# Patient Record
Sex: FEMALE | Race: OTHER | HISPANIC OR LATINO
[De-identification: names, ages, dates, MRNs, and addresses within clinical notes are randomized per-mention and may not be internally consistent; named-entity substitution may affect disease eponyms.]

---

## 2019-12-15 ENCOUNTER — TRANSCRIPTION ENCOUNTER (OUTPATIENT)
Age: 44
End: 2019-12-15

## 2020-05-26 ENCOUNTER — APPOINTMENT (OUTPATIENT)
Dept: UROLOGY | Facility: CLINIC | Age: 45
End: 2020-05-26
Payer: MEDICAID

## 2020-05-26 ENCOUNTER — OUTPATIENT (OUTPATIENT)
Dept: OUTPATIENT SERVICES | Facility: HOSPITAL | Age: 45
LOS: 1 days | End: 2020-05-26
Payer: MEDICAID

## 2020-05-26 VITALS
HEIGHT: 64 IN | TEMPERATURE: 98.3 F | WEIGHT: 238 LBS | HEART RATE: 79 BPM | DIASTOLIC BLOOD PRESSURE: 67 MMHG | BODY MASS INDEX: 40.63 KG/M2 | SYSTOLIC BLOOD PRESSURE: 100 MMHG

## 2020-05-26 DIAGNOSIS — E21.3 HYPERPARATHYROIDISM, UNSPECIFIED: ICD-10-CM

## 2020-05-26 PROCEDURE — 76775 US EXAM ABDO BACK WALL LIM: CPT | Mod: 26

## 2020-05-26 PROCEDURE — 99204 OFFICE O/P NEW MOD 45 MIN: CPT | Mod: 25

## 2020-05-26 PROCEDURE — 76775 US EXAM ABDO BACK WALL LIM: CPT

## 2020-05-26 NOTE — REVIEW OF SYSTEMS
[Feeling Tired] : feeling tired [Recent Weight Gain (___ Lbs)] : recent [unfilled] ~Ulb weight gain [Abdominal Pain] : abdominal pain [Constipation] : constipation [History of kidney stones] : history of kidney stones [Bladder pressure] : experiences bladder pressure [Strain or push to urinate] : strain or push to urinate [Itching] : itching [Negative] : Heme/Lymph

## 2020-05-26 NOTE — PHYSICAL EXAM
[General Appearance - Well Developed] : well developed [General Appearance - Well Nourished] : well nourished [Normal Appearance] : normal appearance [Well Groomed] : well groomed [General Appearance - In No Acute Distress] : no acute distress [Heart Rate And Rhythm] : Heart rate and rhythm were normal [Edema] : no peripheral edema [Exaggerated Use Of Accessory Muscles For Inspiration] : no accessory muscle use [Respiration, Rhythm And Depth] : normal respiratory rhythm and effort [Abdomen Soft] : soft [Abdomen Tenderness] : non-tender [Costovertebral Angle Tenderness] : no ~M costovertebral angle tenderness [Urinary Bladder Findings] : the bladder was normal on palpation [Normal Station and Gait] : the gait and station were normal for the patient's age [] : no rash [No Focal Deficits] : no focal deficits [Oriented To Time, Place, And Person] : oriented to person, place, and time [Affect] : the affect was normal [Mood] : the mood was normal [Not Anxious] : not anxious [No Palpable Adenopathy] : no palpable adenopathy [FreeTextEntry1] : soft and non tender.  Mild left CVA tenderness

## 2020-05-26 NOTE — ADDENDUM
[FreeTextEntry1] : This note was authored by Tracy Martinez working as scribe for Dr. González Cole. I, Dr. González Cole, have reviewed the content of this note and confirm it is true and accurate. I personally performed the history and physical examination and made all the decisions.\par 05/26/2020

## 2020-05-26 NOTE — ASSESSMENT
[FreeTextEntry1] : 05/26/2020: 45 year old female presents today for initial evaluation. Reports back pain on the left side in the lumbar area. Pt is experiencing suprapubic pain and pressure and increased levels of urine. This pressure began around 4 months ago. No vision problems. Bilateral upper chest discomfort. Reports itch skin in the face area. Denies depression and excess anxiety. Had an episode of stress incontinence with a large  amount of urine. Normally no urgency. Hx of kidney stones. Hx of parathyroid surgery in December 2000 and high calcium. \par \par US renal today showed there is a 1.2 cm x 1.4 cm x 0.9 cm hyperechoic nonvascular lesion in the upper pole of the left kidney. Both kidneys are normal in size and echogenicity without obvious stones, hydronephrosis visualized. Cyst is benign.\par \par Pt will provide a urine sample today for culture and analysis. \par \par Advised pt to discuss chest discomfort with PCP\par \par Bloodwork today will include Basic Metabolic Panel and Parathyroid Hormone Intact. \par \par Pt will have a pregnancy blood test and CT in two days if the results are negative.

## 2020-05-26 NOTE — REVIEW OF SYSTEMS
[Feeling Tired] : feeling tired [Recent Weight Gain (___ Lbs)] : recent [unfilled] ~Ulb weight gain [Abdominal Pain] : abdominal pain [Constipation] : constipation [History of kidney stones] : history of kidney stones [Bladder pressure] : experiences bladder pressure [Strain or push to urinate] : strain or push to urinate [Itching] : itching [Negative] : Endocrine

## 2020-05-26 NOTE — ADDENDUM
[FreeTextEntry1] : This note was authored by Tracy Martinez working as scribe for Dr. González Cole. I, Dr. Goznález Cole, have reviewed the content of this note and confirm it is true and accurate. I personally performed the history and physical examination and made all the decisions.\par 05/26/2020

## 2020-05-26 NOTE — LETTER BODY
[Dear  ___] : Dear  [unfilled], [Please see my note below.] : Please see my note below. [Consult Letter:] : I had the pleasure of evaluating your patient, [unfilled]. [Consult Closing:] : Thank you very much for allowing me to participate in the care of this patient.  If you have any questions, please do not hesitate to contact me. [Sincerely,] : Sincerely, [FreeTextEntry2] : Dr. Jose Grijalva\par [address] [facility] [address] \[place], [address] [FreeTextEntry1] : 05/26/2020: 45 year old female presents today for initial evaluation. Reports back pain on the left side in the lumbar area. Pt is experiencing suprapubic pain and pressure and increased levels of urine. This pressure began around 4 months ago. No vision problems. Bilateral upper chest discomfort. Reports itch skin in the face area. Denies depression and excess anxiety. Had an episode of stress incontinence with a large  amount of urine. Normally no urgency. Hx of kidney stones. Hx of parathyroid surgery in December 2000 and high calcium. \par \par US renal today showed there is a 1.2 cm x 1.4 cm x 0.9 cm hyperechoic nonvascular lesion in the upper pole of the left kidney. Both kidneys are normal in size and echogenicity without obvious stones, hydronephrosis visualized. Cyst is benign.\par \par Pt will provide a urine sample today for culture and analysis. \par \par Advised pt to discuss chest discomfort with PCP\par \par Bloodwork today will include Basic Metabolic Panel and Parathyroid Hormone Intact. \par \par Pt will have a pregnancy blood test and CT in two days if the results are negative.  [FreeTextEntry3] : Dr. González Cole

## 2020-05-26 NOTE — LETTER BODY
[Dear  ___] : Dear  [unfilled], [Please see my note below.] : Please see my note below. [Consult Letter:] : I had the pleasure of evaluating your patient, [unfilled]. [Consult Closing:] : Thank you very much for allowing me to participate in the care of this patient.  If you have any questions, please do not hesitate to contact me. [Sincerely,] : Sincerely, [FreeTextEntry2] : Dr. Jose Grijalva\par [address] [facility] [address] \[place], [address] [FreeTextEntry1] : 05/26/2020: 45 year old female presents today for initial evaluation. Reports back pain on the left side in the lumbar area. Pt is experiencing suprapubic pain and pressure and increased levels of urine. This pressure began around 4 months ago. No vision problems. Bilateral upper chest discomfort. Reports itch skin in the face area. Denies depression and excess anxiety. Had an episode of stress incontinence with a large  amount of urine. Normally no urgency. Hx of kidney stones. Hx of parathyroid surgery in December 2000 and high calcium. \par \par US renal today showed there is a 1.2 cm x 1.4 cm x 0.9 cm hyperechoic nonvascular lesion in the upper pole of the left kidney. Both kidneys are normal in size and echogenicity without obvious stones, hydronephrosis visualized. Cyst is benign.\par \par Pt will provide a urine sample today for culture and analysis. \par \par Advised pt to discuss chest discomfort with PCP\par \par Bloodwork today will include Basic Metabolic Panel and Parathyroid Hormone Intact. \par \par Pt will have a pregnancy blood test and CT in two days if the results are negative.  [FreeTextEntry3] : Dr. González Cole

## 2020-05-27 DIAGNOSIS — N20.0 CALCULUS OF KIDNEY: ICD-10-CM

## 2020-05-27 DIAGNOSIS — M54.5 LOW BACK PAIN: ICD-10-CM

## 2020-05-29 ENCOUNTER — APPOINTMENT (OUTPATIENT)
Dept: CT IMAGING | Facility: IMAGING CENTER | Age: 45
End: 2020-05-29
Payer: MEDICAID

## 2020-05-29 ENCOUNTER — TRANSCRIPTION ENCOUNTER (OUTPATIENT)
Age: 45
End: 2020-05-29

## 2020-05-29 ENCOUNTER — RESULT REVIEW (OUTPATIENT)
Age: 45
End: 2020-05-29

## 2020-05-29 ENCOUNTER — OUTPATIENT (OUTPATIENT)
Dept: OUTPATIENT SERVICES | Facility: HOSPITAL | Age: 45
LOS: 1 days | End: 2020-05-29
Payer: MEDICAID

## 2020-05-29 DIAGNOSIS — M54.5 LOW BACK PAIN: ICD-10-CM

## 2020-05-29 DIAGNOSIS — N20.0 CALCULUS OF KIDNEY: ICD-10-CM

## 2020-05-29 PROCEDURE — 74178 CT ABD&PLV WO CNTR FLWD CNTR: CPT | Mod: 26

## 2020-05-29 PROCEDURE — 74178 CT ABD&PLV WO CNTR FLWD CNTR: CPT

## 2020-06-01 LAB
APPEARANCE: CLEAR
BACTERIA: ABNORMAL
BILIRUBIN URINE: NEGATIVE
BLOOD URINE: NEGATIVE
COLOR: NORMAL
GLUCOSE QUALITATIVE U: NEGATIVE
HYALINE CASTS: 0 /LPF
KETONES URINE: NEGATIVE
LEUKOCYTE ESTERASE URINE: NEGATIVE
MICROSCOPIC-UA: NORMAL
NITRITE URINE: NEGATIVE
PH URINE: 6
PROTEIN URINE: NEGATIVE
RED BLOOD CELLS URINE: 2 /HPF
SPECIFIC GRAVITY URINE: 1.02
SQUAMOUS EPITHELIAL CELLS: 3 /HPF
UROBILINOGEN URINE: NORMAL
WHITE BLOOD CELLS URINE: 1 /HPF

## 2020-06-04 ENCOUNTER — APPOINTMENT (OUTPATIENT)
Dept: UROLOGY | Facility: CLINIC | Age: 45
End: 2020-06-04
Payer: MEDICAID

## 2020-06-04 VITALS
SYSTOLIC BLOOD PRESSURE: 120 MMHG | RESPIRATION RATE: 16 BRPM | DIASTOLIC BLOOD PRESSURE: 82 MMHG | TEMPERATURE: 98.1 F | HEART RATE: 83 BPM

## 2020-06-04 VITALS — TEMPERATURE: 98.1 F

## 2020-06-04 LAB
ANION GAP SERPL CALC-SCNC: 12 MMOL/L
BUN SERPL-MCNC: 15 MG/DL
CALCIUM SERPL-MCNC: 8.8 MG/DL
CALCIUM SERPL-MCNC: 8.8 MG/DL
CHLORIDE SERPL-SCNC: 104 MMOL/L
CO2 SERPL-SCNC: 24 MMOL/L
CREAT SERPL-MCNC: 0.85 MG/DL
GLUCOSE SERPL-MCNC: 102 MG/DL
HCG SERPL QL: NEGATIVE
PAPP-A SERPL-ACNC: <1 MIU/ML
PARATHYROID HORMONE INTACT: 48 PG/ML
POTASSIUM SERPL-SCNC: 4.4 MMOL/L
SODIUM SERPL-SCNC: 140 MMOL/L
URINE CYTOLOGY: NORMAL

## 2020-06-04 PROCEDURE — 99214 OFFICE O/P EST MOD 30 MIN: CPT

## 2020-06-04 NOTE — PHYSICAL EXAM
[General Appearance - Well Nourished] : well nourished [General Appearance - Well Developed] : well developed [Normal Appearance] : normal appearance [Well Groomed] : well groomed [General Appearance - In No Acute Distress] : no acute distress [Abdomen Soft] : soft [Abdomen Tenderness] : non-tender [Costovertebral Angle Tenderness] : no ~M costovertebral angle tenderness [Urinary Bladder Findings] : the bladder was normal on palpation [Heart Rate And Rhythm] : Heart rate and rhythm were normal [Edema] : no peripheral edema [] : no respiratory distress [Respiration, Rhythm And Depth] : normal respiratory rhythm and effort [Exaggerated Use Of Accessory Muscles For Inspiration] : no accessory muscle use [Oriented To Time, Place, And Person] : oriented to person, place, and time [Affect] : the affect was normal [Mood] : the mood was normal [Not Anxious] : not anxious [Normal Station and Gait] : the gait and station were normal for the patient's age [No Focal Deficits] : no focal deficits [No Palpable Adenopathy] : no palpable adenopathy [FreeTextEntry1] : 5/5 bilaterally

## 2020-06-04 NOTE — ADDENDUM
[FreeTextEntry1] : This note was authored by Tracy Martinez working as scribe for Dr. González Cole. I, Dr. González Cole, have reviewed the content of this note and confirm it is true and accurate. I personally performed the history and physical examination and made all the decisions.\par 06/04/2020

## 2020-06-04 NOTE — LETTER BODY
[Dear  ___] : Dear  [unfilled], [Consult Letter:] : I had the pleasure of evaluating your patient, [unfilled]. [Please see my note below.] : Please see my note below. [Consult Closing:] : Thank you very much for allowing me to participate in the care of this patient.  If you have any questions, please do not hesitate to contact me. [Sincerely,] : Sincerely, [FreeTextEntry2] : Dr. Jose Grijalva\par [address] [facility] [address] \[place], [address] [FreeTextEntry1] : 05/26/2020: 45 year old female presents today for initial evaluation. Reports back pain on the left side in the lumbar area. Pt is experiencing suprapubic pain and pressure and increased levels of urine. This pressure began around 4 months ago. No vision problems. Bilateral upper chest discomfort. Reports itch skin in the face area. Denies depression and excess anxiety. Had an episode of stress incontinence with a large  amount of urine. Normally no urgency. Hx of kidney stones. Hx of parathyroid surgery in December 2000 and high calcium. \par \par US renal today showed there is a 1.2 cm x 1.4 cm x 0.9 cm hyperechoic nonvascular lesion in the upper pole of the left kidney. Both kidneys are normal in size and echogenicity without obvious stones, hydronephrosis visualized. Cyst is benign.\par \par Pt will provide a urine sample today for culture and analysis. \par \par Advised pt to discuss chest discomfort with PCP\par \par Bloodwork today will include Basic Metabolic Panel and Parathyroid Hormone Intact. \par \par Pt will have a pregnancy blood test and CT in two days if the results are negative.  [FreeTextEntry3] : Dr. González Cole

## 2020-06-04 NOTE — ASSESSMENT
Called and LM to call back in the morning of 11/15/2017. (have tasked lawson at  as well). No orders and would benefit from seeing provider rather than LAB only visit.   ~ Ceasar CRUZ (Chrissi) CMA     [FreeTextEntry1] : 05/26/2020: 45 year old female presents today for initial evaluation. Reports back pain on the left side in the lumbar area. Pt is experiencing suprapubic pain and pressure and increased levels of urine. This pressure began around 4 months ago. No vision problems. Bilateral upper chest discomfort. Reports itch skin in the face area. Denies depression and excess anxiety. Had an episode of stress incontinence with a large  amount of urine. Normally no urgency. Hx of kidney stones. Hx of parathyroid surgery in December 2000 and high calcium. US renal today showed there is a 1.2 cm x 1.4 cm x 0.9 cm hyperechoic nonvascular lesion in the upper pole of the left kidney. Both kidneys are normal in size and echogenicity without obvious stones, hydronephrosis visualized. Cyst is benign. Pt will provide a urine sample today for culture and analysis. Advised pt to discuss chest discomfort with PCP. Bloodwork today will include Basic Metabolic Panel and Parathyroid Hormone Intact. Pt will have a pregnancy blood test and CT in two days if the results are negative. \par \par 06/04/2020: Patient presents today for a follow up. US renal from 05/26/2020 shows there is a 1.2 cm x 1.4 cm x 0.9 cm hyperechoic nonvascular lesion in the upper pole of the left kidney. Both kidneys are normal in size and echogenicity without obvious stones, hydronephrosis visualized. Pregnancy test was negative. CT from 05/29/2020 shows no blockage, tumors or stones in the kidney. Bladder is normal. Pt reports continuations of abdominal pain. Hx of stones. Urine showed occasional bacteria. Pt is still on phenazopyridine. There is suprapubic pressure which is relieved when she urinates. \par \par Prescribed Amoxicillin\par \par RTO in 2 weeks.

## 2020-06-04 NOTE — HISTORY OF PRESENT ILLNESS
[FreeTextEntry1] : 05/26/2020: 45 year old female presents today for initial evaluation. Reports back pain on the left side in the lumbar area. Pt is experiencing suprapubic pain and pressure and increased levels of urine. This pressure began around 4 months ago. No vision problems. Bilateral upper chest discomfort. Reports itch skin in the face area. Denies depression and excess anxiety. Had an episode of stress incontinence with a large  amount of urine. Normally no urgency. Hx of kidney stones. Hx of parathyroid surgery in December 2000 and high calcium. \par \par 06/04/2020: Patient presents today for a follow up. US renal from 05/26/2020 shows there is a 1.2 cm x 1.4 cm x 0.9 cm hyperechoic nonvascular lesion in the upper pole of the left kidney. Both kidneys are normal in size and echogenicity without obvious stones, hydronephrosis visualized. Pregnancy test was negative. CT from 05/29/2020 shows no blockage, tumors or stones in the kidney. Bladder is normal. Pt reports continuations of abdominal pain. Hx of stones. Urine showed occasional bacteria. Pt is still on phenazopyridine. There is suprapubic pressure which is relieved when she urinates.

## 2020-06-06 LAB — BACTERIA UR CULT: ABNORMAL

## 2020-06-18 ENCOUNTER — APPOINTMENT (OUTPATIENT)
Dept: UROLOGY | Facility: CLINIC | Age: 45
End: 2020-06-18
Payer: MEDICAID

## 2020-06-18 VITALS
HEART RATE: 83 BPM | BODY MASS INDEX: 40.63 KG/M2 | SYSTOLIC BLOOD PRESSURE: 107 MMHG | WEIGHT: 238 LBS | HEIGHT: 64 IN | RESPIRATION RATE: 16 BRPM | DIASTOLIC BLOOD PRESSURE: 72 MMHG

## 2020-06-18 VITALS — TEMPERATURE: 98.2 F

## 2020-06-18 PROCEDURE — 99214 OFFICE O/P EST MOD 30 MIN: CPT

## 2020-06-18 NOTE — LETTER BODY
[Consult Letter:] : I had the pleasure of evaluating your patient, [unfilled]. [Dear  ___] : Dear  [unfilled], [Please see my note below.] : Please see my note below. [Sincerely,] : Sincerely, [Consult Closing:] : Thank you very much for allowing me to participate in the care of this patient.  If you have any questions, please do not hesitate to contact me. [FreeTextEntry2] : Dr. Jose Grijalva\par [address] [facility] [address] \[place], [address] [FreeTextEntry1] : 05/26/2020: 45 year old female presents today for initial evaluation. Reports back pain on the left side in the lumbar area. Pt is experiencing suprapubic pain and pressure and increased levels of urine. This pressure began around 4 months ago. No vision problems. Bilateral upper chest discomfort. Reports itch skin in the face area. Denies depression and excess anxiety. Had an episode of stress incontinence with a large  amount of urine. Normally no urgency. Hx of kidney stones. Hx of parathyroid surgery in December 2000 and high calcium. \par \par US renal today showed there is a 1.2 cm x 1.4 cm x 0.9 cm hyperechoic nonvascular lesion in the upper pole of the left kidney. Both kidneys are normal in size and echogenicity without obvious stones, hydronephrosis visualized. Cyst is benign.\par \par Pt will provide a urine sample today for culture and analysis. \par \par Advised pt to discuss chest discomfort with PCP\par \par Bloodwork today will include Basic Metabolic Panel and Parathyroid Hormone Intact. \par \par Pt will have a pregnancy blood test and CT in two days if the results are negative.  [FreeTextEntry3] : Dr. González Cole

## 2020-06-18 NOTE — ADDENDUM
[FreeTextEntry1] : This note was authored by Tracy Martinez working as scribe for Dr. González Cole. I, Dr. González Cole, have reviewed the content of this note and confirm it is true and accurate. I personally performed the history and physical examination and made all the decisions.\par 06/18/2020

## 2020-06-18 NOTE — HISTORY OF PRESENT ILLNESS
[FreeTextEntry1] : 05/26/2020: 45 year old female presents today for initial evaluation. Reports back pain on the left side in the lumbar area. Pt is experiencing suprapubic pain and pressure and increased levels of urine. This pressure began around 4 months ago. No vision problems. Bilateral upper chest discomfort. Reports itch skin in the face area. Denies depression and excess anxiety. Had an episode of stress incontinence with a large  amount of urine. Normally no urgency. Hx of kidney stones. Hx of parathyroid surgery in December 2000 and high calcium. \par 06/04/2020: Patient presents today for a follow up. US renal from 05/26/2020 shows there is a 1.2 cm x 1.4 cm x 0.9 cm hyperechoic nonvascular lesion in the upper pole of the left kidney. Both kidneys are normal in size and echogenicity without obvious stones, hydronephrosis visualized. Pregnancy test was negative. CT from 05/29/2020 shows no blockage, tumors or stones in the kidney. Bladder is normal. Pt reports continuations of abdominal pain. Hx of stones. Urine showed occasional bacteria. Pt is still on phenazopyridine. There is suprapubic pressure which is relieved when she urinates. \par \par 06/18/2020: Patient presents today for a follow up. Pt works as a  for a baking delivery service. Pt finished Amoxicillin TID. She believes it has helped. She finished it yesterday. Last night, her suprapubic pressure returned. While she was on Amoxicillin, her pressure was milder but still present and stronger at night. She drank water and forced herself to void. She reports some difficulty in voiding. Once she voided some of the pressure was relieved, but came back. Never a smoker. Creatinine was 0.84 on 05/26/2020.

## 2020-06-18 NOTE — ASSESSMENT
[FreeTextEntry1] : 05/26/2020: 45 year old female presents today for initial evaluation. Reports back pain on the left side in the lumbar area. Pt is experiencing suprapubic pain and pressure and increased levels of urine. This pressure began around 4 months ago. No vision problems. Bilateral upper chest discomfort. Reports itch skin in the face area. Denies depression and excess anxiety. Had an episode of stress incontinence with a large  amount of urine. Normally no urgency. Hx of kidney stones. Hx of parathyroid surgery in December 2000 and high calcium. US renal today showed there is a 1.2 cm x 1.4 cm x 0.9 cm hyperechoic nonvascular lesion in the upper pole of the left kidney. Both kidneys are normal in size and echogenicity without obvious stones, hydronephrosis visualized. Cyst is benign. Pt will provide a urine sample today for culture and analysis. Advised pt to discuss chest discomfort with PCP. Bloodwork today will include Basic Metabolic Panel and Parathyroid Hormone Intact. Pt will have a pregnancy blood test and CT in two days if the results are negative. \par 06/04/2020: Patient presents today for a follow up. US renal from 05/26/2020 shows there is a 1.2 cm x 1.4 cm x 0.9 cm hyperechoic nonvascular lesion in the upper pole of the left kidney. Both kidneys are normal in size and echogenicity without obvious stones, hydronephrosis visualized. Pregnancy test was negative. CT from 05/29/2020 shows no blockage, tumors or stones in the kidney. Bladder is normal. Pt reports continuations of abdominal pain. Hx of stones. Urine showed occasional bacteria. Pt is still on phenazopyridine. There is suprapubic pressure which is relieved when she urinates. Prescribed Amoxicillin. RTO in 2 weeks. \par \par 06/18/2020: Patient presents today for a follow up. Pt works as a  for a baking delivery service. Pt finished Amoxicillin TID. She believes it has helped. She finished it yesterday. Last night, her suprapubic pressure returned. While she was on Amoxicillin, her pressure was milder but still present and stronger at night. She drank water and forced herself to void. She reports some difficulty in voiding. Once she voided some of the pressure was relieved, but came back. Never a smoker. Creatinine was 0.84 on 05/26/2020. \par \par Prescribed Nitrofurantoin qhs and Tamsulosin after dinner. \par \par Pt will provide a urine sample today for culture, cytology and analysis. \par \par Scheduled cystoscopy for next week.

## 2020-06-18 NOTE — PHYSICAL EXAM
[General Appearance - Well Developed] : well developed [General Appearance - Well Nourished] : well nourished [Well Groomed] : well groomed [Normal Appearance] : normal appearance [General Appearance - In No Acute Distress] : no acute distress [Abdomen Tenderness] : non-tender [Abdomen Soft] : soft [Costovertebral Angle Tenderness] : no ~M costovertebral angle tenderness [Urinary Bladder Findings] : the bladder was normal on palpation [Heart Rate And Rhythm] : Heart rate and rhythm were normal [Edema] : no peripheral edema [] : no respiratory distress [Respiration, Rhythm And Depth] : normal respiratory rhythm and effort [Exaggerated Use Of Accessory Muscles For Inspiration] : no accessory muscle use [Oriented To Time, Place, And Person] : oriented to person, place, and time [Affect] : the affect was normal [Mood] : the mood was normal [Not Anxious] : not anxious [Normal Station and Gait] : the gait and station were normal for the patient's age [No Focal Deficits] : no focal deficits [No Palpable Adenopathy] : no palpable adenopathy [FreeTextEntry1] : 5/5 bilaterally

## 2020-06-21 LAB
APPEARANCE: CLEAR
BACTERIA UR CULT: NORMAL
BACTERIA: NEGATIVE
BILIRUBIN URINE: NEGATIVE
BLOOD URINE: NEGATIVE
COLOR: YELLOW
GLUCOSE QUALITATIVE U: NEGATIVE
HYALINE CASTS: 1 /LPF
KETONES URINE: NEGATIVE
LEUKOCYTE ESTERASE URINE: NEGATIVE
MICROSCOPIC-UA: NORMAL
NITRITE URINE: NEGATIVE
PH URINE: 6.5
PROTEIN URINE: NEGATIVE
RED BLOOD CELLS URINE: 2 /HPF
SPECIFIC GRAVITY URINE: 1.02
SQUAMOUS EPITHELIAL CELLS: 1 /HPF
URINE CYTOLOGY: NORMAL
UROBILINOGEN URINE: NORMAL
WHITE BLOOD CELLS URINE: 0 /HPF

## 2020-06-29 ENCOUNTER — APPOINTMENT (OUTPATIENT)
Dept: UROLOGY | Facility: CLINIC | Age: 45
End: 2020-06-29
Payer: MEDICAID

## 2020-06-29 VITALS — RESPIRATION RATE: 16 BRPM | HEART RATE: 94 BPM | SYSTOLIC BLOOD PRESSURE: 127 MMHG | DIASTOLIC BLOOD PRESSURE: 73 MMHG

## 2020-06-29 VITALS — TEMPERATURE: 97.7 F

## 2020-06-29 DIAGNOSIS — N34.2 OTHER URETHRITIS: ICD-10-CM

## 2020-06-29 PROCEDURE — 99214 OFFICE O/P EST MOD 30 MIN: CPT

## 2020-06-29 NOTE — HISTORY OF PRESENT ILLNESS
[FreeTextEntry1] : 05/26/2020: 45 year old female presents today for initial evaluation. Reports back pain on the left side in the lumbar area. Pt is experiencing suprapubic pain and pressure and increased levels of urine. This pressure began around 4 months ago. No vision problems. Bilateral upper chest discomfort. Reports itch skin in the face area. Denies depression and excess anxiety. Had an episode of stress incontinence with a large  amount of urine. Normally no urgency. Hx of kidney stones. Hx of parathyroid surgery in December 2000 and high calcium. \par 06/04/2020: Patient presents today for a follow up. US renal from 05/26/2020 shows there is a 1.2 cm x 1.4 cm x 0.9 cm hyperechoic nonvascular lesion in the upper pole of the left kidney. Both kidneys are normal in size and echogenicity without obvious stones, hydronephrosis visualized. Pregnancy test was negative. CT from 05/29/2020 shows no blockage, tumors or stones in the kidney. Bladder is normal. Pt reports continuations of abdominal pain. Hx of stones. Urine showed occasional bacteria. Pt is still on phenazopyridine. There is suprapubic pressure which is relieved when she urinates. \par 06/18/2020: Patient presents today for a follow up. Pt works as a  for a baking delivery service. Pt finished Amoxicillin TID. She believes it has helped. She finished it yesterday. Last night, her suprapubic pressure returned. While she was on Amoxicillin, her pressure was milder but still present and stronger at night. She drank water and forced herself to void. She reports some difficulty in voiding. Once she voided some of the pressure was relieved, but came back. Never a smoker. Creatinine was 0.84 on 05/26/2020. \par \par 06/29/2020: Patient presents today for a follow up. Pt complains of pressure during urination. There is some improvement. Pt is taking Tamsulosin once daily after meal and Nitrofurantoin qhs. Urine from 06/18/2020 showed no unusual results. \par

## 2020-06-29 NOTE — LETTER BODY
[Dear  ___] : Dear  [unfilled], [Please see my note below.] : Please see my note below. [Consult Closing:] : Thank you very much for allowing me to participate in the care of this patient.  If you have any questions, please do not hesitate to contact me. [Consult Letter:] : I had the pleasure of evaluating your patient, [unfilled]. [Sincerely,] : Sincerely, [FreeTextEntry2] : Dr. Jose Grijalva\par [address] [facility] [address] \[place], [address] [FreeTextEntry1] : 05/26/2020: 45 year old female presents today for initial evaluation. Reports back pain on the left side in the lumbar area. Pt is experiencing suprapubic pain and pressure and increased levels of urine. This pressure began around 4 months ago. No vision problems. Bilateral upper chest discomfort. Reports itch skin in the face area. Denies depression and excess anxiety. Had an episode of stress incontinence with a large  amount of urine. Normally no urgency. Hx of kidney stones. Hx of parathyroid surgery in December 2000 and high calcium. \par \par US renal today showed there is a 1.2 cm x 1.4 cm x 0.9 cm hyperechoic nonvascular lesion in the upper pole of the left kidney. Both kidneys are normal in size and echogenicity without obvious stones, hydronephrosis visualized. Cyst is benign.\par \par Pt will provide a urine sample today for culture and analysis. \par \par Advised pt to discuss chest discomfort with PCP\par \par Bloodwork today will include Basic Metabolic Panel and Parathyroid Hormone Intact. \par \par Pt will have a pregnancy blood test and CT in two days if the results are negative.  [FreeTextEntry3] : Dr. González Cole

## 2020-06-29 NOTE — PHYSICAL EXAM
[General Appearance - Well Developed] : well developed [General Appearance - Well Nourished] : well nourished [Well Groomed] : well groomed [Normal Appearance] : normal appearance [General Appearance - In No Acute Distress] : no acute distress [Costovertebral Angle Tenderness] : no ~M costovertebral angle tenderness [Abdomen Tenderness] : non-tender [Abdomen Soft] : soft [Urinary Bladder Findings] : the bladder was normal on palpation [Heart Rate And Rhythm] : Heart rate and rhythm were normal [Edema] : no peripheral edema [] : no respiratory distress [Respiration, Rhythm And Depth] : normal respiratory rhythm and effort [Oriented To Time, Place, And Person] : oriented to person, place, and time [Exaggerated Use Of Accessory Muscles For Inspiration] : no accessory muscle use [Affect] : the affect was normal [Mood] : the mood was normal [Not Anxious] : not anxious [Normal Station and Gait] : the gait and station were normal for the patient's age [No Focal Deficits] : no focal deficits [No Palpable Adenopathy] : no palpable adenopathy [FreeTextEntry1] : right pulse 1+, left pulse is faint, RRR

## 2020-06-29 NOTE — ASSESSMENT
[FreeTextEntry1] : 05/26/2020: 45 year old female presents today for initial evaluation. Reports back pain on the left side in the lumbar area. Pt is experiencing suprapubic pain and pressure and increased levels of urine. This pressure began around 4 months ago. No vision problems. Bilateral upper chest discomfort. Reports itch skin in the face area. Denies depression and excess anxiety. Had an episode of stress incontinence with a large  amount of urine. Normally no urgency. Hx of kidney stones. Hx of parathyroid surgery in December 2000 and high calcium. US renal today showed there is a 1.2 cm x 1.4 cm x 0.9 cm hyperechoic nonvascular lesion in the upper pole of the left kidney. Both kidneys are normal in size and echogenicity without obvious stones, hydronephrosis visualized. Cyst is benign. Pt will provide a urine sample today for culture and analysis. Advised pt to discuss chest discomfort with PCP. Bloodwork today will include Basic Metabolic Panel and Parathyroid Hormone Intact. Pt will have a pregnancy blood test and CT in two days if the results are negative. \par 06/04/2020: Patient presents today for a follow up. US renal from 05/26/2020 shows there is a 1.2 cm x 1.4 cm x 0.9 cm hyperechoic nonvascular lesion in the upper pole of the left kidney. Both kidneys are normal in size and echogenicity without obvious stones, hydronephrosis visualized. Pregnancy test was negative. CT from 05/29/2020 shows no blockage, tumors or stones in the kidney. Bladder is normal. Pt reports continuations of abdominal pain. Hx of stones. Urine showed occasional bacteria. Pt is still on phenazopyridine. There is suprapubic pressure which is relieved when she urinates. Prescribed Amoxicillin. RTO in 2 weeks. \par 06/18/2020: Patient presents today for a follow up. Pt works as a  for a baking delivery service. Pt finished Amoxicillin TID. She believes it has helped. She finished it yesterday. Last night, her suprapubic pressure returned. While she was on Amoxicillin, her pressure was milder but still present and stronger at night. She drank water and forced herself to void. She reports some difficulty in voiding. Once she voided some of the pressure was relieved, but came back. Never a smoker. Creatinine was 0.84 on 05/26/2020. Prescribed Nitrofurantoin qhs and Tamsulosin after dinner. Pt will provide a urine sample today for culture, cytology and analysis. \par Scheduled cystoscopy for next week.\par \par 06/29/2020: Patient presents today for a follow up. Pt complains of pressure during urination. There is some improvement. Pt is taking Tamsulosin once daily after meal and Nitrofurantoin qhs. Urine from 06/18/2020 showed no unusual results. \par \par Increased Tamsulosin to BID \par \par Pt will provide a urine sample today for culture, cytology and analysis. Pt is on her period currently and is concerned about contaminating the sample. \par \par RTO in 2 weeks. Scheduled cystoscopy.

## 2020-06-30 LAB
APPEARANCE: CLEAR
BILIRUBIN URINE: NEGATIVE
BLOOD URINE: NEGATIVE
COLOR: YELLOW
GLUCOSE QUALITATIVE U: NEGATIVE
KETONES URINE: NEGATIVE
LEUKOCYTE ESTERASE URINE: NEGATIVE
NITRITE URINE: NEGATIVE
PH URINE: 6.5
PROTEIN URINE: NORMAL
SPECIFIC GRAVITY URINE: 1.02
URINE CYTOLOGY: NORMAL
UROBILINOGEN URINE: NORMAL

## 2020-07-08 ENCOUNTER — APPOINTMENT (OUTPATIENT)
Dept: UROLOGY | Facility: CLINIC | Age: 45
End: 2020-07-08
Payer: MEDICAID

## 2020-07-08 ENCOUNTER — OUTPATIENT (OUTPATIENT)
Dept: OUTPATIENT SERVICES | Facility: HOSPITAL | Age: 45
LOS: 1 days | End: 2020-07-08
Payer: MEDICAID

## 2020-07-08 VITALS
DIASTOLIC BLOOD PRESSURE: 80 MMHG | TEMPERATURE: 97.5 F | HEART RATE: 79 BPM | SYSTOLIC BLOOD PRESSURE: 117 MMHG | RESPIRATION RATE: 16 BRPM

## 2020-07-08 VITALS — TEMPERATURE: 97.5 F

## 2020-07-08 DIAGNOSIS — R39.89 OTHER SYMPTOMS AND SIGNS INVOLVING THE GENITOURINARY SYSTEM: ICD-10-CM

## 2020-07-08 DIAGNOSIS — R10.2 PELVIC AND PERINEAL PAIN: ICD-10-CM

## 2020-07-08 DIAGNOSIS — R35.0 FREQUENCY OF MICTURITION: ICD-10-CM

## 2020-07-08 DIAGNOSIS — R30.0 DYSURIA: ICD-10-CM

## 2020-07-08 DIAGNOSIS — M54.5 LOW BACK PAIN: ICD-10-CM

## 2020-07-08 PROCEDURE — 88112 CYTOPATH CELL ENHANCE TECH: CPT | Mod: 26

## 2020-07-08 PROCEDURE — 99214 OFFICE O/P EST MOD 30 MIN: CPT | Mod: 25

## 2020-07-08 PROCEDURE — 52000 CYSTOURETHROSCOPY: CPT

## 2020-07-08 NOTE — ADDENDUM
[FreeTextEntry1] : This note was authored by Tracy Martinez working as scribe for Dr. González Cole. I, Dr. González Cole, have reviewed the content of this note and confirm it is true and accurate. I personally performed the history and physical examination and made all the decisions.\par 07/08/2020

## 2020-07-08 NOTE — ASSESSMENT
[FreeTextEntry1] : 05/26/2020: 45 year old female presents today for initial evaluation. Reports back pain on the left side in the lumbar area. Pt is experiencing suprapubic pain and pressure and increased levels of urine. This pressure began around 4 months ago. No vision problems. Bilateral upper chest discomfort. Reports itch skin in the face area. Denies depression and excess anxiety. Had an episode of stress incontinence with a large  amount of urine. Normally no urgency. Hx of kidney stones. Hx of parathyroid surgery in December 2000 and high calcium. US renal today showed there is a 1.2 cm x 1.4 cm x 0.9 cm hyperechoic nonvascular lesion in the upper pole of the left kidney. Both kidneys are normal in size and echogenicity without obvious stones, hydronephrosis visualized. Cyst is benign. Pt will provide a urine sample today for culture and analysis. Advised pt to discuss chest discomfort with PCP. Bloodwork today will include Basic Metabolic Panel and Parathyroid Hormone Intact. Pt will have a pregnancy blood test and CT in two days if the results are negative. \par 06/04/2020: Patient presents today for a follow up. US renal from 05/26/2020 shows there is a 1.2 cm x 1.4 cm x 0.9 cm hyperechoic nonvascular lesion in the upper pole of the left kidney. Both kidneys are normal in size and echogenicity without obvious stones, hydronephrosis visualized. Pregnancy test was negative. CT from 05/29/2020 shows no blockage, tumors or stones in the kidney. Bladder is normal. Pt reports continuations of abdominal pain. Hx of stones. Urine showed occasional bacteria. Pt is still on phenazopyridine. There is suprapubic pressure which is relieved when she urinates. Prescribed Amoxicillin. RTO in 2 weeks. \par 06/18/2020: Patient presents today for a follow up. Pt works as a  for a baking delivery service. Pt finished Amoxicillin TID. She believes it has helped. She finished it yesterday. Last night, her suprapubic pressure returned. While she was on Amoxicillin, her pressure was milder but still present and stronger at night. She drank water and forced herself to void. She reports some difficulty in voiding. Once she voided some of the pressure was relieved, but came back. Never a smoker. Creatinine was 0.84 on 05/26/2020. Prescribed Nitrofurantoin qhs and Tamsulosin after dinner. Pt will provide a urine sample today for culture, cytology and analysis. \par Scheduled cystoscopy for next week.\par 06/29/2020: Patient presents today for a follow up. Pt complains of pressure during urination. There is some improvement. Pt is taking Tamsulosin once daily after meal and Nitrofurantoin qhs. Urine from 06/18/2020 showed no unusual results. Increased Tamsulosin to BID. Pt will provide a urine sample today for culture, cytology and analysis. Pt is on her period currently and is concerned about contaminating the sample. RTO in 2 weeks. Scheduled cystoscopy. \par \par 07/08/2020: Patient presents today for cystoscopy. Ureteral orifices are at normal positions in the Trigone. No bladder stones. Cystoscopy is retroflex. Trigone is normal. No areas of inflammation or urothelial neoplasia. No inflammatory polyps of the bladder neck. No urethral strictures, stones or tumors. Reports burning and pain in the pelvic area unrelated to the cystoscopy. She is still feeling pressure in her suprapubic area. The pain is induced when she has the urge to void. When she voids, she has the urge to void again immediately afterwards and must void 2-3 times in subsequently small amounts before she feels she's emptied. When she pressures on her lower abdomen, the pain becomes more severe. CT urogram on 05/29/2020 reveals no unusual results. No unusual growths or infections in urine. \par \par Prescribed Oxybutynin to take along with Tamsulosin. Discussed side effects. Advised to sugarless candy for dry mouth and stool softener for constipation. If it does not work, we will schedule the pt for UDS. \par \par Pt will provide a urine sample today for culture, cytology and analysis. \par \par RTO in 2 weeks.

## 2020-07-08 NOTE — LETTER BODY
[Dear  ___] : Dear  [unfilled], [Sincerely,] : Sincerely, [Consult Closing:] : Thank you very much for allowing me to participate in the care of this patient.  If you have any questions, please do not hesitate to contact me. [Consult Letter:] : I had the pleasure of evaluating your patient, [unfilled]. [Please see my note below.] : Please see my note below. [FreeTextEntry2] : Dr. Jose Grijalva\par [address] [facility] [address] \[place], [address] [FreeTextEntry1] : 05/26/2020: 45 year old female presents today for initial evaluation. Reports back pain on the left side in the lumbar area. Pt is experiencing suprapubic pain and pressure and increased levels of urine. This pressure began around 4 months ago. No vision problems. Bilateral upper chest discomfort. Reports itch skin in the face area. Denies depression and excess anxiety. Had an episode of stress incontinence with a large  amount of urine. Normally no urgency. Hx of kidney stones. Hx of parathyroid surgery in December 2000 and high calcium. \par \par US renal today showed there is a 1.2 cm x 1.4 cm x 0.9 cm hyperechoic nonvascular lesion in the upper pole of the left kidney. Both kidneys are normal in size and echogenicity without obvious stones, hydronephrosis visualized. Cyst is benign.\par \par Pt will provide a urine sample today for culture and analysis. \par \par Advised pt to discuss chest discomfort with PCP\par \par Bloodwork today will include Basic Metabolic Panel and Parathyroid Hormone Intact. \par \par Pt will have a pregnancy blood test and CT in two days if the results are negative.  [FreeTextEntry3] : Dr. González Cole

## 2020-07-08 NOTE — HISTORY OF PRESENT ILLNESS
[FreeTextEntry1] : 05/26/2020: 45 year old female presents today for initial evaluation. Reports back pain on the left side in the lumbar area. Pt is experiencing suprapubic pain and pressure and increased levels of urine. This pressure began around 4 months ago. No vision problems. Bilateral upper chest discomfort. Reports itch skin in the face area. Denies depression and excess anxiety. Had an episode of stress incontinence with a large  amount of urine. Normally no urgency. Hx of kidney stones. Hx of parathyroid surgery in December 2000 and high calcium. \par 06/04/2020: Patient presents today for a follow up. US renal from 05/26/2020 shows there is a 1.2 cm x 1.4 cm x 0.9 cm hyperechoic nonvascular lesion in the upper pole of the left kidney. Both kidneys are normal in size and echogenicity without obvious stones, hydronephrosis visualized. Pregnancy test was negative. CT from 05/29/2020 shows no blockage, tumors or stones in the kidney. Bladder is normal. Pt reports continuations of abdominal pain. Hx of stones. Urine showed occasional bacteria. Pt is still on phenazopyridine. There is suprapubic pressure which is relieved when she urinates. \par 06/18/2020: Patient presents today for a follow up. Pt works as a  for a baking delivery service. Pt finished Amoxicillin TID. She believes it has helped. She finished it yesterday. Last night, her suprapubic pressure returned. While she was on Amoxicillin, her pressure was milder but still present and stronger at night. She drank water and forced herself to void. She reports some difficulty in voiding. Once she voided some of the pressure was relieved, but came back. Never a smoker. Creatinine was 0.84 on 05/26/2020. \par 06/29/2020: Patient presents today for a follow up. Pt complains of pressure during urination. There is some improvement. Pt is taking Tamsulosin once daily after meal and Nitrofurantoin qhs. Urine from 06/18/2020 showed no unusual results. \par \par 07/08/2020: Patient presents today for cystoscopy. Ureteral orifices are at normal positions in the Trigone. No bladder stones. Cystoscopy is retroflex. Trigone is normal. No areas of inflammation or urothelial neoplasia. No inflammatory polyps of the bladder neck. No urethral strictures, stones or tumors. Reports burning and pain in the pelvic area unrelated to the cystoscopy. She is still feeling pressure in her suprapubic area. The pain is induced when she has the urge to void. When she voids, she has the urge to void again immediately afterwards and must void 2-3 times in subsequently small amounts before she feels she's emptied. When she pressures on her lower abdomen, the pain becomes more severe. CT urogram on 05/29/2020 reveals no unusual results. No unusual growths or infections in urine.

## 2020-07-08 NOTE — PHYSICAL EXAM
[General Appearance - Well Nourished] : well nourished [General Appearance - Well Developed] : well developed [Normal Appearance] : normal appearance [General Appearance - In No Acute Distress] : no acute distress [Abdomen Soft] : soft [Well Groomed] : well groomed [Abdomen Tenderness] : non-tender [Costovertebral Angle Tenderness] : no ~M costovertebral angle tenderness [Urinary Bladder Findings] : the bladder was normal on palpation [Edema] : no peripheral edema [Heart Rate And Rhythm] : Heart rate and rhythm were normal [Exaggerated Use Of Accessory Muscles For Inspiration] : no accessory muscle use [] : no respiratory distress [Respiration, Rhythm And Depth] : normal respiratory rhythm and effort [Oriented To Time, Place, And Person] : oriented to person, place, and time [Affect] : the affect was normal [Mood] : the mood was normal [Normal Station and Gait] : the gait and station were normal for the patient's age [Not Anxious] : not anxious [No Focal Deficits] : no focal deficits [No Palpable Adenopathy] : no palpable adenopathy [FreeTextEntry1] : right pulse 1+, left pulse is faint, RRR

## 2020-07-09 LAB
APPEARANCE: CLEAR
BACTERIA: ABNORMAL
BILIRUBIN URINE: NEGATIVE
BLOOD URINE: NEGATIVE
COLOR: NORMAL
GLUCOSE QUALITATIVE U: NEGATIVE
HYALINE CASTS: 3 /LPF
KETONES URINE: NEGATIVE
LEUKOCYTE ESTERASE URINE: ABNORMAL
MICROSCOPIC-UA: NORMAL
NITRITE URINE: NEGATIVE
PH URINE: 6
PROTEIN URINE: NEGATIVE
RED BLOOD CELLS URINE: 3 /HPF
SPECIFIC GRAVITY URINE: 1.02
SQUAMOUS EPITHELIAL CELLS: 4 /HPF
URINE CYTOLOGY: NORMAL
UROBILINOGEN URINE: NORMAL
WHITE BLOOD CELLS URINE: 5 /HPF

## 2020-07-10 LAB — BACTERIA UR CULT: NORMAL

## 2020-07-16 DIAGNOSIS — R39.89 OTHER SYMPTOMS AND SIGNS INVOLVING THE GENITOURINARY SYSTEM: ICD-10-CM

## 2020-07-16 DIAGNOSIS — R10.2 PELVIC AND PERINEAL PAIN: ICD-10-CM

## 2020-07-16 DIAGNOSIS — N20.0 CALCULUS OF KIDNEY: ICD-10-CM

## 2020-07-16 DIAGNOSIS — M54.5 LOW BACK PAIN: ICD-10-CM

## 2020-07-16 DIAGNOSIS — E66.9 OBESITY, UNSPECIFIED: ICD-10-CM

## 2020-07-16 DIAGNOSIS — R30.0 DYSURIA: ICD-10-CM

## 2020-07-28 ENCOUNTER — APPOINTMENT (OUTPATIENT)
Dept: OBGYN | Facility: HOSPITAL | Age: 45
End: 2020-07-28

## 2020-09-22 ENCOUNTER — RESULT REVIEW (OUTPATIENT)
Age: 45
End: 2020-09-22

## 2020-09-22 ENCOUNTER — OUTPATIENT (OUTPATIENT)
Dept: OUTPATIENT SERVICES | Facility: HOSPITAL | Age: 45
LOS: 1 days | End: 2020-09-22

## 2020-09-22 ENCOUNTER — APPOINTMENT (OUTPATIENT)
Dept: OBGYN | Facility: HOSPITAL | Age: 45
End: 2020-09-22
Payer: MEDICAID

## 2020-09-22 VITALS
SYSTOLIC BLOOD PRESSURE: 133 MMHG | TEMPERATURE: 98 F | BODY MASS INDEX: 41.48 KG/M2 | DIASTOLIC BLOOD PRESSURE: 83 MMHG | WEIGHT: 243 LBS | HEART RATE: 72 BPM | HEIGHT: 64 IN

## 2020-09-22 PROCEDURE — 99213 OFFICE O/P EST LOW 20 MIN: CPT | Mod: 25,GC

## 2020-09-22 RX ORDER — NORGESTIMATE AND ETHINYL ESTRADIOL 0.25-0.035
0.25-35 KIT ORAL DAILY
Qty: 1 | Refills: 3 | Status: COMPLETED | OUTPATIENT
Start: 2020-09-22

## 2020-09-23 LAB
C TRACH RRNA SPEC QL NAA+PROBE: SIGNIFICANT CHANGE UP
C TRACH+GC RRNA SPEC QL PROBE: SIGNIFICANT CHANGE UP
HPV HIGH+LOW RISK DNA PNL CVX: SIGNIFICANT CHANGE UP
N GONORRHOEA RRNA SPEC QL NAA+PROBE: SIGNIFICANT CHANGE UP

## 2020-09-26 LAB — CYTOLOGY SPEC DOC CYTO: SIGNIFICANT CHANGE UP

## 2020-09-27 NOTE — PLAN
[FreeTextEntry1] : #Health Maintenance \par -Pap performed today with HPV and G/C \par -Referral for mammogram given \par -Rx for Sprintec , advised to use condoms as well \par -To be seen annually \par \par #Suprapubic Pressure\par -Continue to follow with Dr. Cole( Urology) \par \par \par d/w Dr. Guillen \par Rakel Brunner, PGY-1

## 2020-09-27 NOTE — PHYSICAL EXAM
[Appropriately responsive] : appropriately responsive [Alert] : alert [No Acute Distress] : no acute distress [No Lymphadenopathy] : no lymphadenopathy [Regular Rate Rhythm] : regular rate rhythm [No Murmurs] : no murmurs [Clear to Auscultation B/L] : clear to auscultation bilaterally [Soft] : soft [Non-tender] : non-tender [Non-distended] : non-distended [No HSM] : No HSM [No Lesions] : no lesions [No Mass] : no mass [Oriented x3] : oriented x3 [Examination Of The Breasts] : a normal appearance [No Masses] : no breast masses were palpable [Labia Majora] : normal [Labia Minora] : normal [Normal] : normal [Uterine Adnexae] : normal [FreeTextEntry4] : no cystocele noted on examination [FreeTextEntry8] : no uterine or adnexal masses appreciated

## 2020-09-27 NOTE — REVIEW OF SYSTEMS
[Negative] : Heme/Lymph [Abdominal Pain] : no abdominal pain [Constipation] : no constipation [Diarrhea] : diarrhea [Vomiting] : no vomiting [Melena] : no melena [Nausea] : no nausea [Urgency] : no urgency [Frequency] : no frequency [Pelvic pain] : no pelvic pain [CVA Pain] : no CVA pain

## 2020-10-02 DIAGNOSIS — Z01.419 ENCOUNTER FOR GYNECOLOGICAL EXAMINATION (GENERAL) (ROUTINE) WITHOUT ABNORMAL FINDINGS: ICD-10-CM

## 2021-01-03 ENCOUNTER — INPATIENT (INPATIENT)
Facility: HOSPITAL | Age: 46
LOS: 58 days | Discharge: HOME HEALTH | End: 2021-03-03
Attending: INTERNAL MEDICINE | Admitting: INTERNAL MEDICINE
Payer: MEDICAID

## 2021-01-03 VITALS
OXYGEN SATURATION: 80 % | RESPIRATION RATE: 28 BRPM | SYSTOLIC BLOOD PRESSURE: 137 MMHG | DIASTOLIC BLOOD PRESSURE: 90 MMHG | TEMPERATURE: 99 F | HEART RATE: 96 BPM

## 2021-01-03 DIAGNOSIS — Z02.9 ENCOUNTER FOR ADMINISTRATIVE EXAMINATIONS, UNSPECIFIED: ICD-10-CM

## 2021-01-03 DIAGNOSIS — J96.01 ACUTE RESPIRATORY FAILURE WITH HYPOXIA: ICD-10-CM

## 2021-01-03 DIAGNOSIS — U07.1 COVID-19: ICD-10-CM

## 2021-01-03 DIAGNOSIS — Z29.9 ENCOUNTER FOR PROPHYLACTIC MEASURES, UNSPECIFIED: ICD-10-CM

## 2021-01-03 LAB
ALBUMIN SERPL ELPH-MCNC: 4 G/DL — SIGNIFICANT CHANGE UP (ref 3.3–5)
ALP SERPL-CCNC: 90 U/L — SIGNIFICANT CHANGE UP (ref 40–120)
ALT FLD-CCNC: 20 U/L — SIGNIFICANT CHANGE UP (ref 4–33)
ANION GAP SERPL CALC-SCNC: 13 MMOL/L — SIGNIFICANT CHANGE UP (ref 7–14)
APTT BLD: 27 SEC — SIGNIFICANT CHANGE UP (ref 27–36.3)
AST SERPL-CCNC: <5 U/L — LOW (ref 4–32)
BASOPHILS # BLD AUTO: 0.01 K/UL — SIGNIFICANT CHANGE UP (ref 0–0.2)
BASOPHILS NFR BLD AUTO: 0.1 % — SIGNIFICANT CHANGE UP (ref 0–2)
BILIRUB SERPL-MCNC: 0.3 MG/DL — SIGNIFICANT CHANGE UP (ref 0.2–1.2)
BLD GP AB SCN SERPL QL: NEGATIVE — SIGNIFICANT CHANGE UP
BUN SERPL-MCNC: 13 MG/DL — SIGNIFICANT CHANGE UP (ref 7–23)
CALCIUM SERPL-MCNC: 8.8 MG/DL — SIGNIFICANT CHANGE UP (ref 8.4–10.5)
CHLORIDE SERPL-SCNC: 100 MMOL/L — SIGNIFICANT CHANGE UP (ref 98–107)
CO2 SERPL-SCNC: 22 MMOL/L — SIGNIFICANT CHANGE UP (ref 22–31)
CREAT SERPL-MCNC: 0.74 MG/DL — SIGNIFICANT CHANGE UP (ref 0.5–1.3)
CRP SERPL-MCNC: 81.3 MG/L — HIGH
D DIMER BLD IA.RAPID-MCNC: 243 NG/ML DDU — HIGH
EOSINOPHIL # BLD AUTO: 0 K/UL — SIGNIFICANT CHANGE UP (ref 0–0.5)
EOSINOPHIL NFR BLD AUTO: 0 % — SIGNIFICANT CHANGE UP (ref 0–6)
FERRITIN SERPL-MCNC: 106 NG/ML — SIGNIFICANT CHANGE UP (ref 15–150)
GLUCOSE SERPL-MCNC: 118 MG/DL — HIGH (ref 70–99)
HCT VFR BLD CALC: 33.7 % — LOW (ref 34.5–45)
HGB BLD-MCNC: 11 G/DL — LOW (ref 11.5–15.5)
IANC: 8.47 K/UL — SIGNIFICANT CHANGE UP (ref 1.5–8.5)
IMM GRANULOCYTES NFR BLD AUTO: 1 % — SIGNIFICANT CHANGE UP (ref 0–1.5)
INR BLD: 1.05 RATIO — SIGNIFICANT CHANGE UP (ref 0.88–1.16)
LDH SERPL L TO P-CCNC: 355 U/L — HIGH (ref 135–225)
LYMPHOCYTES # BLD AUTO: 1.32 K/UL — SIGNIFICANT CHANGE UP (ref 1–3.3)
LYMPHOCYTES # BLD AUTO: 12.6 % — LOW (ref 13–44)
MCHC RBC-ENTMCNC: 27.4 PG — SIGNIFICANT CHANGE UP (ref 27–34)
MCHC RBC-ENTMCNC: 32.6 GM/DL — SIGNIFICANT CHANGE UP (ref 32–36)
MCV RBC AUTO: 84 FL — SIGNIFICANT CHANGE UP (ref 80–100)
MONOCYTES # BLD AUTO: 0.6 K/UL — SIGNIFICANT CHANGE UP (ref 0–0.9)
MONOCYTES NFR BLD AUTO: 5.7 % — SIGNIFICANT CHANGE UP (ref 2–14)
NEUTROPHILS # BLD AUTO: 8.47 K/UL — HIGH (ref 1.8–7.4)
NEUTROPHILS NFR BLD AUTO: 80.6 % — HIGH (ref 43–77)
NRBC # BLD: 0 /100 WBCS — SIGNIFICANT CHANGE UP
NRBC # FLD: 0 K/UL — SIGNIFICANT CHANGE UP
PLATELET # BLD AUTO: 236 K/UL — SIGNIFICANT CHANGE UP (ref 150–400)
POTASSIUM SERPL-MCNC: 3.9 MMOL/L — SIGNIFICANT CHANGE UP (ref 3.5–5.3)
POTASSIUM SERPL-SCNC: 3.9 MMOL/L — SIGNIFICANT CHANGE UP (ref 3.5–5.3)
PROT SERPL-MCNC: 7.8 G/DL — SIGNIFICANT CHANGE UP (ref 6–8.3)
PROTHROM AB SERPL-ACNC: 12.1 SEC — SIGNIFICANT CHANGE UP (ref 10.6–13.6)
RBC # BLD: 4.01 M/UL — SIGNIFICANT CHANGE UP (ref 3.8–5.2)
RBC # FLD: 14.7 % — HIGH (ref 10.3–14.5)
RH IG SCN BLD-IMP: POSITIVE — SIGNIFICANT CHANGE UP
SARS-COV-2 RNA SPEC QL NAA+PROBE: DETECTED
SODIUM SERPL-SCNC: 135 MMOL/L — SIGNIFICANT CHANGE UP (ref 135–145)
WBC # BLD: 10.51 K/UL — HIGH (ref 3.8–10.5)
WBC # FLD AUTO: 10.51 K/UL — HIGH (ref 3.8–10.5)

## 2021-01-03 PROCEDURE — 99223 1ST HOSP IP/OBS HIGH 75: CPT

## 2021-01-03 PROCEDURE — 71045 X-RAY EXAM CHEST 1 VIEW: CPT | Mod: 26

## 2021-01-03 PROCEDURE — 99285 EMERGENCY DEPT VISIT HI MDM: CPT

## 2021-01-03 RX ORDER — DEXAMETHASONE 0.5 MG/5ML
6 ELIXIR ORAL EVERY 24 HOURS
Refills: 0 | Status: DISCONTINUED | OUTPATIENT
Start: 2021-01-04 | End: 2021-01-05

## 2021-01-03 RX ORDER — DEXAMETHASONE 0.5 MG/5ML
6 ELIXIR ORAL ONCE
Refills: 0 | Status: DISCONTINUED | OUTPATIENT
Start: 2021-01-03 | End: 2021-01-03

## 2021-01-03 RX ORDER — ALBUTEROL 90 UG/1
2 AEROSOL, METERED ORAL EVERY 6 HOURS
Refills: 0 | Status: DISCONTINUED | OUTPATIENT
Start: 2021-01-03 | End: 2021-01-06

## 2021-01-03 RX ORDER — DEXAMETHASONE 0.5 MG/5ML
6 ELIXIR ORAL ONCE
Refills: 0 | Status: COMPLETED | OUTPATIENT
Start: 2021-01-03 | End: 2021-01-03

## 2021-01-03 RX ADMIN — Medication 6 MILLIGRAM(S): at 16:18

## 2021-01-03 RX ADMIN — ALBUTEROL 2 PUFF(S): 90 AEROSOL, METERED ORAL at 16:19

## 2021-01-03 NOTE — H&P ADULT - PROBLEM SELECTOR PLAN 1
SUBJECTIVE:    Patient is a 82y old Female who presents with a chief complaint of Urinary Retention/Hperkalemia with EKG changes (08 Mar 2019 21:09)    Currently admitted to medicine with the primary diagnosis of Bladder cancer     Today is hospital day 32d. This morning she is resting comfortably in bed and reports no new issues or overnight events.     PAST MEDICAL & SURGICAL HISTORY  Anemia: 4 PRBC 11/2018  Oxygen dependent: O2 2L Via NC  Bladder cancer: 2018  CHF (congestive heart failure)  Breast tumor: removal benign bl breasts  Anal cancer: Chemo and radiation 1992  COPD (chronic obstructive pulmonary disease)  Lung disease: COPD  Hypertension  History of cystoscopy: 10/2018  H/O breast surgery: 39 yrs. ago, bilateral  History of back surgery: 10 yrs. ago stimulator implant 2001, 2011( cervical and Lumbar)    SOCIAL HISTORY:  Negative for smoking/alcohol/drug use.     ALLERGIES:  hair dyes (Hives)  sulfa drugs (Headache)  Zoloft (Headache)    MEDICATIONS:  STANDING MEDICATIONS  ALBUTerol/ipratropium for Nebulization 3 milliLiter(s) Nebulizer every 6 hours  amLODIPine   Tablet 10 milliGRAM(s) Oral daily  aspirin  chewable 81 milliGRAM(s) Oral daily  atorvastatin 20 milliGRAM(s) Oral at bedtime  buDESOnide 160 MICROgram(s)/formoterol 4.5 MICROgram(s) Inhaler 2 Puff(s) Inhalation two times a day  busPIRone 5 milliGRAM(s) Oral three times a day  chlorhexidine 4% Liquid 1 Application(s) Topical <User Schedule>  docusate sodium 200 milliGRAM(s) Oral two times a day  DULoxetine 30 milliGRAM(s) Oral at bedtime  epoetin spencer Injectable 15963 Unit(s) SubCutaneous every 7 days  famotidine    Tablet 20 milliGRAM(s) Oral daily  hydrocortisone 2.5% Rectal Cream 1 Application(s) Rectal daily  iohexol 300 mG (iodine)/mL Oral Solution 30 milliLiter(s) Oral once  metoprolol succinate ER 50 milliGRAM(s) Oral daily  senna 2 Tablet(s) Oral daily  vancomycin    Solution 125 milliGRAM(s) Oral every 6 hours    PRN MEDICATIONS  acetylcysteine 10%  Inhalation 3 milliLiter(s) Inhalation two times a day PRN  bisacodyl Suppository 10 milliGRAM(s) Rectal every 24 hours PRN  cloNIDine 0.1 milliGRAM(s) Oral every 8 hours PRN  HYDROcodone 10 mG/acetaminophen 325 mG 1 Tablet(s) Oral every 4 hours PRN  ondansetron Injectable 4 milliGRAM(s) IV Push every 8 hours PRN  simethicone 80 milliGRAM(s) Chew every 8 hours PRN  sodium chloride 3%  Inhalation 3 milliLiter(s) Inhalation every 4 hours PRN    VITALS:   T(F): 96.7  HR: 94  BP: 140/63  RR: 16  SpO2: 97%    LABS:                        6.8    8.47  )-----------( 204      ( 08 Mar 2019 15:26 )             23.6     03-08    149<H>  |  113<H>  |  49<H>  ----------------------------<  138<H>  5.0   |  32  |  0.7    Ca    8.0<L>      08 Mar 2019 15:26    TPro  4.8<L>  /  Alb  2.3<L>  /  TBili  0.3  /  DBili  x   /  AST  104<H>  /  ALT  135<H>  /  AlkPhos  1123<H>  03-08    PT/INR - ( 08 Mar 2019 15:26 )   PT: 11.90 sec;   INR: 1.04 ratio         PTT - ( 08 Mar 2019 15:26 )  PTT:29.2 sec              RADIOLOGY:    PHYSICAL EXAM:  GEN: No acute distress  LUNGS: Clear to auscultation bilaterally   HEART: Regular  ABD: Soft, non-tender, non-distended.  EXT: NC/NC/NE/2+PP/SARAVIA/Skin Intact.   NEURO: AAOX3    Intravenous access:   NG tube:   Rowe Catheter:   Indwelling Urethral Catheter:     Connect To:  Straight Drainage/Maybeury    Indication:  Urinary Retention / Obstruction    Additional Instructions:  if more then 250cc, keep in place (03-08-19 @ 18:13) (not performed) [active] Patient w/ acute hypoxic respiratory failure likely 2/2 to COVID given exposure  - f/u COVID test   - continue with dexamethasone for 10 days   - will start remdesivir once COVID test comes back and weight entered  - COVID labs ordered   - will order Lovenox once weight entered

## 2021-01-03 NOTE — H&P ADULT - NSHPPHYSICALEXAM_GEN_ALL_CORE
VITAL SIGNS:  T(F): 98.6 (01-03-21 @ 15:18), Max: 98.6 (01-03-21 @ 15:18)  HR: 93 (01-03-21 @ 16:24) (93 - 96)  BP: 137/79 (01-03-21 @ 16:24) (137/79 - 137/90)  BP(mean): --  RR: 25 (01-03-21 @ 16:24) (25 - 28)  SpO2: 94% (01-03-21 @ 16:24) (80% - 94%)    PHYSICAL EXAM:  Constitutional: Middle aged woman, dyspneic with speaking, comfortable at rest   HEENT: anicteric sclera, no nasal discharge; uvula midline, no oropharyngeal erythema or exudates; slightly dry mucous membranes   Neck: supple   Respiratory: Exam limited patient splinting due to pleuritic rib pain. However otherwise clear w/o wheezes.   Cardiac: +S1/S2; RRR; no M/R/G  Gastrointestinal: soft, NT/ND; no rebound or guarding; +BSx4  Genitourinary: not examined  Back: spine midline  Extremities: WWP, no clubbing or cyanosis; no peripheral edema  Musculoskeletal: NROM x4; no joint swelling, tenderness or erythema  Vascular: 2+ radial, DP/PT pulses B/L  Dermatologic: skin warm, dry and intact; no rashes, wounds, or scars  Lymphatic: no submandibular or cervical LAD  Neurologic: AAOx3; CNII-XII grossly intact; no focal deficits  Psychiatric: affect and characteristics of appearance, verbalizations, behaviors are appropriate

## 2021-01-03 NOTE — ED PROVIDER NOTE - CLINICAL SUMMARY MEDICAL DECISION MAKING FREE TEXT BOX
45 y/o F denies pmh c/o 1 week of generalized headache, fever, chills, cough, diarrhea and 2 days of worsening SOB. Pt reports her  tested positive on 12/29 for covid. Pt reports pleuritic chest pain. Denies abd pain, v/d, dysuria.    covid infection  -labs, cxr, albuterol, dexamethasone Sammy att: 47 y/o F denies pmh c/o 1 week of generalized headache, fever, chills, cough, diarrhea and 2 days of worsening SOB. Pt reports her  tested positive on 12/29 for covid. Pt reports pleuritic chest pain. Denies abd pain, v/d, dysuria.    covid infection  -labs, cxr, albuterol, dexamethasone

## 2021-01-03 NOTE — H&P ADULT - HISTORY OF PRESENT ILLNESS
Patient is a 46 year old woman with no past medical history coming in with shortness of breath, cough,  Patient is a 46 year old woman with no past medical history coming in with shortness of breath, cough productive of white sputum, pleuritic rib pain (moderate in severity) for two days and diarrhea for one day. Patient  tested positive for COVID 12/29. Patient denies loss of taste or smell. Patient denies sore throat however states mouth is dry. Patient denies any history of lung disease or smoking. Patient states she was having subjective fevers at home however never took her temperature. Patient was taking an unknown antibiotic prescribed by a doctor. Unable to give name of doctor or name of abx. Patient dyspneic during with talking during interview.   ED Course: T 98.6, HR 96, /90, S 80% on RA. White count 10k. Hemoglobin 11.0. Plt 236. D-dimer 243. CRP 81.3. . CXR peripheral opacities consistent w/ COVID. Patient given 6 of dexamethasone. Patient admitted for acute hypoxic respiratory failure in the setting of likely COVID infection.

## 2021-01-03 NOTE — ED PROVIDER NOTE - SECONDARY DIAGNOSIS.
----- Message from Luisa Peters sent at 2/6/2017 12:31 PM CST -----  Contact: chris Brooke  Ms Mendy is giving patients BP readings- 142/120, Ms Mendy will be faxing over a trend in patients BP readings, please call Ms Mendy back if needed at [phone]. Thank you   Shortness of breath

## 2021-01-03 NOTE — ED ADULT NURSE REASSESSMENT NOTE - NS ED NURSE REASSESS COMMENT FT1
pt received alert and oriented x4. respirations appeared labored. pt positioned to her side. pt appears less sob after reposition . nsr on cm. iv intact. Call bell in reach, warm blanket provided, bed in lowest position, side rails up x2,safety maintained. will continue to monitor.. pt waiting for transport to (T

## 2021-01-03 NOTE — ED ADULT NURSE NOTE - OBJECTIVE STATEMENT
pt a&o x3 reports fevers, cough and sob. states her  is covid +. pt on NRB and 10l N/C with o2 sat 92-96. md assessed. right ac 20gplaced.

## 2021-01-03 NOTE — H&P ADULT - NSHPSOCIALHISTORY_GEN_ALL_CORE
patient non-smoker, non-drinker, no drug use. Lives with her  who is COVID +. Daughter also with COVID.

## 2021-01-03 NOTE — H&P ADULT - ASSESSMENT
Patient is a 46 year old woman with no past medical history coming in with shortness of breath, cough productive of white sputum, pleuritic rib pain (moderate in severity) for two days and diarrhea for one day.

## 2021-01-03 NOTE — ED PROVIDER NOTE - OBJECTIVE STATEMENT
45 y/o F denies pmh c/o 1 week of generalized headache, fever, chills, cough, diarrhea and 2 days of worsening SOB. Pt reports her  tested positive on 12/29 for covid. Pt reports pleuritic chest pain. Denies abd pain, v/d, dysuria.

## 2021-01-03 NOTE — H&P ADULT - NSHPREVIEWOFSYSTEMS_GEN_ALL_CORE
REVIEW OF SYSTEMS:  CONSTITUTIONAL: Patient denies weakness, fevers or chills  EYES/ENT: Patient denies visual changes;  denies vertigo or throat pain   NECK: Patient denies pain or stiffness  RESPIRATORY: Patient denies cough, wheezing, hemoptysis; denies shortness of breath  CARDIOVASCULAR: Patient denies chest pain or palpitations  GASTROINTESTINAL: Patient denies abdominal or epigastric pain, nausea, vomiting, or hematemesis, diarrhea or constipation, melena or hematochezia.  GENITOURINARY: Patient denies dysuria, frequency or hematuria  NEUROLOGICAL: Patient denies numbness or weakness  SKIN: Patient denies itching, burning, rashes, or lesions   All other review of systems is negative unless indicated above. REVIEW OF SYSTEMS:  CONSTITUTIONAL: + generalized weakness, + subjective fever   EYES/ENT: Patient denies visual changes;  denies vertigo or throat pain, + dry mouth    NECK: Patient denies pain or stiffness  RESPIRATORY: + productive cough, wheezing, hemoptysis; + shortness off breath, + pleuritic rib pain  CARDIOVASCULAR: Patient denies chest pain or palpitations  GASTROINTESTINAL: Patient denies abdominal or epigastric pain, nausea, vomiting, or hematemesis, constipation, melena or hematochezia. + diarrhea of one day duration   GENITOURINARY: Patient denies dysuria, frequency or hematuria  NEUROLOGICAL: Patient denies numbness or weakness  SKIN: Patient denies itching, burning, rashes, or lesions   All other review of systems is negative unless indicated above.

## 2021-01-03 NOTE — H&P ADULT - NSHPLABSRESULTS_GEN_ALL_CORE
LABS:                         11.0   10.51 )-----------( 236      ( 03 Jan 2021 16:31 )             33.7     01-03    135  |  100  |  13  ----------------------------<  118<H>  3.9   |  22  |  0.74    Ca    8.8      03 Jan 2021 16:32    TPro  7.8  /  Alb  4.0  /  TBili  0.3  /  DBili  x   /  AST  <5<L>  /  ALT  20  /  AlkPhos  90  01-03                  RADIOLOGY, EKG & ADDITIONAL TESTS: Reviewed.

## 2021-01-04 LAB
ALBUMIN SERPL ELPH-MCNC: 3.8 G/DL — SIGNIFICANT CHANGE UP (ref 3.3–5)
ALBUMIN SERPL ELPH-MCNC: 3.8 G/DL — SIGNIFICANT CHANGE UP (ref 3.3–5)
ALP SERPL-CCNC: 88 U/L — SIGNIFICANT CHANGE UP (ref 40–120)
ALP SERPL-CCNC: 89 U/L — SIGNIFICANT CHANGE UP (ref 40–120)
ALT FLD-CCNC: 17 U/L — SIGNIFICANT CHANGE UP (ref 4–33)
ALT FLD-CCNC: 17 U/L — SIGNIFICANT CHANGE UP (ref 4–33)
ANION GAP SERPL CALC-SCNC: 10 MMOL/L — SIGNIFICANT CHANGE UP (ref 7–14)
AST SERPL-CCNC: 23 U/L — SIGNIFICANT CHANGE UP (ref 4–32)
AST SERPL-CCNC: 35 U/L — HIGH (ref 4–32)
BASOPHILS # BLD AUTO: 0.02 K/UL — SIGNIFICANT CHANGE UP (ref 0–0.2)
BASOPHILS NFR BLD AUTO: 0.2 % — SIGNIFICANT CHANGE UP (ref 0–2)
BILIRUB DIRECT SERPL-MCNC: <0.2 MG/DL — SIGNIFICANT CHANGE UP (ref 0–0.2)
BILIRUB INDIRECT FLD-MCNC: >0.1 MG/DL — SIGNIFICANT CHANGE UP (ref 0–1)
BILIRUB SERPL-MCNC: 0.3 MG/DL — SIGNIFICANT CHANGE UP (ref 0.2–1.2)
BILIRUB SERPL-MCNC: 0.3 MG/DL — SIGNIFICANT CHANGE UP (ref 0.2–1.2)
BUN SERPL-MCNC: 14 MG/DL — SIGNIFICANT CHANGE UP (ref 7–23)
CALCIUM SERPL-MCNC: 8.9 MG/DL — SIGNIFICANT CHANGE UP (ref 8.4–10.5)
CHLORIDE SERPL-SCNC: 101 MMOL/L — SIGNIFICANT CHANGE UP (ref 98–107)
CK SERPL-CCNC: 93 U/L — SIGNIFICANT CHANGE UP (ref 25–170)
CO2 SERPL-SCNC: 27 MMOL/L — SIGNIFICANT CHANGE UP (ref 22–31)
CREAT SERPL-MCNC: 0.77 MG/DL — SIGNIFICANT CHANGE UP (ref 0.5–1.3)
CREAT SERPL-MCNC: 0.78 MG/DL — SIGNIFICANT CHANGE UP (ref 0.5–1.3)
EOSINOPHIL # BLD AUTO: 0 K/UL — SIGNIFICANT CHANGE UP (ref 0–0.5)
EOSINOPHIL NFR BLD AUTO: 0 % — SIGNIFICANT CHANGE UP (ref 0–6)
GLUCOSE SERPL-MCNC: 155 MG/DL — HIGH (ref 70–99)
HCT VFR BLD CALC: 36.5 % — SIGNIFICANT CHANGE UP (ref 34.5–45)
HGB BLD-MCNC: 11.4 G/DL — LOW (ref 11.5–15.5)
IANC: 9.56 K/UL — HIGH (ref 1.5–8.5)
IMM GRANULOCYTES NFR BLD AUTO: 1.2 % — SIGNIFICANT CHANGE UP (ref 0–1.5)
LYMPHOCYTES # BLD AUTO: 0.82 K/UL — LOW (ref 1–3.3)
LYMPHOCYTES # BLD AUTO: 7.5 % — LOW (ref 13–44)
MAGNESIUM SERPL-MCNC: 2.3 MG/DL — SIGNIFICANT CHANGE UP (ref 1.6–2.6)
MCHC RBC-ENTMCNC: 27 PG — SIGNIFICANT CHANGE UP (ref 27–34)
MCHC RBC-ENTMCNC: 31.2 GM/DL — LOW (ref 32–36)
MCV RBC AUTO: 86.3 FL — SIGNIFICANT CHANGE UP (ref 80–100)
MONOCYTES # BLD AUTO: 0.45 K/UL — SIGNIFICANT CHANGE UP (ref 0–0.9)
MONOCYTES NFR BLD AUTO: 4.1 % — SIGNIFICANT CHANGE UP (ref 2–14)
NEUTROPHILS # BLD AUTO: 9.56 K/UL — HIGH (ref 1.8–7.4)
NEUTROPHILS NFR BLD AUTO: 87 % — HIGH (ref 43–77)
NRBC # BLD: 0 /100 WBCS — SIGNIFICANT CHANGE UP
NRBC # FLD: 0 K/UL — SIGNIFICANT CHANGE UP
PLATELET # BLD AUTO: 286 K/UL — SIGNIFICANT CHANGE UP (ref 150–400)
POTASSIUM SERPL-MCNC: 4.6 MMOL/L — SIGNIFICANT CHANGE UP (ref 3.5–5.3)
POTASSIUM SERPL-SCNC: 4.6 MMOL/L — SIGNIFICANT CHANGE UP (ref 3.5–5.3)
PROCALCITONIN SERPL-MCNC: 0.11 NG/ML — HIGH (ref 0.02–0.1)
PROT SERPL-MCNC: 7.9 G/DL — SIGNIFICANT CHANGE UP (ref 6–8.3)
PROT SERPL-MCNC: 8 G/DL — SIGNIFICANT CHANGE UP (ref 6–8.3)
RBC # BLD: 4.23 M/UL — SIGNIFICANT CHANGE UP (ref 3.8–5.2)
RBC # FLD: 14.9 % — HIGH (ref 10.3–14.5)
SODIUM SERPL-SCNC: 138 MMOL/L — SIGNIFICANT CHANGE UP (ref 135–145)
TROPONIN T, HIGH SENSITIVITY RESULT: <6 NG/L — SIGNIFICANT CHANGE UP
WBC # BLD: 10.98 K/UL — HIGH (ref 3.8–10.5)
WBC # FLD AUTO: 10.98 K/UL — HIGH (ref 3.8–10.5)

## 2021-01-04 RX ORDER — ACETAMINOPHEN 500 MG
650 TABLET ORAL EVERY 6 HOURS
Refills: 0 | Status: DISCONTINUED | OUTPATIENT
Start: 2021-01-04 | End: 2021-01-16

## 2021-01-04 RX ORDER — REMDESIVIR 5 MG/ML
INJECTION INTRAVENOUS
Refills: 0 | Status: DISCONTINUED | OUTPATIENT
Start: 2021-01-04 | End: 2021-01-05

## 2021-01-04 RX ORDER — REMDESIVIR 5 MG/ML
100 INJECTION INTRAVENOUS EVERY 24 HOURS
Refills: 0 | Status: DISCONTINUED | OUTPATIENT
Start: 2021-01-05 | End: 2021-01-05

## 2021-01-04 RX ORDER — INFLUENZA VIRUS VACCINE 15; 15; 15; 15 UG/.5ML; UG/.5ML; UG/.5ML; UG/.5ML
0.5 SUSPENSION INTRAMUSCULAR ONCE
Refills: 0 | Status: DISCONTINUED | OUTPATIENT
Start: 2021-01-04 | End: 2021-01-12

## 2021-01-04 RX ORDER — ENOXAPARIN SODIUM 100 MG/ML
40 INJECTION SUBCUTANEOUS EVERY 12 HOURS
Refills: 0 | Status: DISCONTINUED | OUTPATIENT
Start: 2021-01-04 | End: 2021-01-12

## 2021-01-04 RX ORDER — REMDESIVIR 5 MG/ML
200 INJECTION INTRAVENOUS EVERY 24 HOURS
Refills: 0 | Status: COMPLETED | OUTPATIENT
Start: 2021-01-04 | End: 2021-01-04

## 2021-01-04 RX ADMIN — ENOXAPARIN SODIUM 40 MILLIGRAM(S): 100 INJECTION SUBCUTANEOUS at 05:04

## 2021-01-04 RX ADMIN — Medication 650 MILLIGRAM(S): at 14:17

## 2021-01-04 RX ADMIN — REMDESIVIR 500 MILLIGRAM(S): 5 INJECTION INTRAVENOUS at 11:19

## 2021-01-04 RX ADMIN — Medication 6 MILLIGRAM(S): at 18:38

## 2021-01-04 RX ADMIN — ENOXAPARIN SODIUM 40 MILLIGRAM(S): 100 INJECTION SUBCUTANEOUS at 18:38

## 2021-01-04 RX ADMIN — Medication 650 MILLIGRAM(S): at 13:17

## 2021-01-04 NOTE — PATIENT PROFILE ADULT - NSPROIMPLANTSMEDDEV_GEN_A_NUR
The patient's sleep hygiene is unchanged.  He does nap on the couch without CPAP (see above).  I recommended that he wear his CPAP whenever he sleeps.  He is drinking 5-6 cups of coffee every day.  Moderation discussed.  He is rather vague about how much alcohol he was drinking.  Once again the effects of alcohol on sleep were discussed and moderation was recommended.  Good sleep hygiene was discussed and encouraged.   None

## 2021-01-04 NOTE — PROGRESS NOTE ADULT - SUBJECTIVE AND OBJECTIVE BOX
Subjective: Patient seen and examined. No new events except as noted.     REVIEW OF SYSTEMS:    CONSTITUTIONAL: No weakness, fevers or chills  EYES/ENT: No visual changes;  No vertigo or throat pain   NECK: No pain or stiffness  RESPIRATORY: No cough, wheezing, hemoptysis; No shortness of breath  CARDIOVASCULAR: No chest pain or palpitations  GASTROINTESTINAL: No abdominal or epigastric pain. No nausea, vomiting, or hematemesis; No diarrhea or constipation. No melena or hematochezia.  GENITOURINARY: No dysuria, frequency or hematuria  NEUROLOGICAL: No numbness or weakness  SKIN: No itching, burning, rashes, or lesions   All other review of systems is negative unless indicated above.    MEDICATIONS:  MEDICATIONS  (STANDING):  dexAMETHasone     Tablet 6 milliGRAM(s) Oral every 24 hours  enoxaparin Injectable 40 milliGRAM(s) SubCutaneous every 12 hours  influenza   Vaccine 0.5 milliLiter(s) IntraMuscular once  remdesivir  IVPB   IV Intermittent       PHYSICAL EXAM:  T(C): 37 (01-04-21 @ 15:36), Max: 37.2 (01-03-21 @ 22:30)  HR: 90 (01-04-21 @ 15:55) (90 - 100)  BP: 119/74 (01-04-21 @ 15:36) (104/56 - 133/76)  RR: 20 (01-04-21 @ 15:55) (20 - 22)  SpO2: 94% (01-04-21 @ 15:55) (90% - 99%)  Wt(kg): --  I&O's Summary    Height (cm): 165.1 (01-03 @ 22:30)  Weight (kg): 109.8 (01-03 @ 22:30)  BMI (kg/m2): 40.3 (01-03 @ 22:30)  BSA (m2): 2.15 (01-03 @ 22:30)    Appearance: Normal	  HEENT:  PERRLA   Lymphatic: No lymphadenopathy   Cardiovascular: Normal S1 S2, no JVD  Respiratory: normal effort , clear  Gastrointestinal:  Soft, Non-tender  Skin: No rashes,  warm to touch  Psychiatry:  Mood & affect appropriate  Musculuskeletal: No edema      All labs, Imaging and EKGs personally reviewed                           11.4   10.98 )-----------( 286      ( 04 Jan 2021 07:22 )             36.5               01-04    138  |  101  |  14  ----------------------------<  155<H>  4.6   |  27  |  0.78    Ca    8.9      04 Jan 2021 07:22  Mg     2.3     01-04    TPro  7.9  /  Alb  3.8  /  TBili  0.3  /  DBili  <0.2  /  AST  35<H>  /  ALT  17  /  AlkPhos  89  01-04    PT/INR - ( 03 Jan 2021 16:31 )   PT: 12.1 sec;   INR: 1.05 ratio         PTT - ( 03 Jan 2021 16:31 )  PTT:27.0 sec       CARDIAC MARKERS ( 04 Jan 2021 07:22 )  x     / x     / 93 U/L / x     / x

## 2021-01-05 LAB
ALBUMIN SERPL ELPH-MCNC: 3.3 G/DL — SIGNIFICANT CHANGE UP (ref 3.3–5)
ALBUMIN SERPL ELPH-MCNC: 3.6 G/DL — SIGNIFICANT CHANGE UP (ref 3.3–5)
ALP SERPL-CCNC: 106 U/L — SIGNIFICANT CHANGE UP (ref 40–120)
ALP SERPL-CCNC: 113 U/L — SIGNIFICANT CHANGE UP (ref 40–120)
ALT FLD-CCNC: 26 U/L — SIGNIFICANT CHANGE UP (ref 4–33)
ALT FLD-CCNC: 28 U/L — SIGNIFICANT CHANGE UP (ref 4–33)
ANION GAP SERPL CALC-SCNC: 16 MMOL/L — HIGH (ref 7–14)
ANION GAP SERPL CALC-SCNC: 17 MMOL/L — HIGH (ref 7–14)
AST SERPL-CCNC: 40 U/L — HIGH (ref 4–32)
AST SERPL-CCNC: 50 U/L — HIGH (ref 4–32)
BASOPHILS # BLD AUTO: 0.03 K/UL — SIGNIFICANT CHANGE UP (ref 0–0.2)
BASOPHILS NFR BLD AUTO: 0.3 % — SIGNIFICANT CHANGE UP (ref 0–2)
BILIRUB SERPL-MCNC: 0.5 MG/DL — SIGNIFICANT CHANGE UP (ref 0.2–1.2)
BILIRUB SERPL-MCNC: 0.6 MG/DL — SIGNIFICANT CHANGE UP (ref 0.2–1.2)
BLOOD GAS ARTERIAL - LYTES,HGB,ICA,LACT RESULT: SIGNIFICANT CHANGE UP
BLOOD GAS ARTERIAL COMPREHENSIVE RESULT: SIGNIFICANT CHANGE UP
BLOOD GAS ARTERIAL COMPREHENSIVE WITH CREATININE RESULT: SIGNIFICANT CHANGE UP
BUN SERPL-MCNC: 21 MG/DL — SIGNIFICANT CHANGE UP (ref 7–23)
BUN SERPL-MCNC: 24 MG/DL — HIGH (ref 7–23)
CALCIUM SERPL-MCNC: 8.2 MG/DL — LOW (ref 8.4–10.5)
CALCIUM SERPL-MCNC: 8.5 MG/DL — SIGNIFICANT CHANGE UP (ref 8.4–10.5)
CHLORIDE SERPL-SCNC: 99 MMOL/L — SIGNIFICANT CHANGE UP (ref 98–107)
CHLORIDE SERPL-SCNC: 99 MMOL/L — SIGNIFICANT CHANGE UP (ref 98–107)
CO2 SERPL-SCNC: 18 MMOL/L — LOW (ref 22–31)
CO2 SERPL-SCNC: 23 MMOL/L — SIGNIFICANT CHANGE UP (ref 22–31)
CREAT SERPL-MCNC: 0.63 MG/DL — SIGNIFICANT CHANGE UP (ref 0.5–1.3)
CREAT SERPL-MCNC: 0.74 MG/DL — SIGNIFICANT CHANGE UP (ref 0.5–1.3)
CRP SERPL-MCNC: 270.2 MG/L — HIGH
D DIMER BLD IA.RAPID-MCNC: 707 NG/ML DDU — HIGH
EOSINOPHIL # BLD AUTO: 0 K/UL — SIGNIFICANT CHANGE UP (ref 0–0.5)
EOSINOPHIL NFR BLD AUTO: 0 % — SIGNIFICANT CHANGE UP (ref 0–6)
FERRITIN SERPL-MCNC: 236 NG/ML — HIGH (ref 15–150)
GLUCOSE SERPL-MCNC: 104 MG/DL — HIGH (ref 70–99)
GLUCOSE SERPL-MCNC: 157 MG/DL — HIGH (ref 70–99)
HCT VFR BLD CALC: 37 % — SIGNIFICANT CHANGE UP (ref 34.5–45)
HGB BLD-MCNC: 11.7 G/DL — SIGNIFICANT CHANGE UP (ref 11.5–15.5)
IANC: 9.29 K/UL — HIGH (ref 1.5–8.5)
IMM GRANULOCYTES NFR BLD AUTO: 1.9 % — HIGH (ref 0–1.5)
LYMPHOCYTES # BLD AUTO: 1.45 K/UL — SIGNIFICANT CHANGE UP (ref 1–3.3)
LYMPHOCYTES # BLD AUTO: 12.4 % — LOW (ref 13–44)
MAGNESIUM SERPL-MCNC: 2.3 MG/DL — SIGNIFICANT CHANGE UP (ref 1.6–2.6)
MCHC RBC-ENTMCNC: 27.1 PG — SIGNIFICANT CHANGE UP (ref 27–34)
MCHC RBC-ENTMCNC: 31.6 GM/DL — LOW (ref 32–36)
MCV RBC AUTO: 85.8 FL — SIGNIFICANT CHANGE UP (ref 80–100)
MONOCYTES # BLD AUTO: 0.68 K/UL — SIGNIFICANT CHANGE UP (ref 0–0.9)
MONOCYTES NFR BLD AUTO: 5.8 % — SIGNIFICANT CHANGE UP (ref 2–14)
NEUTROPHILS # BLD AUTO: 9.29 K/UL — HIGH (ref 1.8–7.4)
NEUTROPHILS NFR BLD AUTO: 79.6 % — HIGH (ref 43–77)
NRBC # BLD: 0 /100 WBCS — SIGNIFICANT CHANGE UP
NRBC # FLD: 0 K/UL — SIGNIFICANT CHANGE UP
PHOSPHATE SERPL-MCNC: 4.8 MG/DL — HIGH (ref 2.5–4.5)
PLATELET # BLD AUTO: 318 K/UL — SIGNIFICANT CHANGE UP (ref 150–400)
POTASSIUM SERPL-MCNC: 4.2 MMOL/L — SIGNIFICANT CHANGE UP (ref 3.5–5.3)
POTASSIUM SERPL-MCNC: 4.9 MMOL/L — SIGNIFICANT CHANGE UP (ref 3.5–5.3)
POTASSIUM SERPL-SCNC: 4.2 MMOL/L — SIGNIFICANT CHANGE UP (ref 3.5–5.3)
POTASSIUM SERPL-SCNC: 4.9 MMOL/L — SIGNIFICANT CHANGE UP (ref 3.5–5.3)
PROT SERPL-MCNC: 7.7 G/DL — SIGNIFICANT CHANGE UP (ref 6–8.3)
PROT SERPL-MCNC: 7.8 G/DL — SIGNIFICANT CHANGE UP (ref 6–8.3)
RBC # BLD: 4.31 M/UL — SIGNIFICANT CHANGE UP (ref 3.8–5.2)
RBC # FLD: 14.8 % — HIGH (ref 10.3–14.5)
SODIUM SERPL-SCNC: 134 MMOL/L — LOW (ref 135–145)
SODIUM SERPL-SCNC: 138 MMOL/L — SIGNIFICANT CHANGE UP (ref 135–145)
WBC # BLD: 11.67 K/UL — HIGH (ref 3.8–10.5)
WBC # FLD AUTO: 11.67 K/UL — HIGH (ref 3.8–10.5)

## 2021-01-05 PROCEDURE — 71045 X-RAY EXAM CHEST 1 VIEW: CPT | Mod: 26

## 2021-01-05 PROCEDURE — 99291 CRITICAL CARE FIRST HOUR: CPT | Mod: 25

## 2021-01-05 PROCEDURE — 43753 TX GASTRO INTUB W/ASP: CPT | Mod: 59

## 2021-01-05 PROCEDURE — 36556 INSERT NON-TUNNEL CV CATH: CPT

## 2021-01-05 PROCEDURE — 31500 INSERT EMERGENCY AIRWAY: CPT

## 2021-01-05 PROCEDURE — 99232 SBSQ HOSP IP/OBS MODERATE 35: CPT

## 2021-01-05 PROCEDURE — 36620 INSERTION CATHETER ARTERY: CPT

## 2021-01-05 RX ORDER — CALCIUM GLUCONATE 100 MG/ML
2 VIAL (ML) INTRAVENOUS ONCE
Refills: 0 | Status: COMPLETED | OUTPATIENT
Start: 2021-01-05 | End: 2021-01-05

## 2021-01-05 RX ORDER — PROPOFOL 10 MG/ML
30 INJECTION, EMULSION INTRAVENOUS ONCE
Refills: 0 | Status: COMPLETED | OUTPATIENT
Start: 2021-01-05 | End: 2021-01-05

## 2021-01-05 RX ORDER — CHLORHEXIDINE GLUCONATE 213 G/1000ML
15 SOLUTION TOPICAL EVERY 12 HOURS
Refills: 0 | Status: DISCONTINUED | OUTPATIENT
Start: 2021-01-05 | End: 2021-01-08

## 2021-01-05 RX ORDER — DEXAMETHASONE 0.5 MG/5ML
6 ELIXIR ORAL DAILY
Refills: 0 | Status: COMPLETED | OUTPATIENT
Start: 2021-01-05 | End: 2021-01-12

## 2021-01-05 RX ORDER — CISATRACURIUM BESYLATE 2 MG/ML
3 INJECTION INTRAVENOUS
Qty: 200 | Refills: 0 | Status: DISCONTINUED | OUTPATIENT
Start: 2021-01-05 | End: 2021-01-09

## 2021-01-05 RX ORDER — PROPOFOL 10 MG/ML
50 INJECTION, EMULSION INTRAVENOUS
Qty: 1000 | Refills: 0 | Status: DISCONTINUED | OUTPATIENT
Start: 2021-01-05 | End: 2021-01-09

## 2021-01-05 RX ORDER — DEXMEDETOMIDINE HYDROCHLORIDE IN 0.9% SODIUM CHLORIDE 4 UG/ML
0.2 INJECTION INTRAVENOUS
Qty: 400 | Refills: 0 | Status: DISCONTINUED | OUTPATIENT
Start: 2021-01-05 | End: 2021-01-06

## 2021-01-05 RX ORDER — CHLORHEXIDINE GLUCONATE 213 G/1000ML
1 SOLUTION TOPICAL
Refills: 0 | Status: DISCONTINUED | OUTPATIENT
Start: 2021-01-05 | End: 2021-01-06

## 2021-01-05 RX ORDER — MIDAZOLAM HYDROCHLORIDE 1 MG/ML
4 INJECTION, SOLUTION INTRAMUSCULAR; INTRAVENOUS ONCE
Refills: 0 | Status: DISCONTINUED | OUTPATIENT
Start: 2021-01-05 | End: 2021-01-05

## 2021-01-05 RX ORDER — POTASSIUM CHLORIDE 20 MEQ
40 PACKET (EA) ORAL ONCE
Refills: 0 | Status: COMPLETED | OUTPATIENT
Start: 2021-01-05 | End: 2021-01-05

## 2021-01-05 RX ORDER — CHLORHEXIDINE GLUCONATE 213 G/1000ML
1 SOLUTION TOPICAL
Refills: 0 | Status: DISCONTINUED | OUTPATIENT
Start: 2021-01-05 | End: 2021-01-16

## 2021-01-05 RX ORDER — FENTANYL CITRATE 50 UG/ML
100 INJECTION INTRAVENOUS ONCE
Refills: 0 | Status: DISCONTINUED | OUTPATIENT
Start: 2021-01-05 | End: 2021-01-05

## 2021-01-05 RX ORDER — NOREPINEPHRINE BITARTRATE/D5W 8 MG/250ML
0.05 PLASTIC BAG, INJECTION (ML) INTRAVENOUS
Qty: 8 | Refills: 0 | Status: DISCONTINUED | OUTPATIENT
Start: 2021-01-05 | End: 2021-01-05

## 2021-01-05 RX ORDER — PROPOFOL 10 MG/ML
20 INJECTION, EMULSION INTRAVENOUS ONCE
Refills: 0 | Status: COMPLETED | OUTPATIENT
Start: 2021-01-05 | End: 2021-01-05

## 2021-01-05 RX ORDER — PROPOFOL 10 MG/ML
40 INJECTION, EMULSION INTRAVENOUS ONCE
Refills: 0 | Status: COMPLETED | OUTPATIENT
Start: 2021-01-05 | End: 2021-01-05

## 2021-01-05 RX ORDER — REMDESIVIR 5 MG/ML
100 INJECTION INTRAVENOUS EVERY 24 HOURS
Refills: 0 | Status: COMPLETED | OUTPATIENT
Start: 2021-01-05 | End: 2021-01-08

## 2021-01-05 RX ORDER — ACETAMINOPHEN 500 MG
1000 TABLET ORAL ONCE
Refills: 0 | Status: COMPLETED | OUTPATIENT
Start: 2021-01-05 | End: 2021-01-05

## 2021-01-05 RX ORDER — MIDAZOLAM HYDROCHLORIDE 1 MG/ML
6 INJECTION, SOLUTION INTRAMUSCULAR; INTRAVENOUS ONCE
Refills: 0 | Status: DISCONTINUED | OUTPATIENT
Start: 2021-01-05 | End: 2021-01-05

## 2021-01-05 RX ORDER — SODIUM CHLORIDE 9 MG/ML
10 INJECTION INTRAMUSCULAR; INTRAVENOUS; SUBCUTANEOUS
Refills: 0 | Status: DISCONTINUED | OUTPATIENT
Start: 2021-01-05 | End: 2021-01-16

## 2021-01-05 RX ORDER — CISATRACURIUM BESYLATE 2 MG/ML
20 INJECTION INTRAVENOUS ONCE
Refills: 0 | Status: COMPLETED | OUTPATIENT
Start: 2021-01-05 | End: 2021-01-05

## 2021-01-05 RX ORDER — FENTANYL CITRATE 50 UG/ML
0.5 INJECTION INTRAVENOUS
Qty: 2500 | Refills: 0 | Status: DISCONTINUED | OUTPATIENT
Start: 2021-01-05 | End: 2021-01-09

## 2021-01-05 RX ADMIN — Medication 1 APPLICATION(S): at 15:03

## 2021-01-05 RX ADMIN — PROPOFOL 20 MILLIGRAM(S): 10 INJECTION, EMULSION INTRAVENOUS at 17:38

## 2021-01-05 RX ADMIN — MIDAZOLAM HYDROCHLORIDE 4 MILLIGRAM(S): 1 INJECTION, SOLUTION INTRAMUSCULAR; INTRAVENOUS at 17:38

## 2021-01-05 RX ADMIN — MIDAZOLAM HYDROCHLORIDE 6 MILLIGRAM(S): 1 INJECTION, SOLUTION INTRAMUSCULAR; INTRAVENOUS at 17:38

## 2021-01-05 RX ADMIN — CHLORHEXIDINE GLUCONATE 15 MILLILITER(S): 213 SOLUTION TOPICAL at 17:40

## 2021-01-05 RX ADMIN — CISATRACURIUM BESYLATE 19.8 MICROGRAM(S)/KG/MIN: 2 INJECTION INTRAVENOUS at 14:58

## 2021-01-05 RX ADMIN — CHLORHEXIDINE GLUCONATE 1 APPLICATION(S): 213 SOLUTION TOPICAL at 15:02

## 2021-01-05 RX ADMIN — REMDESIVIR 500 MILLIGRAM(S): 5 INJECTION INTRAVENOUS at 21:00

## 2021-01-05 RX ADMIN — PROPOFOL 40 MILLIGRAM(S): 10 INJECTION, EMULSION INTRAVENOUS at 17:38

## 2021-01-05 RX ADMIN — Medication 400 MILLIGRAM(S): at 01:10

## 2021-01-05 RX ADMIN — FENTANYL CITRATE 100 MICROGRAM(S): 50 INJECTION INTRAVENOUS at 18:22

## 2021-01-05 RX ADMIN — ENOXAPARIN SODIUM 40 MILLIGRAM(S): 100 INJECTION SUBCUTANEOUS at 17:37

## 2021-01-05 RX ADMIN — MIDAZOLAM HYDROCHLORIDE 4 MILLIGRAM(S): 1 INJECTION, SOLUTION INTRAMUSCULAR; INTRAVENOUS at 12:58

## 2021-01-05 RX ADMIN — PROPOFOL 30 MILLIGRAM(S): 10 INJECTION, EMULSION INTRAVENOUS at 17:38

## 2021-01-05 RX ADMIN — DEXMEDETOMIDINE HYDROCHLORIDE IN 0.9% SODIUM CHLORIDE 5.49 MICROGRAM(S)/KG/HR: 4 INJECTION INTRAVENOUS at 20:19

## 2021-01-05 RX ADMIN — Medication 6 MILLIGRAM(S): at 17:36

## 2021-01-05 RX ADMIN — CISATRACURIUM BESYLATE 20 MILLIGRAM(S): 2 INJECTION INTRAVENOUS at 14:57

## 2021-01-05 RX ADMIN — PROPOFOL 32.9 MICROGRAM(S)/KG/MIN: 10 INJECTION, EMULSION INTRAVENOUS at 20:19

## 2021-01-05 RX ADMIN — Medication 200 GRAM(S): at 23:24

## 2021-01-05 RX ADMIN — CHLORHEXIDINE GLUCONATE 1 APPLICATION(S): 213 SOLUTION TOPICAL at 17:40

## 2021-01-05 RX ADMIN — Medication 40 MILLIEQUIVALENT(S): at 17:36

## 2021-01-05 RX ADMIN — FENTANYL CITRATE 5.49 MICROGRAM(S)/KG/HR: 50 INJECTION INTRAVENOUS at 20:19

## 2021-01-05 RX ADMIN — PROPOFOL 30 MILLIGRAM(S): 10 INJECTION, EMULSION INTRAVENOUS at 17:39

## 2021-01-05 RX ADMIN — CISATRACURIUM BESYLATE 19.8 MICROGRAM(S)/KG/MIN: 2 INJECTION INTRAVENOUS at 20:19

## 2021-01-05 RX ADMIN — Medication 1000 MILLIGRAM(S): at 02:00

## 2021-01-05 RX ADMIN — FENTANYL CITRATE 100 MICROGRAM(S): 50 INJECTION INTRAVENOUS at 17:40

## 2021-01-05 RX ADMIN — ENOXAPARIN SODIUM 40 MILLIGRAM(S): 100 INJECTION SUBCUTANEOUS at 05:17

## 2021-01-05 NOTE — CHART NOTE - NSCHARTNOTEFT_GEN_A_CORE
CHIEF COMPLAINT:    HPI:    PAST MEDICAL & SURGICAL HISTORY:  No pertinent past medical history    No significant past surgical history        FAMILY HISTORY:  FH: type 2 diabetes        SOCIAL HISTORY:  Smoking: [ ] Never Smoked [ ] Former Smoker (__ packs x ___ years) [ ] Current Smoker  (__ packs x ___ years)  Substance Use: [ ] Never Used [ ] Used ____  EtOH Use:  Marital Status: [ ] Single [ ]  [ ]  [ ]   Sexual History:   Occupation:  Recent Travel:  Country of Birth:  Advance Directives:    Allergies    No Known Allergies    Intolerances        HOME MEDICATIONS:    REVIEW OF SYSTEMS:  Constitutional: [ ] fevers [ ] chills [ ] weight loss [ ] weight gain  HEENT: [ ] dry eyes [ ] eye irritation [ ] postnasal drip [ ] nasal congestion  CV: [ ] chest pain [ ] orthopnea [ ] palpitations [ ] murmur  Resp: [ ] cough [ ] shortness of breath [ ] dyspnea [ ] wheezing [ ] sputum [ ] hemoptysis  GI: [ ] nausea [ ] vomiting [ ] diarrhea [ ] constipation [ ] abd pain [ ] dysphagia   : [ ] dysuria [ ] nocturia [ ] hematuria [ ] increased urinary frequency  Musculoskeletal: [ ] back pain [ ] myalgias [ ] arthralgias [ ] fracture  Skin: [ ] rash [ ] itch  Neurological: [ ] headache [ ] dizziness [ ] syncope [ ] weakness [ ] numbness  Psychiatric: [ ] anxiety [ ] depression  Endocrine: [ ] diabetes [ ] thyroid problem  Hematologic/Lymphatic: [ ] anemia [ ] bleeding problem  Allergic/Immunologic: [ ] itchy eyes [ ] nasal discharge [ ] hives [ ] angioedema  [ ] All other systems negative  [ ] Unable to assess ROS because ________    OBJECTIVE:  ICU Vital Signs Last 24 Hrs  T(C): 37.1 (05 Jan 2021 12:03), Max: 37.9 (05 Jan 2021 01:00)  T(F): 98.7 (05 Jan 2021 12:03), Max: 100.3 (05 Jan 2021 01:00)  HR: 93 (05 Jan 2021 12:52) (76 - 96)  BP: 108/82 (05 Jan 2021 12:52) (92/64 - 153/106)  BP(mean): 91 (05 Jan 2021 12:52) (69 - 120)  ABP: --  ABP(mean): --  RR: 27 (05 Jan 2021 12:52) (16 - 50)  SpO2: 85% (05 Jan 2021 12:52) (72% - 99%)        CAPILLARY BLOOD GLUCOSE      POCT Blood Glucose.: 111 mg/dL (05 Jan 2021 01:01)      PHYSICAL EXAM:  General:   HEENT:   Lymph Nodes:  Neck:   Respiratory:   Cardiovascular:   Abdomen:   Extremities:   Skin:   Neurological:  Psychiatry:    LINES:     HOSPITAL MEDICATIONS:  MEDICATIONS  (STANDING):  chlorhexidine 0.12% Liquid 15 milliLiter(s) Oral Mucosa every 12 hours  chlorhexidine 4% Liquid 1 Application(s) Topical <User Schedule>  dexAMETHasone  Injectable 6 milliGRAM(s) IV Push daily  dexMEDEtomidine Infusion 0.2 MICROgram(s)/kG/Hr (5.49 mL/Hr) IV Continuous <Continuous>  enoxaparin Injectable 40 milliGRAM(s) SubCutaneous every 12 hours  influenza   Vaccine 0.5 milliLiter(s) IntraMuscular once  norepinephrine Infusion 0.05 MICROgram(s)/kG/Min (10.3 mL/Hr) IV Continuous <Continuous>  petrolatum Ophthalmic Ointment 1 Application(s) Both EYES daily  propofol Infusion 50 MICROgram(s)/kG/Min (32.9 mL/Hr) IV Continuous <Continuous>    MEDICATIONS  (PRN):  acetaminophen   Tablet .. 650 milliGRAM(s) Oral every 6 hours PRN Temp greater or equal to 38C (100.4F), Moderate Pain (4 - 6)  ALBUTerol    90 MICROgram(s) HFA Inhaler 2 Puff(s) Inhalation every 6 hours PRN Shortness of Breath      LABS:                        11.7   11.67 )-----------( 318      ( 05 Jan 2021 07:46 )             37.0     Hgb Trend: 11.7<--, 11.4<--, 11.0<--  01-05    138  |  99  |  21  ----------------------------<  104<H>  4.2   |  23  |  0.74    Ca    8.5      05 Jan 2021 07:46  Mg     2.3     01-04    TPro  7.8  /  Alb  3.6  /  TBili  0.5  /  DBili  x   /  AST  40<H>  /  ALT  28  /  AlkPhos  113  01-05    Creatinine Trend: 0.74<--, 0.78<--, 0.74<--  PT/INR - ( 03 Jan 2021 16:31 )   PT: 12.1 sec;   INR: 1.05 ratio         PTT - ( 03 Jan 2021 16:31 )  PTT:27.0 sec    Arterial Blood Gas:  01-05 @ 01:17  7.42/39/58/25/88.5/0.5  ABG lactate: --        MICROBIOLOGY:     RADIOLOGY:  [ ] Reviewed and interpreted by me    EKG:    Patient is 46yFemale with PMHx of *** who presents with ***.     NEURO:  #Mental status: baseline/altered/non altered. Likely 2/2 ____ (structural (bleed or stroke), encephalitis, meningitis, non convulsive status epilepticus, metabolic cause (endogenous vs exogenous)).  -Currently A&O x 3  -Eligible for early mobilization and immediate physical therapy?    CV:  #STEMI (+trops, +EKG changes) | NSTEMI (+trops) | unstable angina (-trops): Typical/atypical symptoms, loaded with 325 mg ASA, 180mg Ticagrelor/ 600mg Clopidogrel, sublingual nitroglycerin 0.4mg, heparin bolus (check monogram)  - Revascularization (within 48hrs of symptoms) with PCI  - JENI score: (if >140, revascularize early)  - Nitroglycerin gtt 5mcg/min UNLESS anterior MI for pain relief  - DAPT: ASA 81, Clopidogrel 75mg qD /Ticagrelor 90mg BID for 1 year  - Statin: atorvastatin 80mg qD  - Heparin gtt (for 48 hrs or until revascularization with PCI, check monogram)    s/p stent: stop heparin, nitro gtt  -c/w DAPT  - start B-blocker: metoprolol tartrate  - start ACE-I (wait until AM labs return)   - statin: atorvastatin 80mg qD  - check ECHO in 48hr if anterior wall MI    #CHF: EF __%, on JVD ***  -Bblocker: metoprolol succinate 25mg qD / carvedilol 3.125mg qD  -Diuretic: furosemide (lasix) 20-40-60-80 mg qD | torsemide | bumetanide (bumex) 1mg qD  -Trend I/Os and daily weights, and Cr    #Arrythmia:   - Monitor on tele    #AFib:   - CHADSVASC score:  - HAS-BLED score:  - a/c: DOAC (apixiban 5mg BID (Eliquis)| dabigatran 150mg BID (Pradexa)| Riveroxaban 20mg qD (Xeralto) > Coumadin  - If new: get echo (check for tachycardia, valvular AF, L atrial size which is large if chronic)  - Rate control: BB/Digoxin/Amio if low EF  - Monitor telemetry    #s/p Atrial fibrillation Ablation:  - CHADSVASC/HAS-BLED score:  - a/c: Coumadin, DOAC (eliquis/pradexa/xeralto) resume after 6hrs post procedure  - Bedrest x6hrs, resume head of bed @30 degrees afterwards  - 12-lead EKG   - Continuous telemetry monitoring for arrythmias  - TTE echo (to r/o tamponade and heart fx)  - Protonix 40mg qD (prophylactic prevent acidity b/c heat from ablation since L atrium is close to esophagusx 1mo)   - DASH diet: Soft  - Pro-BNP labs in AM (to determine underlying dilation of heart)    #s/p Atrial flutter Ablation:  - CHADSVASC/HAS-BLED score:  - a/c: Coumadin, DOAC (eliquis/pradexa/xeralto)  - Rate control: CCB or BB  - Continuous telemetry monitoring for arrythmias  - Bedrest x6hrs, resume head of bed @30 degrees afterwards  - 12-lead EKG now    #Hemodynamically stable/unstable:  - On pressors due to ____ shock (cardiogenic due to muscle or valve failure, obstructive due to peff, PE, PTX, hypovolemic, vasopegic)     PULM:  #COPD: *Home meds* on __L O2    RENAL:  #ROMAN:   -UO:  -Haque:    GI:  #Transaminitis: Elevated LFTs  #Diet:  #Bowel regiment:    ENDO:  #DM2: HbA1c ***   - Insulin Sliding Scale    METABOLIC:  #Electrolyte abnormalities    HEMATOLOGIC:  #CBC results show  #Coag panel shows  #DVT prophylaxis with     ID:  # COVID-19 infection: on 5d course of remdesivir     SKIN:  #Lines: CHIEF COMPLAINT: AHRF    HPI:  46F w/ no PMH coming in with shortness of breath, cough productive of white sputum, pleuritic rib pain (moderate in severity) for two days and diarrhea for one day. Patient  tested positive for COVID . Patient denies loss of taste or smell. Patient denies sore throat however states mouth is dry. Patient denies any history of lung disease or smoking. Patient states she was having subjective fevers at home however never took her temperature. Patient was taking an unknown antibiotic prescribed by a doctor. Unable to give name of doctor or name of abx. Patient dyspneic during with talking during interview.   ED Course: T 98.6, HR 96, /90, S 80% on RA. White count 10k. Hemoglobin 11.0. Plt 236. D-dimer 243. CRP 81.3. . CXR peripheral opacities consistent w/ COVID. Patient given 6 of dexamethasone. Patient admitted for acute hypoxic respiratory failure in the setting of likely COVID infection.     RRT called for hypoxia top the low 80s. Patient did not tolerate proning earlier.  Pt was also febrile  BP wnl  ABG, Other (specify)...  Chest PT  IV Tylenol 1g  Patient placed on her L side and received chest PT with improvement in her SPO2 to 89-91%. ABG obtained. BIPAP placed at bedside and primary team advised to switch from HFNC to BIPAP if patient persistently desats. MICU team was present at bedside at time of RRT, in agreement with plan  Remained on unit      PAST MEDICAL & SURGICAL HISTORY:  No pertinent past medical history    No significant past surgical history        FAMILY HISTORY:  FH: type 2 diabetes        SOCIAL HISTORY:  Smoking: [ ] Never Smoked [ ] Former Smoker (__ packs x ___ years) [ ] Current Smoker  (__ packs x ___ years)  Substance Use: [ ] Never Used [ ] Used ____  EtOH Use:  Marital Status: [ ] Single [ ]  [ ]  [ ]   Sexual History:   Occupation:  Recent Travel:  Country of Birth:  Advance Directives:    Allergies    No Known Allergies    Intolerances        HOME MEDICATIONS:    REVIEW OF SYSTEMS:  Constitutional: [ ] fevers [ ] chills [ ] weight loss [ ] weight gain  HEENT: [ ] dry eyes [ ] eye irritation [ ] postnasal drip [ ] nasal congestion  CV: [ ] chest pain [ ] orthopnea [ ] palpitations [ ] murmur  Resp: [ ] cough [ ] shortness of breath [ ] dyspnea [ ] wheezing [ ] sputum [ ] hemoptysis  GI: [ ] nausea [ ] vomiting [ ] diarrhea [ ] constipation [ ] abd pain [ ] dysphagia   : [ ] dysuria [ ] nocturia [ ] hematuria [ ] increased urinary frequency  Musculoskeletal: [ ] back pain [ ] myalgias [ ] arthralgias [ ] fracture  Skin: [ ] rash [ ] itch  Neurological: [ ] headache [ ] dizziness [ ] syncope [ ] weakness [ ] numbness  Psychiatric: [ ] anxiety [ ] depression  Endocrine: [ ] diabetes [ ] thyroid problem  Hematologic/Lymphatic: [ ] anemia [ ] bleeding problem  Allergic/Immunologic: [ ] itchy eyes [ ] nasal discharge [ ] hives [ ] angioedema  [ ] All other systems negative  [ ] Unable to assess ROS because ________    OBJECTIVE:  ICU Vital Signs Last 24 Hrs  T(C): 37.1 (2021 12:03), Max: 37.9 (2021 01:00)  T(F): 98.7 (2021 12:03), Max: 100.3 (2021 01:00)  HR: 93 (2021 12:52) (76 - 96)  BP: 108/82 (2021 12:52) (92/64 - 153/106)  BP(mean): 91 (2021 12:52) (69 - 120)  ABP: --  ABP(mean): --  RR: 27 (2021 12:52) (16 - 50)  SpO2: 85% (2021 12:52) (72% - 99%)        CAPILLARY BLOOD GLUCOSE      POCT Blood Glucose.: 111 mg/dL (2021 01:01)      PHYSICAL EXAM:  General:   HEENT:   Lymph Nodes:  Neck:   Respiratory:   Cardiovascular:   Abdomen:   Extremities:   Skin:   Neurological:  Psychiatry:    LINES:     HOSPITAL MEDICATIONS:  MEDICATIONS  (STANDING):  chlorhexidine 0.12% Liquid 15 milliLiter(s) Oral Mucosa every 12 hours  chlorhexidine 4% Liquid 1 Application(s) Topical <User Schedule>  dexAMETHasone  Injectable 6 milliGRAM(s) IV Push daily  dexMEDEtomidine Infusion 0.2 MICROgram(s)/kG/Hr (5.49 mL/Hr) IV Continuous <Continuous>  enoxaparin Injectable 40 milliGRAM(s) SubCutaneous every 12 hours  influenza   Vaccine 0.5 milliLiter(s) IntraMuscular once  norepinephrine Infusion 0.05 MICROgram(s)/kG/Min (10.3 mL/Hr) IV Continuous <Continuous>  petrolatum Ophthalmic Ointment 1 Application(s) Both EYES daily  propofol Infusion 50 MICROgram(s)/kG/Min (32.9 mL/Hr) IV Continuous <Continuous>    MEDICATIONS  (PRN):  acetaminophen   Tablet .. 650 milliGRAM(s) Oral every 6 hours PRN Temp greater or equal to 38C (100.4F), Moderate Pain (4 - 6)  ALBUTerol    90 MICROgram(s) HFA Inhaler 2 Puff(s) Inhalation every 6 hours PRN Shortness of Breath      LABS:                        11.7   11.67 )-----------( 318      ( 2021 07:46 )             37.0     Hgb Trend: 11.7<--, 11.4<--, 11.0<--      138  |  99  |  21  ----------------------------<  104<H>  4.2   |  23  |  0.74    Ca    8.5      2021 07:46  Mg     2.3         TPro  7.8  /  Alb  3.6  /  TBili  0.5  /  DBili  x   /  AST  40<H>  /  ALT  28  /  AlkPhos  113      Creatinine Trend: 0.74<--, 0.78<--, 0.74<--  PT/INR - ( 2021 16:31 )   PT: 12.1 sec;   INR: 1.05 ratio         PTT - ( 2021 16:31 )  PTT:27.0 sec    Arterial Blood Gas:   @ 01:17  7.42/39/58/25/88.5/0.5  ABG lactate: --        MICROBIOLOGY:     RADIOLOGY:  [ ] Reviewed and interpreted by me    EKF w/ obesity who initially p/w SOB, productive cough, pleuritic rib pain and diarrhea and was admitted on 20 w/ AHRF 2/2 COVID-19, now transferred to MICU on 20 s/p RRT for desaturation on BIPAP and currently intubated on MV.       NEURO:  #Mental status:   baseline AOx4  now sedated on propofol and precedex  c/w current sedation    CV:  no known cardiac hx; admission EKG ___  # on levophed likely 2/2 vasoplegic shock  - wean pressors as tolerated  - will eval heart function on POCUS     PULM:  #COPD: *Home meds* on __L O2    RENAL:  #ROMAN:   -UO:  -Haque:    GI:  #Transaminitis: Elevated LFTs  #Diet:  #Bowel regiment:    ENDO:  #DM2: HbA1c ***   - Insulin Sliding Scale    METABOLIC:  #Electrolyte abnormalities    HEMATOLOGIC:  #CBC results show  #Coag panel shows  #DVT prophylaxis with     ID:  # COVID-19 infection: on 5d course of remdesivir     SKIN:  #Lines: CHIEF COMPLAINT: AHRF    HPI:  46F w/ no PMH coming in with shortness of breath, cough productive of white sputum, pleuritic rib pain (moderate in severity) for two days and diarrhea for one day. Patient  tested positive for COVID 12/29. Patient denies loss of taste or smell. Patient denies sore throat however states mouth is dry. Patient denies any history of lung disease or smoking. Patient states she was having subjective fevers at home however never took her temperature. Patient was taking an unknown antibiotic prescribed by a doctor. Unable to give name of doctor or name of abx. Patient dyspneic during with talking during interview.   ED Course: T 98.6, HR 96, /90, S 80% on RA. White count 10k. Hemoglobin 11.0. Plt 236. D-dimer 243. CRP 81.3. . CXR peripheral opacities consistent w/ COVID. Patient given 6 of dexamethasone. Patient admitted for acute hypoxic respiratory failure in the setting of likely COVID infection.     RRT called for hypoxia top the low 80s. Patient did not tolerate proning earlier.  Pt was also febrile  BP wnl  ABG, Other (specify)...  Chest PT  IV Tylenol 1g  Patient placed on her L side and received chest PT with improvement in her SPO2 to 89-91%. ABG obtained. BIPAP placed at bedside and primary team advised to switch from HFNC to BIPAP if patient persistently desats. MICU team was present at bedside at time of RRT, in agreement with plan  Remained on unit      PAST MEDICAL & SURGICAL HISTORY:  No pertinent past medical history    No significant past surgical history        FAMILY HISTORY:  FH: type 2 diabetes        SOCIAL HISTORY:  Smoking: [ ] Never Smoked [ ] Former Smoker (__ packs x ___ years) [ ] Current Smoker  (__ packs x ___ years)  Substance Use: [ ] Never Used [ ] Used ____  EtOH Use:  Marital Status: [ ] Single [ ]  [ ]  [ ]   Sexual History:   Occupation:  Recent Travel:  Country of Birth:  Advance Directives:    Allergies    No Known Allergies    Intolerances        HOME MEDICATIONS:    REVIEW OF SYSTEMS:  Constitutional: [ ] fevers [ ] chills [ ] weight loss [ ] weight gain  HEENT: [ ] dry eyes [ ] eye irritation [ ] postnasal drip [ ] nasal congestion  CV: [ ] chest pain [ ] orthopnea [ ] palpitations [ ] murmur  Resp: [ ] cough [ ] shortness of breath [ ] dyspnea [ ] wheezing [ ] sputum [ ] hemoptysis  GI: [ ] nausea [ ] vomiting [ ] diarrhea [ ] constipation [ ] abd pain [ ] dysphagia   : [ ] dysuria [ ] nocturia [ ] hematuria [ ] increased urinary frequency  Musculoskeletal: [ ] back pain [ ] myalgias [ ] arthralgias [ ] fracture  Skin: [ ] rash [ ] itch  Neurological: [ ] headache [ ] dizziness [ ] syncope [ ] weakness [ ] numbness  Psychiatric: [ ] anxiety [ ] depression  Endocrine: [ ] diabetes [ ] thyroid problem  Hematologic/Lymphatic: [ ] anemia [ ] bleeding problem  Allergic/Immunologic: [ ] itchy eyes [ ] nasal discharge [ ] hives [ ] angioedema  [ ] All other systems negative  [ ] Unable to assess ROS because ________    OBJECTIVE:  ICU Vital Signs Last 24 Hrs  T(C): 37.1 (05 Jan 2021 12:03), Max: 37.9 (05 Jan 2021 01:00)  T(F): 98.7 (05 Jan 2021 12:03), Max: 100.3 (05 Jan 2021 01:00)  HR: 93 (05 Jan 2021 12:52) (76 - 96)  BP: 108/82 (05 Jan 2021 12:52) (92/64 - 153/106)  BP(mean): 91 (05 Jan 2021 12:52) (69 - 120)  ABP: --  ABP(mean): --  RR: 27 (05 Jan 2021 12:52) (16 - 50)  SpO2: 85% (05 Jan 2021 12:52) (72% - 99%)        CAPILLARY BLOOD GLUCOSE      POCT Blood Glucose.: 111 mg/dL (05 Jan 2021 01:01)      PHYSICAL EXAM:  General: obese female laying in bed  HEENT:   Lymph Nodes:  Neck:   Respiratory:   Cardiovascular:   Abdomen:   Extremities:   Skin:   Neurological:  Psychiatry:    LINES:     HOSPITAL MEDICATIONS:  MEDICATIONS  (STANDING):  chlorhexidine 0.12% Liquid 15 milliLiter(s) Oral Mucosa every 12 hours  chlorhexidine 4% Liquid 1 Application(s) Topical <User Schedule>  dexAMETHasone  Injectable 6 milliGRAM(s) IV Push daily  dexMEDEtomidine Infusion 0.2 MICROgram(s)/kG/Hr (5.49 mL/Hr) IV Continuous <Continuous>  enoxaparin Injectable 40 milliGRAM(s) SubCutaneous every 12 hours  influenza   Vaccine 0.5 milliLiter(s) IntraMuscular once  norepinephrine Infusion 0.05 MICROgram(s)/kG/Min (10.3 mL/Hr) IV Continuous <Continuous>  petrolatum Ophthalmic Ointment 1 Application(s) Both EYES daily  propofol Infusion 50 MICROgram(s)/kG/Min (32.9 mL/Hr) IV Continuous <Continuous>    MEDICATIONS  (PRN):  acetaminophen   Tablet .. 650 milliGRAM(s) Oral every 6 hours PRN Temp greater or equal to 38C (100.4F), Moderate Pain (4 - 6)  ALBUTerol    90 MICROgram(s) HFA Inhaler 2 Puff(s) Inhalation every 6 hours PRN Shortness of Breath      LABS:                        11.7   11.67 )-----------( 318      ( 05 Jan 2021 07:46 )             37.0     Hgb Trend: 11.7<--, 11.4<--, 11.0<--  01-05    138  |  99  |  21  ----------------------------<  104<H>  4.2   |  23  |  0.74    Ca    8.5      05 Jan 2021 07:46  Mg     2.3     01-04    TPro  7.8  /  Alb  3.6  /  TBili  0.5  /  DBili  x   /  AST  40<H>  /  ALT  28  /  AlkPhos  113  01-05    Creatinine Trend: 0.74<--, 0.78<--, 0.74<--  PT/INR - ( 03 Jan 2021 16:31 )   PT: 12.1 sec;   INR: 1.05 ratio         PTT - ( 03 Jan 2021 16:31 )  PTT:27.0 sec    Arterial Blood Gas:  01-05 @ 01:17  7.42/39/58/25/88.5/0.5  ABG lactate: --      MICROBIOLOGY: no cultures    RADIOLOGY:  [ x] Reviewed and interpreted by me    EKG: pending    46F w/ obesity who initially p/w SOB, productive cough, pleuritic rib pain and diarrhea and was admitted on 1/4/20 w/ AHRF 2/2 COVID-19, now transferred to MICU on 1/5/20 s/p RRT for desaturation on BIPAP and currently intubated on MV.       NEURO:  #Mental status:   baseline AOx4  now sedated on propofol and precedex  c/w current sedation    CV:  no known cardiac hx; admission EKG ___  # on levophed likely 2/2 vasoplegic shock  - wean pressors as tolerated  - will eval heart function on POCUS     PULM:  #COPD: *Home meds* on __L O2    RENAL:  #ROMAN:   -UO:  -Haque:    GI:  #Transaminitis: Elevated LFTs  #Diet:  #Bowel regiment:    ENDO:  #DM2: HbA1c ***   - Insulin Sliding Scale    METABOLIC:  #Electrolyte abnormalities    HEMATOLOGIC:  #CBC results show  #Coag panel shows  #DVT prophylaxis with     ID:  # COVID-19 infection: on 5d course of remdesivir     SKIN:  #Lines: CHIEF COMPLAINT: AHRF    HPI:  46F w/ no PMH coming in with shortness of breath, cough productive of white sputum, pleuritic rib pain (moderate in severity) for two days and diarrhea for one day. Patient  tested positive for COVID 12/29. Patient denies loss of taste or smell. Patient denies sore throat however states mouth is dry. Patient denies any history of lung disease or smoking. Patient states she was having subjective fevers at home however never took her temperature. Patient was taking an unknown antibiotic prescribed by a doctor. Unable to give name of doctor or name of abx. Patient dyspneic during with talking during interview.   ED Course: T 98.6, HR 96, /90, S 80% on RA. White count 10k. Hemoglobin 11.0. Plt 236. D-dimer 243. CRP 81.3. . CXR peripheral opacities consistent w/ COVID. Patient given 6 of dexamethasone. Patient admitted for acute hypoxic respiratory failure in the setting of likely COVID infection.     RRT called for hypoxia top the low 80s. Patient did not tolerate proning earlier.  Pt was also febrile  BP wnl  ABG, Other (specify)...  Chest PT  IV Tylenol 1g  Patient placed on her L side and received chest PT with improvement in her SPO2 to 89-91%. ABG obtained. BIPAP placed at bedside and primary team advised to switch from HFNC to BIPAP if patient persistently desats. MICU team was present at bedside at time of RRT, in agreement with plan      PAST MEDICAL & SURGICAL HISTORY:  No pertinent past medical history    No significant past surgical history        FAMILY HISTORY:  FH: type 2 diabetes        SOCIAL HISTORY:  Smoking: [ x] Never Smoked [ ] Former Smoker (__ packs x ___ years) [ ] Current Smoker  (__ packs x ___ years)  Substance Use: [ x] Never Used [ ] Used ____  EtOH Use: none  Marital Status: [ ] Single [x ]  [ ]  [ ]   Full code.  is covid positive    Allergies    No Known Allergies    Intolerances      HOME MEDICATIONS: none    REVIEW OF SYSTEMS:  Constitutional: [ ] fevers [ ] chills [ ] weight loss [ ] weight gain  HEENT: [ ] dry eyes [ ] eye irritation [ ] postnasal drip [ ] nasal congestion  CV: [ ] chest pain [ ] orthopnea [ ] palpitations [ ] murmur  Resp: [ ] cough [ ] shortness of breath [ ] dyspnea [ ] wheezing [ ] sputum [ ] hemoptysis  GI: [ ] nausea [ ] vomiting [ ] diarrhea [ ] constipation [ ] abd pain [ ] dysphagia   : [ ] dysuria [ ] nocturia [ ] hematuria [ ] increased urinary frequency  Musculoskeletal: [ ] back pain [ ] myalgias [ ] arthralgias [ ] fracture  Skin: [ ] rash [ ] itch  Neurological: [ ] headache [ ] dizziness [ ] syncope [ ] weakness [ ] numbness  Psychiatric: [ ] anxiety [ ] depression  Endocrine: [ ] diabetes [ ] thyroid problem  Hematologic/Lymphatic: [ ] anemia [ ] bleeding problem  Allergic/Immunologic: [ ] itchy eyes [ ] nasal discharge [ ] hives [ ] angioedema  [ ] All other systems negative  [x ] Unable to assess ROS because intubated, sedated    OBJECTIVE:  ICU Vital Signs Last 24 Hrs  T(C): 37.1 (05 Jan 2021 12:03), Max: 37.9 (05 Jan 2021 01:00)  T(F): 98.7 (05 Jan 2021 12:03), Max: 100.3 (05 Jan 2021 01:00)  HR: 93 (05 Jan 2021 12:52) (76 - 96)  BP: 108/82 (05 Jan 2021 12:52) (92/64 - 153/106)  BP(mean): 91 (05 Jan 2021 12:52) (69 - 120)  ABP: --  ABP(mean): --  RR: 27 (05 Jan 2021 12:52) (16 - 50)  SpO2: 85% (05 Jan 2021 12:52) (72% - 99%)      CAPILLARY BLOOD GLUCOSE      POCT Blood Glucose.: 111 mg/dL (05 Jan 2021 01:01)      PHYSICAL EXAM:  General: obese female laying in bed, now intubated, sedated   Respiratory: intubated on MV  Cardiovascular: n1 s1s2  Abdomen: NTND  Extremities: no RENEE  Neurological:  PERRLA    LINES: +RIJ, +L A-line    HOSPITAL MEDICATIONS:  MEDICATIONS  (STANDING):  chlorhexidine 0.12% Liquid 15 milliLiter(s) Oral Mucosa every 12 hours  chlorhexidine 4% Liquid 1 Application(s) Topical <User Schedule>  dexAMETHasone  Injectable 6 milliGRAM(s) IV Push daily  dexMEDEtomidine Infusion 0.2 MICROgram(s)/kG/Hr (5.49 mL/Hr) IV Continuous <Continuous>  enoxaparin Injectable 40 milliGRAM(s) SubCutaneous every 12 hours  influenza   Vaccine 0.5 milliLiter(s) IntraMuscular once  norepinephrine Infusion 0.05 MICROgram(s)/kG/Min (10.3 mL/Hr) IV Continuous <Continuous>  petrolatum Ophthalmic Ointment 1 Application(s) Both EYES daily  propofol Infusion 50 MICROgram(s)/kG/Min (32.9 mL/Hr) IV Continuous <Continuous>    MEDICATIONS  (PRN):  acetaminophen   Tablet .. 650 milliGRAM(s) Oral every 6 hours PRN Temp greater or equal to 38C (100.4F), Moderate Pain (4 - 6)  ALBUTerol    90 MICROgram(s) HFA Inhaler 2 Puff(s) Inhalation every 6 hours PRN Shortness of Breath      LABS:                        11.7   11.67 )-----------( 318      ( 05 Jan 2021 07:46 )             37.0     Hgb Trend: 11.7<--, 11.4<--, 11.0<--  01-05    138  |  99  |  21  ----------------------------<  104<H>  4.2   |  22  |  0.74    Ca    8.5      05 Jan 2021 07:46  Mg     2.3     01-04    TPro  7.8  /  Alb  3.6  /  TBili  0.5  /  DBili  x   /  AST  40<H>  /  ALT  28  /  AlkPhos  113  01-05    Creatinine Trend: 0.74<--, 0.78<--, 0.74<--  PT/INR - ( 03 Jan 2021 16:31 )   PT: 12.1 sec;   INR: 1.05 ratio         PTT - ( 03 Jan 2021 16:31 )  PTT:27.0 sec    Arterial Blood Gas:  01-05 @ 01:17  7.42/39/58/25/88.5/0.5  ABG lactate: --      MICROBIOLOGY: no cultures    RADIOLOGY:  [ x] Reviewed and interpreted by me    EKG: pending    46F w/ obesity who initially p/w SOB, productive cough, pleuritic rib pain and diarrhea and was admitted on 1/4/20 w/ AHRF 2/2 COVID-19, now transferred to MICU on 1/5/20 s/p RRT for desaturation on BIPAP and currently intubated on MV.       NEURO:  #Mental status:   baseline AOx4  now sedated on fent, propofol and precedex  c/w current sedation    CV:  no known cardiac hx; admission EKG pending  # on levophed likely 2/2 vasoplegic shock  - wean pressors as tolerated  - will eval heart function on POCUS     PULM:  ARDS  #currently intubated on AC: 28/380/12/100  f/u post-intubation ABG  airway clearance  albuterol MDI    RENAL:  #baseline CR ~0.7, currently at baseline  - monitor UOP carefully  - burdick placed for UOP    GI:  #Diet: will start TFs  #Bowel regimen: senna/miralax    ENDO:  #BG wnl on BMPs  no other issues    HEMATOLOGIC:  #Hgb, Plts, coags unremarkable - monitor qd  #DVT prophylaxis with lovenox 40BID    ID:  # COVID-19 infection: started on remdesivir - will dc now that pt is on MV (completed 2d) and started on 10d course of dexamethasone on 1/4 - will complete  - monitor inflammatory markers  - will send admission blood, sputum cx's     SKIN:  #Lines: RIJ central line, L A line CHIEF COMPLAINT: AHRF    HPI:  46F w/ no PMH who initially presented on 1/4/20 with SOB, cough productive of subjective fevers, white sputum, pleuritic rib pain (moderate in severity) for two days and diarrhea for one day. Patient's  tested positive for COVID 12/29. Never had loss of taste/smell/sore throat but did have dry mouth. She denied any history of lung disease or smoking. Patient was taking an unknown antibiotic prescribed by a doctor but did not know the name of the abx or doctor. In the ED, she was notably tachypneic and hypoxic: T98.6, HR 96, /90, S 80% on RA. White count 10k. Hemoglobin 11.0. Plt 236. D-dimer 243. CRP 81.3. . CXR peripheral opacities consistent w/ COVID. Patient given 6 of dexamethasone. Patient admitted for acute hypoxic respiratory failure in the setting of likely COVID infection.     RRT called for hypoxia top the low 80s. Patient did not tolerate proning earlier.  Pt was also febrile  BP wnl  ABG, Other (specify)...  Chest PT  IV Tylenol 1g  Patient placed on her L side and received chest PT with improvement in her SPO2 to 89-91%. ABG obtained. BIPAP placed at bedside and primary team advised to switch from HFNC to BIPAP if patient persistently desats. MICU team was present at bedside at time of RRT, in agreement with plan      PAST MEDICAL & SURGICAL HISTORY:  No pertinent past medical history    No significant past surgical history        FAMILY HISTORY:  FH: type 2 diabetes        SOCIAL HISTORY:  Smoking: [ x] Never Smoked [ ] Former Smoker (__ packs x ___ years) [ ] Current Smoker  (__ packs x ___ years)  Substance Use: [ x] Never Used [ ] Used ____  EtOH Use: none  Marital Status: [ ] Single [x ]  [ ]  [ ]   Full code.  is covid positive    Allergies    No Known Allergies    Intolerances      HOME MEDICATIONS: none    REVIEW OF SYSTEMS:  Constitutional: [ ] fevers [ ] chills [ ] weight loss [ ] weight gain  HEENT: [ ] dry eyes [ ] eye irritation [ ] postnasal drip [ ] nasal congestion  CV: [ ] chest pain [ ] orthopnea [ ] palpitations [ ] murmur  Resp: [ ] cough [ ] shortness of breath [ ] dyspnea [ ] wheezing [ ] sputum [ ] hemoptysis  GI: [ ] nausea [ ] vomiting [ ] diarrhea [ ] constipation [ ] abd pain [ ] dysphagia   : [ ] dysuria [ ] nocturia [ ] hematuria [ ] increased urinary frequency  Musculoskeletal: [ ] back pain [ ] myalgias [ ] arthralgias [ ] fracture  Skin: [ ] rash [ ] itch  Neurological: [ ] headache [ ] dizziness [ ] syncope [ ] weakness [ ] numbness  Psychiatric: [ ] anxiety [ ] depression  Endocrine: [ ] diabetes [ ] thyroid problem  Hematologic/Lymphatic: [ ] anemia [ ] bleeding problem  Allergic/Immunologic: [ ] itchy eyes [ ] nasal discharge [ ] hives [ ] angioedema  [ ] All other systems negative  [x ] Unable to assess ROS because intubated, sedated    OBJECTIVE:  ICU Vital Signs Last 24 Hrs  T(C): 37.1 (05 Jan 2021 12:03), Max: 37.9 (05 Jan 2021 01:00)  T(F): 98.7 (05 Jan 2021 12:03), Max: 100.3 (05 Jan 2021 01:00)  HR: 93 (05 Jan 2021 12:52) (76 - 96)  BP: 108/82 (05 Jan 2021 12:52) (92/64 - 153/106)  BP(mean): 91 (05 Jan 2021 12:52) (69 - 120)  ABP: --  ABP(mean): --  RR: 27 (05 Jan 2021 12:52) (16 - 50)  SpO2: 85% (05 Jan 2021 12:52) (72% - 99%)      CAPILLARY BLOOD GLUCOSE      POCT Blood Glucose.: 111 mg/dL (05 Jan 2021 01:01)      PHYSICAL EXAM:  General: obese female laying in bed, now intubated, sedated   Respiratory: intubated on MV  Cardiovascular: n1 s1s2  Abdomen: NTND  Extremities: no RENEE  Neurological:  PERRLA    LINES: +RIJ, +L A-line    HOSPITAL MEDICATIONS:  MEDICATIONS  (STANDING):  chlorhexidine 0.12% Liquid 15 milliLiter(s) Oral Mucosa every 12 hours  chlorhexidine 4% Liquid 1 Application(s) Topical <User Schedule>  dexAMETHasone  Injectable 6 milliGRAM(s) IV Push daily  dexMEDEtomidine Infusion 0.2 MICROgram(s)/kG/Hr (5.49 mL/Hr) IV Continuous <Continuous>  enoxaparin Injectable 40 milliGRAM(s) SubCutaneous every 12 hours  influenza   Vaccine 0.5 milliLiter(s) IntraMuscular once  norepinephrine Infusion 0.05 MICROgram(s)/kG/Min (10.3 mL/Hr) IV Continuous <Continuous>  petrolatum Ophthalmic Ointment 1 Application(s) Both EYES daily  propofol Infusion 50 MICROgram(s)/kG/Min (32.9 mL/Hr) IV Continuous <Continuous>    MEDICATIONS  (PRN):  acetaminophen   Tablet .. 650 milliGRAM(s) Oral every 6 hours PRN Temp greater or equal to 38C (100.4F), Moderate Pain (4 - 6)  ALBUTerol    90 MICROgram(s) HFA Inhaler 2 Puff(s) Inhalation every 6 hours PRN Shortness of Breath      LABS:                        11.7   11.67 )-----------( 318      ( 05 Jan 2021 07:46 )             37.0     Hgb Trend: 11.7<--, 11.4<--, 11.0<--  01-05    138  |  99  |  21  ----------------------------<  104<H>  4.2   |  23  |  0.74    Ca    8.5      05 Jan 2021 07:46  Mg     2.3     01-04    TPro  7.8  /  Alb  3.6  /  TBili  0.5  /  DBili  x   /  AST  40<H>  /  ALT  28  /  AlkPhos  113  01-05    Creatinine Trend: 0.74<--, 0.78<--, 0.74<--  PT/INR - ( 03 Jan 2021 16:31 )   PT: 12.1 sec;   INR: 1.05 ratio         PTT - ( 03 Jan 2021 16:31 )  PTT:27.0 sec    Arterial Blood Gas:  01-05 @ 01:17  7.42/39/58/25/88.5/0.5  ABG lactate: --      MICROBIOLOGY: no cultures    RADIOLOGY:  [ x] Reviewed and interpreted by me    EKG: pending    46F w/ obesity who initially p/w SOB, productive cough, pleuritic rib pain and diarrhea and was admitted on 1/4/20 w/ AHRF 2/2 COVID-19, [facility]/p [facility]RT for desaturation on BIPAP and currently intubated on MV.       NEURO:  #Mental status:   baseline AOx4  now sedated on fent, propofol and precedex  c/w current sedation    CV:  no known cardiac hx; admission EKG pending  # on levophed likely 2/2 vasoplegic shock  - wean pressors as tolerated  - will eval heart function on POCUS     PULM:  ARDS  #currently intubated on AC: 28/380/12/100  f/u post-intubation ABG  airway clearance  albuterol MDI    RENAL:  #baseline CR ~0.7, currently at baseline  - monitor UOP carefully  - burdick placed for UOP    GI:  #Diet: will start TFs  #Bowel regimen: senna/miralax    ENDO:  #BG wnl on BMPs  no other issues    HEMATOLOGIC:  #Hgb, Plts, coags unremarkable - monitor qd  #DVT prophylaxis with lovenox 40BID    ID:  # COVID-19 infection: started on remdesivir - will dc now that pt is on MV (completed 2d) and started on 10d course of dexamethasone on 1/4 - will complete  - monitor inflammatory markers  - will send admission blood, sputum cx's     SKIN:  #Lines: RIJ central line, L A line CHIEF COMPLAINT: AHRF    HPI:  46F w/ no PMH who initially presented on 1/4/20 with SOB, cough productive of subjective fevers, white sputum, pleuritic rib pain (moderate in severity) for two days and diarrhea for one day. Patient's  tested positive for COVID 12/29. Never had loss of taste/smell/sore throat but did have dry mouth. She denied any history of lung disease or smoking. Patient was taking an unknown antibiotic prescribed by a doctor but did not know the name of the abx or doctor. In the ED, she was notably tachypneic and hypoxic: T98.6, HR 96, /90, S 80% on RA. White count 10k. Hemoglobin 11.0. Plt 236. D-dimer 243. CRP 81.3. . CXR peripheral opacities consistent w/ COVID. Patient given 6 of dexamethasone. Patient admitted for acute hypoxic respiratory failure in the setting of likely COVID infection.     RRT called for hypoxia top the low 80s. Patient did not tolerate proning earlier.  Pt was also febrile  BP wnl  ABG, Other (specify)...  Chest PT  IV Tylenol 1g  Patient placed on her L side and received chest PT with improvement in her SPO2 to 89-91%. ABG obtained. BIPAP placed at bedside and primary team advised to switch from HFNC to BIPAP if patient persistently desats. MICU team was present at bedside at time of RRT, in agreement with plan      PAST MEDICAL & SURGICAL HISTORY:  No pertinent past medical history    No significant past surgical history        FAMILY HISTORY:  FH: type 2 diabetes        SOCIAL HISTORY:  Smoking: [ x] Never Smoked [ ] Former Smoker (__ packs x ___ years) [ ] Current Smoker  (__ packs x ___ years)  Substance Use: [ x] Never Used [ ] Used ____  EtOH Use: none  Marital Status: [ ] Single [x ]  [ ]  [ ]   Full code.  is covid positive    Allergies    No Known Allergies    Intolerances      HOME MEDICATIONS: none    REVIEW OF SYSTEMS:  Constitutional: [ ] fevers [ ] chills [ ] weight loss [ ] weight gain  HEENT: [ ] dry eyes [ ] eye irritation [ ] postnasal drip [ ] nasal congestion  CV: [ ] chest pain [ ] orthopnea [ ] palpitations [ ] murmur  Resp: [ ] cough [ ] shortness of breath [ ] dyspnea [ ] wheezing [ ] sputum [ ] hemoptysis  GI: [ ] nausea [ ] vomiting [ ] diarrhea [ ] constipation [ ] abd pain [ ] dysphagia   : [ ] dysuria [ ] nocturia [ ] hematuria [ ] increased urinary frequency  Musculoskeletal: [ ] back pain [ ] myalgias [ ] arthralgias [ ] fracture  Skin: [ ] rash [ ] itch  Neurological: [ ] headache [ ] dizziness [ ] syncope [ ] weakness [ ] numbness  Psychiatric: [ ] anxiety [ ] depression  Endocrine: [ ] diabetes [ ] thyroid problem  Hematologic/Lymphatic: [ ] anemia [ ] bleeding problem  Allergic/Immunologic: [ ] itchy eyes [ ] nasal discharge [ ] hives [ ] angioedema  [ ] All other systems negative  [x ] Unable to assess ROS because intubated, sedated    OBJECTIVE:  ICU Vital Signs Last 24 Hrs  T(C): 37.1 (05 Jan 2021 12:03), Max: 37.9 (05 Jan 2021 01:00)  T(F): 98.7 (05 Jan 2021 12:03), Max: 100.3 (05 Jan 2021 01:00)  HR: 93 (05 Jan 2021 12:52) (76 - 96)  BP: 108/82 (05 Jan 2021 12:52) (92/64 - 153/106)  BP(mean): 91 (05 Jan 2021 12:52) (69 - 120)  ABP: --  ABP(mean): --  RR: 27 (05 Jan 2021 12:52) (16 - 50)  SpO2: 85% (05 Jan 2021 12:52) (72% - 99%)      CAPILLARY BLOOD GLUCOSE      POCT Blood Glucose.: 111 mg/dL (05 Jan 2021 01:01)      PHYSICAL EXAM:  General: obese female laying in bed, now intubated, sedated   Respiratory: intubated on MV  Cardiovascular: n1 s1s2  Abdomen: NTND  Extremities: no RENEE  Neurological:  PERRLA    LINES: +RIJ, +L A-line    HOSPITAL MEDICATIONS:  MEDICATIONS  (STANDING):  chlorhexidine 0.12% Liquid 15 milliLiter(s) Oral Mucosa every 12 hours  chlorhexidine 4% Liquid 1 Application(s) Topical <User Schedule>  dexAMETHasone  Injectable 6 milliGRAM(s) IV Push daily  dexMEDEtomidine Infusion 0.2 MICROgram(s)/kG/Hr (5.49 mL/Hr) IV Continuous <Continuous>  enoxaparin Injectable 40 milliGRAM(s) SubCutaneous every 12 hours  influenza   Vaccine 0.5 milliLiter(s) IntraMuscular once  norepinephrine Infusion 0.05 MICROgram(s)/kG/Min (10.3 mL/Hr) IV Continuous <Continuous>  petrolatum Ophthalmic Ointment 1 Application(s) Both EYES daily  propofol Infusion 50 MICROgram(s)/kG/Min (32.9 mL/Hr) IV Continuous <Continuous>    MEDICATIONS  (PRN):  acetaminophen   Tablet .. 650 milliGRAM(s) Oral every 6 hours PRN Temp greater or equal to 38C (100.4F), Moderate Pain (4 - 6)  ALBUTerol    90 MICROgram(s) HFA Inhaler 2 Puff(s) Inhalation every 6 hours PRN Shortness of Breath      LABS:                        11.7   11.67 )-----------( 318      ( 05 Jan 2021 07:46 )             37.0     Hgb Trend: 11.7<--, 11.4<--, 11.0<--  01-05    138  |  99  |  21  ----------------------------<  104<H>  4.2   |  23  |  0.74    Ca    8.5      05 Jan 2021 07:46  Mg     2.3     01-04    TPro  7.8  /  Alb  3.6  /  TBili  0.5  /  DBili  x   /  AST  40<H>  /  ALT  28  /  AlkPhos  113  01-05    Creatinine Trend: 0.74<--, 0.78<--, 0.74<--  PT/INR - ( 03 Jan 2021 16:31 )   PT: 12.1 sec;   INR: 1.05 ratio         PTT - ( 03 Jan 2021 16:31 )  PTT:27.0 sec    Arterial Blood Gas:  01-05 @ 01:17  7.42/39/58/25/88.5/0.5  ABG lactate: --      MICROBIOLOGY: no cultures    RADIOLOGY:  [ x] Reviewed and interpreted by me    EKG: pending    46F w/ obesity who initially p/w SOB, productive cough, pleuritic rib pain and diarrhea and was admitted on 1/4/20 w/ AHRF 2/2 COVID-19, [facility]/p [facility]RT for desaturation on BIPAP and currently intubated on MV.       NEURO:  #Mental status:   baseline AOx4  now sedated on fent, propofol and precedex  c/w current sedation    CV:  no known cardiac hx; admission EKG pending  # on levophed likely 2/2 vasoplegic shock  - wean pressors as tolerated  - will eval heart function on POCUS     PULM:  ARDS  #currently intubated on AC: 28/380/12/100  f/u post-intubation ABG  airway clearance  albuterol MDI    RENAL:  #baseline CR ~0.7, currently at baseline  - monitor UOP carefully  - burdick placed for UOP    GI:  #Diet: will start TFs  #Bowel regimen: senna/miralax    ENDO:  #BG wnl on BMPs  no other issues    HEMATOLOGIC:  #Hgb, Plts, coags unremarkable - monitor qd  #DVT prophylaxis with lovenox 40BID    ID:  # COVID-19 infection: started on remdesivir - will dc now that pt is on MV (completed 2d) and started on 10d course of dexamethasone on 1/4 - will complete  - monitor inflammatory markers  - will send admission blood, sputum cx's     SKIN:  #Lines: RIJ central line, L A line    Attending Attestation:  Patient seen and examined with resident/fellow.  Agree with above except as noted.    45 y/o F with PMH as above presented to the hospital with shortness of breath on 01/03, patient was noted to be in acute hypoxic respiratory failure and was admitted to the medicine floors.  This morning, the patient had an RRT called for progressive respiratory failure with increased work of breathing and she was placed on BiPAP.  She was transferred to the MICU for closer observation to the MICU.  Upon evaluation, she was noted to have increase work of breathing and an oxygen saturation of 91% on BiPAP FiO2 of 100%, so the decision was made to intubate the patient.  Patient was set on a ventilator setting of 28/380/100/12 with plateau pressures of 26 which calculates to a driving pressure of 14.  Will check ABG and CXR.  Cont sedation and paralysis.  Depending on ABG, the patient may need proning.  Will plan for TLC and ABG.  Serial ABGs.  Cont steroids and remdesivir.  Overall prognosis guarded.    This patient is critically ill and required frequent bedside visits with therapy change.  Total critical care time spent was 60 minutes. CHIEF COMPLAINT: AHRF    HPI:  46F w/ no PMH who initially presented on 1/4/20 with SOB, cough productive of subjective fevers, white sputum, pleuritic rib pain (moderate in severity) for two days and diarrhea for one day. Patient's  tested positive for COVID 12/29. Never had loss of taste/smell/sore throat but did have dry mouth. She denied any history of lung disease or smoking. Patient was taking an unknown antibiotic prescribed by a doctor but did not know the name of the abx or doctor. In the ED, she was notably tachypneic and hypoxic: T98.6, HR 96, /90, satting 80%RA. White count 10k. Hemoglobin 11.0. Plt 236. D-dimer 243. CRP 81.3. . CXR peripheral opacities c/f COVID. Given 6mg dexamethasone, then admitted to medicine for AHRF in the s/o COVID-19 infection. Pt was on NRB, then HFNC. On 1/5, RRT was called for hypoxia to the low 80s while on HFNC. She did not tolerate proning earlier. Given IV tylenol for fever. VS otherwise were stable. Patient placed on her L side and received chest PT with improvement in her SPO2 to 89-91%. BIPAP placed at bedside and primary team was advised to switch from HFNC to BIPAP if patient persistently desats. She was trialed on BIPAP, then admitted to the MICU for further monitoring given increased WOB. In the MICU, she was notably tachypneic on BIPAP. Given increased WOB and 02Sat 91% on FI02 100%, she was urgently intubated.      PAST MEDICAL & SURGICAL HISTORY:  No pertinent past medical history    No significant past surgical history        FAMILY HISTORY:  FH: type 2 diabetes        SOCIAL HISTORY:  Smoking: [ x] Never Smoked [ ] Former Smoker (__ packs x ___ years) [ ] Current Smoker  (__ packs x ___ years)  Substance Use: [ x] Never Used [ ] Used ____  EtOH Use: none  Marital Status: [ ] Single [x ]  [ ]  [ ]   Full code.  is covid positive    Allergies    No Known Allergies    Intolerances      HOME MEDICATIONS: none    REVIEW OF SYSTEMS:  Constitutional: [ ] fevers [ ] chills [ ] weight loss [ ] weight gain  HEENT: [ ] dry eyes [ ] eye irritation [ ] postnasal drip [ ] nasal congestion  CV: [ ] chest pain [ ] orthopnea [ ] palpitations [ ] murmur  Resp: [ ] cough [ ] shortness of breath [ ] dyspnea [ ] wheezing [ ] sputum [ ] hemoptysis  GI: [ ] nausea [ ] vomiting [ ] diarrhea [ ] constipation [ ] abd pain [ ] dysphagia   : [ ] dysuria [ ] nocturia [ ] hematuria [ ] increased urinary frequency  Musculoskeletal: [ ] back pain [ ] myalgias [ ] arthralgias [ ] fracture  Skin: [ ] rash [ ] itch  Neurological: [ ] headache [ ] dizziness [ ] syncope [ ] weakness [ ] numbness  Psychiatric: [ ] anxiety [ ] depression  Endocrine: [ ] diabetes [ ] thyroid problem  Hematologic/Lymphatic: [ ] anemia [ ] bleeding problem  Allergic/Immunologic: [ ] itchy eyes [ ] nasal discharge [ ] hives [ ] angioedema  [ ] All other systems negative  [x ] Unable to assess ROS because intubated, sedated    OBJECTIVE:  ICU Vital Signs Last 24 Hrs  T(C): 37.1 (05 Jan 2021 12:03), Max: 37.9 (05 Jan 2021 01:00)  T(F): 98.7 (05 Jan 2021 12:03), Max: 100.3 (05 Jan 2021 01:00)  HR: 93 (05 Jan 2021 12:52) (76 - 96)  BP: 108/82 (05 Jan 2021 12:52) (92/64 - 153/106)  BP(mean): 91 (05 Jan 2021 12:52) (69 - 120)  ABP: --  ABP(mean): --  RR: 27 (05 Jan 2021 12:52) (16 - 50)  SpO2: 85% (05 Jan 2021 12:52) (72% - 99%)      CAPILLARY BLOOD GLUCOSE      POCT Blood Glucose.: 111 mg/dL (05 Jan 2021 01:01)      PHYSICAL EXAM:  General: obese female laying in bed, now intubated, sedated   Respiratory: intubated on MV  Cardiovascular: n1 s1s2  Abdomen: NTND  Extremities: no RENEE  Neurological:  PERRLA    LINES: +RIJ, +L A-line    HOSPITAL MEDICATIONS:  MEDICATIONS  (STANDING):  chlorhexidine 0.12% Liquid 15 milliLiter(s) Oral Mucosa every 12 hours  chlorhexidine 4% Liquid 1 Application(s) Topical <User Schedule>  dexAMETHasone  Injectable 6 milliGRAM(s) IV Push daily  dexMEDEtomidine Infusion 0.2 MICROgram(s)/kG/Hr (5.49 mL/Hr) IV Continuous <Continuous>  enoxaparin Injectable 40 milliGRAM(s) SubCutaneous every 12 hours  influenza   Vaccine 0.5 milliLiter(s) IntraMuscular once  norepinephrine Infusion 0.05 MICROgram(s)/kG/Min (10.3 mL/Hr) IV Continuous <Continuous>  petrolatum Ophthalmic Ointment 1 Application(s) Both EYES daily  propofol Infusion 50 MICROgram(s)/kG/Min (32.9 mL/Hr) IV Continuous <Continuous>    MEDICATIONS  (PRN):  acetaminophen   Tablet .. 650 milliGRAM(s) Oral every 6 hours PRN Temp greater or equal to 38C (100.4F), Moderate Pain (4 - 6)  ALBUTerol    90 MICROgram(s) HFA Inhaler 2 Puff(s) Inhalation every 6 hours PRN Shortness of Breath      LABS:                        11.7   11.67 )-----------( 318      ( 05 Jan 2021 07:46 )             37.0     Hgb Trend: 11.7<--, 11.4<--, 11.0<--  01-05    138  |  99  |  21  ----------------------------<  104<H>  4.2   |  23  |  0.74    Ca    8.5      05 Jan 2021 07:46  Mg     2.3     01-04    TPro  7.8  /  Alb  3.6  /  TBili  0.5  /  DBili  x   /  AST  40<H>  /  ALT  28  /  AlkPhos  113  01-05    Creatinine Trend: 0.74<--, 0.78<--, 0.74<--  PT/INR - ( 03 Jan 2021 16:31 )   PT: 12.1 sec;   INR: 1.05 ratio         PTT - ( 03 Jan 2021 16:31 )  PTT:27.0 sec    Arterial Blood Gas:  01-05 @ 01:17  7.42/39/58/25/88.5/0.5  ABG lactate: --      MICROBIOLOGY: no cultures    RADIOLOGY:  [ x] Reviewed and interpreted by me    EKG: pending    46F w/ obesity who initially p/w SOB, productive cough, pleuritic rib pain and diarrhea and was admitted on 1/4/20 w/ AHRF 2/2 COVID-19, now transferred to MICU on 1/5/20 s/p RRT for desaturation on BIPAP and currently intubated on MV.       NEURO:  #Mental status:   baseline AOx4  now sedated on fent, propofol and precedex and paralyzed with nimbex  c/w current sedation/paralysis    CV:  no known cardiac hx; admission EKG pending  # on levophed likely 2/2 vasoplegic shock  - wean pressors as tolerated  - will eval heart function on POCUS     PULM:  ARDS  #currently intubated on AC: 28/380/12/100  f/u post-intubation ABG to determine need for proning  airway clearance  albuterol MDI    RENAL:  #baseline CR ~0.7, currently at baseline  - monitor UOP carefully  - burdick placed for UOP    GI:  #Diet: will start TFs depending on whether pt will need proning  #Bowel regimen: senna/miralax    ENDO:  #BG wnl on BMPs  no other issues    HEMATOLOGIC:  #Hgb, Plts, coags unremarkable - monitor qd  #DVT prophylaxis with lovenox 40BID    ID:  # COVID-19 infection: started on remdesivir for 5d course and started on 10d course of dexamethasone on 1/4 - will complete  - monitor inflammatory markers  - will send admission blood, sputum cx's     SKIN:  #Lines: RIJ central line, L A line    Attending Attestation:  Patient seen and examined with resident/fellow.  Agree with above except as noted.    47 y/o F with PMH as above presented to the hospital with shortness of breath on 01/03, patient was noted to be in acute hypoxic respiratory failure and was admitted to the medicine floors.  This morning, the patient had an RRT called for progressive respiratory failure with increased work of breathing and she was placed on BiPAP.  She was transferred to the MICU for closer observation to the MICU.  Upon evaluation, she was noted to have increase work of breathing and an oxygen saturation of 91% on BiPAP FiO2 of 100%, so the decision was made to intubate the patient.  Patient was set on a ventilator setting of 28/380/100/12 with plateau pressures of 26 which calculates to a driving pressure of 14.  Will check ABG and CXR.  Cont sedation and paralysis.  Depending on ABG, the patient may need proning.  Will plan for TLC and ABG.  Serial ABGs.  Cont steroids and remdesivir.  Overall prognosis guarded.    This patient is critically ill and required frequent bedside visits with therapy change.  Total critical care time spent was 60 minutes.

## 2021-01-05 NOTE — PROGRESS NOTE ADULT - SUBJECTIVE AND OBJECTIVE BOX
Subjective: Patient seen and examined. No new events except as noted.   transferred to MICU for tachypnea         MEDICATIONS:  MEDICATIONS  (STANDING):  chlorhexidine 0.12% Liquid 15 milliLiter(s) Oral Mucosa every 12 hours  chlorhexidine 4% Liquid 1 Application(s) Topical <User Schedule>  chlorhexidine 4% Liquid 1 Application(s) Topical <User Schedule>  cisatracurium Infusion 3 MICROgram(s)/kG/Min (19.8 mL/Hr) IV Continuous <Continuous>  dexAMETHasone  Injectable 6 milliGRAM(s) IV Push daily  dexMEDEtomidine Infusion 0.2 MICROgram(s)/kG/Hr (5.49 mL/Hr) IV Continuous <Continuous>  enoxaparin Injectable 40 milliGRAM(s) SubCutaneous every 12 hours  fentaNYL   Infusion. 0.5 MICROgram(s)/kG/Hr (5.49 mL/Hr) IV Continuous <Continuous>  influenza   Vaccine 0.5 milliLiter(s) IntraMuscular once  petrolatum Ophthalmic Ointment 1 Application(s) Both EYES daily  propofol Infusion 50 MICROgram(s)/kG/Min (32.9 mL/Hr) IV Continuous <Continuous>  remdesivir  IVPB 100 milliGRAM(s) IV Intermittent every 24 hours      PHYSICAL EXAM:  T(C): 37.4 (01-06-21 @ 00:00), Max: 37.9 (01-05-21 @ 01:00)  HR: 59 (01-06-21 @ 00:03) (59 - 95)  BP: 142/89 (01-06-21 @ 00:00) (90/62 - 153/106)  RR: 25 (01-06-21 @ 00:00) (0 - 50)  SpO2: 100% (01-06-21 @ 00:03) (72% - 100%)  Wt(kg): --  I&O's Summary    05 Jan 2021 07:01  -  06 Jan 2021 00:18  --------------------------------------------------------  IN: 1630.5 mL / OUT: 2265 mL / NET: -634.5 mL          Appearance: Normal	  HEENT:  PERRLA   Lymphatic: No lymphadenopathy   Cardiovascular: Normal S1 S2  Respiratory: + SOB, tachypnea   Gastrointestinal:  Soft, Non-tender  Skin: No rashes,  warm to touch  Psychiatry:  Mood & affect appropriate  Musculuskeletal: No edema      All labs, Imaging and EKGs personally reviewed                             11.7 11.67 )-----------( 318      ( 05 Jan 2021 07:46 )             37.0               01-05    134<L>  |  99  |  24<H>  ----------------------------<  157<H>  4.9   |  18<L>  |  0.63    Ca    8.2<L>      05 Jan 2021 14:24  Phos  4.8     01-05  Mg     2.3     01-05    TPro  7.7  /  Alb  3.3  /  TBili  0.6  /  DBili  x   /  AST  50<H>  /  ALT  26  /  AlkPhos  106  01-05           CARDIAC MARKERS ( 04 Jan 2021 07:22 )  x     / x     / 93 U/L / x     / x                ABG - ( 05 Jan 2021 18:25 )  pH, Arterial: 7.40  pH, Blood: x     /  pCO2: 34    /  pO2: 238   / HCO3: 22    / Base Excess: -3.4  /  SaO2: 99.0

## 2021-01-05 NOTE — CHART NOTE - NSCHARTNOTEFT_GEN_A_CORE
Medicine Subsequent Hospital Care Note- ACP  CC: Hypoxia and Tachypnea    HPI/Subjective: 47 yo Covid Pos, hypoxic to 80's on HFNC and SOB    ROS: febrile, SOB    Constitutional: fever, generalized weakness. .  Respiratory: SOB, cough  Cardiovascular: dizziness, Denies CP,    Gastrointestinal: Denies nausea, vomiting, diarrhea, constipation, abdominal pain, melena, hematochezia, dysphagia.  Genitourinary: Denies dysuria, frequency, urgency, hematuria, nocturia.  Skin/Breast: Denies rash, itching, ulcers, erythema, peripheral edema.  Neurologic: Denies headache, weakness, dizziness paresthesias, numbness/tingling, paralysis, visual changes, ataxia.  Psychiatric: anxious      -------------------------------------------------------------------------------------------------------------------------  Vital: /80  Os sat soen to 78 on HFNC 60/100 and NRB 15l  HR SR 90's  RR 40's  Temp 100.6 rectal        Telemetry/Alarms:  General: anxious, tachypnea, inc WOB  Neurology: Awake, SU x 4  Eyes: Scleras clear, PERRLA/ EOMI, Gross vision intact  ENT:Gross hearing intact, no stridor  Neck: Neck supple, trachea midline,   Respiratory: diminissed, difficult to assess  CV: RRR  Abdominal: Soft, NT, ND   Extremities: No edema, + peripheral pulses  Skin: No Rashes, Hematoma, Ecchymosis  Psych:  normal affect    Relevant labs, radiology and Medications reviewed                        11.4   10.98 )-----------( 286      ( 04 Jan 2021 07:22 )             36.5     01-04    138  |  101  |  14  ----------------------------<  155<H>  4.6   |  27  |  0.78    Ca    8.9      04 Jan 2021 07:22  Mg     2.3     01-04    TPro  7.9  /  Alb  3.8  /  TBili  0.3  /  DBili  <0.2  /  AST  35<H>  /  ALT  17  /  AlkPhos  89  01-04    PT/INR - ( 03 Jan 2021 16:31 )   PT: 12.1 sec;   INR: 1.05 ratio         PTT - ( 03 Jan 2021 16:31 )  PTT:27.0 sec  MEDICATIONS  (STANDING):  dexAMETHasone     Tablet 6 milliGRAM(s) Oral every 24 hours  enoxaparin Injectable 40 milliGRAM(s) SubCutaneous every 12 hours  influenza   Vaccine 0.5 milliLiter(s) IntraMuscular once  remdesivir  IVPB 100 milliGRAM(s) IV Intermittent every 24 hours  remdesivir  IVPB   IV Intermittent     MEDICATIONS  (PRN):  acetaminophen   Tablet .. 650 milliGRAM(s) Oral every 6 hours PRN Temp greater or equal to 38C (100.4F), Moderate Pain (4 - 6)  ALBUTerol    90 MICROgram(s) HFA Inhaler 2 Puff(s) Inhalation every 6 hours PRN Shortness of Breath          Substance Use (street drugs): ( x ) never used  (  ) other:  Tobacco Usage:  ( x  ) never smoked   (   ) former smoker   (   ) current smoker  (     ) pack year  Alcohol Usage: None     I reviewed the above lab results, tests, telemetry, and  EKG interpretation.   Assessment  46y Female  w/ PAST MEDICAL & SURGICAL HISTORY:  No pertinent past medical history    No significant past surgical history     Patient is a 46y old  Female who presents with a chief complaint of COVID 19 with SOB   now with incresing O2 requirements    PLAN  1)inc HFNC to 60/100 and NRB at 15L and pt still desat to 78%, RRT was called  2)CPT, and 2 puffs albuterol given prior to RRT, repeated CPT at RRT with rise in O2sat to 90-91%, RR decreased soemwhat and pt more comfortable but still struggling, cannot to Nebulizer as per respiratory therapist  3)Last dose of Deacdron at 6:30 PM 1/4, will not redose now  4) MICU at bedside qiht RRT team, rec BIPAP if pt desat again  5) ABG sent, D-dimer added  6) Pt on full dose lovenox 40mg BID, will inc if d-dimer higher with suspicion of DVT/PE  7? Continue Remdesevir and Decadron, fever management      Neuro: Pain management  Pulm: Encourage coughing, deep breathing and use of incentive spirometry. Wean off supplemental oxygen as able.   Cardio: Monitor telemetry/alarms  GI: Tolerating diet. Continue stool softeners.  Renal: monitor urine output, supplement electrolytes as needed  Vasc: Lovenoa SC for DVT prophylaxis  Heme: Stable H/H. .   ID: Off antibiotics. Stable.      Disposition: Full Code. Pt requested intubation at RRT if necessary    Discussed with MICU, Will monitor patient closely.      [x ] Moderate complexity/risk, (Time > 30 min)

## 2021-01-05 NOTE — RAPID RESPONSE TEAM SUMMARY - NSOTHERINTERVENTIONSRRT_GEN_ALL_CORE
Patient placed on her L side and received chest PT with improvement in her SPO2 to 89-91%. ABG obtained. BIPAP placed at bedside and primary team advised to switch from HFNC to BIPAP if patient persistently desats. MICU team was present at bedside at time of RRT, in agreement with plan

## 2021-01-06 LAB
A1C WITH ESTIMATED AVERAGE GLUCOSE RESULT: 6.6 % — HIGH (ref 4–5.6)
APPEARANCE UR: CLEAR — SIGNIFICANT CHANGE UP
APTT BLD: 22.1 SEC — LOW (ref 27–36.3)
BASOPHILS # BLD AUTO: 0.03 K/UL — SIGNIFICANT CHANGE UP (ref 0–0.2)
BASOPHILS NFR BLD AUTO: 0.3 % — SIGNIFICANT CHANGE UP (ref 0–2)
BILIRUB UR-MCNC: NEGATIVE — SIGNIFICANT CHANGE UP
BLOOD GAS ARTERIAL - LYTES,HGB,ICA,LACT RESULT: SIGNIFICANT CHANGE UP
COLOR SPEC: SIGNIFICANT CHANGE UP
D DIMER BLD IA.RAPID-MCNC: 817 NG/ML DDU — HIGH
DIFF PNL FLD: NEGATIVE — SIGNIFICANT CHANGE UP
EOSINOPHIL # BLD AUTO: 0 K/UL — SIGNIFICANT CHANGE UP (ref 0–0.5)
EOSINOPHIL NFR BLD AUTO: 0 % — SIGNIFICANT CHANGE UP (ref 0–6)
ESTIMATED AVERAGE GLUCOSE: 143 MG/DL — HIGH (ref 68–114)
GLUCOSE BLDC GLUCOMTR-MCNC: 167 MG/DL — HIGH (ref 70–99)
GLUCOSE BLDC GLUCOMTR-MCNC: 170 MG/DL — HIGH (ref 70–99)
GLUCOSE BLDC GLUCOMTR-MCNC: 203 MG/DL — HIGH (ref 70–99)
GLUCOSE BLDC GLUCOMTR-MCNC: 302 MG/DL — HIGH (ref 70–99)
GLUCOSE UR QL: NEGATIVE — SIGNIFICANT CHANGE UP
HCT VFR BLD CALC: 34.6 % — SIGNIFICANT CHANGE UP (ref 34.5–45)
HGB BLD-MCNC: 11.4 G/DL — LOW (ref 11.5–15.5)
IANC: 7.24 K/UL — SIGNIFICANT CHANGE UP (ref 1.5–8.5)
IMM GRANULOCYTES NFR BLD AUTO: 1.9 % — HIGH (ref 0–1.5)
INR BLD: 1.24 RATIO — HIGH (ref 0.88–1.16)
KETONES UR-MCNC: NEGATIVE — SIGNIFICANT CHANGE UP
LEUKOCYTE ESTERASE UR-ACNC: NEGATIVE — SIGNIFICANT CHANGE UP
LYMPHOCYTES # BLD AUTO: 0.85 K/UL — LOW (ref 1–3.3)
LYMPHOCYTES # BLD AUTO: 9.6 % — LOW (ref 13–44)
MAGNESIUM SERPL-MCNC: 2.2 MG/DL — SIGNIFICANT CHANGE UP (ref 1.6–2.6)
MCHC RBC-ENTMCNC: 27.7 PG — SIGNIFICANT CHANGE UP (ref 27–34)
MCHC RBC-ENTMCNC: 32.9 GM/DL — SIGNIFICANT CHANGE UP (ref 32–36)
MCV RBC AUTO: 84 FL — SIGNIFICANT CHANGE UP (ref 80–100)
MONOCYTES # BLD AUTO: 0.58 K/UL — SIGNIFICANT CHANGE UP (ref 0–0.9)
MONOCYTES NFR BLD AUTO: 6.5 % — SIGNIFICANT CHANGE UP (ref 2–14)
NEUTROPHILS # BLD AUTO: 7.24 K/UL — SIGNIFICANT CHANGE UP (ref 1.8–7.4)
NEUTROPHILS NFR BLD AUTO: 81.7 % — HIGH (ref 43–77)
NITRITE UR-MCNC: NEGATIVE — SIGNIFICANT CHANGE UP
NRBC # BLD: 0 /100 WBCS — SIGNIFICANT CHANGE UP
NRBC # FLD: 0 K/UL — SIGNIFICANT CHANGE UP
PH UR: 6.5 — SIGNIFICANT CHANGE UP (ref 5–8)
PHOSPHATE SERPL-MCNC: 4.1 MG/DL — SIGNIFICANT CHANGE UP (ref 2.5–4.5)
PLATELET # BLD AUTO: 334 K/UL — SIGNIFICANT CHANGE UP (ref 150–400)
PROT UR-MCNC: ABNORMAL
PROTHROM AB SERPL-ACNC: 14.1 SEC — HIGH (ref 10.6–13.6)
RBC # BLD: 4.12 M/UL — SIGNIFICANT CHANGE UP (ref 3.8–5.2)
RBC # FLD: 14.4 % — SIGNIFICANT CHANGE UP (ref 10.3–14.5)
SP GR SPEC: 1.02 — SIGNIFICANT CHANGE UP (ref 1.01–1.02)
UROBILINOGEN FLD QL: SIGNIFICANT CHANGE UP
WBC # BLD: 8.87 K/UL — SIGNIFICANT CHANGE UP (ref 3.8–10.5)
WBC # FLD AUTO: 8.87 K/UL — SIGNIFICANT CHANGE UP (ref 3.8–10.5)

## 2021-01-06 PROCEDURE — 99291 CRITICAL CARE FIRST HOUR: CPT | Mod: 25

## 2021-01-06 PROCEDURE — 93308 TTE F-UP OR LMTD: CPT | Mod: 26

## 2021-01-06 PROCEDURE — 76604 US EXAM CHEST: CPT | Mod: 26

## 2021-01-06 RX ORDER — POLYETHYLENE GLYCOL 3350 17 G/17G
17 POWDER, FOR SOLUTION ORAL DAILY
Refills: 0 | Status: DISCONTINUED | OUTPATIENT
Start: 2021-01-06 | End: 2021-01-25

## 2021-01-06 RX ORDER — SODIUM CHLORIDE 9 MG/ML
1000 INJECTION, SOLUTION INTRAVENOUS
Refills: 0 | Status: DISCONTINUED | OUTPATIENT
Start: 2021-01-06 | End: 2021-01-10

## 2021-01-06 RX ORDER — ALBUTEROL 90 UG/1
2 AEROSOL, METERED ORAL EVERY 6 HOURS
Refills: 0 | Status: DISCONTINUED | OUTPATIENT
Start: 2021-01-06 | End: 2021-02-02

## 2021-01-06 RX ORDER — SENNA PLUS 8.6 MG/1
10 TABLET ORAL DAILY
Refills: 0 | Status: DISCONTINUED | OUTPATIENT
Start: 2021-01-06 | End: 2021-02-17

## 2021-01-06 RX ORDER — PANTOPRAZOLE SODIUM 20 MG/1
40 TABLET, DELAYED RELEASE ORAL DAILY
Refills: 0 | Status: DISCONTINUED | OUTPATIENT
Start: 2021-01-06 | End: 2021-01-07

## 2021-01-06 RX ORDER — DEXTROSE 50 % IN WATER 50 %
15 SYRINGE (ML) INTRAVENOUS ONCE
Refills: 0 | Status: DISCONTINUED | OUTPATIENT
Start: 2021-01-06 | End: 2021-01-10

## 2021-01-06 RX ORDER — ACETAMINOPHEN 500 MG
650 TABLET ORAL EVERY 6 HOURS
Refills: 0 | Status: DISCONTINUED | OUTPATIENT
Start: 2021-01-06 | End: 2021-01-16

## 2021-01-06 RX ORDER — INSULIN LISPRO 100/ML
VIAL (ML) SUBCUTANEOUS EVERY 6 HOURS
Refills: 0 | Status: DISCONTINUED | OUTPATIENT
Start: 2021-01-06 | End: 2021-01-10

## 2021-01-06 RX ORDER — NOREPINEPHRINE BITARTRATE/D5W 8 MG/250ML
0.03 PLASTIC BAG, INJECTION (ML) INTRAVENOUS
Qty: 8 | Refills: 0 | Status: DISCONTINUED | OUTPATIENT
Start: 2021-01-06 | End: 2021-01-09

## 2021-01-06 RX ORDER — DEXTROSE 50 % IN WATER 50 %
25 SYRINGE (ML) INTRAVENOUS ONCE
Refills: 0 | Status: DISCONTINUED | OUTPATIENT
Start: 2021-01-06 | End: 2021-01-10

## 2021-01-06 RX ORDER — DEXTROSE 50 % IN WATER 50 %
12.5 SYRINGE (ML) INTRAVENOUS ONCE
Refills: 0 | Status: DISCONTINUED | OUTPATIENT
Start: 2021-01-06 | End: 2021-01-10

## 2021-01-06 RX ORDER — GLUCAGON INJECTION, SOLUTION 0.5 MG/.1ML
1 INJECTION, SOLUTION SUBCUTANEOUS ONCE
Refills: 0 | Status: DISCONTINUED | OUTPATIENT
Start: 2021-01-06 | End: 2021-01-10

## 2021-01-06 RX ADMIN — REMDESIVIR 500 MILLIGRAM(S): 5 INJECTION INTRAVENOUS at 22:42

## 2021-01-06 RX ADMIN — POLYETHYLENE GLYCOL 3350 17 GRAM(S): 17 POWDER, FOR SOLUTION ORAL at 11:43

## 2021-01-06 RX ADMIN — CISATRACURIUM BESYLATE 19.8 MICROGRAM(S)/KG/MIN: 2 INJECTION INTRAVENOUS at 06:09

## 2021-01-06 RX ADMIN — ALBUTEROL 2 PUFF(S): 90 AEROSOL, METERED ORAL at 22:49

## 2021-01-06 RX ADMIN — DEXMEDETOMIDINE HYDROCHLORIDE IN 0.9% SODIUM CHLORIDE 5.49 MICROGRAM(S)/KG/HR: 4 INJECTION INTRAVENOUS at 06:09

## 2021-01-06 RX ADMIN — SENNA PLUS 10 MILLILITER(S): 8.6 TABLET ORAL at 11:43

## 2021-01-06 RX ADMIN — Medication 6 MILLIGRAM(S): at 06:06

## 2021-01-06 RX ADMIN — CISATRACURIUM BESYLATE 19.8 MICROGRAM(S)/KG/MIN: 2 INJECTION INTRAVENOUS at 08:35

## 2021-01-06 RX ADMIN — PROPOFOL 32.9 MICROGRAM(S)/KG/MIN: 10 INJECTION, EMULSION INTRAVENOUS at 08:35

## 2021-01-06 RX ADMIN — CHLORHEXIDINE GLUCONATE 15 MILLILITER(S): 213 SOLUTION TOPICAL at 06:06

## 2021-01-06 RX ADMIN — Medication 650 MILLIGRAM(S): at 06:07

## 2021-01-06 RX ADMIN — CHLORHEXIDINE GLUCONATE 15 MILLILITER(S): 213 SOLUTION TOPICAL at 17:13

## 2021-01-06 RX ADMIN — ENOXAPARIN SODIUM 40 MILLIGRAM(S): 100 INJECTION SUBCUTANEOUS at 06:06

## 2021-01-06 RX ADMIN — Medication 1 APPLICATION(S): at 11:20

## 2021-01-06 RX ADMIN — PANTOPRAZOLE SODIUM 40 MILLIGRAM(S): 20 TABLET, DELAYED RELEASE ORAL at 11:56

## 2021-01-06 RX ADMIN — Medication 650 MILLIGRAM(S): at 12:23

## 2021-01-06 RX ADMIN — FENTANYL CITRATE 5.49 MICROGRAM(S)/KG/HR: 50 INJECTION INTRAVENOUS at 06:09

## 2021-01-06 RX ADMIN — DEXMEDETOMIDINE HYDROCHLORIDE IN 0.9% SODIUM CHLORIDE 5.49 MICROGRAM(S)/KG/HR: 4 INJECTION INTRAVENOUS at 08:35

## 2021-01-06 RX ADMIN — PROPOFOL 32.9 MICROGRAM(S)/KG/MIN: 10 INJECTION, EMULSION INTRAVENOUS at 06:08

## 2021-01-06 RX ADMIN — ENOXAPARIN SODIUM 40 MILLIGRAM(S): 100 INJECTION SUBCUTANEOUS at 17:14

## 2021-01-06 RX ADMIN — Medication 1: at 17:13

## 2021-01-06 RX ADMIN — CHLORHEXIDINE GLUCONATE 1 APPLICATION(S): 213 SOLUTION TOPICAL at 06:07

## 2021-01-06 NOTE — DIETITIAN INITIAL EVALUATION ADULT. - ENTERAL
Jevity 1.2 @ 10 ml/hr 7 increase to the goal of 35 ml/hr x24hrs with No Carb Pro source x 3 packs daily

## 2021-01-06 NOTE — PROGRESS NOTE ADULT - ATTENDING COMMENTS
47 y/o F with PMH as above presented to the hospital with shortness of breath on 01/03, patient was noted to be in acute hypoxic respiratory failure and was admitted to the medicine floors.  Yesterday morning, the patient had an RRT called for progressive respiratory failure with increased work of breathing and she was placed on BiPAP.  She was transferred to the MICU for closer observation to the MICU.  Upon evaluation, she was noted to have increase work of breathing and an oxygen saturation of 91% on BiPAP FiO2 of 100%, so the decision was made to intubate the patient.  Patient was set on a ventilator setting of 28/380/100/12 with plateau pressures of 26 which calculates to a driving pressure of 14.  ABG prior to proning showed a P:F 72.  The decision was made to prone.  Her proned ABG showed a P:F of 238.  Supined at 10 am and ABG on 28/380/60/12 showed a P:F of 156.  Will trend serial ABGs and decide on proning.  Cont sedation and paralysis. Febrile, will check cultures.  Cont steroids and remdesivir.  Overall prognosis guarded.

## 2021-01-06 NOTE — PROGRESS NOTE ADULT - SUBJECTIVE AND OBJECTIVE BOX
Subjective: Patient seen and examined. No new events except as noted.   intubated        MEDICATIONS:  MEDICATIONS  (STANDING):  ALBUTerol    90 MICROgram(s) HFA Inhaler 2 Puff(s) Inhalation every 6 hours  chlorhexidine 0.12% Liquid 15 milliLiter(s) Oral Mucosa every 12 hours  chlorhexidine 4% Liquid 1 Application(s) Topical <User Schedule>  cisatracurium Infusion 3 MICROgram(s)/kG/Min (19.8 mL/Hr) IV Continuous <Continuous>  dexAMETHasone  Injectable 6 milliGRAM(s) IV Push daily  dextrose 40% Gel 15 Gram(s) Oral once  dextrose 5%. 1000 milliLiter(s) (50 mL/Hr) IV Continuous <Continuous>  dextrose 5%. 1000 milliLiter(s) (100 mL/Hr) IV Continuous <Continuous>  dextrose 50% Injectable 25 Gram(s) IV Push once  dextrose 50% Injectable 12.5 Gram(s) IV Push once  dextrose 50% Injectable 25 Gram(s) IV Push once  enoxaparin Injectable 40 milliGRAM(s) SubCutaneous every 12 hours  fentaNYL   Infusion. 0.5 MICROgram(s)/kG/Hr (5.49 mL/Hr) IV Continuous <Continuous>  glucagon  Injectable 1 milliGRAM(s) IntraMuscular once  influenza   Vaccine 0.5 milliLiter(s) IntraMuscular once  insulin lispro (ADMELOG) corrective regimen sliding scale   SubCutaneous every 6 hours  norepinephrine Infusion 0.03 MICROgram(s)/kG/Min (6.18 mL/Hr) IV Continuous <Continuous>  pantoprazole  Injectable 40 milliGRAM(s) IV Push daily  petrolatum Ophthalmic Ointment 1 Application(s) Both EYES daily  polyethylene glycol 3350 17 Gram(s) Oral daily  propofol Infusion 50 MICROgram(s)/kG/Min (32.9 mL/Hr) IV Continuous <Continuous>  remdesivir  IVPB 100 milliGRAM(s) IV Intermittent every 24 hours  senna Syrup 10 milliLiter(s) Oral daily      PHYSICAL EXAM:  T(C): 37.6 (21 @ 14:00), Max: 38.8 (21 @ 10:21)  HR: 62 (21 @ 16:46) (59 - 65)  BP: 109/67 (21 @ 15:00) (89/64 - 145/93)  RR: 28 (21 @ 15:00) (0 - 28)  SpO2: 94% (21 @ 16:46) (92% - 100%)  Wt(kg): --  I&O's Summary    2021 07:01  -  2021 07:00  --------------------------------------------------------  IN: 2310.9 mL / OUT: 3230 mL / NET: -919.1 mL    2021 07:01  -  2021 17:45  --------------------------------------------------------  IN: 730.2 mL / OUT: 760 mL / NET: -29.8 mL    All labs, Imaging and EKGs personally reviewed                             11.4   8.87  )-----------( 334      ( 2021 03:55 )             34.6               01-05    134<L>  |  99  |  24<H>  ----------------------------<  157<H>  4.9   |  18<L>  |  0.63    Ca    8.2<L>      2021 14:24  Phos  4.1     -  Mg     2.2     -    TPro  7.7  /  Alb  3.3  /  TBili  0.6  /  DBili  x   /  AST  50<H>  /  ALT  26  /  AlkPhos  106  01-05    PT/INR - ( 2021 03:55 )   PT: 14.1 sec;   INR: 1.24 ratio         PTT - ( 2021 03:55 )  PTT:22.1 sec                 ABG - ( 2021 14:57 )  pH, Arterial: 7.38  pH, Blood: x     /  pCO2: 38    /  pO2: 72    / HCO3: 23    / Base Excess: -2.1  /  SaO2: 92.3              Urinalysis Basic - ( 2021 11:42 )    Color: Light Yellow / Appearance: Clear / S.018 / pH: x  Gluc: x / Ketone: Negative  / Bili: Negative / Urobili: <2 mg/dL   Blood: x / Protein: Trace / Nitrite: Negative   Leuk Esterase: Negative / RBC: 1 /HPF / WBC 4 /HPF   Sq Epi: x / Non Sq Epi: 6 /HPF / Bacteria: Negative

## 2021-01-06 NOTE — PROGRESS NOTE ADULT - SUBJECTIVE AND OBJECTIVE BOX
Melinda Attila, PGY-1    Patient is a 46y old  Female who presents with a chief complaint of COVID (05 Jan 2021 12:17)      INTERVAL HPI/OVERNIGHT EVENTS: KAVYA    SUBJECTIVE: Patient seen and examined at bedside.       VITAL SIGNS:  ICU Vital Signs Last 24 Hrs  T(C): 38.2 (06 Jan 2021 04:00), Max: 38.2 (06 Jan 2021 04:00)  T(F): 100.8 (06 Jan 2021 04:00), Max: 100.8 (06 Jan 2021 04:00)  HR: 65 (06 Jan 2021 07:07) (59 - 95)  BP: 113/78 (06 Jan 2021 06:00) (90/62 - 153/106)  BP(mean): 90 (06 Jan 2021 06:00) (69 - 120)  ABP: 115/64 (06 Jan 2021 06:00) (115/64 - 157/81)  ABP(mean): 84 (06 Jan 2021 06:00) (84 - 111)  RR: 25 (06 Jan 2021 06:00) (0 - 50)  SpO2: 100% (06 Jan 2021 07:07) (72% - 100%)    Mode: AC/ CMV (Assist Control/ Continuous Mandatory Ventilation), RR (machine): 28, TV (machine): 380, FiO2: 50, PEEP: 12, ITime: 0.66, MAP: 17, PIP: 27  Plateau pressure:   P/F ratio:     01-05 @ 07:01  -  01-06 @ 07:00  --------------------------------------------------------  IN: 2310.9 mL / OUT: 3230 mL / NET: -919.1 mL      CAPILLARY BLOOD GLUCOSE      POCT Blood Glucose.: 111 mg/dL (05 Jan 2021 01:01)    ECG:    PHYSICAL EXAM:        MEDICATIONS:  MEDICATIONS  (STANDING):  chlorhexidine 0.12% Liquid 15 milliLiter(s) Oral Mucosa every 12 hours  chlorhexidine 4% Liquid 1 Application(s) Topical <User Schedule>  chlorhexidine 4% Liquid 1 Application(s) Topical <User Schedule>  cisatracurium Infusion 3 MICROgram(s)/kG/Min (19.8 mL/Hr) IV Continuous <Continuous>  dexAMETHasone  Injectable 6 milliGRAM(s) IV Push daily  dexMEDEtomidine Infusion 0.2 MICROgram(s)/kG/Hr (5.49 mL/Hr) IV Continuous <Continuous>  enoxaparin Injectable 40 milliGRAM(s) SubCutaneous every 12 hours  fentaNYL   Infusion. 0.5 MICROgram(s)/kG/Hr (5.49 mL/Hr) IV Continuous <Continuous>  influenza   Vaccine 0.5 milliLiter(s) IntraMuscular once  petrolatum Ophthalmic Ointment 1 Application(s) Both EYES daily  propofol Infusion 50 MICROgram(s)/kG/Min (32.9 mL/Hr) IV Continuous <Continuous>  remdesivir  IVPB 100 milliGRAM(s) IV Intermittent every 24 hours    MEDICATIONS  (PRN):  acetaminophen   Tablet .. 650 milliGRAM(s) Oral every 6 hours PRN Temp greater or equal to 38C (100.4F), Moderate Pain (4 - 6)  acetaminophen  Suppository .. 650 milliGRAM(s) Rectal every 6 hours PRN Temp greater or equal to 38C (100.4F)  ALBUTerol    90 MICROgram(s) HFA Inhaler 2 Puff(s) Inhalation every 6 hours PRN Shortness of Breath  sodium chloride 0.9% lock flush 10 milliLiter(s) IV Push every 1 hour PRN Pre/post blood products, medications, blood draw, and to maintain line patency      ALLERGIES:  Allergies    No Known Allergies    Intolerances        LABS:                        11.4   8.87  )-----------( 334      ( 06 Jan 2021 03:55 )             34.6     01-05    134<L>  |  99  |  24<H>  ----------------------------<  157<H>  4.9   |  18<L>  |  0.63    Ca    8.2<L>      05 Jan 2021 14:24  Phos  4.1     01-06  Mg     2.2     01-06    TPro  7.7  /  Alb  3.3  /  TBili  0.6  /  DBili  x   /  AST  50<H>  /  ALT  26  /  AlkPhos  106  01-05    PT/INR - ( 06 Jan 2021 03:55 )   PT: 14.1 sec;   INR: 1.24 ratio         PTT - ( 06 Jan 2021 03:55 )  PTT:22.1 sec      RADIOLOGY & ADDITIONAL TESTS: Reviewed. Melinda Attila, PGY-1    Patient is a 46y old  Female who presents with a chief complaint of COVID (05 Jan 2021 12:17)      INTERVAL HPI/OVERNIGHT EVENTS: KAVYA    SUBJECTIVE: Patient seen and examined at bedside.       VITAL SIGNS:  ICU Vital Signs Last 24 Hrs  T(C): 38.2 (06 Jan 2021 04:00), Max: 38.2 (06 Jan 2021 04:00)  T(F): 100.8 (06 Jan 2021 04:00), Max: 100.8 (06 Jan 2021 04:00)  HR: 65 (06 Jan 2021 07:07) (59 - 95)  BP: 113/78 (06 Jan 2021 06:00) (90/62 - 153/106)  BP(mean): 90 (06 Jan 2021 06:00) (69 - 120)  ABP: 115/64 (06 Jan 2021 06:00) (115/64 - 157/81)  ABP(mean): 84 (06 Jan 2021 06:00) (84 - 111)  RR: 25 (06 Jan 2021 06:00) (0 - 50)  SpO2: 100% (06 Jan 2021 07:07) (72% - 100%)    Mode: AC/ CMV (Assist Control/ Continuous Mandatory Ventilation), RR (machine): 28, TV (machine): 380, FiO2: 50, PEEP: 12, ITime: 0.66, MAP: 17, PIP: 27  Plateau pressure:   P/F ratio:     01-05 @ 07:01  -  01-06 @ 07:00  --------------------------------------------------------  IN: 2310.9 mL / OUT: 3230 mL / NET: -919.1 mL      CAPILLARY BLOOD GLUCOSE      POCT Blood Glucose.: 111 mg/dL (05 Jan 2021 01:01)    ECG:    PHYSICAL EXAM:  General: obese female laying in bed, now intubated, sedated   Respiratory: intubated on MV  Cardiovascular: n1 s1s2  Abdomen: NTND  Extremities: no RENEE  Neurological:  PERRLA      MEDICATIONS:  MEDICATIONS  (STANDING):  chlorhexidine 0.12% Liquid 15 milliLiter(s) Oral Mucosa every 12 hours  chlorhexidine 4% Liquid 1 Application(s) Topical <User Schedule>  chlorhexidine 4% Liquid 1 Application(s) Topical <User Schedule>  cisatracurium Infusion 3 MICROgram(s)/kG/Min (19.8 mL/Hr) IV Continuous <Continuous>  dexAMETHasone  Injectable 6 milliGRAM(s) IV Push daily  dexMEDEtomidine Infusion 0.2 MICROgram(s)/kG/Hr (5.49 mL/Hr) IV Continuous <Continuous>  enoxaparin Injectable 40 milliGRAM(s) SubCutaneous every 12 hours  fentaNYL   Infusion. 0.5 MICROgram(s)/kG/Hr (5.49 mL/Hr) IV Continuous <Continuous>  influenza   Vaccine 0.5 milliLiter(s) IntraMuscular once  petrolatum Ophthalmic Ointment 1 Application(s) Both EYES daily  propofol Infusion 50 MICROgram(s)/kG/Min (32.9 mL/Hr) IV Continuous <Continuous>  remdesivir  IVPB 100 milliGRAM(s) IV Intermittent every 24 hours    MEDICATIONS  (PRN):  acetaminophen   Tablet .. 650 milliGRAM(s) Oral every 6 hours PRN Temp greater or equal to 38C (100.4F), Moderate Pain (4 - 6)  acetaminophen  Suppository .. 650 milliGRAM(s) Rectal every 6 hours PRN Temp greater or equal to 38C (100.4F)  ALBUTerol    90 MICROgram(s) HFA Inhaler 2 Puff(s) Inhalation every 6 hours PRN Shortness of Breath  sodium chloride 0.9% lock flush 10 milliLiter(s) IV Push every 1 hour PRN Pre/post blood products, medications, blood draw, and to maintain line patency      ALLERGIES:  Allergies    No Known Allergies    Intolerances        LABS:                        11.4   8.87  )-----------( 334      ( 06 Jan 2021 03:55 )             34.6     01-05    134<L>  |  99  |  24<H>  ----------------------------<  157<H>  4.9   |  18<L>  |  0.63    Ca    8.2<L>      05 Jan 2021 14:24  Phos  4.1     01-06  Mg     2.2     01-06    TPro  7.7  /  Alb  3.3  /  TBili  0.6  /  DBili  x   /  AST  50<H>  /  ALT  26  /  AlkPhos  106  01-05    PT/INR - ( 06 Jan 2021 03:55 )   PT: 14.1 sec;   INR: 1.24 ratio         PTT - ( 06 Jan 2021 03:55 )  PTT:22.1 sec      RADIOLOGY & ADDITIONAL TESTS: Reviewed.

## 2021-01-06 NOTE — PROGRESS NOTE ADULT - ASSESSMENT
46F w/ obesity who initially p/w SOB, productive cough, pleuritic rib pain and diarrhea and was admitted on 1/4/20 w/ AHRF 2/2 COVID-19, now transferred to MICU on 1/5/20 s/p RRT for desaturation on BIPAP and currently intubated on MV.       NEURO:  #Mental status:   baseline AOx4  now sedated on fent, propofol and precedex and paralyzed with nimbex  c/w current sedation/paralysis    CV:  no known cardiac hx; admission EKG pending  # on levophed likely 2/2 vasoplegic shock  - wean pressors as tolerated  - will eval heart function on POCUS     PULM:  ARDS  #currently intubated on AC: 28/380/12/100  f/u post-intubation ABG to determine need for proning  airway clearance  albuterol MDI    RENAL:  #baseline CR ~0.7, currently at baseline  - monitor UOP carefully  - burdick placed for UOP    GI:  #Diet: will start TFs depending on whether pt will need proning  #Bowel regimen: senna/miralax    ENDO:  #BG wnl on BMPs  no other issues    HEMATOLOGIC:  #Hgb, Plts, coags unremarkable - monitor qd  #DVT prophylaxis with lovenox 40BID    ID:  # COVID-19 infection: started on remdesivir for 5d course and started on 10d course of dexamethasone on 1/4 - will complete  - monitor inflammatory markers  - will send admission blood, sputum cx's     SKIN:  #Lines: RIJ central line, L A line     46F w/ obesity who initially p/w SOB, productive cough, pleuritic rib pain and diarrhea and was admitted on 1/4/20 w/ AHRF 2/2 COVID-19, now transferred to MICU on 1/5/20 s/p RRT for desaturation on BIPAP and currently intubated on MV.       NEURO:  #Mental status:   baseline AOx4  now sedated on fent, propofol and paralyzed with nimbex  c/w current sedation/paralysis    CV:  no known cardiac hx; admission EKG pending  # on levophed likely 2/2 vasoplegic shock  - wean pressors as tolerated, currently d/c   - POCUS reveals normal LV and RV function    PULM:  ARDS  #currently intubated on AC: 28/380/12/100  -ABGs improved with proning, supine this AM, ABG while supine shows worsening PF ratio, POCUS reveals lung disease is in posterior bases, will benefit from reproning 1/6/2021  airway clearance  albuterol MDI    RENAL:  #baseline CR ~0.7, currently at baseline  - monitor UOP carefully, currently net negative   - burdick placed for UOP    GI:  #Diet: NPO while proned, likely will reprone today, continue to hold tube feeds  -nutrition recs appreciated, will adjust when patient resumes feeds  #Bowel regimen: senna/miralax    ENDO:  #BG wnl on BMPs  -f/u A1c  no other issues    HEMATOLOGIC:  #Hgb, Plts, coags unremarkable - monitor qd  #DVT prophylaxis with lovenox 40BID    ID:  # COVID-19 infection: started on remdesivir for 5d course and started on 10d course of dexamethasone on 1/4 - will complete  - monitor inflammatory markers  - will send admission blood, sputum cx's     SKIN:  #Lines: RIJ central line, L A line

## 2021-01-06 NOTE — CHART NOTE - NSCHARTNOTEFT_GEN_A_CORE
: Pat Stuart    INDICATION: Respiratory failure    PROCEDURE:  [x] LIMITED ECHO  [x] LIMITED CHEST  [ ] LIMITED RETROPERITONEAL  [ ] LIMITED ABDOMINAL  [ ] LIMITED DVT  [ ] NEEDLE GUIDANCE VASCULAR  [ ] NEEDLE GUIDANCE THORACENTESIS  [ ] NEEDLE GUIDANCE PARACENTESIS  [ ] NEEDLE GUIDANCE PERICARDIOCENTESIS  [ ] OTHER    FINDINGS:  ECHO: (limited subcostal view) grossly normal LV function, no pericardial effusion, no RV enlargement,   Lung: A lines anteriorly, b-lines posteriorly with irregular pleura    INTERPRETATION:  normal LV function  Posterior consolidations    Images uploaded on [employer] : Pat Stuart    INDICATION: Respiratory failure    PROCEDURE:  [x] LIMITED ECHO  [x] LIMITED CHEST  [ ] LIMITED RETROPERITONEAL  [ ] LIMITED ABDOMINAL  [ ] LIMITED DVT  [ ] NEEDLE GUIDANCE VASCULAR  [ ] NEEDLE GUIDANCE THORACENTESIS  [ ] NEEDLE GUIDANCE PARACENTESIS  [ ] NEEDLE GUIDANCE PERICARDIOCENTESIS  [ ] OTHER    FINDINGS:  ECHO: (limited subcostal view) grossly normal LV function, no pericardial effusion, no RV enlargement,   Lung: A lines anteriorly, b-lines posteriorly with irregular pleura    INTERPRETATION:  normal LV function  Posterior consolidations    Images uploaded on [employer]    Attending Attestation:  I was present during the key portions of the procedure and immediately available during the entire procedure.  Claudio Ghosh DO  Attending  Pulmonary & Critical Care Medicine

## 2021-01-06 NOTE — DIETITIAN INITIAL EVALUATION ADULT. - OTHER INFO
Pt. was on PO diet until 1/5/2021 , currently NPO , intubated, sedated , noted the plan for EN support if continues the need for sedation , per flow sheet pt. with 2+ generalized edema . Nutrition focused physical exam deferred at this time in setting of limited contact precautions in setting of Covid pandemic. Per [facility] HIE  pt. was 109.8 kg in Dec. 2019 ; 110.2 kg in Sept. 2020. Wt. stable and BMI > 40 . Pt. received 559 ml propofol in 24 hrs providing 615 kcal

## 2021-01-07 LAB
ALBUMIN SERPL ELPH-MCNC: 2.8 G/DL — LOW (ref 3.3–5)
ALBUMIN SERPL ELPH-MCNC: 2.9 G/DL — LOW (ref 3.3–5)
ALP SERPL-CCNC: 89 U/L — SIGNIFICANT CHANGE UP (ref 40–120)
ALP SERPL-CCNC: 90 U/L — SIGNIFICANT CHANGE UP (ref 40–120)
ALT FLD-CCNC: 15 U/L — SIGNIFICANT CHANGE UP (ref 4–33)
ALT FLD-CCNC: 15 U/L — SIGNIFICANT CHANGE UP (ref 4–33)
ANION GAP SERPL CALC-SCNC: 12 MMOL/L — SIGNIFICANT CHANGE UP (ref 7–14)
ANION GAP SERPL CALC-SCNC: 12 MMOL/L — SIGNIFICANT CHANGE UP (ref 7–14)
AST SERPL-CCNC: 17 U/L — SIGNIFICANT CHANGE UP (ref 4–32)
AST SERPL-CCNC: 17 U/L — SIGNIFICANT CHANGE UP (ref 4–32)
BASOPHILS # BLD AUTO: 0.04 K/UL — SIGNIFICANT CHANGE UP (ref 0–0.2)
BASOPHILS NFR BLD AUTO: 0.3 % — SIGNIFICANT CHANGE UP (ref 0–2)
BILIRUB SERPL-MCNC: 0.3 MG/DL — SIGNIFICANT CHANGE UP (ref 0.2–1.2)
BILIRUB SERPL-MCNC: 0.3 MG/DL — SIGNIFICANT CHANGE UP (ref 0.2–1.2)
BLOOD GAS ARTERIAL - LYTES,HGB,ICA,LACT RESULT: SIGNIFICANT CHANGE UP
BUN SERPL-MCNC: 16 MG/DL — SIGNIFICANT CHANGE UP (ref 7–23)
BUN SERPL-MCNC: 16 MG/DL — SIGNIFICANT CHANGE UP (ref 7–23)
CALCIUM SERPL-MCNC: 7.9 MG/DL — LOW (ref 8.4–10.5)
CALCIUM SERPL-MCNC: 8.1 MG/DL — LOW (ref 8.4–10.5)
CHLORIDE SERPL-SCNC: 104 MMOL/L — SIGNIFICANT CHANGE UP (ref 98–107)
CHLORIDE SERPL-SCNC: 104 MMOL/L — SIGNIFICANT CHANGE UP (ref 98–107)
CO2 SERPL-SCNC: 21 MMOL/L — LOW (ref 22–31)
CO2 SERPL-SCNC: 22 MMOL/L — SIGNIFICANT CHANGE UP (ref 22–31)
CREAT SERPL-MCNC: 0.59 MG/DL — SIGNIFICANT CHANGE UP (ref 0.5–1.3)
CREAT SERPL-MCNC: 0.6 MG/DL — SIGNIFICANT CHANGE UP (ref 0.5–1.3)
D DIMER BLD IA.RAPID-MCNC: 507 NG/ML DDU — HIGH
EOSINOPHIL # BLD AUTO: 0 K/UL — SIGNIFICANT CHANGE UP (ref 0–0.5)
EOSINOPHIL NFR BLD AUTO: 0 % — SIGNIFICANT CHANGE UP (ref 0–6)
GAS PNL BLDA: SIGNIFICANT CHANGE UP
GLUCOSE BLDC GLUCOMTR-MCNC: 121 MG/DL — HIGH (ref 70–99)
GLUCOSE BLDC GLUCOMTR-MCNC: 126 MG/DL — HIGH (ref 70–99)
GLUCOSE BLDC GLUCOMTR-MCNC: 137 MG/DL — HIGH (ref 70–99)
GLUCOSE SERPL-MCNC: 150 MG/DL — HIGH (ref 70–99)
GLUCOSE SERPL-MCNC: 152 MG/DL — HIGH (ref 70–99)
HCT VFR BLD CALC: 34.7 % — SIGNIFICANT CHANGE UP (ref 34.5–45)
HGB BLD-MCNC: 10.8 G/DL — LOW (ref 11.5–15.5)
IANC: 9.93 K/UL — HIGH (ref 1.5–8.5)
IMM GRANULOCYTES NFR BLD AUTO: 2.3 % — HIGH (ref 0–1.5)
LYMPHOCYTES # BLD AUTO: 1.63 K/UL — SIGNIFICANT CHANGE UP (ref 1–3.3)
LYMPHOCYTES # BLD AUTO: 12.7 % — LOW (ref 13–44)
MAGNESIUM SERPL-MCNC: 2.2 MG/DL — SIGNIFICANT CHANGE UP (ref 1.6–2.6)
MCHC RBC-ENTMCNC: 26.7 PG — LOW (ref 27–34)
MCHC RBC-ENTMCNC: 31.1 GM/DL — LOW (ref 32–36)
MCV RBC AUTO: 85.9 FL — SIGNIFICANT CHANGE UP (ref 80–100)
MONOCYTES # BLD AUTO: 0.91 K/UL — HIGH (ref 0–0.9)
MONOCYTES NFR BLD AUTO: 7.1 % — SIGNIFICANT CHANGE UP (ref 2–14)
NEUTROPHILS # BLD AUTO: 9.93 K/UL — HIGH (ref 1.8–7.4)
NEUTROPHILS NFR BLD AUTO: 77.6 % — HIGH (ref 43–77)
NRBC # BLD: 0 /100 WBCS — SIGNIFICANT CHANGE UP
NRBC # FLD: 0 K/UL — SIGNIFICANT CHANGE UP
PHOSPHATE SERPL-MCNC: 3 MG/DL — SIGNIFICANT CHANGE UP (ref 2.5–4.5)
PLATELET # BLD AUTO: 411 K/UL — HIGH (ref 150–400)
POTASSIUM SERPL-MCNC: 3.6 MMOL/L — SIGNIFICANT CHANGE UP (ref 3.5–5.3)
POTASSIUM SERPL-MCNC: 3.8 MMOL/L — SIGNIFICANT CHANGE UP (ref 3.5–5.3)
POTASSIUM SERPL-SCNC: 3.6 MMOL/L — SIGNIFICANT CHANGE UP (ref 3.5–5.3)
POTASSIUM SERPL-SCNC: 3.8 MMOL/L — SIGNIFICANT CHANGE UP (ref 3.5–5.3)
PROT SERPL-MCNC: 6.6 G/DL — SIGNIFICANT CHANGE UP (ref 6–8.3)
PROT SERPL-MCNC: 6.8 G/DL — SIGNIFICANT CHANGE UP (ref 6–8.3)
RBC # BLD: 4.04 M/UL — SIGNIFICANT CHANGE UP (ref 3.8–5.2)
RBC # FLD: 14.5 % — SIGNIFICANT CHANGE UP (ref 10.3–14.5)
SODIUM SERPL-SCNC: 137 MMOL/L — SIGNIFICANT CHANGE UP (ref 135–145)
SODIUM SERPL-SCNC: 138 MMOL/L — SIGNIFICANT CHANGE UP (ref 135–145)
WBC # BLD: 12.81 K/UL — HIGH (ref 3.8–10.5)
WBC # FLD AUTO: 12.81 K/UL — HIGH (ref 3.8–10.5)

## 2021-01-07 PROCEDURE — 99291 CRITICAL CARE FIRST HOUR: CPT | Mod: 25

## 2021-01-07 PROCEDURE — 76604 US EXAM CHEST: CPT | Mod: 26

## 2021-01-07 PROCEDURE — 93308 TTE F-UP OR LMTD: CPT | Mod: 26

## 2021-01-07 RX ADMIN — ALBUTEROL 2 PUFF(S): 90 AEROSOL, METERED ORAL at 03:31

## 2021-01-07 RX ADMIN — Medication 1: at 00:03

## 2021-01-07 RX ADMIN — PANTOPRAZOLE SODIUM 40 MILLIGRAM(S): 20 TABLET, DELAYED RELEASE ORAL at 11:41

## 2021-01-07 RX ADMIN — FENTANYL CITRATE 5.49 MICROGRAM(S)/KG/HR: 50 INJECTION INTRAVENOUS at 13:26

## 2021-01-07 RX ADMIN — ENOXAPARIN SODIUM 40 MILLIGRAM(S): 100 INJECTION SUBCUTANEOUS at 06:45

## 2021-01-07 RX ADMIN — CHLORHEXIDINE GLUCONATE 1 APPLICATION(S): 213 SOLUTION TOPICAL at 06:57

## 2021-01-07 RX ADMIN — ALBUTEROL 2 PUFF(S): 90 AEROSOL, METERED ORAL at 23:38

## 2021-01-07 RX ADMIN — Medication 1 APPLICATION(S): at 11:55

## 2021-01-07 RX ADMIN — CHLORHEXIDINE GLUCONATE 15 MILLILITER(S): 213 SOLUTION TOPICAL at 18:13

## 2021-01-07 RX ADMIN — CHLORHEXIDINE GLUCONATE 15 MILLILITER(S): 213 SOLUTION TOPICAL at 06:56

## 2021-01-07 RX ADMIN — Medication 6 MILLIGRAM(S): at 06:55

## 2021-01-07 RX ADMIN — ALBUTEROL 2 PUFF(S): 90 AEROSOL, METERED ORAL at 16:23

## 2021-01-07 RX ADMIN — ALBUTEROL 2 PUFF(S): 90 AEROSOL, METERED ORAL at 11:36

## 2021-01-07 RX ADMIN — ENOXAPARIN SODIUM 40 MILLIGRAM(S): 100 INJECTION SUBCUTANEOUS at 18:13

## 2021-01-07 RX ADMIN — POLYETHYLENE GLYCOL 3350 17 GRAM(S): 17 POWDER, FOR SOLUTION ORAL at 11:41

## 2021-01-07 RX ADMIN — SENNA PLUS 10 MILLILITER(S): 8.6 TABLET ORAL at 11:42

## 2021-01-07 RX ADMIN — REMDESIVIR 500 MILLIGRAM(S): 5 INJECTION INTRAVENOUS at 21:46

## 2021-01-07 NOTE — PROGRESS NOTE ADULT - ATTENDING COMMENTS
Patient examined and care reviewed in detail on bedside rounds  Critically ill on vent/pressors with severe COVID-19 ARDS/encephalopathy   Frequent bedside visits with therapy change today.   I have personally provided 35+ minutes of critical care time concurrently with the resident/fellow; this excludes time spent on separate procedures.

## 2021-01-07 NOTE — ADVANCED PRACTICE NURSE CONSULT - ASSESSMENT
General: Patient intubated on AC FiO2 30%, sedated and paralyzed, in prone position. Bedbound, indwelling burdick catheter in place, no recent BMs. Skin warm, dry with increased moisture in intertriginous folds, adequate skin turgor. Visible skin intact, mild facial edema. Patient in reverse Trendelenburg to minimize facial edema, in swimmers position for full lung expansion. Patient with prevention silicone foams to all bony prominences. Z-jayne positioning device is under patient's shoulder and head to minimize pressure.

## 2021-01-07 NOTE — PROGRESS NOTE ADULT - SUBJECTIVE AND OBJECTIVE BOX
CHIEF COMPLAINT:    Interval Events:    REVIEW OF SYSTEMS:  Constitutional:   Eyes:  ENT:  CV:  Resp:  GI:  :  MSK:  Integumentary:  Neurological:  Psychiatric:  Endocrine:  Hematologic/Lymphatic:  Allergic/Immunologic:  [ ] All other systems negative  [x ] Unable to assess ROS because mental status    OBJECTIVE:  ICU Vital Signs Last 24 Hrs  T(C): 36.5 (2021 12:00), Max: 37.6 (2021 14:00)  T(F): 97.7 (2021 12:00), Max: 99.6 (2021 14:00)  HR: 80 (2021 11:34) (57 - 114)  BP: 103/84 (2021 18:37) (103/84 - 111/78)  BP(mean): 92 (2021 18:37) (81 - 92)  ABP: 109/58 (2021 11:00) (98/56 - 118/92)  ABP(mean): 78 (2021 11:00) (72 - 92)  RR: 35 (2021 11:00) (20 - 35)  SpO2: 100% (2021 11:34) (93% - 100%)    Mode: AC/ CMV (Assist Control/ Continuous Mandatory Ventilation), RR (machine): 28, TV (machine): 380, FiO2: 30, PEEP: 12, ITime: 0.65, MAP: 17, PIP: 26     @ : @ 07:00  --------------------------------------------------------  IN: 1891.1 mL / OUT: 1450 mL / NET: 441.1 mL     @ : @ 12:42  --------------------------------------------------------  IN: 307.4 mL / OUT: 140 mL / NET: 167.4 mL      CAPILLARY BLOOD GLUCOSE      POCT Blood Glucose.: 126 mg/dL (2021 11:37)      PHYSICAL EXAM:  General: obese female laying in bed, proned, now intubated, sedated   Respiratory: intubated on MV  Cardiovascular: n1 s1s2  Abdomen: NTND  Extremities: no RENEE  Neurological:  PERRLA, sedated    HOSPITAL MEDICATIONS:  MEDICATIONS  (STANDING):  ALBUTerol    90 MICROgram(s) HFA Inhaler 2 Puff(s) Inhalation every 6 hours  chlorhexidine 0.12% Liquid 15 milliLiter(s) Oral Mucosa every 12 hours  chlorhexidine 4% Liquid 1 Application(s) Topical <User Schedule>  cisatracurium Infusion 3 MICROgram(s)/kG/Min (19.8 mL/Hr) IV Continuous <Continuous>  dexAMETHasone  Injectable 6 milliGRAM(s) IV Push daily  dextrose 40% Gel 15 Gram(s) Oral once  dextrose 5%. 1000 milliLiter(s) (50 mL/Hr) IV Continuous <Continuous>  dextrose 5%. 1000 milliLiter(s) (100 mL/Hr) IV Continuous <Continuous>  dextrose 50% Injectable 25 Gram(s) IV Push once  dextrose 50% Injectable 12.5 Gram(s) IV Push once  dextrose 50% Injectable 25 Gram(s) IV Push once  enoxaparin Injectable 40 milliGRAM(s) SubCutaneous every 12 hours  fentaNYL   Infusion. 0.5 MICROgram(s)/kG/Hr (5.49 mL/Hr) IV Continuous <Continuous>  glucagon  Injectable 1 milliGRAM(s) IntraMuscular once  influenza   Vaccine 0.5 milliLiter(s) IntraMuscular once  insulin lispro (ADMELOG) corrective regimen sliding scale   SubCutaneous every 6 hours  norepinephrine Infusion 0.03 MICROgram(s)/kG/Min (6.18 mL/Hr) IV Continuous <Continuous>  pantoprazole  Injectable 40 milliGRAM(s) IV Push daily  petrolatum Ophthalmic Ointment 1 Application(s) Both EYES daily  polyethylene glycol 3350 17 Gram(s) Oral daily  propofol Infusion 50 MICROgram(s)/kG/Min (32.9 mL/Hr) IV Continuous <Continuous>  remdesivir  IVPB 100 milliGRAM(s) IV Intermittent every 24 hours  senna Syrup 10 milliLiter(s) Oral daily    MEDICATIONS  (PRN):  acetaminophen   Tablet .. 650 milliGRAM(s) Oral every 6 hours PRN Temp greater or equal to 38C (100.4F), Moderate Pain (4 - 6)  acetaminophen  Suppository .. 650 milliGRAM(s) Rectal every 6 hours PRN Temp greater or equal to 38C (100.4F)  sodium chloride 0.9% lock flush 10 milliLiter(s) IV Push every 1 hour PRN Pre/post blood products, medications, blood draw, and to maintain line patency      LABS:                        10.8   12.81 )-----------( 411      ( 2021 05:27 )             34.7         137  |  104  |  16  ----------------------------<  150<H>  3.6   |  21<L>  |  0.60    Ca    7.9<L>      2021 05:27  Phos  3.0       Mg     2.2         TPro  6.6  /  Alb  2.8<L>  /  TBili  0.3  /  DBili  x   /  AST  17  /  ALT  15  /  AlkPhos  89      PT/INR - ( 2021 03:55 )   PT: 14.1 sec;   INR: 1.24 ratio         PTT - ( 2021 03:55 )  PTT:22.1 sec  Urinalysis Basic - ( 2021 11:42 )    Color: Light Yellow / Appearance: Clear / S.018 / pH: x  Gluc: x / Ketone: Negative  / Bili: Negative / Urobili: <2 mg/dL   Blood: x / Protein: Trace / Nitrite: Negative   Leuk Esterase: Negative / RBC: 1 /HPF / WBC 4 /HPF   Sq Epi: x / Non Sq Epi: 6 /HPF / Bacteria: Negative      Arterial Blood Gas:   @ 09:31  7.39/39/88/23/96.2/-1.4  ABG lactate: --  Arterial Blood Gas:   @ 05:27  7.39/38/156/23/98.5/-1.4  ABG lactate: --  Arterial Blood Gas:   @ 14:57  7.38/38/72/23/92.3/-2.1  ABG lactate: --  Arterial Blood Gas:   @ 11:42  7.42/37/94/24/96.6/-0.6  ABG lactate: --  Arterial Blood Gas:   @ 03:55  7.42/37/115/24/97.5/-0.5  ABG lactate: --  Arterial Blood Gas:   @ 18:25  7.40/34/238/22/99.0/-3.4  ABG lactate: --  Arterial Blood Gas:   @ 14:24  7.36/40/72/22/91.3/-2.6  ABG lactate: --        MICROBIOLOGY:     RADIOLOGY:  [ ] Reviewed and interpreted by me    EKF w/ obesity who initially p/w SOB, productive cough, pleuritic rib pain and diarrhea and was admitted on 20 w/ AHRF 2/2 COVID-19, now transferred to MICU on 20 s/p RRT for desaturation on BIPAP and currently intubated on MV.       NEURO:  #Mental status:   baseline AOx4  now sedated on fent, propofol and paralyzed with nimbex  c/w current sedation/paralysis    CV:  no known cardiac hx; admission EKG pending  # on levophed likely 2/2 vasoplegic shock  - wean pressors as tolerated, currently d/c   - POCUS reveals normal LV and RV function    PULM:  ARDS  #currently intubated on AC: 28/380/12/100  -ABGs improved with proning, supine this AM, ABG while supine shows worsening PF ratio, POCUS reveals lung disease is in posterior bases, will benefit from reproning 2021  airway clearance  albuterol MDI    RENAL:  #baseline CR ~0.7, currently at baseline  - monitor UOP carefully, currently net negative   - burdick placed for UOP    GI:  #Diet: NPO while proned, likely will reprone today, continue to hold tube feeds  -nutrition recs appreciated, will adjust when patient resumes feeds  #Bowel regimen: senna/miralax    ENDO:  #BG wnl on BMPs  -f/u A1c  no other issues    HEMATOLOGIC:  #Hgb, Plts, coags unremarkable - monitor qd  #DVT prophylaxis with lovenox 40BID    ID:  # COVID-19 infection: started on remdesivir for 5d course and started on 10d course of dexamethasone on  - will complete  - monitor inflammatory markers  - will send admission blood, sputum cx's     SKIN:  #Lines: RIJ central line, L A line   CHIEF COMPLAINT:    Interval Events:    REVIEW OF SYSTEMS:  Constitutional:   Eyes:  ENT:  CV:  Resp:  GI:  :  MSK:  Integumentary:  Neurological:  Psychiatric:  Endocrine:  Hematologic/Lymphatic:  Allergic/Immunologic:  [ ] All other systems negative  [x ] Unable to assess ROS because mental status    OBJECTIVE:  ICU Vital Signs Last 24 Hrs  T(C): 36.5 (2021 12:00), Max: 37.6 (2021 14:00)  T(F): 97.7 (2021 12:00), Max: 99.6 (2021 14:00)  HR: 80 (2021 11:34) (57 - 114)  BP: 103/84 (2021 18:37) (103/84 - 111/78)  BP(mean): 92 (2021 18:37) (81 - 92)  ABP: 109/58 (2021 11:00) (98/56 - 118/92)  ABP(mean): 78 (2021 11:00) (72 - 92)  RR: 35 (2021 11:00) (20 - 35)  SpO2: 100% (2021 11:34) (93% - 100%)    Mode: AC/ CMV (Assist Control/ Continuous Mandatory Ventilation), RR (machine): 28, TV (machine): 380, FiO2: 30, PEEP: 12, ITime: 0.65, MAP: 17, PIP: 26     @ : @ 07:00  --------------------------------------------------------  IN: 1891.1 mL / OUT: 1450 mL / NET: 441.1 mL     @ : @ 12:42  --------------------------------------------------------  IN: 307.4 mL / OUT: 140 mL / NET: 167.4 mL      CAPILLARY BLOOD GLUCOSE      POCT Blood Glucose.: 126 mg/dL (2021 11:37)      PHYSICAL EXAM:  General: obese female laying in bed, proned, now intubated, sedated   Respiratory: intubated on MV  Cardiovascular: n1 s1s2  Abdomen: NTND  Extremities: no RENEE  Neurological:  PERRLA, sedated    HOSPITAL MEDICATIONS:  MEDICATIONS  (STANDING):  ALBUTerol    90 MICROgram(s) HFA Inhaler 2 Puff(s) Inhalation every 6 hours  chlorhexidine 0.12% Liquid 15 milliLiter(s) Oral Mucosa every 12 hours  chlorhexidine 4% Liquid 1 Application(s) Topical <User Schedule>  cisatracurium Infusion 3 MICROgram(s)/kG/Min (19.8 mL/Hr) IV Continuous <Continuous>  dexAMETHasone  Injectable 6 milliGRAM(s) IV Push daily  dextrose 40% Gel 15 Gram(s) Oral once  dextrose 5%. 1000 milliLiter(s) (50 mL/Hr) IV Continuous <Continuous>  dextrose 5%. 1000 milliLiter(s) (100 mL/Hr) IV Continuous <Continuous>  dextrose 50% Injectable 25 Gram(s) IV Push once  dextrose 50% Injectable 12.5 Gram(s) IV Push once  dextrose 50% Injectable 25 Gram(s) IV Push once  enoxaparin Injectable 40 milliGRAM(s) SubCutaneous every 12 hours  fentaNYL   Infusion. 0.5 MICROgram(s)/kG/Hr (5.49 mL/Hr) IV Continuous <Continuous>  glucagon  Injectable 1 milliGRAM(s) IntraMuscular once  influenza   Vaccine 0.5 milliLiter(s) IntraMuscular once  insulin lispro (ADMELOG) corrective regimen sliding scale   SubCutaneous every 6 hours  norepinephrine Infusion 0.03 MICROgram(s)/kG/Min (6.18 mL/Hr) IV Continuous <Continuous>  pantoprazole  Injectable 40 milliGRAM(s) IV Push daily  petrolatum Ophthalmic Ointment 1 Application(s) Both EYES daily  polyethylene glycol 3350 17 Gram(s) Oral daily  propofol Infusion 50 MICROgram(s)/kG/Min (32.9 mL/Hr) IV Continuous <Continuous>  remdesivir  IVPB 100 milliGRAM(s) IV Intermittent every 24 hours  senna Syrup 10 milliLiter(s) Oral daily    MEDICATIONS  (PRN):  acetaminophen   Tablet .. 650 milliGRAM(s) Oral every 6 hours PRN Temp greater or equal to 38C (100.4F), Moderate Pain (4 - 6)  acetaminophen  Suppository .. 650 milliGRAM(s) Rectal every 6 hours PRN Temp greater or equal to 38C (100.4F)  sodium chloride 0.9% lock flush 10 milliLiter(s) IV Push every 1 hour PRN Pre/post blood products, medications, blood draw, and to maintain line patency      LABS:                        10.8   12.81 )-----------( 411      ( 2021 05:27 )             34.7         137  |  104  |  16  ----------------------------<  150<H>  3.6   |  21<L>  |  0.60    Ca    7.9<L>      2021 05:27  Phos  3.0       Mg     2.2         TPro  6.6  /  Alb  2.8<L>  /  TBili  0.3  /  DBili  x   /  AST  17  /  ALT  15  /  AlkPhos  89      PT/INR - ( 2021 03:55 )   PT: 14.1 sec;   INR: 1.24 ratio         PTT - ( 2021 03:55 )  PTT:22.1 sec  Urinalysis Basic - ( 2021 11:42 )    Color: Light Yellow / Appearance: Clear / S.018 / pH: x  Gluc: x / Ketone: Negative  / Bili: Negative / Urobili: <2 mg/dL   Blood: x / Protein: Trace / Nitrite: Negative   Leuk Esterase: Negative / RBC: 1 /HPF / WBC 4 /HPF   Sq Epi: x / Non Sq Epi: 6 /HPF / Bacteria: Negative      Arterial Blood Gas:   @ 09:31  7.39/39/88/23/96.2/-1.4  ABG lactate: --  Arterial Blood Gas:   @ 05:27  7.39/38/156/23/98.5/-1.4  ABG lactate: --  Arterial Blood Gas:   @ 14:57  7.38/38/72/23/92.3/-2.1  ABG lactate: --  Arterial Blood Gas:   @ 11:42  7.42/37/94/24/96.6/-0.6  ABG lactate: --  Arterial Blood Gas:   @ 03:55  7.42/37/115/24/97.5/-0.5  ABG lactate: --  Arterial Blood Gas:   @ 18:25  7.40/34/238/22/99.0/-3.4  ABG lactate: --  Arterial Blood Gas:   @ 14:24  7.36/40/72/22/91.3/-2.6  ABG lactate: --        MICROBIOLOGY:     RADIOLOGY:  [ ] Reviewed and interpreted by me    EKF w/ obesity who initially p/w SOB, productive cough, pleuritic rib pain and diarrhea and was admitted on 20 w/ AHRF 2/2 COVID-19, now transferred to MICU on 20 s/p RRT for desaturation on BIPAP and currently intubated on MV.       NEURO:  #Mental status:   baseline AOx4  now sedated on fent, propofol and paralyzed with nimbex  c/w current sedation/paralysis    CV:  no known cardiac hx; admission EKG pending  # on levophed likely 2/2 vasoplegic shock  - wean pressors as tolerated, currently d/c   - POCUS reveals normal LV and RV function    PULM:  ARDS  #currently intubated on AC: 28/380/12/100  -ABGs improved with proning, supine this AM, ABG while supine shows worsening PF ratio, POCUS reveals lung disease is in posterior bases, will benefit from reproning 2021  airway clearance  albuterol MDI    RENAL:  #baseline CR ~0.7, currently at baseline  - monitor UOP carefully, currently net negative   - burdick placed for UOP    GI:  #Diet: NPO while proned, likely will reprone today, continue to hold tube feeds  -nutrition recs appreciated, will adjust when patient resumes feeds  #Bowel regimen: senna/miralax    ENDO:  #BG wnl on BMPs  -f/u A1c  no other issues    HEMATOLOGIC:  #Hgb, Plts, coags unremarkable - monitor qd  #DVT prophylaxis with lovenox 40BID    ID:  # COVID-19 infection: started on remdesivir for 5d course and started on 10d course of dexamethasone on  - will complete  - monitor inflammatory markers  - will send admission blood, sputum cx's     SKIN:  #Lines: RIJ central line

## 2021-01-07 NOTE — CHART NOTE - NSCHARTNOTEFT_GEN_A_CORE
: MD Gary and MD Dane    INDICATION: Hypoxic Respiratory Failure    PROCEDURE:  [x] LIMITED ECHO  [x] LIMITED CHEST  [ ] LIMITED RETROPERITONEAL  [ ] LIMITED ABDOMINAL  [x] LIMITED DVT  [ ] NEEDLE GUIDANCE VASCULAR  [ ] NEEDLE GUIDANCE THORACENTESIS  [ ] NEEDLE GUIDANCE PARACENTESIS  [ ] NEEDLE GUIDANCE PERICARDIOCENTESIS  [ ] OTHER    FINDINGS:  Thoracic: In prone position, posteriorly A-line predominance, B-lines at the right lung base.  Cardiac: Difficult to obtain views given prone position. Grossly normal LV systolic function.  DVT: Only able to visualize popliteal veins, fully compressible.    INTERPRETATION:  R basilar B-lines with irregular pleura, otherwise A-line predominant pattern. Grossly normal LV systolic function. No evidence of popliteal DVT.    Images stored in [employer]

## 2021-01-08 LAB
ALBUMIN SERPL ELPH-MCNC: 2.8 G/DL — LOW (ref 3.3–5)
ALP SERPL-CCNC: 91 U/L — SIGNIFICANT CHANGE UP (ref 40–120)
ALT FLD-CCNC: 16 U/L — SIGNIFICANT CHANGE UP (ref 4–33)
ANION GAP SERPL CALC-SCNC: 11 MMOL/L — SIGNIFICANT CHANGE UP (ref 7–14)
APTT BLD: 26.8 SEC — LOW (ref 27–36.3)
AST SERPL-CCNC: 25 U/L — SIGNIFICANT CHANGE UP (ref 4–32)
BASOPHILS # BLD AUTO: 0.05 K/UL — SIGNIFICANT CHANGE UP (ref 0–0.2)
BASOPHILS NFR BLD AUTO: 0.4 % — SIGNIFICANT CHANGE UP (ref 0–2)
BILIRUB SERPL-MCNC: 0.6 MG/DL — SIGNIFICANT CHANGE UP (ref 0.2–1.2)
BLD GP AB SCN SERPL QL: NEGATIVE — SIGNIFICANT CHANGE UP
BLOOD GAS ARTERIAL - LYTES,HGB,ICA,LACT RESULT: SIGNIFICANT CHANGE UP
BLOOD GAS ARTERIAL - LYTES,HGB,ICA,LACT RESULT: SIGNIFICANT CHANGE UP
BLOOD GAS ARTERIAL COMPREHENSIVE RESULT: SIGNIFICANT CHANGE UP
BUN SERPL-MCNC: 18 MG/DL — SIGNIFICANT CHANGE UP (ref 7–23)
CALCIUM SERPL-MCNC: 7.9 MG/DL — LOW (ref 8.4–10.5)
CHLORIDE SERPL-SCNC: 107 MMOL/L — SIGNIFICANT CHANGE UP (ref 98–107)
CO2 SERPL-SCNC: 21 MMOL/L — LOW (ref 22–31)
CREAT SERPL-MCNC: 0.76 MG/DL — SIGNIFICANT CHANGE UP (ref 0.5–1.3)
CRP SERPL-MCNC: 120.1 MG/L — HIGH
D DIMER BLD IA.RAPID-MCNC: 492 NG/ML DDU — HIGH
EOSINOPHIL # BLD AUTO: 0.06 K/UL — SIGNIFICANT CHANGE UP (ref 0–0.5)
EOSINOPHIL NFR BLD AUTO: 0.5 % — SIGNIFICANT CHANGE UP (ref 0–6)
FERRITIN SERPL-MCNC: 208 NG/ML — HIGH (ref 15–150)
GLUCOSE BLDC GLUCOMTR-MCNC: 114 MG/DL — HIGH (ref 70–99)
GLUCOSE BLDC GLUCOMTR-MCNC: 125 MG/DL — HIGH (ref 70–99)
GLUCOSE BLDC GLUCOMTR-MCNC: 126 MG/DL — HIGH (ref 70–99)
GLUCOSE BLDC GLUCOMTR-MCNC: 142 MG/DL — HIGH (ref 70–99)
GLUCOSE BLDC GLUCOMTR-MCNC: 192 MG/DL — HIGH (ref 70–99)
GLUCOSE SERPL-MCNC: 141 MG/DL — HIGH (ref 70–99)
HCT VFR BLD CALC: 33.8 % — LOW (ref 34.5–45)
HGB BLD-MCNC: 10.9 G/DL — LOW (ref 11.5–15.5)
IANC: 8.93 K/UL — HIGH (ref 1.5–8.5)
IMM GRANULOCYTES NFR BLD AUTO: 4 % — HIGH (ref 0–1.5)
INR BLD: 1.28 RATIO — HIGH (ref 0.88–1.16)
LDH SERPL L TO P-CCNC: 305 U/L — HIGH (ref 135–225)
LYMPHOCYTES # BLD AUTO: 17 % — SIGNIFICANT CHANGE UP (ref 13–44)
LYMPHOCYTES # BLD AUTO: 2.18 K/UL — SIGNIFICANT CHANGE UP (ref 1–3.3)
MAGNESIUM SERPL-MCNC: 2 MG/DL — SIGNIFICANT CHANGE UP (ref 1.6–2.6)
MCHC RBC-ENTMCNC: 27.3 PG — SIGNIFICANT CHANGE UP (ref 27–34)
MCHC RBC-ENTMCNC: 32.2 GM/DL — SIGNIFICANT CHANGE UP (ref 32–36)
MCV RBC AUTO: 84.7 FL — SIGNIFICANT CHANGE UP (ref 80–100)
MONOCYTES # BLD AUTO: 1.06 K/UL — HIGH (ref 0–0.9)
MONOCYTES NFR BLD AUTO: 8.3 % — SIGNIFICANT CHANGE UP (ref 2–14)
NEUTROPHILS # BLD AUTO: 8.93 K/UL — HIGH (ref 1.8–7.4)
NEUTROPHILS NFR BLD AUTO: 69.8 % — SIGNIFICANT CHANGE UP (ref 43–77)
NRBC # BLD: 0 /100 WBCS — SIGNIFICANT CHANGE UP
NRBC # FLD: 0 K/UL — SIGNIFICANT CHANGE UP
PHOSPHATE SERPL-MCNC: 3.4 MG/DL — SIGNIFICANT CHANGE UP (ref 2.5–4.5)
PLATELET # BLD AUTO: 392 K/UL — SIGNIFICANT CHANGE UP (ref 150–400)
POTASSIUM SERPL-MCNC: 3.8 MMOL/L — SIGNIFICANT CHANGE UP (ref 3.5–5.3)
POTASSIUM SERPL-SCNC: 3.8 MMOL/L — SIGNIFICANT CHANGE UP (ref 3.5–5.3)
PROCALCITONIN SERPL-MCNC: 0.14 NG/ML — HIGH (ref 0.02–0.1)
PROT SERPL-MCNC: 6.5 G/DL — SIGNIFICANT CHANGE UP (ref 6–8.3)
PROTHROM AB SERPL-ACNC: 14.4 SEC — HIGH (ref 10.6–13.6)
RBC # BLD: 3.99 M/UL — SIGNIFICANT CHANGE UP (ref 3.8–5.2)
RBC # FLD: 14.8 % — HIGH (ref 10.3–14.5)
RH IG SCN BLD-IMP: POSITIVE — SIGNIFICANT CHANGE UP
SODIUM SERPL-SCNC: 139 MMOL/L — SIGNIFICANT CHANGE UP (ref 135–145)
WBC # BLD: 12.79 K/UL — HIGH (ref 3.8–10.5)
WBC # FLD AUTO: 12.79 K/UL — HIGH (ref 3.8–10.5)

## 2021-01-08 PROCEDURE — 99291 CRITICAL CARE FIRST HOUR: CPT | Mod: 25

## 2021-01-08 PROCEDURE — 99292 CRITICAL CARE ADDL 30 MIN: CPT | Mod: 25

## 2021-01-08 PROCEDURE — 93308 TTE F-UP OR LMTD: CPT | Mod: 26

## 2021-01-08 PROCEDURE — 76604 US EXAM CHEST: CPT | Mod: 26

## 2021-01-08 RX ORDER — CHLORHEXIDINE GLUCONATE 213 G/1000ML
15 SOLUTION TOPICAL EVERY 12 HOURS
Refills: 0 | Status: DISCONTINUED | OUTPATIENT
Start: 2021-01-08 | End: 2021-01-09

## 2021-01-08 RX ADMIN — Medication 1: at 12:15

## 2021-01-08 RX ADMIN — CHLORHEXIDINE GLUCONATE 15 MILLILITER(S): 213 SOLUTION TOPICAL at 05:23

## 2021-01-08 RX ADMIN — ENOXAPARIN SODIUM 40 MILLIGRAM(S): 100 INJECTION SUBCUTANEOUS at 05:30

## 2021-01-08 RX ADMIN — CHLORHEXIDINE GLUCONATE 1 APPLICATION(S): 213 SOLUTION TOPICAL at 05:23

## 2021-01-08 RX ADMIN — ALBUTEROL 2 PUFF(S): 90 AEROSOL, METERED ORAL at 04:59

## 2021-01-08 RX ADMIN — ENOXAPARIN SODIUM 40 MILLIGRAM(S): 100 INJECTION SUBCUTANEOUS at 18:30

## 2021-01-08 RX ADMIN — Medication 6 MILLIGRAM(S): at 05:30

## 2021-01-08 RX ADMIN — Medication 650 MILLIGRAM(S): at 00:04

## 2021-01-08 RX ADMIN — Medication 1 APPLICATION(S): at 12:20

## 2021-01-08 RX ADMIN — ALBUTEROL 2 PUFF(S): 90 AEROSOL, METERED ORAL at 23:12

## 2021-01-08 RX ADMIN — Medication 650 MILLIGRAM(S): at 06:30

## 2021-01-08 RX ADMIN — REMDESIVIR 500 MILLIGRAM(S): 5 INJECTION INTRAVENOUS at 21:00

## 2021-01-08 NOTE — PROGRESS NOTE ADULT - ATTENDING COMMENTS
Patient examined and care reviewed in detail on bedside rounds  Critically ill on vent with severe COVID-19 ARDS/encephalopathy   Frequent bedside visits with therapy change today.   I have personally provided 80+ minutes of critical care time concurrently with the resident/fellow; this excludes time spent on separate procedures.  Additional 35 min+ of critical care time provided today

## 2021-01-08 NOTE — PROGRESS NOTE ADULT - SUBJECTIVE AND OBJECTIVE BOX
CHIEF COMPLAINT:    Interval Events:    REVIEW OF SYSTEMS:  Constitutional:   Eyes:  ENT:  CV:  Resp:  GI:  :  MSK:  Integumentary:  Neurological:  Psychiatric:  Endocrine:  Hematologic/Lymphatic:  Allergic/Immunologic:  [ ] All other systems negative  [x ] Unable to assess ROS because intubated    OBJECTIVE:  ICU Vital Signs Last 24 Hrs  T(C): 38.4 (2021 06:00), Max: 38.7 (2021 00:00)  T(F): 101.2 (2021 06:00), Max: 101.6 (2021 00:00)  HR: 93 (2021 10:00) (80 - 110)  BP: 132/80 (2021 10:00) (95/59 - 146/93)  BP(mean): 97 (2021 10:00) (71 - 115)  ABP: 109/58 (2021 11:00) (109/58 - 109/58)  ABP(mean): 78 (2021 11:00) (78 - 78)  RR: 12 (2021 10:00) (12 - 35)  SpO2: 95% (2021 10:00) (92% - 100%)    Mode: standby     @ : @ 07:00  --------------------------------------------------------  IN: 1091.7 mL / OUT: 1170 mL / NET: -78.3 mL     @ : @ 10:39  --------------------------------------------------------  IN: 0 mL / OUT: 200 mL / NET: -200 mL      CAPILLARY BLOOD GLUCOSE      POCT Blood Glucose.: 114 mg/dL (2021 05:18)      PHYSICAL EXAM:  General: obese female laying in bed, now intubated, off sedation  Respiratory: intubated on MV  Cardiovascular: n1 s1s2  Abdomen: NTND  Extremities: no RENEE  Neurological:  PERRLA, now off sedation, responding to questions      HOSPITAL MEDICATIONS:  MEDICATIONS  (STANDING):  ALBUTerol    90 MICROgram(s) HFA Inhaler 2 Puff(s) Inhalation every 6 hours  chlorhexidine 0.12% Liquid 15 milliLiter(s) Oral Mucosa every 12 hours  chlorhexidine 4% Liquid 1 Application(s) Topical <User Schedule>  cisatracurium Infusion 3 MICROgram(s)/kG/Min (19.8 mL/Hr) IV Continuous <Continuous>  dexAMETHasone  Injectable 6 milliGRAM(s) IV Push daily  dextrose 40% Gel 15 Gram(s) Oral once  dextrose 5%. 1000 milliLiter(s) (50 mL/Hr) IV Continuous <Continuous>  dextrose 5%. 1000 milliLiter(s) (100 mL/Hr) IV Continuous <Continuous>  dextrose 50% Injectable 25 Gram(s) IV Push once  dextrose 50% Injectable 12.5 Gram(s) IV Push once  dextrose 50% Injectable 25 Gram(s) IV Push once  enoxaparin Injectable 40 milliGRAM(s) SubCutaneous every 12 hours  fentaNYL   Infusion. 0.5 MICROgram(s)/kG/Hr (5.49 mL/Hr) IV Continuous <Continuous>  glucagon  Injectable 1 milliGRAM(s) IntraMuscular once  influenza   Vaccine 0.5 milliLiter(s) IntraMuscular once  insulin lispro (ADMELOG) corrective regimen sliding scale   SubCutaneous every 6 hours  norepinephrine Infusion 0.03 MICROgram(s)/kG/Min (6.18 mL/Hr) IV Continuous <Continuous>  petrolatum Ophthalmic Ointment 1 Application(s) Both EYES daily  polyethylene glycol 3350 17 Gram(s) Oral daily  propofol Infusion 50 MICROgram(s)/kG/Min (32.9 mL/Hr) IV Continuous <Continuous>  remdesivir  IVPB 100 milliGRAM(s) IV Intermittent every 24 hours  senna Syrup 10 milliLiter(s) Oral daily    MEDICATIONS  (PRN):  acetaminophen   Tablet .. 650 milliGRAM(s) Oral every 6 hours PRN Temp greater or equal to 38C (100.4F), Moderate Pain (4 - 6)  acetaminophen  Suppository .. 650 milliGRAM(s) Rectal every 6 hours PRN Temp greater or equal to 38C (100.4F)  sodium chloride 0.9% lock flush 10 milliLiter(s) IV Push every 1 hour PRN Pre/post blood products, medications, blood draw, and to maintain line patency      LABS:                        10.9   12.79 )-----------( 392      ( 2021 00:47 )             33.8         139  |  107  |  18  ----------------------------<  141<H>  3.8   |  21<L>  |  0.76    Ca    7.9<L>      2021 00:47  Phos  3.4       Mg     2.0         TPro  6.5  /  Alb  2.8<L>  /  TBili  0.6  /  DBili  x   /  AST  25  /  ALT  16  /  AlkPhos  91      PT/INR - ( 2021 00:47 )   PT: 14.4 sec;   INR: 1.28 ratio         PTT - ( 2021 00:47 )  PTT:26.8 sec  Urinalysis Basic - ( 2021 11:42 )    Color: Light Yellow / Appearance: Clear / S.018 / pH: x  Gluc: x / Ketone: Negative  / Bili: Negative / Urobili: <2 mg/dL   Blood: x / Protein: Trace / Nitrite: Negative   Leuk Esterase: Negative / RBC: 1 /HPF / WBC 4 /HPF   Sq Epi: x / Non Sq Epi: 6 /HPF / Bacteria: Negative      Arterial Blood Gas:   @ 07:37  7.39/40/66/24/91.7/-0.1  ABG lactate: --  Arterial Blood Gas:   @ 01:14  7.39/40/59/24/88.0/-0.2  ABG lactate: --  Arterial Blood Gas:   @ 09:31  7.39/39/88/23/96.2/-1.4  ABG lactate: --  Arterial Blood Gas:   @ 05:27  7.39/38/156/23/98.5/-1.4  ABG lactate: --  Arterial Blood Gas:   @ 14:57  7.38/38/72/23/92.3/-2.1  ABG lactate: --  Arterial Blood Gas:  01-06 @ 11:42  7.42/37/94/24/96.6/-0.6  ABG lactate: --        MICROBIOLOGY:     RADIOLOGY:  [ ] Reviewed and interpreted by me    EKF w/ obesity who initially p/w SOB, productive cough, pleuritic rib pain and diarrhea and was admitted on 20 w/ AHRF 2/2 COVID-19, now transferred to MICU on 20 s/p RRT for desaturation on BIPAP and currently intubated on MV.       NEURO:  #Mental status:   baseline AOx4  off sedation, remains intubated, following commands appropriately    CV:  no known cardiac hx; admission EKG pending  # on levophed likely 2/2 vasoplegic shock  - wean pressors as tolerated, currently d/c   - POCUS reveals normal LV and RV function    PULM:  ARDS  #currently intubated on AC: /  - ABGs stable  - will trial extubation today to cpap  - albuterol MDI    RENAL:  #baseline CR ~0.7, currently at baseline  - monitor UOP carefully, currently net negative   - burdick placed for UOP    GI:  #Diet: NPO while proned, likely will reprone today, continue to hold tube feeds  -nutrition recs appreciated, will adjust when patient resumes feeds  #Bowel regimen: senna/miralax    ENDO:  #BG wnl on BMPs  -f/u A1c  no other issues    HEMATOLOGIC:  #Hgb, Plts, coags unremarkable - monitor qd  #DVT prophylaxis with lovenox 40BID    ID:  # COVID-19 infection: started on remdesivir for 5d course and started on 10d course of dexamethasone on  - will complete  - monitor inflammatory markers  - will send admission blood, sputum cx's     SKIN:  #Lines: RIJ central line, L A line

## 2021-01-08 NOTE — CHART NOTE - NSCHARTNOTEFT_GEN_A_CORE
: Alejandro Vela    INDICATION: respiratory failure    PROCEDURE:  [x] LIMITED ECHO  [x] LIMITED CHEST      FINDINGS:  Anterior A-lines with few lateral B-lines  LV/RV function normal. IVC indeterminate    INTERPRETATION:  c/w COVID-19 PNA  No cardiac limitations

## 2021-01-09 LAB
ALBUMIN SERPL ELPH-MCNC: 2.8 G/DL — LOW (ref 3.3–5)
ALP SERPL-CCNC: 91 U/L — SIGNIFICANT CHANGE UP (ref 40–120)
ALT FLD-CCNC: 18 U/L — SIGNIFICANT CHANGE UP (ref 4–33)
ANION GAP SERPL CALC-SCNC: 10 MMOL/L — SIGNIFICANT CHANGE UP (ref 7–14)
APTT BLD: 30 SEC — SIGNIFICANT CHANGE UP (ref 27–36.3)
AST SERPL-CCNC: 29 U/L — SIGNIFICANT CHANGE UP (ref 4–32)
BASOPHILS # BLD AUTO: 0.03 K/UL — SIGNIFICANT CHANGE UP (ref 0–0.2)
BASOPHILS NFR BLD AUTO: 0.2 % — SIGNIFICANT CHANGE UP (ref 0–2)
BILIRUB SERPL-MCNC: 0.5 MG/DL — SIGNIFICANT CHANGE UP (ref 0.2–1.2)
BLOOD GAS ARTERIAL - LYTES,HGB,ICA,LACT RESULT: SIGNIFICANT CHANGE UP
BLOOD GAS ARTERIAL - LYTES,HGB,ICA,LACT RESULT: SIGNIFICANT CHANGE UP
BLOOD GAS ARTERIAL COMPREHENSIVE RESULT: SIGNIFICANT CHANGE UP
BLOOD GAS ARTERIAL COMPREHENSIVE RESULT: SIGNIFICANT CHANGE UP
BUN SERPL-MCNC: 17 MG/DL — SIGNIFICANT CHANGE UP (ref 7–23)
CALCIUM SERPL-MCNC: 8.6 MG/DL — SIGNIFICANT CHANGE UP (ref 8.4–10.5)
CHLORIDE SERPL-SCNC: 106 MMOL/L — SIGNIFICANT CHANGE UP (ref 98–107)
CO2 SERPL-SCNC: 26 MMOL/L — SIGNIFICANT CHANGE UP (ref 22–31)
CREAT SERPL-MCNC: 0.57 MG/DL — SIGNIFICANT CHANGE UP (ref 0.5–1.3)
EOSINOPHIL # BLD AUTO: 0.04 K/UL — SIGNIFICANT CHANGE UP (ref 0–0.5)
EOSINOPHIL NFR BLD AUTO: 0.3 % — SIGNIFICANT CHANGE UP (ref 0–6)
GLUCOSE BLDC GLUCOMTR-MCNC: 129 MG/DL — HIGH (ref 70–99)
GLUCOSE BLDC GLUCOMTR-MCNC: 131 MG/DL — HIGH (ref 70–99)
GLUCOSE BLDC GLUCOMTR-MCNC: 132 MG/DL — HIGH (ref 70–99)
GLUCOSE BLDC GLUCOMTR-MCNC: 191 MG/DL — HIGH (ref 70–99)
GLUCOSE SERPL-MCNC: 133 MG/DL — HIGH (ref 70–99)
HCT VFR BLD CALC: 30.7 % — LOW (ref 34.5–45)
HGB BLD-MCNC: 9.7 G/DL — LOW (ref 11.5–15.5)
IANC: 10.01 K/UL — HIGH (ref 1.5–8.5)
IMM GRANULOCYTES NFR BLD AUTO: 4.2 % — HIGH (ref 0–1.5)
INR BLD: 1.32 RATIO — HIGH (ref 0.88–1.16)
LYMPHOCYTES # BLD AUTO: 1.09 K/UL — SIGNIFICANT CHANGE UP (ref 1–3.3)
LYMPHOCYTES # BLD AUTO: 8.6 % — LOW (ref 13–44)
MAGNESIUM SERPL-MCNC: 2.5 MG/DL — SIGNIFICANT CHANGE UP (ref 1.6–2.6)
MCHC RBC-ENTMCNC: 27.1 PG — SIGNIFICANT CHANGE UP (ref 27–34)
MCHC RBC-ENTMCNC: 31.6 GM/DL — LOW (ref 32–36)
MCV RBC AUTO: 85.8 FL — SIGNIFICANT CHANGE UP (ref 80–100)
MONOCYTES # BLD AUTO: 1.04 K/UL — HIGH (ref 0–0.9)
MONOCYTES NFR BLD AUTO: 8.2 % — SIGNIFICANT CHANGE UP (ref 2–14)
NEUTROPHILS # BLD AUTO: 10.01 K/UL — HIGH (ref 1.8–7.4)
NEUTROPHILS NFR BLD AUTO: 78.5 % — HIGH (ref 43–77)
NRBC # BLD: 0 /100 WBCS — SIGNIFICANT CHANGE UP
NRBC # FLD: 0 K/UL — SIGNIFICANT CHANGE UP
PHOSPHATE SERPL-MCNC: 3 MG/DL — SIGNIFICANT CHANGE UP (ref 2.5–4.5)
PLATELET # BLD AUTO: 373 K/UL — SIGNIFICANT CHANGE UP (ref 150–400)
POTASSIUM SERPL-MCNC: 4.1 MMOL/L — SIGNIFICANT CHANGE UP (ref 3.5–5.3)
POTASSIUM SERPL-SCNC: 4.1 MMOL/L — SIGNIFICANT CHANGE UP (ref 3.5–5.3)
PROT SERPL-MCNC: 6.8 G/DL — SIGNIFICANT CHANGE UP (ref 6–8.3)
PROTHROM AB SERPL-ACNC: 14.9 SEC — HIGH (ref 10.6–13.6)
RBC # BLD: 3.58 M/UL — LOW (ref 3.8–5.2)
RBC # FLD: 15 % — HIGH (ref 10.3–14.5)
SODIUM SERPL-SCNC: 142 MMOL/L — SIGNIFICANT CHANGE UP (ref 135–145)
WBC # BLD: 12.74 K/UL — HIGH (ref 3.8–10.5)
WBC # FLD AUTO: 12.74 K/UL — HIGH (ref 3.8–10.5)

## 2021-01-09 PROCEDURE — 99291 CRITICAL CARE FIRST HOUR: CPT

## 2021-01-09 RX ORDER — DEXMEDETOMIDINE HYDROCHLORIDE IN 0.9% SODIUM CHLORIDE 4 UG/ML
0.5 INJECTION INTRAVENOUS
Qty: 400 | Refills: 0 | Status: DISCONTINUED | OUTPATIENT
Start: 2021-01-09 | End: 2021-01-10

## 2021-01-09 RX ADMIN — Medication 1: at 13:02

## 2021-01-09 RX ADMIN — ALBUTEROL 2 PUFF(S): 90 AEROSOL, METERED ORAL at 22:58

## 2021-01-09 RX ADMIN — ALBUTEROL 2 PUFF(S): 90 AEROSOL, METERED ORAL at 03:42

## 2021-01-09 RX ADMIN — Medication 1 APPLICATION(S): at 13:17

## 2021-01-09 RX ADMIN — ENOXAPARIN SODIUM 40 MILLIGRAM(S): 100 INJECTION SUBCUTANEOUS at 05:09

## 2021-01-09 RX ADMIN — ALBUTEROL 2 PUFF(S): 90 AEROSOL, METERED ORAL at 15:25

## 2021-01-09 RX ADMIN — ENOXAPARIN SODIUM 40 MILLIGRAM(S): 100 INJECTION SUBCUTANEOUS at 17:58

## 2021-01-09 RX ADMIN — ALBUTEROL 2 PUFF(S): 90 AEROSOL, METERED ORAL at 08:36

## 2021-01-09 RX ADMIN — Medication 6 MILLIGRAM(S): at 05:09

## 2021-01-09 RX ADMIN — DEXMEDETOMIDINE HYDROCHLORIDE IN 0.9% SODIUM CHLORIDE 13.7 MICROGRAM(S)/KG/HR: 4 INJECTION INTRAVENOUS at 20:10

## 2021-01-09 RX ADMIN — DEXMEDETOMIDINE HYDROCHLORIDE IN 0.9% SODIUM CHLORIDE 13.7 MICROGRAM(S)/KG/HR: 4 INJECTION INTRAVENOUS at 18:20

## 2021-01-09 RX ADMIN — CHLORHEXIDINE GLUCONATE 1 APPLICATION(S): 213 SOLUTION TOPICAL at 05:09

## 2021-01-09 NOTE — PROGRESS NOTE ADULT - ATTENDING COMMENTS
Covid 19 PNA hypoxic resp failure extubated to CPAP yesterday now continues to be on CPAP   Will encourage incentive spirometry/ out fo bed to chair for goal sSPO2>88%   Pt is diuresing well   Dexamethasone and Remdesavir   Will transfer to the floor.

## 2021-01-09 NOTE — PROGRESS NOTE ADULT - ASSESSMENT
46F w/ obesity who initially p/w SOB, productive cough, pleuritic rib pain and diarrhea and was admitted on 1/4/20 w/ AHRF 2/2 COVID-19, now transferred to MICU on 1/5/20 s/p RRT for desaturation on BIPAP and currently intubated on MV.       NEURO:  #Mental status:   off sedation, A&Ox4, following commands appropriately, no acute issues    CV:  no known cardiac hx; admission EKG pending  #Hemodynamically stable no acute issues   - POCUS reveals normal LV and RV function    PULM:  ARDS  #Extubated 1/8 to CPAP currently on CPAP 10 FiO2 50%  - Wean CPAP/FiO2 as tolerated  - albuterol MDI    RENAL:  #baseline CR ~0.7, currently at baseline  - monitor UOP carefully, currently net negative   - burdick placed for UOP    GI:  #Diet: NPO while requiring CPAP  -nutrition recs appreciated, will adjust when patient resumes feeds  #Bowel regimen: senna/miralax    ENDO:  #BG wnl on BMPs  -f/u A1c  no other issues    HEMATOLOGIC:  #Hgb, Plts, coags unremarkable - monitor qd  #DVT prophylaxis with lovenox 40BID    ID:  # COVID-19 infection: started on remdesivir for 5d course and started on 10d course of dexamethasone on 1/4 - will complete  - monitor inflammatory markers  - will send admission blood, sputum cx's     SKIN:  #Lines: RIJ central line    DISPO: Transfer to floor today

## 2021-01-09 NOTE — PROGRESS NOTE ADULT - SUBJECTIVE AND OBJECTIVE BOX
INTERVAL HPI/OVERNIGHT EVENTS:  - Yesterday extubated to CPAP 10  - Attempted to wean CPAP however patient continues to require due to hypoxemia on lower settings    SUBJECTIVE: Patient seen and examined at bedside. Endorses dyspnea on CPAP, denies any pain. No acute complaints.    REVIEW OF SYSTEMS:  CONSTITUTIONAL: No weakness, fevers or chills  EYES/ENT: No visual changes;  No vertigo or throat pain   NECK: No pain or stiffness  RESPIRATORY: See above  CARDIOVASCULAR: No chest pain or palpitations  GASTROINTESTINAL: No abdominal or epigastric pain. No nausea, vomiting, or hematemesis; No diarrhea or constipation. No melena or hematochezia.  GENITOURINARY: No dysuria, frequency or hematuria  NEUROLOGICAL: No numbness or weakness  SKIN: No itching, rashes    OBJECTIVE:    VITAL SIGNS:  ICU Vital Signs Last 24 Hrs  T(C): 36.8 (09 Jan 2021 04:00), Max: 37.4 (08 Jan 2021 20:00)  T(F): 98.3 (09 Jan 2021 04:00), Max: 99.4 (08 Jan 2021 20:00)  HR: 89 (09 Jan 2021 11:27) (74 - 104)  BP: 117/75 (09 Jan 2021 08:00) (116/70 - 142/83)  BP(mean): 90 (09 Jan 2021 08:00) (86 - 104)  ABP: --  ABP(mean): --  RR: 18 (09 Jan 2021 08:00) (14 - 18)  SpO2: 92% (09 Jan 2021 11:27) (92% - 95%)        01-08 @ 07:01 - 01-09 @ 07:00  --------------------------------------------------------  IN: 250 mL / OUT: 1415 mL / NET: -1165 mL    01-09 @ 07:01  -  01-09 @ 12:39  --------------------------------------------------------  IN: 0 mL / OUT: 150 mL / NET: -150 mL      CAPILLARY BLOOD GLUCOSE      POCT Blood Glucose.: 129 mg/dL (09 Jan 2021 05:04)      PHYSICAL EXAM:    General: obese female resting comfortably in bed, NAD  HEENT: NC/AT; PERRL, clear conjunctiva  Neck: supple  Respiratory: no increased WOB on CPAP  Cardiovascular: +S1/S2; RRR  Abdomen: soft, NT/ND; +BS x4  Extremities: WWP, 2+ peripheral pulses b/l; no LE edema  Skin: normal color and turgor; no rash  Neurological: A&Ox4, CN II-XII grossly intact, nonfocal    MEDICATIONS:  MEDICATIONS  (STANDING):  ALBUTerol    90 MICROgram(s) HFA Inhaler 2 Puff(s) Inhalation every 6 hours  chlorhexidine 4% Liquid 1 Application(s) Topical <User Schedule>  dexAMETHasone  Injectable 6 milliGRAM(s) IV Push daily  dextrose 40% Gel 15 Gram(s) Oral once  dextrose 5%. 1000 milliLiter(s) (50 mL/Hr) IV Continuous <Continuous>  dextrose 5%. 1000 milliLiter(s) (100 mL/Hr) IV Continuous <Continuous>  dextrose 50% Injectable 25 Gram(s) IV Push once  dextrose 50% Injectable 12.5 Gram(s) IV Push once  dextrose 50% Injectable 25 Gram(s) IV Push once  enoxaparin Injectable 40 milliGRAM(s) SubCutaneous every 12 hours  glucagon  Injectable 1 milliGRAM(s) IntraMuscular once  influenza   Vaccine 0.5 milliLiter(s) IntraMuscular once  insulin lispro (ADMELOG) corrective regimen sliding scale   SubCutaneous every 6 hours  petrolatum Ophthalmic Ointment 1 Application(s) Both EYES daily  polyethylene glycol 3350 17 Gram(s) Oral daily  senna Syrup 10 milliLiter(s) Oral daily    MEDICATIONS  (PRN):  acetaminophen   Tablet .. 650 milliGRAM(s) Oral every 6 hours PRN Temp greater or equal to 38C (100.4F), Moderate Pain (4 - 6)  acetaminophen  Suppository .. 650 milliGRAM(s) Rectal every 6 hours PRN Temp greater or equal to 38C (100.4F)  sodium chloride 0.9% lock flush 10 milliLiter(s) IV Push every 1 hour PRN Pre/post blood products, medications, blood draw, and to maintain line patency      ALLERGIES:  Allergies    No Known Allergies    Intolerances        LABS:                        9.7    12.74 )-----------( 373      ( 09 Jan 2021 03:48 )             30.7     01-09    142  |  106  |  17  ----------------------------<  133<H>  4.1   |  26  |  0.57    Ca    8.6      09 Jan 2021 03:48  Phos  3.0     01-09  Mg     2.5     01-09    TPro  6.8  /  Alb  2.8<L>  /  TBili  0.5  /  DBili  x   /  AST  29  /  ALT  18  /  AlkPhos  91  01-09    PT/INR - ( 09 Jan 2021 03:48 )   PT: 14.9 sec;   INR: 1.32 ratio         PTT - ( 09 Jan 2021 03:48 )  PTT:30.0 sec      RADIOLOGY & ADDITIONAL TESTS: Reviewed.

## 2021-01-10 LAB
ALBUMIN SERPL ELPH-MCNC: 3 G/DL — LOW (ref 3.3–5)
ALP SERPL-CCNC: 99 U/L — SIGNIFICANT CHANGE UP (ref 40–120)
ALT FLD-CCNC: 32 U/L — SIGNIFICANT CHANGE UP (ref 4–33)
ANION GAP SERPL CALC-SCNC: 11 MMOL/L — SIGNIFICANT CHANGE UP (ref 7–14)
APTT BLD: 28.2 SEC — SIGNIFICANT CHANGE UP (ref 27–36.3)
AST SERPL-CCNC: 40 U/L — HIGH (ref 4–32)
BASOPHILS # BLD AUTO: 0.03 K/UL — SIGNIFICANT CHANGE UP (ref 0–0.2)
BASOPHILS NFR BLD AUTO: 0.2 % — SIGNIFICANT CHANGE UP (ref 0–2)
BILIRUB SERPL-MCNC: 0.6 MG/DL — SIGNIFICANT CHANGE UP (ref 0.2–1.2)
BLOOD GAS ARTERIAL COMPREHENSIVE RESULT: SIGNIFICANT CHANGE UP
BUN SERPL-MCNC: 26 MG/DL — HIGH (ref 7–23)
CALCIUM SERPL-MCNC: 9.1 MG/DL — SIGNIFICANT CHANGE UP (ref 8.4–10.5)
CHLORIDE SERPL-SCNC: 104 MMOL/L — SIGNIFICANT CHANGE UP (ref 98–107)
CO2 SERPL-SCNC: 27 MMOL/L — SIGNIFICANT CHANGE UP (ref 22–31)
CREAT SERPL-MCNC: 0.62 MG/DL — SIGNIFICANT CHANGE UP (ref 0.5–1.3)
D DIMER BLD IA.RAPID-MCNC: 493 NG/ML DDU — HIGH
EOSINOPHIL # BLD AUTO: 0.07 K/UL — SIGNIFICANT CHANGE UP (ref 0–0.5)
EOSINOPHIL NFR BLD AUTO: 0.5 % — SIGNIFICANT CHANGE UP (ref 0–6)
GLUCOSE BLDC GLUCOMTR-MCNC: 112 MG/DL — HIGH (ref 70–99)
GLUCOSE BLDC GLUCOMTR-MCNC: 127 MG/DL — HIGH (ref 70–99)
GLUCOSE BLDC GLUCOMTR-MCNC: 142 MG/DL — HIGH (ref 70–99)
GLUCOSE BLDC GLUCOMTR-MCNC: 179 MG/DL — HIGH (ref 70–99)
GLUCOSE SERPL-MCNC: 155 MG/DL — HIGH (ref 70–99)
HCT VFR BLD CALC: 31.2 % — LOW (ref 34.5–45)
HGB BLD-MCNC: 10.1 G/DL — LOW (ref 11.5–15.5)
IANC: 10.33 K/UL — HIGH (ref 1.5–8.5)
IMM GRANULOCYTES NFR BLD AUTO: 3.2 % — HIGH (ref 0–1.5)
INR BLD: 1.3 RATIO — HIGH (ref 0.88–1.16)
LYMPHOCYTES # BLD AUTO: 1.26 K/UL — SIGNIFICANT CHANGE UP (ref 1–3.3)
LYMPHOCYTES # BLD AUTO: 9.5 % — LOW (ref 13–44)
MAGNESIUM SERPL-MCNC: 2.4 MG/DL — SIGNIFICANT CHANGE UP (ref 1.6–2.6)
MCHC RBC-ENTMCNC: 27.3 PG — SIGNIFICANT CHANGE UP (ref 27–34)
MCHC RBC-ENTMCNC: 32.4 GM/DL — SIGNIFICANT CHANGE UP (ref 32–36)
MCV RBC AUTO: 84.3 FL — SIGNIFICANT CHANGE UP (ref 80–100)
MONOCYTES # BLD AUTO: 1.18 K/UL — HIGH (ref 0–0.9)
MONOCYTES NFR BLD AUTO: 8.9 % — SIGNIFICANT CHANGE UP (ref 2–14)
NEUTROPHILS # BLD AUTO: 10.33 K/UL — HIGH (ref 1.8–7.4)
NEUTROPHILS NFR BLD AUTO: 77.7 % — HIGH (ref 43–77)
NRBC # BLD: 0 /100 WBCS — SIGNIFICANT CHANGE UP
NRBC # FLD: 0 K/UL — SIGNIFICANT CHANGE UP
PHOSPHATE SERPL-MCNC: 3.3 MG/DL — SIGNIFICANT CHANGE UP (ref 2.5–4.5)
PLATELET # BLD AUTO: 413 K/UL — HIGH (ref 150–400)
POTASSIUM SERPL-MCNC: 4.4 MMOL/L — SIGNIFICANT CHANGE UP (ref 3.5–5.3)
POTASSIUM SERPL-SCNC: 4.4 MMOL/L — SIGNIFICANT CHANGE UP (ref 3.5–5.3)
PROT SERPL-MCNC: 7.2 G/DL — SIGNIFICANT CHANGE UP (ref 6–8.3)
PROTHROM AB SERPL-ACNC: 14.8 SEC — HIGH (ref 10.6–13.6)
RBC # BLD: 3.7 M/UL — LOW (ref 3.8–5.2)
RBC # FLD: 14.5 % — SIGNIFICANT CHANGE UP (ref 10.3–14.5)
SODIUM SERPL-SCNC: 142 MMOL/L — SIGNIFICANT CHANGE UP (ref 135–145)
WBC # BLD: 13.3 K/UL — HIGH (ref 3.8–10.5)
WBC # FLD AUTO: 13.3 K/UL — HIGH (ref 3.8–10.5)

## 2021-01-10 PROCEDURE — 99291 CRITICAL CARE FIRST HOUR: CPT

## 2021-01-10 RX ORDER — SODIUM CHLORIDE 9 MG/ML
1000 INJECTION, SOLUTION INTRAVENOUS
Refills: 0 | Status: DISCONTINUED | OUTPATIENT
Start: 2021-01-10 | End: 2021-01-12

## 2021-01-10 RX ORDER — SODIUM CHLORIDE 9 MG/ML
1000 INJECTION, SOLUTION INTRAVENOUS
Refills: 0 | Status: DISCONTINUED | OUTPATIENT
Start: 2021-01-10 | End: 2021-01-11

## 2021-01-10 RX ORDER — INSULIN LISPRO 100/ML
VIAL (ML) SUBCUTANEOUS EVERY 6 HOURS
Refills: 0 | Status: DISCONTINUED | OUTPATIENT
Start: 2021-01-10 | End: 2021-01-13

## 2021-01-10 RX ORDER — GLUCAGON INJECTION, SOLUTION 0.5 MG/.1ML
1 INJECTION, SOLUTION SUBCUTANEOUS ONCE
Refills: 0 | Status: DISCONTINUED | OUTPATIENT
Start: 2021-01-10 | End: 2021-01-12

## 2021-01-10 RX ADMIN — Medication 1 MILLIGRAM(S): at 18:56

## 2021-01-10 RX ADMIN — ENOXAPARIN SODIUM 40 MILLIGRAM(S): 100 INJECTION SUBCUTANEOUS at 17:49

## 2021-01-10 RX ADMIN — ALBUTEROL 2 PUFF(S): 90 AEROSOL, METERED ORAL at 06:10

## 2021-01-10 RX ADMIN — ALBUTEROL 2 PUFF(S): 90 AEROSOL, METERED ORAL at 07:23

## 2021-01-10 RX ADMIN — ALBUTEROL 2 PUFF(S): 90 AEROSOL, METERED ORAL at 14:39

## 2021-01-10 RX ADMIN — ENOXAPARIN SODIUM 40 MILLIGRAM(S): 100 INJECTION SUBCUTANEOUS at 05:18

## 2021-01-10 RX ADMIN — SODIUM CHLORIDE 100 MILLILITER(S): 9 INJECTION, SOLUTION INTRAVENOUS at 22:36

## 2021-01-10 RX ADMIN — Medication 6 MILLIGRAM(S): at 05:17

## 2021-01-10 RX ADMIN — Medication 1 MILLIGRAM(S): at 12:25

## 2021-01-10 RX ADMIN — Medication 1 APPLICATION(S): at 11:25

## 2021-01-10 RX ADMIN — ALBUTEROL 2 PUFF(S): 90 AEROSOL, METERED ORAL at 03:14

## 2021-01-10 RX ADMIN — CHLORHEXIDINE GLUCONATE 1 APPLICATION(S): 213 SOLUTION TOPICAL at 11:25

## 2021-01-10 RX ADMIN — Medication 1: at 11:53

## 2021-01-10 NOTE — PROGRESS NOTE ADULT - SUBJECTIVE AND OBJECTIVE BOX
INTERVAL HPI/OVERNIGHT EVENTS:  - precedex weaned down    SUBJECTIVE: Patient seen and examined at bedside. Endorses dyspnea on CPAP, denies any pain. No acute complaints.    REVIEW OF SYSTEMS:  CONSTITUTIONAL: No weakness, fevers or chills  EYES/ENT: No visual changes;  No vertigo or throat pain   NECK: No pain or stiffness  RESPIRATORY: See above  CARDIOVASCULAR: No chest pain or palpitations  GASTROINTESTINAL: No abdominal or epigastric pain. No nausea, vomiting, or hematemesis; No diarrhea or constipation. No melena or hematochezia.  GENITOURINARY: No dysuria, frequency or hematuria  NEUROLOGICAL: No numbness or weakness  SKIN: No itching, rashes    OBJECTIVE:    VITAL SIGNS:  T(C): 35.4 (01-10-21 @ 12:00), Max: 37.5 (01-09-21 @ 16:00)  HR: 78 (01-10-21 @ 14:00) (52 - 102)  BP: 122/75 (01-10-21 @ 14:00) (110/61 - 144/85)  BP(mean): 89 (01-10-21 @ 14:00) (72 - 111)  ABP: --  ABP(mean): --  RR: 32 (01-10-21 @ 14:00) (25 - 45)  SpO2: 93% (01-10-21 @ 14:00) (90% - 98%)  Wt(kg): --  CVP(mm Hg): --  CI: --  CAPILLARY BLOOD GLUCOSE      POCT Blood Glucose.: 179 mg/dL (10 José Manuel 2021 11:47)  POCT Blood Glucose.: 142 mg/dL (10 José Manuel 2021 05:15)  POCT Blood Glucose.: 132 mg/dL (09 Jan 2021 23:11)  POCT Blood Glucose.: 131 mg/dL (09 Jan 2021 17:55)   N/A      01-09 @ 07:01  -  01-10 @ 07:00  --------------------------------------------------------  IN:    Dexmedetomidine: 129.2 mL  Total IN: 129.2 mL    OUT:    Indwelling Catheter - Urethral (mL): 1260 mL  Total OUT: 1260 mL    Total NET: -1130.8 mL      01-10 @ 07:01  -  01-10 @ 14:34  --------------------------------------------------------  IN:    Dexmedetomidine: 46.6 mL    dextrose 5% + lactated ringers: 200 mL  Total IN: 246.6 mL    OUT:    Indwelling Catheter - Urethral (mL): 710 mL  Total OUT: 710 mL    Total NET: -463.4 mL      PHYSICAL EXAM:    General: obese female resting comfortably in bed, NAD  HEENT: NC/AT; PERRL, clear conjunctiva  Neck: supple  Respiratory: no increased WOB on CPAP  Cardiovascular: +S1/S2; RRR  Abdomen: soft, NT/ND; +BS x4  Extremities: WWP, 2+ peripheral pulses b/l; no LE edema  Skin: normal color and turgor; no rash  Neurological: A&Ox4, CN II-XII grossly intact, nonfocal    MEDICATIONS:  MEDICATIONS  (STANDING):  ALBUTerol    90 MICROgram(s) HFA Inhaler 2 Puff(s) Inhalation every 6 hours  chlorhexidine 4% Liquid 1 Application(s) Topical <User Schedule>  dexAMETHasone  Injectable 6 milliGRAM(s) IV Push daily  dextrose 5% + lactated ringers. 1000 milliLiter(s) (100 mL/Hr) IV Continuous <Continuous>  dextrose 5%. 1000 milliLiter(s) (100 mL/Hr) IV Continuous <Continuous>  enoxaparin Injectable 40 milliGRAM(s) SubCutaneous every 12 hours  glucagon  Injectable 1 milliGRAM(s) IntraMuscular once  influenza   Vaccine 0.5 milliLiter(s) IntraMuscular once  insulin lispro (ADMELOG) corrective regimen sliding scale   SubCutaneous every 6 hours  petrolatum Ophthalmic Ointment 1 Application(s) Both EYES daily  polyethylene glycol 3350 17 Gram(s) Oral daily  senna Syrup 10 milliLiter(s) Oral daily    MEDICATIONS  (PRN):  acetaminophen   Tablet .. 650 milliGRAM(s) Oral every 6 hours PRN Temp greater or equal to 38C (100.4F), Moderate Pain (4 - 6)  acetaminophen  Suppository .. 650 milliGRAM(s) Rectal every 6 hours PRN Temp greater or equal to 38C (100.4F)  LORazepam   Injectable 1 milliGRAM(s) IV Push every 6 hours PRN Anxiety    labs    CBC (01-10 @ 04:46)                              10.1<L>                         13.30<H>  )----------------(  413<H>     77.7<H>% Neutrophils, 9.5<L>% Lymphocytes, ANC: 10.33<H>                              31.2<L>  CBC (01-09 @ 03:48)                              9.7<L>                         12.74<H>  )----------------(  373        78.5<H>% Neutrophils, 8.6<L>% Lymphocytes, ANC: 10.01<H>                              30.7<L>    BMP (01-10 @ 04:46)             142     |  104     |  26<H> 		Ca++ --      Ca 9.1                ---------------------------------( 155<H>		Mg 2.4                4.4     |  27      |  0.62  			Ph 3.3     BMP (01-09 @ 03:48)             142     |  106     |  17    		Ca++ --      Ca 8.6                ---------------------------------( 133<H>		Mg 2.5                4.1     |  26      |  0.57  			Ph 3.0       LFTs (01-10 @ 04:46)      TPro 7.2 / Alb 3.0<L> / TBili 0.6 / DBili -- / AST 40<H> / ALT 32 / AlkPhos 99  LFTs (01-09 @ 03:48)      TPro 6.8 / Alb 2.8<L> / TBili 0.5 / DBili -- / AST 29 / ALT 18 / AlkPhos 91    Coags (01-10 @ 04:46)  aPTT 28.2 / INR 1.30<H> / PT 14.8<H>  Coags (01-09 @ 03:48)  aPTT 30.0 / INR 1.32<H> / PT 14.9<H>      ABG (01-10 @ 04:46)     7.46<H> / 41 / 136<H> / 28<H> / 4.4<H> / 98.8%     Lactate:     ABG (01-09 @ 23:37)     7.44 / 43 / 82<L> / 28<H> / 4.6<H> / 95.5%     Lactate:           -> .Blood Blood-Peripheral Culture (01-08 @ 02:22)     NG    NG    No growth to date.    -> .Blood Blood-Peripheral Culture (01-06 @ 14:44)     NG    NG    No growth to date.    sodium chloride 0.9% lock flush 10 milliLiter(s) IV Push every 1 hour PRN Pre/post blood products, medications, blood draw, and to maintain line patency        ALLERGIES:  Allergies    No Known Allergies    Intolerances            Assessment and Plan:   · Assessment	  46F w/ obesity who initially p/w SOB, productive cough, pleuritic rib pain and diarrhea and was admitted on 1/4/20 w/ AHRF 2/2 COVID-19, now transferred to MICU on 1/5/20 s/p RRT for desaturation on BIPAP and currently intubated on MV.       NEURO:  #Mental status:   off sedation, A&Ox4, following commands appropriately, no acute issues  -ativan 1mg Q6hrs prn anxiety    CV:  no known cardiac hx; admission EKG pending  #Hemodynamically stable no acute issues   - POCUS reveals normal LV and RV function    PULM:  ARDS  #Extubated 1/8 to CPAP currently on CPAP 10 FiO2 50%  - Wean CPAP/FiO2 as tolerated  - albuterol MDI    RENAL:  #baseline CR ~0.7, currently at baseline  - monitor UOP carefully, currently net negative   - burdick placed for UOP    GI:  #Diet: NPO while requiring CPAP, adding LR with dextrose for nutrition  -nutrition recs appreciated, will adjust when patient resumes feeds  #Bowel regimen: senna/miralax    ENDO:  #BG wnl on BMPs  -f/u A1c  no other issues    HEMATOLOGIC:  #Hgb, Plts, coags unremarkable - monitor qd  #DVT prophylaxis with lovenox 40BID    ID:  # COVID-19 infection: started on remdesivir for 5d course and started on 10d course of dexamethasone on 1/4 - will complete  - monitor inflammatory markers  - will send admission blood, sputum cx's     SKIN:  #Lines: RIJ central line can come out after getting PIV    DISPO: Transfer to floor today

## 2021-01-10 NOTE — PROGRESS NOTE ADULT - ATTENDING COMMENTS
Covid 19 PNA hypoxic resp failure extubated to CPAP continues to be on CPAP since last 4 8 hours   Episodes of increased work of breathign likey in the setting of anxiety , ABG with imrpvoemetn in oxygenation  Overnight pt was on precedex , now with improvement in distress, doing well   WIll switch to HFNC,  encourage incentive spirometry/ out fo bed to chair for goal sSPO2>88%   Dexamethasone and Remdesavir   Will transfer to the floor.

## 2021-01-10 NOTE — PROGRESS NOTE ADULT - SUBJECTIVE AND OBJECTIVE BOX
Subjective: Patient seen and examined. No new events except as noted.   doing okay   o2 upplement     REVIEW OF SYSTEMS:    CONSTITUTIONAL: No weakness, fevers or chills  EYES/ENT: No visual changes;  No vertigo or throat pain   NECK: No pain or stiffness  RESPIRATORY: No cough, wheezing, hemoptysis; No shortness of breath  CARDIOVASCULAR: No chest pain or palpitations  GASTROINTESTINAL: No abdominal or epigastric pain. No nausea, vomiting, or hematemesis; No diarrhea or constipation. No melena or hematochezia.  GENITOURINARY: No dysuria, frequency or hematuria  NEUROLOGICAL: No numbness or weakness  SKIN: No itching, burning, rashes, or lesions   All other review of systems is negative unless indicated above.    MEDICATIONS:  MEDICATIONS  (STANDING):  ALBUTerol    90 MICROgram(s) HFA Inhaler 2 Puff(s) Inhalation every 6 hours  chlorhexidine 4% Liquid 1 Application(s) Topical <User Schedule>  dexAMETHasone  Injectable 6 milliGRAM(s) IV Push daily  dextrose 5% + lactated ringers. 1000 milliLiter(s) (100 mL/Hr) IV Continuous <Continuous>  dextrose 5%. 1000 milliLiter(s) (100 mL/Hr) IV Continuous <Continuous>  enoxaparin Injectable 40 milliGRAM(s) SubCutaneous every 12 hours  glucagon  Injectable 1 milliGRAM(s) IntraMuscular once  influenza   Vaccine 0.5 milliLiter(s) IntraMuscular once  insulin lispro (ADMELOG) corrective regimen sliding scale   SubCutaneous every 6 hours  petrolatum Ophthalmic Ointment 1 Application(s) Both EYES daily  polyethylene glycol 3350 17 Gram(s) Oral daily  senna Syrup 10 milliLiter(s) Oral daily      PHYSICAL EXAM:  T(C): 37.4 (01-10-21 @ 18:00), Max: 37.4 (01-10-21 @ 18:00)  HR: 72 (01-10-21 @ 20:09) (52 - 91)  BP: 133/75 (01-10-21 @ 17:15) (113/75 - 144/85)  RR: 27 (01-10-21 @ 17:15) (27 - 40)  SpO2: 95% (01-10-21 @ 20:09) (90% - 98%)  Wt(kg): --  I&O's Summary    09 Jan 2021 07:01  -  10 José Manuel 2021 07:00  --------------------------------------------------------  IN: 129.2 mL / OUT: 1260 mL / NET: -1130.8 mL    10 José Manuel 2021 07:01  -  10 José Manuel 2021 21:53  --------------------------------------------------------  IN: 446.6 mL / OUT: 860 mL / NET: -413.4 mL          Appearance: Normal	  HEENT:  PERRLA   Lymphatic: No lymphadenopathy   Cardiovascular: Normal S1 S2, no JVD  Respiratory: normal effort , clear  Gastrointestinal:  Soft, Non-tender  Skin: No rashes,  warm to touch  Psychiatry:  Mood & affect appropriate  Musculuskeletal: No edema      All labs, Imaging and EKGs personally reviewed                             10.1   13.30 )-----------( 413      ( 10 José Manuel 2021 04:46 )             31.2               01-10    142  |  104  |  26<H>  ----------------------------<  155<H>  4.4   |  27  |  0.62    Ca    9.1      10 José Manuel 2021 04:46  Phos  3.3     01-10  Mg     2.4     01-10    TPro  7.2  /  Alb  3.0<L>  /  TBili  0.6  /  DBili  x   /  AST  40<H>  /  ALT  32  /  AlkPhos  99  01-10    PT/INR - ( 10 José Manuel 2021 04:46 )   PT: 14.8 sec;   INR: 1.30 ratio         PTT - ( 10 José Manuel 2021 04:46 )  PTT:28.2 sec                 ABG - ( 10 José Manuel 2021 04:46 )  pH, Arterial: 7.46  pH, Blood: x     /  pCO2: 41    /  pO2: 136   / HCO3: 28    / Base Excess: 4.4   /  SaO2: 98.8

## 2021-01-11 LAB
ALBUMIN SERPL ELPH-MCNC: 3.2 G/DL — LOW (ref 3.3–5)
ALP SERPL-CCNC: 119 U/L — SIGNIFICANT CHANGE UP (ref 40–120)
ALT FLD-CCNC: 66 U/L — HIGH (ref 4–33)
ANION GAP SERPL CALC-SCNC: 8 MMOL/L — SIGNIFICANT CHANGE UP (ref 7–14)
AST SERPL-CCNC: 65 U/L — HIGH (ref 4–32)
BILIRUB SERPL-MCNC: 1.1 MG/DL — SIGNIFICANT CHANGE UP (ref 0.2–1.2)
BLOOD GAS ARTERIAL COMPREHENSIVE RESULT: SIGNIFICANT CHANGE UP
BUN SERPL-MCNC: 22 MG/DL — SIGNIFICANT CHANGE UP (ref 7–23)
CALCIUM SERPL-MCNC: 8.9 MG/DL — SIGNIFICANT CHANGE UP (ref 8.4–10.5)
CHLORIDE SERPL-SCNC: 103 MMOL/L — SIGNIFICANT CHANGE UP (ref 98–107)
CO2 SERPL-SCNC: 29 MMOL/L — SIGNIFICANT CHANGE UP (ref 22–31)
CREAT SERPL-MCNC: 0.54 MG/DL — SIGNIFICANT CHANGE UP (ref 0.5–1.3)
CRP SERPL-MCNC: 86.3 MG/L — HIGH
CULTURE RESULTS: SIGNIFICANT CHANGE UP
FERRITIN SERPL-MCNC: 252 NG/ML — HIGH (ref 15–150)
GLUCOSE BLDC GLUCOMTR-MCNC: 115 MG/DL — HIGH (ref 70–99)
GLUCOSE BLDC GLUCOMTR-MCNC: 159 MG/DL — HIGH (ref 70–99)
GLUCOSE BLDC GLUCOMTR-MCNC: 160 MG/DL — HIGH (ref 70–99)
GLUCOSE BLDC GLUCOMTR-MCNC: 76 MG/DL — SIGNIFICANT CHANGE UP (ref 70–99)
GLUCOSE SERPL-MCNC: 198 MG/DL — HIGH (ref 70–99)
HCT VFR BLD CALC: 36.1 % — SIGNIFICANT CHANGE UP (ref 34.5–45)
HGB BLD-MCNC: 11.2 G/DL — LOW (ref 11.5–15.5)
LDH SERPL L TO P-CCNC: 491 U/L — HIGH (ref 135–225)
MAGNESIUM SERPL-MCNC: 2.1 MG/DL — SIGNIFICANT CHANGE UP (ref 1.6–2.6)
MCHC RBC-ENTMCNC: 26.9 PG — LOW (ref 27–34)
MCHC RBC-ENTMCNC: 31 GM/DL — LOW (ref 32–36)
MCV RBC AUTO: 86.8 FL — SIGNIFICANT CHANGE UP (ref 80–100)
NRBC # BLD: 0 /100 WBCS — SIGNIFICANT CHANGE UP
NRBC # FLD: 0 K/UL — SIGNIFICANT CHANGE UP
PHOSPHATE SERPL-MCNC: 3.9 MG/DL — SIGNIFICANT CHANGE UP (ref 2.5–4.5)
PLATELET # BLD AUTO: 459 K/UL — HIGH (ref 150–400)
POTASSIUM SERPL-MCNC: 4.3 MMOL/L — SIGNIFICANT CHANGE UP (ref 3.5–5.3)
POTASSIUM SERPL-SCNC: 4.3 MMOL/L — SIGNIFICANT CHANGE UP (ref 3.5–5.3)
PROCALCITONIN SERPL-MCNC: 0.05 NG/ML — SIGNIFICANT CHANGE UP (ref 0.02–0.1)
PROT SERPL-MCNC: 7.6 G/DL — SIGNIFICANT CHANGE UP (ref 6–8.3)
RBC # BLD: 4.16 M/UL — SIGNIFICANT CHANGE UP (ref 3.8–5.2)
RBC # FLD: 14.3 % — SIGNIFICANT CHANGE UP (ref 10.3–14.5)
SODIUM SERPL-SCNC: 140 MMOL/L — SIGNIFICANT CHANGE UP (ref 135–145)
SPECIMEN SOURCE: SIGNIFICANT CHANGE UP
WBC # BLD: 11.79 K/UL — HIGH (ref 3.8–10.5)
WBC # FLD AUTO: 11.79 K/UL — HIGH (ref 3.8–10.5)

## 2021-01-11 RX ORDER — ACETAMINOPHEN 500 MG
1000 TABLET ORAL ONCE
Refills: 0 | Status: COMPLETED | OUTPATIENT
Start: 2021-01-11 | End: 2021-01-11

## 2021-01-11 RX ADMIN — POLYETHYLENE GLYCOL 3350 17 GRAM(S): 17 POWDER, FOR SOLUTION ORAL at 12:32

## 2021-01-11 RX ADMIN — Medication 1 MILLIGRAM(S): at 02:43

## 2021-01-11 RX ADMIN — Medication 1 MILLIGRAM(S): at 23:49

## 2021-01-11 RX ADMIN — Medication 2: at 07:09

## 2021-01-11 RX ADMIN — ENOXAPARIN SODIUM 40 MILLIGRAM(S): 100 INJECTION SUBCUTANEOUS at 04:32

## 2021-01-11 RX ADMIN — Medication 400 MILLIGRAM(S): at 20:30

## 2021-01-11 RX ADMIN — CHLORHEXIDINE GLUCONATE 1 APPLICATION(S): 213 SOLUTION TOPICAL at 04:32

## 2021-01-11 RX ADMIN — ENOXAPARIN SODIUM 40 MILLIGRAM(S): 100 INJECTION SUBCUTANEOUS at 18:44

## 2021-01-11 RX ADMIN — Medication 1 APPLICATION(S): at 14:05

## 2021-01-11 RX ADMIN — Medication 2: at 12:32

## 2021-01-11 RX ADMIN — ALBUTEROL 2 PUFF(S): 90 AEROSOL, METERED ORAL at 23:00

## 2021-01-11 RX ADMIN — ALBUTEROL 2 PUFF(S): 90 AEROSOL, METERED ORAL at 16:43

## 2021-01-11 RX ADMIN — ALBUTEROL 2 PUFF(S): 90 AEROSOL, METERED ORAL at 05:17

## 2021-01-11 RX ADMIN — ALBUTEROL 2 PUFF(S): 90 AEROSOL, METERED ORAL at 10:13

## 2021-01-11 RX ADMIN — Medication 6 MILLIGRAM(S): at 04:32

## 2021-01-11 NOTE — PROGRESS NOTE ADULT - SUBJECTIVE AND OBJECTIVE BOX
DATE OF SERVICE: 01-11-21 @ 14:50    Subjective: Patient seen and examined. No new events except as noted.   on BIPAP  doing okay   saturating in low 90s     REVIEW OF SYSTEMS:    CONSTITUTIONAL: No weakness, fevers or chills  EYES/ENT: No visual changes;  No vertigo or throat pain   NECK: No pain or stiffness  RESPIRATORY: No cough, wheezing, hemoptysis; No shortness of breath  CARDIOVASCULAR: No chest pain or palpitations  GASTROINTESTINAL: No abdominal or epigastric pain. No nausea, vomiting, or hematemesis; No diarrhea or constipation. No melena or hematochezia.  GENITOURINARY: No dysuria, frequency or hematuria  NEUROLOGICAL: No numbness or weakness  SKIN: No itching, burning, rashes, or lesions   All other review of systems is negative unless indicated above.    MEDICATIONS:  MEDICATIONS  (STANDING):  ALBUTerol    90 MICROgram(s) HFA Inhaler 2 Puff(s) Inhalation every 6 hours  chlorhexidine 4% Liquid 1 Application(s) Topical <User Schedule>  dexAMETHasone  Injectable 6 milliGRAM(s) IV Push daily  dextrose 5% + lactated ringers. 1000 milliLiter(s) (100 mL/Hr) IV Continuous <Continuous>  dextrose 5%. 1000 milliLiter(s) (100 mL/Hr) IV Continuous <Continuous>  enoxaparin Injectable 40 milliGRAM(s) SubCutaneous every 12 hours  glucagon  Injectable 1 milliGRAM(s) IntraMuscular once  influenza   Vaccine 0.5 milliLiter(s) IntraMuscular once  insulin lispro (ADMELOG) corrective regimen sliding scale   SubCutaneous every 6 hours  petrolatum Ophthalmic Ointment 1 Application(s) Both EYES daily  polyethylene glycol 3350 17 Gram(s) Oral daily  senna Syrup 10 milliLiter(s) Oral daily      PHYSICAL EXAM:  T(C): 37.3 (01-11-21 @ 09:55), Max: 37.9 (01-11-21 @ 02:31)  HR: 86 (01-11-21 @ 09:55) (72 - 92)  BP: 127/73 (01-11-21 @ 09:55) (127/73 - 144/77)  RR: 25 (01-11-21 @ 09:55) (25 - 37)  SpO2: 94% (01-11-21 @ 09:55) (91% - 98%)  Wt(kg): --  I&O's Summary    10 José Manuel 2021 07:01  -  11 Jan 2021 07:00  --------------------------------------------------------  IN: 1646.6 mL / OUT: 1535 mL / NET: 111.6 mL          Appearance: Normal, obese	  HEENT:  PERRLA , on bipap   Lymphatic: No lymphadenopathy   Cardiovascular: Normal S1 S2, no JVD  Respiratory: normal effort , clear  Gastrointestinal:  Soft, Non-tender  Skin: No rashes,  warm to touch  Psychiatry:  Mood & affect appropriate  Musculuskeletal: No edema      All labs, Imaging and EKGs personally reviewed                           11.2 11.79 )-----------( 459      ( 11 Jan 2021 11:26 )             36.1               01-11    140  |  103  |  22  ----------------------------<  198<H>  4.3   |  29  |  0.54    Ca    8.9      11 Jan 2021 11:26  Phos  3.9     01-11  Mg     2.1     01-11    TPro  7.6  /  Alb  3.2<L>  /  TBili  1.1  /  DBili  x   /  AST  65<H>  /  ALT  66<H>  /  AlkPhos  119  01-11    PT/INR - ( 10 José Manuel 2021 04:46 )   PT: 14.8 sec;   INR: 1.30 ratio         PTT - ( 10 José Manuel 2021 04:46 )  PTT:28.2 sec                 ABG - ( 10 José Manuel 2021 04:46 )  pH, Arterial: 7.46  pH, Blood: x     /  pCO2: 41    /  pO2: 136   / HCO3: 28    / Base Excess: 4.4   /  SaO2: 98.8

## 2021-01-12 LAB
ALBUMIN SERPL ELPH-MCNC: 3.1 G/DL — LOW (ref 3.3–5)
ALP SERPL-CCNC: 110 U/L — SIGNIFICANT CHANGE UP (ref 40–120)
ALT FLD-CCNC: 71 U/L — HIGH (ref 4–33)
ANION GAP SERPL CALC-SCNC: 11 MMOL/L — SIGNIFICANT CHANGE UP (ref 7–14)
AST SERPL-CCNC: 46 U/L — HIGH (ref 4–32)
BILIRUB SERPL-MCNC: 1 MG/DL — SIGNIFICANT CHANGE UP (ref 0.2–1.2)
BLOOD GAS ARTERIAL COMPREHENSIVE RESULT: SIGNIFICANT CHANGE UP
BLOOD GAS ARTERIAL COMPREHENSIVE RESULT: SIGNIFICANT CHANGE UP
BUN SERPL-MCNC: 20 MG/DL — SIGNIFICANT CHANGE UP (ref 7–23)
CALCIUM SERPL-MCNC: 8.6 MG/DL — SIGNIFICANT CHANGE UP (ref 8.4–10.5)
CHLORIDE SERPL-SCNC: 100 MMOL/L — SIGNIFICANT CHANGE UP (ref 98–107)
CO2 SERPL-SCNC: 26 MMOL/L — SIGNIFICANT CHANGE UP (ref 22–31)
CREAT SERPL-MCNC: 0.59 MG/DL — SIGNIFICANT CHANGE UP (ref 0.5–1.3)
D DIMER BLD IA.RAPID-MCNC: 1118 NG/ML DDU — HIGH
D DIMER BLD IA.RAPID-MCNC: SIGNIFICANT CHANGE UP NG/ML DDU
FERRITIN SERPL-MCNC: 242 NG/ML — HIGH (ref 15–150)
GLUCOSE BLDC GLUCOMTR-MCNC: 180 MG/DL — HIGH (ref 70–99)
GLUCOSE BLDC GLUCOMTR-MCNC: 96 MG/DL — SIGNIFICANT CHANGE UP (ref 70–99)
GLUCOSE SERPL-MCNC: 167 MG/DL — HIGH (ref 70–99)
HCT VFR BLD CALC: 32.6 % — LOW (ref 34.5–45)
HGB BLD-MCNC: 10.6 G/DL — LOW (ref 11.5–15.5)
MAGNESIUM SERPL-MCNC: 2 MG/DL — SIGNIFICANT CHANGE UP (ref 1.6–2.6)
MCHC RBC-ENTMCNC: 27.2 PG — SIGNIFICANT CHANGE UP (ref 27–34)
MCHC RBC-ENTMCNC: 32.5 GM/DL — SIGNIFICANT CHANGE UP (ref 32–36)
MCV RBC AUTO: 83.8 FL — SIGNIFICANT CHANGE UP (ref 80–100)
NRBC # BLD: 0 /100 WBCS — SIGNIFICANT CHANGE UP
NRBC # FLD: 0 K/UL — SIGNIFICANT CHANGE UP
PHOSPHATE SERPL-MCNC: 4 MG/DL — SIGNIFICANT CHANGE UP (ref 2.5–4.5)
PLATELET # BLD AUTO: 409 K/UL — HIGH (ref 150–400)
POTASSIUM SERPL-MCNC: 3.9 MMOL/L — SIGNIFICANT CHANGE UP (ref 3.5–5.3)
POTASSIUM SERPL-SCNC: 3.9 MMOL/L — SIGNIFICANT CHANGE UP (ref 3.5–5.3)
PROT SERPL-MCNC: 7 G/DL — SIGNIFICANT CHANGE UP (ref 6–8.3)
RBC # BLD: 3.89 M/UL — SIGNIFICANT CHANGE UP (ref 3.8–5.2)
RBC # FLD: 14.4 % — SIGNIFICANT CHANGE UP (ref 10.3–14.5)
SODIUM SERPL-SCNC: 137 MMOL/L — SIGNIFICANT CHANGE UP (ref 135–145)
WBC # BLD: 14.33 K/UL — HIGH (ref 3.8–10.5)
WBC # FLD AUTO: 14.33 K/UL — HIGH (ref 3.8–10.5)

## 2021-01-12 PROCEDURE — 71045 X-RAY EXAM CHEST 1 VIEW: CPT | Mod: 26

## 2021-01-12 RX ORDER — GLUCAGON INJECTION, SOLUTION 0.5 MG/.1ML
1 INJECTION, SOLUTION SUBCUTANEOUS ONCE
Refills: 0 | Status: DISCONTINUED | OUTPATIENT
Start: 2021-01-12 | End: 2021-01-16

## 2021-01-12 RX ORDER — ENOXAPARIN SODIUM 100 MG/ML
110 INJECTION SUBCUTANEOUS
Refills: 0 | Status: DISCONTINUED | OUTPATIENT
Start: 2021-01-12 | End: 2021-01-16

## 2021-01-12 RX ORDER — DEXTROSE 50 % IN WATER 50 %
25 SYRINGE (ML) INTRAVENOUS ONCE
Refills: 0 | Status: COMPLETED | OUTPATIENT
Start: 2021-01-12 | End: 2021-01-12

## 2021-01-12 RX ORDER — DEXTROSE 50 % IN WATER 50 %
12.5 SYRINGE (ML) INTRAVENOUS ONCE
Refills: 0 | Status: DISCONTINUED | OUTPATIENT
Start: 2021-01-12 | End: 2021-01-16

## 2021-01-12 RX ORDER — SODIUM CHLORIDE 9 MG/ML
1000 INJECTION, SOLUTION INTRAVENOUS
Refills: 0 | Status: DISCONTINUED | OUTPATIENT
Start: 2021-01-12 | End: 2021-01-16

## 2021-01-12 RX ORDER — DEXTROSE 50 % IN WATER 50 %
25 SYRINGE (ML) INTRAVENOUS ONCE
Refills: 0 | Status: DISCONTINUED | OUTPATIENT
Start: 2021-01-12 | End: 2021-01-16

## 2021-01-12 RX ORDER — DEXTROSE 50 % IN WATER 50 %
15 SYRINGE (ML) INTRAVENOUS ONCE
Refills: 0 | Status: DISCONTINUED | OUTPATIENT
Start: 2021-01-12 | End: 2021-01-16

## 2021-01-12 RX ORDER — SODIUM CHLORIDE 9 MG/ML
1000 INJECTION, SOLUTION INTRAVENOUS
Refills: 0 | Status: DISCONTINUED | OUTPATIENT
Start: 2021-01-12 | End: 2021-01-12

## 2021-01-12 RX ADMIN — Medication 1 MILLIGRAM(S): at 05:58

## 2021-01-12 RX ADMIN — Medication 1 MILLIGRAM(S): at 18:51

## 2021-01-12 RX ADMIN — Medication 6 MILLIGRAM(S): at 05:11

## 2021-01-12 RX ADMIN — Medication 2: at 11:56

## 2021-01-12 RX ADMIN — SODIUM CHLORIDE 50 MILLILITER(S): 9 INJECTION, SOLUTION INTRAVENOUS at 00:44

## 2021-01-12 RX ADMIN — ALBUTEROL 2 PUFF(S): 90 AEROSOL, METERED ORAL at 16:22

## 2021-01-12 RX ADMIN — Medication 0.5 MILLIGRAM(S): at 01:25

## 2021-01-12 RX ADMIN — Medication 1 APPLICATION(S): at 11:59

## 2021-01-12 RX ADMIN — ENOXAPARIN SODIUM 40 MILLIGRAM(S): 100 INJECTION SUBCUTANEOUS at 05:58

## 2021-01-12 RX ADMIN — Medication 25 MILLILITER(S): at 00:44

## 2021-01-12 RX ADMIN — ENOXAPARIN SODIUM 110 MILLIGRAM(S): 100 INJECTION SUBCUTANEOUS at 17:56

## 2021-01-12 RX ADMIN — Medication 0.5 MILLIGRAM(S): at 02:15

## 2021-01-12 RX ADMIN — ALBUTEROL 2 PUFF(S): 90 AEROSOL, METERED ORAL at 10:32

## 2021-01-12 RX ADMIN — Medication 1 MILLIGRAM(S): at 06:47

## 2021-01-12 RX ADMIN — ALBUTEROL 2 PUFF(S): 90 AEROSOL, METERED ORAL at 02:20

## 2021-01-12 RX ADMIN — ALBUTEROL 2 PUFF(S): 90 AEROSOL, METERED ORAL at 21:23

## 2021-01-12 RX ADMIN — CHLORHEXIDINE GLUCONATE 1 APPLICATION(S): 213 SOLUTION TOPICAL at 05:11

## 2021-01-12 NOTE — CHART NOTE - NSCHARTNOTEFT_GEN_A_CORE
ID recommending trial to remove [facility] for [facility] Barron Castanon PA-C

## 2021-01-12 NOTE — PROVIDER CONTACT NOTE (OTHER) - ACTION/TREATMENT ORDERED:
Provider notified and at bedside. Provider is okay with RR when patient is sating in 90s. This is an ongoing issue for patient. Monitoring of o2 saturation frequently done throughout shift.

## 2021-01-12 NOTE — CHART NOTE - NSCHARTNOTEFT_GEN_A_CORE
Notified by RN that patient is tachypneic to 40s on BIPAP. Patient seen and examined bedside, is in mild respiratory distress with RR of 41, O2 sat 92%. Patient s/p RRT earlier in night, extubated 1/10. Lungs CTA.   Plan: repeat CXR ordered, repeat ABG sent, MICU consulted, will continue with ativan prn

## 2021-01-12 NOTE — PROGRESS NOTE ADULT - SUBJECTIVE AND OBJECTIVE BOX
DATE OF SERVICE: 01-12-21 @ 16:07    Subjective: Patient seen and examined. No new events except as noted.   hypoxic and tachypneic   Bipap as tolerated  D-dimer elevated  AC started      REVIEW OF SYSTEMS:    CONSTITUTIONAL: + weakness  EYES/ENT: No visual changes;  No vertigo or throat pain   NECK: No pain or stiffness  RESPIRATORY: No cough, wheezing, hemoptysis  CARDIOVASCULAR: No chest pain or palpitations  GASTROINTESTINAL: No abdominal or epigastric pain.   GENITOURINARY: No dysuria, frequency or hematuria  NEUROLOGICAL: No numbness or weakness  SKIN: No itching, burning, rashes, or lesions   All other review of systems is negative unless indicated above.    MEDICATIONS:  MEDICATIONS  (STANDING):  ALBUTerol    90 MICROgram(s) HFA Inhaler 2 Puff(s) Inhalation every 6 hours  chlorhexidine 4% Liquid 1 Application(s) Topical <User Schedule>  dextrose 40% Gel 15 Gram(s) Oral once  dextrose 5%. 1000 milliLiter(s) (50 mL/Hr) IV Continuous <Continuous>  dextrose 5%. 1000 milliLiter(s) (100 mL/Hr) IV Continuous <Continuous>  dextrose 50% Injectable 25 Gram(s) IV Push once  dextrose 50% Injectable 12.5 Gram(s) IV Push once  dextrose 50% Injectable 25 Gram(s) IV Push once  enoxaparin Injectable 110 milliGRAM(s) SubCutaneous two times a day  glucagon  Injectable 1 milliGRAM(s) IntraMuscular once  insulin lispro (ADMELOG) corrective regimen sliding scale   SubCutaneous every 6 hours  petrolatum Ophthalmic Ointment 1 Application(s) Both EYES daily  polyethylene glycol 3350 17 Gram(s) Oral daily  senna Syrup 10 milliLiter(s) Oral daily      PHYSICAL EXAM:  T(C): 37 (01-12-21 @ 08:15), Max: 37 (01-12-21 @ 08:15)  HR: 111 (01-12-21 @ 08:15) (87 - 111)  BP: 115/75 (01-12-21 @ 08:15) (115/75 - 129/73)  RR: 49 (01-12-21 @ 08:15) (27 - 49)  SpO2: 95% (01-12-21 @ 08:15) (90% - 113%)  Wt(kg): --  I&O's Summary    11 Jan 2021 07:01  -  12 Jan 2021 07:00  --------------------------------------------------------  IN: 0 mL / OUT: 650 mL / NET: -650 mL    12 Jan 2021 07:01  -  12 Jan 2021 16:07  --------------------------------------------------------  IN: 0 mL / OUT: 375 mL / NET: -375 mL          Appearance: awake , on bipap   HEENT:  PERRLA   Lymphatic: No lymphadenopathy   Cardiovascular: Normal S1 S2  Respiratory: normal effort , clear  Gastrointestinal:  Soft, Non-tender  Skin: No rashes,  warm to touch  Psychiatry:  Mood & affect appropriate  Musculuskeletal: No edema      All labs, Imaging and EKGs personally reviewed                           10.6   14.33 )-----------( 409      ( 12 Jan 2021 07:55 )             32.6               01-12    137  |  100  |  20  ----------------------------<  167<H>  3.9   |  26  |  0.59    Ca    8.6      12 Jan 2021 07:55  Phos  4.0     01-12  Mg     2.0     01-12    TPro  7.0  /  Alb  3.1<L>  /  TBili  1.0  /  DBili  x   /  AST  46<H>  /  ALT  71<H>  /  AlkPhos  110  01-12                     ABG - ( 12 Jan 2021 10:49 )  pH, Arterial: 7.43  pH, Blood: x     /  pCO2: 40    /  pO2: 73    / HCO3: 26    / Base Excess: 2.1   /  SaO2: 94.2

## 2021-01-13 LAB
ALBUMIN SERPL ELPH-MCNC: 3.1 G/DL — LOW (ref 3.3–5)
ALP SERPL-CCNC: 107 U/L — SIGNIFICANT CHANGE UP (ref 40–120)
ALT FLD-CCNC: 55 U/L — HIGH (ref 4–33)
ANION GAP SERPL CALC-SCNC: 12 MMOL/L — SIGNIFICANT CHANGE UP (ref 7–14)
AST SERPL-CCNC: 30 U/L — SIGNIFICANT CHANGE UP (ref 4–32)
BILIRUB SERPL-MCNC: 0.8 MG/DL — SIGNIFICANT CHANGE UP (ref 0.2–1.2)
BUN SERPL-MCNC: 22 MG/DL — SIGNIFICANT CHANGE UP (ref 7–23)
CALCIUM SERPL-MCNC: 8.8 MG/DL — SIGNIFICANT CHANGE UP (ref 8.4–10.5)
CHLORIDE SERPL-SCNC: 98 MMOL/L — SIGNIFICANT CHANGE UP (ref 98–107)
CO2 SERPL-SCNC: 24 MMOL/L — SIGNIFICANT CHANGE UP (ref 22–31)
CREAT SERPL-MCNC: 0.63 MG/DL — SIGNIFICANT CHANGE UP (ref 0.5–1.3)
CULTURE RESULTS: SIGNIFICANT CHANGE UP
D DIMER BLD IA.RAPID-MCNC: 2189 NG/ML DDU — HIGH
FERRITIN SERPL-MCNC: 218 NG/ML — HIGH (ref 15–150)
GLUCOSE SERPL-MCNC: 105 MG/DL — HIGH (ref 70–99)
HCT VFR BLD CALC: 32.3 % — LOW (ref 34.5–45)
HGB BLD-MCNC: 10.4 G/DL — LOW (ref 11.5–15.5)
MAGNESIUM SERPL-MCNC: 2.1 MG/DL — SIGNIFICANT CHANGE UP (ref 1.6–2.6)
MCHC RBC-ENTMCNC: 27.4 PG — SIGNIFICANT CHANGE UP (ref 27–34)
MCHC RBC-ENTMCNC: 32.2 GM/DL — SIGNIFICANT CHANGE UP (ref 32–36)
MCV RBC AUTO: 85 FL — SIGNIFICANT CHANGE UP (ref 80–100)
NRBC # BLD: 0 /100 WBCS — SIGNIFICANT CHANGE UP
NRBC # FLD: 0 K/UL — SIGNIFICANT CHANGE UP
PHOSPHATE SERPL-MCNC: 3.4 MG/DL — SIGNIFICANT CHANGE UP (ref 2.5–4.5)
PLATELET # BLD AUTO: 383 K/UL — SIGNIFICANT CHANGE UP (ref 150–400)
POTASSIUM SERPL-MCNC: 3.4 MMOL/L — LOW (ref 3.5–5.3)
POTASSIUM SERPL-SCNC: 3.4 MMOL/L — LOW (ref 3.5–5.3)
PROT SERPL-MCNC: 7.2 G/DL — SIGNIFICANT CHANGE UP (ref 6–8.3)
RBC # BLD: 3.8 M/UL — SIGNIFICANT CHANGE UP (ref 3.8–5.2)
RBC # FLD: 14.3 % — SIGNIFICANT CHANGE UP (ref 10.3–14.5)
SODIUM SERPL-SCNC: 134 MMOL/L — LOW (ref 135–145)
SPECIMEN SOURCE: SIGNIFICANT CHANGE UP
WBC # BLD: 14.38 K/UL — HIGH (ref 3.8–10.5)
WBC # FLD AUTO: 14.38 K/UL — HIGH (ref 3.8–10.5)

## 2021-01-13 PROCEDURE — 99233 SBSQ HOSP IP/OBS HIGH 50: CPT | Mod: GC

## 2021-01-13 RX ORDER — INSULIN LISPRO 100/ML
VIAL (ML) SUBCUTANEOUS
Refills: 0 | Status: DISCONTINUED | OUTPATIENT
Start: 2021-01-13 | End: 2021-01-16

## 2021-01-13 RX ORDER — INSULIN LISPRO 100/ML
VIAL (ML) SUBCUTANEOUS AT BEDTIME
Refills: 0 | Status: DISCONTINUED | OUTPATIENT
Start: 2021-01-13 | End: 2021-01-16

## 2021-01-13 RX ORDER — IBUPROFEN 200 MG
400 TABLET ORAL ONCE
Refills: 0 | Status: COMPLETED | OUTPATIENT
Start: 2021-01-13 | End: 2021-01-13

## 2021-01-13 RX ORDER — DEXTROSE 50 % IN WATER 50 %
25 SYRINGE (ML) INTRAVENOUS ONCE
Refills: 0 | Status: COMPLETED | OUTPATIENT
Start: 2021-01-13 | End: 2021-01-13

## 2021-01-13 RX ORDER — ZOLPIDEM TARTRATE 10 MG/1
5 TABLET ORAL ONCE
Refills: 0 | Status: DISCONTINUED | OUTPATIENT
Start: 2021-01-13 | End: 2021-01-13

## 2021-01-13 RX ADMIN — ALBUTEROL 2 PUFF(S): 90 AEROSOL, METERED ORAL at 21:09

## 2021-01-13 RX ADMIN — Medication 1 MILLIGRAM(S): at 12:42

## 2021-01-13 RX ADMIN — ALBUTEROL 2 PUFF(S): 90 AEROSOL, METERED ORAL at 12:40

## 2021-01-13 RX ADMIN — ENOXAPARIN SODIUM 110 MILLIGRAM(S): 100 INJECTION SUBCUTANEOUS at 17:39

## 2021-01-13 RX ADMIN — ENOXAPARIN SODIUM 110 MILLIGRAM(S): 100 INJECTION SUBCUTANEOUS at 05:55

## 2021-01-13 RX ADMIN — Medication 1 MILLIGRAM(S): at 19:40

## 2021-01-13 RX ADMIN — Medication 0.5 MILLIGRAM(S): at 02:44

## 2021-01-13 RX ADMIN — Medication 650 MILLIGRAM(S): at 19:09

## 2021-01-13 RX ADMIN — CHLORHEXIDINE GLUCONATE 1 APPLICATION(S): 213 SOLUTION TOPICAL at 05:25

## 2021-01-13 RX ADMIN — SENNA PLUS 10 MILLILITER(S): 8.6 TABLET ORAL at 12:42

## 2021-01-13 RX ADMIN — ZOLPIDEM TARTRATE 5 MILLIGRAM(S): 10 TABLET ORAL at 22:54

## 2021-01-13 RX ADMIN — ALBUTEROL 2 PUFF(S): 90 AEROSOL, METERED ORAL at 02:45

## 2021-01-13 RX ADMIN — Medication 400 MILLIGRAM(S): at 22:32

## 2021-01-13 RX ADMIN — Medication 1 APPLICATION(S): at 12:42

## 2021-01-13 RX ADMIN — Medication 25 MILLILITER(S): at 00:10

## 2021-01-13 RX ADMIN — Medication 1 MILLIGRAM(S): at 00:07

## 2021-01-13 NOTE — CHART NOTE - NSCHARTNOTEFT_GEN_A_CORE
Called by RN at ~ 10:45AM after patient was found to be hypoxic to < 80% on BiPAP. At the time of my arrival, patient's O2 sat improved to 90%. It remains around 90% at rest, though it can decrease with little exertion. Patient reports that her breathing feels the same as yesterday. On exam, she is tachypneic and her lungs sound slightly coarse. Discussed plan with Attending and Pulmonary. Will request ICU evaluation. Spoke with MICU Resident and discussed the case. Will await recommendations.    Arun Stapleton MD  Acting ACP for COVID Deployment   Pager: [phone]

## 2021-01-13 NOTE — CONSULT NOTE ADULT - ASSESSMENT
Patient is a 46 year old woman with no past medical history coming in with shortness of breath, cough productive of white sputum, pleuritic rib pain (moderate in severity) for two days and diarrhea for one day.    COVID 19 PNEUMONIA: WITH HYPOXIC RESP FAILURE:     1/13:  COVID 19 PNEUMONIA: WITH HYPOXIC RESP FAILURE:     She was intubated in MICU nad then extubated successfully and transferred to floor: pulm called today to evaluate Pt is on bipap 100% sao2 in high 80's and tachypneic   She looks in resp distress:   Her d Dimer bumped up yesterday and was switched to full AC:  She isnot doing well and may need MICU again:   ALT is mildly elevated and renal profile is normal   repeat crp and ferritin:  ??firgtehr course of steroids    STEPHANIE LAYTON and rn at [facility]:   stephanie craig

## 2021-01-13 NOTE — CONSULT NOTE ADULT - SUBJECTIVE AND OBJECTIVE BOX
CHIEF COMPLAINT:    HPI:  47 yo woman with BMI 40, admitted 1/3/2020 for acute hypoxic respiratory failure 2/2 COVID-19, transferred to MICU on 1/5/20 (intubated 1/5 - 1/10), transferred to medicine floor on BiPAP, trialed on HF however switched back to BiPAP at RRT 1/11 for desaturation. MICU reconsulted for dyspnea, tachypnea (30s-40s), increased WOB. Labs notable for uptrending D-Dimer (400 - 2000s) since she has been on the medical floor, however is currently on full dose AC with lovenox 110 BID.  Pt endorses mild chest pain, SOB.  She  denies fever, chills, nausea, vomiting, diarrhea, dysuria.     PAST MEDICAL & SURGICAL HISTORY:  No pertinent past medical history    No significant past surgical history    FAMILY HISTORY:  FH: type 2 diabetes    SOCIAL HISTORY:  Smoking: [ x] Never Smoked [ ] Former Smoker (__ packs x ___ years) [ ] Current Smoker  (__ packs x ___ years)  Substance Use: [ x] Never Used [ ] Used ____  EtOH Use: none  Marital Status: [ ] Single [x ]  [ ]  [ ]   Full code.  is covid positive    Allergies    No Known Allergies    Intolerances    REVIEW OF SYSTEMS: Per HPI    OBJECTIVE:  ICU Vital Signs Last 24 Hrs  T(C): 36.9 (13 Jan 2021 11:54), Max: 37.3 (12 Jan 2021 21:04)  T(F): 98.4 (13 Jan 2021 11:54), Max: 99.2 (12 Jan 2021 21:04)  HR: 86 (13 Jan 2021 12:24) (75 - 114)  BP: 105/63 (13 Jan 2021 11:54) (104/35 - 126/70)  RR: 27 (13 Jan 2021 11:54) (27 - 41)  SpO2: 93% (13 Jan 2021 12:24) (90% - 99%)        01-12 @ 07:01  -  01-13 @ 07:00  --------------------------------------------------------  IN: 0 mL / OUT: 675 mL / NET: -675 mL      CAPILLARY BLOOD GLUCOSE      POCT Blood Glucose.: 121 mg/dL (13 Jan 2021 11:54)      PHYSICAL EXAM:  Constitutional: NAD  Eyes: PERRL, EOMI  ENT: Neck supple  CV: tachycardic to 100, regular rhythm, +s1/s2, no m/r/g  Resp: CTAB, wheezing heard from air-leak on BiLevel  GI: +BS, soft, obsese, non-tender,non-distended   Integumentary: Warm, well perfused  Neurological: AOx3  Psychiatric: [facility] MEDICATIONS:  MEDICATIONS  (STANDING):  ALBUTerol    90 MICROgram(s) HFA Inhaler 2 Puff(s) Inhalation every 6 hours  chlorhexidine 4% Liquid 1 Application(s) Topical <User Schedule>  dextrose 40% Gel 15 Gram(s) Oral once  dextrose 5%. 1000 milliLiter(s) (50 mL/Hr) IV Continuous <Continuous>  dextrose 5%. 1000 milliLiter(s) (100 mL/Hr) IV Continuous <Continuous>  dextrose 50% Injectable 25 Gram(s) IV Push once  dextrose 50% Injectable 12.5 Gram(s) IV Push once  dextrose 50% Injectable 25 Gram(s) IV Push once  enoxaparin Injectable 110 milliGRAM(s) SubCutaneous two times a day  glucagon  Injectable 1 milliGRAM(s) IntraMuscular once  insulin lispro (ADMELOG) corrective regimen sliding scale   SubCutaneous every 6 hours  petrolatum Ophthalmic Ointment 1 Application(s) Both EYES daily  polyethylene glycol 3350 17 Gram(s) Oral daily  senna Syrup 10 milliLiter(s) Oral daily    MEDICATIONS  (PRN):  acetaminophen   Tablet .. 650 milliGRAM(s) Oral every 6 hours PRN Temp greater or equal to 38C (100.4F), Moderate Pain (4 - 6)  acetaminophen  Suppository .. 650 milliGRAM(s) Rectal every 6 hours PRN Temp greater or equal to 38C (100.4F)  LORazepam   Injectable 1 milliGRAM(s) IV Push every 6 hours PRN Anxiety  sodium chloride 0.9% lock flush 10 milliLiter(s) IV Push every 1 hour PRN Pre/post blood products, medications, blood draw, and to maintain line patency      LABS:                        10.4   14.38 )-----------( 383      ( 13 Jan 2021 07:30 )             32.3     01-13    134<L>  |  98  |  22  ----------------------------<  105<H>  3.4<L>   |  24  |  0.63    Ca    8.8      13 Jan 2021 07:30  Phos  3.4     01-13  Mg     2.1     01-13    TPro  7.2  /  Alb  3.1<L>  /  TBili  0.8  /  DBili  x   /  AST  30  /  ALT  55<H>  /  AlkPhos  107  01-13        Arterial Blood Gas:  01-12 @ 10:49  7.43/40/73/26/94.2/2.1  ABG lactate: --  Arterial Blood Gas:  01-12 @ 06:46  TNP/TNP/TNP/--/TNP/--  ABG lactate: --  Arterial Blood Gas:  01-11 @ 21:48  7.46/37/81/27/95.3/2.8  ABG lactate: --        MICROBIOLOGY:     RADIOLOGY:  < from: Xray Chest 1 View- PORTABLE-Urgent (Xray Chest 1 View- PORTABLE-Urgent .) (01.12.21 @ 08:08) >  IMPRESSION:  Bilateral patchy lung opacities which can be consistent with COVID-19 infection show slight improvement from theprior image.    < end of copied text >

## 2021-01-13 NOTE — ADVANCED PRACTICE NURSE CONSULT - ASSESSMENT
General: A&Ox4, obese, on BIPAP, increased work of breathing RR in the high 40s- primary RN made aware, O2 sat maintaining to 92%, moving extremities independently, continent of urine and stool. Skin warm, dry, adequate skin turgor. Unable to turn patient at this time due to tachypnea and increased work of breathing.      Risk for MAD secondary to increased moisture under bilateral breasts and abdominal pannus.    Bridge of nose- mixed etiology COVID skin manifestation, acute skin failure, frictional injury and pressure related injury measuring [id number]. Tissue type presenting with 100% pink moist dermis. No drainage. Gecko pad noted  under BIPAP for protection. Periwound with blanching erythema at wound edge. (As per primary RN, patient had BIPAP mask that was too large which kept moving around which could have contributed to wound). Goals of care: Maintain moist wound bed, continue to offload, protect from frictional forces.

## 2021-01-13 NOTE — CONSULT NOTE ADULT - SUBJECTIVE AND OBJECTIVE BOX
55 y/o  female with ESRD on HD M/W/F, HFrEF LVEF 20-25% from echo in 10/2018, NICM (non-obstructive CAD on cath 3/2018), insulin dependent diabetes mellitus, hypertension, hyperlipidemia, paroxysmal atrial fibrillation (not on AC due to hx of GI bleed, has not followed up in the office regarding watchman referral), uremic pericarditis s/p pericardiocentesis, right sided pleural effusion s/p thoracentesis 9/2018, hx of dietary indiscretions, presents with worsening shortness of breath at home.  Found with large right sided pleural effusion by PCP. Compliant with HD sessions and medications.  No chest pain, dizziness.  Reports orthopnea and cough in supine position. Imaging showed pulmonary edema, small left pleural effusion, moderate, increasing right sided pleural effusion.      # dyspnea - resolved  - largely due to pleural effusion/volume overload  # recurrent right sided pleural effusion  - CTS on board, s/p thoracentesis (1350 mL) by interventional radiology  # NICM, decompensated, LVEF 20-25% - now compensated.  Continue carvedilol and entresto at current doses.  LVEF has been severely depressed since August, has been on OMT since then, however missed follow up appointments in the office with me.  TTE showed mildly reduced systolic function on my review (estimated LVEF 45-50%, read as 50-55%), nonetheless, recovered LVEF.  ICD not warranted.  Oliguric, diuretic likely not worthwhile. Rediscussed low sodium diet.   # hypertension - now well controlled on current regimen - carvedilol, entresto.   # paroxysmal afib - hx of recurrent GI bleed in the past, deemed high bleeding risk.  Has been previously referred for watchman, however has missed appointments in the office - patient willing to follow up as an outpatient regarding this.   # ESRD - HD per renal.   # worsening pulmonary edema on CXR - may be getting additional HD session, asymptomatic.     Thank you for allowing me to participate in care of your patient.   Stable from cardiac standpoint  Please call with questions.   D/W Dr. Barnes  Outpatient follow up in 2 weeks.   01-13-21 @ 12:26    Patient is a 46y old  Female who presents with a chief complaint of COVID (12 Jan 2021 16:06)      HPI:  Patient is a 46 year old woman with no past medical history coming in with shortness of breath, cough productive of white sputum, pleuritic rib pain (moderate in severity) for two days and diarrhea for one day. Patient  tested positive for COVID 12/29. Patient denies loss of taste or smell. Patient denies sore throat however states mouth is dry. Patient denies any history of lung disease or smoking. Patient states she was having subjective fevers at home however never took her temperature. Patient was taking an unknown antibiotic prescribed by a doctor. Unable to give name of doctor or name of abx. Patient dyspneic during with talking during interview.   ED Course: T 98.6, HR 96, /90, S 80% on RA. White count 10k. Hemoglobin 11.0. Plt 236. D-dimer 243. CRP 81.3. . CXR peripheral opacities consistent w/ COVID. Patient given 6 of dexamethasone. Patient admitted for acute hypoxic respiratory failure in the setting of likely COVID infection.  (03 Jan 2021 18:35)    she was intubated in MICU and was successfully extubated on 10th: today pulm called to evaluate:  she is in  resp distress hypoxic on bipap at 100%: not doing so well:       ?FOLLOWING PRESENT  [ x] Hx of PE/DVT, x[ ] Hx COPD, [ x] Hx of Asthma, x ] Hx of Hospitalization, [x ]  Hx of BiPAP/CPAP use, [x ] Hx of OSMAR    Allergies    No Known Allergies    Intolerances        PAST MEDICAL & SURGICAL HISTORY:  No pertinent past medical history    No significant past surgical history        FAMILY HISTORY:  FH: type 2 diabetes        Social History: [  x] TOBACCO                  [  x] ETOH                                 [x  ] IVDA/DRUGS    REVIEW OF SYSTEMS      General:	x    Skin/Breast:x  	  Ophthalmologic:x  	  ENMT:	x    Respiratory and Thorax: sob  	  Cardiovascular:	x    Gastrointestinal:	x    Genitourinary:	x    Musculoskeletal:	x    Neurological:	  x  Psychiatric:	x    Hematology/Lymphatics:	x    Endocrine:	xx    Allergic/Immunologic:x    MEDICATIONS  (STANDING):  ALBUTerol    90 MICROgram(s) HFA Inhaler 2 Puff(s) Inhalation every 6 hours  chlorhexidine 4% Liquid 1 Application(s) Topical <User Schedule>  dextrose 40% Gel 15 Gram(s) Oral once  dextrose 5%. 1000 milliLiter(s) (100 mL/Hr) IV Continuous <Continuous>  dextrose 5%. 1000 milliLiter(s) (50 mL/Hr) IV Continuous <Continuous>  dextrose 50% Injectable 25 Gram(s) IV Push once  dextrose 50% Injectable 25 Gram(s) IV Push once  dextrose 50% Injectable 12.5 Gram(s) IV Push once  enoxaparin Injectable 110 milliGRAM(s) SubCutaneous two times a day  glucagon  Injectable 1 milliGRAM(s) IntraMuscular once  insulin lispro (ADMELOG) corrective regimen sliding scale   SubCutaneous every 6 hours  petrolatum Ophthalmic Ointment 1 Application(s) Both EYES daily  polyethylene glycol 3350 17 Gram(s) Oral daily  senna Syrup 10 milliLiter(s) Oral daily    MEDICATIONS  (PRN):  acetaminophen   Tablet .. 650 milliGRAM(s) Oral every 6 hours PRN Temp greater or equal to 38C (100.4F), Moderate Pain (4 - 6)  acetaminophen  Suppository .. 650 milliGRAM(s) Rectal every 6 hours PRN Temp greater or equal to 38C (100.4F)  LORazepam   Injectable 1 milliGRAM(s) IV Push every 6 hours PRN Anxiety  sodium chloride 0.9% lock flush 10 milliLiter(s) IV Push every 1 hour PRN Pre/post blood products, medications, blood draw, and to maintain line patency       Vital Signs Last 24 Hrs  T(C): 36.9 (13 Jan 2021 11:54), Max: 37.3 (12 Jan 2021 21:04)  T(F): 98.4 (13 Jan 2021 11:54), Max: 99.2 (12 Jan 2021 21:04)  HR: 98 (13 Jan 2021 11:54) (75 - 114)  BP: 105/63 (13 Jan 2021 11:54) (104/35 - 126/70)  BP(mean): --  RR: 27 (13 Jan 2021 11:54) (27 - 41)  SpO2: 92% (13 Jan 2021 11:54) (90% - 99%)Orthostatic VS          I&O's Summary    12 Jan 2021 07:01  -  13 Jan 2021 07:00  --------------------------------------------------------  IN: 0 mL / OUT: 675 mL / NET: -675 mL        Physical Exam:   GENERAL: Obese++  HEENT: LALIT/   Atraumatic, Normocephalic  ENMT: No tonsillar erythema, exudates, or enlargement; Moist mucous membranes, Good dentition, No lesions  NECK: Supple, No JVD, Normal thyroid  CHEST/LUNG: Clear to auscultation bilaterally  CVS: Regular rate and rhythm; No murmurs, rubs, or gallops  GI: : Soft, Nontender, Nondistended; Bowel sounds present  NERVOUS SYSTEM:  Alert & Oriented X3  EXTREMITIES: - edema  LYMPH: No lymphadenopathy noted  SKIN: No rashes or lesions  ENDOCRINOLOGY: No Thyromegaly  PSYCH: Appropriate    Labs:  ABG - ( 12 Jan 2021 10:49 )  pH, Arterial: 7.43  pH, Blood: x     /  pCO2: 40    /  pO2: 73    / HCO3: 26    / Base Excess: 2.1   /  SaO2: 94.2            COVID-19 PCR: Detected (03 Jan 2021 16:34)                              10.4   14.38 )-----------( 383      ( 13 Jan 2021 07:30 )             32.3                         10.6   14.33 )-----------( 409      ( 12 Jan 2021 07:55 )             32.6                         11.2   11.79 )-----------( 459      ( 11 Jan 2021 11:26 )             36.1                         10.1   13.30 )-----------( 413      ( 10 José Manuel 2021 04:46 )             31.2     01-13    134<L>  |  98  |  22  ----------------------------<  105<H>  3.4<L>   |  24  |  0.63  01-12    137  |  100  |  20  ----------------------------<  167<H>  3.9   |  26  |  0.59  01-11    140  |  103  |  22  ----------------------------<  198<H>  4.3   |  29  |  0.54  01-10    142  |  104  |  26<H>  ----------------------------<  155<H>  4.4   |  27  |  0.62    Ca    8.8      13 Jan 2021 07:30  Ca    8.6      12 Jan 2021 07:55  Phos  3.4     01-13  Phos  4.0     01-12  Mg     2.1     01-13  Mg     2.0     01-12    TPro  7.2  /  Alb  3.1<L>  /  TBili  0.8  /  DBili  x   /  AST  30  /  ALT  55<H>  /  AlkPhos  107  01-13  TPro  7.0  /  Alb  3.1<L>  /  TBili  1.0  /  DBili  x   /  AST  46<H>  /  ALT  71<H>  /  AlkPhos  110  01-12  TPro  7.6  /  Alb  3.2<L>  /  TBili  1.1  /  DBili  x   /  AST  65<H>  /  ALT  66<H>  /  AlkPhos  119  01-11  TPro  7.2  /  Alb  3.0<L>  /  TBili  0.6  /  DBili  x   /  AST  40<H>  /  ALT  32  /  AlkPhos  99  01-10    CAPILLARY BLOOD GLUCOSE      POCT Blood Glucose.: 121 mg/dL (13 Jan 2021 11:54)  POCT Blood Glucose.: 114 mg/dL (13 Jan 2021 07:24)  POCT Blood Glucose.: 106 mg/dL (13 Jan 2021 05:45)  POCT Blood Glucose.: 70 mg/dL (12 Jan 2021 23:47)  POCT Blood Glucose.: 82 mg/dL (12 Jan 2021 20:57)  POCT Blood Glucose.: 114 mg/dL (12 Jan 2021 16:43)    LIVER FUNCTIONS - ( 13 Jan 2021 07:30 )  Alb: 3.1 g/dL / Pro: 7.2 g/dL / ALK PHOS: 107 U/L / ALT: 55 U/L / AST: 30 U/L / GGT: x               D DImer  Procalcitonin, Serum: 0.05 ng/mL (01-11 @ 11:26)  D-Dimer Assay, Quantitative: 2189 ng/mL DDU (01-13 @ 07:30)  D-Dimer Assay, Quantitative: 1118 ng/mL DDU (01-12 @ 13:28)  D-Dimer Assay, Quantitative: FAILED ng/mL DDU (01-12 @ 10:49)  D-Dimer Assay, Quantitative: 493 ng/mL DDU (01-10 @ 04:46)  D-Dimer Assay, Quantitative: 492 ng/mL DDU (01-08 @ 00:47)  D-Dimer Assay, Quantitative: 507 ng/mL DDU (01-07 @ 09:31)    r< from: Xray Chest 1 View- PORTABLE-Urgent (Xray Chest 1 View- PORTABLE-Urgent .) (01.12.21 @ 08:08) >  EXAM:  XR CHEST PORTABLE URGENT 1V        PROCEDURE DATE:  Jan 12 2021         INTERPRETATION:  TIME OF EXAM: January 12, 2021 at 7:32 AM.    CLINICAL INFORMATION: Shortness of breath.    COMPARISON:  January 5, 2021.    TECHNIQUE:   AP Portable chest x-ray. The chin obscures the superior mediastinum and portions of the apices.    INTERPRETATION:    Heart size and the mediastinum cannot be accurately evaluated on this projection. Previous ET tube, left IJ line, and enteric tube are not seen.  Bilateral patchy lung opacities are again noted showing slight improvement.  No pleural effusion or pneumothorax is seen.      IMPRESSION:  Bilateral patchy lung opacities which can be consistent with COVID-19 infection show slight improvement from theprior image.              LAMBERT GODFREY MD; Attending Radiologist  This document has been electronically signed. Jan 12 2021  1:20PM    < end of copied text >  < from: CT Abdomen and Pelvis w/wo IV Cont (05.29.20 @ 18:14) >  Sagittal and coronal reformats were performed.    FINDINGS:  LOWER CHEST: Within normal limits.    LIVER: Within normal limits.  BILE DUCTS: Normal caliber.  GALLBLADDER: Within normal limits.  SPLEEN: Within normal limits.  PANCREAS: Within normal limits.  ADRENALS: Within normal limits.  KIDNEYS/URETERS: No renal stones or hydronephrosis. Symmetricrenal enhancement. No renal mass or evidence of a urothelial lesion.    BLADDER: Within normal limits.  REPRODUCTIVE ORGANS: Uterus and adnexa within normal limits.    BOWEL: No bowel obstruction. Appendix is normal.  PERITONEUM: No ascites.  VESSELS: Within normal limits.  RETROPERITONEUM/LYMPH NODES: No lymphadenopathy.    ABDOMINAL WALL: Within normal limits.  BONES: Degenerative changes.    IMPRESSION:     Normal CT urogram.          ***Please see the addendum at the top of this report. It may contain additional important information or changes.****    < end of copied text >    Studies  Chest X-RAY  CT SCAN Chest   CT Abdomen  Venous Dopplers: LE:   Others

## 2021-01-13 NOTE — PROGRESS NOTE ADULT - SUBJECTIVE AND OBJECTIVE BOX
DATE OF SERVICE: 01-13-21 @ 12:07    Subjective: Patient seen and examined earlier today. No new events except as noted.   cont to be hypoxic and very tachypneic   MICu eval called     REVIEW OF SYSTEMS:    CONSTITUTIONAL: + wekaness   EYES/ENT: No visual changes;  No vertigo or throat pain   NECK: No pain or stiffness  RESPIRATORY: + SOB   CARDIOVASCULAR: No chest pain or palpitations  GASTROINTESTINAL: No abdominal or epigastric pain. No nausea, vomiting, or hematemesis; No diarrhea or constipation. No melena or hematochezia.  GENITOURINARY: No dysuria, frequency or hematuria  NEUROLOGICAL: No numbness or weakness  SKIN: No itching, burning, rashes, or lesions   All other review of systems is negative unless indicated above.    MEDICATIONS:  MEDICATIONS  (STANDING):  ALBUTerol    90 MICROgram(s) HFA Inhaler 2 Puff(s) Inhalation every 6 hours  chlorhexidine 4% Liquid 1 Application(s) Topical <User Schedule>  dextrose 40% Gel 15 Gram(s) Oral once  dextrose 5%. 1000 milliLiter(s) (50 mL/Hr) IV Continuous <Continuous>  dextrose 5%. 1000 milliLiter(s) (100 mL/Hr) IV Continuous <Continuous>  dextrose 50% Injectable 25 Gram(s) IV Push once  dextrose 50% Injectable 12.5 Gram(s) IV Push once  dextrose 50% Injectable 25 Gram(s) IV Push once  enoxaparin Injectable 110 milliGRAM(s) SubCutaneous two times a day  glucagon  Injectable 1 milliGRAM(s) IntraMuscular once  insulin lispro (ADMELOG) corrective regimen sliding scale   SubCutaneous every 6 hours  petrolatum Ophthalmic Ointment 1 Application(s) Both EYES daily  polyethylene glycol 3350 17 Gram(s) Oral daily  senna Syrup 10 milliLiter(s) Oral daily      PHYSICAL EXAM:  T(C): 36.9 (01-13-21 @ 11:54), Max: 37.3 (01-12-21 @ 21:04)  HR: 92 (01-13-21 @ 16:23) (84 - 98)  BP: 105/63 (01-13-21 @ 11:54) (105/63 - 126/70)  RR: 26 (01-13-21 @ 16:23) (26 - 41)  SpO2: 94% (01-13-21 @ 16:23) (92% - 99%)  Wt(kg): --  I&O's Summary    12 Jan 2021 07:01  -  13 Jan 2021 07:00  --------------------------------------------------------  IN: 0 mL / OUT: 675 mL / NET: -675 mL          Appearance: awake, obese, in distress   HEENT:  PERRLA   Lymphatic: No lymphadenopathy   Cardiovascular: Normal S1 S2, no JVD  Respiratory: on bipap   Gastrointestinal:  Soft, Non-tender  Skin: No rashes,  warm to touch  Psychiatry:  Mood & affect appropriate  Musculuskeletal: No edema      All labs, Imaging and EKGs personally reviewed                           10.4 14.38 )-----------( 383      ( 13 Jan 2021 07:30 )             32.3               01-13    134<L>  |  98  |  22  ----------------------------<  105<H>  3.4<L>   |  24  |  0.63    Ca    8.8      13 Jan 2021 07:30  Phos  3.4     01-13  Mg     2.1     01-13    TPro  7.2  /  Alb  3.1<L>  /  TBili  0.8  /  DBili  x   /  AST  30  /  ALT  55<H>  /  AlkPhos  107  01-13                     ABG - ( 12 Jan 2021 10:49 )  pH, Arterial: 7.43  pH, Blood: x     /  pCO2: 40    /  pO2: 73    / HCO3: 26    / Base Excess: 2.1   /  SaO2: 94.2

## 2021-01-13 NOTE — ADVANCED PRACTICE NURSE CONSULT - REASON FOR CONSULT
Patient seen on skin care rounds after wound care referral received for assessment of skin impairment and recommendations of topical management. Known to WOCN team last seen on Jan 7 2021. Chart reviewed. Patient H/O of obesity who initially p/w SOB, productive cough, pleuritic rib pain and diarrhea and was admitted on 1/4/20 w/ AHRF 2/2 COVID-19, transferred to MICU on 1/5/20 s/p RRT for desaturation on BIPAP, patient intubated and extubated on 1/10, on 1/11 RRT called for desaturation, BIPAP continued, patient remained on current floor.      Nutrition consult assessed patient with concern for malnutrition secondary to NPO status due to  hypoxia.    
Patient seen on skin care rounds today. Chart reviewed: Pt h/o obesity who initially p/w SOB, productive cough, pleuritic rib pain and diarrhea and was admitted on 1/4/20 w/ AHRF 2/2 COVID-19, transferred to MICU on 1/5/20 s/p RRT for desaturation on BIPAP. Patient subsequently intubated in MICU. Was proned on 1/5, supine 1/6 in the AM, Reproned 1/6 PM. Patient due to be supine today. Patient assessed with prone. BMI 40.3 kg/m2, Jose 10.

## 2021-01-13 NOTE — ADVANCED PRACTICE NURSE CONSULT - RECOMMEDATIONS
Continue low air loss bed therapy, continue skin prevention for proning as per prone protocol. When supine, continue heel elevation with Z-flex fluidized positioning boots, continue to turn & reposition q2h with Z-jayne fluidized positioning device, soft pillow between bony prominences, continue moisture management with interdry beneath to intertriginous folds leaving 2" out at end to wick, single breathable pad, continue measures to decrease friction/shear/pressure.     When appropriate continue with nutritional support.    Findings and plan discussed with primary RN and Medical ICU team.    Please contact Wound Care Service Line if we can be of further assistance (ext [phone]). 
Topical recommendations:     Areas at risk for MAD- Cleanse with soap and water, pat dry, Apply Interdry textile sheeting, under intertriginous folds leaving 2 inches exposed at ends to wick, remove to wash & dry affected area, then replace. Individual sheeting may be used for up to 5 days unless soiled.     Bridge of nose- Cleanse with NS, pat dry. Apply Liquid barrier film to periwound skin (allow to dry). Apply small piece of hydrocolloid to open area. Change every three days. (If applying BIPAP, apply Gecko pad over hydrocolloid and apply BIPAP)    Continue low air loss bed therapy, heel elevation while in bed, turn & reposition q2h with Z-flow fluidized pillow- even small shifts in weight if patient unable to handle complete turn, continue moisture management with barrier creams & single breathable pad, continue measures to decrease friction/shear. Of note: patient at high risk for skin breakdown due to prolonged NPO status, respiratory failure, s/p mechanical ventilation, hx of obesity.     Recommend Nutrition consult when deemed appropriate.     Please contact Wound Care Service Line if we can be of further assistance (ext [phone]).

## 2021-01-14 LAB
ALBUMIN SERPL ELPH-MCNC: 3.1 G/DL — LOW (ref 3.3–5)
ALP SERPL-CCNC: 124 U/L — HIGH (ref 40–120)
ALT FLD-CCNC: 48 U/L — HIGH (ref 4–33)
ANION GAP SERPL CALC-SCNC: 15 MMOL/L — HIGH (ref 7–14)
AST SERPL-CCNC: 36 U/L — HIGH (ref 4–32)
BILIRUB SERPL-MCNC: 1.2 MG/DL — SIGNIFICANT CHANGE UP (ref 0.2–1.2)
BUN SERPL-MCNC: 17 MG/DL — SIGNIFICANT CHANGE UP (ref 7–23)
CALCIUM SERPL-MCNC: 8.7 MG/DL — SIGNIFICANT CHANGE UP (ref 8.4–10.5)
CHLORIDE SERPL-SCNC: 95 MMOL/L — LOW (ref 98–107)
CO2 SERPL-SCNC: 22 MMOL/L — SIGNIFICANT CHANGE UP (ref 22–31)
CREAT SERPL-MCNC: 0.55 MG/DL — SIGNIFICANT CHANGE UP (ref 0.5–1.3)
CRP SERPL-MCNC: 127 MG/L — HIGH
D DIMER BLD IA.RAPID-MCNC: 1255 NG/ML DDU — HIGH
FERRITIN SERPL-MCNC: 258 NG/ML — HIGH (ref 15–150)
GLUCOSE SERPL-MCNC: 112 MG/DL — HIGH (ref 70–99)
HCT VFR BLD CALC: 33.9 % — LOW (ref 34.5–45)
HGB BLD-MCNC: 11.1 G/DL — LOW (ref 11.5–15.5)
LDH SERPL L TO P-CCNC: 516 U/L — HIGH (ref 135–225)
MAGNESIUM SERPL-MCNC: 2 MG/DL — SIGNIFICANT CHANGE UP (ref 1.6–2.6)
MCHC RBC-ENTMCNC: 27.2 PG — SIGNIFICANT CHANGE UP (ref 27–34)
MCHC RBC-ENTMCNC: 32.7 GM/DL — SIGNIFICANT CHANGE UP (ref 32–36)
MCV RBC AUTO: 83.1 FL — SIGNIFICANT CHANGE UP (ref 80–100)
NRBC # BLD: 0 /100 WBCS — SIGNIFICANT CHANGE UP
NRBC # FLD: 0 K/UL — SIGNIFICANT CHANGE UP
PHOSPHATE SERPL-MCNC: 3.2 MG/DL — SIGNIFICANT CHANGE UP (ref 2.5–4.5)
PLATELET # BLD AUTO: 356 K/UL — SIGNIFICANT CHANGE UP (ref 150–400)
POTASSIUM SERPL-MCNC: 3.9 MMOL/L — SIGNIFICANT CHANGE UP (ref 3.5–5.3)
POTASSIUM SERPL-SCNC: 3.9 MMOL/L — SIGNIFICANT CHANGE UP (ref 3.5–5.3)
PROCALCITONIN SERPL-MCNC: 0.07 NG/ML — SIGNIFICANT CHANGE UP (ref 0.02–0.1)
PROT SERPL-MCNC: 7.3 G/DL — SIGNIFICANT CHANGE UP (ref 6–8.3)
RBC # BLD: 4.08 M/UL — SIGNIFICANT CHANGE UP (ref 3.8–5.2)
RBC # FLD: 14.3 % — SIGNIFICANT CHANGE UP (ref 10.3–14.5)
SODIUM SERPL-SCNC: 132 MMOL/L — LOW (ref 135–145)
WBC # BLD: 19.43 K/UL — HIGH (ref 3.8–10.5)
WBC # FLD AUTO: 19.43 K/UL — HIGH (ref 3.8–10.5)

## 2021-01-14 RX ORDER — SODIUM CHLORIDE 0.65 %
1 AEROSOL, SPRAY (ML) NASAL
Refills: 0 | Status: DISCONTINUED | OUTPATIENT
Start: 2021-01-14 | End: 2021-01-16

## 2021-01-14 RX ORDER — DEXAMETHASONE 0.5 MG/5ML
6 ELIXIR ORAL DAILY
Refills: 0 | Status: COMPLETED | OUTPATIENT
Start: 2021-01-14 | End: 2021-01-18

## 2021-01-14 RX ADMIN — ENOXAPARIN SODIUM 110 MILLIGRAM(S): 100 INJECTION SUBCUTANEOUS at 17:19

## 2021-01-14 RX ADMIN — ALBUTEROL 2 PUFF(S): 90 AEROSOL, METERED ORAL at 11:10

## 2021-01-14 RX ADMIN — Medication 650 MILLIGRAM(S): at 13:30

## 2021-01-14 RX ADMIN — CHLORHEXIDINE GLUCONATE 1 APPLICATION(S): 213 SOLUTION TOPICAL at 04:17

## 2021-01-14 RX ADMIN — Medication 650 MILLIGRAM(S): at 20:55

## 2021-01-14 RX ADMIN — Medication 6 MILLIGRAM(S): at 13:28

## 2021-01-14 RX ADMIN — Medication 1: at 17:18

## 2021-01-14 RX ADMIN — ENOXAPARIN SODIUM 110 MILLIGRAM(S): 100 INJECTION SUBCUTANEOUS at 04:17

## 2021-01-14 RX ADMIN — ALBUTEROL 2 PUFF(S): 90 AEROSOL, METERED ORAL at 19:44

## 2021-01-14 RX ADMIN — ALBUTEROL 2 PUFF(S): 90 AEROSOL, METERED ORAL at 04:17

## 2021-01-14 RX ADMIN — Medication 1 SPRAY(S): at 06:53

## 2021-01-14 RX ADMIN — Medication 1 SPRAY(S): at 19:43

## 2021-01-14 RX ADMIN — Medication 1 MILLIGRAM(S): at 06:53

## 2021-01-14 RX ADMIN — Medication 1 MILLIGRAM(S): at 20:56

## 2021-01-14 RX ADMIN — POLYETHYLENE GLYCOL 3350 17 GRAM(S): 17 POWDER, FOR SOLUTION ORAL at 13:30

## 2021-01-14 RX ADMIN — Medication 1 MILLIGRAM(S): at 11:09

## 2021-01-14 NOTE — PROVIDER CONTACT NOTE (OTHER) - ACTION/TREATMENT ORDERED:
Provider notified and at bedside. Provider is aware of ongoing issue for patient. Provider notified and at bedside. Provider is aware of ongoing issue for patient. Patient was placed on settings of 60/100 on high flow NC and is sating between 88-93%. Provider is okay with Sat.

## 2021-01-14 NOTE — PROGRESS NOTE ADULT - ASSESSMENT
Patient is a 46 year old woman with no past medical history coming in with shortness of breath, cough productive of white sputum, pleuritic rib pain (moderate in severity) for two days and diarrhea for one day.    COVID 19 PNEUMONIA: WITH HYPOXIC RESP FAILURE:     1/13:  COVID 19 PNEUMONIA: WITH HYPOXIC RESP FAILURE:     She was intubated in MICU nad then extubated successfully and transferred to floor: pulm called today to evaluate Pt is on bipap 100% sao2 in high 80's and tachypneic   She looks in resp distress:   Her d Dimer bumped up yesterday and was switched to full AC:  She isnot doing well and may need MICU again:   ALT is mildly elevated and renal profile is normal   repeat crp and ferritin:  ??firgtehr course of steroids    STEPHANIE PMMOI and rn at [facility]:   stephanie craig    1/14:  COVID 19 PNEUMONIA: WITH HYPOXIC RESP FAILURE:   She was intubated in MICU and then extubated successfully and transferred to floor: pulm called today to evaluate Pt is on bipap 100% sao2 in high 80's and tachypneic : MICU evaluation done again yesterday and she refused for intubation  She does not looks in resp distress today   Her d Dimer bumped up yesterday and was switched to full AC: d dimer is decreased today   She is not doing well today too and may need MICU again:   LFT's are mildly elevated and renal profile is normal   repeat crp and ferritin: high still   stephanie craig

## 2021-01-14 NOTE — PROGRESS NOTE ADULT - SUBJECTIVE AND OBJECTIVE BOX
Date of Service: 01-14-21 @ 11:04    Patient is a 46y old  Female who presents with a chief complaint of COVID (13 Jan 2021 14:17)      Any change in ROS: Doing ok : taoday much more calm on high flow: at 1005"     MEDICATIONS  (STANDING):  ALBUTerol    90 MICROgram(s) HFA Inhaler 2 Puff(s) Inhalation every 6 hours  chlorhexidine 4% Liquid 1 Application(s) Topical <User Schedule>  dextrose 40% Gel 15 Gram(s) Oral once  dextrose 5%. 1000 milliLiter(s) (50 mL/Hr) IV Continuous <Continuous>  dextrose 5%. 1000 milliLiter(s) (100 mL/Hr) IV Continuous <Continuous>  dextrose 50% Injectable 25 Gram(s) IV Push once  dextrose 50% Injectable 12.5 Gram(s) IV Push once  dextrose 50% Injectable 25 Gram(s) IV Push once  enoxaparin Injectable 110 milliGRAM(s) SubCutaneous two times a day  glucagon  Injectable 1 milliGRAM(s) IntraMuscular once  insulin lispro (ADMELOG) corrective regimen sliding scale   SubCutaneous three times a day before meals  insulin lispro (ADMELOG) corrective regimen sliding scale   SubCutaneous at bedtime  LORazepam   Injectable 1 milliGRAM(s) IV Push once  petrolatum Ophthalmic Ointment 1 Application(s) Both EYES daily  polyethylene glycol 3350 17 Gram(s) Oral daily  senna Syrup 10 milliLiter(s) Oral daily    MEDICATIONS  (PRN):  acetaminophen   Tablet .. 650 milliGRAM(s) Oral every 6 hours PRN Temp greater or equal to 38C (100.4F), Moderate Pain (4 - 6)  acetaminophen  Suppository .. 650 milliGRAM(s) Rectal every 6 hours PRN Temp greater or equal to 38C (100.4F)  LORazepam   Injectable 1 milliGRAM(s) IV Push every 6 hours PRN Anxiety  sodium chloride 0.65% Nasal 1 Spray(s) Both Nostrils four times a day PRN Nasal Congestion  sodium chloride 0.9% lock flush 10 milliLiter(s) IV Push every 1 hour PRN Pre/post blood products, medications, blood draw, and to maintain line patency    Vital Signs Last 24 Hrs  T(C): 37.2 (14 Jan 2021 04:14), Max: 37.2 (14 Jan 2021 04:14)  T(F): 98.9 (14 Jan 2021 04:14), Max: 98.9 (14 Jan 2021 04:14)  HR: 94 (14 Jan 2021 07:44) (86 - 99)  BP: 104/61 (14 Jan 2021 04:14) (102/61 - 125/68)  BP(mean): --  RR: 24 (14 Jan 2021 07:44) (23 - 38)  SpO2: 94% (14 Jan 2021 07:44) (85% - 94%)    I&O's Summary    13 Jan 2021 07:01  -  14 Jan 2021 07:00  --------------------------------------------------------  IN: 0 mL / OUT: 500 mL / NET: -500 mL          Physical Exam:   GENERAL: Obese+  HEENT: LALIT/   Atraumatic, Normocephalic  ENMT: No tonsillar erythema, exudates, or enlargement; Moist mucous membranes, Good dentition, No lesions  NECK: Supple, No JVD, Normal thyroid  CHEST/LUNG: Clear to auscultaion, ; No rales, rhonchi, wheezing, or rubs  CVS: Regular rate and rhythm; No murmurs, rubs, or gallops  GI: : Soft, Nontender, Nondistended; Bowel sounds present  NERVOUS SYSTEM:  Alert & Oriented X3  EXTREMITIES:  -edema  LYMPH: No lymphadenopathy noted  SKIN: No rashes or lesions  ENDOCRINOLOGY: No Thyromegaly  PSYCH: Appropriate    Labs:                              11.1   19.43 )-----------( 356      ( 14 Jan 2021 08:15 )             33.9                         10.4   14.38 )-----------( 383      ( 13 Jan 2021 07:30 )             32.3                         10.6   14.33 )-----------( 409      ( 12 Jan 2021 07:55 )             32.6                         11.2   11.79 )-----------( 459      ( 11 Jan 2021 11:26 )             36.1     01-14    132<L>  |  95<L>  |  17  ----------------------------<  112<H>  3.9   |  22  |  0.55  01-13    134<L>  |  98  |  22  ----------------------------<  105<H>  3.4<L>   |  24  |  0.63  01-12    137  |  100  |  20  ----------------------------<  167<H>  3.9   |  26  |  0.59  01-11    140  |  103  |  22  ----------------------------<  198<H>  4.3   |  29  |  0.54    Ca    8.7      14 Jan 2021 08:15  Ca    8.8      13 Jan 2021 07:30  Phos  3.2     01-14  Phos  3.4     01-13  Mg     2.0     01-14  Mg     2.1     01-13    TPro  7.3  /  Alb  3.1<L>  /  TBili  1.2  /  DBili  x   /  AST  36<H>  /  ALT  48<H>  /  AlkPhos  124<H>  01-14  TPro  7.2  /  Alb  3.1<L>  /  TBili  0.8  /  DBili  x   /  AST  30  /  ALT  55<H>  /  AlkPhos  107  01-13  TPro  7.0  /  Alb  3.1<L>  /  TBili  1.0  /  DBili  x   /  AST  46<H>  /  ALT  71<H>  /  AlkPhos  110  01-12  TPro  7.6  /  Alb  3.2<L>  /  TBili  1.1  /  DBili  x   /  AST  65<H>  /  ALT  66<H>  /  AlkPhos  119  01-11    CAPILLARY BLOOD GLUCOSE      POCT Blood Glucose.: 109 mg/dL (14 Jan 2021 08:26)  POCT Blood Glucose.: 106 mg/dL (14 Jan 2021 07:22)  POCT Blood Glucose.: 93 mg/dL (13 Jan 2021 21:12)  POCT Blood Glucose.: 104 mg/dL (13 Jan 2021 16:57)  POCT Blood Glucose.: 121 mg/dL (13 Jan 2021 11:54)      LIVER FUNCTIONS - ( 14 Jan 2021 08:15 )  Alb: 3.1 g/dL / Pro: 7.3 g/dL / ALK PHOS: 124 U/L / ALT: 48 U/L / AST: 36 U/L / GGT: x               D-Dimer Assay, Quantitative: 1255 ng/mL DDU (01-14 @ 08:15)  D-Dimer Assay, Quantitative: 2189 ng/mL DDU (01-13 @ 07:30)  D-Dimer Assay, Quantitative: 1118 ng/mL DDU (01-12 @ 13:28)  D-Dimer Assay, Quantitative: FAILED ng/mL DDU (01-12 @ 10:49)  D-Dimer Assay, Quantitative: 493 ng/mL DDU (01-10 @ 04:46)  D-Dimer Assay, Quantitative: 492 ng/mL DDU (01-08 @ 00:47)  Procalcitonin, Serum: 0.07 ng/mL (01-14 @ 08:15)  Procalcitonin, Serum: 0.05 ng/mL (01-11 @ 11:26)        RECENT CULTURES:  01-08 @ 00:30 .Blood Blood-Peripheral       < from: Xray Chest 1 View- PORTABLE-Urgent (Xray Chest 1 View- PORTABLE-Urgent .) (01.12.21 @ 08:08) >  EXAM:  XR CHEST PORTABLE URGENT 1V        PROCEDURE DATE:  Jan 12 2021         INTERPRETATION:  TIME OF EXAM: January 12, 2021 at 7:32 AM.    CLINICAL INFORMATION: Shortness of breath.    COMPARISON:  January 5, 2021.    TECHNIQUE:   AP Portable chest x-ray. The chin obscures the superior mediastinum and portions of the apices.    INTERPRETATION:    Heart size and the mediastinum cannot be accurately evaluated on this projection. Previous ET tube, left IJ line, and enteric tube are not seen.  Bilateral patchy lung opacities are again noted showing slight improvement.  No pleural effusion or pneumothorax is seen.      IMPRESSION:  Bilateral patchy lung opacities which can be consistent with COVID-19 infection show slight improvement from theprior image.              LAMBERT GODFREY MD; Attending Radiologist  This document has been electronically signed. Jan 12 2021  1:20PM    < end of copied text >           No Growth Final          RESPIRATORY CULTURES:          Studies  Chest X-RAY  CT SCAN Chest   Venous Dopplers: LE:   CT Abdomen  Others

## 2021-01-14 NOTE — PROGRESS NOTE ADULT - SUBJECTIVE AND OBJECTIVE BOX
DATE OF SERVICE: 01-14-21 @ 22:17    Subjective: Patient seen and examined. No new events except as noted.   placed on high flow   cont to be tachypneic refusing intubation   saturating low 90s     REVIEW OF SYSTEMS:    CONSTITUTIONAL: No weakness, fevers or chills  EYES/ENT: No visual changes;  No vertigo or throat pain   NECK: No pain or stiffness  RESPIRATORY: + SOB   CARDIOVASCULAR: No chest pain or palpitations  GASTROINTESTINAL: No abdominal or epigastric pain. No nausea, vomiting, or hematemesis; No diarrhea or constipation. No melena or hematochezia.  GENITOURINARY: No dysuria, frequency or hematuria  NEUROLOGICAL: No numbness or weakness  SKIN: No itching, burning, rashes, or lesions   All other review of systems is negative unless indicated above.    MEDICATIONS:  MEDICATIONS  (STANDING):  ALBUTerol    90 MICROgram(s) HFA Inhaler 2 Puff(s) Inhalation every 6 hours  chlorhexidine 4% Liquid 1 Application(s) Topical <User Schedule>  dexAMETHasone  Injectable 6 milliGRAM(s) IV Push daily  dextrose 40% Gel 15 Gram(s) Oral once  dextrose 5%. 1000 milliLiter(s) (50 mL/Hr) IV Continuous <Continuous>  dextrose 5%. 1000 milliLiter(s) (100 mL/Hr) IV Continuous <Continuous>  dextrose 50% Injectable 25 Gram(s) IV Push once  dextrose 50% Injectable 12.5 Gram(s) IV Push once  dextrose 50% Injectable 25 Gram(s) IV Push once  enoxaparin Injectable 110 milliGRAM(s) SubCutaneous two times a day  glucagon  Injectable 1 milliGRAM(s) IntraMuscular once  insulin lispro (ADMELOG) corrective regimen sliding scale   SubCutaneous three times a day before meals  insulin lispro (ADMELOG) corrective regimen sliding scale   SubCutaneous at bedtime  petrolatum Ophthalmic Ointment 1 Application(s) Both EYES daily                        11.1   19.43 )-----------( 356      ( 14 Jan 2021 08:15 )             33.9               01-14    132<L>  |  95<L>  |  17  ----------------------------<  112<H>  3.9   |  22  |  0.55    Ca    8.7      14 Jan 2021 08:15  Phos  3.2     01-14  Mg     2.0     01-14    TPro  7.3  /  Alb  3.1<L>  /  TBili  1.2  /  DBili  x   /  AST  36<H>  /  ALT  48<H>  /  AlkPhos  124<H>  01-14                         polyethylene glycol 3350 17 Gram(s) Oral daily  senna Syrup 10 milliLiter(s) Oral daily  sodium chloride 0.65% Nasal 1 Spray(s) Both Nostrils two times a day      PHYSICAL EXAM:  T(C): 36.7 (01-14-21 @ 20:53), Max: 37.2 (01-14-21 @ 04:14)  HR: 102 (01-14-21 @ 20:53) (92 - 111)  BP: 129/76 (01-14-21 @ 20:53) (102/61 - 129/76)  RR: 26 (01-14-21 @ 20:53) (20 - 38)  SpO2: 93% (01-14-21 @ 20:53) (85% - 95%)  Wt(kg): --  I&O's Summary    13 Jan 2021 07:01  -  14 Jan 2021 07:00  --------------------------------------------------------  IN: 0 mL / OUT: 500 mL / NET: -500 mL          Appearance: awake, in moderate distress 	  HEENT:  PERRLA   Lymphatic: No lymphadenopathy   Cardiovascular: Normal S1 S2  Respiratory: hihg flow,   Gastrointestinal:  Soft, Non-tender  Skin: No rashes,  warm to touch  Psychiatry:  Mood & affect appropriate  Musculuskeletal: No edema      All labs, Imaging and EKGs personally reviewed

## 2021-01-15 LAB
ALBUMIN SERPL ELPH-MCNC: 3.2 G/DL — LOW (ref 3.3–5)
ALP SERPL-CCNC: 126 U/L — HIGH (ref 40–120)
ALT FLD-CCNC: 47 U/L — HIGH (ref 4–33)
ANION GAP SERPL CALC-SCNC: 13 MMOL/L — SIGNIFICANT CHANGE UP (ref 7–14)
AST SERPL-CCNC: 31 U/L — SIGNIFICANT CHANGE UP (ref 4–32)
BILIRUB SERPL-MCNC: 0.8 MG/DL — SIGNIFICANT CHANGE UP (ref 0.2–1.2)
BUN SERPL-MCNC: 16 MG/DL — SIGNIFICANT CHANGE UP (ref 7–23)
CALCIUM SERPL-MCNC: 9 MG/DL — SIGNIFICANT CHANGE UP (ref 8.4–10.5)
CHLORIDE SERPL-SCNC: 95 MMOL/L — LOW (ref 98–107)
CO2 SERPL-SCNC: 21 MMOL/L — LOW (ref 22–31)
CREAT SERPL-MCNC: 0.58 MG/DL — SIGNIFICANT CHANGE UP (ref 0.5–1.3)
GLUCOSE SERPL-MCNC: 110 MG/DL — HIGH (ref 70–99)
HCT VFR BLD CALC: 32.1 % — LOW (ref 34.5–45)
HGB BLD-MCNC: 10.7 G/DL — LOW (ref 11.5–15.5)
MAGNESIUM SERPL-MCNC: 2.2 MG/DL — SIGNIFICANT CHANGE UP (ref 1.6–2.6)
MCHC RBC-ENTMCNC: 27.4 PG — SIGNIFICANT CHANGE UP (ref 27–34)
MCHC RBC-ENTMCNC: 33.3 GM/DL — SIGNIFICANT CHANGE UP (ref 32–36)
MCV RBC AUTO: 82.3 FL — SIGNIFICANT CHANGE UP (ref 80–100)
NRBC # BLD: 0 /100 WBCS — SIGNIFICANT CHANGE UP
NRBC # FLD: 0 K/UL — SIGNIFICANT CHANGE UP
PHOSPHATE SERPL-MCNC: 3.7 MG/DL — SIGNIFICANT CHANGE UP (ref 2.5–4.5)
PLATELET # BLD AUTO: 383 K/UL — SIGNIFICANT CHANGE UP (ref 150–400)
POTASSIUM SERPL-MCNC: 4.7 MMOL/L — SIGNIFICANT CHANGE UP (ref 3.5–5.3)
POTASSIUM SERPL-SCNC: 4.7 MMOL/L — SIGNIFICANT CHANGE UP (ref 3.5–5.3)
PROT SERPL-MCNC: 7.5 G/DL — SIGNIFICANT CHANGE UP (ref 6–8.3)
RBC # BLD: 3.9 M/UL — SIGNIFICANT CHANGE UP (ref 3.8–5.2)
RBC # FLD: 14.4 % — SIGNIFICANT CHANGE UP (ref 10.3–14.5)
SODIUM SERPL-SCNC: 129 MMOL/L — LOW (ref 135–145)
WBC # BLD: 22.05 K/UL — HIGH (ref 3.8–10.5)
WBC # FLD AUTO: 22.05 K/UL — HIGH (ref 3.8–10.5)

## 2021-01-15 RX ORDER — LANOLIN ALCOHOL/MO/W.PET/CERES
6 CREAM (GRAM) TOPICAL ONCE
Refills: 0 | Status: COMPLETED | OUTPATIENT
Start: 2021-01-15 | End: 2021-01-15

## 2021-01-15 RX ADMIN — Medication 1 MILLIGRAM(S): at 05:20

## 2021-01-15 RX ADMIN — ALBUTEROL 2 PUFF(S): 90 AEROSOL, METERED ORAL at 20:58

## 2021-01-15 RX ADMIN — Medication 1 MILLIGRAM(S): at 20:22

## 2021-01-15 RX ADMIN — ALBUTEROL 2 PUFF(S): 90 AEROSOL, METERED ORAL at 03:23

## 2021-01-15 RX ADMIN — Medication 1 SPRAY(S): at 05:06

## 2021-01-15 RX ADMIN — Medication 6 MILLIGRAM(S): at 05:08

## 2021-01-15 RX ADMIN — CHLORHEXIDINE GLUCONATE 1 APPLICATION(S): 213 SOLUTION TOPICAL at 05:08

## 2021-01-15 RX ADMIN — Medication 1 MILLIGRAM(S): at 12:13

## 2021-01-15 RX ADMIN — Medication 1 APPLICATION(S): at 12:49

## 2021-01-15 RX ADMIN — ENOXAPARIN SODIUM 110 MILLIGRAM(S): 100 INJECTION SUBCUTANEOUS at 05:06

## 2021-01-15 RX ADMIN — Medication 650 MILLIGRAM(S): at 20:58

## 2021-01-15 RX ADMIN — Medication 1: at 10:34

## 2021-01-15 RX ADMIN — Medication 6 MILLIGRAM(S): at 00:16

## 2021-01-15 RX ADMIN — SODIUM CHLORIDE 10 MILLILITER(S): 9 INJECTION INTRAMUSCULAR; INTRAVENOUS; SUBCUTANEOUS at 12:50

## 2021-01-15 RX ADMIN — Medication 1 MILLIGRAM(S): at 07:22

## 2021-01-15 NOTE — PROGRESS NOTE ADULT - SUBJECTIVE AND OBJECTIVE BOX
DATE OF SERVICE: 01-15-21 @ 12:31    Subjective: Patient seen and examined. No new events except as noted.   cont to be hypoxic and tachypneic  RRT this morning, refusing intubation     REVIEW OF SYSTEMS:    CONSTITUTIONAL: No weakness, fevers or chills  EYES/ENT: No visual changes;  No vertigo or throat pain   NECK: No pain or stiffness  RESPIRATORY: No cough, wheezing, hemoptysis; No shortness of breath  CARDIOVASCULAR: No chest pain or palpitations  GASTROINTESTINAL: No abdominal or epigastric pain. No nausea, vomiting, or hematemesis; No diarrhea or constipation. No melena or hematochezia.  GENITOURINARY: No dysuria, frequency or hematuria  NEUROLOGICAL: No numbness or weakness  SKIN: No itching, burning, rashes, or lesions   All other review of systems is negative unless indicated above.    MEDICATIONS:  MEDICATIONS  (STANDING):  ALBUTerol    90 MICROgram(s) HFA Inhaler 2 Puff(s) Inhalation every 6 hours  chlorhexidine 4% Liquid 1 Application(s) Topical <User Schedule>  dexAMETHasone  Injectable 6 milliGRAM(s) IV Push daily  dextrose 40% Gel 15 Gram(s) Oral once  dextrose 5%. 1000 milliLiter(s) (50 mL/Hr) IV Continuous <Continuous>  dextrose 5%. 1000 milliLiter(s) (100 mL/Hr) IV Continuous <Continuous>  dextrose 50% Injectable 25 Gram(s) IV Push once  dextrose 50% Injectable 12.5 Gram(s) IV Push once  dextrose 50% Injectable 25 Gram(s) IV Push once  enoxaparin Injectable 110 milliGRAM(s) SubCutaneous two times a day  glucagon  Injectable 1 milliGRAM(s) IntraMuscular once  insulin lispro (ADMELOG) corrective regimen sliding scale   SubCutaneous three times a day before meals  insulin lispro (ADMELOG) corrective regimen sliding scale   SubCutaneous at bedtime  petrolatum Ophthalmic Ointment 1 Application(s) Both EYES daily  polyethylene glycol 3350 17 Gram(s) Oral daily  senna Syrup 10 milliLiter(s) Oral daily  sodium chloride 0.65% Nasal 1 Spray(s) Both Nostrils two times a day      PHYSICAL EXAM:  T(C): 36.7 (01-14-21 @ 20:53), Max: 36.7 (01-14-21 @ 20:53)  HR: 73 (01-15-21 @ 07:20) (73 - 111)  BP: 129/76 (01-14-21 @ 20:53) (129/76 - 129/76)  RR: 24 (01-15-21 @ 05:21) (20 - 26)  SpO2: 94% (01-15-21 @ 07:20) (92% - 95%)  Wt(kg): --  I&O's Summary        Appearance: awake, bipap, tachypneic   HEENT:  PERRLA   Lymphatic: No lymphadenopathy   Cardiovascular: Normal S1 S2, no JVD  Respiratory: normal effort , clear  Gastrointestinal:  Soft, Non-tender  Skin: No rashes,  warm to touch  Psychiatry:  Mood & affect appropriate  Musculuskeletal: No edema      All labs, Imaging and EKGs personally reviewed                           10.7   22.05 )-----------( 383      ( 15 José Manuel 2021 05:01 )             32.1               01-15    129<L>  |  95<L>  |  16  ----------------------------<  110<H>  4.7   |  21<L>  |  0.58    Ca    9.0      15 José Manuel 2021 05:01  Phos  3.7     01-15  Mg     2.2     01-15    TPro  7.5  /  Alb  3.2<L>  /  TBili  0.8  /  DBili  x   /  AST  31  /  ALT  47<H>  /  AlkPhos  126<H>  01-15

## 2021-01-15 NOTE — PROGRESS NOTE ADULT - SUBJECTIVE AND OBJECTIVE BOX
Date of Service: 01-15-21 @ 12:59    Patient is a 46y old  Female who presents with a chief complaint of COVID (15 José Manuel 2021 12:31)      Any change in ROS: she is on bipap seems calm+     MEDICATIONS  (STANDING):  ALBUTerol    90 MICROgram(s) HFA Inhaler 2 Puff(s) Inhalation every 6 hours  chlorhexidine 4% Liquid 1 Application(s) Topical <User Schedule>  dexAMETHasone  Injectable 6 milliGRAM(s) IV Push daily  dextrose 40% Gel 15 Gram(s) Oral once  dextrose 5%. 1000 milliLiter(s) (50 mL/Hr) IV Continuous <Continuous>  dextrose 5%. 1000 milliLiter(s) (100 mL/Hr) IV Continuous <Continuous>  dextrose 50% Injectable 25 Gram(s) IV Push once  dextrose 50% Injectable 12.5 Gram(s) IV Push once  dextrose 50% Injectable 25 Gram(s) IV Push once  enoxaparin Injectable 110 milliGRAM(s) SubCutaneous two times a day  glucagon  Injectable 1 milliGRAM(s) IntraMuscular once  insulin lispro (ADMELOG) corrective regimen sliding scale   SubCutaneous three times a day before meals  insulin lispro (ADMELOG) corrective regimen sliding scale   SubCutaneous at bedtime  petrolatum Ophthalmic Ointment 1 Application(s) Both EYES daily  polyethylene glycol 3350 17 Gram(s) Oral daily  senna Syrup 10 milliLiter(s) Oral daily  sodium chloride 0.65% Nasal 1 Spray(s) Both Nostrils two times a day    MEDICATIONS  (PRN):  acetaminophen   Tablet .. 650 milliGRAM(s) Oral every 6 hours PRN Temp greater or equal to 38C (100.4F), Moderate Pain (4 - 6)  acetaminophen  Suppository .. 650 milliGRAM(s) Rectal every 6 hours PRN Temp greater or equal to 38C (100.4F)  LORazepam   Injectable 1 milliGRAM(s) IV Push every 6 hours PRN Anxiety  sodium chloride 0.65% Nasal 1 Spray(s) Both Nostrils four times a day PRN Nasal Congestion  sodium chloride 0.9% lock flush 10 milliLiter(s) IV Push every 1 hour PRN Pre/post blood products, medications, blood draw, and to maintain line patency    Vital Signs Last 24 Hrs  T(C): 36.7 (14 Jan 2021 20:53), Max: 36.7 (14 Jan 2021 20:53)  T(F): 98.1 (14 Jan 2021 20:53), Max: 98.1 (14 Jan 2021 20:53)  HR: 73 (15 José Manuel 2021 07:20) (73 - 111)  BP: 129/76 (14 Jan 2021 20:53) (129/76 - 129/76)  BP(mean): --  RR: 24 (15 José Manuel 2021 05:21) (20 - 26)  SpO2: 94% (15 José Manuel 2021 07:20) (92% - 95%)    I&O's Summary        Physical Exam:   GENERAL: Morbidly obese+  HEENT: LALIT/   Atraumatic, Normocephalic  ENMT: No tonsillar erythema, exudates, or enlargement; Moist mucous membranes, Good dentition, No lesions  NECK: Supple, No JVD, Normal thyroid  CHEST/LUNG: Clear to auscultaion  CVS: Regular rate and rhythm; No murmurs, rubs, or gallops  GI: : Soft, Nontender, Nondistended; Bowel sounds present  NERVOUS SYSTEM:  calm on bipap  EXTREMITIES: - edema  LYMPH: No lymphadenopathy noted  SKIN: No rashes or lesions  ENDOCRINOLOGY: No Thyromegaly  PSYCH: on bipap     Labs:                              10.7   22.05 )-----------( 383      ( 15 José Manuel 2021 05:01 )             32.1                         11.1   19.43 )-----------( 356      ( 14 Jan 2021 08:15 )             33.9                         10.4   14.38 )-----------( 383      ( 13 Jan 2021 07:30 )             32.3                         10.6   14.33 )-----------( 409      ( 12 Jan 2021 07:55 )             32.6     01-15    129<L>  |  95<L>  |  16  ----------------------------<  110<H>  4.7   |  21<L>  |  0.58  01-14    132<L>  |  95<L>  |  17  ----------------------------<  112<H>  3.9   |  22  |  0.55  01-13    134<L>  |  98  |  22  ----------------------------<  105<H>  3.4<L>   |  24  |  0.63  01-12    137  |  100  |  20  ----------------------------<  167<H>  3.9   |  26  |  0.59    Ca    9.0      15 José Manuel 2021 05:01  Ca    8.7      14 Jan 2021 08:15  Phos  3.7     01-15  Phos  3.2     01-14  Mg     2.2     01-15  Mg     2.0     01-14    TPro  7.5  /  Alb  3.2<L>  /  TBili  0.8  /  DBili  x   /  AST  31  /  ALT  47<H>  /  AlkPhos  126<H>  01-15  TPro  7.3  /  Alb  3.1<L>  /  TBili  1.2  /  DBili  x   /  AST  36<H>  /  ALT  48<H>  /  AlkPhos  124<H>  01-14  TPro  7.2  /  Alb  3.1<L>  /  TBili  0.8  /  DBili  x   /  AST  30  /  ALT  55<H>  /  AlkPhos  107  01-13  TPro  7.0  /  Alb  3.1<L>  /  TBili  1.0  /  DBili  x   /  AST  46<H>  /  ALT  71<H>  /  AlkPhos  110  01-12    CAPILLARY BLOOD GLUCOSE      POCT Blood Glucose.: 144 mg/dL (15 José Manuel 2021 11:52)  POCT Blood Glucose.: 154 mg/dL (15 José Manuel 2021 10:31)  POCT Blood Glucose.: 142 mg/dL (15 José Manuel 2021 07:51)  POCT Blood Glucose.: 141 mg/dL (14 Jan 2021 21:23)  POCT Blood Glucose.: 156 mg/dL (14 Jan 2021 17:08)      LIVER FUNCTIONS - ( 15 José Manuel 2021 05:01 )  Alb: 3.2 g/dL / Pro: 7.5 g/dL / ALK PHOS: 126 U/L / ALT: 47 U/L / AST: 31 U/L / GGT: x               D-Dimer Assay, Quantitative: 1255 ng/mL DDU (01-14 @ 08:15)  D-Dimer Assay, Quantitative: 2189 ng/mL DDU (01-13 @ 07:30)  D-Dimer Assay, Quantitative: 1118 ng/mL DDU (01-12 @ 13:28)  D-Dimer Assay, Quantitative: FAILED ng/mL DDU (01-12 @ 10:49)  D-Dimer Assay, Quantitative: 493 ng/mL DDU (01-10 @ 04:46)  Procalcitonin, Serum: 0.07 ng/mL (01-14 @ 08:15)        RECENT CULTURES:      < from: Xray Chest 1 View- PORTABLE-Urgent (Xray Chest 1 View- PORTABLE-Urgent .) (01.12.21 @ 08:08) >    CLINICAL INFORMATION: Shortness of breath.    COMPARISON:  January 5, 2021.    TECHNIQUE:   AP Portable chest x-ray. The chin obscures the superior mediastinum and portions of the apices.    INTERPRETATION:    Heart size and the mediastinum cannot be accurately evaluated on this projection. Previous ET tube, left IJ line, and enteric tube are not seen.  Bilateral patchy lung opacities are again noted showing slight improvement.  No pleural effusion or pneumothorax is seen.      IMPRESSION:  Bilateral patchy lung opacities which can be consistent with COVID-19 infection show slight improvement from theprior image.              LAMBERT GODFREY MD; Attending Radiologist  This document has been electronically signed. Jan 12 2021  1:20P    < end of copied text >    RESPIRATORY CULTURES:          Studies  Chest X-RAY  CT SCAN Chest   Venous Dopplers: LE:   CT Abdomen  Others

## 2021-01-15 NOTE — PROGRESS NOTE ADULT - ASSESSMENT
Patient is a 46 year old woman with no past medical history coming in with shortness of breath, cough productive of white sputum, pleuritic rib pain (moderate in severity) for two days and diarrhea for one day.    COVID 19 PNEUMONIA: WITH HYPOXIC RESP FAILURE:     1/13:  COVID 19 PNEUMONIA: WITH HYPOXIC RESP FAILURE:     She was intubated in MICU nad then extubated successfully and transferred to floor: pulm called today to evaluate Pt is on bipap 100% sao2 in high 80's and tachypneic   She looks in resp distress:   Her d Dimer bumped up yesterday and was switched to full AC:  She isnot doing well and may need MICU again:   ALT is mildly elevated and renal profile is normal   repeat crp and ferritin:  ??firgtehr course of steroids    STEPHANIE PMD and rn at [facility]:   stephanie acp    1/14:  COVID 19 PNEUMONIA: WITH HYPOXIC RESP FAILURE:   She was intubated in MICU and then extubated successfully and transferred to floor: pulm called today to evaluate Pt is on bipap 100% sao2 in high 80's and tachypneic : MICU evaluation done again yesterday and she refused for intubation  She does not looks in resp distress today   Her d Dimer bumped up yesterday and was switched to full AC: d dimer is decreased today   She is not doing well today too and may need MICU again:   LFT's are mildly elevated and renal profile is normal   repeat crp and ferritin: high still   dw acp    1/15:    COVID 19 PNEUMONIA: WITH HYPOXIC RESP FAILURE:   She is on bipap; tachypneic on 100% bipap: she had earlier refused for intubation she was intubated once: before :  She does not looks in resp distress today   Her d Dimer bumped up yesterday and was switched to full AC:   She is not doing well today again and s eis very hypoxic: she may need intubation any time if she agrees:     LFT's are mildly elevated and renal profile is normal   crp and ferritin: high still   dw PMD   pt is not doing well and she has SOB and is tachypneic and on bipap at 100% fio2: She is already on full time AC:

## 2021-01-16 LAB
ALBUMIN SERPL ELPH-MCNC: 3.1 G/DL — LOW (ref 3.3–5)
ALP SERPL-CCNC: 113 U/L — SIGNIFICANT CHANGE UP (ref 40–120)
ALT FLD-CCNC: 38 U/L — HIGH (ref 4–33)
ANION GAP SERPL CALC-SCNC: 11 MMOL/L — SIGNIFICANT CHANGE UP (ref 7–14)
APTT BLD: 26.8 SEC — LOW (ref 27–36.3)
AST SERPL-CCNC: 26 U/L — SIGNIFICANT CHANGE UP (ref 4–32)
BASOPHILS # BLD AUTO: 0.04 K/UL — SIGNIFICANT CHANGE UP (ref 0–0.2)
BASOPHILS NFR BLD AUTO: 0.2 % — SIGNIFICANT CHANGE UP (ref 0–2)
BILIRUB SERPL-MCNC: 0.8 MG/DL — SIGNIFICANT CHANGE UP (ref 0.2–1.2)
BLOOD GAS ARTERIAL - LYTES,HGB,ICA,LACT RESULT: SIGNIFICANT CHANGE UP
BLOOD GAS ARTERIAL COMPREHENSIVE RESULT: SIGNIFICANT CHANGE UP
BUN SERPL-MCNC: 20 MG/DL — SIGNIFICANT CHANGE UP (ref 7–23)
CALCIUM SERPL-MCNC: 8.7 MG/DL — SIGNIFICANT CHANGE UP (ref 8.4–10.5)
CHLORIDE SERPL-SCNC: 96 MMOL/L — LOW (ref 98–107)
CO2 SERPL-SCNC: 22 MMOL/L — SIGNIFICANT CHANGE UP (ref 22–31)
CREAT SERPL-MCNC: 0.67 MG/DL — SIGNIFICANT CHANGE UP (ref 0.5–1.3)
D DIMER BLD IA.RAPID-MCNC: 886 NG/ML DDU — HIGH
EOSINOPHIL # BLD AUTO: 0.09 K/UL — SIGNIFICANT CHANGE UP (ref 0–0.5)
EOSINOPHIL NFR BLD AUTO: 0.4 % — SIGNIFICANT CHANGE UP (ref 0–6)
GLUCOSE SERPL-MCNC: 128 MG/DL — HIGH (ref 70–99)
HCT VFR BLD CALC: 31 % — LOW (ref 34.5–45)
HGB BLD-MCNC: 10.3 G/DL — LOW (ref 11.5–15.5)
IANC: 17.69 K/UL — HIGH (ref 1.5–8.5)
IMM GRANULOCYTES NFR BLD AUTO: 1.5 % — SIGNIFICANT CHANGE UP (ref 0–1.5)
INR BLD: 1.49 RATIO — HIGH (ref 0.88–1.16)
LYMPHOCYTES # BLD AUTO: 1.87 K/UL — SIGNIFICANT CHANGE UP (ref 1–3.3)
LYMPHOCYTES # BLD AUTO: 8.6 % — LOW (ref 13–44)
MAGNESIUM SERPL-MCNC: 2 MG/DL — SIGNIFICANT CHANGE UP (ref 1.6–2.6)
MCHC RBC-ENTMCNC: 28.1 PG — SIGNIFICANT CHANGE UP (ref 27–34)
MCHC RBC-ENTMCNC: 33.2 GM/DL — SIGNIFICANT CHANGE UP (ref 32–36)
MCV RBC AUTO: 84.5 FL — SIGNIFICANT CHANGE UP (ref 80–100)
MONOCYTES # BLD AUTO: 1.82 K/UL — HIGH (ref 0–0.9)
MONOCYTES NFR BLD AUTO: 8.3 % — SIGNIFICANT CHANGE UP (ref 2–14)
NEUTROPHILS # BLD AUTO: 17.69 K/UL — HIGH (ref 1.8–7.4)
NEUTROPHILS NFR BLD AUTO: 81 % — HIGH (ref 43–77)
NRBC # BLD: 0 /100 WBCS — SIGNIFICANT CHANGE UP
NRBC # FLD: 0 K/UL — SIGNIFICANT CHANGE UP
PHOSPHATE SERPL-MCNC: 3.7 MG/DL — SIGNIFICANT CHANGE UP (ref 2.5–4.5)
PLATELET # BLD AUTO: 433 K/UL — HIGH (ref 150–400)
POTASSIUM SERPL-MCNC: 3.9 MMOL/L — SIGNIFICANT CHANGE UP (ref 3.5–5.3)
POTASSIUM SERPL-SCNC: 3.9 MMOL/L — SIGNIFICANT CHANGE UP (ref 3.5–5.3)
PROCALCITONIN SERPL-MCNC: 0.1 NG/ML — SIGNIFICANT CHANGE UP (ref 0.02–0.1)
PROT SERPL-MCNC: 7.1 G/DL — SIGNIFICANT CHANGE UP (ref 6–8.3)
PROTHROM AB SERPL-ACNC: 16.8 SEC — HIGH (ref 10.6–13.6)
RBC # BLD: 3.67 M/UL — LOW (ref 3.8–5.2)
RBC # FLD: 14.6 % — HIGH (ref 10.3–14.5)
SODIUM SERPL-SCNC: 129 MMOL/L — LOW (ref 135–145)
WBC # BLD: 21.84 K/UL — HIGH (ref 3.8–10.5)
WBC # FLD AUTO: 21.84 K/UL — HIGH (ref 3.8–10.5)

## 2021-01-16 PROCEDURE — 93971 EXTREMITY STUDY: CPT | Mod: 26,GC

## 2021-01-16 PROCEDURE — 93308 TTE F-UP OR LMTD: CPT | Mod: 26,GC

## 2021-01-16 PROCEDURE — 99291 CRITICAL CARE FIRST HOUR: CPT

## 2021-01-16 PROCEDURE — 71045 X-RAY EXAM CHEST 1 VIEW: CPT | Mod: 26,76

## 2021-01-16 PROCEDURE — 76604 US EXAM CHEST: CPT | Mod: 26,GC

## 2021-01-16 RX ORDER — GLUCAGON INJECTION, SOLUTION 0.5 MG/.1ML
1 INJECTION, SOLUTION SUBCUTANEOUS ONCE
Refills: 0 | Status: DISCONTINUED | OUTPATIENT
Start: 2021-01-16 | End: 2021-03-03

## 2021-01-16 RX ORDER — CISATRACURIUM BESYLATE 2 MG/ML
20 INJECTION INTRAVENOUS ONCE
Refills: 0 | Status: COMPLETED | OUTPATIENT
Start: 2021-01-16 | End: 2021-01-16

## 2021-01-16 RX ORDER — MIDAZOLAM HYDROCHLORIDE 1 MG/ML
0.02 INJECTION, SOLUTION INTRAMUSCULAR; INTRAVENOUS
Qty: 100 | Refills: 0 | Status: DISCONTINUED | OUTPATIENT
Start: 2021-01-16 | End: 2021-01-22

## 2021-01-16 RX ORDER — SODIUM CHLORIDE 9 MG/ML
1000 INJECTION, SOLUTION INTRAVENOUS
Refills: 0 | Status: DISCONTINUED | OUTPATIENT
Start: 2021-01-16 | End: 2021-03-03

## 2021-01-16 RX ORDER — DEXTROSE 50 % IN WATER 50 %
25 SYRINGE (ML) INTRAVENOUS ONCE
Refills: 0 | Status: DISCONTINUED | OUTPATIENT
Start: 2021-01-16 | End: 2021-01-23

## 2021-01-16 RX ORDER — FENTANYL CITRATE 50 UG/ML
100 INJECTION INTRAVENOUS ONCE
Refills: 0 | Status: DISCONTINUED | OUTPATIENT
Start: 2021-01-16 | End: 2021-01-16

## 2021-01-16 RX ORDER — NOREPINEPHRINE BITARTRATE/D5W 8 MG/250ML
0.05 PLASTIC BAG, INJECTION (ML) INTRAVENOUS
Qty: 16 | Refills: 0 | Status: DISCONTINUED | OUTPATIENT
Start: 2021-01-16 | End: 2021-01-30

## 2021-01-16 RX ORDER — CHLORHEXIDINE GLUCONATE 213 G/1000ML
15 SOLUTION TOPICAL EVERY 12 HOURS
Refills: 0 | Status: DISCONTINUED | OUTPATIENT
Start: 2021-01-16 | End: 2021-01-27

## 2021-01-16 RX ORDER — PIPERACILLIN AND TAZOBACTAM 4; .5 G/20ML; G/20ML
3.38 INJECTION, POWDER, LYOPHILIZED, FOR SOLUTION INTRAVENOUS ONCE
Refills: 0 | Status: COMPLETED | OUTPATIENT
Start: 2021-01-16 | End: 2021-01-16

## 2021-01-16 RX ORDER — PIPERACILLIN AND TAZOBACTAM 4; .5 G/20ML; G/20ML
3.38 INJECTION, POWDER, LYOPHILIZED, FOR SOLUTION INTRAVENOUS EVERY 8 HOURS
Refills: 0 | Status: DISCONTINUED | OUTPATIENT
Start: 2021-01-16 | End: 2021-01-20

## 2021-01-16 RX ORDER — DEXTROSE 50 % IN WATER 50 %
15 SYRINGE (ML) INTRAVENOUS ONCE
Refills: 0 | Status: DISCONTINUED | OUTPATIENT
Start: 2021-01-16 | End: 2021-01-23

## 2021-01-16 RX ORDER — PROPOFOL 10 MG/ML
50 INJECTION, EMULSION INTRAVENOUS
Qty: 1000 | Refills: 0 | Status: DISCONTINUED | OUTPATIENT
Start: 2021-01-16 | End: 2021-01-19

## 2021-01-16 RX ORDER — CHLORHEXIDINE GLUCONATE 213 G/1000ML
1 SOLUTION TOPICAL
Refills: 0 | Status: DISCONTINUED | OUTPATIENT
Start: 2021-01-16 | End: 2021-02-08

## 2021-01-16 RX ORDER — DEXTROSE 50 % IN WATER 50 %
12.5 SYRINGE (ML) INTRAVENOUS ONCE
Refills: 0 | Status: DISCONTINUED | OUTPATIENT
Start: 2021-01-16 | End: 2021-01-23

## 2021-01-16 RX ORDER — NOREPINEPHRINE BITARTRATE/D5W 8 MG/250ML
0.05 PLASTIC BAG, INJECTION (ML) INTRAVENOUS
Qty: 8 | Refills: 0 | Status: DISCONTINUED | OUTPATIENT
Start: 2021-01-16 | End: 2021-01-16

## 2021-01-16 RX ORDER — INSULIN LISPRO 100/ML
VIAL (ML) SUBCUTANEOUS EVERY 6 HOURS
Refills: 0 | Status: DISCONTINUED | OUTPATIENT
Start: 2021-01-16 | End: 2021-02-18

## 2021-01-16 RX ORDER — HYDROMORPHONE HYDROCHLORIDE 2 MG/ML
1 INJECTION INTRAMUSCULAR; INTRAVENOUS; SUBCUTANEOUS
Qty: 100 | Refills: 0 | Status: DISCONTINUED | OUTPATIENT
Start: 2021-01-16 | End: 2021-01-19

## 2021-01-16 RX ORDER — CISATRACURIUM BESYLATE 2 MG/ML
3 INJECTION INTRAVENOUS
Qty: 200 | Refills: 0 | Status: DISCONTINUED | OUTPATIENT
Start: 2021-01-16 | End: 2021-01-19

## 2021-01-16 RX ORDER — ENOXAPARIN SODIUM 100 MG/ML
40 INJECTION SUBCUTANEOUS EVERY 12 HOURS
Refills: 0 | Status: COMPLETED | OUTPATIENT
Start: 2021-01-16 | End: 2021-01-27

## 2021-01-16 RX ADMIN — CISATRACURIUM BESYLATE 20 MILLIGRAM(S): 2 INJECTION INTRAVENOUS at 06:40

## 2021-01-16 RX ADMIN — Medication 1: at 12:58

## 2021-01-16 RX ADMIN — Medication 1 APPLICATION(S): at 17:22

## 2021-01-16 RX ADMIN — CHLORHEXIDINE GLUCONATE 1 APPLICATION(S): 213 SOLUTION TOPICAL at 07:32

## 2021-01-16 RX ADMIN — Medication 6 MILLIGRAM(S): at 07:32

## 2021-01-16 RX ADMIN — ENOXAPARIN SODIUM 40 MILLIGRAM(S): 100 INJECTION SUBCUTANEOUS at 17:22

## 2021-01-16 RX ADMIN — PROPOFOL 32.9 MICROGRAM(S)/KG/MIN: 10 INJECTION, EMULSION INTRAVENOUS at 19:32

## 2021-01-16 RX ADMIN — PROPOFOL 32.9 MICROGRAM(S)/KG/MIN: 10 INJECTION, EMULSION INTRAVENOUS at 09:37

## 2021-01-16 RX ADMIN — ALBUTEROL 2 PUFF(S): 90 AEROSOL, METERED ORAL at 20:18

## 2021-01-16 RX ADMIN — CHLORHEXIDINE GLUCONATE 15 MILLILITER(S): 213 SOLUTION TOPICAL at 07:32

## 2021-01-16 RX ADMIN — CISATRACURIUM BESYLATE 20 MILLIGRAM(S): 2 INJECTION INTRAVENOUS at 09:52

## 2021-01-16 RX ADMIN — HYDROMORPHONE HYDROCHLORIDE 1 MG/HR: 2 INJECTION INTRAMUSCULAR; INTRAVENOUS; SUBCUTANEOUS at 19:32

## 2021-01-16 RX ADMIN — PIPERACILLIN AND TAZOBACTAM 200 GRAM(S): 4; .5 INJECTION, POWDER, LYOPHILIZED, FOR SOLUTION INTRAVENOUS at 09:37

## 2021-01-16 RX ADMIN — FENTANYL CITRATE 100 MICROGRAM(S): 50 INJECTION INTRAVENOUS at 05:18

## 2021-01-16 RX ADMIN — MIDAZOLAM HYDROCHLORIDE 2.2 MG/KG/HR: 1 INJECTION, SOLUTION INTRAMUSCULAR; INTRAVENOUS at 10:14

## 2021-01-16 RX ADMIN — Medication 1 MILLIGRAM(S): at 02:16

## 2021-01-16 RX ADMIN — Medication 1 APPLICATION(S): at 07:33

## 2021-01-16 RX ADMIN — CHLORHEXIDINE GLUCONATE 15 MILLILITER(S): 213 SOLUTION TOPICAL at 17:22

## 2021-01-16 RX ADMIN — ENOXAPARIN SODIUM 40 MILLIGRAM(S): 100 INJECTION SUBCUTANEOUS at 07:32

## 2021-01-16 RX ADMIN — ALBUTEROL 2 PUFF(S): 90 AEROSOL, METERED ORAL at 09:13

## 2021-01-16 RX ADMIN — Medication 5.15 MICROGRAM(S)/KG/MIN: at 09:37

## 2021-01-16 RX ADMIN — CISATRACURIUM BESYLATE 19.8 MICROGRAM(S)/KG/MIN: 2 INJECTION INTRAVENOUS at 19:33

## 2021-01-16 RX ADMIN — MIDAZOLAM HYDROCHLORIDE 2.2 MG/KG/HR: 1 INJECTION, SOLUTION INTRAMUSCULAR; INTRAVENOUS at 19:32

## 2021-01-16 RX ADMIN — HYDROMORPHONE HYDROCHLORIDE 1 MG/HR: 2 INJECTION INTRAMUSCULAR; INTRAVENOUS; SUBCUTANEOUS at 09:38

## 2021-01-16 RX ADMIN — Medication 5.15 MICROGRAM(S)/KG/MIN: at 19:32

## 2021-01-16 RX ADMIN — CISATRACURIUM BESYLATE 19.8 MICROGRAM(S)/KG/MIN: 2 INJECTION INTRAVENOUS at 09:52

## 2021-01-16 RX ADMIN — PIPERACILLIN AND TAZOBACTAM 25 GRAM(S): 4; .5 INJECTION, POWDER, LYOPHILIZED, FOR SOLUTION INTRAVENOUS at 17:21

## 2021-01-16 NOTE — CHART NOTE - NSCHARTNOTEFT_GEN_A_CORE
: MD Gary and MD Abel    INDICATION: Hypoxic Respiratory Failure    PROCEDURE:  [x] LIMITED ECHO  [x] LIMITED CHEST  [x] LIMITED DVT    FINDINGS:  Thoracic: Bilateral diffuse B-lines with irregular pleura. No pleural effusions.  Cardiac: Grossly normal LV systolic function. No pericardial effusion. LV appears larger than RV. IVC 1.1cm-1.7cm, indeterminate.  DVT: Fully compressible femoral and popliteal veins    INTERPRETATION:  Bilateral diffuse B-lines with irregular pleura, consistent with COVID-19 ARDS. No pleural effusions, no evidence of pneumothorax.  Grossly normal LV systolic function, RV appears smaller than LV. Indeterminate IVC.  No evidence of lower extremity DVT    Images stored in [employer] : MD Gary and MD Abel    INDICATION: Hypoxic Respiratory Failure    PROCEDURE:  [x] LIMITED ECHO  [x] LIMITED CHEST  [x] LIMITED DVT    FINDINGS:  Thoracic: Bilateral diffuse B-lines with irregular pleura. No pleural effusions.  Cardiac: Grossly normal LV systolic function. No pericardial effusion. LV appears larger than RV. IVC 1.1cm-1.7cm, indeterminate.  DVT: Fully compressible femoral and popliteal veins    INTERPRETATION:  Bilateral diffuse B-lines with irregular pleura, consistent with COVID-19 ARDS. No pleural effusions, no evidence of pneumothorax.  Grossly normal LV systolic function, RV appears smaller than LV. Indeterminate IVC.  No evidence of lower extremity DVT    Images stored in [employer]    Attending Attestation:  I was present during the key portions of the procedure and immediately available during the entire procedure.  Betsey Roman MD  Attending  Pulmonary & Critical Care Medicine

## 2021-01-16 NOTE — PROGRESS NOTE ADULT - SUBJECTIVE AND OBJECTIVE BOX
Abdi Palencia [id number]  [id number]     INTERVAL HPI/OVERNIGHT EVENTS: The patient is a 46-year-old woman with no significant past medical history who was admitted to the hospital on 1/3 for acute respiratory failure with hypoxia secondary to covid-19 pneumonia. From 1/5 to 1/8 for management of acute respiratory distress syndrome. Over the course of her hospital stay, the patient received complete courses of remdesivir and of dexamethasone. Following extubation, the patient's management was transitioned to non-invasive positive pressure ventilation, to high-flow nasal cannula, and then back to non-invasive positive pressure ventilation. Overnight, the patient was noted to have an increased work of breathing and was intubated on the floor and transferred to the medical intensive care unit.     SUBJECTIVE: Patient seen and examined at bedside.     Could not obtain review of systems as patient is sedated and intubated.     OBJECTIVE:    VITAL SIGNS:  ICU Vital Signs Last 24 Hrs  T(C): 36.4 (16 Jan 2021 12:00), Max: 38 (15 José Manuel 2021 20:32)  T(F): 97.6 (16 Jan 2021 12:00), Max: 100.4 (15 José Manuel 2021 20:32)  HR: 78 (16 Jan 2021 15:02) (78 - 120)  BP: 104/56 (16 Jan 2021 05:00) (104/56 - 180/98)  BP(mean): 67 (16 Jan 2021 05:00) (67 - 118)  ABP: 108/61 (16 Jan 2021 14:00) (89/54 - 127/77)  ABP(mean): 82 (16 Jan 2021 14:00) (71 - 96)  RR: 28 (16 Jan 2021 14:00) (20 - 63)  SpO2: 97% (16 Jan 2021 15:02) (90% - 98%)    Mode: AC/ CMV (Assist Control/ Continuous Mandatory Ventilation), RR (machine): 28, TV (machine): 350, FiO2: 90, PEEP: 8, ITime: 0.75, MAP: 17, PIP: 37    01-16 @ 07:01  -  01-16 @ 15:47  --------------------------------------------------------  IN: 508.6 mL / OUT: 1750 mL / NET: -1241.4 mL      CAPILLARY BLOOD GLUCOSE      POCT Blood Glucose.: 176 mg/dL (16 Jan 2021 12:48)      PHYSICAL EXAM:    General: Sedated and intubated on mechanical ventilator   HEENT: Pupils round and reactive,   Neck: No jvd, no lymphadenopathy   Respiratory: Equal chest rise bilaterally; tympanic to percussion bilaterally   Cardiovascular: +S1/S2; RRR  Abdomen: soft, NT/ND; +BS x4  Extremities: WWP, 2+ peripheral pulses b/l; no LE edema  Skin: normal color and turgor; no rash  Neurological: RASS -5; pupils equal, round, and reactive     MEDICATIONS:  MEDICATIONS  (STANDING):  ALBUTerol    90 MICROgram(s) HFA Inhaler 2 Puff(s) Inhalation every 6 hours  chlorhexidine 0.12% Liquid 15 milliLiter(s) Oral Mucosa every 12 hours  chlorhexidine 2% Cloths 1 Application(s) Topical <User Schedule>  cisatracurium Infusion 3 MICROgram(s)/kG/Min (19.8 mL/Hr) IV Continuous <Continuous>  dexAMETHasone  Injectable 6 milliGRAM(s) IV Push daily  dextrose 40% Gel 15 Gram(s) Oral once  dextrose 5%. 1000 milliLiter(s) (50 mL/Hr) IV Continuous <Continuous>  dextrose 5%. 1000 milliLiter(s) (100 mL/Hr) IV Continuous <Continuous>  dextrose 50% Injectable 25 Gram(s) IV Push once  dextrose 50% Injectable 12.5 Gram(s) IV Push once  dextrose 50% Injectable 25 Gram(s) IV Push once  enoxaparin Injectable 40 milliGRAM(s) SubCutaneous every 12 hours  glucagon  Injectable 1 milliGRAM(s) IntraMuscular once  HYDROmorphone Infusion. 1 mG/Hr (1 mL/Hr) IV Continuous <Continuous>  insulin lispro (ADMELOG) corrective regimen sliding scale   SubCutaneous every 6 hours  midazolam Infusion 0.02 mG/kG/Hr (2.2 mL/Hr) IV Continuous <Continuous>  norepinephrine Infusion 0.05 MICROgram(s)/kG/Min (5.15 mL/Hr) IV Continuous <Continuous>  petrolatum Ophthalmic Ointment 1 Application(s) Both EYES two times a day  piperacillin/tazobactam IVPB.. 3.375 Gram(s) IV Intermittent every 8 hours  polyethylene glycol 3350 17 Gram(s) Oral daily  propofol Infusion 50 MICROgram(s)/kG/Min (32.9 mL/Hr) IV Continuous <Continuous>  senna Syrup 10 milliLiter(s) Oral daily    MEDICATIONS  (PRN):      ALLERGIES:  Allergies    No Known Allergies    Intolerances        LABS:                        10.3   21.84 )-----------( 433      ( 16 Jan 2021 06:27 )             31.0     01-16    129<L>  |  96<L>  |  20  ----------------------------<  128<H>  3.9   |  22  |  0.67    Ca    8.7      16 Jan 2021 06:27  Phos  3.7     01-16  Mg     2.0     01-16    TPro  7.1  /  Alb  3.1<L>  /  TBili  0.8  /  DBili  x   /  AST  26  /  ALT  38<H>  /  AlkPhos  113  01-16    PT/INR - ( 16 Jan 2021 06:27 )   PT: 16.8 sec;   INR: 1.49 ratio         PTT - ( 16 Jan 2021 06:27 )  PTT:26.8 sec      RADIOLOGY & ADDITIONAL TESTS: Reviewed.

## 2021-01-16 NOTE — CHART NOTE - NSCHARTNOTEFT_GEN_A_CORE
Notified by RN of patient with RR 50's while on BIPAP.   Vitals: Temp 100.4F HR: 104 Sp02: 95% on BIPAP Fi02 100%.     Patient is a 46 year old Obese F no sig PMHx (A1c 6.6% on presentation) admitted 1/3/2020 for acute hypoxic respiratory failure 2/2 COVID-19 s/p extubation on 01/08 s/p Remdesivir currently on full dose Lovenox for elevated DDimer.  Patient seen at bedside anxious, tachypneic with increased WOB. A+Ox4. IV Ativan 1mg x 1 given. CXR and ABG ordered. MICU called. Patient spoke with  and agreed on intubation. RRT called additional PIV placed and patient was started on Levophed. Intubated for airway protection.    Vital Signs Last 24 Hrs  T(C): 37.7 (16 Jan 2021 04:20), Max: 38 (15 José Manuel 2021 20:32)  T(F): 99.8 (16 Jan 2021 04:20), Max: 100.4 (15 José Manuel 2021 20:32)  HR: 119 (16 Jan 2021 04:20) (73 - 120)  BP: 180/98 (16 Jan 2021 04:20) (124/74 - 180/98)  BP(mean): 118 (16 Jan 2021 04:20) (118 - 118)  RR: 24 (16 Jan 2021 04:20) (22 - 63)  SpO2: 98% (16 Jan 2021 04:20) (90% - 98%) Notified by RN of patient with RR 50's while on BIPAP.   Vitals: Temp 100.4F HR: 104 Sp02: 95% on BIPAP Fi02 100%.     Patient is a 46 year old Obese F no sig PMHx (A1c 6.6% on presentation) admitted 1/3/2020 for acute hypoxic respiratory failure 2/2 COVID-19 s/p extubation on 01/08 s/p Remdesivir currently on full dose Lovenox for elevated DDimer.  Patient seen at bedside anxious, tachypneic with increased WOB. A+Ox4. IV Ativan 1mg x 1 given. CXR and ABG ordered. MICU called. Patient spoke with  and agreed on intubation. RRT called additional PIV placed and patient was started on Levophed. Intubated for airway protection. Discussed with Dr. Zapata     Vital Signs Last 24 Hrs  T(C): 37.7 (16 Jan 2021 04:20), Max: 38 (15 José Manuel 2021 20:32)  T(F): 99.8 (16 Jan 2021 04:20), Max: 100.4 (15 José Manuel 2021 20:32)  HR: 119 (16 Jan 2021 04:20) (73 - 120)  BP: 180/98 (16 Jan 2021 04:20) (124/74 - 180/98)  BP(mean): 118 (16 Jan 2021 04:20) (118 - 118)  RR: 24 (16 Jan 2021 04:20) (22 - 63)  SpO2: 98% (16 Jan 2021 04:20) (90% - 98%) I personally evaluated the patient. I reviewed the Physician Assistant’s note (as assigned above), and agree with the findings and plan except as documented in my note.  ~ 20 y/o here for eval of flank pain ,incr ua, afebrile , nkda never admitted + iup + fhr pe + l flank tenderness will send ua /sono ; tx

## 2021-01-16 NOTE — PROGRESS NOTE ADULT - SUBJECTIVE AND OBJECTIVE BOX
CHERELLE MEJIA    46y Female    No Known Allergies      HPI:  Patient is a 46 year old woman with no past medical history coming in with shortness of breath, cough productive of white sputum, pleuritic rib pain (moderate in severity) for two days and diarrhea for one day. Patient  tested positive for COVID 12/29. Patient denies loss of taste or smell. Patient denies sore throat however states mouth is dry. Patient denies any history of lung disease or smoking. Patient states she was having subjective fevers at home however never took her temperature. Patient was taking an unknown antibiotic prescribed by a doctor. Unable to give name of doctor or name of abx. Patient dyspneic during with talking during interview.   ED Course: T 98.6, HR 96, /90, S 80% on RA. White count 10k. Hemoglobin 11.0. Plt 236. D-dimer 243. CRP 81.3. . CXR peripheral opacities consistent w/ COVID. Patient given 6 of dexamethasone. Patient admitted for acute hypoxic respiratory failure in the setting of likely COVID infection.  (03 Jan 2021 18:35)      PAST MEDICAL & SURGICAL HISTORY:  No pertinent past medical history    No significant past surgical history          MEDICATIONS  (STANDING):  ALBUTerol    90 MICROgram(s) HFA Inhaler 2 Puff(s) Inhalation every 6 hours  chlorhexidine 4% Liquid 1 Application(s) Topical <User Schedule>  dexAMETHasone  Injectable 6 milliGRAM(s) IV Push daily  dextrose 40% Gel 15 Gram(s) Oral once  dextrose 5%. 1000 milliLiter(s) (50 mL/Hr) IV Continuous <Continuous>  dextrose 5%. 1000 milliLiter(s) (100 mL/Hr) IV Continuous <Continuous>  dextrose 50% Injectable 25 Gram(s) IV Push once  dextrose 50% Injectable 12.5 Gram(s) IV Push once  dextrose 50% Injectable 25 Gram(s) IV Push once  enoxaparin Injectable 110 milliGRAM(s) SubCutaneous two times a day  glucagon  Injectable 1 milliGRAM(s) IntraMuscular once  insulin lispro (ADMELOG) corrective regimen sliding scale   SubCutaneous three times a day before meals  insulin lispro (ADMELOG) corrective regimen sliding scale   SubCutaneous at bedtime  petrolatum Ophthalmic Ointment 1 Application(s) Both EYES daily  polyethylene glycol 3350 17 Gram(s) Oral daily  senna Syrup 10 milliLiter(s) Oral daily  sodium chloride 0.65% Nasal 1 Spray(s) Both Nostrils two times a day    MEDICATIONS  (PRN):  acetaminophen   Tablet .. 650 milliGRAM(s) Oral every 6 hours PRN Temp greater or equal to 38C (100.4F), Moderate Pain (4 - 6)  acetaminophen  Suppository .. 650 milliGRAM(s) Rectal every 6 hours PRN Temp greater or equal to 38C (100.4F)  LORazepam   Injectable 1 milliGRAM(s) IV Push every 6 hours PRN Anxiety  sodium chloride 0.65% Nasal 1 Spray(s) Both Nostrils four times a day PRN Nasal Congestion  sodium chloride 0.9% lock flush 10 milliLiter(s) IV Push every 1 hour PRN Pre/post blood products, medications, blood draw, and to maintain line patency      Allergies    No Known Allergies    Intolerances        Daily     Daily     SOCIAL HISTORY:    FAMILY HISTORY:  FH: type 2 diabetes        Vital Signs Last 24 Hrs  T(C): 38 (16 Jan 2021 02:28), Max: 38 (15 José Manuel 2021 20:32)  T(F): 100.4 (16 Jan 2021 02:28), Max: 100.4 (15 José Manuel 2021 20:32)  HR: 104 (16 Jan 2021 02:28) (73 - 105)  BP: 124/74 (15 José Manuel 2021 20:23) (124/74 - 134/72)  BP(mean): --  RR: 51 (16 Jan 2021 02:28) (22 - 63)  SpO2: 96% (16 Jan 2021 02:28) (90% - 97%)    Last Menstrual Period      Exam:  MP:  Teeth:  Mouth opening:  Neck:    LABS:                        10.7   22.05 )-----------( 383      ( 15 José Manuel 2021 05:01 )             32.1     01-15    129<L>  |  95<L>  |  16  ----------------------------<  110<H>  4.7   |  21<L>  |  0.58    Ca    9.0      15 José Manuel 2021 05:01  Phos  3.7     01-15  Mg     2.2     01-15    TPro  7.5  /  Alb  3.2<L>  /  TBili  0.8  /  DBili  x   /  AST  31  /  ALT  47<H>  /  AlkPhos  126<H>  01-15          RADIOLOGY & ADDITIONAL STUDIES:    Assessment and Plan: Called to secure airway. Pt. COVID positive on BIPAP vs: SaO2 100%. BP and HR wnl on levophed. Pt. induced with etomidate 10 mg and Succinylcholine 120mg. Pt. intubated with glide scope w/ size 3 blade. 7.5 ETT placed under direct visualization; positive BS; positive coloremetric change; rapid desaturation during intubation but pt. SaO2 improved after Bag ventilation

## 2021-01-16 NOTE — PROCEDURE NOTE - NSPROCDETAILS_GEN_ALL_CORE
Admission medication history interview status for the 1/28/2018 admission is complete. See Epic admission navigator for allergy information, pharmacy, prior to admission medications and immunization status.     Medication history interview sources:  Patient, care everywhere    Changes made to PTA medication list (reason)  Added: Lorazepam, aspirin, albuterol, duonebs, flonase, advair  Deleted: None  Changed: None    Additional medication history information (including reliability of information, actions taken by pharmacist):  - Patient was fair historian, confirmed meds/doses with PCP note from January 2018 in Care Everywhere  - He has not had a flu shot this season (was ill at his last visit therefore waited)  - He has not started taking the lorazepam yet (prescribed in January 2018) and did not know what it was for (I explained it to him)  - He recently completed a prednisone burst and levofloxacin 10d course starting 1/12/18      Prior to Admission medications    Medication Sig Last Dose Taking? Auth Provider   albuterol (PROAIR HFA/PROVENTIL HFA/VENTOLIN HFA) 108 (90 BASE) MCG/ACT Inhaler Inhale 2 puffs into the lungs every 4 hours as needed for shortness of breath / dyspnea or wheezing  Yes Unknown, Entered By History   ipratropium - albuterol 0.5 mg/2.5 mg/3 mL (DUONEB) 0.5-2.5 (3) MG/3ML neb solution Take 1 vial by nebulization 4 times daily  Yes Unknown, Entered By History   ASPIRIN PO Take 81 mg by mouth daily  Yes Unknown, Entered By History   fluticasone (FLONASE) 50 MCG/ACT spray Spray 2 sprays into both nostrils daily  Yes Unknown, Entered By History   fluticasone-salmeterol (ADVAIR) 100-50 MCG/DOSE diskus inhaler Inhale 1 puff into the lungs 2 times daily  Yes Unknown, Entered By History   LORAZEPAM PO Take 0.5 mg by mouth every 8 hours as needed for anxiety Prescribed in January 2018- patient has not started taking it yet.   Unknown, Entered By History         Medication history completed by: Jazmine 
Vera, PharmD  
location identified, draped/prepped, sterile technique used, needle inserted/introduced/positive blood return obtained via catheter/connected to a pressurized flush line/sutured in place/Seldinger technique/all materials/supplies accounted for at end of procedure
guidewire recovered/lumen(s) aspirated and flushed/sterile dressing applied/ultrasound guidance with use of sterile gel and probe cove
placement confirmed by auscultation/orogastric
guidewire recovered/lumen(s) aspirated and flushed/sterile dressing applied/ultrasound guidance with use of sterile gel and probe cove
nasogastric/placement confirmed by auscultation
location identified, draped/prepped, sterile technique used, needle inserted/introduced/positive blood return obtained via catheter/connected to a pressurized flush line/sutured in place/Seldinger technique/all materials/supplies accounted for at end of procedure
patient pre-oxygenated, tube inserted, placement confirmed

## 2021-01-16 NOTE — PROGRESS NOTE ADULT - ASSESSMENT
The patient is a 46-year-old woman who was admitted to the hospital for management acute respiratory failure with hypoxia secondary to covid-19 pneumonia, is status post intubation from 1/5 to 1/9 for management of acute respiratory distress syndrome, and who is now re-intubated and in the MICU for management of severe ARDS secondary to covid-19 pneumonia.     Neuro: -The patient is sedated and intubated in the MICU with intravenous hydromorphone, propofol, and midazolam; will administer cisatracurium for neuromuscular paralysis today  The patient is a 46-year-old woman who was admitted to the hospital for management acute respiratory failure with hypoxia secondary to covid-19 pneumonia, is status post intubation from 1/5 to 1/9 for management of acute respiratory distress syndrome, and who is now re-intubated and in the MICU for management of severe ARDS secondary to covid-19 pneumonia.     Neuro: -The patient is sedated and intubated in the MICU with intravenous hydromorphone, propofol, and midazolam; will administer cisatracurium for neuromuscular paralysis today in preparation for prone positioning     CV: -Vasoplegic shock, most likely secondary to administration of sedative medications; currently receiving norepinephrine for intravenous blood pressure support   -No known history of coronary artery disease or of arrhythmia; sinus rhythm on telemetry     Respiratory: -Severe acute respiratory distress syndrome diagnosed; p:f ratio less than 100; will plan for prone positioning today   -Current ventilator settings: AC/VC, RR 28, , PEEP 8, FiO2 90; pulmonary compliance about 30-35  -No pleural effusions noted on imaging; no new fevers noted; most consistent with clinical worsening of underlying covid pneumonia; superimposed bacterial infection unlikely  -Administering 6 mg of dexamethasone daily per decreased mortality and icu length of stay noted in dexa-ards trial   -Respiratory acidosis noted (permissive hypercapnia) with appropriate metabolic compensation     Renal: -Serum creatinine at baseline; will continue to monitor urine output; no acute kidney injury noted at this time   -Mild hyponatremia noted; possibly secondary to siadh in setting of pulmonary pathology   -Will continue to monitor daily metabolic panel     ID: -Status post administration of ten-day course of dexamethasone and five-day course of remdesivir for treatment of severe covid-19   -In the setting of need for re-intubation, administering zosyn for empiric coverage of superimposed bacterial pneumonia; will follow up blood and sputum cultures     Endo: -No active concerns at this time   -A1c 6.6, meeting criteria for diagnosis of diabetes mellitus; sliding scale insulin ordered  The patient is a 46-year-old woman who was admitted to the hospital for management acute respiratory failure with hypoxia secondary to covid-19 pneumonia, is status post intubation from 1/5 to 1/9 for management of acute respiratory distress syndrome, and who is now re-intubated and in the MICU for management of severe ARDS secondary to covid-19 pneumonia.     Neuro: -The patient is sedated and intubated in the MICU with intravenous hydromorphone, propofol, and midazolam; will administer cisatracurium for neuromuscular paralysis today in preparation for prone positioning     CV: -Vasoplegic shock, most likely secondary to administration of sedative medications; currently receiving norepinephrine for intravenous blood pressure support   -No known history of coronary artery disease or of arrhythmia; sinus rhythm on telemetry     Respiratory: -Severe acute respiratory distress syndrome diagnosed; p:f ratio less than 100; will plan for prone positioning today   -Current ventilator settings: AC/VC, RR 28, , PEEP 8, FiO2 90; pulmonary compliance about 30-35  -No pleural effusions noted on imaging; no new fevers noted; most consistent with clinical worsening of underlying covid pneumonia; superimposed bacterial infection unlikely  -Administering 6 mg of dexamethasone daily per decreased mortality and icu length of stay noted in dexa-ards trial   -Respiratory acidosis noted (permissive hypercapnia) with appropriate metabolic compensation     Renal: -Serum creatinine at baseline; will continue to monitor urine output; no acute kidney injury noted at this time   -Mild hyponatremia noted; possibly secondary to siadh in setting of pulmonary pathology   -Will continue to monitor daily metabolic panel     ID: -Status post administration of ten-day course of dexamethasone and five-day course of remdesivir for treatment of severe covid-19   -In the setting of need for re-intubation, administering zosyn for empiric coverage of superimposed bacterial pneumonia; will follow up blood and sputum cultures     Endo: -No active concerns at this time   -A1c 6.6, meeting criteria for diagnosis of diabetes mellitus; sliding scale insulin ordered     GI: -NPO at this time in the setting of prone positioning; will monitor bowel movements

## 2021-01-16 NOTE — CHART NOTE - NSCHARTNOTEFT_GEN_A_CORE
NUTRITION FOLLOW-UP:    Extensive chart review conducted to obtain nutrition related information.  Unable to conduct a face to face interview or nutrition-focused physical exam due to limited contact restrictions related to Pt's medical condition and isolation precautions.  Pt intubated this AM 2/2 desaturation.  Pt transferred to MICU.  Pt sedated on propofol, which provided 218kcal/24h.  Pt previously on Dysphagia 3 diet w/thin liquids prior to intubation.  NGT placed for drainage, feedings  Pt NPO at this time.  Noted pt placed in prone position at 12:20 PM today.  Noted wt status - pt down 4.3kg since admission.  Pt w/1+ generalized edema.      Weight:  1/14 - 105.5kg     1/11 - 108.5kg     Adm - 109.8kg     IBW - 56.6kg  Labs: H/H 10.3/31.0  Na 129  Glu 128  FS   C-reactive protein 127  477  Current Diet:  NPO  Enteral Recommendations:  Estimated kcal needs = 11-14kcal/kg WNQ=[id number]/[id number].  Estimated  needs = 1.5-2.0gm/kg IBW = 85-113gms/d.  Suggest initiate enteral nutrition support when medically feasible w/Pivot 1.5 @10mL/h and advance as tolerated to goal of 35mL/h to provide 1260 kcal w/78gm protein and add 1 pack/d of no carb prosource to increase protein intake to 93gms/d to meet kcal and protein needs.  .    RD to Remain Available:  yes    Additional Recommendations:   1) Monitor weights, labs, BM's, skin integrity, FS  2) Initiate enteral nutrition support as medically feasible.    3) Monitor Propofol needs and triglyceride levels.

## 2021-01-16 NOTE — RAPID RESPONSE TEAM SUMMARY - NSSITUATIONBACKGROUNDRRT_GEN_ALL_CORE
46F admitted 1/3/2020 for acute hypoxic respiratory failure 2/2 COVID-19, transferred to MICU on 1/5/20 (intubated 1/5 - 1/10), transferred to medicine floor on BiPAP, seen by MICU on 1/13, recommended for re-intubation at that time but patient declined.    RRT called for desaturation. On arrival, patient on HFNC and spO2 70s. Patient initially refusing BiPAP, after prolonged discussion patient agreed to BiPAP. Recommended intubation to patient, however patient adamantly refusing intubation. Currently AAOx4, understands the risks of foregoing intubation. MICU team called and updated, will come speak to patient. SpO2 improved to low 90s on BiPAP 18/12 100% FiO2. Given 1mg Ativan IVP for anxiety/restlessness. RRT ended, primary team at bedside and aware of plan.     
RRT called by MICU team for intubation. Patient with increased work of breathing and hypoxemia. Intubated by anesthesia without complication. Started on Levophed and propofol gtt post-intubation, given rocuronium IVP x1 post intubation as spO2 was mid 80s, subsequent improvement in spO2 to 90%. Transferred to MICU with spO2 and BP maintained appropriately. 
RRT called for desaturation. On arrival, patient had been just switched from HFNC to BiPAP, with improvement in spO2 to low 90s. Remainder of vitals stable. Given 1g IV Tylenol for fever. RRT ended with patient on BiPAP, primary team at bedside. Plan to continue on BiPAP overnight and recheck ABG in 30 mins.     
antonio is a 46 year old woman with no past medical history coming in with shortness of breath, cough productive of white sputum, pleuritic rib pain (moderate in severity) for two days and diarrhea for one day. RRT called for hypoxia top the low 80s. Patient did not tolerate proning earlier.

## 2021-01-16 NOTE — PROCEDURE NOTE - NSINDICATIONS_GEN_A_CORE
critical illness/emergency venous access
critical illness/venous access
critical patient/monitoring purposes
feeds
drainage/feeds
respiratory failure
arterial puncture to obtain ABG's

## 2021-01-16 NOTE — CHART NOTE - NSCHARTNOTEFT_GEN_A_CORE
MICU Accept Note    CHIEF COMPLAINT: Dyspnea    HPI / INTERVAL HISTORY:  46 year old woman with BMI 40, newly dx DM2 (A1c 6.6% on presentation), no other significant history, admitted 1/3/2020 for acute hypoxic respiratory failure 2/2 COVID-19, transferred to MICU on 1/5/20 (intubated 1/5 - 1/10), transferred to medicine floor on BiPAP, trialed on HF, eventually switched back to BiPAP. Patient with increasing dyspnea, tachypnea to the 50s, hypoxia, eventually requiring re-intubation and transfer to MICU. Pt endorses mild chest pain, worsening SOB.  She  denies fever, chills, nausea, vomiting, diarrhea, dysuria.         PAST MEDICAL & SURGICAL HISTORY:  No pertinent past medical history    No significant past surgical history        FAMILY HISTORY:  FH: type 2 diabetes        SOCIAL HISTORY:  Never smoker, no alcohol, illicit substance use.  ,  +COVID.     HOME MEDICATIONS:  None    Allergies    No Known Allergies    Intolerances          REVIEW OF SYSTEMS:  Per HPI      OBJECTIVE:  ICU Vital Signs Last 24 Hrs  T(C): 38 (16 Jan 2021 02:28), Max: 38 (15 José Manuel 2021 20:32)  T(F): 100.4 (16 Jan 2021 02:28), Max: 100.4 (15 José Manuel 2021 20:32)  HR: 104 (16 Jan 2021 02:28) (73 - 105)  BP: 124/74 (15 José Manuel 2021 20:23) (124/74 - 134/72)  RR: 51 (16 Jan 2021 02:28) (22 - 63)  SpO2: 96% (16 Jan 2021 02:28) (90% - 97%)        CAPILLARY BLOOD GLUCOSE      POCT Blood Glucose.: 93 mg/dL (15 José Manuel 2021 21:19)      PHYSICAL EXAM:  Constitutional: Anxious, respiratory distress  Eyes: PERRL, EOMI  ENT: Neck supple  CV: tachycardic to 100, regular rhythm, +s1/s2, no m/r/g  Resp: CTAB, wheezing heard from air-leak on BiLevel  GI: +BS, soft, obsese, non-tender,non-distended   Integumentary: Warm, well perfused  Neurological: AOx3  Psychiatric: anxious    LINES:     HOSPITAL MEDICATIONS:  MEDICATIONS  (STANDING):  ALBUTerol    90 MICROgram(s) HFA Inhaler 2 Puff(s) Inhalation every 6 hours  chlorhexidine 4% Liquid 1 Application(s) Topical <User Schedule>  dexAMETHasone  Injectable 6 milliGRAM(s) IV Push daily  dextrose 40% Gel 15 Gram(s) Oral once  dextrose 5%. 1000 milliLiter(s) (50 mL/Hr) IV Continuous <Continuous>  dextrose 5%. 1000 milliLiter(s) (100 mL/Hr) IV Continuous <Continuous>  dextrose 50% Injectable 25 Gram(s) IV Push once  dextrose 50% Injectable 12.5 Gram(s) IV Push once  dextrose 50% Injectable 25 Gram(s) IV Push once  enoxaparin Injectable 110 milliGRAM(s) SubCutaneous two times a day  glucagon  Injectable 1 milliGRAM(s) IntraMuscular once  insulin lispro (ADMELOG) corrective regimen sliding scale   SubCutaneous three times a day before meals  insulin lispro (ADMELOG) corrective regimen sliding scale   SubCutaneous at bedtime  petrolatum Ophthalmic Ointment 1 Application(s) Both EYES daily  polyethylene glycol 3350 17 Gram(s) Oral daily  senna Syrup 10 milliLiter(s) Oral daily  sodium chloride 0.65% Nasal 1 Spray(s) Both Nostrils two times a day    MEDICATIONS  (PRN):  acetaminophen   Tablet .. 650 milliGRAM(s) Oral every 6 hours PRN Temp greater or equal to 38C (100.4F), Moderate Pain (4 - 6)  acetaminophen  Suppository .. 650 milliGRAM(s) Rectal every 6 hours PRN Temp greater or equal to 38C (100.4F)  LORazepam   Injectable 1 milliGRAM(s) IV Push every 6 hours PRN Anxiety  sodium chloride 0.65% Nasal 1 Spray(s) Both Nostrils four times a day PRN Nasal Congestion  sodium chloride 0.9% lock flush 10 milliLiter(s) IV Push every 1 hour PRN Pre/post blood products, medications, blood draw, and to maintain line patency      LABS:                        10.7   22.05 )-----------( 383      ( 15 José Manuel 2021 05:01 )             32.1     Hgb Trend: 10.7<--, 11.1<--, 10.4<--, 10.6<--, 11.2<--  01-15    129<L>  |  95<L>  |  16  ----------------------------<  110<H>  4.7   |  21<L>  |  0.58    Ca    9.0      15 José Manuel 2021 05:01  Phos  3.7     01-15  Mg     2.2     01-15    TPro  7.5  /  Alb  3.2<L>  /  TBili  0.8  /  DBili  x   /  AST  31  /  ALT  47<H>  /  AlkPhos  126<H>  01-15    Creatinine Trend: 0.58<--, 0.55<--, 0.63<--, 0.59<--, 0.54<--, 0.62<--      D-Dimer Assay, Quantitative: 1255:(01.14.21 @ 08:15)  D-Dimer Assay, Quantitative: 2189(01.13.21 @ 07:30)  D-Dimer Assay, Quantitative: 1118:(01.12.21 @ 13:28)     C-Reactive Protein, Serum: 127.0 mg/L (01.14.21 @ 08:15)     MICROBIOLOGY:     RADIOLOGY & ADDITIONAL TESTS:      ASSESSMENT AND PLAN:  46 year old woman w/ obesity, DMII A1c 6.6% admitted on 1/4/20 w/ AHRF 2/2 COVID-19, intubated from 1/5-1/10, transferred to medicine floors, failed High Flow and BiPAP, transferred to MICU 1/16 s/p re-intubation.     NEURO:  #Mental status:   baseline AOx4  now sedated on fent, propofol and paralyzed with nimbex  c/w current sedation/paralysis    CV:  no known cardiac hx  # on levophed likely 2/2 vasoplegic shock  - wean pressors as tolerated, goal MAP >65  - eval heart function on POCUS     PULM:  ARDS  #currently intubated on AC: 28/380/12/100  f/u post-intubation ABG  airway clearance  albuterol MDI    RENAL:  #baseline CR ~0.7, currently at baseline  - monitor UOP carefully  - burdick placed for UOP    GI:  #Diet: will start TFs depending on whether pt will need proning  #Bowel regimen: senna/miralax    ENDO:  #BG wnl on BMPs  no other issues    HEMATOLOGIC:  #Hgb, Plts, coags unremarkable - monitor qd  #DVT prophylaxis with lovenox 110 BID  Trend D-dimer, on therapeutic lovenox     ID:  # COVID-19 infection: completed 5 day course of remdesivir, 10 day course of dexamethasone 1/4-1/12, restarted on 1/14-  - uptrending leukocytosis, febrile to 100.4, f/u BCx, UA/UCx, MRSA, Staph PCR, start broad spectrum antibiotics   - monitor inflammatory markers      SKIN:  #Lines: RIJ central line, L A line    ETHICS:  Full code MICU Accept Note    CHIEF COMPLAINT: Dyspnea    HPI / INTERVAL HISTORY:  46 year old woman with BMI 40, newly dx DM2 (A1c 6.6% on presentation), no other significant history, admitted 1/3/2020 for acute hypoxic respiratory failure 2/2 COVID-19, transferred to MICU on 1/5/20 (intubated 1/5 - 1/10), transferred to medicine floor on BiPAP, trialed on HF, eventually switched back to BiPAP. Patient with increasing dyspnea, tachypnea to the 50s, hypoxia, eventually requiring re-intubation and transfer to MICU. Pt endorses mild chest pain, worsening SOB.  She  denies fever, chills, nausea, vomiting, diarrhea, dysuria.         PAST MEDICAL & SURGICAL HISTORY:  No pertinent past medical history    No significant past surgical history        FAMILY HISTORY:  FH: type 2 diabetes        SOCIAL HISTORY:  Never smoker, no alcohol, illicit substance use.  ,  +COVID.     HOME MEDICATIONS:  None    Allergies    No Known Allergies    Intolerances          REVIEW OF SYSTEMS:  Per HPI      OBJECTIVE:  ICU Vital Signs Last 24 Hrs  T(C): 38 (16 Jan 2021 02:28), Max: 38 (15 José Manuel 2021 20:32)  T(F): 100.4 (16 Jan 2021 02:28), Max: 100.4 (15 José Manuel 2021 20:32)  HR: 104 (16 Jan 2021 02:28) (73 - 105)  BP: 124/74 (15 José Manuel 2021 20:23) (124/74 - 134/72)  RR: 51 (16 Jan 2021 02:28) (22 - 63)  SpO2: 96% (16 Jan 2021 02:28) (90% - 97%)        CAPILLARY BLOOD GLUCOSE      POCT Blood Glucose.: 93 mg/dL (15 José Manuel 2021 21:19)      PHYSICAL EXAM:  Constitutional: Anxious, respiratory distress  Eyes: PERRL, EOMI  ENT: Neck supple  CV: tachycardic to 100, regular rhythm, +s1/s2, no m/r/g  Resp: CTAB, wheezing heard from air-leak on BiLevel  GI: +BS, soft, obsese, non-tender,non-distended   Integumentary: Warm, well perfused  Neurological: AOx3  Psychiatric: anxious    LINES:     HOSPITAL MEDICATIONS:  MEDICATIONS  (STANDING):  ALBUTerol    90 MICROgram(s) HFA Inhaler 2 Puff(s) Inhalation every 6 hours  chlorhexidine 4% Liquid 1 Application(s) Topical <User Schedule>  dexAMETHasone  Injectable 6 milliGRAM(s) IV Push daily  dextrose 40% Gel 15 Gram(s) Oral once  dextrose 5%. 1000 milliLiter(s) (50 mL/Hr) IV Continuous <Continuous>  dextrose 5%. 1000 milliLiter(s) (100 mL/Hr) IV Continuous <Continuous>  dextrose 50% Injectable 25 Gram(s) IV Push once  dextrose 50% Injectable 12.5 Gram(s) IV Push once  dextrose 50% Injectable 25 Gram(s) IV Push once  enoxaparin Injectable 110 milliGRAM(s) SubCutaneous two times a day  glucagon  Injectable 1 milliGRAM(s) IntraMuscular once  insulin lispro (ADMELOG) corrective regimen sliding scale   SubCutaneous three times a day before meals  insulin lispro (ADMELOG) corrective regimen sliding scale   SubCutaneous at bedtime  petrolatum Ophthalmic Ointment 1 Application(s) Both EYES daily  polyethylene glycol 3350 17 Gram(s) Oral daily  senna Syrup 10 milliLiter(s) Oral daily  sodium chloride 0.65% Nasal 1 Spray(s) Both Nostrils two times a day    MEDICATIONS  (PRN):  acetaminophen   Tablet .. 650 milliGRAM(s) Oral every 6 hours PRN Temp greater or equal to 38C (100.4F), Moderate Pain (4 - 6)  acetaminophen  Suppository .. 650 milliGRAM(s) Rectal every 6 hours PRN Temp greater or equal to 38C (100.4F)  LORazepam   Injectable 1 milliGRAM(s) IV Push every 6 hours PRN Anxiety  sodium chloride 0.65% Nasal 1 Spray(s) Both Nostrils four times a day PRN Nasal Congestion  sodium chloride 0.9% lock flush 10 milliLiter(s) IV Push every 1 hour PRN Pre/post blood products, medications, blood draw, and to maintain line patency      LABS:                        10.7   22.05 )-----------( 383      ( 15 José Manuel 2021 05:01 )             32.1     Hgb Trend: 10.7<--, 11.1<--, 10.4<--, 10.6<--, 11.2<--  01-15    129<L>  |  95<L>  |  16  ----------------------------<  110<H>  4.7   |  21<L>  |  0.58    Ca    9.0      15 José Manuel 2021 05:01  Phos  3.7     01-15  Mg     2.2     01-15    TPro  7.5  /  Alb  3.2<L>  /  TBili  0.8  /  DBili  x   /  AST  31  /  ALT  47<H>  /  AlkPhos  126<H>  01-15    Creatinine Trend: 0.58<--, 0.55<--, 0.63<--, 0.59<--, 0.54<--, 0.62<--      D-Dimer Assay, Quantitative: 1255:(01.14.21 @ 08:15)  D-Dimer Assay, Quantitative: 2189(01.13.21 @ 07:30)  D-Dimer Assay, Quantitative: 1118:(01.12.21 @ 13:28)     C-Reactive Protein, Serum: 127.0 mg/L (01.14.21 @ 08:15)     MICROBIOLOGY:     RADIOLOGY & ADDITIONAL TESTS:      ASSESSMENT AND PLAN:  46 year old woman w/ obesity, DMII A1c 6.6% admitted on 1/4/20 w/ AHRF 2/2 COVID-19, intubated from 1/5-1/10, transferred to medicine floors, failed High Flow and BiPAP, transferred to MICU 1/16 s/p re-intubation.     NEURO:  #Mental status:   baseline AOx4  now sedated on fent, propofol and paralyzed with nimbex  c/w current sedation/paralysis    CV:  no known cardiac hx  # on levophed likely 2/2 vasoplegic shock  - wean pressors as tolerated, goal MAP >65  - eval heart function on POCUS     PULM:  ARDS  #reintubated 1/16:   f/u post-intubation ABG  airway clearance  albuterol MDI    RENAL:  #baseline CR ~0.7, currently at baseline  - monitor UOP carefully  - burdick placed for UOP    GI:  #Diet: will start TFs depending on whether pt will need proning  #Bowel regimen: senna/miralax    ENDO:  #DMII: A1c 6.6%, c/w FS q6 hours while on tube feeds, ISS  no other issues    HEMATOLOGIC:  #Hgb, Plts, coags unremarkable - monitor qd  #DVT prophylaxis with lovenox 110 BID  Trend D-dimer, on therapeutic lovenox     ID:  # COVID-19 infection: completed 5 day course of remdesivir, 10 day course of dexamethasone 1/4-1/12, restarted on 1/14-  - uptrending leukocytosis, febrile to 100.4, f/u BCx, UA/UCx, MRSA, Staph PCR, start broad spectrum antibiotics   - monitor inflammatory markers      SKIN:  #Lines:     ETHICS:  Full code MICU Accept Note    CHIEF COMPLAINT: Dyspnea    HPI / INTERVAL HISTORY:  46 year old woman with BMI 40, newly dx DM2 (A1c 6.6% on presentation), no other significant history, admitted 1/3/2020 for acute hypoxic respiratory failure 2/2 COVID-19, transferred to MICU on 1/5/20 (intubated 1/5 - 1/10), transferred to medicine floor on BiPAP, trialed on HF, eventually switched back to BiPAP. Patient with increasing dyspnea, tachypnea to the 50s, hypoxia, eventually requiring re-intubation and transfer to MICU. Pt endorses mild chest pain, worsening SOB.  She  denies fever, chills, nausea, vomiting, diarrhea, dysuria.         PAST MEDICAL & SURGICAL HISTORY:  No pertinent past medical history    No significant past surgical history        FAMILY HISTORY:  FH: type 2 diabetes        SOCIAL HISTORY:  Never smoker, no alcohol, illicit substance use.  ,  +COVID.     HOME MEDICATIONS:  None    Allergies    No Known Allergies    Intolerances          REVIEW OF SYSTEMS:  Per HPI      OBJECTIVE:  ICU Vital Signs Last 24 Hrs  T(C): 38 (16 Jan 2021 02:28), Max: 38 (15 José Manuel 2021 20:32)  T(F): 100.4 (16 Jan 2021 02:28), Max: 100.4 (15 José Manuel 2021 20:32)  HR: 104 (16 Jan 2021 02:28) (73 - 105)  BP: 124/74 (15 José Manuel 2021 20:23) (124/74 - 134/72)  RR: 51 (16 Jan 2021 02:28) (22 - 63)  SpO2: 96% (16 Jan 2021 02:28) (90% - 97%)        CAPILLARY BLOOD GLUCOSE      POCT Blood Glucose.: 93 mg/dL (15 José Manuel 2021 21:19)      PHYSICAL EXAM:  Constitutional: Anxious, respiratory distress  Eyes: PERRL, EOMI  ENT: Neck supple  CV: tachycardic to 100, regular rhythm, +s1/s2, no m/r/g  Resp: CTAB, wheezing heard from air-leak on BiLevel  GI: +BS, soft, obsese, non-tender,non-distended   Integumentary: Warm, well perfused  Neurological: AOx3  Psychiatric: anxious    LINES:     HOSPITAL MEDICATIONS:  MEDICATIONS  (STANDING):  ALBUTerol    90 MICROgram(s) HFA Inhaler 2 Puff(s) Inhalation every 6 hours  chlorhexidine 4% Liquid 1 Application(s) Topical <User Schedule>  dexAMETHasone  Injectable 6 milliGRAM(s) IV Push daily  dextrose 40% Gel 15 Gram(s) Oral once  dextrose 5%. 1000 milliLiter(s) (50 mL/Hr) IV Continuous <Continuous>  dextrose 5%. 1000 milliLiter(s) (100 mL/Hr) IV Continuous <Continuous>  dextrose 50% Injectable 25 Gram(s) IV Push once  dextrose 50% Injectable 12.5 Gram(s) IV Push once  dextrose 50% Injectable 25 Gram(s) IV Push once  enoxaparin Injectable 110 milliGRAM(s) SubCutaneous two times a day  glucagon  Injectable 1 milliGRAM(s) IntraMuscular once  insulin lispro (ADMELOG) corrective regimen sliding scale   SubCutaneous three times a day before meals  insulin lispro (ADMELOG) corrective regimen sliding scale   SubCutaneous at bedtime  petrolatum Ophthalmic Ointment 1 Application(s) Both EYES daily  polyethylene glycol 3350 17 Gram(s) Oral daily  senna Syrup 10 milliLiter(s) Oral daily  sodium chloride 0.65% Nasal 1 Spray(s) Both Nostrils two times a day    MEDICATIONS  (PRN):  acetaminophen   Tablet .. 650 milliGRAM(s) Oral every 6 hours PRN Temp greater or equal to 38C (100.4F), Moderate Pain (4 - 6)  acetaminophen  Suppository .. 650 milliGRAM(s) Rectal every 6 hours PRN Temp greater or equal to 38C (100.4F)  LORazepam   Injectable 1 milliGRAM(s) IV Push every 6 hours PRN Anxiety  sodium chloride 0.65% Nasal 1 Spray(s) Both Nostrils four times a day PRN Nasal Congestion  sodium chloride 0.9% lock flush 10 milliLiter(s) IV Push every 1 hour PRN Pre/post blood products, medications, blood draw, and to maintain line patency      LABS:                        10.7   22.05 )-----------( 383      ( 15 José Manuel 2021 05:01 )             32.1     Hgb Trend: 10.7<--, 11.1<--, 10.4<--, 10.6<--, 11.2<--  01-15    129<L>  |  95<L>  |  16  ----------------------------<  110<H>  4.7   |  21<L>  |  0.58    Ca    9.0      15 José Manuel 2021 05:01  Phos  3.7     01-15  Mg     2.2     01-15    TPro  7.5  /  Alb  3.2<L>  /  TBili  0.8  /  DBili  x   /  AST  31  /  ALT  47<H>  /  AlkPhos  126<H>  01-15    Creatinine Trend: 0.58<--, 0.55<--, 0.63<--, 0.59<--, 0.54<--, 0.62<--      D-Dimer Assay, Quantitative: 1255:(01.14.21 @ 08:15)  D-Dimer Assay, Quantitative: 2189(01.13.21 @ 07:30)  D-Dimer Assay, Quantitative: 1118:(01.12.21 @ 13:28)     C-Reactive Protein, Serum: 127.0 mg/L (01.14.21 @ 08:15)     MICROBIOLOGY:     RADIOLOGY & ADDITIONAL TESTS:      ASSESSMENT AND PLAN:  46 year old woman w/ obesity, DMII A1c 6.6% admitted on 1/4/20 w/ AHRF 2/2 COVID-19, intubated from 1/5-1/10, transferred to medicine floors, failed High Flow and BiPAP, transferred to MICU 1/16 s/p re-intubation.     NEURO:  #Mental status:   baseline AOx4  now sedated on fent, propofol and paralyzed with nimbex  c/w current sedation/paralysis    CV:  no known cardiac hx  # on levophed likely 2/2 vasoplegic shock  - wean pressors as tolerated, goal MAP >65  - eval heart function on POCUS     PULM:  ARDS  #reintubated 1/16:   f/u post-intubation ABG  airway clearance  albuterol MDI    RENAL:  #baseline CR ~0.7, currently at baseline  - monitor UOP carefully  - burdick placed for UOP    GI:  #Diet: will start TFs depending on whether pt will need proning  #Bowel regimen: senna/miralax    ENDO:  #DMII: A1c 6.6%, c/w FS q6 hours while on tube feeds, ISS  no other issues    HEMATOLOGIC:  #Hgb, Plts, coags unremarkable - monitor qd  #DVT prophylaxis with lovenox 110 BID  Trend D-dimer, c/w lovenox for DVT PPX    ID:  # COVID-19 infection: completed 5 day course of remdesivir, 10 day course of dexamethasone 1/4-1/12, restarted on 1/14-  - uptrending leukocytosis, febrile to 100.4, f/u BCx, UA/UCx, MRSA, Staph PCR, start zosyn  - monitor inflammatory markers      SKIN:  #Lines:     ETHICS:  Full code MICU Accept Note    CHIEF COMPLAINT: Dyspnea    HPI / INTERVAL HISTORY:  46 year old woman with BMI 40, newly dx DM2 (A1c 6.6% on presentation), no other significant history, admitted 1/3/2020 for acute hypoxic respiratory failure 2/2 COVID-19, transferred to MICU on 1/5/20 (intubated 1/5 - 1/10), transferred to medicine floor on BiPAP, trialed on HF, eventually switched back to BiPAP. Patient with increasing dyspnea, tachypnea to the 50s, hypoxia, eventually requiring re-intubation and transfer to MICU. Pt endorses mild chest pain, worsening SOB.  She  denies fever, chills, nausea, vomiting, diarrhea, dysuria.         PAST MEDICAL & SURGICAL HISTORY:  No pertinent past medical history    No significant past surgical history        FAMILY HISTORY:  FH: type 2 diabetes        SOCIAL HISTORY:  Never smoker, no alcohol, illicit substance use.  ,  +COVID.     HOME MEDICATIONS:  None    Allergies    No Known Allergies    Intolerances          REVIEW OF SYSTEMS:  Per HPI      OBJECTIVE:  ICU Vital Signs Last 24 Hrs  T(C): 38 (16 Jan 2021 02:28), Max: 38 (15 José Manuel 2021 20:32)  T(F): 100.4 (16 Jan 2021 02:28), Max: 100.4 (15 José Manuel 2021 20:32)  HR: 104 (16 Jan 2021 02:28) (73 - 105)  BP: 124/74 (15 José Manuel 2021 20:23) (124/74 - 134/72)  RR: 51 (16 Jan 2021 02:28) (22 - 63)  SpO2: 96% (16 Jan 2021 02:28) (90% - 97%)        CAPILLARY BLOOD GLUCOSE      POCT Blood Glucose.: 93 mg/dL (15 José Manuel 2021 21:19)      PHYSICAL EXAM:  Constitutional: Anxious, respiratory distress  Eyes: PERRL, EOMI  ENT: Neck supple  CV: tachycardic to 100, regular rhythm, +s1/s2, no m/r/g  Resp: CTAB, wheezing heard from air-leak on BiLevel  GI: +BS, soft, obsese, non-tender,non-distended   Integumentary: Warm, well perfused  Neurological: AOx3  Psychiatric: anxious    LINES:     HOSPITAL MEDICATIONS:  MEDICATIONS  (STANDING):  ALBUTerol    90 MICROgram(s) HFA Inhaler 2 Puff(s) Inhalation every 6 hours  chlorhexidine 4% Liquid 1 Application(s) Topical <User Schedule>  dexAMETHasone  Injectable 6 milliGRAM(s) IV Push daily  dextrose 40% Gel 15 Gram(s) Oral once  dextrose 5%. 1000 milliLiter(s) (50 mL/Hr) IV Continuous <Continuous>  dextrose 5%. 1000 milliLiter(s) (100 mL/Hr) IV Continuous <Continuous>  dextrose 50% Injectable 25 Gram(s) IV Push once  dextrose 50% Injectable 12.5 Gram(s) IV Push once  dextrose 50% Injectable 25 Gram(s) IV Push once  enoxaparin Injectable 110 milliGRAM(s) SubCutaneous two times a day  glucagon  Injectable 1 milliGRAM(s) IntraMuscular once  insulin lispro (ADMELOG) corrective regimen sliding scale   SubCutaneous three times a day before meals  insulin lispro (ADMELOG) corrective regimen sliding scale   SubCutaneous at bedtime  petrolatum Ophthalmic Ointment 1 Application(s) Both EYES daily  polyethylene glycol 3350 17 Gram(s) Oral daily  senna Syrup 10 milliLiter(s) Oral daily  sodium chloride 0.65% Nasal 1 Spray(s) Both Nostrils two times a day    MEDICATIONS  (PRN):  acetaminophen   Tablet .. 650 milliGRAM(s) Oral every 6 hours PRN Temp greater or equal to 38C (100.4F), Moderate Pain (4 - 6)  acetaminophen  Suppository .. 650 milliGRAM(s) Rectal every 6 hours PRN Temp greater or equal to 38C (100.4F)  LORazepam   Injectable 1 milliGRAM(s) IV Push every 6 hours PRN Anxiety  sodium chloride 0.65% Nasal 1 Spray(s) Both Nostrils four times a day PRN Nasal Congestion  sodium chloride 0.9% lock flush 10 milliLiter(s) IV Push every 1 hour PRN Pre/post blood products, medications, blood draw, and to maintain line patency      LABS:                        10.7   22.05 )-----------( 383      ( 15 José Manuel 2021 05:01 )             32.1     Hgb Trend: 10.7<--, 11.1<--, 10.4<--, 10.6<--, 11.2<--  01-15    129<L>  |  95<L>  |  16  ----------------------------<  110<H>  4.7   |  21<L>  |  0.58    Ca    9.0      15 José Manuel 2021 05:01  Phos  3.7     01-15  Mg     2.2     01-15    TPro  7.5  /  Alb  3.2<L>  /  TBili  0.8  /  DBili  x   /  AST  31  /  ALT  47<H>  /  AlkPhos  126<H>  01-15    Creatinine Trend: 0.58<--, 0.55<--, 0.63<--, 0.59<--, 0.54<--, 0.62<--      D-Dimer Assay, Quantitative: 1255:(01.14.21 @ 08:15)  D-Dimer Assay, Quantitative: 2189(01.13.21 @ 07:30)  D-Dimer Assay, Quantitative: 1118:(01.12.21 @ 13:28)     C-Reactive Protein, Serum: 127.0 mg/L (01.14.21 @ 08:15)     MICROBIOLOGY:     RADIOLOGY & ADDITIONAL TESTS:      ASSESSMENT AND PLAN:  46 year old woman w/ obesity, DMII A1c 6.6% admitted on 1/4/20 w/ AHRF 2/2 COVID-19, intubated from 1/5-1/10, transferred to medicine floors, failed High Flow and BiPAP, transferred to MICU 1/16 s/p re-intubation.     NEURO:  #Mental status:   baseline AOx4  now sedated on fent, propofol and paralyzed with nimbex  c/w current sedation/paralysis    CV:  no known cardiac hx  # on levophed likely 2/2 vasoplegic shock  - wean pressors as tolerated, goal MAP >65  - eval heart function on POCUS     PULM:  ARDS likely 2/2 COVID   #reintubated 1/16:   f/u post-intubation ABG   airway clearance  albuterol MDI    RENAL:  #baseline CR ~0.7, currently at baseline  - monitor UOP carefully  - burdick placed for UOP    GI:  #Diet: will start TFs depending on whether pt will need proning  #Bowel regimen: senna/miralax    ENDO:  #DMII: A1c 6.6%, c/w FS q6 hours while on tube feeds, ISS  no other issues    HEMATOLOGIC:  #Hgb, Plts, coags unremarkable - monitor qd  #DVT prophylaxis with lovenox 40 BID     ID:  # COVID-19 infection: completed 5 day course of remdesivir, 10 day course of dexamethasone 1/4-1/12, restarted on 1/14-  - uptrending leukocytosis, febrile to 100.4, f/u BCx, UA/UCx, MRSA, Staph PCR, start zosyn (1/16 - )  - monitor inflammatory markers    ETHICS:  Full code,  point of contact MICU Accept Note    CHIEF COMPLAINT: Dyspnea    HPI / INTERVAL HISTORY:  46 year old woman with BMI 40, newly dx DM2 (A1c 6.6% on presentation), no other significant history, admitted 1/3/2020 for acute hypoxic respiratory failure 2/2 COVID-19, transferred to MICU on 1/5/20 (intubated 1/5 - 1/10), transferred to medicine floor on BiPAP, trialed on HF, eventually switched back to BiPAP. Patient with increasing dyspnea, tachypnea to the 50s, hypoxia, eventually requiring re-intubation and transfer to MICU.         PAST MEDICAL & SURGICAL HISTORY:  No pertinent past medical history    No significant past surgical history        FAMILY HISTORY:  FH: type 2 diabetes        SOCIAL HISTORY:  Never smoker, no alcohol, illicit substance use.  ,  +COVID.     HOME MEDICATIONS:  None    Allergies    No Known Allergies    Intolerances          REVIEW OF SYSTEMS:  Per HPI      OBJECTIVE:  ICU Vital Signs Last 24 Hrs  T(C): 38 (16 Jan 2021 02:28), Max: 38 (15 José Manuel 2021 20:32)  T(F): 100.4 (16 Jan 2021 02:28), Max: 100.4 (15 José Manuel 2021 20:32)  HR: 104 (16 Jan 2021 02:28) (73 - 105)  BP: 124/74 (15 José Manuel 2021 20:23) (124/74 - 134/72)  RR: 51 (16 Jan 2021 02:28) (22 - 63)  SpO2: 96% (16 Jan 2021 02:28) (90% - 97%)        CAPILLARY BLOOD GLUCOSE      POCT Blood Glucose.: 93 mg/dL (15 José Manuel 2021 21:19)      PHYSICAL EXAM:  Constitutional: Anxious, respiratory distress  Eyes: PERRL, EOMI  ENT: Neck supple  CV: tachycardic to 100, regular rhythm, +s1/s2, no m/r/g  Resp: CTAB, wheezing heard from air-leak on BiLevel  GI: +BS, soft, obsese, non-tender,non-distended   Integumentary: Warm, well perfused  Neurological: AOx3  Psychiatric: anxious    HOSPITAL MEDICATIONS:  MEDICATIONS  (STANDING):  ALBUTerol    90 MICROgram(s) HFA Inhaler 2 Puff(s) Inhalation every 6 hours  chlorhexidine 4% Liquid 1 Application(s) Topical <User Schedule>  dexAMETHasone  Injectable 6 milliGRAM(s) IV Push daily  dextrose 40% Gel 15 Gram(s) Oral once  dextrose 5%. 1000 milliLiter(s) (50 mL/Hr) IV Continuous <Continuous>  dextrose 5%. 1000 milliLiter(s) (100 mL/Hr) IV Continuous <Continuous>  dextrose 50% Injectable 25 Gram(s) IV Push once  dextrose 50% Injectable 12.5 Gram(s) IV Push once  dextrose 50% Injectable 25 Gram(s) IV Push once  enoxaparin Injectable 110 milliGRAM(s) SubCutaneous two times a day  glucagon  Injectable 1 milliGRAM(s) IntraMuscular once  insulin lispro (ADMELOG) corrective regimen sliding scale   SubCutaneous three times a day before meals  insulin lispro (ADMELOG) corrective regimen sliding scale   SubCutaneous at bedtime  petrolatum Ophthalmic Ointment 1 Application(s) Both EYES daily  polyethylene glycol 3350 17 Gram(s) Oral daily  senna Syrup 10 milliLiter(s) Oral daily  sodium chloride 0.65% Nasal 1 Spray(s) Both Nostrils two times a day    MEDICATIONS  (PRN):  acetaminophen   Tablet .. 650 milliGRAM(s) Oral every 6 hours PRN Temp greater or equal to 38C (100.4F), Moderate Pain (4 - 6)  acetaminophen  Suppository .. 650 milliGRAM(s) Rectal every 6 hours PRN Temp greater or equal to 38C (100.4F)  LORazepam   Injectable 1 milliGRAM(s) IV Push every 6 hours PRN Anxiety  sodium chloride 0.65% Nasal 1 Spray(s) Both Nostrils four times a day PRN Nasal Congestion  sodium chloride 0.9% lock flush 10 milliLiter(s) IV Push every 1 hour PRN Pre/post blood products, medications, blood draw, and to maintain line patency      LABS:                        10.7   22.05 )-----------( 383      ( 15 José Manuel 2021 05:01 )             32.1     Hgb Trend: 10.7<--, 11.1<--, 10.4<--, 10.6<--, 11.2<--  01-15    129<L>  |  95<L>  |  16  ----------------------------<  110<H>  4.7   |  21<L>  |  0.58    Ca    9.0      15 José Manuel 2021 05:01  Phos  3.7     01-15  Mg     2.2     01-15    TPro  7.5  /  Alb  3.2<L>  /  TBili  0.8  /  DBili  x   /  AST  31  /  ALT  47<H>  /  AlkPhos  126<H>  01-15    Creatinine Trend: 0.58<--, 0.55<--, 0.63<--, 0.59<--, 0.54<--, 0.62<--      D-Dimer Assay, Quantitative: 1255:(01.14.21 @ 08:15)  D-Dimer Assay, Quantitative: 2189(01.13.21 @ 07:30)  D-Dimer Assay, Quantitative: 1118:(01.12.21 @ 13:28)     C-Reactive Protein, Serum: 127.0 mg/L (01.14.21 @ 08:15)     MICROBIOLOGY:     RADIOLOGY & ADDITIONAL TESTS:      ASSESSMENT AND PLAN:  46 year old woman w/ obesity, DMII A1c 6.6% admitted on 1/4/20 w/ AHRF 2/2 COVID-19, intubated from 1/5-1/10, transferred to medicine floors, failed High Flow and BiPAP, transferred to MICU 1/16 s/p re-intubation.     NEURO:  #Mental status:   baseline AOx4  now sedated on fent, propofol and paralyzed with nimbex  c/w current sedation/paralysis    CV:  no known cardiac hx  # on levophed likely 2/2 vasoplegic shock  - wean pressors as tolerated, goal MAP >65  - eval heart function on POCUS     PULM:  ARDS likely 2/2 COVID   #reintubated 1/16:   f/u post-intubation ABG   airway clearance  albuterol MDI    RENAL:  #baseline CR ~0.7, currently at baseline  - monitor UOP carefully  - burdick placed for UOP    GI:  #Diet: will start TFs depending on whether pt will need proning  #Bowel regimen: senna/miralax    ENDO:  #DMII: A1c 6.6%, c/w FS q6 hours while on tube feeds, ISS  no other issues    HEMATOLOGIC:  #Hgb, Plts, coags unremarkable - monitor qd  #DVT prophylaxis with lovenox 40 BID     ID:  # COVID-19 infection: completed 5 day course of remdesivir, 10 day course of dexamethasone 1/4-1/12, restarted on 1/14-  - uptrending leukocytosis, febrile to 100.4, f/u BCx, UA/UCx, MRSA, Staph PCR, start zosyn (1/16 - )  - monitor inflammatory markers    ETHICS:  Full code,  point of contact MICU Accept Note    CHIEF COMPLAINT: Dyspnea    HPI / INTERVAL HISTORY:  46 year old woman with BMI 40, newly dx DM2 (A1c 6.6% on presentation), no other significant history, admitted 1/3/2020 for acute hypoxic respiratory failure 2/2 COVID-19, transferred to MICU on 1/5/20 (intubated 1/5 - 1/10), transferred to medicine floor on BiPAP, trialed on HF, eventually switched back to BiPAP. Patient with increasing dyspnea, tachypnea to the 50s, hypoxia, eventually requiring re-intubation and transfer to MICU.         PAST MEDICAL & SURGICAL HISTORY:  No pertinent past medical history    No significant past surgical history        FAMILY HISTORY:  FH: type 2 diabetes        SOCIAL HISTORY:  Never smoker, no alcohol, illicit substance use.  ,  +COVID.     HOME MEDICATIONS:  None    Allergies    No Known Allergies    Intolerances          REVIEW OF SYSTEMS:  Per HPI      OBJECTIVE:  ICU Vital Signs Last 24 Hrs  T(C): 38 (16 Jan 2021 02:28), Max: 38 (15 José Manuel 2021 20:32)  T(F): 100.4 (16 Jan 2021 02:28), Max: 100.4 (15 José Manuel 2021 20:32)  HR: 104 (16 Jan 2021 02:28) (73 - 105)  BP: 124/74 (15 José Manuel 2021 20:23) (124/74 - 134/72)  RR: 51 (16 Jan 2021 02:28) (22 - 63)  SpO2: 96% (16 Jan 2021 02:28) (90% - 97%)        CAPILLARY BLOOD GLUCOSE      POCT Blood Glucose.: 93 mg/dL (15 José Manuel 2021 21:19)      PHYSICAL EXAM:  Constitutional: Anxious, respiratory distress  Eyes: PERRL, EOMI  ENT: Neck supple  CV: tachycardic to 100, regular rhythm, +s1/s2, no m/r/g  Resp: CTAB, wheezing heard from air-leak on BiLevel  GI: +BS, soft, obsese, non-tender,non-distended   Integumentary: Warm, well perfused  Neurological: AOx3  Psychiatric: anxious    HOSPITAL MEDICATIONS:  MEDICATIONS  (STANDING):  ALBUTerol    90 MICROgram(s) HFA Inhaler 2 Puff(s) Inhalation every 6 hours  chlorhexidine 4% Liquid 1 Application(s) Topical <User Schedule>  dexAMETHasone  Injectable 6 milliGRAM(s) IV Push daily  dextrose 40% Gel 15 Gram(s) Oral once  dextrose 5%. 1000 milliLiter(s) (50 mL/Hr) IV Continuous <Continuous>  dextrose 5%. 1000 milliLiter(s) (100 mL/Hr) IV Continuous <Continuous>  dextrose 50% Injectable 25 Gram(s) IV Push once  dextrose 50% Injectable 12.5 Gram(s) IV Push once  dextrose 50% Injectable 25 Gram(s) IV Push once  enoxaparin Injectable 110 milliGRAM(s) SubCutaneous two times a day  glucagon  Injectable 1 milliGRAM(s) IntraMuscular once  insulin lispro (ADMELOG) corrective regimen sliding scale   SubCutaneous three times a day before meals  insulin lispro (ADMELOG) corrective regimen sliding scale   SubCutaneous at bedtime  petrolatum Ophthalmic Ointment 1 Application(s) Both EYES daily  polyethylene glycol 3350 17 Gram(s) Oral daily  senna Syrup 10 milliLiter(s) Oral daily  sodium chloride 0.65% Nasal 1 Spray(s) Both Nostrils two times a day    MEDICATIONS  (PRN):  acetaminophen   Tablet .. 650 milliGRAM(s) Oral every 6 hours PRN Temp greater or equal to 38C (100.4F), Moderate Pain (4 - 6)  acetaminophen  Suppository .. 650 milliGRAM(s) Rectal every 6 hours PRN Temp greater or equal to 38C (100.4F)  LORazepam   Injectable 1 milliGRAM(s) IV Push every 6 hours PRN Anxiety  sodium chloride 0.65% Nasal 1 Spray(s) Both Nostrils four times a day PRN Nasal Congestion  sodium chloride 0.9% lock flush 10 milliLiter(s) IV Push every 1 hour PRN Pre/post blood products, medications, blood draw, and to maintain line patency      LABS:                        10.7   22.05 )-----------( 383      ( 15 José Manuel 2021 05:01 )             32.1     Hgb Trend: 10.7<--, 11.1<--, 10.4<--, 10.6<--, 11.2<--  01-15    129<L>  |  95<L>  |  16  ----------------------------<  110<H>  4.7   |  21<L>  |  0.58    Ca    9.0      15 José Manuel 2021 05:01  Phos  3.7     01-15  Mg     2.2     01-15    TPro  7.5  /  Alb  3.2<L>  /  TBili  0.8  /  DBili  x   /  AST  31  /  ALT  47<H>  /  AlkPhos  126<H>  01-15    Creatinine Trend: 0.58<--, 0.55<--, 0.63<--, 0.59<--, 0.54<--, 0.62<--      D-Dimer Assay, Quantitative: 1255:(01.14.21 @ 08:15)  D-Dimer Assay, Quantitative: 2189(01.13.21 @ 07:30)  D-Dimer Assay, Quantitative: 1118:(01.12.21 @ 13:28)     C-Reactive Protein, Serum: 127.0 mg/L (01.14.21 @ 08:15)     MICROBIOLOGY:     RADIOLOGY & ADDITIONAL TESTS:      ASSESSMENT AND PLAN:  46 year old woman w/ obesity, DMII A1c 6.6% admitted on 1/4/20 w/ AHRF 2/2 COVID-19, intubated from 1/5-1/10, transferred to medicine floors, failed High Flow and BiPAP, transferred to MICU 1/16 s/p re-intubation.     NEURO:  #Mental status:   baseline AOx4  now sedated on fent, propofol and paralyzed with nimbex  c/w current sedation/paralysis    CV:  no known cardiac hx  # on levophed likely 2/2 vasoplegic shock  - wean pressors as tolerated, goal MAP >65  - eval heart function on POCUS     PULM:  ARDS likely 2/2 COVID   #reintubated 1/16:   f/u post-intubation ABG   airway clearance  albuterol MDI    RENAL:  #baseline CR ~0.7, currently at baseline  - monitor UOP carefully  - burdick placed for UOP    GI:  #Diet: will start TFs depending on whether pt will need proning  #Bowel regimen: senna/miralax    ENDO:  #DMII: A1c 6.6%, c/w FS q6 hours while on tube feeds, ISS  no other issues    HEMATOLOGIC:  #Hgb, Plts, coags unremarkable - monitor qd  #DVT prophylaxis with lovenox 40 BID     ID:  # COVID-19 infection: completed 5 day course of remdesivir, 10 day course of dexamethasone 1/4-1/12, restarted on 1/14-  - uptrending leukocytosis, febrile to 100.4, f/u BCx, UA/UCx, MRSA, Staph PCR, start zosyn (1/16 - )  - monitor inflammatory markers    ETHICS:  Full code,  point of contact    Attending Attestation:  Patient seen and examined with resident/fellow.  Agree with above except as noted.  46 F with DM2 here with acute hypoxemic respiratory failure 2/2 COVID requiring intubation, extubated to NIPPV, switched to HFNC and then back on NIPPV with progressively worsening acute hypoxemic respiratory failure requiring intubation last night due to respiratory distress/failure, likely progression of disease.  Intubated, sedated, paralyzed, still with severe ARDS.  Will prone.  c/w broad abx in case this is superimposed pna, will check cultures.  Plateau pressure acceptable, as is driving pressure.      This patient is critically ill and required frequent bedside visits with therapy change.  Total critical care time spent was 35 minutes. This time excludes time spent on separate procedures and time spent teaching.    Betsey Roman MD  Attending  Pulmonary/Critical Care

## 2021-01-17 LAB
ALBUMIN SERPL ELPH-MCNC: 3.1 G/DL — LOW (ref 3.3–5)
ALP SERPL-CCNC: 111 U/L — SIGNIFICANT CHANGE UP (ref 40–120)
ALT FLD-CCNC: 29 U/L — SIGNIFICANT CHANGE UP (ref 4–33)
ANION GAP SERPL CALC-SCNC: 14 MMOL/L — SIGNIFICANT CHANGE UP (ref 7–14)
APTT BLD: 34 SEC — SIGNIFICANT CHANGE UP (ref 27–36.3)
AST SERPL-CCNC: 12 U/L — SIGNIFICANT CHANGE UP (ref 4–32)
BASOPHILS # BLD AUTO: 0.08 K/UL — SIGNIFICANT CHANGE UP (ref 0–0.2)
BASOPHILS NFR BLD AUTO: 0.4 % — SIGNIFICANT CHANGE UP (ref 0–2)
BILIRUB SERPL-MCNC: 0.8 MG/DL — SIGNIFICANT CHANGE UP (ref 0.2–1.2)
BLD GP AB SCN SERPL QL: NEGATIVE — SIGNIFICANT CHANGE UP
BLOOD GAS ARTERIAL - LYTES,HGB,ICA,LACT RESULT: SIGNIFICANT CHANGE UP
BLOOD GAS ARTERIAL COMPREHENSIVE RESULT: SIGNIFICANT CHANGE UP
BLOOD GAS ARTERIAL COMPREHENSIVE RESULT: SIGNIFICANT CHANGE UP
BUN SERPL-MCNC: 10 MG/DL — SIGNIFICANT CHANGE UP (ref 7–23)
CALCIUM SERPL-MCNC: 9.1 MG/DL — SIGNIFICANT CHANGE UP (ref 8.4–10.5)
CHLORIDE SERPL-SCNC: 103 MMOL/L — SIGNIFICANT CHANGE UP (ref 98–107)
CO2 SERPL-SCNC: 22 MMOL/L — SIGNIFICANT CHANGE UP (ref 22–31)
CREAT SERPL-MCNC: 0.54 MG/DL — SIGNIFICANT CHANGE UP (ref 0.5–1.3)
CRP SERPL-MCNC: 258.7 MG/L — HIGH
D DIMER BLD IA.RAPID-MCNC: 727 NG/ML DDU — HIGH
EOSINOPHIL # BLD AUTO: 0.46 K/UL — SIGNIFICANT CHANGE UP (ref 0–0.5)
EOSINOPHIL NFR BLD AUTO: 2.4 % — SIGNIFICANT CHANGE UP (ref 0–6)
FERRITIN SERPL-MCNC: 289 NG/ML — HIGH (ref 15–150)
GLUCOSE SERPL-MCNC: 140 MG/DL — HIGH (ref 70–99)
HCT VFR BLD CALC: 32.2 % — LOW (ref 34.5–45)
HGB BLD-MCNC: 10.4 G/DL — LOW (ref 11.5–15.5)
IANC: 15.1 K/UL — HIGH (ref 1.5–8.5)
IMM GRANULOCYTES NFR BLD AUTO: 1.5 % — SIGNIFICANT CHANGE UP (ref 0–1.5)
INR BLD: 1.31 RATIO — HIGH (ref 0.88–1.16)
LYMPHOCYTES # BLD AUTO: 1.64 K/UL — SIGNIFICANT CHANGE UP (ref 1–3.3)
LYMPHOCYTES # BLD AUTO: 8.7 % — LOW (ref 13–44)
MAGNESIUM SERPL-MCNC: 2.3 MG/DL — SIGNIFICANT CHANGE UP (ref 1.6–2.6)
MCHC RBC-ENTMCNC: 27.2 PG — SIGNIFICANT CHANGE UP (ref 27–34)
MCHC RBC-ENTMCNC: 32.3 GM/DL — SIGNIFICANT CHANGE UP (ref 32–36)
MCV RBC AUTO: 84.3 FL — SIGNIFICANT CHANGE UP (ref 80–100)
MONOCYTES # BLD AUTO: 1.35 K/UL — HIGH (ref 0–0.9)
MONOCYTES NFR BLD AUTO: 7.1 % — SIGNIFICANT CHANGE UP (ref 2–14)
NEUTROPHILS # BLD AUTO: 15.1 K/UL — HIGH (ref 1.8–7.4)
NEUTROPHILS NFR BLD AUTO: 79.9 % — HIGH (ref 43–77)
NRBC # BLD: 0 /100 WBCS — SIGNIFICANT CHANGE UP
NRBC # FLD: 0 K/UL — SIGNIFICANT CHANGE UP
PHOSPHATE SERPL-MCNC: 5.2 MG/DL — HIGH (ref 2.5–4.5)
PLATELET # BLD AUTO: 360 K/UL — SIGNIFICANT CHANGE UP (ref 150–400)
POTASSIUM SERPL-MCNC: 3.9 MMOL/L — SIGNIFICANT CHANGE UP (ref 3.5–5.3)
POTASSIUM SERPL-SCNC: 3.9 MMOL/L — SIGNIFICANT CHANGE UP (ref 3.5–5.3)
PROCALCITONIN SERPL-MCNC: 0.18 NG/ML — HIGH (ref 0.02–0.1)
PROT SERPL-MCNC: 7.1 G/DL — SIGNIFICANT CHANGE UP (ref 6–8.3)
PROTHROM AB SERPL-ACNC: 14.9 SEC — HIGH (ref 10.6–13.6)
RBC # BLD: 3.82 M/UL — SIGNIFICANT CHANGE UP (ref 3.8–5.2)
RBC # FLD: 14.8 % — HIGH (ref 10.3–14.5)
RH IG SCN BLD-IMP: POSITIVE — SIGNIFICANT CHANGE UP
SODIUM SERPL-SCNC: 139 MMOL/L — SIGNIFICANT CHANGE UP (ref 135–145)
WBC # BLD: 18.91 K/UL — HIGH (ref 3.8–10.5)
WBC # FLD AUTO: 18.91 K/UL — HIGH (ref 3.8–10.5)

## 2021-01-17 PROCEDURE — 99291 CRITICAL CARE FIRST HOUR: CPT

## 2021-01-17 RX ADMIN — HYDROMORPHONE HYDROCHLORIDE 1 MG/HR: 2 INJECTION INTRAMUSCULAR; INTRAVENOUS; SUBCUTANEOUS at 07:47

## 2021-01-17 RX ADMIN — Medication 1: at 16:43

## 2021-01-17 RX ADMIN — ENOXAPARIN SODIUM 40 MILLIGRAM(S): 100 INJECTION SUBCUTANEOUS at 17:39

## 2021-01-17 RX ADMIN — HYDROMORPHONE HYDROCHLORIDE 1 MG/HR: 2 INJECTION INTRAMUSCULAR; INTRAVENOUS; SUBCUTANEOUS at 19:39

## 2021-01-17 RX ADMIN — Medication 5.15 MICROGRAM(S)/KG/MIN: at 07:44

## 2021-01-17 RX ADMIN — MIDAZOLAM HYDROCHLORIDE 2.2 MG/KG/HR: 1 INJECTION, SOLUTION INTRAMUSCULAR; INTRAVENOUS at 07:46

## 2021-01-17 RX ADMIN — PIPERACILLIN AND TAZOBACTAM 25 GRAM(S): 4; .5 INJECTION, POWDER, LYOPHILIZED, FOR SOLUTION INTRAVENOUS at 07:47

## 2021-01-17 RX ADMIN — Medication 2: at 11:50

## 2021-01-17 RX ADMIN — ALBUTEROL 2 PUFF(S): 90 AEROSOL, METERED ORAL at 09:59

## 2021-01-17 RX ADMIN — PIPERACILLIN AND TAZOBACTAM 25 GRAM(S): 4; .5 INJECTION, POWDER, LYOPHILIZED, FOR SOLUTION INTRAVENOUS at 15:17

## 2021-01-17 RX ADMIN — ALBUTEROL 2 PUFF(S): 90 AEROSOL, METERED ORAL at 21:57

## 2021-01-17 RX ADMIN — CHLORHEXIDINE GLUCONATE 1 APPLICATION(S): 213 SOLUTION TOPICAL at 05:16

## 2021-01-17 RX ADMIN — CISATRACURIUM BESYLATE 19.8 MICROGRAM(S)/KG/MIN: 2 INJECTION INTRAVENOUS at 07:44

## 2021-01-17 RX ADMIN — ALBUTEROL 2 PUFF(S): 90 AEROSOL, METERED ORAL at 03:54

## 2021-01-17 RX ADMIN — Medication 5.15 MICROGRAM(S)/KG/MIN: at 19:39

## 2021-01-17 RX ADMIN — CISATRACURIUM BESYLATE 19.8 MICROGRAM(S)/KG/MIN: 2 INJECTION INTRAVENOUS at 19:39

## 2021-01-17 RX ADMIN — PROPOFOL 32.9 MICROGRAM(S)/KG/MIN: 10 INJECTION, EMULSION INTRAVENOUS at 19:39

## 2021-01-17 RX ADMIN — ALBUTEROL 2 PUFF(S): 90 AEROSOL, METERED ORAL at 15:14

## 2021-01-17 RX ADMIN — Medication 6 MILLIGRAM(S): at 05:29

## 2021-01-17 RX ADMIN — SENNA PLUS 10 MILLILITER(S): 8.6 TABLET ORAL at 11:44

## 2021-01-17 RX ADMIN — Medication 1 APPLICATION(S): at 05:29

## 2021-01-17 RX ADMIN — CHLORHEXIDINE GLUCONATE 15 MILLILITER(S): 213 SOLUTION TOPICAL at 05:30

## 2021-01-17 RX ADMIN — ENOXAPARIN SODIUM 40 MILLIGRAM(S): 100 INJECTION SUBCUTANEOUS at 05:29

## 2021-01-17 RX ADMIN — Medication 1 APPLICATION(S): at 17:40

## 2021-01-17 RX ADMIN — PIPERACILLIN AND TAZOBACTAM 25 GRAM(S): 4; .5 INJECTION, POWDER, LYOPHILIZED, FOR SOLUTION INTRAVENOUS at 00:18

## 2021-01-17 RX ADMIN — CHLORHEXIDINE GLUCONATE 15 MILLILITER(S): 213 SOLUTION TOPICAL at 17:40

## 2021-01-17 RX ADMIN — PROPOFOL 32.9 MICROGRAM(S)/KG/MIN: 10 INJECTION, EMULSION INTRAVENOUS at 07:45

## 2021-01-17 RX ADMIN — POLYETHYLENE GLYCOL 3350 17 GRAM(S): 17 POWDER, FOR SOLUTION ORAL at 11:44

## 2021-01-17 RX ADMIN — MIDAZOLAM HYDROCHLORIDE 2.2 MG/KG/HR: 1 INJECTION, SOLUTION INTRAMUSCULAR; INTRAVENOUS at 19:39

## 2021-01-17 NOTE — PROGRESS NOTE ADULT - ASSESSMENT
The patient is a 46-year-old woman who was admitted to the hospital for management acute respiratory failure with hypoxia secondary to covid-19 pneumonia, is status post intubation from 1/5 to 1/9 for management of acute respiratory distress syndrome, and who is now re-intubated and in the MICU for management of severe ARDS secondary to covid-19 pneumonia.     Neuro:  -The patient is sedated and intubated in the MICU with intravenous hydromorphone, propofol, and midazolam    CV:  -Vasoplegic shock, most likely secondary to administration of sedative medications; currently receiving norepinephrine for intravenous blood pressure support   -No known history of coronary artery disease or of arrhythmia; sinus rhythm on telemetry     Respiratory:  -Severe acute respiratory distress syndrome diagnosed; p:f ratio less than 100; will plan for prone positioning today   -Current ventilator settings: AC/VC, RR 28, , PEEP 8, FiO2 90; pulmonary compliance about 30-35  -No pleural effusions noted on imaging; no new fevers noted; most consistent with clinical worsening of underlying covid pneumonia; superimposed bacterial infection unlikely  -Administering 6 mg of dexamethasone daily per decreased mortality and icu length of stay noted in dexa-ards trial   -Respiratory acidosis noted (permissive hypercapnia) with appropriate metabolic compensation     Renal:  -Serum creatinine at baseline; will continue to monitor urine output; no acute kidney injury noted at this time   -Mild hyponatremia noted; possibly secondary to siadh in setting of pulmonary pathology   -Will continue to monitor daily metabolic panel     ID:  -Status post administration of ten-day course of dexamethasone and five-day course of remdesivir for treatment of severe covid-19   -In the setting of need for re-intubation, administering zosyn for empiric coverage of superimposed bacterial pneumonia; will follow up blood and sputum cultures     Endo:  -No active concerns at this time   -A1c 6.6, meeting criteria for diagnosis of diabetes mellitus; sliding scale insulin ordered     GI:  -NPO at this time in the setting of prone positioning; will monitor bowel movements  The patient is a 46-year-old woman who was admitted to the hospital for management acute respiratory failure with hypoxia secondary to covid-19 pneumonia, is status post intubation from 1/5 to 1/9 for management of acute respiratory distress syndrome, and who is now re-intubated and in the MICU for management of severe ARDS secondary to covid-19 pneumonia.     Neuro:  -The patient is sedated and intubated in the MICU with intravenous hydromorphone, propofol, and midazolam. Paralysis with nimbex.     CV:  -Vasoplegic shock, most likely secondary to administration of sedative medications; currently receiving norepinephrine for intravenous blood pressure support   -No known history of coronary artery disease or of arrhythmia; sinus rhythm on telemetry     Respiratory:  -Severe acute respiratory distress syndrome diagnosed; s/p proning yesterday with improvement in p/f ratio >100  -Current ventilator settings: AC/VC, RR 28, , PEEP 8, FiO2 90; pulmonary compliance about 30-35  -No pleural effusions noted on imaging; no new fevers noted; most consistent with clinical worsening of underlying covid pneumonia; superimposed bacterial infection unlikely  -Administering 6 mg of dexamethasone daily per decreased mortality and icu length of stay noted in dexa-ards trial   -Respiratory acidosis noted (permissive hypercapnia) with appropriate metabolic compensation     Renal:  -Serum creatinine at baseline; will continue to monitor urine output; no acute kidney injury noted at this time   -Mild hyponatremia noted; possibly secondary to siadh in setting of pulmonary pathology   -Will continue to monitor daily metabolic panel     ID:  -Status post administration of ten-day course of dexamethasone and five-day course of remdesivir for treatment of severe covid-19   -In the setting of need for re-intubation, administering zosyn (1/16-) for empiric coverage of superimposed bacterial pneumonia;   -follow up blood and sputum cultures     Endo:  -No active concerns at this time   -A1c 6.6, meeting criteria for diagnosis of diabetes mellitus; sliding scale insulin ordered     GI:  -NPO at this time in the setting of prone positioning; will monitor bowel movements     Heme:  -DVT ppx: lovenox 40 q12h  -pending LE duplex r/o DVT   The patient is a 46-year-old woman who was admitted to the hospital for management acute respiratory failure with hypoxia secondary to covid-19 pneumonia, is status post intubation from 1/5 to 1/9 for management of acute respiratory distress syndrome, and who is now re-intubated and in the MICU for management of severe ARDS secondary to covid-19 pneumonia.     Neuro:  -The patient is sedated and intubated in the MICU with intravenous hydromorphone, propofol, and midazolam. Paralysis with nimbex.     CV:  -Vasoplegic shock, most likely secondary to administration of sedative medications; currently receiving norepinephrine for intravenous blood pressure support   -No known history of coronary artery disease or of arrhythmia; sinus rhythm on telemetry     Respiratory:  -Severe acute respiratory distress syndrome diagnosed; s/p proning yesterday with improvement in p/f ratio to 134. Prone again today until FiO2<60.  -Current ventilator settings: AC/VC, RR 28, , PEEP 8, FiO2 90; pulmonary compliance about 30-35  -No pleural effusions noted on imaging; no new fevers noted; most consistent with clinical worsening of underlying covid pneumonia; superimposed bacterial infection unlikely  -Administering 6 mg of dexamethasone daily per decreased mortality and icu length of stay noted in dexa-ards trial   -Respiratory acidosis noted (permissive hypercapnia) with appropriate metabolic compensation     Renal:  -Serum creatinine at baseline; will continue to monitor urine output; no acute kidney injury noted at this time   -Mild hyponatremia noted; possibly secondary to siadh in setting of pulmonary pathology   -Will continue to monitor daily metabolic panel     ID:  -Status post administration of ten-day course of dexamethasone and five-day course of remdesivir for treatment of severe covid-19   -In the setting of need for re-intubation, administering zosyn (1/16-) for empiric coverage of superimposed bacterial pneumonia;   -follow up blood and sputum cultures     Endo:  -No active concerns at this time   -A1c 6.6, meeting criteria for diagnosis of diabetes mellitus; sliding scale insulin ordered     GI:  - start trickle feeds  - monitor bowel movements     Heme:  -DVT ppx: lovenox 40 q12h  -pending LE duplex r/o DVT

## 2021-01-17 NOTE — PROGRESS NOTE ADULT - SUBJECTIVE AND OBJECTIVE BOX
Date of Service: 01-17-21 @ 10:36    Patient is a 46y old  Female who presents with a chief complaint of COVID (17 Jan 2021 07:10)      Any change in ROS: Pt was on bipap on floor and then subsequently intuabted: now in MICU : sedated and intuabted now:     MEDICATIONS  (STANDING):  ALBUTerol    90 MICROgram(s) HFA Inhaler 2 Puff(s) Inhalation every 6 hours  chlorhexidine 0.12% Liquid 15 milliLiter(s) Oral Mucosa every 12 hours  chlorhexidine 2% Cloths 1 Application(s) Topical <User Schedule>  cisatracurium Infusion 3 MICROgram(s)/kG/Min (19.8 mL/Hr) IV Continuous <Continuous>  dexAMETHasone  Injectable 6 milliGRAM(s) IV Push daily  dextrose 40% Gel 15 Gram(s) Oral once  dextrose 5%. 1000 milliLiter(s) (50 mL/Hr) IV Continuous <Continuous>  dextrose 5%. 1000 milliLiter(s) (100 mL/Hr) IV Continuous <Continuous>  dextrose 50% Injectable 25 Gram(s) IV Push once  dextrose 50% Injectable 12.5 Gram(s) IV Push once  dextrose 50% Injectable 25 Gram(s) IV Push once  enoxaparin Injectable 40 milliGRAM(s) SubCutaneous every 12 hours  glucagon  Injectable 1 milliGRAM(s) IntraMuscular once  HYDROmorphone Infusion. 1 mG/Hr (1 mL/Hr) IV Continuous <Continuous>  insulin lispro (ADMELOG) corrective regimen sliding scale   SubCutaneous every 6 hours  midazolam Infusion 0.02 mG/kG/Hr (2.2 mL/Hr) IV Continuous <Continuous>  norepinephrine Infusion 0.05 MICROgram(s)/kG/Min (5.15 mL/Hr) IV Continuous <Continuous>  petrolatum Ophthalmic Ointment 1 Application(s) Both EYES two times a day  piperacillin/tazobactam IVPB.. 3.375 Gram(s) IV Intermittent every 8 hours  polyethylene glycol 3350 17 Gram(s) Oral daily  propofol Infusion 50 MICROgram(s)/kG/Min (32.9 mL/Hr) IV Continuous <Continuous>  senna Syrup 10 milliLiter(s) Oral daily    MEDICATIONS  (PRN):    Vital Signs Last 24 Hrs  T(C): 36.3 (17 Jan 2021 08:00), Max: 36.4 (16 Jan 2021 12:00)  T(F): 97.4 (17 Jan 2021 08:00), Max: 97.6 (16 Jan 2021 12:00)  HR: 61 (17 Jan 2021 10:01) (61 - 82)  BP: --  BP(mean): --  RR: 28 (17 Jan 2021 09:00) (20 - 28)  SpO2: 100% (17 Jan 2021 10:01) (95% - 100%)  Mode: AC/ CMV (Assist Control/ Continuous Mandatory Ventilation)  RR (machine): 28  TV (machine): 350  FiO2: 90  PEEP: 8  ITime: 0.7  MAP: 14  PIP: 29    I&O's Summary    16 Jan 2021 07:01  -  17 Jan 2021 07:00  --------------------------------------------------------  IN: 1986.2 mL / OUT: 3700 mL / NET: -1713.8 mL    17 Jan 2021 07:01  -  17 Jan 2021 10:36  --------------------------------------------------------  IN: 309 mL / OUT: 700 mL / NET: -391 mL          Physical Exam:   GENERAL:Obese+  HEENT: LALIT/   Atraumatic, Normocephalic  ENMT: No tonsillar erythema, exudates, or enlargement; Moist mucous membranes, Good dentition, No lesions  NECK: Supple, No JVD, Normal thyroid  CHEST/LUNG: Clear to auscultaion, ; No rales, rhonchi, wheezing, or rubs  CVS: Regular rate and rhythm; No murmurs, rubs, or gallops  GI: : Soft, Nontender, Nondistended; Bowel sounds present  NERVOUS SYSTEM:  Sedatred and intuabtes  EXTREMITIES: - edema  LYMPH: No lymphadenopathy noted  SKIN: No rashes or lesions  ENDOCRINOLOGY: No Thyromegaly  PSYCH: sedated    Labs:  ABG - ( 17 Jan 2021 06:43 )  pH, Arterial: 7.35  pH, Blood: x     /  pCO2: 44    /  pO2: 121   / HCO3: 23    / Base Excess: -1.2  /  SaO2: 98.2                                        10.4   18.91 )-----------( 360      ( 17 Jan 2021 00:37 )             32.2                         10.3   21.84 )-----------( 433      ( 16 Jan 2021 06:27 )             31.0                         10.7   22.05 )-----------( 383      ( 15 José Manuel 2021 05:01 )             32.1                         11.1   19.43 )-----------( 356      ( 14 Jan 2021 08:15 )             33.9     01-17    139  |  103  |  10  ----------------------------<  140<H>  3.9   |  22  |  0.54  01-16    129<L>  |  96<L>  |  20  ----------------------------<  128<H>  3.9   |  22  |  0.67  01-15    129<L>  |  95<L>  |  16  ----------------------------<  110<H>  4.7   |  21<L>  |  0.58  01-14    132<L>  |  95<L>  |  17  ----------------------------<  112<H>  3.9   |  22  |  0.55    Ca    9.1      17 Jan 2021 00:37  Ca    8.7      16 Jan 2021 06:27  Phos  5.2     01-17  Phos  3.7     01-16  Mg     2.3     01-17  Mg     2.0     01-16    TPro  7.1  /  Alb  3.1<L>  /  TBili  0.8  /  DBili  x   /  AST  12  /  ALT  29  /  AlkPhos  111  01-17  TPro  7.1  /  Alb  3.1<L>  /  TBili  0.8  /  DBili  x   /  AST  26  /  ALT  38<H>  /  AlkPhos  113  01-16  TPro  7.5  /  Alb  3.2<L>  /  TBili  0.8  /  DBili  x   /  AST  31  /  ALT  47<H>  /  AlkPhos  126<H>  01-15  TPro  7.3  /  Alb  3.1<L>  /  TBili  1.2  /  DBili  x   /  AST  36<H>  /  ALT  48<H>  /  AlkPhos  124<H>  01-14    CAPILLARY BLOOD GLUCOSE      POCT Blood Glucose.: 123 mg/dL (17 Jan 2021 05:21)  POCT Blood Glucose.: 127 mg/dL (17 Jan 2021 00:12)  POCT Blood Glucose.: 140 mg/dL (16 Jan 2021 17:36)  POCT Blood Glucose.: 176 mg/dL (16 Jan 2021 12:48)      LIVER FUNCTIONS - ( 17 Jan 2021 00:37 )  Alb: 3.1 g/dL / Pro: 7.1 g/dL / ALK PHOS: 111 U/L / ALT: 29 U/L / AST: 12 U/L / GGT: x           PT/INR - ( 17 Jan 2021 00:37 )   PT: 14.9 sec;   INR: 1.31 ratio         PTT - ( 17 Jan 2021 00:37 )  PTT:34.0 sec    D-Dimer Assay, Quantitative: 727 ng/mL DDU (01-17 @ 00:37)  D-Dimer Assay, Quantitative: 886 ng/mL DDU (01-16 @ 06:27)  D-Dimer Assay, Quantitative: 1255 ng/mL DDU (01-14 @ 08:15)  D-Dimer Assay, Quantitative: 2189 ng/mL DDU (01-13 @ 07:30)  D-Dimer Assay, Quantitative: 1118 ng/mL DDU (01-12 @ 13:28)  D-Dimer Assay, Quantitative: FAILED ng/mL DDU (01-12 @ 10:49)  Procalcitonin, Serum: 0.18 ng/mL (01-17 @ 00:37)  Procalcitonin, Serum: 0.10 ng/mL (01-16 @ 06:27)  Procalcitonin, Serum: 0.07 ng/mL (01-14 @ 08:15)        RECENT CULTURES:        RESPIRATORY CULTURES:      < from: Xray Chest 1 View- PORTABLE-Urgent (Xray Chest 1 View- PORTABLE-Urgent .) (01.16.21 @ 07:52) >      PROCEDURE DATE:  Jan 16 2021         INTERPRETATION:  HISTORY: Shortness of breath    TECHNIQUE: Single frontal views of the chest obtained on 1/16/2021 at 2:48 AM and 7:44 AM with comparison to 1/12/2021    FINDINGS:    1/16/2021 at 2:48 AM: Heart is mildly prominent. Diffuse bilateral increased interstitial changes and airspace disease worsened as compared to prior exam. Apices are obscured by patient's head. Visualized osseous structures are within normal limits.    7:44 AM: Interval placement of an endotracheal tube and NG tube. Left internal jugular line with tip in the SVC. Heart is normal in size. Increased interstitial markings bilaterally unchanged. Apices are unremarkable. Visualized osseous structures within normal limits.        IMPRESSION:    Interval placement of endotracheal tube and NG tube and left internal jugular line.    Increasing interstitial markings and airspace disease bilaterally.              LACHELLE ASENCIO MD; Attending Radiologist  This document has been electronically signed. Jan 16 2021 10:44AM    < end of copied text >      Studies  Chest X-RAY  CT SCAN Chest   Venous Dopplers: LE:   CT Abdomen  Others

## 2021-01-17 NOTE — PROGRESS NOTE ADULT - ASSESSMENT
Patient is a 46 year old woman with no past medical history coming in with shortness of breath, cough productive of white sputum, pleuritic rib pain (moderate in severity) for two days and diarrhea for one day.    COVID 19 PNEUMONIA: WITH HYPOXIC RESP FAILURE:     1/13:  COVID 19 PNEUMONIA: WITH HYPOXIC RESP FAILURE:     She was intubated in MICU nad then extubated successfully and transferred to floor: pulm called today to evaluate Pt is on bipap 100% sao2 in high 80's and tachypneic   She looks in resp distress:   Her d Dimer bumped up yesterday and was switched to full AC:  She isnot doing well and may need MICU again:   ALT is mildly elevated and renal profile is normal   repeat crp and ferritin:  ??firgtehr course of steroids    STEPHANIE PMD and rn at [facility]:   stephanie acp    1/14:  COVID 19 PNEUMONIA: WITH HYPOXIC RESP FAILURE:   She was intubated in MICU and then extubated successfully and transferred to floor: pulm called today to evaluate Pt is on bipap 100% sao2 in high 80's and tachypneic : MICU evaluation done again yesterday and she refused for intubation  She does not looks in resp distress today   Her d Dimer bumped up yesterday and was switched to full AC: d dimer is decreased today   She is not doing well today too and may need MICU again:   LFT's are mildly elevated and renal profile is normal   repeat crp and ferritin: high still   dw acp    1/15:    COVID 19 PNEUMONIA: WITH HYPOXIC RESP FAILURE:   She is on bipap; tachypneic on 100% bipap: she had earlier refused for intubation she was intubated once: before :  She does not looks in resp distress today   Her d Dimer bumped up yesterday and was switched to full AC:   She is not doing well today again and s eis very hypoxic: she may need intubation any time if she agrees:     LFT's are mildly elevated and renal profile is normal   crp and ferritin: high still   dw PMD   pt is not doing well and she has SOB and is tachypneic and on bipap at 100% fio2: She is already on full time AC:       1/17:    COVID 19 PNEUMONIA: WITH HYPOXIC RESP FAILURE:   intuabted and sedated and paralysed in MICU : requiring high o2 and PEEP"   Shock: on pressors: She was started on empiric antibiotics after the intubation   Her d Dimer is decreasing     LFT's are mildly elevated and renal profile is normal   crp and ferritin: today are still high    on dvt emmy [facility] team

## 2021-01-17 NOTE — PROGRESS NOTE ADULT - ATTENDING COMMENTS
46 F with DM2 here with acute hypoxemic respiratory failure 2/2 COVID requiring intubation, extubated to NIPPV, switched to HFNC and then back on NIPPV with progressively worsening acute hypoxemic respiratory failure requiring intubation due to respiratory distress/failure, likely progression of disease.  Intubated, sedated, paralyzed, still with severe ARDS.      Supine today, but p/f ratio 134, will prone again.  check cultures.  Plateau pressure acceptable.

## 2021-01-17 NOTE — PROGRESS NOTE ADULT - SUBJECTIVE AND OBJECTIVE BOX
CHIEF COMPLAINT: Patient is a 46y old  Female who presents with a chief complaint of COVID (16 Jan 2021 15:45)    Interval Events:    REVIEW OF SYSTEMS:  Constitutional:     [ ] negative [ ] fevers [ ] chills [ ] weight loss [ ] weight gain  HEENT:                  [ ] negative [ ] dry eyes [ ] eye irritation [ ] postnasal drip [ ] nasal congestion  CV:                         [ ] negative  [ ] chest pain [ ] orthopnea [ ] palpitations [ ] murmur  Resp:                     [ ] negative [ ] cough [ ] shortness of breath [ ] dyspnea [ ] wheezing [ ] sputum [ ] hemoptysis  GI:                          [ ] negative [ ] nausea [ ] vomiting [ ] diarrhea [ ] constipation [ ] abd pain [ ] dysphagia   :                        [ ] negative [ ] dysuria [ ] nocturia [ ] hematuria [ ] increased urinary frequency  Musculoskeletal: [ ] negative [ ] back pain [ ] myalgias [ ] arthralgias [ ] fracture  Skin:                       [ ] negative [ ] rash [ ] itch  Neurological:        [ ] negative [ ] headache [ ] dizziness [ ] syncope [ ] weakness [ ] numbness  Psychiatric:           [ ] negative [ ] anxiety [ ] depression  Endocrine:            [ ] negative [ ] diabetes [ ] thyroid problem  Heme/Lymph:      [ ] negative [ ] anemia [ ] bleeding problem  Allergic/Immune: [ ] negative [ ] itchy eyes [ ] nasal discharge [ ] hives [ ] angioedema    [ ] All other systems negative  [x ] Unable to assess ROS because pt intubated and sedated.    OBJECTIVE:  ICU Vital Signs Last 24 Hrs  T(C): 36.2 (17 Jan 2021 04:00), Max: 36.5 (16 Jan 2021 08:00)  T(F): 97.2 (17 Jan 2021 04:00), Max: 97.7 (16 Jan 2021 08:00)  HR: 75 (17 Jan 2021 06:00) (75 - 84)  BP: --  BP(mean): --  ABP: 97/56 (17 Jan 2021 06:00) (89/54 - 116/61)  ABP(mean): 74 (17 Jan 2021 06:00) (71 - 86)  RR: 28 (17 Jan 2021 06:00) (20 - 28)  SpO2: 97% (17 Jan 2021 06:00) (93% - 100%)    Mode: AC/ CMV (Assist Control/ Continuous Mandatory Ventilation), RR (machine): 28, TV (machine): 350, FiO2: 90, PEEP: 8, ITime: 0.81, MAP: 17, PIP: 36    01-16 @ 07:01  -  01-17 @ 07:00  --------------------------------------------------------  IN: 1986.2 mL / OUT: 3700 mL / NET: -1713.8 mL      CAPILLARY BLOOD GLUCOSE      POCT Blood Glucose.: 123 mg/dL (17 Jan 2021 05:21)      PHYSICAL EXAM:  General: WN/WD NAD  HEENT: PERRLA, EOMI, moist mucous membranes  Neurology: A&Ox3, nonfocal, SU x 4  Respiratory: CTA B/L, normal respiratory effort, no wheezes, crackles, rales  CV: RRR, S1S2, no murmurs, rubs or gallops  Abdominal: Soft, NT, ND +BS, Last BM  Extremities: No edema, + peripheral pulses  Incisions:   Tubes:    HOSPITAL MEDICATIONS:  MEDICATIONS  (STANDING):  ALBUTerol    90 MICROgram(s) HFA Inhaler 2 Puff(s) Inhalation every 6 hours  chlorhexidine 0.12% Liquid 15 milliLiter(s) Oral Mucosa every 12 hours  chlorhexidine 2% Cloths 1 Application(s) Topical <User Schedule>  cisatracurium Infusion 3 MICROgram(s)/kG/Min (19.8 mL/Hr) IV Continuous <Continuous>  dexAMETHasone  Injectable 6 milliGRAM(s) IV Push daily  dextrose 40% Gel 15 Gram(s) Oral once  dextrose 5%. 1000 milliLiter(s) (50 mL/Hr) IV Continuous <Continuous>  dextrose 5%. 1000 milliLiter(s) (100 mL/Hr) IV Continuous <Continuous>  dextrose 50% Injectable 25 Gram(s) IV Push once  dextrose 50% Injectable 12.5 Gram(s) IV Push once  dextrose 50% Injectable 25 Gram(s) IV Push once  enoxaparin Injectable 40 milliGRAM(s) SubCutaneous every 12 hours  glucagon  Injectable 1 milliGRAM(s) IntraMuscular once  HYDROmorphone Infusion. 1 mG/Hr (1 mL/Hr) IV Continuous <Continuous>  insulin lispro (ADMELOG) corrective regimen sliding scale   SubCutaneous every 6 hours  midazolam Infusion 0.02 mG/kG/Hr (2.2 mL/Hr) IV Continuous <Continuous>  norepinephrine Infusion 0.05 MICROgram(s)/kG/Min (5.15 mL/Hr) IV Continuous <Continuous>  petrolatum Ophthalmic Ointment 1 Application(s) Both EYES two times a day  piperacillin/tazobactam IVPB.. 3.375 Gram(s) IV Intermittent every 8 hours  polyethylene glycol 3350 17 Gram(s) Oral daily  propofol Infusion 50 MICROgram(s)/kG/Min (32.9 mL/Hr) IV Continuous <Continuous>  senna Syrup 10 milliLiter(s) Oral daily    MEDICATIONS  (PRN):      LABS:  (01-17 @ 00:37)                        10.4  18.91 )-----------( 360                 32.2    Neutrophils = 15.10 (79.9%)  Lymphocytes = 1.64 (8.7%)  Eosinophils = 0.46 (2.4%)  Basophils = 0.08 (0.4%)  Monocytes = 1.35 (7.1%)  Bands = --%    WBC Trend: 18.91<--, 21.84<--, 22.05<--  Hb Trend: 10.4<--, 10.3<--, 10.7<--, 11.1<--, 10.4<--  Plt Trend: 360<--, 433<--, 383<--, 356<--, 383<--  01-17    139  |  103  |  10  ----------------------------<  140<H>  3.9   |  22  |  0.54    Ca    9.1      17 Jan 2021 00:37  Phos  5.2     01-17  Mg     2.3     01-17    TPro  7.1  /  Alb  3.1<L>  /  TBili  0.8  /  DBili  x   /  AST  12  /  ALT  29  /  AlkPhos  111  01-17    Creatinine Trend: 0.54<--, 0.67<--, 0.58<--, 0.55<--, 0.63<--, 0.59<--  PT/INR - ( 17 Jan 2021 00:37 )   PT: 14.9 sec;   INR: 1.31 ratio         PTT - ( 17 Jan 2021 00:37 )  PTT:34.0 sec    Arterial Blood Gas:  01-17 @ 06:43  7.35/44/121/23/98.2/-1.2  ABG lactate: --  Arterial Blood Gas:  01-17 @ 00:37  7.33/48/143/23/98.4/-0.9  ABG lactate: --  Arterial Blood Gas:  01-16 @ 15:36  7.30/49/113/22/97.6/-1.9  ABG lactate: --  Arterial Blood Gas:  01-16 @ 10:50  7.29/49/79/21/93.4/-3.0  ABG lactate: --  Arterial Blood Gas:  01-16 @ 06:27  7.32/44/76/22/92.7/-2.8  ABG lactate: --  Arterial Blood Gas:  01-16 @ 03:35  7.45/34/64/24/91.3/-0.7  ABG lactate: --            MICROBIOLOGY:   Blood Cx:  Urine Cx:  Sputum Cx:  Legionella:  RVP:    RADIOLOGY:  X Ray:  CT:  MRI:  Ultrasound:  [ ] Reviewed and interpreted by me    EKG:   CHIEF COMPLAINT: Patient is a 46y old  Female who presents with a chief complaint of COVID (16 Jan 2021 15:45)    Interval Events: Placed supine at 5am. AM abg 7.35/44/121.    REVIEW OF SYSTEMS:  Constitutional:     [ ] negative [ ] fevers [ ] chills [ ] weight loss [ ] weight gain  HEENT:                  [ ] negative [ ] dry eyes [ ] eye irritation [ ] postnasal drip [ ] nasal congestion  CV:                         [ ] negative  [ ] chest pain [ ] orthopnea [ ] palpitations [ ] murmur  Resp:                     [ ] negative [ ] cough [ ] shortness of breath [ ] dyspnea [ ] wheezing [ ] sputum [ ] hemoptysis  GI:                          [ ] negative [ ] nausea [ ] vomiting [ ] diarrhea [ ] constipation [ ] abd pain [ ] dysphagia   :                        [ ] negative [ ] dysuria [ ] nocturia [ ] hematuria [ ] increased urinary frequency  Musculoskeletal: [ ] negative [ ] back pain [ ] myalgias [ ] arthralgias [ ] fracture  Skin:                       [ ] negative [ ] rash [ ] itch  Neurological:        [ ] negative [ ] headache [ ] dizziness [ ] syncope [ ] weakness [ ] numbness  Psychiatric:           [ ] negative [ ] anxiety [ ] depression  Endocrine:            [ ] negative [ ] diabetes [ ] thyroid problem  Heme/Lymph:      [ ] negative [ ] anemia [ ] bleeding problem  Allergic/Immune: [ ] negative [ ] itchy eyes [ ] nasal discharge [ ] hives [ ] angioedema    [ ] All other systems negative  [x ] Unable to assess ROS because pt intubated and sedated.    OBJECTIVE:  ICU Vital Signs Last 24 Hrs  T(C): 36.2 (17 Jan 2021 04:00), Max: 36.5 (16 Jan 2021 08:00)  T(F): 97.2 (17 Jan 2021 04:00), Max: 97.7 (16 Jan 2021 08:00)  HR: 75 (17 Jan 2021 06:00) (75 - 84)  BP: --  BP(mean): --  ABP: 97/56 (17 Jan 2021 06:00) (89/54 - 116/61)  ABP(mean): 74 (17 Jan 2021 06:00) (71 - 86)  RR: 28 (17 Jan 2021 06:00) (20 - 28)  SpO2: 97% (17 Jan 2021 06:00) (93% - 100%)    Mode: AC/ CMV (Assist Control/ Continuous Mandatory Ventilation), RR (machine): 28, TV (machine): 350, FiO2: 90, PEEP: 8, ITime: 0.81, MAP: 17, PIP: 36    01-16 @ 07:01  -  01-17 @ 07:00  --------------------------------------------------------  IN: 1986.2 mL / OUT: 3700 mL / NET: -1713.8 mL      CAPILLARY BLOOD GLUCOSE      POCT Blood Glucose.: 123 mg/dL (17 Jan 2021 05:21)      PHYSICAL EXAM:  General: WN/WD NAD  HEENT: PERRLA, EOMI, moist mucous membranes  Neurology: Sedated and intubated on mechanical ventilator   Respiratory: CTA B/L, normal respiratory effort, no wheezes, crackles, rales  CV: RRR, S1S2, no murmurs, rubs or gallops  Abdominal: Soft, NT, ND +BS, Last BM  Extremities: No edema, + peripheral pulses      HOSPITAL MEDICATIONS:  MEDICATIONS  (STANDING):  ALBUTerol    90 MICROgram(s) HFA Inhaler 2 Puff(s) Inhalation every 6 hours  chlorhexidine 0.12% Liquid 15 milliLiter(s) Oral Mucosa every 12 hours  chlorhexidine 2% Cloths 1 Application(s) Topical <User Schedule>  cisatracurium Infusion 3 MICROgram(s)/kG/Min (19.8 mL/Hr) IV Continuous <Continuous>  dexAMETHasone  Injectable 6 milliGRAM(s) IV Push daily  dextrose 40% Gel 15 Gram(s) Oral once  dextrose 5%. 1000 milliLiter(s) (50 mL/Hr) IV Continuous <Continuous>  dextrose 5%. 1000 milliLiter(s) (100 mL/Hr) IV Continuous <Continuous>  dextrose 50% Injectable 25 Gram(s) IV Push once  dextrose 50% Injectable 12.5 Gram(s) IV Push once  dextrose 50% Injectable 25 Gram(s) IV Push once  enoxaparin Injectable 40 milliGRAM(s) SubCutaneous every 12 hours  glucagon  Injectable 1 milliGRAM(s) IntraMuscular once  HYDROmorphone Infusion. 1 mG/Hr (1 mL/Hr) IV Continuous <Continuous>  insulin lispro (ADMELOG) corrective regimen sliding scale   SubCutaneous every 6 hours  midazolam Infusion 0.02 mG/kG/Hr (2.2 mL/Hr) IV Continuous <Continuous>  norepinephrine Infusion 0.05 MICROgram(s)/kG/Min (5.15 mL/Hr) IV Continuous <Continuous>  petrolatum Ophthalmic Ointment 1 Application(s) Both EYES two times a day  piperacillin/tazobactam IVPB.. 3.375 Gram(s) IV Intermittent every 8 hours  polyethylene glycol 3350 17 Gram(s) Oral daily  propofol Infusion 50 MICROgram(s)/kG/Min (32.9 mL/Hr) IV Continuous <Continuous>  senna Syrup 10 milliLiter(s) Oral daily    MEDICATIONS  (PRN):      LABS:  (01-17 @ 00:37)                        10.4  18.91 )-----------( 360                 32.2    Neutrophils = 15.10 (79.9%)  Lymphocytes = 1.64 (8.7%)  Eosinophils = 0.46 (2.4%)  Basophils = 0.08 (0.4%)  Monocytes = 1.35 (7.1%)  Bands = --%    WBC Trend: 18.91<--, 21.84<--, 22.05<--  Hb Trend: 10.4<--, 10.3<--, 10.7<--, 11.1<--, 10.4<--  Plt Trend: 360<--, 433<--, 383<--, 356<--, 383<--  01-17    139  |  103  |  10  ----------------------------<  140<H>  3.9   |  22  |  0.54    Ca    9.1      17 Jan 2021 00:37  Phos  5.2     01-17  Mg     2.3     01-17    TPro  7.1  /  Alb  3.1<L>  /  TBili  0.8  /  DBili  x   /  AST  12  /  ALT  29  /  AlkPhos  111  01-17    Creatinine Trend: 0.54<--, 0.67<--, 0.58<--, 0.55<--, 0.63<--, 0.59<--  PT/INR - ( 17 Jan 2021 00:37 )   PT: 14.9 sec;   INR: 1.31 ratio         PTT - ( 17 Jan 2021 00:37 )  PTT:34.0 sec    Arterial Blood Gas:  01-17 @ 06:43  7.35/44/121/23/98.2/-1.2  ABG lactate: --  Arterial Blood Gas:  01-17 @ 00:37  7.33/48/143/23/98.4/-0.9  ABG lactate: --  Arterial Blood Gas:  01-16 @ 15:36  7.30/49/113/22/97.6/-1.9  ABG lactate: --  Arterial Blood Gas:  01-16 @ 10:50  7.29/49/79/21/93.4/-3.0  ABG lactate: --  Arterial Blood Gas:  01-16 @ 06:27  7.32/44/76/22/92.7/-2.8  ABG lactate: --  Arterial Blood Gas:  01-16 @ 03:35  7.45/34/64/24/91.3/-0.7  ABG lactate: --            MICROBIOLOGY:   Blood Cx:  Urine Cx:  Sputum Cx:  Legionella:  RVP:    RADIOLOGY:  X Ray:  CT:  MRI:  Ultrasound:  [ ] Reviewed and interpreted by me    EKG:

## 2021-01-18 LAB
ALBUMIN SERPL ELPH-MCNC: 2.9 G/DL — LOW (ref 3.3–5)
ALBUMIN SERPL ELPH-MCNC: 3.1 G/DL — LOW (ref 3.3–5)
ALP SERPL-CCNC: 103 U/L — SIGNIFICANT CHANGE UP (ref 40–120)
ALP SERPL-CCNC: 108 U/L — SIGNIFICANT CHANGE UP (ref 40–120)
ALT FLD-CCNC: 19 U/L — SIGNIFICANT CHANGE UP (ref 4–33)
ALT FLD-CCNC: 21 U/L — SIGNIFICANT CHANGE UP (ref 4–33)
ANION GAP SERPL CALC-SCNC: 11 MMOL/L — SIGNIFICANT CHANGE UP (ref 7–14)
ANION GAP SERPL CALC-SCNC: 8 MMOL/L — SIGNIFICANT CHANGE UP (ref 7–14)
APTT BLD: 28.2 SEC — SIGNIFICANT CHANGE UP (ref 27–36.3)
AST SERPL-CCNC: 10 U/L — SIGNIFICANT CHANGE UP (ref 4–32)
AST SERPL-CCNC: 14 U/L — SIGNIFICANT CHANGE UP (ref 4–32)
BASOPHILS # BLD AUTO: 0.03 K/UL — SIGNIFICANT CHANGE UP (ref 0–0.2)
BASOPHILS NFR BLD AUTO: 0.2 % — SIGNIFICANT CHANGE UP (ref 0–2)
BILIRUB SERPL-MCNC: 0.4 MG/DL — SIGNIFICANT CHANGE UP (ref 0.2–1.2)
BILIRUB SERPL-MCNC: 0.4 MG/DL — SIGNIFICANT CHANGE UP (ref 0.2–1.2)
BLOOD GAS ARTERIAL COMPREHENSIVE RESULT: SIGNIFICANT CHANGE UP
BUN SERPL-MCNC: 10 MG/DL — SIGNIFICANT CHANGE UP (ref 7–23)
BUN SERPL-MCNC: 8 MG/DL — SIGNIFICANT CHANGE UP (ref 7–23)
CALCIUM SERPL-MCNC: 8.9 MG/DL — SIGNIFICANT CHANGE UP (ref 8.4–10.5)
CALCIUM SERPL-MCNC: 9.2 MG/DL — SIGNIFICANT CHANGE UP (ref 8.4–10.5)
CHLORIDE SERPL-SCNC: 105 MMOL/L — SIGNIFICANT CHANGE UP (ref 98–107)
CHLORIDE SERPL-SCNC: 107 MMOL/L — SIGNIFICANT CHANGE UP (ref 98–107)
CO2 SERPL-SCNC: 26 MMOL/L — SIGNIFICANT CHANGE UP (ref 22–31)
CO2 SERPL-SCNC: 29 MMOL/L — SIGNIFICANT CHANGE UP (ref 22–31)
CREAT SERPL-MCNC: 0.52 MG/DL — SIGNIFICANT CHANGE UP (ref 0.5–1.3)
CREAT SERPL-MCNC: 0.59 MG/DL — SIGNIFICANT CHANGE UP (ref 0.5–1.3)
EOSINOPHIL # BLD AUTO: 0 K/UL — SIGNIFICANT CHANGE UP (ref 0–0.5)
EOSINOPHIL NFR BLD AUTO: 0 % — SIGNIFICANT CHANGE UP (ref 0–6)
GLUCOSE SERPL-MCNC: 178 MG/DL — HIGH (ref 70–99)
GLUCOSE SERPL-MCNC: 204 MG/DL — HIGH (ref 70–99)
HCT VFR BLD CALC: 31 % — LOW (ref 34.5–45)
HGB BLD-MCNC: 9.8 G/DL — LOW (ref 11.5–15.5)
IANC: 17.32 K/UL — HIGH (ref 1.5–8.5)
IMM GRANULOCYTES NFR BLD AUTO: 1.6 % — HIGH (ref 0–1.5)
INR BLD: 1.28 RATIO — HIGH (ref 0.88–1.16)
LYMPHOCYTES # BLD AUTO: 1.04 K/UL — SIGNIFICANT CHANGE UP (ref 1–3.3)
LYMPHOCYTES # BLD AUTO: 5.2 % — LOW (ref 13–44)
MAGNESIUM SERPL-MCNC: 2.3 MG/DL — SIGNIFICANT CHANGE UP (ref 1.6–2.6)
MCHC RBC-ENTMCNC: 27.2 PG — SIGNIFICANT CHANGE UP (ref 27–34)
MCHC RBC-ENTMCNC: 31.6 GM/DL — LOW (ref 32–36)
MCV RBC AUTO: 86.1 FL — SIGNIFICANT CHANGE UP (ref 80–100)
MONOCYTES # BLD AUTO: 1.12 K/UL — HIGH (ref 0–0.9)
MONOCYTES NFR BLD AUTO: 5.7 % — SIGNIFICANT CHANGE UP (ref 2–14)
NEUTROPHILS # BLD AUTO: 17.32 K/UL — HIGH (ref 1.8–7.4)
NEUTROPHILS NFR BLD AUTO: 87.3 % — HIGH (ref 43–77)
NRBC # BLD: 0 /100 WBCS — SIGNIFICANT CHANGE UP
NRBC # FLD: 0 K/UL — SIGNIFICANT CHANGE UP
PHOSPHATE SERPL-MCNC: 4.1 MG/DL — SIGNIFICANT CHANGE UP (ref 2.5–4.5)
PLATELET # BLD AUTO: 366 K/UL — SIGNIFICANT CHANGE UP (ref 150–400)
POTASSIUM SERPL-MCNC: 4.5 MMOL/L — SIGNIFICANT CHANGE UP (ref 3.5–5.3)
POTASSIUM SERPL-MCNC: 4.6 MMOL/L — SIGNIFICANT CHANGE UP (ref 3.5–5.3)
POTASSIUM SERPL-SCNC: 4.5 MMOL/L — SIGNIFICANT CHANGE UP (ref 3.5–5.3)
POTASSIUM SERPL-SCNC: 4.6 MMOL/L — SIGNIFICANT CHANGE UP (ref 3.5–5.3)
PROT SERPL-MCNC: 7 G/DL — SIGNIFICANT CHANGE UP (ref 6–8.3)
PROT SERPL-MCNC: 7 G/DL — SIGNIFICANT CHANGE UP (ref 6–8.3)
PROTHROM AB SERPL-ACNC: 14.6 SEC — HIGH (ref 10.6–13.6)
RBC # BLD: 3.6 M/UL — LOW (ref 3.8–5.2)
RBC # FLD: 15.1 % — HIGH (ref 10.3–14.5)
SODIUM SERPL-SCNC: 142 MMOL/L — SIGNIFICANT CHANGE UP (ref 135–145)
SODIUM SERPL-SCNC: 144 MMOL/L — SIGNIFICANT CHANGE UP (ref 135–145)
WBC # BLD: 19.82 K/UL — HIGH (ref 3.8–10.5)
WBC # FLD AUTO: 19.82 K/UL — HIGH (ref 3.8–10.5)

## 2021-01-18 PROCEDURE — 99291 CRITICAL CARE FIRST HOUR: CPT | Mod: 25

## 2021-01-18 RX ADMIN — CHLORHEXIDINE GLUCONATE 15 MILLILITER(S): 213 SOLUTION TOPICAL at 17:25

## 2021-01-18 RX ADMIN — Medication 1: at 05:50

## 2021-01-18 RX ADMIN — PIPERACILLIN AND TAZOBACTAM 25 GRAM(S): 4; .5 INJECTION, POWDER, LYOPHILIZED, FOR SOLUTION INTRAVENOUS at 23:14

## 2021-01-18 RX ADMIN — PIPERACILLIN AND TAZOBACTAM 25 GRAM(S): 4; .5 INJECTION, POWDER, LYOPHILIZED, FOR SOLUTION INTRAVENOUS at 00:06

## 2021-01-18 RX ADMIN — Medication 1 APPLICATION(S): at 05:45

## 2021-01-18 RX ADMIN — PIPERACILLIN AND TAZOBACTAM 25 GRAM(S): 4; .5 INJECTION, POWDER, LYOPHILIZED, FOR SOLUTION INTRAVENOUS at 07:58

## 2021-01-18 RX ADMIN — ENOXAPARIN SODIUM 40 MILLIGRAM(S): 100 INJECTION SUBCUTANEOUS at 17:25

## 2021-01-18 RX ADMIN — Medication 2: at 11:22

## 2021-01-18 RX ADMIN — PIPERACILLIN AND TAZOBACTAM 25 GRAM(S): 4; .5 INJECTION, POWDER, LYOPHILIZED, FOR SOLUTION INTRAVENOUS at 16:50

## 2021-01-18 RX ADMIN — CISATRACURIUM BESYLATE 19.8 MICROGRAM(S)/KG/MIN: 2 INJECTION INTRAVENOUS at 07:57

## 2021-01-18 RX ADMIN — ALBUTEROL 2 PUFF(S): 90 AEROSOL, METERED ORAL at 10:19

## 2021-01-18 RX ADMIN — HYDROMORPHONE HYDROCHLORIDE 1 MG/HR: 2 INJECTION INTRAMUSCULAR; INTRAVENOUS; SUBCUTANEOUS at 07:56

## 2021-01-18 RX ADMIN — Medication 1: at 17:23

## 2021-01-18 RX ADMIN — Medication 1 APPLICATION(S): at 17:25

## 2021-01-18 RX ADMIN — POLYETHYLENE GLYCOL 3350 17 GRAM(S): 17 POWDER, FOR SOLUTION ORAL at 11:23

## 2021-01-18 RX ADMIN — Medication 5.15 MICROGRAM(S)/KG/MIN: at 07:56

## 2021-01-18 RX ADMIN — ALBUTEROL 2 PUFF(S): 90 AEROSOL, METERED ORAL at 03:53

## 2021-01-18 RX ADMIN — PROPOFOL 32.9 MICROGRAM(S)/KG/MIN: 10 INJECTION, EMULSION INTRAVENOUS at 19:39

## 2021-01-18 RX ADMIN — PROPOFOL 32.9 MICROGRAM(S)/KG/MIN: 10 INJECTION, EMULSION INTRAVENOUS at 07:56

## 2021-01-18 RX ADMIN — MIDAZOLAM HYDROCHLORIDE 2.2 MG/KG/HR: 1 INJECTION, SOLUTION INTRAMUSCULAR; INTRAVENOUS at 19:39

## 2021-01-18 RX ADMIN — Medication 1: at 23:13

## 2021-01-18 RX ADMIN — ALBUTEROL 2 PUFF(S): 90 AEROSOL, METERED ORAL at 22:48

## 2021-01-18 RX ADMIN — CHLORHEXIDINE GLUCONATE 15 MILLILITER(S): 213 SOLUTION TOPICAL at 05:41

## 2021-01-18 RX ADMIN — ENOXAPARIN SODIUM 40 MILLIGRAM(S): 100 INJECTION SUBCUTANEOUS at 05:42

## 2021-01-18 RX ADMIN — ALBUTEROL 2 PUFF(S): 90 AEROSOL, METERED ORAL at 15:58

## 2021-01-18 RX ADMIN — Medication 1: at 00:08

## 2021-01-18 RX ADMIN — CHLORHEXIDINE GLUCONATE 1 APPLICATION(S): 213 SOLUTION TOPICAL at 05:44

## 2021-01-18 RX ADMIN — CISATRACURIUM BESYLATE 19.8 MICROGRAM(S)/KG/MIN: 2 INJECTION INTRAVENOUS at 19:40

## 2021-01-18 RX ADMIN — HYDROMORPHONE HYDROCHLORIDE 1 MG/HR: 2 INJECTION INTRAMUSCULAR; INTRAVENOUS; SUBCUTANEOUS at 19:40

## 2021-01-18 RX ADMIN — MIDAZOLAM HYDROCHLORIDE 2.2 MG/KG/HR: 1 INJECTION, SOLUTION INTRAMUSCULAR; INTRAVENOUS at 07:57

## 2021-01-18 RX ADMIN — SENNA PLUS 10 MILLILITER(S): 8.6 TABLET ORAL at 11:23

## 2021-01-18 RX ADMIN — Medication 5.15 MICROGRAM(S)/KG/MIN: at 19:40

## 2021-01-18 RX ADMIN — Medication 6 MILLIGRAM(S): at 05:42

## 2021-01-18 NOTE — PROGRESS NOTE ADULT - ATTENDING COMMENTS
46 F with DM2 here with acute hypoxemic respiratory failure 2/2 COVID requiring intubation, extubated to NIPPV, switched to HFNC and then back on NIPPV with progressively worsening acute hypoxemic respiratory failure requiring intubation due to respiratory distress/failure, likely progression of disease.  Intubated, sedated, paralyzed, still with severe ARDS.  Improved with proning, fio2 decreased.  Plateau 26, DP 18.  Wean fio2 to 60%, check abg. c/w zosyn for now.

## 2021-01-18 NOTE — PROGRESS NOTE ADULT - ASSESSMENT
The patient is a 46-year-old woman who was admitted to the hospital for management acute respiratory failure with hypoxia secondary to covid-19 pneumonia, is status post intubation from 1/5 to 1/9 for management of acute respiratory distress syndrome, and who is now re-intubated and in the MICU for management of severe ARDS secondary to covid-19 pneumonia.     Neuro:  -The patient is sedated and intubated in the MICU with intravenous hydromorphone, propofol, and midazolam. Paralysis with nimbex.     CV:  -Vasoplegic shock, most likely secondary to administration of sedative medications; currently receiving norepinephrine for intravenous blood pressure support   -No known history of coronary artery disease or of arrhythmia; sinus rhythm on telemetry     Respiratory:  -Severe acute respiratory distress syndrome diagnosed; s/p proning yesterday with improvement in p/f ratio to 134. Prone again today until FiO2<60.  -Current ventilator settings: AC/VC, RR 28, , PEEP 8, FiO2 90; pulmonary compliance about 30-35  -No pleural effusions noted on imaging; no new fevers noted; most consistent with clinical worsening of underlying covid pneumonia; superimposed bacterial infection unlikely  -Administering 6 mg of dexamethasone daily per decreased mortality and icu length of stay noted in dexa-ards trial   -Respiratory acidosis noted (permissive hypercapnia) with appropriate metabolic compensation     Renal:  -Serum creatinine at baseline; will continue to monitor urine output; no acute kidney injury noted at this time   -Mild hyponatremia noted; possibly secondary to siadh in setting of pulmonary pathology   -Will continue to monitor daily metabolic panel     ID:  -Status post administration of ten-day course of dexamethasone and five-day course of remdesivir for treatment of severe covid-19   -In the setting of need for re-intubation, administering zosyn (1/16-) for empiric coverage of superimposed bacterial pneumonia;   -follow up blood and sputum cultures     Endo:  -No active concerns at this time   -A1c 6.6, meeting criteria for diagnosis of diabetes mellitus; sliding scale insulin ordered     GI:  - start trickle feeds  - monitor bowel movements     Heme:  -DVT ppx: lovenox 40 q12h  -pending LE duplex r/o DVT   The patient is a 46-year-old woman who was admitted to the hospital for management acute respiratory failure with hypoxia secondary to covid-19 pneumonia, is status post intubation from 1/5 to 1/9 for management of acute respiratory distress syndrome, and who is now re-intubated and in the MICU for management of severe ARDS secondary to covid-19 pneumonia.     Neuro:  -The patient is sedated and intubated in the MICU with intravenous hydromorphone, propofol, and midazolam. Paralysis with cisatracurium. Will maintain current level of sedation in anticipation of possible need to re-prone.      CV:  -Vasoplegic shock, most likely secondary to administration of sedative medications; currently receiving norepinephrine for intravenous blood pressure support   -No known history of coronary artery disease or of arrhythmia; sinus rhythm on telemetry     Respiratory:  -Severe acute respiratory distress syndrome diagnosed; status post prone positioning with improvement in p:f ratio; will decrease fiO2 today to 0.6 and then will re-prone if p:f ratio less than 150   -Current ventilator settings: AC/VC, RR 28, , PEEP 8, FiO2 60; pulmonary compliance about 13  -No pleural effusions noted on imaging; no new fevers noted; most consistent with clinical worsening of underlying covid pneumonia; superimposed bacterial infection unlikely  -Respiratory acidosis noted (permissive hypercapnia) with appropriate metabolic compensation     Renal:  -Serum creatinine at baseline; will continue to monitor urine output; urine output decreasing slightly today; will administer intravenous crystalloid if urine output declines   -Mild hyponatremia noted; possibly secondary to siadh in setting of pulmonary pathology   -Will continue to monitor daily metabolic panel     ID:  -Status post administration of ten-day course of dexamethasone and five-day course of remdesivir for treatment of severe covid-19   -In the setting of need for re-intubation, administering zosyn (day 3/5) for empiric coverage of superimposed bacterial pneumonia;   -follow up blood and sputum cultures     Endo:  -No active concerns at this time   -A1c 6.6, meeting criteria for diagnosis of diabetes mellitus; sliding scale insulin ordered     GI:  -Will hold feeds in the setting of need to re-prone   -Will monitor bowel movements     Heme:  -DVT ppx: lovenox 40 q12h  -Ordered bilateral lower extremity dopplers in setting of rising d-dimer

## 2021-01-18 NOTE — PROGRESS NOTE ADULT - SUBJECTIVE AND OBJECTIVE BOX
Abdi Palencia [id number]  [id number]    INTERVAL HPI/OVERNIGHT EVENTS:    SUBJECTIVE: Patient seen and examined at bedside.     CONSTITUTIONAL: No weakness, fevers or chills  EYES/ENT: No visual changes;  No vertigo or throat pain   NECK: No pain or stiffness  RESPIRATORY: No cough, wheezing, hemoptysis; No shortness of breath  CARDIOVASCULAR: No chest pain or palpitations  GASTROINTESTINAL: No abdominal or epigastric pain. No nausea, vomiting, or hematemesis; No diarrhea or constipation. No melena or hematochezia.  GENITOURINARY: No dysuria, frequency or hematuria  NEUROLOGICAL: No numbness or weakness  SKIN: No itching, rashes    OBJECTIVE:    VITAL SIGNS:  ICU Vital Signs Last 24 Hrs  T(C): 36.5 (18 Jan 2021 04:00), Max: 36.9 (17 Jan 2021 12:00)  T(F): 97.7 (18 Jan 2021 04:00), Max: 98.5 (17 Jan 2021 12:00)  HR: 66 (18 Jan 2021 06:00) (61 - 69)  BP: --  BP(mean): --  ABP: 108/62 (18 Jan 2021 06:00) (94/54 - 142/78)  ABP(mean): 82 (18 Jan 2021 06:00) (71 - 105)  RR: 28 (18 Jan 2021 06:00) (28 - 29)  SpO2: 95% (18 Jan 2021 06:00) (95% - 100%)    Mode: AC/ CMV (Assist Control/ Continuous Mandatory Ventilation), RR (machine): 28, TV (machine): 350, FiO2: 70, PEEP: 8, ITime: 0.72, MAP: 15, PIP: 31    01-16 @ 07:01 - 01-17 @ 07:00  --------------------------------------------------------  IN: 1986.2 mL / OUT: 3700 mL / NET: -1713.8 mL    01-17 @ 07:01  - 01-18 @ 06:35  --------------------------------------------------------  IN: 1774.2 mL / OUT: 2310 mL / NET: -535.8 mL      CAPILLARY BLOOD GLUCOSE      POCT Blood Glucose.: 173 mg/dL (18 Jan 2021 05:48)      PHYSICAL EXAM:    General: NAD  HEENT: NC/AT; PERRL, clear conjunctiva  Neck: supple  Respiratory: CTA b/l  Cardiovascular: +S1/S2; RRR  Abdomen: soft, NT/ND; +BS x4  Extremities: WWP, 2+ peripheral pulses b/l; no LE edema  Skin: normal color and turgor; no rash  Neurological:    MEDICATIONS:  MEDICATIONS  (STANDING):  ALBUTerol    90 MICROgram(s) HFA Inhaler 2 Puff(s) Inhalation every 6 hours  chlorhexidine 0.12% Liquid 15 milliLiter(s) Oral Mucosa every 12 hours  chlorhexidine 2% Cloths 1 Application(s) Topical <User Schedule>  cisatracurium Infusion 3 MICROgram(s)/kG/Min (19.8 mL/Hr) IV Continuous <Continuous>  dextrose 40% Gel 15 Gram(s) Oral once  dextrose 5%. 1000 milliLiter(s) (50 mL/Hr) IV Continuous <Continuous>  dextrose 5%. 1000 milliLiter(s) (100 mL/Hr) IV Continuous <Continuous>  dextrose 50% Injectable 25 Gram(s) IV Push once  dextrose 50% Injectable 12.5 Gram(s) IV Push once  dextrose 50% Injectable 25 Gram(s) IV Push once  enoxaparin Injectable 40 milliGRAM(s) SubCutaneous every 12 hours  glucagon  Injectable 1 milliGRAM(s) IntraMuscular once  HYDROmorphone Infusion. 1 mG/Hr (1 mL/Hr) IV Continuous <Continuous>  insulin lispro (ADMELOG) corrective regimen sliding scale   SubCutaneous every 6 hours  midazolam Infusion 0.02 mG/kG/Hr (2.2 mL/Hr) IV Continuous <Continuous>  norepinephrine Infusion 0.05 MICROgram(s)/kG/Min (5.15 mL/Hr) IV Continuous <Continuous>  petrolatum Ophthalmic Ointment 1 Application(s) Both EYES two times a day  piperacillin/tazobactam IVPB.. 3.375 Gram(s) IV Intermittent every 8 hours  polyethylene glycol 3350 17 Gram(s) Oral daily  propofol Infusion 50 MICROgram(s)/kG/Min (32.9 mL/Hr) IV Continuous <Continuous>  senna Syrup 10 milliLiter(s) Oral daily    MEDICATIONS  (PRN):      ALLERGIES:  Allergies    No Known Allergies    Intolerances        LABS:                        9.8    19.82 )-----------( 366      ( 18 Jan 2021 02:16 )             31.0     01-18    144  |  107  |  8   ----------------------------<  178<H>  4.6   |  26  |  0.52    Ca    9.2      18 Jan 2021 02:16  Phos  4.1     01-18  Mg     2.3     01-18    TPro  7.0  /  Alb  2.9<L>  /  TBili  0.4  /  DBili  x   /  AST  10  /  ALT  21  /  AlkPhos  108  01-18    PT/INR - ( 18 Jan 2021 02:16 )   PT: 14.6 sec;   INR: 1.28 ratio         PTT - ( 18 Jan 2021 02:16 )  PTT:28.2 sec      RADIOLOGY & ADDITIONAL TESTS: Reviewed. Abdi [facility]encia [id number]  [id number]    INTERVAL HPI/OVERNIGHT EVENTS: The patient remained in the supine position for the past twenty-four hours. Overnight, in the setting of a rising pao2/fio2 ratio, the patient's fio2 as set on the ventilator was decreased to 70%.     SUBJECTIVE: Patient seen and examined at bedside.     Could not obtain an accurate review of systems as the patient was sedated and intubated.     OBJECTIVE:    VITAL SIGNS:  ICU Vital Signs Last 24 Hrs  T(C): 36.5 (18 Jan 2021 04:00), Max: 36.9 (17 Jan 2021 12:00)  T(F): 97.7 (18 Jan 2021 04:00), Max: 98.5 (17 Jan 2021 12:00)  HR: 66 (18 Jan 2021 06:00) (61 - 69)  BP: --  BP(mean): --  ABP: 108/62 (18 Jan 2021 06:00) (94/54 - 142/78)  ABP(mean): 82 (18 Jan 2021 06:00) (71 - 105)  RR: 28 (18 Jan 2021 06:00) (28 - 29)  SpO2: 95% (18 Jan 2021 06:00) (95% - 100%)    Mode: AC/ CMV (Assist Control/ Continuous Mandatory Ventilation), RR (machine): 28, TV (machine): 350, FiO2: 70, PEEP: 8, ITime: 0.72, MAP: 15, PIP: 31    01-16 @ 07:01 - 01-17 @ 07:00  --------------------------------------------------------  IN: 1986.2 mL / OUT: 3700 mL / NET: -1713.8 mL    01-17 @ 07:01  -  01-18 @ 06:35  --------------------------------------------------------  IN: 1774.2 mL / OUT: 2310 mL / NET: -535.8 mL      CAPILLARY BLOOD GLUCOSE      POCT Blood Glucose.: 173 mg/dL (18 Jan 2021 05:48)      PHYSICAL EXAM:    General: Sedated and intubated; not initiating respirations on the ventilator   HEENT: PERRL grossly (about 3 cm), clear conjunctiva  Neck: supple, no jvd; left internal jugular central venous catheter in place   Respiratory: Equal chest rise bilaterally; tympanitic to percussion   Cardiovascular: +S1/S2; RRR  Abdomen: soft, NT/ND; +BS x4  Extremities: WWP, 2+ peripheral pulses b/l; 1+ edema; right radial arterial line in place   Skin: normal color and turgor; no rash  Neurological: RASS -5; pupils 2-3 cm bilaterally and reactive     MEDICATIONS:  MEDICATIONS  (STANDING):  ALBUTerol    90 MICROgram(s) HFA Inhaler 2 Puff(s) Inhalation every 6 hours  chlorhexidine 0.12% Liquid 15 milliLiter(s) Oral Mucosa every 12 hours  chlorhexidine 2% Cloths 1 Application(s) Topical <User Schedule>  cisatracurium Infusion 3 MICROgram(s)/kG/Min (19.8 mL/Hr) IV Continuous <Continuous>  dextrose 40% Gel 15 Gram(s) Oral once  dextrose 5%. 1000 milliLiter(s) (50 mL/Hr) IV Continuous <Continuous>  dextrose 5%. 1000 milliLiter(s) (100 mL/Hr) IV Continuous <Continuous>  dextrose 50% Injectable 25 Gram(s) IV Push once  dextrose 50% Injectable 12.5 Gram(s) IV Push once  dextrose 50% Injectable 25 Gram(s) IV Push once  enoxaparin Injectable 40 milliGRAM(s) SubCutaneous every 12 hours  glucagon  Injectable 1 milliGRAM(s) IntraMuscular once  HYDROmorphone Infusion. 1 mG/Hr (1 mL/Hr) IV Continuous <Continuous>  insulin lispro (ADMELOG) corrective regimen sliding scale   SubCutaneous every 6 hours  midazolam Infusion 0.02 mG/kG/Hr (2.2 mL/Hr) IV Continuous <Continuous>  norepinephrine Infusion 0.05 MICROgram(s)/kG/Min (5.15 mL/Hr) IV Continuous <Continuous>  petrolatum Ophthalmic Ointment 1 Application(s) Both EYES two times a day  piperacillin/tazobactam IVPB.. 3.375 Gram(s) IV Intermittent every 8 hours  polyethylene glycol 3350 17 Gram(s) Oral daily  propofol Infusion 50 MICROgram(s)/kG/Min (32.9 mL/Hr) IV Continuous <Continuous>  senna Syrup 10 milliLiter(s) Oral daily    MEDICATIONS  (PRN):      ALLERGIES:  Allergies    No Known Allergies    Intolerances        LABS:                        9.8    19.82 )-----------( 366      ( 18 Jan 2021 02:16 )             31.0     01-18    144  |  107  |  8   ----------------------------<  178<H>  4.6   |  26  |  0.52    Ca    9.2      18 Jan 2021 02:16  Phos  4.1     01-18  Mg     2.3     01-18    TPro  7.0  /  Alb  2.9<L>  /  TBili  0.4  /  DBili  x   /  AST  10  /  ALT  21  /  AlkPhos  108  01-18    PT/INR - ( 18 Jan 2021 02:16 )   PT: 14.6 sec;   INR: 1.28 ratio         PTT - ( 18 Jan 2021 02:16 )  PTT:28.2 sec      RADIOLOGY & ADDITIONAL TESTS: Reviewed.

## 2021-01-19 LAB
ALBUMIN SERPL ELPH-MCNC: 2.9 G/DL — LOW (ref 3.3–5)
ALP SERPL-CCNC: 103 U/L — SIGNIFICANT CHANGE UP (ref 40–120)
ALT FLD-CCNC: 19 U/L — SIGNIFICANT CHANGE UP (ref 4–33)
ANION GAP SERPL CALC-SCNC: 9 MMOL/L — SIGNIFICANT CHANGE UP (ref 7–14)
APTT BLD: 26.1 SEC — LOW (ref 27–36.3)
AST SERPL-CCNC: 14 U/L — SIGNIFICANT CHANGE UP (ref 4–32)
BASOPHILS # BLD AUTO: 0.03 K/UL — SIGNIFICANT CHANGE UP (ref 0–0.2)
BASOPHILS NFR BLD AUTO: 0.2 % — SIGNIFICANT CHANGE UP (ref 0–2)
BILIRUB SERPL-MCNC: 0.4 MG/DL — SIGNIFICANT CHANGE UP (ref 0.2–1.2)
BLOOD GAS ARTERIAL - LYTES,HGB,ICA,LACT RESULT: SIGNIFICANT CHANGE UP
BLOOD GAS ARTERIAL COMPREHENSIVE RESULT: SIGNIFICANT CHANGE UP
BLOOD GAS ARTERIAL COMPREHENSIVE WITH CREATININE RESULT: SIGNIFICANT CHANGE UP
BUN SERPL-MCNC: 13 MG/DL — SIGNIFICANT CHANGE UP (ref 7–23)
CALCIUM SERPL-MCNC: 9.1 MG/DL — SIGNIFICANT CHANGE UP (ref 8.4–10.5)
CHLORIDE SERPL-SCNC: 105 MMOL/L — SIGNIFICANT CHANGE UP (ref 98–107)
CO2 SERPL-SCNC: 28 MMOL/L — SIGNIFICANT CHANGE UP (ref 22–31)
CREAT SERPL-MCNC: 0.54 MG/DL — SIGNIFICANT CHANGE UP (ref 0.5–1.3)
CRP SERPL-MCNC: 135 MG/L — HIGH
D DIMER BLD IA.RAPID-MCNC: 303 NG/ML DDU — HIGH
EOSINOPHIL # BLD AUTO: 0.01 K/UL — SIGNIFICANT CHANGE UP (ref 0–0.5)
EOSINOPHIL NFR BLD AUTO: 0.1 % — SIGNIFICANT CHANGE UP (ref 0–6)
FERRITIN SERPL-MCNC: 201 NG/ML — HIGH (ref 15–150)
GLUCOSE SERPL-MCNC: 155 MG/DL — HIGH (ref 70–99)
HCT VFR BLD CALC: 29.6 % — LOW (ref 34.5–45)
HGB BLD-MCNC: 9.3 G/DL — LOW (ref 11.5–15.5)
IANC: 12.41 K/UL — HIGH (ref 1.5–8.5)
IMM GRANULOCYTES NFR BLD AUTO: 1.5 % — SIGNIFICANT CHANGE UP (ref 0–1.5)
INR BLD: 1.18 RATIO — HIGH (ref 0.88–1.16)
LYMPHOCYTES # BLD AUTO: 1.6 K/UL — SIGNIFICANT CHANGE UP (ref 1–3.3)
LYMPHOCYTES # BLD AUTO: 10.4 % — LOW (ref 13–44)
MAGNESIUM SERPL-MCNC: 2.4 MG/DL — SIGNIFICANT CHANGE UP (ref 1.6–2.6)
MCHC RBC-ENTMCNC: 27.6 PG — SIGNIFICANT CHANGE UP (ref 27–34)
MCHC RBC-ENTMCNC: 31.4 GM/DL — LOW (ref 32–36)
MCV RBC AUTO: 87.8 FL — SIGNIFICANT CHANGE UP (ref 80–100)
MONOCYTES # BLD AUTO: 1.07 K/UL — HIGH (ref 0–0.9)
MONOCYTES NFR BLD AUTO: 7 % — SIGNIFICANT CHANGE UP (ref 2–14)
NEUTROPHILS # BLD AUTO: 12.41 K/UL — HIGH (ref 1.8–7.4)
NEUTROPHILS NFR BLD AUTO: 80.8 % — HIGH (ref 43–77)
NRBC # BLD: 0 /100 WBCS — SIGNIFICANT CHANGE UP
NRBC # FLD: 0 K/UL — SIGNIFICANT CHANGE UP
PHOSPHATE SERPL-MCNC: 3.6 MG/DL — SIGNIFICANT CHANGE UP (ref 2.5–4.5)
PLATELET # BLD AUTO: 346 K/UL — SIGNIFICANT CHANGE UP (ref 150–400)
POTASSIUM SERPL-MCNC: 4.4 MMOL/L — SIGNIFICANT CHANGE UP (ref 3.5–5.3)
POTASSIUM SERPL-SCNC: 4.4 MMOL/L — SIGNIFICANT CHANGE UP (ref 3.5–5.3)
PROCALCITONIN SERPL-MCNC: 0.06 NG/ML — SIGNIFICANT CHANGE UP (ref 0.02–0.1)
PROT SERPL-MCNC: 6.7 G/DL — SIGNIFICANT CHANGE UP (ref 6–8.3)
PROTHROM AB SERPL-ACNC: 13.4 SEC — SIGNIFICANT CHANGE UP (ref 10.6–13.6)
RBC # BLD: 3.37 M/UL — LOW (ref 3.8–5.2)
RBC # FLD: 15 % — HIGH (ref 10.3–14.5)
SODIUM SERPL-SCNC: 142 MMOL/L — SIGNIFICANT CHANGE UP (ref 135–145)
WBC # BLD: 15.35 K/UL — HIGH (ref 3.8–10.5)
WBC # FLD AUTO: 15.35 K/UL — HIGH (ref 3.8–10.5)

## 2021-01-19 PROCEDURE — 99291 CRITICAL CARE FIRST HOUR: CPT

## 2021-01-19 RX ORDER — HYDROMORPHONE HYDROCHLORIDE 2 MG/ML
0.5 INJECTION INTRAMUSCULAR; INTRAVENOUS; SUBCUTANEOUS
Qty: 100 | Refills: 0 | Status: DISCONTINUED | OUTPATIENT
Start: 2021-01-19 | End: 2021-01-22

## 2021-01-19 RX ORDER — PROPOFOL 10 MG/ML
50 INJECTION, EMULSION INTRAVENOUS
Qty: 1000 | Refills: 0 | Status: DISCONTINUED | OUTPATIENT
Start: 2021-01-19 | End: 2021-01-26

## 2021-01-19 RX ORDER — SODIUM CHLORIDE 9 MG/ML
500 INJECTION, SOLUTION INTRAVENOUS ONCE
Refills: 0 | Status: COMPLETED | OUTPATIENT
Start: 2021-01-19 | End: 2021-01-19

## 2021-01-19 RX ADMIN — POLYETHYLENE GLYCOL 3350 17 GRAM(S): 17 POWDER, FOR SOLUTION ORAL at 11:40

## 2021-01-19 RX ADMIN — HYDROMORPHONE HYDROCHLORIDE 1 MG/HR: 2 INJECTION INTRAMUSCULAR; INTRAVENOUS; SUBCUTANEOUS at 09:23

## 2021-01-19 RX ADMIN — PIPERACILLIN AND TAZOBACTAM 25 GRAM(S): 4; .5 INJECTION, POWDER, LYOPHILIZED, FOR SOLUTION INTRAVENOUS at 16:51

## 2021-01-19 RX ADMIN — CHLORHEXIDINE GLUCONATE 1 APPLICATION(S): 213 SOLUTION TOPICAL at 05:57

## 2021-01-19 RX ADMIN — Medication 1: at 11:40

## 2021-01-19 RX ADMIN — PIPERACILLIN AND TAZOBACTAM 25 GRAM(S): 4; .5 INJECTION, POWDER, LYOPHILIZED, FOR SOLUTION INTRAVENOUS at 09:02

## 2021-01-19 RX ADMIN — Medication 1 APPLICATION(S): at 17:01

## 2021-01-19 RX ADMIN — MIDAZOLAM HYDROCHLORIDE 2.2 MG/KG/HR: 1 INJECTION, SOLUTION INTRAMUSCULAR; INTRAVENOUS at 09:23

## 2021-01-19 RX ADMIN — CHLORHEXIDINE GLUCONATE 15 MILLILITER(S): 213 SOLUTION TOPICAL at 05:56

## 2021-01-19 RX ADMIN — PROPOFOL 32.9 MICROGRAM(S)/KG/MIN: 10 INJECTION, EMULSION INTRAVENOUS at 23:59

## 2021-01-19 RX ADMIN — PROPOFOL 32.9 MICROGRAM(S)/KG/MIN: 10 INJECTION, EMULSION INTRAVENOUS at 09:22

## 2021-01-19 RX ADMIN — Medication 5.15 MICROGRAM(S)/KG/MIN: at 09:23

## 2021-01-19 RX ADMIN — ALBUTEROL 2 PUFF(S): 90 AEROSOL, METERED ORAL at 03:48

## 2021-01-19 RX ADMIN — ALBUTEROL 2 PUFF(S): 90 AEROSOL, METERED ORAL at 20:04

## 2021-01-19 RX ADMIN — ALBUTEROL 2 PUFF(S): 90 AEROSOL, METERED ORAL at 15:09

## 2021-01-19 RX ADMIN — PIPERACILLIN AND TAZOBACTAM 25 GRAM(S): 4; .5 INJECTION, POWDER, LYOPHILIZED, FOR SOLUTION INTRAVENOUS at 23:59

## 2021-01-19 RX ADMIN — ENOXAPARIN SODIUM 40 MILLIGRAM(S): 100 INJECTION SUBCUTANEOUS at 17:01

## 2021-01-19 RX ADMIN — Medication 1 APPLICATION(S): at 05:56

## 2021-01-19 RX ADMIN — ENOXAPARIN SODIUM 40 MILLIGRAM(S): 100 INJECTION SUBCUTANEOUS at 05:56

## 2021-01-19 RX ADMIN — SENNA PLUS 10 MILLILITER(S): 8.6 TABLET ORAL at 11:40

## 2021-01-19 RX ADMIN — SODIUM CHLORIDE 500 MILLILITER(S): 9 INJECTION, SOLUTION INTRAVENOUS at 17:05

## 2021-01-19 RX ADMIN — CHLORHEXIDINE GLUCONATE 15 MILLILITER(S): 213 SOLUTION TOPICAL at 17:02

## 2021-01-19 RX ADMIN — CISATRACURIUM BESYLATE 19.8 MICROGRAM(S)/KG/MIN: 2 INJECTION INTRAVENOUS at 09:23

## 2021-01-19 RX ADMIN — ALBUTEROL 2 PUFF(S): 90 AEROSOL, METERED ORAL at 10:06

## 2021-01-19 NOTE — PROGRESS NOTE ADULT - PROBLEM SELECTOR PLAN 3
DVT and gI PPX
Lovenox once weight entered
DVT and gI PPX
Lovenox once weight entered

## 2021-01-19 NOTE — PROGRESS NOTE ADULT - ASSESSMENT
The patient is a 46-year-old woman who was admitted to the hospital for management acute respiratory failure with hypoxia secondary to covid-19 pneumonia, is status post intubation from 1/5 to 1/9 for management of acute respiratory distress syndrome, and who is now re-intubated and in the MICU for management of severe ARDS secondary to covid-19 pneumonia.     Neuro:  -The patient is sedated and intubated in the MICU with intravenous hydromorphone, propofol, and midazolam. Paralysis with cisatracurium. Will maintain current level of sedation in anticipation of possible need to re-prone.      CV:  -Vasoplegic shock, most likely secondary to administration of sedative medications; currently receiving norepinephrine for intravenous blood pressure support   -No known history of coronary artery disease or of arrhythmia; sinus rhythm on telemetry     Respiratory:  -Severe acute respiratory distress syndrome diagnosed; status post prone positioning with improvement in p:f ratio; will decrease fiO2 today to 0.6 and then will re-prone if p:f ratio less than 150   -Current ventilator settings: AC/VC, RR 28, , PEEP 8, FiO2 60; pulmonary compliance about 13  -No pleural effusions noted on imaging; no new fevers noted; most consistent with clinical worsening of underlying covid pneumonia; superimposed bacterial infection unlikely  -Respiratory acidosis noted (permissive hypercapnia) with appropriate metabolic compensation     Renal:  -Serum creatinine at baseline; will continue to monitor urine output; urine output decreasing slightly today; will administer intravenous crystalloid if urine output declines   -Mild hyponatremia noted; possibly secondary to siadh in setting of pulmonary pathology   -Will continue to monitor daily metabolic panel     ID:  -Status post administration of ten-day course of dexamethasone and five-day course of remdesivir for treatment of severe covid-19   -In the setting of need for re-intubation, administering zosyn (day 3/5) for empiric coverage of superimposed bacterial pneumonia;   -follow up blood and sputum cultures     Endo:  -No active concerns at this time   -A1c 6.6, meeting criteria for diagnosis of diabetes mellitus; sliding scale insulin ordered     GI:  -Will hold feeds in the setting of need to re-prone   -Will monitor bowel movements     Heme:  -DVT ppx: lovenox 40 q12h  -Ordered bilateral lower extremity dopplers in setting of rising d-dimer    The patient is a 46-year-old woman who was admitted to the hospital for management acute respiratory failure with hypoxia secondary to covid-19 pneumonia, is status post intubation from 1/5 to 1/9 for management of acute respiratory distress syndrome, and who is now re-intubated and in the MICU for management of severe ARDS secondary to covid-19 pneumonia.     Neuro:  -The patient is sedated and intubated in the MICU with intravenous hydromorphone, propofol, and midazolam. Paralysis with cisatracurium. If p:f ratio remains greater than 150, will maintain patient in supine position and consider weaning cisatracurium. Will maintain current level of sedation, amnesia, and analgesia.      CV:  -Vasoplegic shock, most likely secondary to administration of sedative medications; currently receiving norepinephrine for intravenous blood pressure support; decreasing pressor requirements indicate resolving shock state   -No known history of coronary artery disease or of arrhythmia; sinus rhythm on telemetry     Respiratory:  -Severe acute respiratory distress syndrome diagnosed; placed in prone position yesterday; improved p:f ratio represents effective recruitment of lung bases   -Current ventilator settings: AC/VC, RR 28, , PEEP 8, FiO2 60; pulmonary compliance about 19  -No pleural effusions noted on imaging; no new fevers noted; need for re-intubation most consistent with clinical worsening of underlying covid pneumonia; superimposed bacterial infection unlikely  -Respiratory acidosis noted (permissive hypercapnia) with appropriate metabolic compensation     Renal:  -Serum creatinine at baseline; will continue to monitor urine output; urine output about 30 ccs/hr over previous 24 hours; suspect represents mild pre-renal azotemia; will administer one liter of intravenous crystalloid today  -Mild hyponatremia noted; possibly secondary to siadh in setting of pulmonary pathology   -Will continue to monitor daily metabolic panel     ID:  -Status post administration of ten-day course of dexamethasone and five-day course of remdesivir for treatment of severe covid-19   -In the setting of need for re-intubation, administering zosyn (day 4/5) for empiric coverage of superimposed bacterial pneumonia;   -No growth to date from cultures     Endo:  -No active concerns at this time   -A1c 6.6, meeting criteria for diagnosis of diabetes mellitus; sliding scale insulin ordered; about 5 units administered over previous 24 hours     GI:  -Jevity tube feeds   -Will monitor bowel movements     Heme:  -DVT ppx: lovenox 40 q12h  -Ordered bilateral lower extremity dopplers in setting of rising d-dimer

## 2021-01-19 NOTE — PROGRESS NOTE ADULT - PROBLEM SELECTOR PLAN 2
S/P Dexa daily albuterol  Monitro O2 sat
S/P Dexa daily albuterol  Monitro O2 sat
plan as above
cont dexa daily albuterol  monitro O2 sat
S/P Dexa daily albuterol  Monitro O2 sat
plan as above
S/P Dexa daily albuterol  Monitro O2 sat
cont dexa daily albuterol  monitro O2 sat
plan as above
cont Dexa daily albuterol  Monitro O2 sat

## 2021-01-19 NOTE — PROGRESS NOTE ADULT - ASSESSMENT
Patient is a 46 year old woman with no past medical history coming in with shortness of breath, cough productive of white sputum, pleuritic rib pain (moderate in severity) for two days and diarrhea for one day.    COVID 19 PNEUMONIA: WITH HYPOXIC RESP FAILURE:     1/13:  COVID 19 PNEUMONIA: WITH HYPOXIC RESP FAILURE:     She was intubated in MICU nad then extubated successfully and transferred to floor: pulm called today to evaluate Pt is on bipap 100% sao2 in high 80's and tachypneic   She looks in resp distress:   Her d Dimer bumped up yesterday and was switched to full AC:  She isnot doing well and may need MICU again:   ALT is mildly elevated and renal profile is normal   repeat crp and ferritin:  ??firgtehr course of steroids    STEPHANIE PMD and rn at [facility]:   stephanie acp    1/14:  COVID 19 PNEUMONIA: WITH HYPOXIC RESP FAILURE:   She was intubated in MICU and then extubated successfully and transferred to floor: pulm called today to evaluate Pt is on bipap 100% sao2 in high 80's and tachypneic : MICU evaluation done again yesterday and she refused for intubation  She does not looks in resp distress today   Her d Dimer bumped up yesterday and was switched to full AC: d dimer is decreased today   She is not doing well today too and may need MICU again:   LFT's are mildly elevated and renal profile is normal   repeat crp and ferritin: high still   dw acp    1/15:    COVID 19 PNEUMONIA: WITH HYPOXIC RESP FAILURE:   She is on bipap; tachypneic on 100% bipap: she had earlier refused for intubation she was intubated once: before :  She does not looks in resp distress today   Her d Dimer bumped up yesterday and was switched to full AC:   She is not doing well today again and s eis very hypoxic: she may need intubation any time if she agrees:     LFT's are mildly elevated and renal profile is normal   crp and ferritin: high still   dw PMD   pt is not doing well and she has SOB and is tachypneic and on bipap at 100% fio2: She is already on full time AC:       1/17:    COVID 19 PNEUMONIA: WITH HYPOXIC RESP FAILURE:   intuabted and sedated and paralysed in MICU : requiring high o2 and PEEP"   Shock: on pressors: She was started on empiric antibiotics after the intubation   Her d Dimer is decreasing     LFT's are mildly elevated and renal profile is normal   crp and ferritin: today are still high    on dvt propahyxdamiáns  dw team    1/19:    COVID 19 PNEUMONIA: WITH HYPOXIC RESP FAILURE:   intubated and sedated and paralysed in MICU : requiring high o2 and PEEP" : 50% fio2: peep 8   Shock: requiring less  pressors: She was started on empiric antibiotics after the intubation : on d4 of zosyn  Her d Dimer is decreasing     LFT's are mildly elevated and renal profile is normal   on dvt propahyxlis  dw team

## 2021-01-19 NOTE — PROGRESS NOTE ADULT - PROBLEM SELECTOR PROBLEM 4
Discharge planning issues

## 2021-01-19 NOTE — PROGRESS NOTE ADULT - PROBLEM SELECTOR PLAN 1
S/P hypoxic respuratory failure  intubated and now extubated, saturating well   O2 supplement  on bipap, switch to hihg flow if tolerating   taper slowly   D/C burdick catheter  elevated d-dimer  start full dose AV with Lovenox BID   CTA ordered
S/P hypoxic respiratory failure  intubated and now extubated  O2 supplement  on bipap, switch to high flow if tolerating , patient is very uncomfortable, tachypnea and hypoxia, MICU called for intubation, patient refusing, has full capacity to make decisions   D/C burdick catheter  elevated d-dimer  Cont full dose AV with Lovenox BID   CTA ordered  pulm roxana appreciated  patient cont to refuse intubation despite multiple RRT and recommendations
S/P hypoxic respuratory failure  intubated and now extubated, saturating well   O2 supplement
Patient w/ acute hypoxic respiratory failure likely 2/2 to COVID given exposure  - f/u COVID test   - continue with dexamethasone for 10 days   - start remdesivir once COVID test comes back and weight entered  - COVID labs ordered   - will order Lovenox once weight entered  - change O2 to high flow   PT when tolerated  - transferred to MICU for worsening tachypnea andclose monitoring
S/P hypoxic respiratory failure  intubated and now extubated  O2 supplement  on bipap, switch to high flow if tolerating , patient is very uncomfortable, tachypnea and hypoxia, MICU called for intubation, patient refusing, has full capacity to make decisions   D/C burdick catheter  elevated d-dimer  Cont full dose AV with Lovenox BID   CTA ordered  pulm eval appreciated  placed on high flow
S/P hypoxic respuratory failure  intubated and sedated  care as per MICU
S/P hypoxic respuratory failure  intubated and now extubated, saturating well   O2 supplement  on bipap, switch to hihg flow if tolerating   taper slowly   D/C burdick catheter
S/P hypoxic respiratory failure  intubated care as per ICU team
Patient w/ acute hypoxic respiratory failure likely 2/2 to COVID given exposure  - f/u COVID test   - continue with dexamethasone for 10 days   - start remdesivir once COVID test comes back and weight entered  - COVID labs ordered   - will order Lovenox once weight entered  - change O2 to high flow   PT when tolerated
S/P hypoxic respuratory failure  intubated and now extubated, saturating well   O2 supplement  on bipap, switch to high flow if tolerating , patient is very uncomfortable, tachypnea and hypoxia, MICU called for intubation, patient refusing, has full capacity to make decisions   D/C burdick catheter  elevated d-dimer  Cont full dose AV with Lovenox BID   CTA ordered  pulm evla called

## 2021-01-19 NOTE — PROGRESS NOTE ADULT - SUBJECTIVE AND OBJECTIVE BOX
Date of Service: 01-19-21 @ 13:04    Patient is a 46y old  Female who presents with a chief complaint of COVID (19 Jan 2021 06:42)      Any change in ROS: intuabted and sedated :paralysed    MEDICATIONS  (STANDING):  ALBUTerol    90 MICROgram(s) HFA Inhaler 2 Puff(s) Inhalation every 6 hours  chlorhexidine 0.12% Liquid 15 milliLiter(s) Oral Mucosa every 12 hours  chlorhexidine 2% Cloths 1 Application(s) Topical <User Schedule>  cisatracurium Infusion 3 MICROgram(s)/kG/Min (19.8 mL/Hr) IV Continuous <Continuous>  dextrose 40% Gel 15 Gram(s) Oral once  dextrose 5%. 1000 milliLiter(s) (50 mL/Hr) IV Continuous <Continuous>  dextrose 5%. 1000 milliLiter(s) (100 mL/Hr) IV Continuous <Continuous>  dextrose 50% Injectable 25 Gram(s) IV Push once  dextrose 50% Injectable 12.5 Gram(s) IV Push once  dextrose 50% Injectable 25 Gram(s) IV Push once  enoxaparin Injectable 40 milliGRAM(s) SubCutaneous every 12 hours  glucagon  Injectable 1 milliGRAM(s) IntraMuscular once  HYDROmorphone Infusion. 1 mG/Hr (1 mL/Hr) IV Continuous <Continuous>  insulin lispro (ADMELOG) corrective regimen sliding scale   SubCutaneous every 6 hours  midazolam Infusion 0.02 mG/kG/Hr (2.2 mL/Hr) IV Continuous <Continuous>  norepinephrine Infusion 0.05 MICROgram(s)/kG/Min (5.15 mL/Hr) IV Continuous <Continuous>  petrolatum Ophthalmic Ointment 1 Application(s) Both EYES two times a day  piperacillin/tazobactam IVPB.. 3.375 Gram(s) IV Intermittent every 8 hours  polyethylene glycol 3350 17 Gram(s) Oral daily  propofol Infusion 50 MICROgram(s)/kG/Min (32.9 mL/Hr) IV Continuous <Continuous>  senna Syrup 10 milliLiter(s) Oral daily    MEDICATIONS  (PRN):    Vital Signs Last 24 Hrs  T(C): 35.4 (19 Jan 2021 12:00), Max: 36.3 (18 Jan 2021 16:00)  T(F): 95.8 (19 Jan 2021 12:00), Max: 97.3 (18 Jan 2021 16:00)  HR: 52 (19 Jan 2021 12:00) (44 - 62)  BP: --  BP(mean): --  RR: 28 (19 Jan 2021 12:00) (28 - 28)  SpO2: 96% (19 Jan 2021 12:00) (92% - 98%)  Mode: AC/ CMV (Assist Control/ Continuous Mandatory Ventilation)  RR (machine): 28  TV (machine): 350  FiO2: 50  PEEP: 8  ITime: 0.72  MAP: 15  PIP: 29    I&O's Summary    18 Jan 2021 07:01  -  19 Jan 2021 07:00  --------------------------------------------------------  IN: 1925 mL / OUT: 670 mL / NET: 1255 mL    19 Jan 2021 07:01  -  19 Jan 2021 13:04  --------------------------------------------------------  IN: 294.5 mL / OUT: 90 mL / NET: 204.5 mL          Physical Exam:   GENERAL: Obese+  HEENT: LALIT/   Atraumatic, Normocephalic  ENMT: No tonsillar erythema, exudates, or enlargement; Moist mucous membranes, Good dentition, No lesions  NECK: Supple, No JVD, Normal thyroid  CHEST/LUNG: Clear to auscultaion  CVS: Regular rate and rhythm; No murmurs, rubs, or gallops  GI: : Soft, Nontender, Nondistended; Bowel sounds present  NERVOUS SYSTEM:  sedated and intuabted  EXTREMITIES:  2+ Peripheral Pulses, No clubbing, cyanosis, or edema  LYMPH: No lymphadenopathy noted  SKIN: No rashes or lesions  ENDOCRINOLOGY: No Thyromegaly  PSYCH: sedated    Labs:  ABG - ( 19 Jan 2021 09:09 )  pH, Arterial: 7.50  pH, Blood: x     /  pCO2: 37    /  pO2: 75    / HCO3: 29    / Base Excess: 5.3   /  SaO2: 96.1                                        9.3    15.35 )-----------( 346      ( 19 Jan 2021 03:03 )             29.6                         9.8    19.82 )-----------( 366      ( 18 Jan 2021 02:16 )             31.0                         10.4   18.91 )-----------( 360      ( 17 Jan 2021 00:37 )             32.2                         10.3   21.84 )-----------( 433      ( 16 Jan 2021 06:27 )             31.0     01-19    142  |  105  |  13  ----------------------------<  155<H>  4.4   |  28  |  0.54  01-18    142  |  105  |  10  ----------------------------<  204<H>  4.5   |  29  |  0.59  01-18    144  |  107  |  8   ----------------------------<  178<H>  4.6   |  26  |  0.52  01-17    139  |  103  |  10  ----------------------------<  140<H>  3.9   |  22  |  0.54  01-16    129<L>  |  96<L>  |  20  ----------------------------<  128<H>  3.9   |  22  |  0.67    Ca    9.1      19 Jan 2021 03:03  Ca    8.9      18 Jan 2021 15:31  Ca    9.2      18 Jan 2021 02:16  Phos  3.6     01-19  Phos  4.1     01-18  Mg     2.4     01-19  Mg     2.3     01-18    TPro  6.7  /  Alb  2.9<L>  /  TBili  0.4  /  DBili  x   /  AST  14  /  ALT  19  /  AlkPhos  103  01-19  TPro  7.0  /  Alb  3.1<L>  /  TBili  0.4  /  DBili  x   /  AST  14  /  ALT  19  /  AlkPhos  103  01-18  TPro  7.0  /  Alb  2.9<L>  /  TBili  0.4  /  DBili  x   /  AST  10  /  ALT  21  /  AlkPhos  108  01-18  TPro  7.1  /  Alb  3.1<L>  /  TBili  0.8  /  DBili  x   /  AST  12  /  ALT  29  /  AlkPhos  111  01-17  TPro  7.1  /  Alb  3.1<L>  /  TBili  0.8  /  DBili  x   /  AST  26  /  ALT  38<H>  /  AlkPhos  113  01-16    CAPILLARY BLOOD GLUCOSE      POCT Blood Glucose.: 151 mg/dL (19 Jan 2021 11:38)  POCT Blood Glucose.: 133 mg/dL (19 Jan 2021 05:46)  POCT Blood Glucose.: 151 mg/dL (18 Jan 2021 23:10)  POCT Blood Glucose.: 181 mg/dL (18 Jan 2021 17:21)      LIVER FUNCTIONS - ( 19 Jan 2021 03:03 )  Alb: 2.9 g/dL / Pro: 6.7 g/dL / ALK PHOS: 103 U/L / ALT: 19 U/L / AST: 14 U/L / GGT: x           PT/INR - ( 19 Jan 2021 03:03 )   PT: 13.4 sec;   INR: 1.18 ratio         PTT - ( 19 Jan 2021 03:03 )  PTT:26.1 sec    D-Dimer Assay, Quantitative: 303 ng/mL DDU (01-19 @ 03:03)  D-Dimer Assay, Quantitative: 727 ng/mL DDU (01-17 @ 00:37)  D-Dimer Assay, Quantitative: 886 ng/mL DDU (01-16 @ 06:27)  D-Dimer Assay, Quantitative: 1255 ng/mL DDU (01-14 @ 08:15)  D-Dimer Assay, Quantitative: 2189 ng/mL DDU (01-13 @ 07:30)  D-Dimer Assay, Quantitative: 1118 ng/mL DDU (01-12 @ 13:28)  Procalcitonin, Serum: 0.06 ng/mL (01-19 @ 03:03)  Procalcitonin, Serum: 0.18 ng/mL (01-17 @ 00:37)  Procalcitonin, Serum: 0.10 ng/mL (01-16 @ 06:27)        RECENT CULTURES:  01-16 @ 10:02 .Blood Blood-Peripheral   r< from: Xray Chest 1 View- PORTABLE-Urgent (Xray Chest 1 View- PORTABLE-Urgent .) (01.16.21 @ 07:52) >    EXAM:  XR CHEST PORTABLE URGENT 1V      EXAM:  XR CHEST PORTABLE URGENT 1V        PROCEDURE DATE:  Jan 16 2021         INTERPRETATION:  HISTORY: Shortness of breath    TECHNIQUE: Single frontal views of the chest obtained on 1/16/2021 at 2:48 AM and 7:44 AM with comparison to 1/12/2021    FINDINGS:    1/16/2021 at 2:48 AM: Heart is mildly prominent. Diffuse bilateral increased interstitial changes and airspace disease worsened as compared to prior exam. Apices are obscured by patient's head. Visualized osseous structures are within normal limits.    7:44 AM: Interval placement of an endotracheal tube and NG tube. Left internal jugular line with tip in the SVC. Heart is normal in size. Increased interstitial markings bilaterally unchanged. Apices are unremarkable. Visualized osseous structures within normal limits.        IMPRESSION:    Interval placement of endotracheal tube and NG tube and left internal jugular line.    Increasing interstitial markings and airspace disease bilaterally.              LACHELLE ASENCIO MD; Attending Radiologist  This document has been electronically signed. Jan 16 2021 10:44AM    < end of copied text >               No growth to date.          RESPIRATORY CULTURES:          Studies  Chest X-RAY  CT SCAN Chest   Venous Dopplers: LE:   CT Abdomen  Others

## 2021-01-19 NOTE — PROGRESS NOTE ADULT - SUBJECTIVE AND OBJECTIVE BOX
Abdi Palencia [id number]  [id number]    INTERVAL HPI/OVERNIGHT EVENTS:    SUBJECTIVE: Patient seen and examined at bedside.     CONSTITUTIONAL: No weakness, fevers or chills  EYES/ENT: No visual changes;  No vertigo or throat pain   NECK: No pain or stiffness  RESPIRATORY: No cough, wheezing, hemoptysis; No shortness of breath  CARDIOVASCULAR: No chest pain or palpitations  GASTROINTESTINAL: No abdominal or epigastric pain. No nausea, vomiting, or hematemesis; No diarrhea or constipation. No melena or hematochezia.  GENITOURINARY: No dysuria, frequency or hematuria  NEUROLOGICAL: No numbness or weakness  SKIN: No itching, rashes    OBJECTIVE:    VITAL SIGNS:  ICU Vital Signs Last 24 Hrs  T(C): 35.6 (19 Jan 2021 04:00), Max: 36.8 (18 Jan 2021 12:00)  T(F): 96 (19 Jan 2021 04:00), Max: 98.2 (18 Jan 2021 12:00)  HR: 48 (19 Jan 2021 06:36) (48 - 62)  BP: --  BP(mean): --  ABP: 89/50 (19 Jan 2021 06:26) (89/50 - 183/87)  ABP(mean): 67 (19 Jan 2021 06:26) (67 - 125)  RR: 28 (19 Jan 2021 06:26) (28 - 28)  SpO2: 94% (19 Jan 2021 06:36) (92% - 99%)    Mode: AC/ CMV (Assist Control/ Continuous Mandatory Ventilation), RR (machine): 28, TV (machine): 350, FiO2: 50, PEEP: 8, ITime: 0.72, MAP: 15, PIP: 29    01-17 @ 07:01 - 01-18 @ 07:00  --------------------------------------------------------  IN: 1946.2 mL / OUT: 2330 mL / NET: -383.8 mL    01-18 @ 07:01 - 01-19 @ 06:42  --------------------------------------------------------  IN: 1923 mL / OUT: 650 mL / NET: 1273 mL      CAPILLARY BLOOD GLUCOSE      POCT Blood Glucose.: 133 mg/dL (19 Jan 2021 05:46)      PHYSICAL EXAM:    General: NAD  HEENT: NC/AT; PERRL, clear conjunctiva  Neck: supple  Respiratory: CTA b/l  Cardiovascular: +S1/S2; RRR  Abdomen: soft, NT/ND; +BS x4  Extremities: WWP, 2+ peripheral pulses b/l; no LE edema  Skin: normal color and turgor; no rash  Neurological:    MEDICATIONS:  MEDICATIONS  (STANDING):  ALBUTerol    90 MICROgram(s) HFA Inhaler 2 Puff(s) Inhalation every 6 hours  chlorhexidine 0.12% Liquid 15 milliLiter(s) Oral Mucosa every 12 hours  chlorhexidine 2% Cloths 1 Application(s) Topical <User Schedule>  cisatracurium Infusion 3 MICROgram(s)/kG/Min (19.8 mL/Hr) IV Continuous <Continuous>  dextrose 40% Gel 15 Gram(s) Oral once  dextrose 5%. 1000 milliLiter(s) (50 mL/Hr) IV Continuous <Continuous>  dextrose 5%. 1000 milliLiter(s) (100 mL/Hr) IV Continuous <Continuous>  dextrose 50% Injectable 25 Gram(s) IV Push once  dextrose 50% Injectable 12.5 Gram(s) IV Push once  dextrose 50% Injectable 25 Gram(s) IV Push once  enoxaparin Injectable 40 milliGRAM(s) SubCutaneous every 12 hours  glucagon  Injectable 1 milliGRAM(s) IntraMuscular once  HYDROmorphone Infusion. 1 mG/Hr (1 mL/Hr) IV Continuous <Continuous>  insulin lispro (ADMELOG) corrective regimen sliding scale   SubCutaneous every 6 hours  midazolam Infusion 0.02 mG/kG/Hr (2.2 mL/Hr) IV Continuous <Continuous>  norepinephrine Infusion 0.05 MICROgram(s)/kG/Min (5.15 mL/Hr) IV Continuous <Continuous>  petrolatum Ophthalmic Ointment 1 Application(s) Both EYES two times a day  piperacillin/tazobactam IVPB.. 3.375 Gram(s) IV Intermittent every 8 hours  polyethylene glycol 3350 17 Gram(s) Oral daily  propofol Infusion 50 MICROgram(s)/kG/Min (32.9 mL/Hr) IV Continuous <Continuous>  senna Syrup 10 milliLiter(s) Oral daily    MEDICATIONS  (PRN):      ALLERGIES:  Allergies    No Known Allergies    Intolerances        LABS:                        9.3    15.35 )-----------( 346      ( 19 Jan 2021 03:03 )             29.6     01-19    142  |  105  |  13  ----------------------------<  155<H>  4.4   |  28  |  0.54    Ca    9.1      19 Jan 2021 03:03  Phos  3.6     01-19  Mg     2.4     01-19    TPro  6.7  /  Alb  2.9<L>  /  TBili  0.4  /  DBili  x   /  AST  14  /  ALT  19  /  AlkPhos  103  01-19    PT/INR - ( 19 Jan 2021 03:03 )   PT: 13.4 sec;   INR: 1.18 ratio         PTT - ( 19 Jan 2021 03:03 )  PTT:26.1 sec      RADIOLOGY & ADDITIONAL TESTS: Reviewed. Abdi Palencia [id number]  [id number]    INTERVAL HPI/OVERNIGHT EVENTS: The patient's P:F ratio was greater than 200 in prone position, so she placed back in the supine position. She continues to maintain oxygen saturations greater than 95% in the supine position.     SUBJECTIVE: Patient seen and examined at bedside.     Could not obtain an accurate review of systems as the patient is sedated and intubated.     OBJECTIVE:    VITAL SIGNS:  ICU Vital Signs Last 24 Hrs  T(C): 35.6 (19 Jan 2021 04:00), Max: 36.8 (18 Jan 2021 12:00)  T(F): 96 (19 Jan 2021 04:00), Max: 98.2 (18 Jan 2021 12:00)  HR: 48 (19 Jan 2021 06:36) (48 - 62)  BP: --  BP(mean): --  ABP: 89/50 (19 Jan 2021 06:26) (89/50 - 183/87)  ABP(mean): 67 (19 Jan 2021 06:26) (67 - 125)  RR: 28 (19 Jan 2021 06:26) (28 - 28)  SpO2: 94% (19 Jan 2021 06:36) (92% - 99%)    Mode: AC/ CMV (Assist Control/ Continuous Mandatory Ventilation), RR (machine): 28, TV (machine): 350, FiO2: 50, PEEP: 8, ITime: 0.72, MAP: 15, PIP: 29    01-17 @ 07:01 - 01-18 @ 07:00  --------------------------------------------------------  IN: 1946.2 mL / OUT: 2330 mL / NET: -383.8 mL    01-18 @ 07:01  -  01-19 @ 06:42  --------------------------------------------------------  IN: 1923 mL / OUT: 650 mL / NET: 1273 mL      CAPILLARY BLOOD GLUCOSE      POCT Blood Glucose.: 133 mg/dL (19 Jan 2021 05:46)      PHYSICAL EXAM:    General: Sedated and intubated; not initiating own respirations on ventilator   HEENT: PERRL, clear conjunctiva  Neck: left internal jugular vein triple lumen catheter in place; no lymphadenopathy   Respiratory: Equal chest rise bilaterally; tympanitic to percussion   Cardiovascular: +S1/S2; RRR  Abdomen: soft, NT/ND; +BS x4  Extremities: WWP, 2+ peripheral pulses b/l; 1+ pitting edema in all four extremities   Skin: normal color and turgor; no rash  Neurological: RASS -5; pupils equal and round about 3 mm     MEDICATIONS:  MEDICATIONS  (STANDING):  ALBUTerol    90 MICROgram(s) HFA Inhaler 2 Puff(s) Inhalation every 6 hours  chlorhexidine 0.12% Liquid 15 milliLiter(s) Oral Mucosa every 12 hours  chlorhexidine 2% Cloths 1 Application(s) Topical <User Schedule>  cisatracurium Infusion 3 MICROgram(s)/kG/Min (19.8 mL/Hr) IV Continuous <Continuous>  dextrose 40% Gel 15 Gram(s) Oral once  dextrose 5%. 1000 milliLiter(s) (50 mL/Hr) IV Continuous <Continuous>  dextrose 5%. 1000 milliLiter(s) (100 mL/Hr) IV Continuous <Continuous>  dextrose 50% Injectable 25 Gram(s) IV Push once  dextrose 50% Injectable 12.5 Gram(s) IV Push once  dextrose 50% Injectable 25 Gram(s) IV Push once  enoxaparin Injectable 40 milliGRAM(s) SubCutaneous every 12 hours  glucagon  Injectable 1 milliGRAM(s) IntraMuscular once  HYDROmorphone Infusion. 1 mG/Hr (1 mL/Hr) IV Continuous <Continuous>  insulin lispro (ADMELOG) corrective regimen sliding scale   SubCutaneous every 6 hours  midazolam Infusion 0.02 mG/kG/Hr (2.2 mL/Hr) IV Continuous <Continuous>  norepinephrine Infusion 0.05 MICROgram(s)/kG/Min (5.15 mL/Hr) IV Continuous <Continuous>  petrolatum Ophthalmic Ointment 1 Application(s) Both EYES two times a day  piperacillin/tazobactam IVPB.. 3.375 Gram(s) IV Intermittent every 8 hours  polyethylene glycol 3350 17 Gram(s) Oral daily  propofol Infusion 50 MICROgram(s)/kG/Min (32.9 mL/Hr) IV Continuous <Continuous>  senna Syrup 10 milliLiter(s) Oral daily    MEDICATIONS  (PRN):      ALLERGIES:  Allergies    No Known Allergies    Intolerances        LABS:                        9.3    15.35 )-----------( 346      ( 19 Jan 2021 03:03 )             29.6     01-19    142  |  105  |  13  ----------------------------<  155<H>  4.4   |  28  |  0.54    Ca    9.1      19 Jan 2021 03:03  Phos  3.6     01-19  Mg     2.4     01-19    TPro  6.7  /  Alb  2.9<L>  /  TBili  0.4  /  DBili  x   /  AST  14  /  ALT  19  /  AlkPhos  103  01-19    PT/INR - ( 19 Jan 2021 03:03 )   PT: 13.4 sec;   INR: 1.18 ratio         PTT - ( 19 Jan 2021 03:03 )  PTT:26.1 sec      RADIOLOGY & ADDITIONAL TESTS: Reviewed.

## 2021-01-19 NOTE — PROGRESS NOTE ADULT - ATTENDING COMMENTS
Patient seen and examined. Chart reviewed.    46 year old woman with COVID ARDS re-intubated oxygenation improved with proning now supine doing well. Will repeat ABG if P/F ratio stable will wean down paralytics. Urine output decreasing will give volume challenge.  On empiric ABx cultures negative to date last day of ABx will be 1/20. Finger sticks controlled.    Critically ill patient requiring frequent bedside visits with therapeutic changes.  Prognosis guarded.

## 2021-01-19 NOTE — PROGRESS NOTE ADULT - SUBJECTIVE AND OBJECTIVE BOX
DATE OF SERVICE: 01-19-21 @ 17:19    Subjective: Patient seen and examined. No new events except as noted.   cont to be intubated   ICU Care     REVIEW OF SYSTEMS:  intubated     MEDICATIONS:  MEDICATIONS  (STANDING):  ALBUTerol    90 MICROgram(s) HFA Inhaler 2 Puff(s) Inhalation every 6 hours  chlorhexidine 0.12% Liquid 15 milliLiter(s) Oral Mucosa every 12 hours  chlorhexidine 2% Cloths 1 Application(s) Topical <User Schedule>  cisatracurium Infusion 3 MICROgram(s)/kG/Min (19.8 mL/Hr) IV Continuous <Continuous>  dextrose 40% Gel 15 Gram(s) Oral once  dextrose 5%. 1000 milliLiter(s) (50 mL/Hr) IV Continuous <Continuous>  dextrose 5%. 1000 milliLiter(s) (100 mL/Hr) IV Continuous <Continuous>  dextrose 50% Injectable 25 Gram(s) IV Push once  dextrose 50% Injectable 12.5 Gram(s) IV Push once  dextrose 50% Injectable 25 Gram(s) IV Push once  enoxaparin Injectable 40 milliGRAM(s) SubCutaneous every 12 hours  glucagon  Injectable 1 milliGRAM(s) IntraMuscular once  HYDROmorphone Infusion. 1 mG/Hr (1 mL/Hr) IV Continuous <Continuous>  insulin lispro (ADMELOG) corrective regimen sliding scale   SubCutaneous every 6 hours  midazolam Infusion 0.02 mG/kG/Hr (2.2 mL/Hr) IV Continuous <Continuous>  norepinephrine Infusion 0.05 MICROgram(s)/kG/Min (5.15 mL/Hr) IV Continuous <Continuous>  petrolatum Ophthalmic Ointment 1 Application(s) Both EYES two times a day  piperacillin/tazobactam IVPB.. 3.375 Gram(s) IV Intermittent every 8 hours  polyethylene glycol 3350 17 Gram(s) Oral daily  propofol Infusion 50 MICROgram(s)/kG/Min (32.9 mL/Hr) IV Continuous <Continuous>  senna Syrup 10 milliLiter(s) Oral daily      PHYSICAL EXAM:  T(C): 35.8 (01-19-21 @ 17:00), Max: 36.2 (01-18-21 @ 20:00)  HR: 56 (01-19-21 @ 17:00) (44 - 60)  BP: --  RR: 28 (01-19-21 @ 17:00) (28 - 28)  SpO2: 96% (01-19-21 @ 17:00) (92% - 100%)  Wt(kg): --  I&O's Summary    18 Jan 2021 07:01  -  19 Jan 2021 07:00  --------------------------------------------------------  IN: 1925 mL / OUT: 670 mL / NET: 1255 mL    19 Jan 2021 07:01  -  19 Jan 2021 17:19  --------------------------------------------------------  IN: 1499.9 mL / OUT: 240 mL / NET: 1259.9 mL        All labs, Imaging and EKGs personally reviewed                             9.3    15.35 )-----------( 346      ( 19 Jan 2021 03:03 )             29.6               01-19    142  |  105  |  13  ----------------------------<  155<H>  4.4   |  28  |  0.54    Ca    9.1      19 Jan 2021 03:03  Phos  3.6     01-19  Mg     2.4     01-19    TPro  6.7  /  Alb  2.9<L>  /  TBili  0.4  /  DBili  x   /  AST  14  /  ALT  19  /  AlkPhos  103  01-19    PT/INR - ( 19 Jan 2021 03:03 )   PT: 13.4 sec;   INR: 1.18 ratio         PTT - ( 19 Jan 2021 03:03 )  PTT:26.1 sec                 ABG - ( 19 Jan 2021 17:05 )  pH, Arterial: 7.38  pH, Blood: x     /  pCO2: 52    /  pO2: 124   / HCO3: 29    / Base Excess: 5.5   /  SaO2: 98.4

## 2021-01-20 LAB
ALBUMIN SERPL ELPH-MCNC: 2.9 G/DL — LOW (ref 3.3–5)
ALP SERPL-CCNC: 105 U/L — SIGNIFICANT CHANGE UP (ref 40–120)
ALT FLD-CCNC: 21 U/L — SIGNIFICANT CHANGE UP (ref 4–33)
ANION GAP SERPL CALC-SCNC: 6 MMOL/L — LOW (ref 7–14)
APTT BLD: 27.8 SEC — SIGNIFICANT CHANGE UP (ref 27–36.3)
AST SERPL-CCNC: 17 U/L — SIGNIFICANT CHANGE UP (ref 4–32)
BASOPHILS # BLD AUTO: 0.07 K/UL — SIGNIFICANT CHANGE UP (ref 0–0.2)
BASOPHILS NFR BLD AUTO: 0.6 % — SIGNIFICANT CHANGE UP (ref 0–2)
BILIRUB SERPL-MCNC: 0.4 MG/DL — SIGNIFICANT CHANGE UP (ref 0.2–1.2)
BLOOD GAS ARTERIAL COMPREHENSIVE RESULT: SIGNIFICANT CHANGE UP
BLOOD GAS ARTERIAL COMPREHENSIVE RESULT: SIGNIFICANT CHANGE UP
BUN SERPL-MCNC: 13 MG/DL — SIGNIFICANT CHANGE UP (ref 7–23)
CALCIUM SERPL-MCNC: 8.7 MG/DL — SIGNIFICANT CHANGE UP (ref 8.4–10.5)
CHLORIDE SERPL-SCNC: 103 MMOL/L — SIGNIFICANT CHANGE UP (ref 98–107)
CO2 SERPL-SCNC: 31 MMOL/L — SIGNIFICANT CHANGE UP (ref 22–31)
CREAT SERPL-MCNC: 0.63 MG/DL — SIGNIFICANT CHANGE UP (ref 0.5–1.3)
CRP SERPL-MCNC: 72.9 MG/L — HIGH
D DIMER BLD IA.RAPID-MCNC: 497 NG/ML DDU — HIGH
EOSINOPHIL # BLD AUTO: 0.19 K/UL — SIGNIFICANT CHANGE UP (ref 0–0.5)
EOSINOPHIL NFR BLD AUTO: 1.7 % — SIGNIFICANT CHANGE UP (ref 0–6)
FERRITIN SERPL-MCNC: 173 NG/ML — HIGH (ref 15–150)
GLUCOSE SERPL-MCNC: 168 MG/DL — HIGH (ref 70–99)
HCT VFR BLD CALC: 31 % — LOW (ref 34.5–45)
HGB BLD-MCNC: 9.3 G/DL — LOW (ref 11.5–15.5)
IANC: 7.5 K/UL — SIGNIFICANT CHANGE UP (ref 1.5–8.5)
IMM GRANULOCYTES NFR BLD AUTO: 2.3 % — HIGH (ref 0–1.5)
INR BLD: 1.1 RATIO — SIGNIFICANT CHANGE UP (ref 0.88–1.16)
LYMPHOCYTES # BLD AUTO: 19.4 % — SIGNIFICANT CHANGE UP (ref 13–44)
LYMPHOCYTES # BLD AUTO: 2.12 K/UL — SIGNIFICANT CHANGE UP (ref 1–3.3)
MAGNESIUM SERPL-MCNC: 2.1 MG/DL — SIGNIFICANT CHANGE UP (ref 1.6–2.6)
MCHC RBC-ENTMCNC: 27 PG — SIGNIFICANT CHANGE UP (ref 27–34)
MCHC RBC-ENTMCNC: 30 GM/DL — LOW (ref 32–36)
MCV RBC AUTO: 89.9 FL — SIGNIFICANT CHANGE UP (ref 80–100)
MONOCYTES # BLD AUTO: 0.78 K/UL — SIGNIFICANT CHANGE UP (ref 0–0.9)
MONOCYTES NFR BLD AUTO: 7.1 % — SIGNIFICANT CHANGE UP (ref 2–14)
NEUTROPHILS # BLD AUTO: 7.5 K/UL — HIGH (ref 1.8–7.4)
NEUTROPHILS NFR BLD AUTO: 68.9 % — SIGNIFICANT CHANGE UP (ref 43–77)
NRBC # BLD: 0 /100 WBCS — SIGNIFICANT CHANGE UP
NRBC # FLD: 0.02 K/UL — HIGH
PHOSPHATE SERPL-MCNC: 5.2 MG/DL — HIGH (ref 2.5–4.5)
PLATELET # BLD AUTO: 329 K/UL — SIGNIFICANT CHANGE UP (ref 150–400)
POTASSIUM SERPL-MCNC: 3.8 MMOL/L — SIGNIFICANT CHANGE UP (ref 3.5–5.3)
POTASSIUM SERPL-SCNC: 3.8 MMOL/L — SIGNIFICANT CHANGE UP (ref 3.5–5.3)
PROT SERPL-MCNC: 6.3 G/DL — SIGNIFICANT CHANGE UP (ref 6–8.3)
PROTHROM AB SERPL-ACNC: 12.6 SEC — SIGNIFICANT CHANGE UP (ref 10.6–13.6)
RBC # BLD: 3.45 M/UL — LOW (ref 3.8–5.2)
RBC # FLD: 15.1 % — HIGH (ref 10.3–14.5)
SODIUM SERPL-SCNC: 140 MMOL/L — SIGNIFICANT CHANGE UP (ref 135–145)
WBC # BLD: 10.91 K/UL — HIGH (ref 3.8–10.5)
WBC # FLD AUTO: 10.91 K/UL — HIGH (ref 3.8–10.5)

## 2021-01-20 PROCEDURE — 99291 CRITICAL CARE FIRST HOUR: CPT

## 2021-01-20 PROCEDURE — 93970 EXTREMITY STUDY: CPT | Mod: 26

## 2021-01-20 RX ORDER — SODIUM CHLORIDE 9 MG/ML
1200 INJECTION, SOLUTION INTRAVENOUS
Refills: 0 | Status: DISCONTINUED | OUTPATIENT
Start: 2021-01-20 | End: 2021-01-21

## 2021-01-20 RX ORDER — PIPERACILLIN AND TAZOBACTAM 4; .5 G/20ML; G/20ML
3.38 INJECTION, POWDER, LYOPHILIZED, FOR SOLUTION INTRAVENOUS ONCE
Refills: 0 | Status: COMPLETED | OUTPATIENT
Start: 2021-01-20 | End: 2021-01-20

## 2021-01-20 RX ORDER — SODIUM CHLORIDE 9 MG/ML
1000 INJECTION, SOLUTION INTRAVENOUS
Refills: 0 | Status: DISCONTINUED | OUTPATIENT
Start: 2021-01-20 | End: 2021-01-20

## 2021-01-20 RX ADMIN — SODIUM CHLORIDE 100 MILLILITER(S): 9 INJECTION, SOLUTION INTRAVENOUS at 20:19

## 2021-01-20 RX ADMIN — SENNA PLUS 10 MILLILITER(S): 8.6 TABLET ORAL at 11:55

## 2021-01-20 RX ADMIN — MIDAZOLAM HYDROCHLORIDE 2.2 MG/KG/HR: 1 INJECTION, SOLUTION INTRAMUSCULAR; INTRAVENOUS at 07:32

## 2021-01-20 RX ADMIN — HYDROMORPHONE HYDROCHLORIDE 0.5 MG/HR: 2 INJECTION INTRAMUSCULAR; INTRAVENOUS; SUBCUTANEOUS at 00:00

## 2021-01-20 RX ADMIN — Medication 1 APPLICATION(S): at 16:41

## 2021-01-20 RX ADMIN — Medication 5.15 MICROGRAM(S)/KG/MIN: at 00:00

## 2021-01-20 RX ADMIN — HYDROMORPHONE HYDROCHLORIDE 0.5 MG/HR: 2 INJECTION INTRAMUSCULAR; INTRAVENOUS; SUBCUTANEOUS at 20:18

## 2021-01-20 RX ADMIN — ALBUTEROL 2 PUFF(S): 90 AEROSOL, METERED ORAL at 21:03

## 2021-01-20 RX ADMIN — Medication 5.15 MICROGRAM(S)/KG/MIN: at 07:32

## 2021-01-20 RX ADMIN — ALBUTEROL 2 PUFF(S): 90 AEROSOL, METERED ORAL at 02:43

## 2021-01-20 RX ADMIN — PIPERACILLIN AND TAZOBACTAM 25 GRAM(S): 4; .5 INJECTION, POWDER, LYOPHILIZED, FOR SOLUTION INTRAVENOUS at 07:32

## 2021-01-20 RX ADMIN — Medication 1 APPLICATION(S): at 05:30

## 2021-01-20 RX ADMIN — CHLORHEXIDINE GLUCONATE 1 APPLICATION(S): 213 SOLUTION TOPICAL at 05:31

## 2021-01-20 RX ADMIN — CHLORHEXIDINE GLUCONATE 15 MILLILITER(S): 213 SOLUTION TOPICAL at 05:30

## 2021-01-20 RX ADMIN — ENOXAPARIN SODIUM 40 MILLIGRAM(S): 100 INJECTION SUBCUTANEOUS at 05:30

## 2021-01-20 RX ADMIN — Medication 1: at 16:41

## 2021-01-20 RX ADMIN — HYDROMORPHONE HYDROCHLORIDE 0.5 MG/HR: 2 INJECTION INTRAMUSCULAR; INTRAVENOUS; SUBCUTANEOUS at 07:32

## 2021-01-20 RX ADMIN — MIDAZOLAM HYDROCHLORIDE 2.2 MG/KG/HR: 1 INJECTION, SOLUTION INTRAMUSCULAR; INTRAVENOUS at 20:18

## 2021-01-20 RX ADMIN — SODIUM CHLORIDE 100 MILLILITER(S): 9 INJECTION, SOLUTION INTRAVENOUS at 13:39

## 2021-01-20 RX ADMIN — PROPOFOL 32.9 MICROGRAM(S)/KG/MIN: 10 INJECTION, EMULSION INTRAVENOUS at 20:18

## 2021-01-20 RX ADMIN — ENOXAPARIN SODIUM 40 MILLIGRAM(S): 100 INJECTION SUBCUTANEOUS at 16:42

## 2021-01-20 RX ADMIN — MIDAZOLAM HYDROCHLORIDE 2.2 MG/KG/HR: 1 INJECTION, SOLUTION INTRAMUSCULAR; INTRAVENOUS at 00:00

## 2021-01-20 RX ADMIN — ALBUTEROL 2 PUFF(S): 90 AEROSOL, METERED ORAL at 10:59

## 2021-01-20 RX ADMIN — Medication 5.15 MICROGRAM(S)/KG/MIN: at 20:18

## 2021-01-20 RX ADMIN — SODIUM CHLORIDE 1000 MILLILITER(S): 9 INJECTION, SOLUTION INTRAVENOUS at 12:04

## 2021-01-20 RX ADMIN — Medication 1: at 12:03

## 2021-01-20 RX ADMIN — PROPOFOL 32.9 MICROGRAM(S)/KG/MIN: 10 INJECTION, EMULSION INTRAVENOUS at 07:32

## 2021-01-20 RX ADMIN — ALBUTEROL 2 PUFF(S): 90 AEROSOL, METERED ORAL at 15:05

## 2021-01-20 RX ADMIN — CHLORHEXIDINE GLUCONATE 15 MILLILITER(S): 213 SOLUTION TOPICAL at 16:42

## 2021-01-20 RX ADMIN — POLYETHYLENE GLYCOL 3350 17 GRAM(S): 17 POWDER, FOR SOLUTION ORAL at 11:55

## 2021-01-20 RX ADMIN — PIPERACILLIN AND TAZOBACTAM 25 GRAM(S): 4; .5 INJECTION, POWDER, LYOPHILIZED, FOR SOLUTION INTRAVENOUS at 16:43

## 2021-01-20 NOTE — PROGRESS NOTE ADULT - ASSESSMENT
The patient is a 46-year-old woman who was admitted to the hospital for management acute respiratory failure with hypoxia secondary to covid-19 pneumonia, is status post intubation from 1/5 to 1/9 for management of acute respiratory distress syndrome, and who is now re-intubated and in the MICU for management of severe ARDS secondary to covid-19 pneumonia.     Neuro:  -The patient is sedated and intubated in the MICU with intravenous hydromorphone, propofol, and midazolam. Paralysis with cisatracurium. If p:f ratio remains greater than 150, will maintain patient in supine position and consider weaning cisatracurium. Will maintain current level of sedation, amnesia, and analgesia.      CV:  -Vasoplegic shock, most likely secondary to administration of sedative medications; currently receiving norepinephrine for intravenous blood pressure support; decreasing pressor requirements indicate resolving shock state   -No known history of coronary artery disease or of arrhythmia; sinus rhythm on telemetry     Respiratory:  -Severe acute respiratory distress syndrome diagnosed; placed in prone position yesterday; improved p:f ratio represents effective recruitment of lung bases   -Current ventilator settings: AC/VC, RR 28, , PEEP 8, FiO2 60; pulmonary compliance about 19  -No pleural effusions noted on imaging; no new fevers noted; need for re-intubation most consistent with clinical worsening of underlying covid pneumonia; superimposed bacterial infection unlikely  -Respiratory acidosis noted (permissive hypercapnia) with appropriate metabolic compensation     Renal:  -Serum creatinine at baseline; will continue to monitor urine output; urine output about 30 ccs/hr over previous 24 hours; suspect represents mild pre-renal azotemia; will administer one liter of intravenous crystalloid today  -Mild hyponatremia noted; possibly secondary to siadh in setting of pulmonary pathology   -Will continue to monitor daily metabolic panel     ID:  -Status post administration of ten-day course of dexamethasone and five-day course of remdesivir for treatment of severe covid-19   -In the setting of need for re-intubation, administering zosyn (day 4/5) for empiric coverage of superimposed bacterial pneumonia;   -No growth to date from cultures     Endo:  -No active concerns at this time   -A1c 6.6, meeting criteria for diagnosis of diabetes mellitus; sliding scale insulin ordered; about 5 units administered over previous 24 hours     GI:  -Jevity tube feeds   -Will monitor bowel movements     Heme:  -DVT ppx: lovenox 40 q12h  -Ordered bilateral lower extremity dopplers in setting of rising d-dimer  The patient is a 46-year-old woman who was admitted to the hospital for management acute respiratory failure with hypoxia secondary to covid-19 pneumonia, is status post intubation from 1/5 to 1/9 for management of acute respiratory distress syndrome, and who is now re-intubated (since 1/16) and in the MICU for management of severe ARDS secondary to covid-19 pneumonia.     Neuro:  -The patient is sedated and intubated in the MICU with intravenous hydromorphone, propofol, and midazolam. Paralysis with cisatracurium weaned. Will maintain current level of sedation as patient's current pulmonary function does not indicate that she is ready for extubation.     CV:  -Vasoplegic shock, most likely secondary to administration of sedative medications; currently receiving norepinephrine for intravenous blood pressure support; mildly increasing vasopressor requirements overnight most likely secondary to hypovolemia (no fevers to represent vasoplegia, no leukocytosis, no emboli concerning for obstructive shock, no spinal cord injury)   -No known history of coronary artery disease or of arrhythmia; sinus rhythm on telemetry     Respiratory:  -Severe acute respiratory distress syndrome diagnosed; placed in prone position yesterday; improved p:f ratio represents effective recruitment of lung bases   -Current ventilator settings: AC/VC, RR 28, , PEEP 8, FiO2 60; pulmonary compliance about 17.5 mL/mm Hg; will decrease tidal volume to 300 ccs today in the setting of elevated driving pressures; hope to allow for lung-protective ventilation and subsequently to wean current ventilator settings   -No pleural effusions noted on imaging; no new fevers noted; need for re-intubation most consistent with clinical worsening of underlying covid pneumonia; superimposed bacterial infection unlikely  -Respiratory acidosis noted (permissive hypercapnia in setting of lung-protective ventilation) with appropriate metabolic compensation     Renal:  -Serum creatinine at baseline; will continue to monitor urine output; urine output about 50 ccs/hr over previous 24 hours; suspect represents mild pre-renal azotemia; will administer 100 cc/hr of lactated ringers for maintenance fluid over the next twelve hours   -Mild hyponatremia noted; possibly secondary to siadh in setting of pulmonary pathology   -Will continue to monitor daily metabolic panel     ID:  -Status post administration of ten-day course of dexamethasone and five-day course of remdesivir for treatment of severe covid-19   -In the setting of need for re-intubation, administering zosyn (day 5/5) for empiric coverage of superimposed bacterial pneumonia;   -No growth to date from cultures     Endo:  -No active concerns at this time   -A1c 6.6, meeting criteria for diagnosis of diabetes mellitus; sliding scale insulin ordered; about 2 units administered over previous 24 hours     GI:  -Jevity tube feeds   -Will monitor bowel movements     Heme:  -DVT ppx: lovenox 40 q12h  -Negative bilateral lower extremity dopplers despite rising d-dimer

## 2021-01-20 NOTE — PROGRESS NOTE ADULT - SUBJECTIVE AND OBJECTIVE BOX
Abdi Palencia [email or web address]  [id number]    INTERVAL HPI/OVERNIGHT EVENTS:    SUBJECTIVE: Patient seen and examined at bedside.     CONSTITUTIONAL: No weakness, fevers or chills  EYES/ENT: No visual changes;  No vertigo or throat pain   NECK: No pain or stiffness  RESPIRATORY: No cough, wheezing, hemoptysis; No shortness of breath  CARDIOVASCULAR: No chest pain or palpitations  GASTROINTESTINAL: No abdominal or epigastric pain. No nausea, vomiting, or hematemesis; No diarrhea or constipation. No melena or hematochezia.  GENITOURINARY: No dysuria, frequency or hematuria  NEUROLOGICAL: No numbness or weakness  SKIN: No itching, rashes    OBJECTIVE:    VITAL SIGNS:  ICU Vital Signs Last 24 Hrs  T(C): 37.2 (20 Jan 2021 05:00), Max: 37.2 (20 Jan 2021 05:00)  T(F): 99 (20 Jan 2021 05:00), Max: 99 (20 Jan 2021 05:00)  HR: 61 (20 Jan 2021 06:25) (44 - 67)  BP: 101/61 (20 Jan 2021 06:25) (101/61 - 108/64)  BP(mean): 70 (20 Jan 2021 06:25) (70 - 74)  ABP: 89/46 (20 Jan 2021 06:25) (80/43 - 193/90)  ABP(mean): 64 (20 Jan 2021 06:25) (58 - 127)  RR: 28 (20 Jan 2021 06:25) (28 - 28)  SpO2: 94% (20 Jan 2021 06:25) (94% - 100%)    Mode: AC/ CMV (Assist Control/ Continuous Mandatory Ventilation), RR (machine): 28, TV (machine): 350, FiO2: 50, PEEP: 8, ITime: 0.78, MAP: 15, PIP: 31    01-18 @ 07:01  -  01-19 @ 07:00  --------------------------------------------------------  IN: 1925 mL / OUT: 670 mL / NET: 1255 mL    01-19 @ 07:01 - 01-20 @ 06:35  --------------------------------------------------------  IN: 2849.6 mL / OUT: 910 mL / NET: 1939.6 mL      CAPILLARY BLOOD GLUCOSE      POCT Blood Glucose.: 143 mg/dL (20 Jan 2021 06:10)      PHYSICAL EXAM:    General: NAD  HEENT: NC/AT; PERRL, clear conjunctiva  Neck: supple  Respiratory: CTA b/l  Cardiovascular: +S1/S2; RRR  Abdomen: soft, NT/ND; +BS x4  Extremities: WWP, 2+ peripheral pulses b/l; no LE edema  Skin: normal color and turgor; no rash  Neurological:    MEDICATIONS:  MEDICATIONS  (STANDING):  ALBUTerol    90 MICROgram(s) HFA Inhaler 2 Puff(s) Inhalation every 6 hours  chlorhexidine 0.12% Liquid 15 milliLiter(s) Oral Mucosa every 12 hours  chlorhexidine 2% Cloths 1 Application(s) Topical <User Schedule>  dextrose 40% Gel 15 Gram(s) Oral once  dextrose 5%. 1000 milliLiter(s) (50 mL/Hr) IV Continuous <Continuous>  dextrose 5%. 1000 milliLiter(s) (100 mL/Hr) IV Continuous <Continuous>  dextrose 50% Injectable 25 Gram(s) IV Push once  dextrose 50% Injectable 12.5 Gram(s) IV Push once  dextrose 50% Injectable 25 Gram(s) IV Push once  enoxaparin Injectable 40 milliGRAM(s) SubCutaneous every 12 hours  glucagon  Injectable 1 milliGRAM(s) IntraMuscular once  HYDROmorphone Infusion. 0.5 mG/Hr (0.5 mL/Hr) IV Continuous <Continuous>  insulin lispro (ADMELOG) corrective regimen sliding scale   SubCutaneous every 6 hours  midazolam Infusion 0.02 mG/kG/Hr (2.2 mL/Hr) IV Continuous <Continuous>  norepinephrine Infusion 0.05 MICROgram(s)/kG/Min (5.15 mL/Hr) IV Continuous <Continuous>  petrolatum Ophthalmic Ointment 1 Application(s) Both EYES two times a day  piperacillin/tazobactam IVPB.. 3.375 Gram(s) IV Intermittent every 8 hours  polyethylene glycol 3350 17 Gram(s) Oral daily  propofol Infusion 50 MICROgram(s)/kG/Min (32.9 mL/Hr) IV Continuous <Continuous>  senna Syrup 10 milliLiter(s) Oral daily    MEDICATIONS  (PRN):      ALLERGIES:  Allergies    No Known Allergies    Intolerances        LABS:                        9.3    10.91 )-----------( 329      ( 20 Jan 2021 02:17 )             31.0     01-20    140  |  103  |  13  ----------------------------<  168<H>  3.8   |  31  |  0.63    Ca    8.7      20 Jan 2021 02:31  Phos  5.2     01-20  Mg     2.1     01-20    TPro  6.3  /  Alb  2.9<L>  /  TBili  0.4  /  DBili  x   /  AST  17  /  ALT  21  /  AlkPhos  105  01-20    PT/INR - ( 20 Jan 2021 02:31 )   PT: 12.6 sec;   INR: 1.10 ratio         PTT - ( 20 Jan 2021 02:31 )  PTT:27.8 sec      RADIOLOGY & ADDITIONAL TESTS: Reviewed. Abdi Palencia [id number]  [id number]    INTERVAL HPI/OVERNIGHT EVENTS: The patient was maintained in the supine position, and her p:f ratio remained greater than 200, so her neuromuscular blockade was weaned.     SUBJECTIVE: Patient seen and examined at bedside.     I could not obtain an accurate review of systems because the patient is sedated and intubated.     OBJECTIVE:    VITAL SIGNS:  ICU Vital Signs Last 24 Hrs  T(C): 37.2 (20 Jan 2021 05:00), Max: 37.2 (20 Jan 2021 05:00)  T(F): 99 (20 Jan 2021 05:00), Max: 99 (20 Jan 2021 05:00)  HR: 61 (20 Jan 2021 06:25) (44 - 67)  BP: 101/61 (20 Jan 2021 06:25) (101/61 - 108/64)  BP(mean): 70 (20 Jan 2021 06:25) (70 - 74)  ABP: 89/46 (20 Jan 2021 06:25) (80/43 - 193/90)  ABP(mean): 64 (20 Jan 2021 06:25) (58 - 127)  RR: 28 (20 Jan 2021 06:25) (28 - 28)  SpO2: 94% (20 Jan 2021 06:25) (94% - 100%)    Mode: AC/ CMV (Assist Control/ Continuous Mandatory Ventilation), RR (machine): 28, TV (machine): 350, FiO2: 50, PEEP: 8, ITime: 0.78, MAP: 15, PIP: 31    01-18 @ 07:01 - 01-19 @ 07:00  --------------------------------------------------------  IN: 1925 mL / OUT: 670 mL / NET: 1255 mL    01-19 @ 07:01 - 01-20 @ 06:35  --------------------------------------------------------  IN: 2849.6 mL / OUT: 910 mL / NET: 1939.6 mL      CAPILLARY BLOOD GLUCOSE      POCT Blood Glucose.: 143 mg/dL (20 Jan 2021 06:10)      PHYSICAL EXAM:    General: Sedated and intubated; not initiating respirations on mechanical ventilator   HEENT: Pupils equal and round; approximately 3 mm bilaterally, clear conjunctiva  Neck: Left internal jugular venous triple lumen catheter in place; no lymphadenopathy   Respiratory: Equal chest rise bilaterally; tympanic to percussion; no crepitus   Cardiovascular: Non-displaced pmi; peripheral pulses intact   Abdomen: soft, NT/ND; +BS x4  Extremities: WWP, 2+ peripheral pulses b/l; no LE edema  Skin: normal color and turgor; no rash  Neurological:    MEDICATIONS:  MEDICATIONS  (STANDING):  ALBUTerol    90 MICROgram(s) HFA Inhaler 2 Puff(s) Inhalation every 6 hours  chlorhexidine 0.12% Liquid 15 milliLiter(s) Oral Mucosa every 12 hours  chlorhexidine 2% Cloths 1 Application(s) Topical <User Schedule>  dextrose 40% Gel 15 Gram(s) Oral once  dextrose 5%. 1000 milliLiter(s) (50 mL/Hr) IV Continuous <Continuous>  dextrose 5%. 1000 milliLiter(s) (100 mL/Hr) IV Continuous <Continuous>  dextrose 50% Injectable 25 Gram(s) IV Push once  dextrose 50% Injectable 12.5 Gram(s) IV Push once  dextrose 50% Injectable 25 Gram(s) IV Push once  enoxaparin Injectable 40 milliGRAM(s) SubCutaneous every 12 hours  glucagon  Injectable 1 milliGRAM(s) IntraMuscular once  HYDROmorphone Infusion. 0.5 mG/Hr (0.5 mL/Hr) IV Continuous <Continuous>  insulin lispro (ADMELOG) corrective regimen sliding scale   SubCutaneous every 6 hours  midazolam Infusion 0.02 mG/kG/Hr (2.2 mL/Hr) IV Continuous <Continuous>  norepinephrine Infusion 0.05 MICROgram(s)/kG/Min (5.15 mL/Hr) IV Continuous <Continuous>  petrolatum Ophthalmic Ointment 1 Application(s) Both EYES two times a day  piperacillin/tazobactam IVPB.. 3.375 Gram(s) IV Intermittent every 8 hours  polyethylene glycol 3350 17 Gram(s) Oral daily  propofol Infusion 50 MICROgram(s)/kG/Min (32.9 mL/Hr) IV Continuous <Continuous>  senna Syrup 10 milliLiter(s) Oral daily    MEDICATIONS  (PRN):      ALLERGIES:  Allergies    No Known Allergies    Intolerances        LABS:                        9.3    10.91 )-----------( 329      ( 20 Jan 2021 02:17 )             31.0     01-20    140  |  103  |  13  ----------------------------<  168<H>  3.8   |  31  |  0.63    Ca    8.7      20 Jan 2021 02:31  Phos  5.2     01-20  Mg     2.1     01-20    TPro  6.3  /  Alb  2.9<L>  /  TBili  0.4  /  DBili  x   /  AST  17  /  ALT  21  /  AlkPhos  105  01-20    PT/INR - ( 20 Jan 2021 02:31 )   PT: 12.6 sec;   INR: 1.10 ratio         PTT - ( 20 Jan 2021 02:31 )  PTT:27.8 sec      RADIOLOGY & ADDITIONAL TESTS: Reviewed. Abdi Palencia [email or web address]  [id number]    INTERVAL HPI/OVERNIGHT EVENTS: The patient was maintained in the supine position, and her p:f ratio remained greater than 200, so her neuromuscular blockade was weaned.     SUBJECTIVE: Patient seen and examined at bedside.     I could not obtain an accurate review of systems because the patient is sedated and intubated.     OBJECTIVE:    VITAL SIGNS:  ICU Vital Signs Last 24 Hrs  T(C): 37.2 (20 Jan 2021 05:00), Max: 37.2 (20 Jan 2021 05:00)  T(F): 99 (20 Jan 2021 05:00), Max: 99 (20 Jan 2021 05:00)  HR: 61 (20 Jan 2021 06:25) (44 - 67)  BP: 101/61 (20 Jan 2021 06:25) (101/61 - 108/64)  BP(mean): 70 (20 Jan 2021 06:25) (70 - 74)  ABP: 89/46 (20 Jan 2021 06:25) (80/43 - 193/90)  ABP(mean): 64 (20 Jan 2021 06:25) (58 - 127)  RR: 28 (20 Jan 2021 06:25) (28 - 28)  SpO2: 94% (20 Jan 2021 06:25) (94% - 100%)    Mode: AC/ CMV (Assist Control/ Continuous Mandatory Ventilation), RR (machine): 28, TV (machine): 350, FiO2: 50, PEEP: 8, ITime: 0.78, MAP: 15, PIP: 31    01-18 @ 07:01 - 01-19 @ 07:00  --------------------------------------------------------  IN: 1925 mL / OUT: 670 mL / NET: 1255 mL    01-19 @ 07:01 - 01-20 @ 06:35  --------------------------------------------------------  IN: 2849.6 mL / OUT: 910 mL / NET: 1939.6 mL      CAPILLARY BLOOD GLUCOSE      POCT Blood Glucose.: 143 mg/dL (20 Jan 2021 06:10)      PHYSICAL EXAM:    General: Sedated and intubated; not initiating respirations on mechanical ventilator   HEENT: Pupils equal and round; approximately 3 mm bilaterally, clear conjunctiva  Neck: Left internal jugular venous triple lumen catheter in place; no lymphadenopathy   Respiratory: Equal chest rise bilaterally; tympanic to percussion; no crepitus   Cardiovascular: Non-displaced pmi; peripheral pulses intact   Abdomen: soft, NT/ND; +BS x4; rectal tube in place  Genitourinary: Haque catheter in place draining yellow urine   Extremities: WWP, 2+ peripheral pulses b/l; no LE edema  Skin: normal color and turgor; no rash  Neurological: No gross motor or sensory deficits; coordination intact     MEDICATIONS:  MEDICATIONS  (STANDING):  ALBUTerol    90 MICROgram(s) HFA Inhaler 2 Puff(s) Inhalation every 6 hours  chlorhexidine 0.12% Liquid 15 milliLiter(s) Oral Mucosa every 12 hours  chlorhexidine 2% Cloths 1 Application(s) Topical <User Schedule>  dextrose 40% Gel 15 Gram(s) Oral once  dextrose 5%. 1000 milliLiter(s) (50 mL/Hr) IV Continuous <Continuous>  dextrose 5%. 1000 milliLiter(s) (100 mL/Hr) IV Continuous <Continuous>  dextrose 50% Injectable 25 Gram(s) IV Push once  dextrose 50% Injectable 12.5 Gram(s) IV Push once  dextrose 50% Injectable 25 Gram(s) IV Push once  enoxaparin Injectable 40 milliGRAM(s) SubCutaneous every 12 hours  glucagon  Injectable 1 milliGRAM(s) IntraMuscular once  HYDROmorphone Infusion. 0.5 mG/Hr (0.5 mL/Hr) IV Continuous <Continuous>  insulin lispro (ADMELOG) corrective regimen sliding scale   SubCutaneous every 6 hours  midazolam Infusion 0.02 mG/kG/Hr (2.2 mL/Hr) IV Continuous <Continuous>  norepinephrine Infusion 0.05 MICROgram(s)/kG/Min (5.15 mL/Hr) IV Continuous <Continuous>  petrolatum Ophthalmic Ointment 1 Application(s) Both EYES two times a day  piperacillin/tazobactam IVPB.. 3.375 Gram(s) IV Intermittent every 8 hours  polyethylene glycol 3350 17 Gram(s) Oral daily  propofol Infusion 50 MICROgram(s)/kG/Min (32.9 mL/Hr) IV Continuous <Continuous>  senna Syrup 10 milliLiter(s) Oral daily    MEDICATIONS  (PRN):      ALLERGIES:  Allergies    No Known Allergies    Intolerances        LABS:                        9.3    10.91 )-----------( 329      ( 20 Jan 2021 02:17 )             31.0     01-20    140  |  103  |  13  ----------------------------<  168<H>  3.8   |  31  |  0.63    Ca    8.7      20 Jan 2021 02:31  Phos  5.2     01-20  Mg     2.1     01-20    TPro  6.3  /  Alb  2.9<L>  /  TBili  0.4  /  DBili  x   /  AST  17  /  ALT  21  /  AlkPhos  105  01-20    PT/INR - ( 20 Jan 2021 02:31 )   PT: 12.6 sec;   INR: 1.10 ratio         PTT - ( 20 Jan 2021 02:31 )  PTT:27.8 sec      RADIOLOGY & ADDITIONAL TESTS: Reviewed.

## 2021-01-20 NOTE — PROGRESS NOTE ADULT - ATTENDING COMMENTS
Patient seen and examined. Chart reviewed.    46 year old woman with COVID ARDS re-intubated oxygenation improved with proning now supine doing well. Off paralytics remains with stable oxygenation has elevated plateau pressures will adjust ventilator follow up ABGs. decreased urine output but stable creatinine will continue volume resuscitation.    Critically ill patient requiring frequent bedside visits with therapeutic changes.  Prognosis guarded.

## 2021-01-21 LAB
ALBUMIN SERPL ELPH-MCNC: 2.7 G/DL — LOW (ref 3.3–5)
ALP SERPL-CCNC: 159 U/L — HIGH (ref 40–120)
ALT FLD-CCNC: 117 U/L — HIGH (ref 4–33)
ANION GAP SERPL CALC-SCNC: 8 MMOL/L — SIGNIFICANT CHANGE UP (ref 7–14)
APTT BLD: 28.8 SEC — SIGNIFICANT CHANGE UP (ref 27–36.3)
AST SERPL-CCNC: 107 U/L — HIGH (ref 4–32)
BASOPHILS # BLD AUTO: 0.07 K/UL — SIGNIFICANT CHANGE UP (ref 0–0.2)
BASOPHILS NFR BLD AUTO: 0.8 % — SIGNIFICANT CHANGE UP (ref 0–2)
BILIRUB SERPL-MCNC: 0.3 MG/DL — SIGNIFICANT CHANGE UP (ref 0.2–1.2)
BLD GP AB SCN SERPL QL: NEGATIVE — SIGNIFICANT CHANGE UP
BLOOD GAS ARTERIAL - LYTES,HGB,ICA,LACT RESULT: SIGNIFICANT CHANGE UP
BLOOD GAS ARTERIAL COMPREHENSIVE RESULT: SIGNIFICANT CHANGE UP
BUN SERPL-MCNC: 9 MG/DL — SIGNIFICANT CHANGE UP (ref 7–23)
CALCIUM SERPL-MCNC: 8.3 MG/DL — LOW (ref 8.4–10.5)
CHLORIDE SERPL-SCNC: 104 MMOL/L — SIGNIFICANT CHANGE UP (ref 98–107)
CO2 SERPL-SCNC: 31 MMOL/L — SIGNIFICANT CHANGE UP (ref 22–31)
CREAT SERPL-MCNC: 0.49 MG/DL — LOW (ref 0.5–1.3)
CULTURE RESULTS: SIGNIFICANT CHANGE UP
CULTURE RESULTS: SIGNIFICANT CHANGE UP
EOSINOPHIL # BLD AUTO: 0.25 K/UL — SIGNIFICANT CHANGE UP (ref 0–0.5)
EOSINOPHIL NFR BLD AUTO: 2.9 % — SIGNIFICANT CHANGE UP (ref 0–6)
GAS PNL BLDA: SIGNIFICANT CHANGE UP
GLUCOSE SERPL-MCNC: 167 MG/DL — HIGH (ref 70–99)
HCT VFR BLD CALC: 30 % — LOW (ref 34.5–45)
HGB BLD-MCNC: 9.2 G/DL — LOW (ref 11.5–15.5)
IANC: 5.2 K/UL — SIGNIFICANT CHANGE UP (ref 1.5–8.5)
IMM GRANULOCYTES NFR BLD AUTO: 4.2 % — HIGH (ref 0–1.5)
INR BLD: 1.13 RATIO — SIGNIFICANT CHANGE UP (ref 0.88–1.16)
LYMPHOCYTES # BLD AUTO: 1.76 K/UL — SIGNIFICANT CHANGE UP (ref 1–3.3)
LYMPHOCYTES # BLD AUTO: 20.8 % — SIGNIFICANT CHANGE UP (ref 13–44)
MAGNESIUM SERPL-MCNC: 1.9 MG/DL — SIGNIFICANT CHANGE UP (ref 1.6–2.6)
MCHC RBC-ENTMCNC: 27.1 PG — SIGNIFICANT CHANGE UP (ref 27–34)
MCHC RBC-ENTMCNC: 30.7 GM/DL — LOW (ref 32–36)
MCV RBC AUTO: 88.5 FL — SIGNIFICANT CHANGE UP (ref 80–100)
MONOCYTES # BLD AUTO: 0.84 K/UL — SIGNIFICANT CHANGE UP (ref 0–0.9)
MONOCYTES NFR BLD AUTO: 9.9 % — SIGNIFICANT CHANGE UP (ref 2–14)
NEUTROPHILS # BLD AUTO: 5.2 K/UL — SIGNIFICANT CHANGE UP (ref 1.8–7.4)
NEUTROPHILS NFR BLD AUTO: 61.4 % — SIGNIFICANT CHANGE UP (ref 43–77)
NRBC # BLD: 0 /100 WBCS — SIGNIFICANT CHANGE UP
NRBC # FLD: 0.06 K/UL — HIGH
PHOSPHATE SERPL-MCNC: 3.4 MG/DL — SIGNIFICANT CHANGE UP (ref 2.5–4.5)
PLATELET # BLD AUTO: 278 K/UL — SIGNIFICANT CHANGE UP (ref 150–400)
POTASSIUM SERPL-MCNC: 3.9 MMOL/L — SIGNIFICANT CHANGE UP (ref 3.5–5.3)
POTASSIUM SERPL-SCNC: 3.9 MMOL/L — SIGNIFICANT CHANGE UP (ref 3.5–5.3)
PROCALCITONIN SERPL-MCNC: 0.1 NG/ML — SIGNIFICANT CHANGE UP (ref 0.02–0.1)
PROT SERPL-MCNC: 6 G/DL — SIGNIFICANT CHANGE UP (ref 6–8.3)
PROTHROM AB SERPL-ACNC: 12.8 SEC — SIGNIFICANT CHANGE UP (ref 10.6–13.6)
RBC # BLD: 3.39 M/UL — LOW (ref 3.8–5.2)
RBC # FLD: 15.1 % — HIGH (ref 10.3–14.5)
RH IG SCN BLD-IMP: POSITIVE — SIGNIFICANT CHANGE UP
SODIUM SERPL-SCNC: 143 MMOL/L — SIGNIFICANT CHANGE UP (ref 135–145)
SPECIMEN SOURCE: SIGNIFICANT CHANGE UP
SPECIMEN SOURCE: SIGNIFICANT CHANGE UP
WBC # BLD: 8.48 K/UL — SIGNIFICANT CHANGE UP (ref 3.8–10.5)
WBC # FLD AUTO: 8.48 K/UL — SIGNIFICANT CHANGE UP (ref 3.8–10.5)

## 2021-01-21 PROCEDURE — 99291 CRITICAL CARE FIRST HOUR: CPT

## 2021-01-21 PROCEDURE — 99233 SBSQ HOSP IP/OBS HIGH 50: CPT

## 2021-01-21 RX ORDER — CISATRACURIUM BESYLATE 2 MG/ML
50 INJECTION INTRAVENOUS ONCE
Refills: 0 | Status: COMPLETED | OUTPATIENT
Start: 2021-01-21 | End: 2021-01-21

## 2021-01-21 RX ORDER — CISATRACURIUM BESYLATE 2 MG/ML
3 INJECTION INTRAVENOUS
Qty: 200 | Refills: 0 | Status: DISCONTINUED | OUTPATIENT
Start: 2021-01-21 | End: 2021-01-25

## 2021-01-21 RX ADMIN — CHLORHEXIDINE GLUCONATE 15 MILLILITER(S): 213 SOLUTION TOPICAL at 05:20

## 2021-01-21 RX ADMIN — ALBUTEROL 2 PUFF(S): 90 AEROSOL, METERED ORAL at 14:26

## 2021-01-21 RX ADMIN — Medication 1: at 00:51

## 2021-01-21 RX ADMIN — Medication 1 APPLICATION(S): at 05:20

## 2021-01-21 RX ADMIN — MIDAZOLAM HYDROCHLORIDE 2.2 MG/KG/HR: 1 INJECTION, SOLUTION INTRAMUSCULAR; INTRAVENOUS at 10:11

## 2021-01-21 RX ADMIN — POLYETHYLENE GLYCOL 3350 17 GRAM(S): 17 POWDER, FOR SOLUTION ORAL at 10:10

## 2021-01-21 RX ADMIN — Medication 2: at 12:38

## 2021-01-21 RX ADMIN — Medication 1: at 05:20

## 2021-01-21 RX ADMIN — CISATRACURIUM BESYLATE 50 MILLIGRAM(S): 2 INJECTION INTRAVENOUS at 22:09

## 2021-01-21 RX ADMIN — ENOXAPARIN SODIUM 40 MILLIGRAM(S): 100 INJECTION SUBCUTANEOUS at 17:12

## 2021-01-21 RX ADMIN — HYDROMORPHONE HYDROCHLORIDE 0.5 MG/HR: 2 INJECTION INTRAMUSCULAR; INTRAVENOUS; SUBCUTANEOUS at 10:11

## 2021-01-21 RX ADMIN — Medication 5.15 MICROGRAM(S)/KG/MIN: at 10:12

## 2021-01-21 RX ADMIN — Medication 1: at 17:11

## 2021-01-21 RX ADMIN — ALBUTEROL 2 PUFF(S): 90 AEROSOL, METERED ORAL at 04:53

## 2021-01-21 RX ADMIN — CHLORHEXIDINE GLUCONATE 1 APPLICATION(S): 213 SOLUTION TOPICAL at 05:19

## 2021-01-21 RX ADMIN — ALBUTEROL 2 PUFF(S): 90 AEROSOL, METERED ORAL at 09:37

## 2021-01-21 RX ADMIN — CISATRACURIUM BESYLATE 19.8 MICROGRAM(S)/KG/MIN: 2 INJECTION INTRAVENOUS at 22:10

## 2021-01-21 RX ADMIN — PROPOFOL 32.9 MICROGRAM(S)/KG/MIN: 10 INJECTION, EMULSION INTRAVENOUS at 10:10

## 2021-01-21 RX ADMIN — CHLORHEXIDINE GLUCONATE 15 MILLILITER(S): 213 SOLUTION TOPICAL at 17:57

## 2021-01-21 RX ADMIN — SENNA PLUS 10 MILLILITER(S): 8.6 TABLET ORAL at 10:12

## 2021-01-21 RX ADMIN — ALBUTEROL 2 PUFF(S): 90 AEROSOL, METERED ORAL at 21:16

## 2021-01-21 RX ADMIN — ENOXAPARIN SODIUM 40 MILLIGRAM(S): 100 INJECTION SUBCUTANEOUS at 05:20

## 2021-01-21 NOTE — CHART NOTE - NSCHARTNOTEFT_GEN_A_CORE
Subjective: Intubated, and sedated w/ propofol, versed, and dilaudid drips.    Vital Signs:  Vital Signs Last 24 Hrs  T(C): 37 (01-21-21 @ 08:00), Max: 37.3 (01-20-21 @ 15:00)  T(F): 98.6 (01-21-21 @ 08:00), Max: 99.2 (01-20-21 @ 15:00)  HR: 76 (01-21-21 @ 11:02) (58 - 82)  BP: 102/61 (01-21-21 @ 08:00) (102/61 - 109/62)  RR: 28 (01-21-21 @ 08:00) (2 - 28)  SpO2: 96% (01-21-21 @ 11:02) (95% - 99%) on (O2)      General: Intubated, and sedated w/ propofol, versed, and dilaudid drips.  Eyes: Scleras clear, PERRLA  Neck: Neck supple, trachea midline, No JVD  Respiratory: coarse breath sounds B/L  CV: S1S2; no audible murmurs  Abdominal: +BS's x4, soft, ND/NT  Extremities: No edema, + peripheral pulses  Skin: No Rashes, Hematoma, Ecchymosis                          9.2    8.48  )-----------( 278      ( 21 Jan 2021 03:40 )             30.0       143  |  104  |  9   ----------------------------<  167<H>  3.9   |  31  |  0.49<L>    Ca    8.3<L>       Phos  3.4      Mg     1.9        TPro  6.0  /  Alb  2.7<L>  /  TBili  0.3  /  DBili  x   /  AST  107<H>  /  ALT  117<H>  /  AlkPhos  159<H>    PT: 12.8 sec;   INR: 1.13 ratio      PTT:28.8 sec      MEDICATIONS  (STANDING):  ALBUTerol    90 MICROgram(s) HFA Inhaler 2 Puff(s) Inhalation every 6 hours  chlorhexidine 0.12% Liquid 15 milliLiter(s) Oral Mucosa every 12 hours  chlorhexidine 2% Cloths 1 Application(s) Topical <User Schedule>  dextrose 40% Gel 15 Gram(s) Oral once  dextrose 5%. 1000 milliLiter(s) (50 mL/Hr) IV Continuous <Continuous>  dextrose 5%. 1000 milliLiter(s) (100 mL/Hr) IV Continuous <Continuous>  dextrose 50% Injectable 25 Gram(s) IV Push once  dextrose 50% Injectable 12.5 Gram(s) IV Push once  dextrose 50% Injectable 25 Gram(s) IV Push once  enoxaparin Injectable 40 milliGRAM(s) SubCutaneous every 12 hours  glucagon  Injectable 1 milliGRAM(s) IntraMuscular once  HYDROmorphone Infusion. 0.5 mG/Hr (0.5 mL/Hr) IV Continuous <Continuous>  insulin lispro (ADMELOG) corrective regimen sliding scale   SubCutaneous every 6 hours  midazolam Infusion 0.02 mG/kG/Hr (2.2 mL/Hr) IV Continuous <Continuous>  norepinephrine Infusion 0.05 MICROgram(s)/kG/Min (5.15 mL/Hr) IV Continuous <Continuous>  petrolatum Ophthalmic Ointment 1 Application(s) Both EYES two times a day  polyethylene glycol 3350 17 Gram(s) Oral daily  propofol Infusion 50 MICROgram(s)/kG/Min (32.9 mL/Hr) IV Continuous <Continuous>  senna Syrup 10 milliLiter(s) Oral daily      Assessment  47 y/o female w/ PAST MEDICAL & SURGICAL HISTORY:    Admitted 1/3/21, and originally intubated 1/5 w/ COVID PNA.  Extubated 1/10, however required reintubation on 1/16.  Transferred from MICU to [facility] today.    PLAN  Sedated w/ propofol, versed, and dilaudid drips.    Wean Levophed drip as tolerated.    Vent settings: FIO2 60%, PEEP 8, , RR 28  Last ABG at 4am: 7.36/60/78/30/95%    WBC 8.48; Afebrile  Blood cultures from 1/16/21 negative  No antibiotics at this time    LFT's elevated today    Cr 0.49  Urine Output last 24hrs: 1,255ml  Fluid balance last 24hrs: 2,371ml    Jevity NGT feeds  Blood sugar's controlled  Lispro sliding scale    Senna, Miralax    Lovenox 40 Q12hrs    Patient discussed w/ Dr. Mayo Subjective: Intubated, and sedated w/ propofol, versed, and dilaudid drips.    Vital Signs:  Vital Signs Last 24 Hrs  T(C): 37 (01-21-21 @ 08:00), Max: 37.3 (01-20-21 @ 15:00)  T(F): 98.6 (01-21-21 @ 08:00), Max: 99.2 (01-20-21 @ 15:00)  HR: 76 (01-21-21 @ 11:02) (58 - 82)  BP: 102/61 (01-21-21 @ 08:00) (102/61 - 109/62)  RR: 28 (01-21-21 @ 08:00) (2 - 28)  SpO2: 96% (01-21-21 @ 11:02) (95% - 99%) on (O2)      General: Intubated, and sedated w/ propofol, versed, and dilaudid drips.  Eyes: Scleras clear, PERRLA  Neck: Neck supple, trachea midline, No JVD  Respiratory: coarse breath sounds B/L  CV: S1S2; no audible murmurs  Abdominal: +BS's x4, soft, ND/NT  Extremities: No edema, + peripheral pulses  Skin: No Rashes, Hematoma, Ecchymosis                          9.2    8.48  )-----------( 278      ( 21 Jan 2021 03:40 )             30.0       143  |  104  |  9   ----------------------------<  167<H>  3.9   |  31  |  0.49<L>    Ca    8.3<L>       Phos  3.4      Mg     1.9        TPro  6.0  /  Alb  2.7<L>  /  TBili  0.3  /  DBili  x   /  AST  107<H>  /  ALT  117<H>  /  AlkPhos  159<H>    PT: 12.8 sec;   INR: 1.13 ratio      PTT:28.8 sec      MEDICATIONS  (STANDING):  ALBUTerol    90 MICROgram(s) HFA Inhaler 2 Puff(s) Inhalation every 6 hours  chlorhexidine 0.12% Liquid 15 milliLiter(s) Oral Mucosa every 12 hours  chlorhexidine 2% Cloths 1 Application(s) Topical <User Schedule>  dextrose 40% Gel 15 Gram(s) Oral once  dextrose 5%. 1000 milliLiter(s) (50 mL/Hr) IV Continuous <Continuous>  dextrose 5%. 1000 milliLiter(s) (100 mL/Hr) IV Continuous <Continuous>  dextrose 50% Injectable 25 Gram(s) IV Push once  dextrose 50% Injectable 12.5 Gram(s) IV Push once  dextrose 50% Injectable 25 Gram(s) IV Push once  enoxaparin Injectable 40 milliGRAM(s) SubCutaneous every 12 hours  glucagon  Injectable 1 milliGRAM(s) IntraMuscular once  HYDROmorphone Infusion. 0.5 mG/Hr (0.5 mL/Hr) IV Continuous <Continuous>  insulin lispro (ADMELOG) corrective regimen sliding scale   SubCutaneous every 6 hours  midazolam Infusion 0.02 mG/kG/Hr (2.2 mL/Hr) IV Continuous <Continuous>  norepinephrine Infusion 0.05 MICROgram(s)/kG/Min (5.15 mL/Hr) IV Continuous <Continuous>  petrolatum Ophthalmic Ointment 1 Application(s) Both EYES two times a day  polyethylene glycol 3350 17 Gram(s) Oral daily  propofol Infusion 50 MICROgram(s)/kG/Min (32.9 mL/Hr) IV Continuous <Continuous>  senna Syrup 10 milliLiter(s) Oral daily      Assessment  45 y/o female w/ PAST MEDICAL & SURGICAL HISTORY:    Admitted 1/3/21, and originally intubated 1/5 w/ COVID PNA.  Extubated 1/10, however required reintubation on 1/16.  Transferred from MICU to [facility] today.    PLAN  Sedated w/ propofol, versed, and dilaudid drips.    Wean Levophed drip as tolerated.    Vent settings: FIO2 60%, PEEP 8, , RR 28  Last ABG at 4am: 7.36/60/78/30/95%  Proned yesterday in MICU    WBC 8.48; Afebrile  Blood cultures from 1/16/21 negative  No antibiotics at this time    LFT's elevated today    Cr 0.49  Urine Output last 24hrs: 1,255ml  Fluid balance last 24hrs: 2,371ml    Jevity NGT feeds  Blood sugar's controlled  Lispro sliding scale    Senna, Miralax    Lovenox 40 Q12hrs    Patient discussed w/ Dr. Mayo Subjective: Intubated, and sedated w/ propofol, versed, and dilaudid drips.    Vital Signs:  Vital Signs Last 24 Hrs  T(C): 37 (01-21-21 @ 08:00), Max: 37.3 (01-20-21 @ 15:00)  T(F): 98.6 (01-21-21 @ 08:00), Max: 99.2 (01-20-21 @ 15:00)  HR: 76 (01-21-21 @ 11:02) (58 - 82)  BP: 102/61 (01-21-21 @ 08:00) (102/61 - 109/62)  RR: 28 (01-21-21 @ 08:00) (2 - 28)  SpO2: 96% (01-21-21 @ 11:02) (95% - 99%) on (O2)      General: Intubated, and sedated w/ propofol, versed, and dilaudid drips.  Eyes: Scleras clear, PERRLA  Neck: Neck supple, trachea midline, No JVD  Respiratory: coarse breath sounds B/L  CV: S1S2; no audible murmurs  Abdominal: +BS's x4, soft, ND/NT  Extremities: No edema, + peripheral pulses  Skin: No Rashes, Hematoma, Ecchymosis                          9.2    8.48  )-----------( 278      ( 21 Jan 2021 03:40 )             30.0       143  |  104  |  9   ----------------------------<  167<H>  3.9   |  31  |  0.49<L>    Ca    8.3<L>       Phos  3.4      Mg     1.9        TPro  6.0  /  Alb  2.7<L>  /  TBili  0.3  /  DBili  x   /  AST  107<H>  /  ALT  117<H>  /  AlkPhos  159<H>    PT: 12.8 sec;   INR: 1.13 ratio      PTT:28.8 sec      MEDICATIONS  (STANDING):  ALBUTerol    90 MICROgram(s) HFA Inhaler 2 Puff(s) Inhalation every 6 hours  chlorhexidine 0.12% Liquid 15 milliLiter(s) Oral Mucosa every 12 hours  chlorhexidine 2% Cloths 1 Application(s) Topical <User Schedule>  dextrose 40% Gel 15 Gram(s) Oral once  dextrose 5%. 1000 milliLiter(s) (50 mL/Hr) IV Continuous <Continuous>  dextrose 5%. 1000 milliLiter(s) (100 mL/Hr) IV Continuous <Continuous>  dextrose 50% Injectable 25 Gram(s) IV Push once  dextrose 50% Injectable 12.5 Gram(s) IV Push once  dextrose 50% Injectable 25 Gram(s) IV Push once  enoxaparin Injectable 40 milliGRAM(s) SubCutaneous every 12 hours  glucagon  Injectable 1 milliGRAM(s) IntraMuscular once  HYDROmorphone Infusion. 0.5 mG/Hr (0.5 mL/Hr) IV Continuous <Continuous>  insulin lispro (ADMELOG) corrective regimen sliding scale   SubCutaneous every 6 hours  midazolam Infusion 0.02 mG/kG/Hr (2.2 mL/Hr) IV Continuous <Continuous>  norepinephrine Infusion 0.05 MICROgram(s)/kG/Min (5.15 mL/Hr) IV Continuous <Continuous>  petrolatum Ophthalmic Ointment 1 Application(s) Both EYES two times a day  polyethylene glycol 3350 17 Gram(s) Oral daily  propofol Infusion 50 MICROgram(s)/kG/Min (32.9 mL/Hr) IV Continuous <Continuous>  senna Syrup 10 milliLiter(s) Oral daily      Assessment  47 y/o female w/ PAST MEDICAL & SURGICAL HISTORY:    Admitted 1/3/21, and originally intubated 1/5 w/ COVID PNA.  Extubated 1/10, however required reintubation on 1/16.  Transferred from MICU to [facility] today.    PLAN  Sedated w/ propofol, versed, and dilaudid drips.    Wean Levophed drip as tolerated.    Vent settings: FIO2 60%, PEEP 8, , RR 28  Last ABG at 4am: 7.36/60/78/30/95%  Proned yesterday in MICU    WBC 8.48; Afebrile  Blood cultures from 1/16/21 negative  No antibiotics at this time    LFT's elevated today    Cr 0.49  Urine Output last 24hrs: 1,255ml  Fluid balance last 24hrs: 2,371ml    Jevity NGT feeds  Blood sugar's controlled  Lispro sliding scale    Senna, Miralax    Lovenox 40 Q12hrs    Patient discussed w/ Dr. Maria Esther Mayo: I have seen and examined the patient face to face, have reviewed and addended the HPI, PE and a/p as necessary.     46 year old woman with COVID ARDS s/p intubation on 1/5 and extubation on 1/9; noted to have re-intubated on 1/16 requiring proning.  Weaned off paralytics on 1/21, though noted to require re-initiation of paralytics and proning overnight for refractory hypoxemia.  Will require multiple visits and active titration of FiO2 and PEEP.

## 2021-01-21 NOTE — PROGRESS NOTE ADULT - ASSESSMENT
The patient is a 46-year-old woman who was admitted to the hospital for management acute respiratory failure with hypoxia secondary to covid-19 pneumonia, is status post intubation from 1/5 to 1/9 for management of acute respiratory distress syndrome, and who is now re-intubated (since 1/16) and in the MICU for management of severe ARDS secondary to covid-19 pneumonia.     Neuro:  -The patient is sedated and intubated in the MICU with intravenous hydromorphone, propofol, and midazolam. Paralysis with cisatracurium weaned. Will maintain current level of sedation as patient's current pulmonary function does not indicate that she is ready for extubation.     CV:  -Vasoplegic shock, most likely secondary to administration of sedative medications; currently receiving norepinephrine for intravenous blood pressure support; mildly increasing vasopressor requirements overnight most likely secondary to hypovolemia (no fevers to represent vasoplegia, no leukocytosis, no emboli concerning for obstructive shock, no spinal cord injury)   -No known history of coronary artery disease or of arrhythmia; sinus rhythm on telemetry     Respiratory:  -Severe acute respiratory distress syndrome diagnosed; placed in prone position yesterday; improved p:f ratio represents effective recruitment of lung bases   -Current ventilator settings: AC/VC, RR 28, , PEEP 8, FiO2 60; pulmonary compliance about 17.5 mL/mm Hg; will decrease tidal volume to 300 ccs today in the setting of elevated driving pressures; hope to allow for lung-protective ventilation and subsequently to wean current ventilator settings   -No pleural effusions noted on imaging; no new fevers noted; need for re-intubation most consistent with clinical worsening of underlying covid pneumonia; superimposed bacterial infection unlikely  -Respiratory acidosis noted (permissive hypercapnia in setting of lung-protective ventilation) with appropriate metabolic compensation     Renal:  -Serum creatinine at baseline; will continue to monitor urine output; urine output about 50 ccs/hr over previous 24 hours; suspect represents mild pre-renal azotemia; will administer 100 cc/hr of lactated ringers for maintenance fluid over the next twelve hours   -Mild hyponatremia noted; possibly secondary to siadh in setting of pulmonary pathology   -Will continue to monitor daily metabolic panel     ID:  -Status post administration of ten-day course of dexamethasone and five-day course of remdesivir for treatment of severe covid-19   -In the setting of need for re-intubation, administering zosyn (day 5/5) for empiric coverage of superimposed bacterial pneumonia;   -No growth to date from cultures     Endo:  -No active concerns at this time   -A1c 6.6, meeting criteria for diagnosis of diabetes mellitus; sliding scale insulin ordered; about 2 units administered over previous 24 hours     GI:  -Jevity tube feeds   -Will monitor bowel movements     Heme:  -DVT ppx: lovenox 40 q12h  -Negative bilateral lower extremity dopplers despite rising d-dimer  The patient is a 46-year-old woman who was admitted to the hospital for management acute respiratory failure with hypoxia secondary to covid-19 pneumonia, is status post intubation from 1/5 to 1/9 for management of acute respiratory distress syndrome, and who is now re-intubated (since 1/16) and in the MICU for management of severe ARDS secondary to covid-19 pneumonia.     Neuro:  -The patient is sedated in the MICU with intravenous hydromorphone, propofol, and midazolam. Paralysis with cisatracurium weaned. Will maintain current level of sedation as patient's current pulmonary function does not indicate that she is ready for extubation.     CV:  -Vasoplegic shock, most likely secondary to administration of sedative medications; currently receiving norepinephrine for intravenous blood pressure support; mildly increasing vasopressor requirements overnight most likely secondary ongoing sedative medication administration (no fevers to represent vasoplegia, no leukocytosis, no emboli concerning for obstructive shock, no spinal cord injury)   -No known history of coronary artery disease or of arrhythmia; sinus rhythm on telemetry     Respiratory:  -Severe acute respiratory distress syndrome diagnosed; has remained in supine position for past two days; improved p:f ratio represents effective recruitment of lung bases   -Current ventilator settings: AC/VC, RR 28, , PEEP 8, FiO2 60; pulmonary compliance about 15 mL/mm Hg; in the setting of elevated driving pressures, will decrease peep as tolerated; hope to allow for lung-protective ventilation and subsequently to wean current ventilator settings   -No pleural effusions noted on imaging; no new fevers noted; need for re-intubation most consistent with clinical worsening of underlying covid pneumonia; superimposed bacterial infection unlikely  -Respiratory acidosis noted (permissive hypercapnia in setting of lung-protective ventilation) with appropriate metabolic compensation     Renal:  -Serum creatinine at baseline; will continue to monitor urine output; urine output about  ccs/hr over previous 24 hours; no need for additional fluid administration at this time   -Mild hyponatremia noted; possibly secondary to siadh in setting of pulmonary pathology   -Will continue to monitor daily metabolic panel     ID:  -Status post administration of ten-day course of dexamethasone and five-day course of remdesivir for treatment of severe covid-19   -In the setting of need for re-intubation, status post zosyn administration for five days for empiric coverage of superimposed bacterial pneumonia;   -No growth to date from cultures     Endo:  -No active concerns at this time   -A1c 6.6, meeting criteria for diagnosis of diabetes mellitus; sliding scale insulin ordered; about 2 units administered over previous 24 hours     GI:  -Jevity tube feeds   -Will monitor bowel movements     Heme:  -DVT ppx: lovenox 40 q12h  -Negative bilateral lower extremity dopplers despite rising d-dimer

## 2021-01-21 NOTE — PROGRESS NOTE ADULT - SUBJECTIVE AND OBJECTIVE BOX
Abdi Palencia [email or web address]  [id number]    INTERVAL HPI/OVERNIGHT EVENTS:    SUBJECTIVE: Patient seen and examined at bedside.     CONSTITUTIONAL: No weakness, fevers or chills  EYES/ENT: No visual changes;  No vertigo or throat pain   NECK: No pain or stiffness  RESPIRATORY: No cough, wheezing, hemoptysis; No shortness of breath  CARDIOVASCULAR: No chest pain or palpitations  GASTROINTESTINAL: No abdominal or epigastric pain. No nausea, vomiting, or hematemesis; No diarrhea or constipation. No melena or hematochezia.  GENITOURINARY: No dysuria, frequency or hematuria  NEUROLOGICAL: No numbness or weakness  SKIN: No itching, rashes    OBJECTIVE:    VITAL SIGNS:  ICU Vital Signs Last 24 Hrs  T(C): 36.6 (21 Jan 2021 04:00), Max: 37.3 (20 Jan 2021 15:00)  T(F): 97.9 (21 Jan 2021 04:00), Max: 99.2 (20 Jan 2021 15:00)  HR: 72 (21 Jan 2021 04:54) (58 - 82)  BP: 109/62 (20 Jan 2021 12:00) (101/58 - 109/62)  BP(mean): 73 (20 Jan 2021 12:00) (68 - 73)  ABP: 122/57 (21 Jan 2021 04:00) (90/46 - 140/55)  ABP(mean): 80 (21 Jan 2021 04:00) (63 - 95)  RR: 28 (21 Jan 2021 04:00) (2 - 28)  SpO2: 97% (21 Jan 2021 04:54) (95% - 99%)    Mode: AC/ CMV (Assist Control/ Continuous Mandatory Ventilation), RR (machine): 28, TV (machine): 300, FiO2: 60, PEEP: 8, ITime: 0.7, MAP: 14, PIP: 30    01-19 @ 07:01  -  01-20 @ 07:00  --------------------------------------------------------  IN: 2867 mL / OUT: 945 mL / NET: 1922 mL    01-20 @ 07:01  -  01-21 @ 06:34  --------------------------------------------------------  IN: 3343.2 mL / OUT: 1255 mL / NET: 2088.2 mL      CAPILLARY BLOOD GLUCOSE      POCT Blood Glucose.: 187 mg/dL (21 Jan 2021 05:17)      PHYSICAL EXAM:    General: NAD  HEENT: NC/AT; PERRL, clear conjunctiva  Neck: supple  Respiratory: CTA b/l  Cardiovascular: +S1/S2; RRR  Abdomen: soft, NT/ND; +BS x4  Extremities: WWP, 2+ peripheral pulses b/l; no LE edema  Skin: normal color and turgor; no rash  Neurological:    MEDICATIONS:  MEDICATIONS  (STANDING):  ALBUTerol    90 MICROgram(s) HFA Inhaler 2 Puff(s) Inhalation every 6 hours  chlorhexidine 0.12% Liquid 15 milliLiter(s) Oral Mucosa every 12 hours  chlorhexidine 2% Cloths 1 Application(s) Topical <User Schedule>  dextrose 40% Gel 15 Gram(s) Oral once  dextrose 5%. 1000 milliLiter(s) (50 mL/Hr) IV Continuous <Continuous>  dextrose 5%. 1000 milliLiter(s) (100 mL/Hr) IV Continuous <Continuous>  dextrose 50% Injectable 25 Gram(s) IV Push once  dextrose 50% Injectable 12.5 Gram(s) IV Push once  dextrose 50% Injectable 25 Gram(s) IV Push once  enoxaparin Injectable 40 milliGRAM(s) SubCutaneous every 12 hours  glucagon  Injectable 1 milliGRAM(s) IntraMuscular once  HYDROmorphone Infusion. 0.5 mG/Hr (0.5 mL/Hr) IV Continuous <Continuous>  insulin lispro (ADMELOG) corrective regimen sliding scale   SubCutaneous every 6 hours  midazolam Infusion 0.02 mG/kG/Hr (2.2 mL/Hr) IV Continuous <Continuous>  norepinephrine Infusion 0.05 MICROgram(s)/kG/Min (5.15 mL/Hr) IV Continuous <Continuous>  petrolatum Ophthalmic Ointment 1 Application(s) Both EYES two times a day  polyethylene glycol 3350 17 Gram(s) Oral daily  propofol Infusion 50 MICROgram(s)/kG/Min (32.9 mL/Hr) IV Continuous <Continuous>  senna Syrup 10 milliLiter(s) Oral daily    MEDICATIONS  (PRN):      ALLERGIES:  Allergies    No Known Allergies    Intolerances        LABS:                        9.2    8.48  )-----------( 278      ( 21 Jan 2021 03:40 )             30.0     01-21    143  |  104  |  9   ----------------------------<  167<H>  3.9   |  31  |  0.49<L>    Ca    8.3<L>      21 Jan 2021 03:40  Phos  3.4     01-21  Mg     1.9     01-21    TPro  6.0  /  Alb  2.7<L>  /  TBili  0.3  /  DBili  x   /  AST  107<H>  /  ALT  117<H>  /  AlkPhos  159<H>  01-21    PT/INR - ( 21 Jan 2021 03:40 )   PT: 12.8 sec;   INR: 1.13 ratio         PTT - ( 21 Jan 2021 03:40 )  PTT:28.8 sec      RADIOLOGY & ADDITIONAL TESTS: Reviewed. Abdi Palencia [id number]  [id number]    INTERVAL HPI/OVERNIGHT EVENTS: The patient had mildly increasing norepinephrine requirements. She has maintained a p:f ratio of approximately 130.     SUBJECTIVE: Patient seen and examined at bedside.     Could not obtain an accurate review of systems because the patient is sedated and intubated.     OBJECTIVE:    VITAL SIGNS:  ICU Vital Signs Last 24 Hrs  T(C): 36.6 (21 Jan 2021 04:00), Max: 37.3 (20 Jan 2021 15:00)  T(F): 97.9 (21 Jan 2021 04:00), Max: 99.2 (20 Jan 2021 15:00)  HR: 72 (21 Jan 2021 04:54) (58 - 82)  BP: 109/62 (20 Jan 2021 12:00) (101/58 - 109/62)  BP(mean): 73 (20 Jan 2021 12:00) (68 - 73)  ABP: 122/57 (21 Jan 2021 04:00) (90/46 - 140/55)  ABP(mean): 80 (21 Jan 2021 04:00) (63 - 95)  RR: 28 (21 Jan 2021 04:00) (2 - 28)  SpO2: 97% (21 Jan 2021 04:54) (95% - 99%)    Mode: AC/ CMV (Assist Control/ Continuous Mandatory Ventilation), RR (machine): 28, TV (machine): 300, FiO2: 60, PEEP: 8, ITime: 0.7, MAP: 14, PIP: 30    01-19 @ 07:01 - 01-20 @ 07:00  --------------------------------------------------------  IN: 2867 mL / OUT: 945 mL / NET: 1922 mL    01-20 @ 07:01 - 01-21 @ 06:34  --------------------------------------------------------  IN: 3343.2 mL / OUT: 1255 mL / NET: 2088.2 mL      CAPILLARY BLOOD GLUCOSE      POCT Blood Glucose.: 187 mg/dL (21 Jan 2021 05:17)      PHYSICAL EXAM:    General: Sedated and intubated; not initiating respirations on the ventilator   HEENT: Pupils 3 mm and round bilaterally, clear conjunctiva  Neck: Left internal jugular vein triple lumen catheter in place; no lymphadenopathy   Respiratory: Equal chest rise bilaterally; tympanitic to percussion; no crepitus   Cardiovascular: PMI not displaced; peripheral pulses 2+  Abdomen: soft, NT/ND; +BS x4  Extremities: WWP, 2+ peripheral pulses b/l; no LE edema  Skin: normal color and turgor; no rash  Neurological: RASS -5 with no response to pain; pupils equal and round    MEDICATIONS:  MEDICATIONS  (STANDING):  ALBUTerol    90 MICROgram(s) HFA Inhaler 2 Puff(s) Inhalation every 6 hours  chlorhexidine 0.12% Liquid 15 milliLiter(s) Oral Mucosa every 12 hours  chlorhexidine 2% Cloths 1 Application(s) Topical <User Schedule>  dextrose 40% Gel 15 Gram(s) Oral once  dextrose 5%. 1000 milliLiter(s) (50 mL/Hr) IV Continuous <Continuous>  dextrose 5%. 1000 milliLiter(s) (100 mL/Hr) IV Continuous <Continuous>  dextrose 50% Injectable 25 Gram(s) IV Push once  dextrose 50% Injectable 12.5 Gram(s) IV Push once  dextrose 50% Injectable 25 Gram(s) IV Push once  enoxaparin Injectable 40 milliGRAM(s) SubCutaneous every 12 hours  glucagon  Injectable 1 milliGRAM(s) IntraMuscular once  HYDROmorphone Infusion. 0.5 mG/Hr (0.5 mL/Hr) IV Continuous <Continuous>  insulin lispro (ADMELOG) corrective regimen sliding scale   SubCutaneous every 6 hours  midazolam Infusion 0.02 mG/kG/Hr (2.2 mL/Hr) IV Continuous <Continuous>  norepinephrine Infusion 0.05 MICROgram(s)/kG/Min (5.15 mL/Hr) IV Continuous <Continuous>  petrolatum Ophthalmic Ointment 1 Application(s) Both EYES two times a day  polyethylene glycol 3350 17 Gram(s) Oral daily  propofol Infusion 50 MICROgram(s)/kG/Min (32.9 mL/Hr) IV Continuous <Continuous>  senna Syrup 10 milliLiter(s) Oral daily    MEDICATIONS  (PRN):      ALLERGIES:  Allergies    No Known Allergies    Intolerances        LABS:                        9.2    8.48  )-----------( 278      ( 21 Jan 2021 03:40 )             30.0     01-21    143  |  104  |  9   ----------------------------<  167<H>  3.9   |  31  |  0.49<L>    Ca    8.3<L>      21 Jan 2021 03:40  Phos  3.4     01-21  Mg     1.9     01-21    TPro  6.0  /  Alb  2.7<L>  /  TBili  0.3  /  DBili  x   /  AST  107<H>  /  ALT  117<H>  /  AlkPhos  159<H>  01-21    PT/INR - ( 21 Jan 2021 03:40 )   PT: 12.8 sec;   INR: 1.13 ratio         PTT - ( 21 Jan 2021 03:40 )  PTT:28.8 sec      RADIOLOGY & ADDITIONAL TESTS: Reviewed.

## 2021-01-21 NOTE — PROGRESS NOTE ADULT - ATTENDING COMMENTS
Patient seen and examined. Chart reviewed.    46 year old woman with COVID ARDS re-intubated oxygenation improved with proning now supine doing well. Off paralytics remains with stable oxygenation continue to follow ABG and wean vent as tolerated. Urine output improved.     Critically ill patient requiring frequent bedside visits.   Prognosis guarded.

## 2021-01-21 NOTE — CHART NOTE - NSCHARTNOTEFT_GEN_A_CORE
Nutrition Follow-Up Note   Reason for follow-up: Critical care length of stay follow- up.     Information obtained from extensive chart review. Unable to conduct face-to-face interview due to current contact/isolation precautions in setting of COVID-19.    Clinical Course:  Patient with history of obesity Admitted 1/3/21, and originally intubated 1/5 w/ COVID PNA.  Extubated 1/10, however required reintubation on 1/16.  Transferred from MICU to [facility] B 1/21. Remains intubated. Sedated w/ propofol, versed, and dilaudid drips.    Propofol 19.8mL/hr  =  provides 522 additional Kcal if sedation continues at this rate x24 hours.    Diet Order: Diet, NPO with Tube Feed:   Tube Feeding Modality: Nasogastric  Jevity 1.2 Willian (JEVITY1.2RTH)  Until Goal Tube Feed Rate (mL per Hour): 50  Tube Feed Duration (in Hours): 24  Tube Feed Start Time: 19:00 (01-17-21 @ 11:18)    CURRENT EN ORDER PROVIDES:  Total Volume: 1200mL/day  Energy: 1440Kcal/day  Protein: 66.6g protein/day  Free Water: 968mL/day    Nutrition Related Information: -1/17 - Patient supine and then re-proned @ 11am  - 1/18 - Supine and then re-proned @12:00. Tube feeds started @ 20mL/hr  - 1/29 - Supine 1/19 at 05:30  - 1/19 - Feeds increased to 40mL/hr  - 1/21 - Feeds running at 50mL/hr per flow sheets  - No tube feeding intolerances reported    GI: - Last BM 1/10. No BM today  - Ordered for Senna and Miralax  - No vomiting reported    Anthropometric Measurements:   Height (cm): 165.1   Weight (kg): 97 (1/20), 92 (1/18), 108.5 (1/11), 103.4 (1/09), 109.8 (1/03)  *Noted weight shifts, ?possibly fluid related versus true weight loss.   BMI (kg/m2): 40.3 (1/03)  IBW: 56.8kg +/-10%  Appearance: Unable to conduct nutrition-focused physical exam at this time due to limited contact in setting of isolation precautions related to COVID-19.    Medications: MEDICATIONS  (STANDING):  enoxaparin Injectable 40 milliGRAM(s) SubCutaneous every 12 hours  HYDROmorphone Infusion. 0.5 mG/Hr (0.5 mL/Hr) IV Continuous <Continuous>  insulin lispro (ADMELOG) corrective regimen sliding scale   SubCutaneous every 6 hours  midazolam Infusion 0.02 mG/kG/Hr (2.2 mL/Hr) IV Continuous <Continuous>  norepinephrine Infusion 0.05 MICROgram(s)/kG/Min (5.15 mL/Hr) IV Continuous <Continuous>  petrolatum Ophthalmic Ointment 1 Application(s) Both EYES two times a day  polyethylene glycol 3350 17 Gram(s) Oral daily  propofol Infusion 50 MICROgram(s)/kG/Min (32.9 mL/Hr) IV Continuous <Continuous>  senna Syrup 10 milliLiter(s) Oral daily    Labs: 01-21 @ 03:40: Sodium 143, Potassium 3.9, Calcium 8.3<L>, Magnesium 1.9, Phosphorus 3.4, BUN 9, Creatinine 0.49<L>, Glucose 167<H>, Alk Phos 159<H>, ALT/SGPT 117<H>, AST/SGOT 107<H>, Albumin 2.7<L>, Total Bilirubin 0.3, Hemoglobin 9.2<L>, Hematocrit 30.0<L>, Creatine Kinase <<27>    POCT Blood Glucose.: 213 mg/dL (01-21-21 @ 11:45)  POCT Blood Glucose.: 187 mg/dL (01-21-21 @ 05:17)  POCT Blood Glucose.: 161 mg/dL (01-21-21 @ 00:48)  POCT Blood Glucose.: 194 mg/dL (01-20-21 @ 16:39)    Skin: bridge of nose wound  Edema: 1+ generalized      Estimated Nutrition Needs: calculated using IBW given weight discrepancies in-house   Estimated Energy Needs (25-30): 1590-1704Kcal/kg  Estimated Protein Needs (2g/kg): 113.6g protein/day    Previous Nutrition Diagnosis:  Increased nutrient needs  Diagnosis is ongoing    Nutrition Interventions/Recommendations:   1. Recommend Jevity 1.2 via NGT @ 35mL/hr x24 hours + No Carb Prosource + No Carb Prosource 5x daily [provdes 840mL total volume, 1008Kcal, 121g protein, 677mL free water].   *With propofol (522Kcal), patient with receive 1530Kcal, 121g protein (26.9Kcal/kg IBW, 2.1g/kg IBW).   2. Consider multivitamin daily for micronutrient coverage.   3. Bowel regimen prn.  4. Additional free water per MD discretion.   5. Trend triglycerides while on propofol.      Monitoring and Evaluation:   Monitor nutrition provision, tolerance to diet, weights, labs, skin integrity and GI function.   RD remains available, please consult as needed. Will follow up per protocol.  Delisa Rios, MS, RD, CDN, [facility] Pager #[phone] Nutrition Follow-Up Note   Reason for follow-up: Critical care length of stay follow- up.     Information obtained from extensive chart review. Unable to conduct face-to-face interview due to current contact/isolation precautions in setting of COVID-19.    Clinical Course:  Patient with history of obesity Admitted 1/3/21, and originally intubated 1/5 w/ COVID PNA.  Extubated 1/10, however required reintubation on 1/16. Transferred from MICU to [facility] 1/21. Remains intubated. Sedated w/ propofol, versed, and dilaudid drips.    Propofol 19.8mL/hr  =  provides 522 additional Kcal if sedation continues at this rate x24 hours.    Diet Order: Diet, NPO with Tube Feed:   Tube Feeding Modality: Nasogastric  Jevity 1.2 Willian (JEVITY1.2RTH)  Until Goal Tube Feed Rate (mL per Hour): 50  Tube Feed Duration (in Hours): 24  Tube Feed Start Time: 19:00 (01-17-21 @ 11:18)    CURRENT EN ORDER PROVIDES:  Total Volume: 1200mL/day  Energy: 1440Kcal/day  Protein: 66.6g protein/day  Free Water: 968mL/day    Nutrition Related Information: -1/17 - Patient supine and then re-proned @ 11am  - 1/18 - Supine and then re-proned @12:00. Tube feeds started @ 20mL/hr  - 1/29 - Supine 1/19 at 05:30  - 1/19 - Feeds increased to 40mL/hr  - 1/21 - Feeds running at 50mL/hr per flow sheets  - No tube feeding intolerances reported    GI: - Last BM 1/10. No BM today  - Ordered for Senna and Miralax  - No vomiting reported    Anthropometric Measurements:   Height (cm): 165.1   Weight (kg): 97 (1/20), 92 (1/18), 108.5 (1/11), 103.4 (1/09), 109.8 (1/03)  *Noted weight shifts, ?possibly fluid related versus true weight loss.   BMI (kg/m2): 40.3 (1/03)  IBW: 56.8kg +/-10%  Appearance: Unable to conduct nutrition-focused physical exam at this time due to limited contact in setting of isolation precautions related to COVID-19.    Medications: MEDICATIONS  (STANDING):  enoxaparin Injectable 40 milliGRAM(s) SubCutaneous every 12 hours  HYDROmorphone Infusion. 0.5 mG/Hr (0.5 mL/Hr) IV Continuous <Continuous>  insulin lispro (ADMELOG) corrective regimen sliding scale   SubCutaneous every 6 hours  midazolam Infusion 0.02 mG/kG/Hr (2.2 mL/Hr) IV Continuous <Continuous>  norepinephrine Infusion 0.05 MICROgram(s)/kG/Min (5.15 mL/Hr) IV Continuous <Continuous>  petrolatum Ophthalmic Ointment 1 Application(s) Both EYES two times a day  polyethylene glycol 3350 17 Gram(s) Oral daily  propofol Infusion 50 MICROgram(s)/kG/Min (32.9 mL/Hr) IV Continuous <Continuous>  senna Syrup 10 milliLiter(s) Oral daily    Labs: 01-21 @ 03:40: Sodium 143, Potassium 3.9, Calcium 8.3<L>, Magnesium 1.9, Phosphorus 3.4, BUN 9, Creatinine 0.49<L>, Glucose 167<H>, Alk Phos 159<H>, ALT/SGPT 117<H>, AST/SGOT 107<H>, Albumin 2.7<L>, Total Bilirubin 0.3, Hemoglobin 9.2<L>, Hematocrit 30.0<L>, Creatine Kinase <<27>    POCT Blood Glucose.: 213 mg/dL (01-21-21 @ 11:45)  POCT Blood Glucose.: 187 mg/dL (01-21-21 @ 05:17)  POCT Blood Glucose.: 161 mg/dL (01-21-21 @ 00:48)  POCT Blood Glucose.: 194 mg/dL (01-20-21 @ 16:39)    Skin: bridge of nose wound  Edema: 1+ generalized      Estimated Nutrition Needs: calculated using IBW given weight discrepancies in-house   Estimated Energy Needs (25-30): 1590-1704Kcal/kg  Estimated Protein Needs (2g/kg): 113.6g protein/day    Previous Nutrition Diagnosis:  Increased nutrient needs  Diagnosis is ongoing    Nutrition Interventions/Recommendations:   1. Recommend Jevity 1.2 via NGT @ 35mL/hr x24 hours + No Carb Prosource + No Carb Prosource 5x daily [provdes 840mL total volume, 1008Kcal, 121g protein, 677mL free water].   *With propofol (522Kcal), patient to receive 1530Kcal, 121g protein (26.9Kcal/kg IBW, 2.1g/kg IBW).   2. Consider multivitamin daily for micronutrient coverage.   3. Bowel regimen prn.  4. Additional free water per MD discretion.   5. Trend triglycerides while on propofol.      Monitoring and Evaluation:   Monitor nutrition provision, tolerance to diet, weights, labs, skin integrity and GI function.   RD remains available, please consult as needed. Will follow up per protocol.  Delisa Rios, MS, RD, CDN, [facility] Pager #[phone]

## 2021-01-22 LAB
ALBUMIN SERPL ELPH-MCNC: 2.6 G/DL — LOW (ref 3.3–5)
ALP SERPL-CCNC: 153 U/L — HIGH (ref 40–120)
ALT FLD-CCNC: 115 U/L — HIGH (ref 4–33)
ANION GAP SERPL CALC-SCNC: 4 MMOL/L — LOW (ref 7–14)
APTT BLD: 32 SEC — SIGNIFICANT CHANGE UP (ref 27–36.3)
AST SERPL-CCNC: 61 U/L — HIGH (ref 4–32)
BASE EXCESS BLDA CALC-SCNC: 9.5 MMOL/L — HIGH (ref -2–2)
BASOPHILS # BLD AUTO: 0.05 K/UL — SIGNIFICANT CHANGE UP (ref 0–0.2)
BASOPHILS NFR BLD AUTO: 0.5 % — SIGNIFICANT CHANGE UP (ref 0–2)
BILIRUB SERPL-MCNC: 0.4 MG/DL — SIGNIFICANT CHANGE UP (ref 0.2–1.2)
BLOOD GAS ARTERIAL COMPREHENSIVE RESULT: SIGNIFICANT CHANGE UP
BLOOD GAS ARTERIAL COMPREHENSIVE RESULT: SIGNIFICANT CHANGE UP
BUN SERPL-MCNC: 8 MG/DL — SIGNIFICANT CHANGE UP (ref 7–23)
CALCIUM SERPL-MCNC: 8.4 MG/DL — SIGNIFICANT CHANGE UP (ref 8.4–10.5)
CHLORIDE SERPL-SCNC: 103 MMOL/L — SIGNIFICANT CHANGE UP (ref 98–107)
CO2 SERPL-SCNC: 33 MMOL/L — HIGH (ref 22–31)
CREAT SERPL-MCNC: 0.5 MG/DL — SIGNIFICANT CHANGE UP (ref 0.5–1.3)
CRP SERPL-MCNC: 109.7 MG/L — HIGH
D DIMER BLD IA.RAPID-MCNC: 855 NG/ML DDU — HIGH
EOSINOPHIL # BLD AUTO: 0.37 K/UL — SIGNIFICANT CHANGE UP (ref 0–0.5)
EOSINOPHIL NFR BLD AUTO: 3.6 % — SIGNIFICANT CHANGE UP (ref 0–6)
FERRITIN SERPL-MCNC: 216 NG/ML — HIGH (ref 15–150)
GLUCOSE SERPL-MCNC: 143 MG/DL — HIGH (ref 70–99)
HCO3 BLDA-SCNC: 32 MMOL/L — HIGH (ref 22–26)
HCT VFR BLD CALC: 30.1 % — LOW (ref 34.5–45)
HGB BLD-MCNC: 9 G/DL — LOW (ref 11.5–15.5)
IANC: 6.1 K/UL — SIGNIFICANT CHANGE UP (ref 1.5–8.5)
IMM GRANULOCYTES NFR BLD AUTO: 4.4 % — HIGH (ref 0–1.5)
INR BLD: 1.24 RATIO — HIGH (ref 0.88–1.16)
LYMPHOCYTES # BLD AUTO: 2.2 K/UL — SIGNIFICANT CHANGE UP (ref 1–3.3)
LYMPHOCYTES # BLD AUTO: 21.5 % — SIGNIFICANT CHANGE UP (ref 13–44)
MAGNESIUM SERPL-MCNC: 2 MG/DL — SIGNIFICANT CHANGE UP (ref 1.6–2.6)
MCHC RBC-ENTMCNC: 27.4 PG — SIGNIFICANT CHANGE UP (ref 27–34)
MCHC RBC-ENTMCNC: 29.9 GM/DL — LOW (ref 32–36)
MCV RBC AUTO: 91.5 FL — SIGNIFICANT CHANGE UP (ref 80–100)
MONOCYTES # BLD AUTO: 1.05 K/UL — HIGH (ref 0–0.9)
MONOCYTES NFR BLD AUTO: 10.3 % — SIGNIFICANT CHANGE UP (ref 2–14)
NEUTROPHILS # BLD AUTO: 6.1 K/UL — SIGNIFICANT CHANGE UP (ref 1.8–7.4)
NEUTROPHILS NFR BLD AUTO: 59.7 % — SIGNIFICANT CHANGE UP (ref 43–77)
NRBC # BLD: 0 /100 WBCS — SIGNIFICANT CHANGE UP
NRBC # FLD: 0.05 K/UL — HIGH
PCO2 BLDA: 60 MMHG — HIGH (ref 32–48)
PH BLDA: 7.38 — SIGNIFICANT CHANGE UP (ref 7.35–7.45)
PHOSPHATE SERPL-MCNC: 3 MG/DL — SIGNIFICANT CHANGE UP (ref 2.5–4.5)
PLATELET # BLD AUTO: 233 K/UL — SIGNIFICANT CHANGE UP (ref 150–400)
PO2 BLDA: 118 MMHG — HIGH (ref 83–108)
POTASSIUM SERPL-MCNC: 4.1 MMOL/L — SIGNIFICANT CHANGE UP (ref 3.5–5.3)
POTASSIUM SERPL-SCNC: 4.1 MMOL/L — SIGNIFICANT CHANGE UP (ref 3.5–5.3)
PROCALCITONIN SERPL-MCNC: 0.12 NG/ML — HIGH (ref 0.02–0.1)
PROT SERPL-MCNC: 6 G/DL — SIGNIFICANT CHANGE UP (ref 6–8.3)
PROTHROM AB SERPL-ACNC: 14.1 SEC — HIGH (ref 10.6–13.6)
RBC # BLD: 3.29 M/UL — LOW (ref 3.8–5.2)
RBC # FLD: 15.5 % — HIGH (ref 10.3–14.5)
SAO2 % BLDA: 98.2 % — SIGNIFICANT CHANGE UP (ref 95–99)
SODIUM SERPL-SCNC: 140 MMOL/L — SIGNIFICANT CHANGE UP (ref 135–145)
WBC # BLD: 10.22 K/UL — SIGNIFICANT CHANGE UP (ref 3.8–10.5)
WBC # FLD AUTO: 10.22 K/UL — SIGNIFICANT CHANGE UP (ref 3.8–10.5)

## 2021-01-22 PROCEDURE — 71045 X-RAY EXAM CHEST 1 VIEW: CPT | Mod: 26

## 2021-01-22 RX ORDER — PANTOPRAZOLE SODIUM 20 MG/1
40 TABLET, DELAYED RELEASE ORAL DAILY
Refills: 0 | Status: DISCONTINUED | OUTPATIENT
Start: 2021-01-22 | End: 2021-02-01

## 2021-01-22 RX ORDER — FENTANYL CITRATE 50 UG/ML
0.5 INJECTION INTRAVENOUS
Qty: 2500 | Refills: 0 | Status: DISCONTINUED | OUTPATIENT
Start: 2021-01-22 | End: 2021-01-26

## 2021-01-22 RX ORDER — FUROSEMIDE 40 MG
20 TABLET ORAL ONCE
Refills: 0 | Status: COMPLETED | OUTPATIENT
Start: 2021-01-22 | End: 2021-01-22

## 2021-01-22 RX ORDER — FUROSEMIDE 40 MG
20 TABLET ORAL DAILY
Refills: 0 | Status: DISCONTINUED | OUTPATIENT
Start: 2021-01-22 | End: 2021-01-22

## 2021-01-22 RX ORDER — PANTOPRAZOLE SODIUM 20 MG/1
40 TABLET, DELAYED RELEASE ORAL DAILY
Refills: 0 | Status: DISCONTINUED | OUTPATIENT
Start: 2021-01-22 | End: 2021-01-22

## 2021-01-22 RX ORDER — FENTANYL CITRATE 50 UG/ML
0.5 INJECTION INTRAVENOUS
Qty: 2500 | Refills: 0 | Status: DISCONTINUED | OUTPATIENT
Start: 2021-01-22 | End: 2021-01-22

## 2021-01-22 RX ORDER — POLYETHYLENE GLYCOL 3350 17 G/17G
17 POWDER, FOR SOLUTION ORAL DAILY
Refills: 0 | Status: DISCONTINUED | OUTPATIENT
Start: 2021-01-22 | End: 2021-01-22

## 2021-01-22 RX ORDER — MIDAZOLAM HYDROCHLORIDE 1 MG/ML
0.02 INJECTION, SOLUTION INTRAMUSCULAR; INTRAVENOUS
Qty: 100 | Refills: 0 | Status: DISCONTINUED | OUTPATIENT
Start: 2021-01-22 | End: 2021-01-26

## 2021-01-22 RX ORDER — LACTULOSE 10 G/15ML
10 SOLUTION ORAL ONCE
Refills: 0 | Status: COMPLETED | OUTPATIENT
Start: 2021-01-22 | End: 2021-01-22

## 2021-01-22 RX ORDER — ACETAMINOPHEN 500 MG
1000 TABLET ORAL ONCE
Refills: 0 | Status: COMPLETED | OUTPATIENT
Start: 2021-01-22 | End: 2021-01-22

## 2021-01-22 RX ADMIN — HYDROMORPHONE HYDROCHLORIDE 0.5 MG/HR: 2 INJECTION INTRAMUSCULAR; INTRAVENOUS; SUBCUTANEOUS at 09:52

## 2021-01-22 RX ADMIN — MIDAZOLAM HYDROCHLORIDE 2.2 MG/KG/HR: 1 INJECTION, SOLUTION INTRAMUSCULAR; INTRAVENOUS at 18:21

## 2021-01-22 RX ADMIN — ENOXAPARIN SODIUM 40 MILLIGRAM(S): 100 INJECTION SUBCUTANEOUS at 05:58

## 2021-01-22 RX ADMIN — CHLORHEXIDINE GLUCONATE 15 MILLILITER(S): 213 SOLUTION TOPICAL at 17:50

## 2021-01-22 RX ADMIN — Medication 1: at 13:14

## 2021-01-22 RX ADMIN — ENOXAPARIN SODIUM 40 MILLIGRAM(S): 100 INJECTION SUBCUTANEOUS at 17:52

## 2021-01-22 RX ADMIN — FENTANYL CITRATE 1.1 MICROGRAM(S)/KG/HR: 50 INJECTION INTRAVENOUS at 11:06

## 2021-01-22 RX ADMIN — Medication 20 MILLIGRAM(S): at 11:16

## 2021-01-22 RX ADMIN — ALBUTEROL 2 PUFF(S): 90 AEROSOL, METERED ORAL at 22:15

## 2021-01-22 RX ADMIN — SENNA PLUS 10 MILLILITER(S): 8.6 TABLET ORAL at 13:12

## 2021-01-22 RX ADMIN — CHLORHEXIDINE GLUCONATE 15 MILLILITER(S): 213 SOLUTION TOPICAL at 05:58

## 2021-01-22 RX ADMIN — MIDAZOLAM HYDROCHLORIDE 2.2 MG/KG/HR: 1 INJECTION, SOLUTION INTRAMUSCULAR; INTRAVENOUS at 02:21

## 2021-01-22 RX ADMIN — CHLORHEXIDINE GLUCONATE 1 APPLICATION(S): 213 SOLUTION TOPICAL at 06:00

## 2021-01-22 RX ADMIN — Medication 1: at 05:58

## 2021-01-22 RX ADMIN — FENTANYL CITRATE 1.1 MICROGRAM(S)/KG/HR: 50 INJECTION INTRAVENOUS at 18:21

## 2021-01-22 RX ADMIN — ALBUTEROL 2 PUFF(S): 90 AEROSOL, METERED ORAL at 04:09

## 2021-01-22 RX ADMIN — MIDAZOLAM HYDROCHLORIDE 2.2 MG/KG/HR: 1 INJECTION, SOLUTION INTRAMUSCULAR; INTRAVENOUS at 10:56

## 2021-01-22 RX ADMIN — FENTANYL CITRATE 1.1 MICROGRAM(S)/KG/HR: 50 INJECTION INTRAVENOUS at 22:36

## 2021-01-22 RX ADMIN — ALBUTEROL 2 PUFF(S): 90 AEROSOL, METERED ORAL at 15:03

## 2021-01-22 RX ADMIN — PANTOPRAZOLE SODIUM 40 MILLIGRAM(S): 20 TABLET, DELAYED RELEASE ORAL at 13:11

## 2021-01-22 RX ADMIN — Medication 400 MILLIGRAM(S): at 01:50

## 2021-01-22 RX ADMIN — ALBUTEROL 2 PUFF(S): 90 AEROSOL, METERED ORAL at 10:20

## 2021-01-22 RX ADMIN — POLYETHYLENE GLYCOL 3350 17 GRAM(S): 17 POWDER, FOR SOLUTION ORAL at 13:13

## 2021-01-22 NOTE — PROGRESS NOTE ADULT - ATTENDING COMMENTS
Gong: I have seen and examined the patient face to face, have reviewed and addended the HPI, PE and a/p as necessary.    46 year old woman with COVID ARDS s/p intubation on 1/5 and extubation on 1/9; noted to have re-intubated on 1/16 requiring proning.  Weaned off paralytics on 1/21, though noted to require re-initiation of paralytics and proning overnight for refractory hypoxemia.  Follow ABG and wean vent as tolerated. Urine output improved.     - Continue with paralytics and sedation for vent synchrony and prone positioning for total of 16 hours  - Vasoplegic shock - continue with levophed to maintin map>65  - COVID ARDS - with multiple intubations; c/w 30/300/8/60 wean as tolerated; requiring prone positioining for P/F <150.  - No acute renal issues; trend bun and creatinine, strict I and Os.  Will monitor closely aim for net negative.     - s/p dex and remdesivir; and zosyn x 5 days. f/u blood cultures  Critically ill patient requiring frequent bedside visits.   Prognosis guarded.

## 2021-01-22 NOTE — PROGRESS NOTE ADULT - SUBJECTIVE AND OBJECTIVE BOX
Date of Service: 01-22-21 @ 11:55    Patient is a 46y old  Female who presents with a chief complaint of COVID (22 Jan 2021 07:30)      Any change in ROS: doing poorly    MEDICATIONS  (STANDING):  ALBUTerol    90 MICROgram(s) HFA Inhaler 2 Puff(s) Inhalation every 6 hours  chlorhexidine 0.12% Liquid 15 milliLiter(s) Oral Mucosa every 12 hours  chlorhexidine 2% Cloths 1 Application(s) Topical <User Schedule>  cisatracurium Infusion 3 MICROgram(s)/kG/Min (19.8 mL/Hr) IV Continuous <Continuous>  dextrose 40% Gel 15 Gram(s) Oral once  dextrose 5%. 1000 milliLiter(s) (50 mL/Hr) IV Continuous <Continuous>  dextrose 5%. 1000 milliLiter(s) (100 mL/Hr) IV Continuous <Continuous>  dextrose 50% Injectable 25 Gram(s) IV Push once  dextrose 50% Injectable 12.5 Gram(s) IV Push once  dextrose 50% Injectable 25 Gram(s) IV Push once  enoxaparin Injectable 40 milliGRAM(s) SubCutaneous every 12 hours  fentaNYL   Infusion..... 0.5 MICROgram(s)/kG/Hr (1.1 mL/Hr) IV Continuous <Continuous>  glucagon  Injectable 1 milliGRAM(s) IntraMuscular once  insulin lispro (ADMELOG) corrective regimen sliding scale   SubCutaneous every 6 hours  midazolam Infusion 0.02 mG/kG/Hr (2.2 mL/Hr) IV Continuous <Continuous>  norepinephrine Infusion 0.05 MICROgram(s)/kG/Min (5.15 mL/Hr) IV Continuous <Continuous>  pantoprazole  Injectable 40 milliGRAM(s) IV Push daily  petrolatum Ophthalmic Ointment 1 Application(s) Both EYES two times a day  polyethylene glycol 3350 17 Gram(s) Oral daily  propofol Infusion 50 MICROgram(s)/kG/Min (32.9 mL/Hr) IV Continuous <Continuous>  senna Syrup 10 milliLiter(s) Oral daily    MEDICATIONS  (PRN):    Vital Signs Last 24 Hrs  T(C): 36.2 (22 Jan 2021 08:00), Max: 38.8 (22 Jan 2021 00:00)  T(F): 97.1 (22 Jan 2021 08:00), Max: 101.9 (22 Jan 2021 00:00)  HR: 79 (22 Jan 2021 11:03) (77 - 97)  BP: --  BP(mean): --  RR: 28 (21 Jan 2021 18:00) (28 - 28)  SpO2: 98% (22 Jan 2021 11:03) (93% - 99%)  Mode: AC/ CMV (Assist Control/ Continuous Mandatory Ventilation)  RR (machine): 30  TV (machine): 300  FiO2: 60  PEEP: 8  ITime: 0.74  MAP: 18  PIP: 39    I&O's Summary    21 Jan 2021 07:01  -  22 Jan 2021 07:00  --------------------------------------------------------  IN: 1688.9 mL / OUT: 1185 mL / NET: 503.9 mL    22 Jan 2021 07:01  -  22 Jan 2021 11:55  --------------------------------------------------------  IN: 157.7 mL / OUT: 100 mL / NET: 57.7 mL          Physical Exam:   GENERAL: NAD, well-groomed, well-developed  HEENT: LALIT/   Atraumatic, Normocephalic  ENMT: No tonsillar erythema, exudates, or enlargement; Moist mucous membranes, Good dentition, No lesions  NECK: Supple, No JVD, Normal thyroid  CHEST/LUNG: intubated AND SEDATED: PRONED  CVS: Regular rate and rhythm; No murmurs, rubs, or gallops  GI: : Soft, Nontender, Nondistended; Bowel sounds present  NERVOUS SYSTEM:  iNTUABTED AND SEDATED  EXTREMITIES:  - edema  LYMPH: No lymphadenopathy noted  SKIN: No rashes or lesions  ENDOCRINOLOGY: No Thyromegaly  PSYCH: SEDATED    Labs:  ABG - ( 22 Jan 2021 05:58 )  pH, Arterial: 7.33  pH, Blood: x     /  pCO2: 65    /  pO2: 90    / HCO3: 30    / Base Excess: 7.5   /  SaO2: 97.1                                        9.0    10.22 )-----------( 233      ( 22 Jan 2021 02:48 )             30.1                         9.2    8.48  )-----------( 278      ( 21 Jan 2021 03:40 )             30.0                         9.3    10.91 )-----------( 329      ( 20 Jan 2021 02:17 )             31.0                         9.3    15.35 )-----------( 346      ( 19 Jan 2021 03:03 )             29.6     01-22    140  |  103  |  8   ----------------------------<  143<H>  4.1   |  33<H>  |  0.50  01-21    143  |  104  |  9   ----------------------------<  167<H>  3.9   |  31  |  0.49<L>  01-20    140  |  103  |  13  ----------------------------<  168<H>  3.8   |  31  |  0.63  01-19    142  |  105  |  13  ----------------------------<  155<H>  4.4   |  28  |  0.54  01-18    142  |  105  |  10  ----------------------------<  204<H>  4.5   |  29  |  0.59    Ca    8.4      22 Jan 2021 02:48  Ca    8.3<L>      21 Jan 2021 03:40  Phos  3.0     01-22  Phos  3.4     01-21  Mg     2.0     01-22  Mg     1.9     01-21    TPro  6.0  /  Alb  2.6<L>  /  TBili  0.4  /  DBili  x   /  AST  61<H>  /  ALT  115<H>  /  AlkPhos  153<H>  01-22  TPro  6.0  /  Alb  2.7<L>  /  TBili  0.3  /  DBili  x   /  AST  107<H>  /  ALT  117<H>  /  AlkPhos  159<H>  01-21  TPro  6.3  /  Alb  2.9<L>  /  TBili  0.4  /  DBili  x   /  AST  17  /  ALT  21  /  AlkPhos  105  01-20  TPro  6.7  /  Alb  2.9<L>  /  TBili  0.4  /  DBili  x   /  AST  14  /  ALT  19  /  AlkPhos  103  01-19  TPro  7.0  /  Alb  3.1<L>  /  TBili  0.4  /  DBili  x   /  AST  14  /  ALT  19  /  AlkPhos  103  01-18    CAPILLARY BLOOD GLUCOSE      POCT Blood Glucose.: 154 mg/dL (22 Jan 2021 04:44)  POCT Blood Glucose.: 142 mg/dL (21 Jan 2021 22:03)  POCT Blood Glucose.: 184 mg/dL (21 Jan 2021 17:09)      LIVER FUNCTIONS - ( 22 Jan 2021 02:48 )  Alb: 2.6 g/dL / Pro: 6.0 g/dL / ALK PHOS: 153 U/L / ALT: 115 U/L / AST: 61 U/L / GGT: x           PT/INR - ( 22 Jan 2021 02:48 )   PT: 14.1 sec;   INR: 1.24 ratio         PTT - ( 22 Jan 2021 02:48 )  PTT:32.0 sec    D-Dimer Assay, Quantitative: 855 ng/mL DDU (01-22 @ 02:48)  D-Dimer Assay, Quantitative: 497 ng/mL DDU (01-20 @ 02:31)  D-Dimer Assay, Quantitative: 303 ng/mL DDU (01-19 @ 03:03)  D-Dimer Assay, Quantitative: 727 ng/mL DDU (01-17 @ 00:37)  D-Dimer Assay, Quantitative: 886 ng/mL DDU (01-16 @ 06:27)  Procalcitonin, Serum: 0.12 ng/mL (01-22 @ 02:48)  Procalcitonin, Serum: 0.10 ng/mL (01-21 @ 03:40)  Procalcitonin, Serum: 0.06 ng/mL (01-19 @ 03:03)        RECENT CULTURES:  01-16 @ 06:00 .Blood Blood-Peripheral     R< from: US Duplex Venous Lower Ext Complete, Bilateral (01.20.21 @ 11:31) >        INTERPRETATION:  CLINICAL INFORMATION: Pneumonia due to Covid 19. Rising d-dimer. Severe acute respiratory distress syndrome.    COMPARISON: None available.    TECHNIQUE: Duplex sonography of the BILATERAL LOWER extremity veins with color and spectral Doppler, with and without compression.    FINDINGS:    There is normal compressibility of the bilateral common femoral, femoral and popliteal veins.  Doppler examination shows normal spontaneous and phasic flow.    No calf vein thrombosis is detected. The bilateral peroneal veins are not visualized.    IMPRESSION:  No evidence of deep venous thrombosis in either lower extremity.                  REBECCA DURAN MD; Attending Radiologist  This document has been electronically signed. Jan 20 2021 11:39AM    < end of copied text >  < from: Xray Chest 1 View- PORTABLE-Urgent (Xray Chest 1 View- PORTABLE-Urgent .) (01.16.21 @ 07:52) >  as compared to prior exam. Apices are obscured by patient's head. Visualized osseous structures are within normal limits.    7:44 AM: Interval placement of an endotracheal tube and NG tube. Left internal jugular line with tip in the SVC. Heart is normal in size. Increased interstitial markings bilaterally unchanged. Apices are unremarkable. Visualized osseous structures within normal limits.        IMPRESSION:    Interval placement of endotracheal tube and NG tube and left internal jugular line.    Increasing interstitial markings and airspace disease bilaterally.              LACHELLE ASENCIO MD; Attending Radiologist  This document has been electronically signed. Jan 16 2021 10:44AM    < end of copied text >             No Growth Final          RESPIRATORY CULTURES:          Studies  Chest X-RAY  CT SCAN Chest   Venous Dopplers: LE:   CT Abdomen  Others

## 2021-01-22 NOTE — PROGRESS NOTE ADULT - ASSESSMENT
The patient is a 46-year-old woman, no PMH who was admitted to the hospital for management ARF with hypoxia secondary to covid-19 pneumonia, is status post intubation from 1/5 to 1/9 for management of acute respiratory distress syndrome, and who was now re-intubated (since 1/16) and in the MICU for management of severe ARDS secondary to covid-19 pneumonia. Transfer to [facility] ICU on 1/21.     Neuro:  -sedated with propofol, midazolam, hydromorphone gtt  -paralysed with nimbex gtt  -wean slowly as tolerated once supine    CV:  -on levophed for pressure support, likely i/s/o heavy sedation required to maintain ventilatory synchrony   -No known history of coronary artery disease or of arrhythmia; sinus rhythm on telemetry     Respiratory: COVID ARDS  intubation 01/05, extubation 01/09, reintubation 01/16, prone 1/21-22  -Current ventilator settings: AC/VC, RR 30, , PEEP 8, FiO2 60;  in the setting of elevated driving pressures, will decrease peep as tolerated; hope to allow for lung-protective ventilation and subsequently to wean current ventilator settings   -Respiratory acidosis noted (permissive hypercapnia in setting of lung-protective ventilation) with appropriate metabolic compensation     Renal:  -Serum creatinine at baseline; will continue to monitor urine output; urine output about 50 ccs/hr over previous 24 hours  -no electrolyte abnormalities at present  -Will continue to monitor daily metabolic panel and correct as needed     ID:  -Status post administration of ten-day course of dexamethasone and five-day course of remdesivir for treatment of severe covid-19   -In the setting of need for re-intubation, status post zosyn administration for five days for empiric coverage of superimposed bacterial pneumonia;   -No growth to date from cultures- last- blood 1/16    Endo:  -No active concerns at this time   -A1c 6.6, meeting criteria for diagnosis of diabetes mellitus; sliding scale insulin ordered    GI:  -Jevity tube feeds at goal   -Will monitor bowel movements, on senna and miralax     Heme:  -DVT ppx: lovenox 40 q12h  -Negative bilateral lower extremity dopplers despite rising d-dimer

## 2021-01-22 NOTE — PROGRESS NOTE ADULT - SUBJECTIVE AND OBJECTIVE BOX
COVID 19 ICU Note    HISTORY  The patient is a 46-year-old woman who was admitted to the hospital for management acute respiratory failure with hypoxia secondary to covid-19 pneumonia, is status post intubation from 1/5 to 1/9 for management of acute respiratory distress syndrome, and who is now re-intubated (since 1/16) and in the MICU for management of severe ARDS secondary to covid-19 pneumonia, transferred to [facility] ICU on 1/21.     24 HOUR EVENTS: Proned at 11 pm 1/21, PF ratio shows some improvement. One episode of fever of 101 ON.    SUBJECTIVE/ROS: Due to intubation with sedation, subjective information was not able to be obtained from the patient. History was obtained, to the extent possible, from review of the chart and collateral sources of information.    NEURO  RASS:  Exam: sedated,  paralysed   Meds: cisatracurium Infusion 3 MICROgram(s)/kG/Min IV Continuous <Continuous>  HYDROmorphone Infusion. 0.5 mG/Hr IV Continuous <Continuous>  midazolam Infusion 0.02 mG/kG/Hr IV Continuous <Continuous>  propofol Infusion 50 MICROgram(s)/kG/Min IV Continuous <Continuous>    Adequacy of sedation and pain control has been assessed and adjusted.    RESPIRATORY  RR: 28 (28 - 28)  SpO2: 98% (93% - 98%)  Exam: coarse rhonchi in all lung fields  Mechanical Ventilation: Mode: AC/ CMV (Assist Control/ Continuous Mandatory Ventilation), RR (machine): 30, TV (machine): 300, FiO2: 60, PEEP: 8  ABG - ( 22 Jan 2021 05:58 )  pH: 7.33  /  pCO2: 65    /  pO2: 90    / HCO3: 30    / Base Excess: 7.5   /  SaO2: 97.1    Lactate: x          PaO2 to FiO2 ratio: 90/0.6= 150    Peak Pressure:  Plateau Pressure:  Patient not a candidate for a SBT at this time due to ventilator requirements    CARDIOVASCULAR  HR: 79 (71 - 97)  ABP: 109/57 (105/52 - 116/54)  ABP(mean): 73 (70 - 76)  Exam: regualr rate and rhythm, S1S2  Cardiac Rhythm: sinus  Most recent QTc:   Meds: norepinephrine Infusion (5.15 mL/Hr)      GI/NUTRITION  Diet: Diet, NPO with Tube Feed:   Tube Feeding Modality: Nasogastric  Jevity 1.2 Willian (JEVITY1.2RTH)  Total Volume for 24 Hours (mL): 840  Continuous  Until Goal Tube Feed Rate (mL per Hour): 35  Tube Feed Duration (in Hours): 24  Tube Feed Start Time: 12:00  No Carb Prosource (1pkg = 15gms Protein)     Qty per Day:  5 (01-21-21 @ 15:29)    Most recent bowel movement:  Meds: dextrose 5%. 1000 milliLiter(s) IV Continuous <Continuous>  dextrose 5%. 1000 milliLiter(s) IV Continuous <Continuous>  polyethylene glycol 3350 17 Gram(s) Oral daily  senna Syrup 10 milliLiter(s) Oral daily      GENITOURINARY  I&O's Detail    01-21 @ 07:01  -  01-22 @ 07:00  --------------------------------------------------------  IN:    Cisatracurium: 92.4 mL    HYRDOmorphone: 60 mL    IV PiggyBack: 100 mL    Jevity: 490 mL    Midazolam: 264 mL    Norepinephrine: 219.4 mL    Propofol: 396 mL  Total IN: 1621.8 mL    OUT:    Indwelling Catheter - Urethral (mL): 1085 mL  Total OUT: 1085 mL    Total NET: 536.8 mL      01-22    140  |  103  |  8   ----------------------------<  143<H>  4.1   |  33<H>  |  0.50    Ca    8.4      22 Jan 2021 02:48  Phos  3.0     01-22  Mg     2.0     01-22    TPro  6.0  /  Alb  2.6<L>  /  TBili  0.4  /  DBili  x   /  AST  61<H>  /  ALT  115<H>  /  AlkPhos  153<H>  01-22    [x] Haque catheter, indication: urine output monitoring in critically ill  Meds: dextrose 5%. 1000 milliLiter(s) IV Continuous <Continuous>  dextrose 5%. 1000 milliLiter(s) IV Continuous <Continuous>        HEMATOLOGIC  Meds: enoxaparin Injectable 40 milliGRAM(s) SubCutaneous every 12 hours                          9.0    10.22 )-----------( 233      ( 22 Jan 2021 02:48 )             30.1     PT/INR - ( 22 Jan 2021 02:48 )   PT: 14.1 sec;   INR: 1.24 ratio         PTT - ( 22 Jan 2021 02:48 )  PTT:32.0 sec  21601-22-21 @ 02:48      INFECTIOUS DISEASES  T(C): 37.9, Max: 38.8 (01-22-21 @ 00:00)  WBC Count: 10.22 (01-22-21 @ 02:48)  WBC Count: 8.48 (01-21-21 @ 03:40)    Recent Cultures:  Specimen Source: .Blood Blood-Peripheral, 01-16 @ 06:00; Results   No Growth Final; Gram Stain: --; Organism: --    Meds:   Procalcitonin, Serum: 0.12 ng/mL (01-22-21 @ 02:48)      ENDOCRINE  Fingersticks: 154, 142, 184, 213, 187  Meds: dextrose 40% Gel 15 Gram(s) Oral once  dextrose 50% Injectable 25 Gram(s) IV Push once  dextrose 50% Injectable 12.5 Gram(s) IV Push once  dextrose 50% Injectable 25 Gram(s) IV Push once  glucagon  Injectable 1 milliGRAM(s) IntraMuscular once  insulin lispro (ADMELOG) corrective regimen sliding scale   SubCutaneous every 6 hours      ACCESS DEVICES:  [x] Peripheral IV  [x] Central Venous Line	[ ] R	[ ] L	[ ] IJ	[ ] Fem [ ] SC		Placed:   [x] Arterial Line			[ ] R	[ ] L	[ ] Fem [ ] Rad [ ] Ax	Placed:   [ ] Maria Cley HD Access		[ ] R [ ] L  [ ] IJ  [ ] Fem			Placed:  [x] Urinary Catheter, Date Placed:   Necessity of urinary, arterial, and venous catheters discussed.    CODE STATUS: full    IMAGING: lower ex duplex scan: 1/20 negative

## 2021-01-22 NOTE — PROGRESS NOTE ADULT - ASSESSMENT
Patient is a 46 year old woman with no past medical history coming in with shortness of breath, cough productive of white sputum, pleuritic rib pain (moderate in severity) for two days and diarrhea for one day.    COVID 19 PNEUMONIA: WITH HYPOXIC RESP FAILURE:     1/13:  COVID 19 PNEUMONIA: WITH HYPOXIC RESP FAILURE:     She was intubated in MICU nad then extubated successfully and transferred to floor: pulm called today to evaluate Pt is on bipap 100% sao2 in high 80's and tachypneic   She looks in resp distress:   Her d Dimer bumped up yesterday and was switched to full AC:  She isnot doing well and may need MICU again:   ALT is mildly elevated and renal profile is normal   repeat crp and ferritin:  ??firgtehr course of steroids    STEPHANIE PMD and rn at [facility]:   stephanie acp    1/14:  COVID 19 PNEUMONIA: WITH HYPOXIC RESP FAILURE:   She was intubated in MICU and then extubated successfully and transferred to floor: pulm called today to evaluate Pt is on bipap 100% sao2 in high 80's and tachypneic : MICU evaluation done again yesterday and she refused for intubation  She does not looks in resp distress today   Her d Dimer bumped up yesterday and was switched to full AC: d dimer is decreased today   She is not doing well today too and may need MICU again:   LFT's are mildly elevated and renal profile is normal   repeat crp and ferritin: high still   dw acp    1/15:    COVID 19 PNEUMONIA: WITH HYPOXIC RESP FAILURE:   She is on bipap; tachypneic on 100% bipap: she had earlier refused for intubation she was intubated once: before :  She does not looks in resp distress today   Her d Dimer bumped up yesterday and was switched to full AC:   She is not doing well today again and s eis very hypoxic: she may need intubation any time if she agrees:     LFT's are mildly elevated and renal profile is normal   crp and ferritin: high still   dw PMD   pt is not doing well and she has SOB and is tachypneic and on bipap at 100% fio2: She is already on full time AC:       1/17:    COVID 19 PNEUMONIA: WITH HYPOXIC RESP FAILURE:   intuabted and sedated and paralysed in MICU : requiring high o2 and PEEP"   Shock: on pressors: She was started on empiric antibiotics after the intubation   Her d Dimer is decreasing     LFT's are mildly elevated and renal profile is normal   crp and ferritin: today are still high    on dvt propahyxlis  dw team    1/19:    COVID 19 PNEUMONIA: WITH HYPOXIC RESP FAILURE:   intubated and sedated and paralysed in MICU : requiring high o2 and PEEP" : 50% fio2: peep 8   Shock: requiring less  pressors: She was started on empiric antibiotics after the intubation : on d4 of zosyn  Her d Dimer is decreasing     LFT's are mildly elevated and renal profile is normal   on dvt propahyxlis  dw team    1/22:    COVID 19 PNEUMONIA: WITH HYPOXIC RESP FAILURE:   intubated and sedated and paralysed AND PRONED: NOT DOING WELL  : requiring high o2 and PEEP" : 60% fio2: peep 8 : STILL PRETTY HYPOXIC  Shock: requiring less  pressors: She was started on empiric antibiotics after the intubation : FINISHED ZOSYN DOSAGES  OVERALL SHE IS CRITICALLY ILL: CONT CURRENT SUPPORTIVE CARE:   on dvt propdariyxdamiáns  dw team

## 2021-01-23 LAB
ALBUMIN SERPL ELPH-MCNC: 2.7 G/DL — LOW (ref 3.3–5)
ALP SERPL-CCNC: 136 U/L — HIGH (ref 40–120)
ALT FLD-CCNC: 72 U/L — HIGH (ref 4–33)
ANION GAP SERPL CALC-SCNC: 8 MMOL/L — SIGNIFICANT CHANGE UP (ref 7–14)
APTT BLD: 33.5 SEC — SIGNIFICANT CHANGE UP (ref 27–36.3)
AST SERPL-CCNC: 26 U/L — SIGNIFICANT CHANGE UP (ref 4–32)
BASOPHILS # BLD AUTO: 0.04 K/UL — SIGNIFICANT CHANGE UP (ref 0–0.2)
BASOPHILS NFR BLD AUTO: 0.4 % — SIGNIFICANT CHANGE UP (ref 0–2)
BILIRUB SERPL-MCNC: 0.5 MG/DL — SIGNIFICANT CHANGE UP (ref 0.2–1.2)
BLOOD GAS ARTERIAL COMPREHENSIVE RESULT: SIGNIFICANT CHANGE UP
BUN SERPL-MCNC: 9 MG/DL — SIGNIFICANT CHANGE UP (ref 7–23)
CALCIUM SERPL-MCNC: 8.6 MG/DL — SIGNIFICANT CHANGE UP (ref 8.4–10.5)
CHLORIDE SERPL-SCNC: 99 MMOL/L — SIGNIFICANT CHANGE UP (ref 98–107)
CHOLEST SERPL-MCNC: 258 MG/DL — HIGH
CO2 SERPL-SCNC: 33 MMOL/L — HIGH (ref 22–31)
CREAT SERPL-MCNC: 0.46 MG/DL — LOW (ref 0.5–1.3)
D DIMER BLD IA.RAPID-MCNC: 659 NG/ML DDU — HIGH
EOSINOPHIL # BLD AUTO: 0.41 K/UL — SIGNIFICANT CHANGE UP (ref 0–0.5)
EOSINOPHIL NFR BLD AUTO: 4.1 % — SIGNIFICANT CHANGE UP (ref 0–6)
GLUCOSE SERPL-MCNC: 144 MG/DL — HIGH (ref 70–99)
HCT VFR BLD CALC: 29.7 % — LOW (ref 34.5–45)
HDLC SERPL-MCNC: 19 MG/DL — LOW
HGB BLD-MCNC: 9.1 G/DL — LOW (ref 11.5–15.5)
IANC: 7.1 K/UL — SIGNIFICANT CHANGE UP (ref 1.5–8.5)
IMM GRANULOCYTES NFR BLD AUTO: 1.8 % — HIGH (ref 0–1.5)
INR BLD: 1.23 RATIO — HIGH (ref 0.88–1.16)
LIPID PNL WITH DIRECT LDL SERPL: 172 MG/DL — HIGH
LYMPHOCYTES # BLD AUTO: 1.46 K/UL — SIGNIFICANT CHANGE UP (ref 1–3.3)
LYMPHOCYTES # BLD AUTO: 14.5 % — SIGNIFICANT CHANGE UP (ref 13–44)
MAGNESIUM SERPL-MCNC: 1.9 MG/DL — SIGNIFICANT CHANGE UP (ref 1.6–2.6)
MCHC RBC-ENTMCNC: 27.4 PG — SIGNIFICANT CHANGE UP (ref 27–34)
MCHC RBC-ENTMCNC: 30.6 GM/DL — LOW (ref 32–36)
MCV RBC AUTO: 89.5 FL — SIGNIFICANT CHANGE UP (ref 80–100)
MONOCYTES # BLD AUTO: 0.91 K/UL — HIGH (ref 0–0.9)
MONOCYTES NFR BLD AUTO: 9 % — SIGNIFICANT CHANGE UP (ref 2–14)
NEUTROPHILS # BLD AUTO: 7.1 K/UL — SIGNIFICANT CHANGE UP (ref 1.8–7.4)
NEUTROPHILS NFR BLD AUTO: 70.2 % — SIGNIFICANT CHANGE UP (ref 43–77)
NON HDL CHOLESTEROL: 239 MG/DL — HIGH
NRBC # BLD: 0 /100 WBCS — SIGNIFICANT CHANGE UP
NRBC # FLD: 0.02 K/UL — HIGH
PHOSPHATE SERPL-MCNC: 3.2 MG/DL — SIGNIFICANT CHANGE UP (ref 2.5–4.5)
PLATELET # BLD AUTO: 224 K/UL — SIGNIFICANT CHANGE UP (ref 150–400)
POTASSIUM SERPL-MCNC: 3.5 MMOL/L — SIGNIFICANT CHANGE UP (ref 3.5–5.3)
POTASSIUM SERPL-SCNC: 3.5 MMOL/L — SIGNIFICANT CHANGE UP (ref 3.5–5.3)
PROT SERPL-MCNC: 6.2 G/DL — SIGNIFICANT CHANGE UP (ref 6–8.3)
PROTHROM AB SERPL-ACNC: 14 SEC — HIGH (ref 10.6–13.6)
RBC # BLD: 3.32 M/UL — LOW (ref 3.8–5.2)
RBC # FLD: 15.6 % — HIGH (ref 10.3–14.5)
SODIUM SERPL-SCNC: 140 MMOL/L — SIGNIFICANT CHANGE UP (ref 135–145)
TRIGL SERPL-MCNC: 334 MG/DL — HIGH
WBC # BLD: 10.1 K/UL — SIGNIFICANT CHANGE UP (ref 3.8–10.5)
WBC # FLD AUTO: 10.1 K/UL — SIGNIFICANT CHANGE UP (ref 3.8–10.5)

## 2021-01-23 RX ORDER — FUROSEMIDE 40 MG
40 TABLET ORAL ONCE
Refills: 0 | Status: COMPLETED | OUTPATIENT
Start: 2021-01-23 | End: 2021-01-23

## 2021-01-23 RX ORDER — POTASSIUM CHLORIDE 20 MEQ
20 PACKET (EA) ORAL
Refills: 0 | Status: COMPLETED | OUTPATIENT
Start: 2021-01-23 | End: 2021-01-23

## 2021-01-23 RX ADMIN — MIDAZOLAM HYDROCHLORIDE 2.2 MG/KG/HR: 1 INJECTION, SOLUTION INTRAMUSCULAR; INTRAVENOUS at 06:16

## 2021-01-23 RX ADMIN — ENOXAPARIN SODIUM 40 MILLIGRAM(S): 100 INJECTION SUBCUTANEOUS at 06:18

## 2021-01-23 RX ADMIN — PANTOPRAZOLE SODIUM 40 MILLIGRAM(S): 20 TABLET, DELAYED RELEASE ORAL at 11:26

## 2021-01-23 RX ADMIN — CISATRACURIUM BESYLATE 19.8 MICROGRAM(S)/KG/MIN: 2 INJECTION INTRAVENOUS at 06:19

## 2021-01-23 RX ADMIN — POLYETHYLENE GLYCOL 3350 17 GRAM(S): 17 POWDER, FOR SOLUTION ORAL at 11:26

## 2021-01-23 RX ADMIN — Medication 50 MILLIEQUIVALENT(S): at 11:27

## 2021-01-23 RX ADMIN — Medication 1: at 06:17

## 2021-01-23 RX ADMIN — FENTANYL CITRATE 1.1 MICROGRAM(S)/KG/HR: 50 INJECTION INTRAVENOUS at 11:26

## 2021-01-23 RX ADMIN — ALBUTEROL 2 PUFF(S): 90 AEROSOL, METERED ORAL at 16:22

## 2021-01-23 RX ADMIN — ALBUTEROL 2 PUFF(S): 90 AEROSOL, METERED ORAL at 21:21

## 2021-01-23 RX ADMIN — Medication 1: at 11:22

## 2021-01-23 RX ADMIN — Medication 1: at 16:46

## 2021-01-23 RX ADMIN — CHLORHEXIDINE GLUCONATE 15 MILLILITER(S): 213 SOLUTION TOPICAL at 16:48

## 2021-01-23 RX ADMIN — LACTULOSE 10 GRAM(S): 10 SOLUTION ORAL at 06:18

## 2021-01-23 RX ADMIN — ALBUTEROL 2 PUFF(S): 90 AEROSOL, METERED ORAL at 11:10

## 2021-01-23 RX ADMIN — ALBUTEROL 2 PUFF(S): 90 AEROSOL, METERED ORAL at 03:59

## 2021-01-23 RX ADMIN — Medication 2: at 22:23

## 2021-01-23 RX ADMIN — Medication 50 MILLIEQUIVALENT(S): at 09:05

## 2021-01-23 RX ADMIN — ENOXAPARIN SODIUM 40 MILLIGRAM(S): 100 INJECTION SUBCUTANEOUS at 16:48

## 2021-01-23 RX ADMIN — Medication 40 MILLIGRAM(S): at 03:22

## 2021-01-23 RX ADMIN — Medication 1 APPLICATION(S): at 16:48

## 2021-01-23 RX ADMIN — Medication 1 APPLICATION(S): at 06:18

## 2021-01-23 RX ADMIN — Medication 5.15 MICROGRAM(S)/KG/MIN: at 00:45

## 2021-01-23 RX ADMIN — Medication 50 MILLIEQUIVALENT(S): at 10:12

## 2021-01-23 RX ADMIN — CHLORHEXIDINE GLUCONATE 15 MILLILITER(S): 213 SOLUTION TOPICAL at 06:18

## 2021-01-23 RX ADMIN — CHLORHEXIDINE GLUCONATE 1 APPLICATION(S): 213 SOLUTION TOPICAL at 06:16

## 2021-01-23 RX ADMIN — SENNA PLUS 10 MILLILITER(S): 8.6 TABLET ORAL at 11:26

## 2021-01-23 RX ADMIN — PROPOFOL 32.9 MICROGRAM(S)/KG/MIN: 10 INJECTION, EMULSION INTRAVENOUS at 00:45

## 2021-01-23 NOTE — PROGRESS NOTE ADULT - ATTENDING COMMENTS
Maria Esther: I have seen and examined the patient face to face on 1/23/2020, have reviewed and addended the HPI, PE and a/p as necessary.     46 year old woman with COVID ARDS s/p intubation on 1/5 and extubation on 1/9; noted to have re-intubated on 1/16 requiring proning.  Weaned off paralytics on 1/21, though noted to require re-initiation of paralytics and proning overnight for refractory hypoxemia.    - Continue with paralytics and sedation for vent synchrony and prone positioning for total of 16 hours then place supine.    - Vasoplegic shock - continue with levophed to maintin map>65  - COVID ARDS - with multiple intubations; c/w 30/300/8/60 wean as tolerated; doing well today supine, will consider re-proning for P/F <150.  - No acute renal issues; trend bun and creatinine, strict I and Os.  Will monitor closely aim for net negative.     - s/p dex and remdesivir; and zosyn x 5 days. f/u blood cultures  Critically ill patient requiring frequent bedside visits.   Prognosis guarded.

## 2021-01-23 NOTE — CHART NOTE - NSCHARTNOTEFT_GEN_A_CORE
Spoke with daughter Alyssia, updated her on mother's clinical status.  All questions answered.  MD Yareli

## 2021-01-23 NOTE — PROGRESS NOTE ADULT - SUBJECTIVE AND OBJECTIVE BOX
Critical Care Progress Note    Patient ID: 45yo with PMHx DMII, obesity, admitted with acute hypoxic respiratory failure 2/2 COVID19, intubated 1/5/21-1/9/21, and reintubated 1/16/21.     Overnight Events: Moved from prone back to supine at 20:00 1/22/21, with subsequent desaturations. CXR suggested R mainstem migration of ET tube, adjusted and repeat CXR showed proper placement.     Subjective: Intubated, sedated, not arousable to verbal stimuli.    Tele: No events.    Objective:  T(F): 98.1 (01-23-21 @ 08:00), Max: 98.2 (01-23-21 @ 04:00)  HR: 85 (01-23-21 @ 11:11) (78 - 86)  BP: --  RR: 30 (01-23-21 @ 08:00) (29 - 30)  SpO2: 95% (01-23-21 @ 11:11) (95% - 97%)  Wt(kg): --  I&O's Summary    22 Jan 2021 07:01  -  23 Jan 2021 07:00  --------------------------------------------------------  IN: 726.3 mL / OUT: 4175 mL / NET: -3448.7 mL    23 Jan 2021 07:01  -  23 Jan 2021 12:19  --------------------------------------------------------  IN: 135.8 mL / OUT: 45 mL / NET: 90.8 mL      CAPILLARY BLOOD GLUCOSE      POCT Blood Glucose.: 162 mg/dL (23 Jan 2021 11:05)  POCT Blood Glucose.: 173 mg/dL (23 Jan 2021 06:08)  POCT Blood Glucose.: 143 mg/dL (23 Jan 2021 00:07)  POCT Blood Glucose.: 142 mg/dL (22 Jan 2021 17:45)      MEDICATIONS  (STANDING):  ALBUTerol    90 MICROgram(s) HFA Inhaler 2 Puff(s) Inhalation every 6 hours  chlorhexidine 0.12% Liquid 15 milliLiter(s) Oral Mucosa every 12 hours  chlorhexidine 2% Cloths 1 Application(s) Topical <User Schedule>  cisatracurium Infusion 3 MICROgram(s)/kG/Min (19.8 mL/Hr) IV Continuous <Continuous>  dextrose 5%. 1000 milliLiter(s) (50 mL/Hr) IV Continuous <Continuous>  dextrose 5%. 1000 milliLiter(s) (100 mL/Hr) IV Continuous <Continuous>  enoxaparin Injectable 40 milliGRAM(s) SubCutaneous every 12 hours  fentaNYL   Infusion..... 0.5 MICROgram(s)/kG/Hr (1.1 mL/Hr) IV Continuous <Continuous>  glucagon  Injectable 1 milliGRAM(s) IntraMuscular once  insulin lispro (ADMELOG) corrective regimen sliding scale   SubCutaneous every 6 hours  midazolam Infusion 0.02 mG/kG/Hr (2.2 mL/Hr) IV Continuous <Continuous>  norepinephrine Infusion 0.05 MICROgram(s)/kG/Min (5.15 mL/Hr) IV Continuous <Continuous>  pantoprazole  Injectable 40 milliGRAM(s) IV Push daily  petrolatum Ophthalmic Ointment 1 Application(s) Both EYES two times a day  polyethylene glycol 3350 17 Gram(s) Oral daily  propofol Infusion 50 MICROgram(s)/kG/Min (32.9 mL/Hr) IV Continuous <Continuous>  senna Syrup 10 milliLiter(s) Oral daily    MEDICATIONS  (PRN):      Physical Exam:  Constitutional: NAD, intubated and sedated   CV: RR S1S2 no m/r/g  Lungs: +vent breath sounds  Abdomen: soft, nondistended, NTTP  Extremities: no lower extremity edema or calf tenderness bilaterally    LABS:    01-23    140    |  99     |  9      ----------------------------<  144<H>  3.5     |  33<H>  |  0.46<L>  01-22    140    |  103    |  8      ----------------------------<  143<H>  4.1     |  33<H>  |  0.50     Ca    8.6        23 Jan 2021 04:56  Ca    8.4        22 Jan 2021 02:48  Phos  3.2       01-23  Phos  3.0       01-22  Mg     1.9       01-23  Mg     2.0       01-22    TPro  6.2    /  Alb  2.7<L>  /  TBili  0.5    /  DBili  x      /  AST  26     /  ALT  72<H>  /  AlkPhos  136<H>  01-23  TPro  6.0    /  Alb  2.6<L>  /  TBili  0.4    /  DBili  x      /  AST  61<H>  /  ALT  115<H>  /  AlkPhos  153<H>  01-22        PT/INR - ( 23 Jan 2021 04:56 )   PT: 14.0 sec;   INR: 1.23 ratio         PTT - ( 23 Jan 2021 04:56 )  PTT:33.5 sec

## 2021-01-23 NOTE — PROGRESS NOTE ADULT - ASSESSMENT
Assessment/Plan: 46y with PMHx DMII, obesity, admitted with acute hypoxic respiratory failure 2/2 COVID19, intubated 1/5/21-1/9/21, reintubated 1/16/21.     Neuro:  -sedated with propofol (3 mcg/kg/min), midazolam (0.12 mg/kg/hr), fentanyl (1 mcg/kg/hr)   -paralysed with nimbex (3.5 mg/kg/min)  -consider prone positioning later in the day  -repeat ABG in pm    CV:  -on levophed for pressure support (0.08 mcg/kg/min), likely i/s/o heavy sedation required to maintain ventilatory synchrony   -no known history of coronary artery disease or of arrhythmia; sinus rhythm on telemetry     Respiratory:   AHRF 2/2 COVID19: intubation 01/05, extubation 01/09, reintubation 01/16, prone intermittently 1/21-22  -Current ventilator settings: AC, RR 30, , PEEP 8, FiO2 50;  in the setting of elevated driving pressures, will decrease peep as tolerated; hope to allow for lung-protective ventilation and subsequently to wean current ventilator settings   -respiratory acidosis noted (permissive hypercapnia in setting of lung-protective ventilation) with appropriate metabolic compensation   -repeat ABG in pm    Renal:  -serum creatinine at baseline; will continue to monitor urine output; urine output 1975cc last shift, but now slowing down  -continue to monitor UOP  -bedside US on am rounds shows IVC about 1cm, hold lasix at this time    ID:  COVID  -remdesivir completed 1/7/21  -dexamethasone completed 1/12/21  -In the setting of need for re-intubation, status post zosyn administration for five days for empiric coverage of superimposed bacterial pneumonia (1/17/21-1/20/21)   -No growth from last cultures- blood 1/16/21  -WBC stable at 10 for several days    Endo:  -No active concerns at this time   -A1c 6.6, meeting criteria for diagnosis of diabetes mellitus; sliding scale insulin ordered  -BG's: 140-170's last 24hr    GI:  -Jevity tube feeds at goal (35ml/hr, continuous)  -Will monitor bowel movements, on senna and miralax     Heme:  -DVT ppx: lovenox 40 q12h  -Negative bilateral lower extremity dopplers despite rising d-dimer     CODE STATUS: Full code    MD Yareli

## 2021-01-24 LAB
ALBUMIN SERPL ELPH-MCNC: 2.6 G/DL — LOW (ref 3.3–5)
ALP SERPL-CCNC: 146 U/L — HIGH (ref 40–120)
ALT FLD-CCNC: 54 U/L — HIGH (ref 4–33)
ANION GAP SERPL CALC-SCNC: 8 MMOL/L — SIGNIFICANT CHANGE UP (ref 7–14)
APTT BLD: 32 SEC — SIGNIFICANT CHANGE UP (ref 27–36.3)
AST SERPL-CCNC: 28 U/L — SIGNIFICANT CHANGE UP (ref 4–32)
BILIRUB SERPL-MCNC: 0.5 MG/DL — SIGNIFICANT CHANGE UP (ref 0.2–1.2)
BLD GP AB SCN SERPL QL: NEGATIVE — SIGNIFICANT CHANGE UP
BLOOD GAS ARTERIAL COMPREHENSIVE RESULT: SIGNIFICANT CHANGE UP
BUN SERPL-MCNC: 10 MG/DL — SIGNIFICANT CHANGE UP (ref 7–23)
CALCIUM SERPL-MCNC: 8.6 MG/DL — SIGNIFICANT CHANGE UP (ref 8.4–10.5)
CHLORIDE SERPL-SCNC: 98 MMOL/L — SIGNIFICANT CHANGE UP (ref 98–107)
CO2 SERPL-SCNC: 33 MMOL/L — HIGH (ref 22–31)
CREAT SERPL-MCNC: 0.54 MG/DL — SIGNIFICANT CHANGE UP (ref 0.5–1.3)
CRP SERPL-MCNC: 174.9 MG/L — HIGH
FERRITIN SERPL-MCNC: 198 NG/ML — HIGH (ref 15–150)
GAS PNL BLDA: SIGNIFICANT CHANGE UP
GLUCOSE SERPL-MCNC: 168 MG/DL — HIGH (ref 70–99)
HCT VFR BLD CALC: 29.6 % — LOW (ref 34.5–45)
HGB BLD-MCNC: 9 G/DL — LOW (ref 11.5–15.5)
INR BLD: 1.26 RATIO — HIGH (ref 0.88–1.16)
MAGNESIUM SERPL-MCNC: 2 MG/DL — SIGNIFICANT CHANGE UP (ref 1.6–2.6)
MCHC RBC-ENTMCNC: 26.9 PG — LOW (ref 27–34)
MCHC RBC-ENTMCNC: 30.4 GM/DL — LOW (ref 32–36)
MCV RBC AUTO: 88.6 FL — SIGNIFICANT CHANGE UP (ref 80–100)
NRBC # BLD: 0 /100 WBCS — SIGNIFICANT CHANGE UP
NRBC # FLD: 0.03 K/UL — HIGH
PHOSPHATE SERPL-MCNC: 5.1 MG/DL — HIGH (ref 2.5–4.5)
PLATELET # BLD AUTO: 217 K/UL — SIGNIFICANT CHANGE UP (ref 150–400)
POTASSIUM SERPL-MCNC: 4 MMOL/L — SIGNIFICANT CHANGE UP (ref 3.5–5.3)
POTASSIUM SERPL-SCNC: 4 MMOL/L — SIGNIFICANT CHANGE UP (ref 3.5–5.3)
PROCALCITONIN SERPL-MCNC: 0.11 NG/ML — HIGH (ref 0.02–0.1)
PROT SERPL-MCNC: 6.3 G/DL — SIGNIFICANT CHANGE UP (ref 6–8.3)
PROTHROM AB SERPL-ACNC: 14.2 SEC — HIGH (ref 10.6–13.6)
RBC # BLD: 3.34 M/UL — LOW (ref 3.8–5.2)
RBC # FLD: 15.8 % — HIGH (ref 10.3–14.5)
RH IG SCN BLD-IMP: POSITIVE — SIGNIFICANT CHANGE UP
SODIUM SERPL-SCNC: 139 MMOL/L — SIGNIFICANT CHANGE UP (ref 135–145)
WBC # BLD: 8.46 K/UL — SIGNIFICANT CHANGE UP (ref 3.8–10.5)
WBC # FLD AUTO: 8.46 K/UL — SIGNIFICANT CHANGE UP (ref 3.8–10.5)

## 2021-01-24 PROCEDURE — 99233 SBSQ HOSP IP/OBS HIGH 50: CPT

## 2021-01-24 RX ORDER — ALBUMIN HUMAN 25 %
250 VIAL (ML) INTRAVENOUS ONCE
Refills: 0 | Status: COMPLETED | OUTPATIENT
Start: 2021-01-24 | End: 2021-01-25

## 2021-01-24 RX ADMIN — ENOXAPARIN SODIUM 40 MILLIGRAM(S): 100 INJECTION SUBCUTANEOUS at 17:21

## 2021-01-24 RX ADMIN — ALBUTEROL 2 PUFF(S): 90 AEROSOL, METERED ORAL at 04:25

## 2021-01-24 RX ADMIN — FENTANYL CITRATE 1.1 MICROGRAM(S)/KG/HR: 50 INJECTION INTRAVENOUS at 20:16

## 2021-01-24 RX ADMIN — MIDAZOLAM HYDROCHLORIDE 2.2 MG/KG/HR: 1 INJECTION, SOLUTION INTRAMUSCULAR; INTRAVENOUS at 09:21

## 2021-01-24 RX ADMIN — ALBUTEROL 2 PUFF(S): 90 AEROSOL, METERED ORAL at 23:42

## 2021-01-24 RX ADMIN — CHLORHEXIDINE GLUCONATE 1 APPLICATION(S): 213 SOLUTION TOPICAL at 04:27

## 2021-01-24 RX ADMIN — Medication 1 APPLICATION(S): at 17:20

## 2021-01-24 RX ADMIN — ENOXAPARIN SODIUM 40 MILLIGRAM(S): 100 INJECTION SUBCUTANEOUS at 04:26

## 2021-01-24 RX ADMIN — Medication 1: at 04:25

## 2021-01-24 RX ADMIN — CISATRACURIUM BESYLATE 19.8 MICROGRAM(S)/KG/MIN: 2 INJECTION INTRAVENOUS at 09:22

## 2021-01-24 RX ADMIN — CHLORHEXIDINE GLUCONATE 15 MILLILITER(S): 213 SOLUTION TOPICAL at 04:26

## 2021-01-24 RX ADMIN — ALBUTEROL 2 PUFF(S): 90 AEROSOL, METERED ORAL at 15:18

## 2021-01-24 RX ADMIN — ALBUTEROL 2 PUFF(S): 90 AEROSOL, METERED ORAL at 07:20

## 2021-01-24 RX ADMIN — MIDAZOLAM HYDROCHLORIDE 2.2 MG/KG/HR: 1 INJECTION, SOLUTION INTRAMUSCULAR; INTRAVENOUS at 17:21

## 2021-01-24 RX ADMIN — Medication 1 APPLICATION(S): at 04:26

## 2021-01-24 RX ADMIN — PROPOFOL 32.9 MICROGRAM(S)/KG/MIN: 10 INJECTION, EMULSION INTRAVENOUS at 17:22

## 2021-01-24 RX ADMIN — Medication 5.15 MICROGRAM(S)/KG/MIN: at 09:22

## 2021-01-24 RX ADMIN — FENTANYL CITRATE 1.1 MICROGRAM(S)/KG/HR: 50 INJECTION INTRAVENOUS at 09:22

## 2021-01-24 RX ADMIN — CHLORHEXIDINE GLUCONATE 15 MILLILITER(S): 213 SOLUTION TOPICAL at 17:21

## 2021-01-24 RX ADMIN — CISATRACURIUM BESYLATE 19.8 MICROGRAM(S)/KG/MIN: 2 INJECTION INTRAVENOUS at 21:29

## 2021-01-24 RX ADMIN — PANTOPRAZOLE SODIUM 40 MILLIGRAM(S): 20 TABLET, DELAYED RELEASE ORAL at 11:14

## 2021-01-24 RX ADMIN — PROPOFOL 32.9 MICROGRAM(S)/KG/MIN: 10 INJECTION, EMULSION INTRAVENOUS at 09:22

## 2021-01-24 RX ADMIN — PROPOFOL 32.9 MICROGRAM(S)/KG/MIN: 10 INJECTION, EMULSION INTRAVENOUS at 20:34

## 2021-01-24 NOTE — PROGRESS NOTE ADULT - ATTENDING COMMENTS
Gong: I have seen and examined the patient face to face, have reviewed and addended the HPI, PE and a/p as necessary.    46 year old woman with COVID ARDS s/p intubation on 1/5 and extubation on 1/9; noted to have re-intubated on 1/16 requiring proning.  Weaned off paralytics on 1/21, though noted to require re-initiation of paralytics and proning overnight for refractory hypoxemia.  Follow ABG and wean vent as tolerated. Urine output improved.     - Continue with paralytics and sedation for vent synchrony and prone positioning for total of 16 hours  - Vasoplegic shock - continue with levophed to maintain map>65  - COVID ARDS - with multiple intubations; c/w 30/300/8/50 wean as tolerated; requiring prone positioining for P/F <150.  Unable to up-titrate PEEP given high plateau pressures.   - No acute renal issues; trend bun and creatinine, strict I and Os.  Will monitor closely aim for net negative.   - s/p dex and remdesivir; and zosyn x 5 days. f/u blood cultures  Critically ill patient requiring frequent bedside visits.  Prognosis guarded.

## 2021-01-24 NOTE — PROGRESS NOTE ADULT - SUBJECTIVE AND OBJECTIVE BOX
Subjective: Intubated; Sedated w/ propofol, versed, fentanyl drips, and medically-paralyzed w/ nimbex drip.    Vital Signs:  Vital Signs Last 24 Hrs  T(C): 37.1 (01-24-21 @ 11:00), Max: 37.7 (01-24-21 @ 04:00)  T(F): 98.8 (01-24-21 @ 11:00), Max: 99.8 (01-24-21 @ 04:00)  HR: 97 (01-24-21 @ 13:00) (85 - 104)  BP: 95/58  RR: 30 (01-24-21 @ 13:00) (28 - 30)  SpO2: 96% (01-24-21 @ 13:00) (94% - 97%) on (O2)      General: Intubated; Sedated w/ propofol, versed, fentanyl drips, and medically-paralyzed w/ nimbex drip.  Eyes: Scleras clear, PERRLA  Neck: Neck supple, trachea midline, No JVD  Respiratory: coarse breath sounds B/L  CV: S1S2; no audible murmurs  Abdominal: +BS's x4, soft, ND/NT  Extremities: No edema, + peripheral pulses  Skin: No Rashes, Hematoma, Ecchymosis                          9.0    8.46  )-----------( 217      ( 24 Jan 2021 09:41 )             29.6       139  |  98  |  10  ----------------------------<  168<H>  4.0   |  33<H>  |  0.54    Ca    8.6      Phos  5.1     Mg     2.0        TPro  6.3  /  Alb  2.6<L>  /  TBili  0.5  /  DBili  x   /  AST  28  /  ALT  54<H>  /  AlkPhos  146<H>    PT: 14.2 sec;   INR: 1.26 ratio      PTT:32.0 sec      MEDICATIONS  (STANDING):  ALBUTerol    90 MICROgram(s) HFA Inhaler 2 Puff(s) Inhalation every 6 hours  chlorhexidine 0.12% Liquid 15 milliLiter(s) Oral Mucosa every 12 hours  chlorhexidine 2% Cloths 1 Application(s) Topical <User Schedule>  cisatracurium Infusion 3 MICROgram(s)/kG/Min (19.8 mL/Hr) IV Continuous <Continuous>  dextrose 5%. 1000 milliLiter(s) (50 mL/Hr) IV Continuous <Continuous>  dextrose 5%. 1000 milliLiter(s) (100 mL/Hr) IV Continuous <Continuous>  enoxaparin Injectable 40 milliGRAM(s) SubCutaneous every 12 hours  fentaNYL   Infusion..... 0.5 MICROgram(s)/kG/Hr (1.1 mL/Hr) IV Continuous <Continuous>  glucagon  Injectable 1 milliGRAM(s) IntraMuscular once  insulin lispro (ADMELOG) corrective regimen sliding scale   SubCutaneous every 6 hours  midazolam Infusion 0.02 mG/kG/Hr (2.2 mL/Hr) IV Continuous <Continuous>  norepinephrine Infusion 0.05 MICROgram(s)/kG/Min (5.15 mL/Hr) IV Continuous <Continuous>  pantoprazole  Injectable 40 milliGRAM(s) IV Push daily  petrolatum Ophthalmic Ointment 1 Application(s) Both EYES two times a day  polyethylene glycol 3350 17 Gram(s) Oral daily  propofol Infusion 50 MICROgram(s)/kG/Min (32.9 mL/Hr) IV Continuous <Continuous>  senna Syrup 10 milliLiter(s) Oral daily      Assessment  47 y/o female w/ PAST MEDICAL & SURGICAL HISTORY:    Admitted 1/3/21, and originally intubated 1/5 w/ COVID PNA.  Extubated 1/10, however required reintubation on 1/16.    PLAN  Sedated w/ propofol, versed, fentanyl drips, and medically-paralyzed w/ nimbex drip    Vent settings: FIO2 50%, PEEP 8, , RR 30  Proned at 10pm last night  ABG at 4am while proned: 7.38/60/82/32/95%  Put back in supine position at 2pm  ABG pending    Wean Levophed drip as tolerated    WBC 8 (down from 10 yesterday); Tmax 99.8oF  Blood cultures from 1/16 NGTD  No antibiotics on at this time    Cr 0.54  Urine output last 24hrs: 1,160ml  Fluid balance last 24hrs: +564ml    Blood glucose 166 at 4:30am, 207 at 10:30pm  Lispro sliding-scale  Jevity NGT feeds    Protonix, senna, miralax    Lower extremity duplex 1/20/21: No DVT  Lovenox 40 Q12hrs    Patient seen and discussed on bedside rounds earlier with Dr. Mayo

## 2021-01-25 LAB
ALBUMIN SERPL ELPH-MCNC: 3.1 G/DL — LOW (ref 3.3–5)
ALP SERPL-CCNC: 138 U/L — HIGH (ref 40–120)
ALT FLD-CCNC: 67 U/L — HIGH (ref 4–33)
ANION GAP SERPL CALC-SCNC: 9 MMOL/L — SIGNIFICANT CHANGE UP (ref 7–14)
APPEARANCE UR: ABNORMAL
APTT BLD: 33 SEC — SIGNIFICANT CHANGE UP (ref 27–36.3)
AST SERPL-CCNC: 63 U/L — HIGH (ref 4–32)
BASOPHILS # BLD AUTO: 0.04 K/UL — SIGNIFICANT CHANGE UP (ref 0–0.2)
BASOPHILS NFR BLD AUTO: 0.5 % — SIGNIFICANT CHANGE UP (ref 0–2)
BILIRUB SERPL-MCNC: 0.7 MG/DL — SIGNIFICANT CHANGE UP (ref 0.2–1.2)
BILIRUB UR-MCNC: NEGATIVE — SIGNIFICANT CHANGE UP
BUN SERPL-MCNC: 9 MG/DL — SIGNIFICANT CHANGE UP (ref 7–23)
CALCIUM SERPL-MCNC: 9.1 MG/DL — SIGNIFICANT CHANGE UP (ref 8.4–10.5)
CHLORIDE SERPL-SCNC: 96 MMOL/L — LOW (ref 98–107)
CO2 SERPL-SCNC: 34 MMOL/L — HIGH (ref 22–31)
COLOR SPEC: ABNORMAL
CREAT SERPL-MCNC: 0.55 MG/DL — SIGNIFICANT CHANGE UP (ref 0.5–1.3)
DIFF PNL FLD: ABNORMAL
EOSINOPHIL # BLD AUTO: 0.48 K/UL — SIGNIFICANT CHANGE UP (ref 0–0.5)
EOSINOPHIL NFR BLD AUTO: 6.3 % — HIGH (ref 0–6)
GLUCOSE SERPL-MCNC: 150 MG/DL — HIGH (ref 70–99)
GLUCOSE UR QL: NEGATIVE — SIGNIFICANT CHANGE UP
GRAM STN FLD: SIGNIFICANT CHANGE UP
HCT VFR BLD CALC: 29 % — LOW (ref 34.5–45)
HGB BLD-MCNC: 8.7 G/DL — LOW (ref 11.5–15.5)
IANC: 4.94 K/UL — SIGNIFICANT CHANGE UP (ref 1.5–8.5)
IMM GRANULOCYTES NFR BLD AUTO: 1.1 % — SIGNIFICANT CHANGE UP (ref 0–1.5)
KETONES UR-MCNC: NEGATIVE — SIGNIFICANT CHANGE UP
LEUKOCYTE ESTERASE UR-ACNC: ABNORMAL
LYMPHOCYTES # BLD AUTO: 1.3 K/UL — SIGNIFICANT CHANGE UP (ref 1–3.3)
LYMPHOCYTES # BLD AUTO: 17.1 % — SIGNIFICANT CHANGE UP (ref 13–44)
MAGNESIUM SERPL-MCNC: 2.1 MG/DL — SIGNIFICANT CHANGE UP (ref 1.6–2.6)
MCHC RBC-ENTMCNC: 27.4 PG — SIGNIFICANT CHANGE UP (ref 27–34)
MCHC RBC-ENTMCNC: 30 GM/DL — LOW (ref 32–36)
MCV RBC AUTO: 91.2 FL — SIGNIFICANT CHANGE UP (ref 80–100)
MONOCYTES # BLD AUTO: 0.76 K/UL — SIGNIFICANT CHANGE UP (ref 0–0.9)
MONOCYTES NFR BLD AUTO: 10 % — SIGNIFICANT CHANGE UP (ref 2–14)
NEUTROPHILS # BLD AUTO: 4.94 K/UL — SIGNIFICANT CHANGE UP (ref 1.8–7.4)
NEUTROPHILS NFR BLD AUTO: 65 % — SIGNIFICANT CHANGE UP (ref 43–77)
NITRITE UR-MCNC: NEGATIVE — SIGNIFICANT CHANGE UP
NRBC # BLD: 0 /100 WBCS — SIGNIFICANT CHANGE UP
NRBC # FLD: 0.03 K/UL — HIGH
PH UR: 6 — SIGNIFICANT CHANGE UP (ref 5–8)
PHOSPHATE SERPL-MCNC: 4.3 MG/DL — SIGNIFICANT CHANGE UP (ref 2.5–4.5)
PLATELET # BLD AUTO: 209 K/UL — SIGNIFICANT CHANGE UP (ref 150–400)
POTASSIUM SERPL-MCNC: 3.4 MMOL/L — LOW (ref 3.5–5.3)
POTASSIUM SERPL-SCNC: 3.4 MMOL/L — LOW (ref 3.5–5.3)
PROT SERPL-MCNC: 7 G/DL — SIGNIFICANT CHANGE UP (ref 6–8.3)
PROT UR-MCNC: ABNORMAL
RBC # BLD: 3.18 M/UL — LOW (ref 3.8–5.2)
RBC # FLD: 15.7 % — HIGH (ref 10.3–14.5)
SODIUM SERPL-SCNC: 139 MMOL/L — SIGNIFICANT CHANGE UP (ref 135–145)
SP GR SPEC: 1.03 — HIGH (ref 1.01–1.02)
SPECIMEN SOURCE: SIGNIFICANT CHANGE UP
UROBILINOGEN FLD QL: ABNORMAL
WBC # BLD: 7.6 K/UL — SIGNIFICANT CHANGE UP (ref 3.8–10.5)
WBC # FLD AUTO: 7.6 K/UL — SIGNIFICANT CHANGE UP (ref 3.8–10.5)

## 2021-01-25 PROCEDURE — 71045 X-RAY EXAM CHEST 1 VIEW: CPT | Mod: 26

## 2021-01-25 PROCEDURE — 99291 CRITICAL CARE FIRST HOUR: CPT

## 2021-01-25 RX ORDER — ACETAMINOPHEN 500 MG
1000 TABLET ORAL ONCE
Refills: 0 | Status: COMPLETED | OUTPATIENT
Start: 2021-01-25 | End: 2021-01-25

## 2021-01-25 RX ORDER — POLYETHYLENE GLYCOL 3350 17 G/17G
17 POWDER, FOR SOLUTION ORAL
Refills: 0 | Status: DISCONTINUED | OUTPATIENT
Start: 2021-01-25 | End: 2021-01-29

## 2021-01-25 RX ORDER — FUROSEMIDE 40 MG
40 TABLET ORAL ONCE
Refills: 0 | Status: COMPLETED | OUTPATIENT
Start: 2021-01-25 | End: 2021-01-25

## 2021-01-25 RX ORDER — POTASSIUM CHLORIDE 20 MEQ
10 PACKET (EA) ORAL ONCE
Refills: 0 | Status: COMPLETED | OUTPATIENT
Start: 2021-01-25 | End: 2021-01-25

## 2021-01-25 RX ORDER — METHYLNALTREXONE BROMIDE 12 MG/.6ML
12 INJECTION, SOLUTION SUBCUTANEOUS ONCE
Refills: 0 | Status: COMPLETED | OUTPATIENT
Start: 2021-01-25 | End: 2021-01-25

## 2021-01-25 RX ORDER — CEFTRIAXONE 500 MG/1
1000 INJECTION, POWDER, FOR SOLUTION INTRAMUSCULAR; INTRAVENOUS EVERY 24 HOURS
Refills: 0 | Status: COMPLETED | OUTPATIENT
Start: 2021-01-25 | End: 2021-01-27

## 2021-01-25 RX ADMIN — Medication 1 APPLICATION(S): at 16:53

## 2021-01-25 RX ADMIN — POLYETHYLENE GLYCOL 3350 17 GRAM(S): 17 POWDER, FOR SOLUTION ORAL at 12:02

## 2021-01-25 RX ADMIN — ALBUTEROL 2 PUFF(S): 90 AEROSOL, METERED ORAL at 05:40

## 2021-01-25 RX ADMIN — CHLORHEXIDINE GLUCONATE 15 MILLILITER(S): 213 SOLUTION TOPICAL at 16:52

## 2021-01-25 RX ADMIN — ALBUTEROL 2 PUFF(S): 90 AEROSOL, METERED ORAL at 07:11

## 2021-01-25 RX ADMIN — Medication 5.15 MICROGRAM(S)/KG/MIN: at 09:25

## 2021-01-25 RX ADMIN — PANTOPRAZOLE SODIUM 40 MILLIGRAM(S): 20 TABLET, DELAYED RELEASE ORAL at 11:57

## 2021-01-25 RX ADMIN — Medication 40 MILLIGRAM(S): at 04:11

## 2021-01-25 RX ADMIN — Medication 1: at 17:30

## 2021-01-25 RX ADMIN — Medication 400 MILLIGRAM(S): at 13:39

## 2021-01-25 RX ADMIN — ENOXAPARIN SODIUM 40 MILLIGRAM(S): 100 INJECTION SUBCUTANEOUS at 16:52

## 2021-01-25 RX ADMIN — CHLORHEXIDINE GLUCONATE 1 APPLICATION(S): 213 SOLUTION TOPICAL at 05:45

## 2021-01-25 RX ADMIN — METHYLNALTREXONE BROMIDE 12 MILLIGRAM(S): 12 INJECTION, SOLUTION SUBCUTANEOUS at 15:30

## 2021-01-25 RX ADMIN — ALBUTEROL 2 PUFF(S): 90 AEROSOL, METERED ORAL at 15:34

## 2021-01-25 RX ADMIN — CEFTRIAXONE 100 MILLIGRAM(S): 500 INJECTION, POWDER, FOR SOLUTION INTRAMUSCULAR; INTRAVENOUS at 18:22

## 2021-01-25 RX ADMIN — Medication 1: at 11:57

## 2021-01-25 RX ADMIN — CHLORHEXIDINE GLUCONATE 15 MILLILITER(S): 213 SOLUTION TOPICAL at 05:45

## 2021-01-25 RX ADMIN — ALBUTEROL 2 PUFF(S): 90 AEROSOL, METERED ORAL at 23:51

## 2021-01-25 RX ADMIN — FENTANYL CITRATE 1.1 MICROGRAM(S)/KG/HR: 50 INJECTION INTRAVENOUS at 09:24

## 2021-01-25 RX ADMIN — Medication 1 APPLICATION(S): at 19:32

## 2021-01-25 RX ADMIN — Medication 100 MILLIEQUIVALENT(S): at 09:24

## 2021-01-25 RX ADMIN — ENOXAPARIN SODIUM 40 MILLIGRAM(S): 100 INJECTION SUBCUTANEOUS at 06:10

## 2021-01-25 RX ADMIN — MIDAZOLAM HYDROCHLORIDE 2.2 MG/KG/HR: 1 INJECTION, SOLUTION INTRAMUSCULAR; INTRAVENOUS at 04:11

## 2021-01-25 RX ADMIN — POLYETHYLENE GLYCOL 3350 17 GRAM(S): 17 POWDER, FOR SOLUTION ORAL at 23:39

## 2021-01-25 RX ADMIN — PROPOFOL 32.9 MICROGRAM(S)/KG/MIN: 10 INJECTION, EMULSION INTRAVENOUS at 00:44

## 2021-01-25 RX ADMIN — Medication 1 APPLICATION(S): at 05:47

## 2021-01-25 RX ADMIN — Medication 1: at 23:40

## 2021-01-25 RX ADMIN — SENNA PLUS 10 MILLILITER(S): 8.6 TABLET ORAL at 11:58

## 2021-01-25 RX ADMIN — Medication 125 MILLILITER(S): at 00:44

## 2021-01-25 NOTE — PROGRESS NOTE ADULT - SUBJECTIVE AND OBJECTIVE BOX
Date of Service: 01-25-21 @ 10:53    Patient is a 46y old  Female who presents with a chief complaint of COVID (24 Jan 2021 14:22)      Any change in ROS: doing poorly intuabted and sedated     MEDICATIONS  (STANDING):  ALBUTerol    90 MICROgram(s) HFA Inhaler 2 Puff(s) Inhalation every 6 hours  chlorhexidine 0.12% Liquid 15 milliLiter(s) Oral Mucosa every 12 hours  chlorhexidine 2% Cloths 1 Application(s) Topical <User Schedule>  dextrose 5%. 1000 milliLiter(s) (50 mL/Hr) IV Continuous <Continuous>  dextrose 5%. 1000 milliLiter(s) (100 mL/Hr) IV Continuous <Continuous>  enoxaparin Injectable 40 milliGRAM(s) SubCutaneous every 12 hours  fentaNYL   Infusion..... 0.5 MICROgram(s)/kG/Hr (1.1 mL/Hr) IV Continuous <Continuous>  glucagon  Injectable 1 milliGRAM(s) IntraMuscular once  insulin lispro (ADMELOG) corrective regimen sliding scale   SubCutaneous every 6 hours  midazolam Infusion 0.02 mG/kG/Hr (2.2 mL/Hr) IV Continuous <Continuous>  norepinephrine Infusion 0.05 MICROgram(s)/kG/Min (5.15 mL/Hr) IV Continuous <Continuous>  pantoprazole  Injectable 40 milliGRAM(s) IV Push daily  petrolatum Ophthalmic Ointment 1 Application(s) Both EYES two times a day  polyethylene glycol 3350 17 Gram(s) Oral <User Schedule>  propofol Infusion 50 MICROgram(s)/kG/Min (32.9 mL/Hr) IV Continuous <Continuous>  senna Syrup 10 milliLiter(s) Oral daily    MEDICATIONS  (PRN):    Vital Signs Last 24 Hrs  T(C): 37.1 (25 Jan 2021 00:00), Max: 37.1 (24 Jan 2021 11:00)  T(F): 98.7 (25 Jan 2021 00:00), Max: 98.8 (24 Jan 2021 11:00)  HR: 97 (25 Jan 2021 10:32) (67 - 101)  BP: --  BP(mean): --  RR: 26 (25 Jan 2021 08:00) (26 - 30)  SpO2: 94% (25 Jan 2021 10:32) (94% - 98%)  Mode: AC/ CMV (Assist Control/ Continuous Mandatory Ventilation)  RR (machine): 30  TV (machine): 300  FiO2: 50  PEEP: 8  ITime: 0.68  MAP: 14  PIP: 28    I&O's Summary    24 Jan 2021 07:01  -  25 Jan 2021 07:00  --------------------------------------------------------  IN: 1540.4 mL / OUT: 2204 mL / NET: -663.6 mL    25 Jan 2021 07:01  -  25 Jan 2021 10:53  --------------------------------------------------------  IN: 305 mL / OUT: 250 mL / NET: 55 mL          Physical Exam:   GENERAL: morbidly obese  HEENT: LALIT/   Atraumatic, Normocephalic  ENMT: No tonsillar erythema, exudates, or enlargement; Moist mucous membranes, Good dentition, No lesions  NECK: Supple, No JVD, Normal thyroid  CHEST/LUNG: Clear to auscultaion, ; No rales, rhonchi, wheezing, or rubs  CVS: Regular rate and rhythm; No murmurs, rubs, or gallops  GI: : Soft, Nontender, Nondistended; Bowel sounds present  NERVOUS SYSTEM:  Intuabed and sedated   EXTREMITIES: + edema  LYMPH: No lymphadenopathy noted  SKIN: No rashes or lesions  ENDOCRINOLOGY: No Thyromegaly  Intuabtred and sedated     Labs:  ABG - ( 24 Jan 2021 23:23 )  pH, Arterial: 7.38  pH, Blood: x     /  pCO2: 58    /  pO2: 85    / HCO3: 31    / Base Excess: 7.8   /  SaO2: 96.0              r< from: Xray Chest 1 View- PORTABLE-Urgent (Xray Chest 1 View- PORTABLE-Urgent .) (01.22.21 @ 21:44) >  EXAM:  XR CHEST PORTABLE URGENT 1V        PROCEDURE DATE:  Jan 22 2021         INTERPRETATION:  DATE OF STUDY: 1/22/21 at 9:36PM    PRIOR:Earlier 1/22/21 exam    CLINICAL INDICATION: Assess ET tube.    TECHNIQUE: portable chest.    FINDINGS:  ET tube tip is appropriately positioned above the rhiannon.  NG tube tip in stomach.  Left IJ venous catheter tip overlies SVC.  Heart is similar in size.  Persistent increased interstitial markings and airspace disease bilaterally.  No pneumothorax.    IMPRESSION:  ET tube tip appropriately positioned above the rhiannon.  Dr. RODY Lagunas dictated prelim report at 10:02PM on 1/22/21.              AMANUEL HARRIS MD; Attending Radiologist  This document has been electronically signed. Jan 23 2021 10:11AM    < end of copied text >                            8.7    7.60  )-----------( 209      ( 25 Jan 2021 05:47 )             29.0                         9.0    8.46  )-----------( 217      ( 24 Jan 2021 09:41 )             29.6                         9.1    10.10 )-----------( 224      ( 23 Jan 2021 04:56 )             29.7                         9.0    10.22 )-----------( 233      ( 22 Jan 2021 02:48 )             30.1     01-25    139  |  96<L>  |  9   ----------------------------<  150<H>  3.4<L>   |  34<H>  |  0.55  01-24    139  |  98  |  10  ----------------------------<  168<H>  4.0   |  33<H>  |  0.54  01-23    140  |  99  |  9   ----------------------------<  144<H>  3.5   |  33<H>  |  0.46<L>  01-22    140  |  103  |  8   ----------------------------<  143<H>  4.1   |  33<H>  |  0.50    Ca    9.1      25 Jan 2021 05:47  Ca    8.6      24 Jan 2021 03:54  Phos  4.3     01-25  Phos  5.1     01-24  Mg     2.1     01-25  Mg     2.0     01-24    TPro  7.0  /  Alb  3.1<L>  /  TBili  0.7  /  DBili  x   /  AST  63<H>  /  ALT  67<H>  /  AlkPhos  138<H>  01-25  TPro  6.3  /  Alb  2.6<L>  /  TBili  0.5  /  DBili  x   /  AST  28  /  ALT  54<H>  /  AlkPhos  146<H>  01-24  TPro  6.2  /  Alb  2.7<L>  /  TBili  0.5  /  DBili  x   /  AST  26  /  ALT  72<H>  /  AlkPhos  136<H>  01-23  TPro  6.0  /  Alb  2.6<L>  /  TBili  0.4  /  DBili  x   /  AST  61<H>  /  ALT  115<H>  /  AlkPhos  153<H>  01-22    CAPILLARY BLOOD GLUCOSE      POCT Blood Glucose.: 138 mg/dL (24 Jan 2021 17:10)  POCT Blood Glucose.: 139 mg/dL (24 Jan 2021 11:07)      LIVER FUNCTIONS - ( 25 Jan 2021 05:47 )  Alb: 3.1 g/dL / Pro: 7.0 g/dL / ALK PHOS: 138 U/L / ALT: 67 U/L / AST: 63 U/L / GGT: x           PT/INR - ( 24 Jan 2021 03:54 )   PT: 14.2 sec;   INR: 1.26 ratio         PTT - ( 25 Jan 2021 05:47 )  PTT:33.0 sec    D-Dimer Assay, Quantitative: 659 ng/mL DDU (01-23 @ 04:56)  D-Dimer Assay, Quantitative: 855 ng/mL DDU (01-22 @ 02:48)  D-Dimer Assay, Quantitative: 497 ng/mL DDU (01-20 @ 02:31)  D-Dimer Assay, Quantitative: 303 ng/mL DDU (01-19 @ 03:03)  Procalcitonin, Serum: 0.11 ng/mL (01-24 @ 03:54)  Procalcitonin, Serum: 0.12 ng/mL (01-22 @ 02:48)        RECENT CULTURES:        RESPIRATORY CULTURES:          Studies  Chest X-RAY  CT SCAN Chest   Venous Dopplers: LE:   CT Abdomen  Others

## 2021-01-25 NOTE — PROGRESS NOTE ADULT - ASSESSMENT
46-year-old woman, no PMH who was admitted to the hospital for management ARF with hypoxia secondary to covid-19 pneumonia, is status post intubation from 1/5 to 1/9 for management of acute respiratory distress syndrome, and who was now re-intubated (since 1/16) and in the MICU for management of severe ARDS secondary to covid-19 pneumonia. Transfer to [facility] ICU on 1/21.     Neuro:  -sedated with propofol, fentanyl, versed  -wean slowly as tolerated     CV:  -on levophed for pressure support  -No known history of coronary artery disease or of arrhythmia; sinus rhythm on telemetry     Respiratory: COVID ARDS  intubation 01/05, extubation 01/09, reintubation 01/16, prone 1/21-24  -Current ventilator settings: AC/VC, RR 30, , PEEP 8, FiO2 50;  in the setting of elevated driving pressures, will decrease peep as tolerated; hope to allow for lung-protective ventilation and subsequently to wean current ventilator settings     Renal:  -Serum creatinine at baseline  -continue to monitor urine output -2.2L over past 24 hrs, s/p lasix 40 mg IV overnight  -no electrolyte abnormalities at present  -Will continue to monitor daily metabolic panel and correct as needed     ID:  -S/p 10 days of dexamethasone 1/5-14 and 5 days of remdesivir 1/5-9 for treatment of severe covid-19   -In the setting of need for re-intubation, s/p zosyn administration for five days 1/16-20 for empiric coverage of superimposed bacterial pneumonia;   -No growth to date from cultures- last- blood 1/16    Endo:  -No active concerns at this time   -A1c 6.6, meeting criteria for diagnosis of diabetes mellitus  -Cont ISS, monitor finger sticks    GI:  -Jevity tube feeds at goal   -No BM since 1/10  -Increase miralax to q8h  -methylnaltrexone for opiod constipation    Heme:  -DVT ppx: lovenox 40 q12h  -Negative bilateral lower extremity dopplers despite rising d-dimer

## 2021-01-25 NOTE — PROGRESS NOTE ADULT - ATTENDING COMMENTS
Dr. Patricio (Attending Physician)  COVID ARDS s/p second intubation will likely require tracheostomy as patient is deconditioned with thick secretions  Fever today will send blood, sputum cx, ua  Negative volume status yesterday will hold off on diuresis today

## 2021-01-25 NOTE — PROGRESS NOTE ADULT - ASSESSMENT
Patient is a 46 year old woman with no past medical history coming in with shortness of breath, cough productive of white sputum, pleuritic rib pain (moderate in severity) for two days and diarrhea for one day.    COVID 19 PNEUMONIA: WITH HYPOXIC RESP FAILURE:     1/13:  COVID 19 PNEUMONIA: WITH HYPOXIC RESP FAILURE:     She was intubated in MICU nad then extubated successfully and transferred to floor: pulm called today to evaluate Pt is on bipap 100% sao2 in high 80's and tachypneic   She looks in resp distress:   Her d Dimer bumped up yesterday and was switched to full AC:  She isnot doing well and may need MICU again:   ALT is mildly elevated and renal profile is normal   repeat crp and ferritin:  ??firgtehr course of steroids    STEPHANIE PMD and rn at [facility]:   stephanie acp    1/14:  COVID 19 PNEUMONIA: WITH HYPOXIC RESP FAILURE:   She was intubated in MICU and then extubated successfully and transferred to floor: pulm called today to evaluate Pt is on bipap 100% sao2 in high 80's and tachypneic : MICU evaluation done again yesterday and she refused for intubation  She does not looks in resp distress today   Her d Dimer bumped up yesterday and was switched to full AC: d dimer is decreased today   She is not doing well today too and may need MICU again:   LFT's are mildly elevated and renal profile is normal   repeat crp and ferritin: high still   dw acp    1/15:    COVID 19 PNEUMONIA: WITH HYPOXIC RESP FAILURE:   She is on bipap; tachypneic on 100% bipap: she had earlier refused for intubation she was intubated once: before :  She does not looks in resp distress today   Her d Dimer bumped up yesterday and was switched to full AC:   She is not doing well today again and s eis very hypoxic: she may need intubation any time if she agrees:     LFT's are mildly elevated and renal profile is normal   crp and ferritin: high still   dw PMD   pt is not doing well and she has SOB and is tachypneic and on bipap at 100% fio2: She is already on full time AC:       1/17:    COVID 19 PNEUMONIA: WITH HYPOXIC RESP FAILURE:   intuabted and sedated and paralysed in MICU : requiring high o2 and PEEP"   Shock: on pressors: She was started on empiric antibiotics after the intubation   Her d Dimer is decreasing     LFT's are mildly elevated and renal profile is normal   crp and ferritin: today are still high    on dvt propahyxlis  dw team    1/19:    COVID 19 PNEUMONIA: WITH HYPOXIC RESP FAILURE:   intubated and sedated and paralysed in MICU : requiring high o2 and PEEP" : 50% fio2: peep 8   Shock: requiring less  pressors: She was started on empiric antibiotics after the intubation : on d4 of zosyn  Her d Dimer is decreasing     LFT's are mildly elevated and renal profile is normal   on dvt propahyxlis  dw team    1/22:    COVID 19 PNEUMONIA: WITH HYPOXIC RESP FAILURE:   intubated and sedated and paralysed AND PRONED: NOT DOING WELL  : requiring high o2 and PEEP" : 50% fio2: peep 8 : STILL PRETTY HYPOXIC  Shock: requiring less  pressors: She was started on empiric antibiotics after the intubation : FINISHED ZOSYN DOSAGES  OVERALL SHE IS CRITICALLY ILL: CONT CURRENT SUPPORTIVE CARE:   on dvt propahyxlis  dw team    1/25:    COVID 19 PNEUMONIA: WITH HYPOXIC RESP FAILURE:   intubated and sedated and paralysed ANDsupne today: NOT DOING WELL  : requiring high o2 and PEEP" : 60% fio2: peep 8 : STILL PRETTY HYPOXIC  Shock: requiring less  pressors: She was started on empiric antibiotics after the intubation : FINISHED ZOSYN DOSAGES  OVERALL SHE IS CRITICALLY ILL: CONT CURRENT SUPPORTIVE CARE:   on dvt propahyxlis  micu : now will sign off: will follow once pt is out of MICU

## 2021-01-25 NOTE — PROGRESS NOTE ADULT - SUBJECTIVE AND OBJECTIVE BOX
ICU Daily Progress Note  =====================================================  HPI:  46-year-old woman who was admitted to the hospital for management acute respiratory failure with hypoxia secondary to covid-19 pneumonia, is status post intubation from 1/5 to 1/9 for management of acute respiratory distress syndrome, and who is now re-intubated (since 1/16) and in the MICU for management of severe ARDS secondary to covid-19 pneumonia, transferred to [facility] ICU on 1/21.     Interval/Overnight Events:      -Patient proned 1/24 early morning, supinated late night   - Nimbex weaned off  - Received lasix 40mg x 1 yesterday with good response  - No acute events overnight    HISTORY  46y Female  Allergies: No Known Allergies    PAST MEDICAL & SURGICAL HISTORY:  No pertinent past medical history    No significant past surgical history    COVID-19    Discharge planning issues    Prophylactic measure    COVID-19    Acute respiratory failure with hypoxia        MEDICATIONS:   --------------------------------------------------------------------------------------  Neurologic Medications  fentaNYL   Infusion..... 0.5 MICROgram(s)/kG/Hr IV Continuous <Continuous>  midazolam Infusion 0.02 mG/kG/Hr IV Continuous <Continuous>  propofol Infusion 50 MICROgram(s)/kG/Min IV Continuous <Continuous>    Respiratory Medications  ALBUTerol    90 MICROgram(s) HFA Inhaler 2 Puff(s) Inhalation every 6 hours    Cardiovascular Medications  norepinephrine Infusion 0.05 MICROgram(s)/kG/Min IV Continuous <Continuous>    Gastrointestinal Medications  dextrose 5%. 1000 milliLiter(s) IV Continuous <Continuous>  dextrose 5%. 1000 milliLiter(s) IV Continuous <Continuous>  methylnaltrexone Injectable 12 milliGRAM(s) SubCutaneous once  pantoprazole  Injectable 40 milliGRAM(s) IV Push daily  polyethylene glycol 3350 17 Gram(s) Oral <User Schedule>  senna Syrup 10 milliLiter(s) Oral daily    Genitourinary Medications    Hematologic/Oncologic Medications  enoxaparin Injectable 40 milliGRAM(s) SubCutaneous every 12 hours    Antimicrobial/Immunologic Medications    Endocrine/Metabolic Medications  glucagon  Injectable 1 milliGRAM(s) IntraMuscular once  insulin lispro (ADMELOG) corrective regimen sliding scale   SubCutaneous every 6 hours    Topical/Other Medications  chlorhexidine 0.12% Liquid 15 milliLiter(s) Oral Mucosa every 12 hours  chlorhexidine 2% Cloths 1 Application(s) Topical <User Schedule>  petrolatum Ophthalmic Ointment 1 Application(s) Both EYES two times a day    --------------------------------------------------------------------------------------    VITAL SIGNS, INS/OUTS (last 24 hours):  --------------------------------------------------------------------------------------  T(C): 37.1 (01-25-21 @ 00:00), Max: 37.1 (01-24-21 @ 20:00)  HR: 97 (01-25-21 @ 10:32) (67 - 99)  ABP: 90/51 (01-25-21 @ 08:00) (85/48 - 120/68)  ABP(mean): 68 (01-25-21 @ 08:00) (65 - 89)  RR: 26 (01-25-21 @ 08:00) (26 - 30)  SpO2: 94% (01-25-21 @ 10:32) (94% - 98%)  CAPILLARY BLOOD GLUCOSE  POCT Blood Glucose.: 138 mg/dL (24 Jan 2021 17:10)   N/A    01-24 @ 07:01  -  01-25 @ 07:00  --------------------------------------------------------  IN:    Cisatracurium: 438.9 mL    FentaNYL: 110 mL    Jevity: 100 mL    Midazolam: 305.8 mL    Norepinephrine: 110.5 mL    Propofol: 475.2 mL  Total IN: 1540.4 mL    OUT:    Indwelling Catheter - Urethral (mL): 2204 mL  Total OUT: 2204 mL    Total NET: -663.6 mL      01-25 @ 07:01 - 01-25 @ 11:12  --------------------------------------------------------  IN:    FentaNYL: 13.2 mL    IV PiggyBack: 100 mL    Jevity: 105 mL    Midazolam: 22 mL    Norepinephrine: 15.3 mL    Propofol: 49.5 mL  Total IN: 305 mL    OUT:    Indwelling Catheter - Urethral (mL): 250 mL  Total OUT: 250 mL    Total NET: 55 mL  --------------------------------------------------------------------------------------    EXAM:  NEUROLOGY  Exam: Sedated RASS: -2  [x] Adequacy of sedation and pain control has been assessed and adjusted    RESPIRATORY  Exam: Lungs clear to auscultation, Normal expansion/effort.   [] Tracheostomy   [x] Intubated  Mechanical Ventilation: Mode: AC/ CMV (Assist Control/ Continuous Mandatory Ventilation), RR (machine): 30, TV (machine): 300, FiO2: 50, PEEP: 8, ITime: 0.68, MAP: 14, PIP: 28  [x] Extubation Readiness Assessed    CARDIOVASCULAR  Exam: S1, S2.  Regular rate and rhythm.  Peripheral edema   Cardiac Rhythm: Normal Sinus Rhythm      GI/NUTRITION  Exam: Abdomen soft, Non-tender, Non-distended.  Nasogastric tube in place.    Current Diet: Tube feeds Jevity @ 35cc/hr with prosource x5 daily    METABOLIC/FLUIDS/ELECTROLYTES  dextrose 5%. 1000 milliLiter(s) IV Continuous <Continuous>  dextrose 5%. 1000 milliLiter(s) IV Continuous <Continuous>      HEMATOLOGIC  [x] DVT Prophylaxis: enoxaparin Injectable 40 milliGRAM(s) SubCutaneous every 12 hours    Transfusions:	[] PRBC	[] Platelets		[] FFP	[] Cryoprecipitate    INFECTIOUS DISEASE  Antimicrobials/Immunologic Medications:      Tubes/Lines/Drains   [x] Peripheral IV  [x] Central Venous Line     	[] R	[x] L	[x] IJ	[] Fem	[] SC	Date Placed: 1/16  [x] Arterial Line		[x] R	[] L	[] Fem	[] Rad	[x] Ax	Date Placed: 1/16  [] PICC		[] Midline		[] Mediport  [] Urinary Catheter		Date Placed:   [x] Necessity of urinary, arterial, and venous catheters discussed    VASCULAR  Exam: Extremities warm, pink, well-perfused.    MUSCULOSKELETAL  Exam: All extremities moving spontaneously without limitations.    SKIN  Exam: Good skin turgor, no skin breakdown.     LABS  --------------------------------------------------------------------------------------  CBC (01-25 @ 05:47)                              8.7<L>                         7.60    )----------------(  209        65.0  % Neutrophils, 17.1  % Lymphocytes, ANC: 4.94                                29.0<L>  CBC (01-24 @ 09:41)                              9.0<L>                         8.46    )----------------(  217        --    % Neutrophils, --    % Lymphocytes, ANC: --                                  29.6<L>    BMP (01-25 @ 05:47)             139     |  96<L>   |  9     		Ca++ --      Ca 9.1                ---------------------------------( 150<H>		Mg 2.1                3.4<L>  |  34<H>   |  0.55  			Ph 4.3     BMP (01-24 @ 03:54)             139     |  98      |  10    		Ca++ --      Ca 8.6                ---------------------------------( 168<H>		Mg 2.0                4.0     |  33<H>   |  0.54  			Ph 5.1<H>    LFTs (01-25 @ 05:47)      TPro 7.0 / Alb 3.1<L> / TBili 0.7 / DBili -- / AST 63<H> / ALT 67<H> / AlkPhos 138<H>  LFTs (01-24 @ 03:54)      TPro 6.3 / Alb 2.6<L> / TBili 0.5 / DBili -- / AST 28 / ALT 54<H> / AlkPhos 146<H>    Coags (01-25 @ 05:47)  aPTT 33.0 / INR -- / PT --  Coags (01-24 @ 03:54)  aPTT 32.0 / INR 1.26<H> / PT 14.2<H>      ABG (01-24 @ 23:23)     7.38 / 58<H> / 85 / 31<H> / 7.8<H> / 96.0%     Lactate:     ABG (01-24 @ 16:31)     7.36 / 63<H> / 94 / 32<H> / 9.0<H> / 98.9%     Lactate:           -> .Blood Blood-Peripheral Culture (01-16 @ 06:00)     NG    NG    No Growth Final  --------------------------------------------------------------------------------------    OTHER LABORATORY:     IMAGING STUDIES:   CXR:

## 2021-01-26 LAB
ALBUMIN SERPL ELPH-MCNC: 2.9 G/DL — LOW (ref 3.3–5)
ALP SERPL-CCNC: 153 U/L — HIGH (ref 40–120)
ALT FLD-CCNC: 61 U/L — HIGH (ref 4–33)
ANION GAP SERPL CALC-SCNC: 9 MMOL/L — SIGNIFICANT CHANGE UP (ref 7–14)
APTT BLD: 32.6 SEC — SIGNIFICANT CHANGE UP (ref 27–36.3)
AST SERPL-CCNC: 49 U/L — HIGH (ref 4–32)
BASOPHILS # BLD AUTO: 0.04 K/UL — SIGNIFICANT CHANGE UP (ref 0–0.2)
BASOPHILS NFR BLD AUTO: 0.5 % — SIGNIFICANT CHANGE UP (ref 0–2)
BILIRUB SERPL-MCNC: 0.5 MG/DL — SIGNIFICANT CHANGE UP (ref 0.2–1.2)
BLOOD GAS ARTERIAL COMPREHENSIVE RESULT: SIGNIFICANT CHANGE UP
BUN SERPL-MCNC: 9 MG/DL — SIGNIFICANT CHANGE UP (ref 7–23)
CALCIUM SERPL-MCNC: 9 MG/DL — SIGNIFICANT CHANGE UP (ref 8.4–10.5)
CHLORIDE SERPL-SCNC: 96 MMOL/L — LOW (ref 98–107)
CO2 SERPL-SCNC: 33 MMOL/L — HIGH (ref 22–31)
CREAT SERPL-MCNC: 0.44 MG/DL — LOW (ref 0.5–1.3)
CRP SERPL-MCNC: 149.6 MG/L — HIGH
D DIMER BLD IA.RAPID-MCNC: 838 NG/ML DDU — HIGH
EOSINOPHIL # BLD AUTO: 0.52 K/UL — HIGH (ref 0–0.5)
EOSINOPHIL NFR BLD AUTO: 6.1 % — HIGH (ref 0–6)
GLUCOSE SERPL-MCNC: 151 MG/DL — HIGH (ref 70–99)
HCT VFR BLD CALC: 27.4 % — LOW (ref 34.5–45)
HGB BLD-MCNC: 8.7 G/DL — LOW (ref 11.5–15.5)
IANC: 5.62 K/UL — SIGNIFICANT CHANGE UP (ref 1.5–8.5)
IMM GRANULOCYTES NFR BLD AUTO: 0.8 % — SIGNIFICANT CHANGE UP (ref 0–1.5)
INR BLD: 1.18 RATIO — HIGH (ref 0.88–1.16)
LYMPHOCYTES # BLD AUTO: 1.33 K/UL — SIGNIFICANT CHANGE UP (ref 1–3.3)
LYMPHOCYTES # BLD AUTO: 15.6 % — SIGNIFICANT CHANGE UP (ref 13–44)
MAGNESIUM SERPL-MCNC: 2.1 MG/DL — SIGNIFICANT CHANGE UP (ref 1.6–2.6)
MCHC RBC-ENTMCNC: 27.8 PG — SIGNIFICANT CHANGE UP (ref 27–34)
MCHC RBC-ENTMCNC: 31.8 GM/DL — LOW (ref 32–36)
MCV RBC AUTO: 87.5 FL — SIGNIFICANT CHANGE UP (ref 80–100)
MONOCYTES # BLD AUTO: 0.97 K/UL — HIGH (ref 0–0.9)
MONOCYTES NFR BLD AUTO: 11.3 % — SIGNIFICANT CHANGE UP (ref 2–14)
NEUTROPHILS # BLD AUTO: 5.62 K/UL — SIGNIFICANT CHANGE UP (ref 1.8–7.4)
NEUTROPHILS NFR BLD AUTO: 65.7 % — SIGNIFICANT CHANGE UP (ref 43–77)
NRBC # BLD: 0 /100 WBCS — SIGNIFICANT CHANGE UP
NRBC # FLD: 0.04 K/UL — HIGH
PLATELET # BLD AUTO: 218 K/UL — SIGNIFICANT CHANGE UP (ref 150–400)
POTASSIUM SERPL-MCNC: 3.7 MMOL/L — SIGNIFICANT CHANGE UP (ref 3.5–5.3)
POTASSIUM SERPL-SCNC: 3.7 MMOL/L — SIGNIFICANT CHANGE UP (ref 3.5–5.3)
PROCALCITONIN SERPL-MCNC: 0.1 NG/ML — SIGNIFICANT CHANGE UP (ref 0.02–0.1)
PROT SERPL-MCNC: 7 G/DL — SIGNIFICANT CHANGE UP (ref 6–8.3)
PROTHROM AB SERPL-ACNC: 13.3 SEC — SIGNIFICANT CHANGE UP (ref 10.6–13.6)
RBC # BLD: 3.13 M/UL — LOW (ref 3.8–5.2)
RBC # FLD: 16.1 % — HIGH (ref 10.3–14.5)
SODIUM SERPL-SCNC: 138 MMOL/L — SIGNIFICANT CHANGE UP (ref 135–145)
WBC # BLD: 8.55 K/UL — SIGNIFICANT CHANGE UP (ref 3.8–10.5)
WBC # FLD AUTO: 8.55 K/UL — SIGNIFICANT CHANGE UP (ref 3.8–10.5)

## 2021-01-26 PROCEDURE — 99291 CRITICAL CARE FIRST HOUR: CPT

## 2021-01-26 RX ORDER — MULTIVIT-MIN/FERROUS GLUCONATE 9 MG/15 ML
15 LIQUID (ML) ORAL DAILY
Refills: 0 | Status: DISCONTINUED | OUTPATIENT
Start: 2021-01-26 | End: 2021-02-09

## 2021-01-26 RX ORDER — FENTANYL CITRATE 50 UG/ML
100 INJECTION INTRAVENOUS ONCE
Refills: 0 | Status: DISCONTINUED | OUTPATIENT
Start: 2021-01-26 | End: 2021-01-26

## 2021-01-26 RX ORDER — FUROSEMIDE 40 MG
20 TABLET ORAL ONCE
Refills: 0 | Status: COMPLETED | OUTPATIENT
Start: 2021-01-26 | End: 2021-01-26

## 2021-01-26 RX ORDER — POTASSIUM CHLORIDE 20 MEQ
10 PACKET (EA) ORAL ONCE
Refills: 0 | Status: COMPLETED | OUTPATIENT
Start: 2021-01-26 | End: 2021-01-26

## 2021-01-26 RX ORDER — DEXMEDETOMIDINE HYDROCHLORIDE IN 0.9% SODIUM CHLORIDE 4 UG/ML
1 INJECTION INTRAVENOUS
Qty: 400 | Refills: 0 | Status: DISCONTINUED | OUTPATIENT
Start: 2021-01-26 | End: 2021-02-02

## 2021-01-26 RX ORDER — ACETAMINOPHEN 500 MG
650 TABLET ORAL EVERY 6 HOURS
Refills: 0 | Status: DISCONTINUED | OUTPATIENT
Start: 2021-01-26 | End: 2021-01-27

## 2021-01-26 RX ADMIN — POLYETHYLENE GLYCOL 3350 17 GRAM(S): 17 POWDER, FOR SOLUTION ORAL at 20:08

## 2021-01-26 RX ADMIN — CHLORHEXIDINE GLUCONATE 15 MILLILITER(S): 213 SOLUTION TOPICAL at 05:09

## 2021-01-26 RX ADMIN — Medication 1: at 11:36

## 2021-01-26 RX ADMIN — CEFTRIAXONE 100 MILLIGRAM(S): 500 INJECTION, POWDER, FOR SOLUTION INTRAMUSCULAR; INTRAVENOUS at 17:42

## 2021-01-26 RX ADMIN — SENNA PLUS 10 MILLILITER(S): 8.6 TABLET ORAL at 11:37

## 2021-01-26 RX ADMIN — CHLORHEXIDINE GLUCONATE 1 APPLICATION(S): 213 SOLUTION TOPICAL at 05:11

## 2021-01-26 RX ADMIN — ENOXAPARIN SODIUM 40 MILLIGRAM(S): 100 INJECTION SUBCUTANEOUS at 17:30

## 2021-01-26 RX ADMIN — PROPOFOL 32.9 MICROGRAM(S)/KG/MIN: 10 INJECTION, EMULSION INTRAVENOUS at 08:38

## 2021-01-26 RX ADMIN — PANTOPRAZOLE SODIUM 40 MILLIGRAM(S): 20 TABLET, DELAYED RELEASE ORAL at 11:37

## 2021-01-26 RX ADMIN — Medication 5.15 MICROGRAM(S)/KG/MIN: at 19:59

## 2021-01-26 RX ADMIN — Medication 15 MILLILITER(S): at 17:25

## 2021-01-26 RX ADMIN — Medication 2: at 23:22

## 2021-01-26 RX ADMIN — Medication 650 MILLIGRAM(S): at 20:08

## 2021-01-26 RX ADMIN — Medication 20 MILLIGRAM(S): at 05:50

## 2021-01-26 RX ADMIN — Medication 1: at 17:27

## 2021-01-26 RX ADMIN — ALBUTEROL 2 PUFF(S): 90 AEROSOL, METERED ORAL at 23:17

## 2021-01-26 RX ADMIN — FENTANYL CITRATE 100 MICROGRAM(S): 50 INJECTION INTRAVENOUS at 06:09

## 2021-01-26 RX ADMIN — ALBUTEROL 2 PUFF(S): 90 AEROSOL, METERED ORAL at 14:43

## 2021-01-26 RX ADMIN — DEXMEDETOMIDINE HYDROCHLORIDE IN 0.9% SODIUM CHLORIDE 27.5 MICROGRAM(S)/KG/HR: 4 INJECTION INTRAVENOUS at 19:59

## 2021-01-26 RX ADMIN — POLYETHYLENE GLYCOL 3350 17 GRAM(S): 17 POWDER, FOR SOLUTION ORAL at 05:11

## 2021-01-26 RX ADMIN — Medication 1: at 05:50

## 2021-01-26 RX ADMIN — Medication 1 APPLICATION(S): at 05:08

## 2021-01-26 RX ADMIN — Medication 50 MILLIEQUIVALENT(S): at 05:12

## 2021-01-26 RX ADMIN — ALBUTEROL 2 PUFF(S): 90 AEROSOL, METERED ORAL at 07:19

## 2021-01-26 RX ADMIN — CHLORHEXIDINE GLUCONATE 15 MILLILITER(S): 213 SOLUTION TOPICAL at 17:26

## 2021-01-26 RX ADMIN — ENOXAPARIN SODIUM 40 MILLIGRAM(S): 100 INJECTION SUBCUTANEOUS at 05:09

## 2021-01-26 RX ADMIN — Medication 1 APPLICATION(S): at 17:26

## 2021-01-26 RX ADMIN — ALBUTEROL 2 PUFF(S): 90 AEROSOL, METERED ORAL at 04:31

## 2021-01-26 RX ADMIN — DEXMEDETOMIDINE HYDROCHLORIDE IN 0.9% SODIUM CHLORIDE 27.5 MICROGRAM(S)/KG/HR: 4 INJECTION INTRAVENOUS at 11:35

## 2021-01-26 RX ADMIN — Medication 5.15 MICROGRAM(S)/KG/MIN: at 08:39

## 2021-01-26 RX ADMIN — POLYETHYLENE GLYCOL 3350 17 GRAM(S): 17 POWDER, FOR SOLUTION ORAL at 13:47

## 2021-01-26 NOTE — PROGRESS NOTE ADULT - ATTENDING COMMENTS
Dr. Patricio (Attending Physician)  N - Off fent/versed, on propofol, bucking the ventilator this am  P - Acute resp failure re-intubated second time, weaning vent settings FiO2 to 40%  C - Low dose NE gtt with propofol  GI - TFs at goal, No BM x days, miralax tid, methylnaltrexone yesterday   - low UO overnight, given lasix x1 overnight  H - lovenox 40 bid, d-dimer slightly increased  ID - febrile yesterday, started on ceftriaxone for UTI yesterday, ucx sent will send pct today  E - ssi, glucose well controlled

## 2021-01-26 NOTE — PROGRESS NOTE ADULT - ASSESSMENT
46-year-old woman, no PMH who was admitted to the hospital for management ARF with hypoxia secondary to covid-19 pneumonia, is status post intubation from 1/5 to 1/9 for management of acute respiratory distress syndrome, and who was now re-intubated (since 1/16) and in the MICU for management of severe ARDS secondary to covid-19 pneumonia. Transfer to [facility] ICU on 1/21.     Neuro:  -sedated with propofol, will add precedex and wean off of propofol. Plan for possible SBT this afternoon.  -wean slowly as tolerated     CV:  -on levophed for pressure support  -No known history of coronary artery disease or of arrhythmia; sinus rhythm on telemetry     Respiratory: COVID ARDS  intubation 01/05, extubation 01/09, reintubation 01/16, prone 1/21-24  -Current ventilator settings: AC/VC, RR 30, , PEEP 8, FiO2 40;  in the setting of elevated driving pressures, will decrease peep as tolerated; hope to allow for lung-protective ventilation and subsequently to wean current ventilator settings     Renal:  -Serum creatinine at baseline  -continue to monitor urine output -2.2L over past 24 hrs, s/p lasix 40 mg IV overnight  -no electrolyte abnormalities at present  -Will continue to monitor daily metabolic panel and correct as needed     ID:  -S/p 10 days of dexamethasone 1/5-14 and 5 days of remdesivir 1/5-9 for treatment of severe covid-19   -In the setting of need for re-intubation, s/p zosyn administration for five days 1/16-20 for empiric coverage of superimposed bacterial pneumonia;   -No growth to date from cultures- last- blood 1/16  -febrile overnight, pan cultured, Rocephin added for +UA, Ucx pending, Sputum Cx with +Gram stain, awaiting ID and sensitivities.     Endo:  -No active concerns at this time   -A1c 6.6, meeting criteria for diagnosis of diabetes mellitus  -Cont ISS, monitor finger sticks    GI:  -Jevity tube feeds at goal   -No BM since 1/10  -Increase miralax to q8h  -methylnaltrexone for opiod constipation, redose tomorrow if no BM    Heme:  -DVT ppx: lovenox 40 q12h  -Negative bilateral lower extremity dopplers despite rising d-dimer

## 2021-01-26 NOTE — CHART NOTE - NSCHARTNOTEFT_GEN_A_CORE
Nutrition Follow-Up Note   Reason for follow-up: Critical care length of stay follow- up. Medical record reviewed.     Information obtained from extensive chart review. Unable to conduct face-to-face interview due to current contact/isolation precautions in setting of COVID-19.    Clinical Course:  Patient with history of obesity Admitted 1/3/21, and originally intubated 1/5 w/ COVID PNA.  Extubated 1/10, however required reintubation on 1/16. Transferred from MICU to [facility] 1/21. Remains intubated. Sedated w/ propofol, versed, and fentanyl gtts.     Propofol 16.5mL/hr  =  provides 435 additional Kcal if sedation continues at this rate x24 hours.    Diet Order: Diet, NPO with Tube Feed:   Tube Feeding Modality: Nasogastric  Jevity 1.2 Willian (JEVITY1.2RTH)  Total Volume for 24 Hours (mL): 840  Continuous  Until Goal Tube Feed Rate (mL per Hour): 35  Tube Feed Duration (in Hours): 24  Tube Feed Start Time: 12:00  No Carb Prosource (1pkg = 15gms Protein)     Qty per Day:  5 (01-21-21 @ 15:29)    CURRENT EN ORDER PROVIDES:  Total Volume: 840mL/day  Energy: 1008Kcal/day  Protein: 121g protein/day  Free Water: 677mL/day    Nutrition Related Information: - Per I/O feeds running at current goal rate of 35mL/hr.  - No tube feeding intolerances reported.   - Glucose levels WDL.     GI: - Patient's last BM 1/10 - ordered for bowel regimen: Senna and Miralax. Consider more aggressive bowel regimen prn if constipation continues.  - No vomiting noted.      Anthropometric Measurements:   Height (cm): 165.1 (01-03-21 @ 22:30)  Weight (kg): 101 (1/26), 90.4 (1/23), 97 (1/20), 109.8 (1/03)  **noted weight fluctuations, possibly fluid-related. Will monitor.  BMI (kg/m2): 40.3 (01-03-21 @ 22:30)  IBW: 56.8kg +/-10%  Appearance: Unable to conduct nutrition-focused physical exam at this time due to limited contact in setting of isolation precautions related to COVID-19.    Medications: MEDICATIONS  (STANDING):  ALBUTerol    90 MICROgram(s) HFA Inhaler 2 Puff(s) Inhalation every 6 hours  cefTRIAXone   IVPB 1000 milliGRAM(s) IV Intermittent every 24 hours  enoxaparin Injectable 40 milliGRAM(s) SubCutaneous every 12 hours  insulin lispro (ADMELOG) corrective regimen sliding scale   SubCutaneous every 6 hours  norepinephrine Infusion 0.05 MICROgram(s)/kG/Min (5.15 mL/Hr) IV Continuous <Continuous>  pantoprazole  Injectable 40 milliGRAM(s) IV Push daily  polyethylene glycol 3350 17 Gram(s) Oral <User Schedule>  propofol Infusion 50 MICROgram(s)/kG/Min (32.9 mL/Hr) IV Continuous <Continuous>  senna Syrup 10 milliLiter(s) Oral daily    Labs: 01-26 @ 02:40: Sodium 138, Potassium 3.7, Calcium 9.0, Magnesium 2.1, Phosphorus --, BUN 9, Creatinine 0.44<L>, Glucose 151<H>, Alk Phos 153<H>, ALT/SGPT 61<H>, AST/SGOT 49<H>, Albumin 2.9<L>, Prealbumin --, Total Bilirubin 0.5, Hemoglobin 8.7<L>, Hematocrit 27.4<L>, Ferritin --, C-Reactive Protein 149.6<H>, Creatine Kinase <<27>    Triglycerides, Serum: 334 mg/dL (01-23-21 @ 04:56)    POCT Blood Glucose.: 153 mg/dL (01-26-21 @ 05:27)  POCT Blood Glucose.: 163 mg/dL (01-25-21 @ 23:36)  POCT Blood Glucose.: 161 mg/dL (01-25-21 @ 17:25)  POCT Blood Glucose.: 161 mg/dL (01-25-21 @ 11:16)    Skin: bridge of nose wound  Edema: 1+ generalized    Estimated Nutrition Needs: calculated using IBW 56.8kg given weight discrepancies in-house   Estimated Energy Needs (25-30Kcal/kg): 1420-1704Kcal/kg  Estimated Protein Needs (2g/kg): 113.6g protein/day    Previous Nutrition Diagnosis:  Increased nutrient needs  Diagnosis is ongoing     Nutrition Interventions/Recommendations:   1. Recommend Peptamen 1.5 @ 33mL/hr x24 hours + No Carb Prosource 4x daily [provides 792mL total volume, 1188Kcal, 113g protein, 609mL free water]. With propofol (~435), pt will receive 1623Kcal,  113g protein (28.5Kcal/kg IBW, 2g/kgIBW).    2. If Prone, please continue to feed with HOB elevated 10-15 degrees. Consider promotility agent if necessary. **Several retrospective and small prospective trials have shown EN during prone positioning is ot associated with increased risk of gastrointestinal or pulmonary complications, thus we recommend the patient requiring prone positioning receive early EN.    3. Additional free water per MD discretion.   4. Consider multivitamin daily for micronutrient coverage.   5. Bowel regimen prn.   6. Trend glucose, triglycerides when on propofol and electrolytes.   6. Obtain weekly weight.     Monitoring and Evaluation:   Monitor nutrition provision, tolerance to diet, weights, labs, skin integrity and GI function.   RD remains available, please consult as needed. Will follow up per protocol.  Delisa Rios, MS, RD, CDN, [facility] Pager #[phone]

## 2021-01-26 NOTE — CHART NOTE - NSCHARTNOTEFT_GEN_A_CORE
Call to family to update them on patient status.   Spoke with daughter Alyssia, explained again that patient relatively stable with same vent settings but still requiring support from ventilator.  Again recommend tracheostomy.  Family in agreement with placement of tube, as this is the next best step for her and her recovery. They want to speak amongst themselves tonight to confirm agreement.   Plan for day team to reach out to them tomorrow morning and confirm decision for trach placement.  MD Yareli

## 2021-01-26 NOTE — PROGRESS NOTE ADULT - SUBJECTIVE AND OBJECTIVE BOX
ICU Daily Progress Note  =====================================================  HPI:  46-year-old woman who was admitted to the hospital for management acute respiratory failure with hypoxia secondary to covid-19 pneumonia, is status post intubation from  to  for management of acute respiratory distress syndrome, and who is now re-intubated (since ) and in the MICU for management of severe ARDS secondary to covid-19 pneumonia, transferred to [facility] ICU on .     Interval/Overnight Events:      -Patient proned  early morning, supinated late night   - Nimbex weaned off  - Received lasix 40mg x 1 yesterday with good response  - No acute events overnight    HISTORY  46y Female  Allergies: No Known Allergies    PAST MEDICAL & SURGICAL HISTORY:  No pertinent past medical history    No significant past surgical history    COVID-19    Discharge planning issues    Prophylactic measure    COVID-19    Acute respiratory failure with hypoxia        MEDICATIONS:   --------------------------------------------------------------------------------------  Neurologic Medications  fentaNYL   Infusion..... 0.5 MICROgram(s)/kG/Hr IV Continuous <Continuous>  midazolam Infusion 0.02 mG/kG/Hr IV Continuous <Continuous>  propofol Infusion 50 MICROgram(s)/kG/Min IV Continuous <Continuous>    Respiratory Medications  ALBUTerol    90 MICROgram(s) HFA Inhaler 2 Puff(s) Inhalation every 6 hours    Cardiovascular Medications  norepinephrine Infusion 0.05 MICROgram(s)/kG/Min IV Continuous <Continuous>    Gastrointestinal Medications  dextrose 5%. 1000 milliLiter(s) IV Continuous <Continuous>  dextrose 5%. 1000 milliLiter(s) IV Continuous <Continuous>  methylnaltrexone Injectable 12 milliGRAM(s) SubCutaneous once  pantoprazole  Injectable 40 milliGRAM(s) IV Push daily  polyethylene glycol 3350 17 Gram(s) Oral <User Schedule>  senna Syrup 10 milliLiter(s) Oral daily    Genitourinary Medications    Hematologic/Oncologic Medications  enoxaparin Injectable 40 milliGRAM(s) SubCutaneous every 12 hours    Antimicrobial/Immunologic Medications    Endocrine/Metabolic Medications  glucagon  Injectable 1 milliGRAM(s) IntraMuscular once  insulin lispro (ADMELOG) corrective regimen sliding scale   SubCutaneous every 6 hours    Topical/Other Medications  chlorhexidine 0.12% Liquid 15 milliLiter(s) Oral Mucosa every 12 hours  chlorhexidine 2% Cloths 1 Application(s) Topical <User Schedule>  petrolatum Ophthalmic Ointment 1 Application(s) Both EYES two times a day    --------------------------------------------------------------------------------------    ICU Vital Signs Last 24 Hrs  T(C): 38 (2021 08:00), Max: 38.3 (2021 12:00)  T(F): 100.4 (2021 08:00), Max: 100.9 (2021 12:00)  HR: 90 (2021 08:00) (82 - 97)  BP: 111/72 (2021 08:00) (111/72 - 111/72)  BP(mean): 83 (2021 08:00) (83 - 83)  ABP: 115/63 (2021 08:00) (97/52 - 131/70)  ABP(mean): 85 (2021 08:00) (70 - 94)  RR: 30 (2021 08:00) (30 - 30)  SpO2: 97% (2021 08:00) (93% - 100%)    LABS:                        8.7    8.55  )-----------( 218      ( 2021 02:40 )             27.4         138  |  96<L>  |  9   ----------------------------<  151<H>  3.7   |  33<H>  |  0.44<L>    Ca    9.0      2021 02:40  Phos  4.3       Mg     2.1         TPro  7.0  /  Alb  2.9<L>  /  TBili  0.5  /  DBili  x   /  AST  49<H>  /  ALT  61<H>  /  AlkPhos  153<H>      PT/INR - ( 2021 02:40 )   PT: 13.3 sec;   INR: 1.18 ratio         PTT - ( 2021 02:40 )  PTT:32.6 sec  Urinalysis Basic - ( 2021 13:49 )    Color: Orange / Appearance: Turbid / S.032 / pH: x  Gluc: x / Ketone: Negative  / Bili: Negative / Urobili: 6 mg/dL   Blood: x / Protein: 300 mg/dL / Nitrite: Negative   Leuk Esterase: Large / RBC: 2-5 /HPF / WBC >50 /HPF   Sq Epi: x / Non Sq Epi: few /HPF / Bacteria: Moderate      CAPILLARY BLOOD GLUCOSE      POCT Blood Glucose.: 153 mg/dL (2021 05:27)      Culture - Sputum (collected 21 @ 20:52)  Source: .Sputum Sputum  Gram Stain (21 @ 23:18):    Numerous polymorphonuclear leukocytes per low power field    No Squamous epithelial cells per low power field    Rare Gram positive cocci in pairs per oil power field        RADIOLOGY & ADDITIONAL TESTS: Reviewed.    I&O's Detail    2021 07:  -  2021 07:00  --------------------------------------------------------  IN:    FentaNYL: 35.2 mL    IV PiggyBack: 150 mL    Jevity: 735 mL    Midazolam: 22 mL    Norepinephrine: 165 mL    Propofol: 363 mL  Total IN: 1470.2 mL    OUT:    Indwelling Catheter - Urethral (mL): 1255 mL  Total OUT: 1255 mL    Total NET: 215.2 mL      2021 07:01  -  2021 11:06  --------------------------------------------------------  IN:    Jevity: 140 mL    Norepinephrine: 32.8 mL    Propofol: 66 mL  Total IN: 238.8 mL    OUT:    FentaNYL: 0 mL    Indwelling Catheter - Urethral (mL): 865 mL    Midazolam: 0 mL  Total OUT: 865 mL    Total NET: -626.2 mL          EXAM:  NEUROLOGY  Exam: Sedated RASS: -2  [x] Adequacy of sedation and pain control has been assessed and adjusted    RESPIRATORY  Exam: Lungs clear to auscultation, Normal expansion/effort.   [] Tracheostomy   [x] Intubated  Mechanical Ventilation: Mode: AC/ CMV (Assist Control/ Continuous Mandatory Ventilation), RR (machine): 30, TV (machine): 300, FiO2: 50, PEEP: 8, ITime: 0.68, MAP: 14, PIP: 28  [x] Extubation Readiness Assessed    CARDIOVASCULAR  Exam: S1, S2.  Regular rate and rhythm.  Peripheral edema   Cardiac Rhythm: Normal Sinus Rhythm      GI/NUTRITION  Exam: Abdomen soft, Non-tender, Non-distended.  Nasogastric tube in place.    Current Diet: Tube feeds Jevity @ 35cc/hr with prosource x5 daily    METABOLIC/FLUIDS/ELECTROLYTES  dextrose 5%. 1000 milliLiter(s) IV Continuous <Continuous>  dextrose 5%. 1000 milliLiter(s) IV Continuous <Continuous>      HEMATOLOGIC  [x] DVT Prophylaxis: enoxaparin Injectable 40 milliGRAM(s) SubCutaneous every 12 hours    Transfusions:	[] PRBC	[] Platelets		[] FFP	[] Cryoprecipitate    INFECTIOUS DISEASE  Antimicrobials/Immunologic Medications:      Tubes/Lines/Drains   [x] Peripheral IV  [x] Central Venous Line     	[] R	[x] L	[x] IJ	[] Fem	[] SC	Date Placed:   [x] Arterial Line		[x] R	[] L	[] Fem	[] Rad	[x] Ax	Date Placed:   [] PICC		[] Midline		[] Mediport  [] Urinary Catheter		Date Placed:   [x] Necessity of urinary, arterial, and venous catheters discussed    VASCULAR  Exam: Extremities warm, pink, well-perfused.    MUSCULOSKELETAL  Exam: All extremities moving spontaneously without limitations.    SKIN  Exam: Good skin turgor, no skin breakdown.         -> .Blood Blood-Peripheral Culture ( @ 06:00)     NG    NG    No Growth Final  --------------------------------------------------------------------------------------    OTHER LABORATORY:     IMAGING STUDIES:   CXR:

## 2021-01-27 DIAGNOSIS — D64.9 ANEMIA, UNSPECIFIED: ICD-10-CM

## 2021-01-27 DIAGNOSIS — R94.5 ABNORMAL RESULTS OF LIVER FUNCTION STUDIES: ICD-10-CM

## 2021-01-27 DIAGNOSIS — E66.01 MORBID (SEVERE) OBESITY DUE TO EXCESS CALORIES: ICD-10-CM

## 2021-01-27 DIAGNOSIS — K59.00 CONSTIPATION, UNSPECIFIED: ICD-10-CM

## 2021-01-27 LAB
-  AMIKACIN: SIGNIFICANT CHANGE UP
-  AMOXICILLIN/CLAVULANIC ACID: SIGNIFICANT CHANGE UP
-  AMPICILLIN/SULBACTAM: SIGNIFICANT CHANGE UP
-  AMPICILLIN: SIGNIFICANT CHANGE UP
-  AZTREONAM: SIGNIFICANT CHANGE UP
-  CEFAZOLIN: SIGNIFICANT CHANGE UP
-  CEFEPIME: SIGNIFICANT CHANGE UP
-  CEFOXITIN: SIGNIFICANT CHANGE UP
-  CEFTRIAXONE: SIGNIFICANT CHANGE UP
-  CIPROFLOXACIN: SIGNIFICANT CHANGE UP
-  ERTAPENEM: SIGNIFICANT CHANGE UP
-  GENTAMICIN: SIGNIFICANT CHANGE UP
-  IMIPENEM: SIGNIFICANT CHANGE UP
-  LEVOFLOXACIN: SIGNIFICANT CHANGE UP
-  MEROPENEM: SIGNIFICANT CHANGE UP
-  NITROFURANTOIN: SIGNIFICANT CHANGE UP
-  PIPERACILLIN/TAZOBACTAM: SIGNIFICANT CHANGE UP
-  TIGECYCLINE: SIGNIFICANT CHANGE UP
-  TOBRAMYCIN: SIGNIFICANT CHANGE UP
-  TRIMETHOPRIM/SULFAMETHOXAZOLE: SIGNIFICANT CHANGE UP
ALBUMIN SERPL ELPH-MCNC: 3 G/DL — LOW (ref 3.3–5)
ALP SERPL-CCNC: 156 U/L — HIGH (ref 40–120)
ALT FLD-CCNC: 53 U/L — HIGH (ref 4–33)
ANION GAP SERPL CALC-SCNC: 8 MMOL/L — SIGNIFICANT CHANGE UP (ref 7–14)
APTT BLD: 31.5 SEC — SIGNIFICANT CHANGE UP (ref 27–36.3)
AST SERPL-CCNC: 41 U/L — HIGH (ref 4–32)
BASE EXCESS BLDA CALC-SCNC: 9.9 MMOL/L — HIGH (ref -2–2)
BASOPHILS # BLD AUTO: 0.04 K/UL — SIGNIFICANT CHANGE UP (ref 0–0.2)
BASOPHILS NFR BLD AUTO: 0.4 % — SIGNIFICANT CHANGE UP (ref 0–2)
BILIRUB SERPL-MCNC: 0.3 MG/DL — SIGNIFICANT CHANGE UP (ref 0.2–1.2)
BUN SERPL-MCNC: 10 MG/DL — SIGNIFICANT CHANGE UP (ref 7–23)
CALCIUM SERPL-MCNC: 9.1 MG/DL — SIGNIFICANT CHANGE UP (ref 8.4–10.5)
CHLORIDE SERPL-SCNC: 98 MMOL/L — SIGNIFICANT CHANGE UP (ref 98–107)
CO2 SERPL-SCNC: 34 MMOL/L — HIGH (ref 22–31)
CREAT SERPL-MCNC: 0.42 MG/DL — LOW (ref 0.5–1.3)
CULTURE RESULTS: SIGNIFICANT CHANGE UP
CULTURE RESULTS: SIGNIFICANT CHANGE UP
EOSINOPHIL # BLD AUTO: 0.52 K/UL — HIGH (ref 0–0.5)
EOSINOPHIL NFR BLD AUTO: 5.2 % — SIGNIFICANT CHANGE UP (ref 0–6)
GLUCOSE SERPL-MCNC: 188 MG/DL — HIGH (ref 70–99)
HCO3 BLDA-SCNC: 33 MMOL/L — HIGH (ref 22–26)
HCT VFR BLD CALC: 30.8 % — LOW (ref 34.5–45)
HGB BLD-MCNC: 9.4 G/DL — LOW (ref 11.5–15.5)
IANC: 6.99 K/UL — SIGNIFICANT CHANGE UP (ref 1.5–8.5)
IMM GRANULOCYTES NFR BLD AUTO: 0.7 % — SIGNIFICANT CHANGE UP (ref 0–1.5)
LYMPHOCYTES # BLD AUTO: 1.36 K/UL — SIGNIFICANT CHANGE UP (ref 1–3.3)
LYMPHOCYTES # BLD AUTO: 13.7 % — SIGNIFICANT CHANGE UP (ref 13–44)
MAGNESIUM SERPL-MCNC: 2.3 MG/DL — SIGNIFICANT CHANGE UP (ref 1.6–2.6)
MCHC RBC-ENTMCNC: 27.3 PG — SIGNIFICANT CHANGE UP (ref 27–34)
MCHC RBC-ENTMCNC: 30.5 GM/DL — LOW (ref 32–36)
MCV RBC AUTO: 89.5 FL — SIGNIFICANT CHANGE UP (ref 80–100)
METHOD TYPE: SIGNIFICANT CHANGE UP
MONOCYTES # BLD AUTO: 0.95 K/UL — HIGH (ref 0–0.9)
MONOCYTES NFR BLD AUTO: 9.6 % — SIGNIFICANT CHANGE UP (ref 2–14)
NEUTROPHILS # BLD AUTO: 6.99 K/UL — SIGNIFICANT CHANGE UP (ref 1.8–7.4)
NEUTROPHILS NFR BLD AUTO: 70.4 % — SIGNIFICANT CHANGE UP (ref 43–77)
NRBC # BLD: 0 /100 WBCS — SIGNIFICANT CHANGE UP
NRBC # FLD: 0.02 K/UL — HIGH
ORGANISM # SPEC MICROSCOPIC CNT: SIGNIFICANT CHANGE UP
ORGANISM # SPEC MICROSCOPIC CNT: SIGNIFICANT CHANGE UP
PCO2 BLDA: 54 MMHG — HIGH (ref 32–48)
PH BLDA: 7.42 — SIGNIFICANT CHANGE UP (ref 7.35–7.45)
PHOSPHATE SERPL-MCNC: 3.5 MG/DL — SIGNIFICANT CHANGE UP (ref 2.5–4.5)
PLATELET # BLD AUTO: 247 K/UL — SIGNIFICANT CHANGE UP (ref 150–400)
PO2 BLDA: 65 MMHG — LOW (ref 83–108)
POTASSIUM SERPL-MCNC: 3.7 MMOL/L — SIGNIFICANT CHANGE UP (ref 3.5–5.3)
POTASSIUM SERPL-SCNC: 3.7 MMOL/L — SIGNIFICANT CHANGE UP (ref 3.5–5.3)
PROT SERPL-MCNC: 7.3 G/DL — SIGNIFICANT CHANGE UP (ref 6–8.3)
RBC # BLD: 3.44 M/UL — LOW (ref 3.8–5.2)
RBC # FLD: 16.2 % — HIGH (ref 10.3–14.5)
SAO2 % BLDA: 91.7 % — LOW (ref 95–99)
SODIUM SERPL-SCNC: 140 MMOL/L — SIGNIFICANT CHANGE UP (ref 135–145)
SPECIMEN SOURCE: SIGNIFICANT CHANGE UP
SPECIMEN SOURCE: SIGNIFICANT CHANGE UP
WBC # BLD: 9.93 K/UL — SIGNIFICANT CHANGE UP (ref 3.8–10.5)
WBC # FLD AUTO: 9.93 K/UL — SIGNIFICANT CHANGE UP (ref 3.8–10.5)

## 2021-01-27 PROCEDURE — 99291 CRITICAL CARE FIRST HOUR: CPT

## 2021-01-27 PROCEDURE — 71045 X-RAY EXAM CHEST 1 VIEW: CPT | Mod: 26

## 2021-01-27 RX ORDER — ACETAMINOPHEN 500 MG
1000 TABLET ORAL ONCE
Refills: 0 | Status: COMPLETED | OUTPATIENT
Start: 2021-01-27 | End: 2021-01-27

## 2021-01-27 RX ORDER — METHYLNALTREXONE BROMIDE 12 MG/.6ML
12 INJECTION, SOLUTION SUBCUTANEOUS ONCE
Refills: 0 | Status: DISCONTINUED | OUTPATIENT
Start: 2021-01-27 | End: 2021-01-29

## 2021-01-27 RX ORDER — CHLORHEXIDINE GLUCONATE 213 G/1000ML
15 SOLUTION TOPICAL EVERY 12 HOURS
Refills: 0 | Status: DISCONTINUED | OUTPATIENT
Start: 2021-01-27 | End: 2021-01-28

## 2021-01-27 RX ORDER — QUETIAPINE FUMARATE 200 MG/1
100 TABLET, FILM COATED ORAL AT BEDTIME
Refills: 0 | Status: DISCONTINUED | OUTPATIENT
Start: 2021-01-27 | End: 2021-02-03

## 2021-01-27 RX ORDER — DIAZEPAM 5 MG
5 TABLET ORAL EVERY 4 HOURS
Refills: 0 | Status: DISCONTINUED | OUTPATIENT
Start: 2021-01-27 | End: 2021-01-29

## 2021-01-27 RX ORDER — FUROSEMIDE 40 MG
20 TABLET ORAL
Refills: 0 | Status: DISCONTINUED | OUTPATIENT
Start: 2021-01-27 | End: 2021-01-28

## 2021-01-27 RX ORDER — FUROSEMIDE 40 MG
20 TABLET ORAL ONCE
Refills: 0 | Status: DISCONTINUED | OUTPATIENT
Start: 2021-01-27 | End: 2021-01-27

## 2021-01-27 RX ORDER — HYDROMORPHONE HYDROCHLORIDE 2 MG/ML
1 INJECTION INTRAMUSCULAR; INTRAVENOUS; SUBCUTANEOUS
Refills: 0 | Status: DISCONTINUED | OUTPATIENT
Start: 2021-01-27 | End: 2021-02-03

## 2021-01-27 RX ORDER — LACTULOSE 10 G/15ML
20 SOLUTION ORAL THREE TIMES A DAY
Refills: 0 | Status: COMPLETED | OUTPATIENT
Start: 2021-01-27 | End: 2021-01-28

## 2021-01-27 RX ORDER — ASPIRIN/CALCIUM CARB/MAGNESIUM 324 MG
81 TABLET ORAL DAILY
Refills: 0 | Status: DISCONTINUED | OUTPATIENT
Start: 2021-01-27 | End: 2021-01-27

## 2021-01-27 RX ORDER — PROPOFOL 10 MG/ML
10 INJECTION, EMULSION INTRAVENOUS
Qty: 1000 | Refills: 0 | Status: DISCONTINUED | OUTPATIENT
Start: 2021-01-27 | End: 2021-01-30

## 2021-01-27 RX ADMIN — CHLORHEXIDINE GLUCONATE 15 MILLILITER(S): 213 SOLUTION TOPICAL at 18:17

## 2021-01-27 RX ADMIN — POLYETHYLENE GLYCOL 3350 17 GRAM(S): 17 POWDER, FOR SOLUTION ORAL at 12:17

## 2021-01-27 RX ADMIN — DEXMEDETOMIDINE HYDROCHLORIDE IN 0.9% SODIUM CHLORIDE 27.5 MICROGRAM(S)/KG/HR: 4 INJECTION INTRAVENOUS at 19:19

## 2021-01-27 RX ADMIN — DEXMEDETOMIDINE HYDROCHLORIDE IN 0.9% SODIUM CHLORIDE 27.5 MICROGRAM(S)/KG/HR: 4 INJECTION INTRAVENOUS at 23:44

## 2021-01-27 RX ADMIN — CHLORHEXIDINE GLUCONATE 15 MILLILITER(S): 213 SOLUTION TOPICAL at 05:16

## 2021-01-27 RX ADMIN — Medication 1 APPLICATION(S): at 18:15

## 2021-01-27 RX ADMIN — CEFTRIAXONE 100 MILLIGRAM(S): 500 INJECTION, POWDER, FOR SOLUTION INTRAMUSCULAR; INTRAVENOUS at 19:16

## 2021-01-27 RX ADMIN — ALBUTEROL 2 PUFF(S): 90 AEROSOL, METERED ORAL at 15:33

## 2021-01-27 RX ADMIN — POLYETHYLENE GLYCOL 3350 17 GRAM(S): 17 POWDER, FOR SOLUTION ORAL at 05:16

## 2021-01-27 RX ADMIN — Medication 5 MILLIGRAM(S): at 14:43

## 2021-01-27 RX ADMIN — Medication 5 MILLIGRAM(S): at 23:44

## 2021-01-27 RX ADMIN — Medication 15 MILLILITER(S): at 14:44

## 2021-01-27 RX ADMIN — ENOXAPARIN SODIUM 40 MILLIGRAM(S): 100 INJECTION SUBCUTANEOUS at 05:16

## 2021-01-27 RX ADMIN — ENOXAPARIN SODIUM 40 MILLIGRAM(S): 100 INJECTION SUBCUTANEOUS at 18:14

## 2021-01-27 RX ADMIN — DEXMEDETOMIDINE HYDROCHLORIDE IN 0.9% SODIUM CHLORIDE 27.5 MICROGRAM(S)/KG/HR: 4 INJECTION INTRAVENOUS at 09:10

## 2021-01-27 RX ADMIN — PANTOPRAZOLE SODIUM 40 MILLIGRAM(S): 20 TABLET, DELAYED RELEASE ORAL at 12:16

## 2021-01-27 RX ADMIN — Medication 2: at 05:17

## 2021-01-27 RX ADMIN — ALBUTEROL 2 PUFF(S): 90 AEROSOL, METERED ORAL at 23:20

## 2021-01-27 RX ADMIN — Medication 1 APPLICATION(S): at 05:16

## 2021-01-27 RX ADMIN — DEXMEDETOMIDINE HYDROCHLORIDE IN 0.9% SODIUM CHLORIDE 27.5 MICROGRAM(S)/KG/HR: 4 INJECTION INTRAVENOUS at 12:16

## 2021-01-27 RX ADMIN — Medication 81 MILLIGRAM(S): at 12:17

## 2021-01-27 RX ADMIN — ALBUTEROL 2 PUFF(S): 90 AEROSOL, METERED ORAL at 07:26

## 2021-01-27 RX ADMIN — ALBUTEROL 2 PUFF(S): 90 AEROSOL, METERED ORAL at 04:07

## 2021-01-27 RX ADMIN — Medication 2: at 12:17

## 2021-01-27 RX ADMIN — SENNA PLUS 10 MILLILITER(S): 8.6 TABLET ORAL at 12:39

## 2021-01-27 RX ADMIN — LACTULOSE 20 GRAM(S): 10 SOLUTION ORAL at 14:44

## 2021-01-27 RX ADMIN — HYDROMORPHONE HYDROCHLORIDE 1 MILLIGRAM(S): 2 INJECTION INTRAMUSCULAR; INTRAVENOUS; SUBCUTANEOUS at 12:18

## 2021-01-27 RX ADMIN — Medication 20 MILLIGRAM(S): at 18:14

## 2021-01-27 RX ADMIN — Medication 2: at 18:16

## 2021-01-27 RX ADMIN — Medication 5 MILLIGRAM(S): at 19:41

## 2021-01-27 RX ADMIN — DEXMEDETOMIDINE HYDROCHLORIDE IN 0.9% SODIUM CHLORIDE 27.5 MICROGRAM(S)/KG/HR: 4 INJECTION INTRAVENOUS at 15:20

## 2021-01-27 RX ADMIN — CHLORHEXIDINE GLUCONATE 1 APPLICATION(S): 213 SOLUTION TOPICAL at 05:17

## 2021-01-27 RX ADMIN — Medication 400 MILLIGRAM(S): at 04:39

## 2021-01-27 NOTE — PROGRESS NOTE ADULT - ATTENDING COMMENTS
Dr. Patricio (Attending Physician)  N - weaned off sedatives yesterday  P - PS trial of 20/8 today will plan for trach today  C - no vasopressors  GI - peptamen 1.5 at goal, ppi, senna miralax, will give methylnaltrexone and lactulose   - even volume status yesterday, diurese today for goal negative 1 liter  H - lovenox 40 bid for dvt ppx with elevated bmi, aspirin  ID - ceftriaxone for uti, urine cx still pending, blood and sputum negative  E - ssi

## 2021-01-27 NOTE — CONSULT NOTE ADULT - PROBLEM SELECTOR RECOMMENDATION 4
multifactorial, likely NAFLD, COVID, critical illness. Can consider US to r/o gallstone disease, especially if increasing

## 2021-01-27 NOTE — CONSULT NOTE ADULT - ATTENDING COMMENTS
Differential diagnosis and plan of care discussed with patient after the evaluation  75 Minutes spent on total encounter of which more than fifty percent of the encounter was spent counseling and/or coordinating care by the attending physician.  Advanced care planning was discussed with the patient and/or surrogate decision makers. Advanced care planning forms were discussed. The risks benefits and alternatives to pertinent gastrointestinal procedures and interventions were discussed in detail and all questions were answered. Duration: 30 Minutes.      Mele Bolanos M.D.   Gastroenterology and Hepatology  Cell: [phone]
acute hypoxemic respiratory failure as above; patient very tachypneic on NIPPV and when switched to HFNC (as per her request).  I believe she should be intubated at this time, but patient is adamant that she not be yet, understanding the risks/benefits.  Continue with current therapy; avoid sedatives.  Check cxr.

## 2021-01-27 NOTE — PROGRESS NOTE ADULT - ASSESSMENT
46-year-old woman, no PMH who was admitted to the hospital for management ARF with hypoxia secondary to covid-19 pneumonia, is status post intubation from 1/5 to 1/9 for management of acute respiratory distress syndrome, and who was now re-intubated (since 1/16) and in the MICU for management of severe ARDS secondary to covid-19 pneumonia. Transfer to [facility] ICU on 1/21.     Neuro:  -sedated with propofol, will add precedex and wean off of propofol. Plan for possible SBT this afternoon.  -wean slowly as tolerated     CV:  -on levophed for pressure support  -No known history of coronary artery disease or of arrhythmia; sinus rhythm on telemetry     Respiratory: COVID ARDS  intubation 01/05, extubation 01/09, reintubation 01/16, prone 1/21-24  -Current ventilator settings: AC/VC, RR 30, , PEEP 8, FiO2 40;  in the setting of elevated driving pressures, will decrease peep as tolerated; hope to allow for lung-protective ventilation and subsequently to wean current ventilator settings     Renal:  -Serum creatinine at baseline  -continue to monitor urine output -2.2L over past 24 hrs, s/p lasix 40 mg IV overnight  -no electrolyte abnormalities at present  -Will continue to monitor daily metabolic panel and correct as needed     ID:  -S/p 10 days of dexamethasone 1/5-14 and 5 days of remdesivir 1/5-9 for treatment of severe covid-19   -In the setting of need for re-intubation, s/p zosyn administration for five days 1/16-20 for empiric coverage of superimposed bacterial pneumonia;   -No growth to date from cultures- last- blood 1/16  -febrile overnight, pan cultured, Rocephin added for +UA, Ucx pending, Sputum Cx with +Gram stain, awaiting ID and sensitivities.     Endo:  -No active concerns at this time   -A1c 6.6, meeting criteria for diagnosis of diabetes mellitus  -Cont ISS, monitor finger sticks    GI:  -Jevity tube feeds at goal   -No BM since 1/10  -Increase miralax to q8h  -methylnaltrexone for opiod constipation, redose tomorrow if no BM    Heme:  -DVT ppx: lovenox 40 q12h  -Negative bilateral lower extremity dopplers despite rising d-dimer

## 2021-01-27 NOTE — CONSULT NOTE ADULT - SUBJECTIVE AND OBJECTIVE BOX
Chief Complaint:  Patient is a 46y old  Female who presents with a chief complaint of COVID (2021 08:57)      HPI:    The patient is a 46 year old female with history of morbid obesity who presented to the hospital with shortness of breath. She was diagnosed with COVID 19 and her hospital course has been complicated by respiratory failure. She was initiatlly extubated but subsequently reintubated. Her hosptial course has also been complicated by constipation.    The patient cannot participate in the interview.    Allergies:  No Known Allergies      Home Medications:    Hospital Medications:  ALBUTerol    90 MICROgram(s) HFA Inhaler 2 Puff(s) Inhalation every 6 hours  cefTRIAXone   IVPB 1000 milliGRAM(s) IV Intermittent every 24 hours  chlorhexidine 0.12% Liquid 15 milliLiter(s) Oral Mucosa every 12 hours  chlorhexidine 2% Cloths 1 Application(s) Topical <User Schedule>  dexMEDEtomidine Infusion 1 MICROgram(s)/kG/Hr IV Continuous <Continuous>  dextrose 5%. 1000 milliLiter(s) IV Continuous <Continuous>  dextrose 5%. 1000 milliLiter(s) IV Continuous <Continuous>  diazepam  Injectable 5 milliGRAM(s) IV Push every 4 hours PRN  furosemide   Injectable 20 milliGRAM(s) IV Push two times a day  glucagon  Injectable 1 milliGRAM(s) IntraMuscular once  HYDROmorphone  Injectable 1 milliGRAM(s) IV Push every 3 hours PRN  insulin lispro (ADMELOG) corrective regimen sliding scale   SubCutaneous every 6 hours  lactulose Syrup 20 Gram(s) Enteral Tube three times a day  methylnaltrexone Injectable 12 milliGRAM(s) SubCutaneous once  multivitamin/minerals/iron Oral Solution (CENTRUM) 15 milliLiter(s) Oral daily  norepinephrine Infusion 0.05 MICROgram(s)/kG/Min IV Continuous <Continuous>  pantoprazole  Injectable 40 milliGRAM(s) IV Push daily  petrolatum Ophthalmic Ointment 1 Application(s) Both EYES two times a day  polyethylene glycol 3350 17 Gram(s) Oral <User Schedule>  senna Syrup 10 milliLiter(s) Oral daily      PMHX/PSHX:  No pertinent past medical history    No significant past surgical history        Family history:  FH: type 2 diabetes        Social History:   Denies ethanol use.  Denies illicit drug use.    ROS:     sedated      PHYSICAL EXAM:     GENERAL:  Appears stated age, well-groomed, well-nourished, no distress  HEENT:  NC/AT,  conjunctivae anicteric, clear and pink, intubated  NECK: supple, trachea midline  CHEST:  Full & symmetric excursion, no increased effort, breath sounds clear  HEART:  Regular rhythm, no JVD  ABDOMEN:  Soft, non-tender, non-distended, normoactive bowel sounds,  no masses , no hepatosplenomegaly  EXTREMITIES:  no cyanosis,clubbing or edema  SKIN:  No rash, erythema, or, ecchymoses, no jaundice  NEURO:  sedated, nonresponsive  PSYCH: Appropriate affect, oriented to place and time  RECTAL: Deferred      Vital Signs:  Vital Signs Last 24 Hrs  T(C): 37.7 (2021 16:00), Max: 38.3 (2021 04:00)  T(F): 99.8 (2021 16:00), Max: 100.9 (2021 04:00)  HR: 106 (2021 16:00) (80 - 106)  BP: 121/- (2021 16:00) (21/79 - 121/79)  BP(mean): 79 (2021 16:00) (79 - 91)  RR: 37 (2021 16:00) (30 - 40)  SpO2: 94% (2021 16:00) (94% - 100%)  Daily     Daily Weight in k.1 (2021 04:00)    LABS:                        9.4    9.93  )-----------( 247      ( 2021 01:57 )             30.8         140  |  98  |  10  ----------------------------<  188<H>  3.7   |  34<H>  |  0.42<L>    Ca    9.1      2021 01:57  Phos  3.5       Mg     2.3         TPro  7.3  /  Alb  3.0<L>  /  TBili  0.3  /  DBili  x   /  AST  41<H>  /  ALT  53<H>  /  AlkPhos  156<H>      LIVER FUNCTIONS - ( 2021 01:57 )  Alb: 3.0 g/dL / Pro: 7.3 g/dL / ALK PHOS: 156 U/L / ALT: 53 U/L / AST: 41 U/L / GGT: x           PT/INR - ( 2021 02:40 )   PT: 13.3 sec;   INR: 1.18 ratio         PTT - ( 2021 01:57 )  PTT:31.5 sec

## 2021-01-27 NOTE — PROGRESS NOTE ADULT - SUBJECTIVE AND OBJECTIVE BOX
SICU Daily Progress Note  =====================================================  HPI:  ((insert from previous note)) ***    POD # 		SICU Day #   ***    Interval/Overnight Events:     ***    HISTORY  46y Female  Allergies: No Known Allergies    PAST MEDICAL & SURGICAL HISTORY:  No pertinent past medical history    No significant past surgical history    COVID-19    Discharge planning issues    Prophylactic measure    COVID-19    Acute respiratory failure with hypoxia        MEDICATIONS:   --------------------------------------------------------------------------------------  Neurologic Medications  dexMEDEtomidine Infusion 1 MICROgram(s)/kG/Hr IV Continuous <Continuous>    Respiratory Medications  ALBUTerol    90 MICROgram(s) HFA Inhaler 2 Puff(s) Inhalation every 6 hours    Cardiovascular Medications  norepinephrine Infusion 0.05 MICROgram(s)/kG/Min IV Continuous <Continuous>    Gastrointestinal Medications  dextrose 5%. 1000 milliLiter(s) IV Continuous <Continuous>  dextrose 5%. 1000 milliLiter(s) IV Continuous <Continuous>  multivitamin/minerals/iron Oral Solution (CENTRUM) 15 milliLiter(s) Oral daily  pantoprazole  Injectable 40 milliGRAM(s) IV Push daily  polyethylene glycol 3350 17 Gram(s) Oral <User Schedule>  senna Syrup 10 milliLiter(s) Oral daily    Genitourinary Medications    Hematologic/Oncologic Medications  enoxaparin Injectable 40 milliGRAM(s) SubCutaneous every 12 hours    Antimicrobial/Immunologic Medications  cefTRIAXone   IVPB 1000 milliGRAM(s) IV Intermittent every 24 hours    Endocrine/Metabolic Medications  glucagon  Injectable 1 milliGRAM(s) IntraMuscular once  insulin lispro (ADMELOG) corrective regimen sliding scale   SubCutaneous every 6 hours    Topical/Other Medications  chlorhexidine 0.12% Liquid 15 milliLiter(s) Oral Mucosa every 12 hours  chlorhexidine 2% Cloths 1 Application(s) Topical <User Schedule>  petrolatum Ophthalmic Ointment 1 Application(s) Both EYES two times a day    --------------------------------------------------------------------------------------    VITAL SIGNS, INS/OUTS (last 24 hours):  --------------------------------------------------------------------------------------  ((Insert SICU Vitals/Is+Os here))***  --------------------------------------------------------------------------------------    EXAM:  NEUROLOGY  RASS:   GCS:   Exam: Normal, NAD, alert, oriented x3, no focal deficits. PERRLA. ***  [x] Adequacy of sedation and pain control has been assessed and adjusted    RESPIRATORY  Exam: Lungs clear to auscultation, Normal expansion/effort. ***  [] Tracheostomy   [] Intubated  Mechanical Ventilation: Mode: AC/ CMV (Assist Control/ Continuous Mandatory Ventilation), RR (machine): 30, TV (machine): 300, FiO2: 40, PEEP: 8, ITime: 0.5, MAP: 10, PIP: 30  [x] Extubation Readiness Assessed    CARDIOVASCULAR  Exam: S1, S2.  Regular rate and rhythm.  Peripheral edema ***  Cardiac Rhythm: Normal Sinus Rhythm  ECHO:     GI/NUTRITION  Exam: Abdomen soft, Non-tender, Non-distended.  Gastrostomy / Jejunostomy tube in place.  Nasogastric tube in place.  Colostomy / Ileostomy.  ***  Wound:   Current Diet:     METABOLIC/FLUIDS/ELECTROLYTES  dextrose 5%. 1000 milliLiter(s) IV Continuous <Continuous>  dextrose 5%. 1000 milliLiter(s) IV Continuous <Continuous>  multivitamin/minerals/iron Oral Solution (CENTRUM) 15 milliLiter(s) Oral daily      HEMATOLOGIC  [x] DVT Prophylaxis: enoxaparin Injectable 40 milliGRAM(s) SubCutaneous every 12 hours    Transfusions:	[] PRBC	[] Platelets		[] FFP	[] Cryoprecipitate    INFECTIOUS DISEASE  Antimicrobials/Immunologic Medications:  cefTRIAXone   IVPB 1000 milliGRAM(s) IV Intermittent every 24 hours    Day #  of  ***    Tubes/Lines/Drains ***  [x] Peripheral IV  [] Central Venous Line     	[] R	[] L	[] IJ	[] Fem	[] SC	Date Placed:   [] Arterial Line		[] R	[] L	[] Fem	[] Rad	[] Ax	Date Placed:   [] PICC		[] Midline		[] Mediport  [] Urinary Catheter		Date Placed:   [x] Necessity of urinary, arterial, and venous catheters discussed    VASCULAR  Exam: Extremities warm, pink, well-perfused. ***    MUSCULOSKELETAL  Exam: All extremities moving spontaneously without limitations. ***    SKIN  Exam: Good skin turgor, no skin breakdown. ***    LABS  --------------------------------------------------------------------------------------  ((Insert SICU Labs here))***  --------------------------------------------------------------------------------------    OTHER LABORATORY:     IMAGING STUDIES:   CXR:     ASSESSMENT:  46y Female  ((insert from previous))***    PLAN:   Neurologic:   Respiratory:   Cardiovascular:   Gastrointestinal/Nutrition:   Renal/Genitourinary:   Hematologic:   Infectious Disease:   Tubes/Lines/Drains:   Endocrine:   Disposition:     --------------------------------------------------------------------------------------    Critical Care Diagnoses: ICU Daily Progress Note  =====================================================  HPI: 46-year-old woman who was admitted to the hospital for management acute respiratory failure with hypoxia secondary to covid-19 pneumonia, is status post intubation from  to  for management of acute respiratory distress syndrome, and who is now re-intubated (since ) and in the MICU for management of severe ARDS secondary to covid-19 pneumonia, transferred to [facility] ICU on .    Interval/Overnight Events:     ***    HISTORY  46y Female  Allergies: No Known Allergies    PAST MEDICAL & SURGICAL HISTORY:  No pertinent past medical history    No significant past surgical history    COVID-19    Discharge planning issues    Prophylactic measure    COVID-19    Acute respiratory failure with hypoxia        MEDICATIONS:   --------------------------------------------------------------------------------------  Neurologic Medications  dexMEDEtomidine Infusion 1 MICROgram(s)/kG/Hr IV Continuous <Continuous>    Respiratory Medications  ALBUTerol    90 MICROgram(s) HFA Inhaler 2 Puff(s) Inhalation every 6 hours    Cardiovascular Medications  norepinephrine Infusion 0.05 MICROgram(s)/kG/Min IV Continuous <Continuous>    Gastrointestinal Medications  dextrose 5%. 1000 milliLiter(s) IV Continuous <Continuous>  dextrose 5%. 1000 milliLiter(s) IV Continuous <Continuous>  multivitamin/minerals/iron Oral Solution (CENTRUM) 15 milliLiter(s) Oral daily  pantoprazole  Injectable 40 milliGRAM(s) IV Push daily  polyethylene glycol 3350 17 Gram(s) Oral <User Schedule>  senna Syrup 10 milliLiter(s) Oral daily    Genitourinary Medications    Hematologic/Oncologic Medications  enoxaparin Injectable 40 milliGRAM(s) SubCutaneous every 12 hours    Antimicrobial/Immunologic Medications  cefTRIAXone   IVPB 1000 milliGRAM(s) IV Intermittent every 24 hours    Endocrine/Metabolic Medications  glucagon  Injectable 1 milliGRAM(s) IntraMuscular once  insulin lispro (ADMELOG) corrective regimen sliding scale   SubCutaneous every 6 hours    Topical/Other Medications  chlorhexidine 0.12% Liquid 15 milliLiter(s) Oral Mucosa every 12 hours  chlorhexidine 2% Cloths 1 Application(s) Topical <User Schedule>  petrolatum Ophthalmic Ointment 1 Application(s) Both EYES two times a day    --------------------------------------------------------------------------------------    VITAL SIGNS, INS/OUTS (last 24 hours):  --------------------------------------------------------------------------------------  T(C): 37.3 (21 @ 08:00), Max: 38.3 (21 @ 04:00)  HR: 87 (21 @ 08:00) (80 - 92)  BP: 121/79 (21 @ 08:00) (104/66 - 121/79)  BP(mean): 91 (21 @ 20:00) (74 - 91)  ABP: 114/62 (21 @ 08:00) (100/52 - 117/63)  ABP(mean): 84 (21 @ 08:00) (71 - 85)  RR: 37 (21 @ 04:00) (30 - 38)  SpO2: 94% (21 @ 08:00) (94% - 100%)  Wt(kg): --  CVP(mm Hg): --  CI: --  CAPILLARY BLOOD GLUCOSE      POCT Blood Glucose.: 207 mg/dL (2021 05:06)  POCT Blood Glucose.: 211 mg/dL (2021 23:12)  POCT Blood Glucose.: 174 mg/dL (2021 17:11)  POCT Blood Glucose.: 180 mg/dL (2021 11:30)   N/A       @ 07: @ 07:00  --------------------------------------------------------  IN:    Dexmedetomidine: 356.9 mL    IV PiggyBack: 50 mL    Jevity: 420 mL    Norepinephrine: 121.5 mL    Peptamen A.F.: 396 mL    Propofol: 118.8 mL  Total IN: 1463.2 mL    OUT:    FentaNYL: 0 mL    Indwelling Catheter - Urethral (mL): 1505 mL    Midazolam: 0 mL  Total OUT: 1505 mL    Total NET: -41.8 mL       @ 07: @ 09:05  --------------------------------------------------------  IN:    Dexmedetomidine: 55 mL    Peptamen A.F.: 66 mL  Total IN: 121 mL    OUT:    Indwelling Catheter - Urethral (mL): 60 mL  Total OUT: 60 mL    Total NET: 61 mL  --------------------------------------------------------------------------------------    EXAM:  NEUROLOGY  RASS:   GCS:   Exam: Normal, NAD, alert, oriented x3, no focal deficits. STONEY. ***  [x] Adequacy of sedation and pain control has been assessed and adjusted    RESPIRATORY  Exam: Lungs clear to auscultation, Normal expansion/effort. ***  [] Tracheostomy   [] Intubated  Mechanical Ventilation: Mode: AC/ CMV (Assist Control/ Continuous Mandatory Ventilation), RR (machine): 30, TV (machine): 300, FiO2: 40, PEEP: 8, ITime: 0.5, MAP: 10, PIP: 30  [x] Extubation Readiness Assessed    CARDIOVASCULAR  Exam: S1, S2.  Regular rate and rhythm.  Peripheral edema ***  Cardiac Rhythm: Normal Sinus Rhythm  ECHO:     GI/NUTRITION  Exam: Abdomen soft, Non-tender, Non-distended.  Gastrostomy / Jejunostomy tube in place.  Nasogastric tube in place.  Colostomy / Ileostomy.  ***  Wound:   Current Diet:     METABOLIC/FLUIDS/ELECTROLYTES  dextrose 5%. 1000 milliLiter(s) IV Continuous <Continuous>  dextrose 5%. 1000 milliLiter(s) IV Continuous <Continuous>  multivitamin/minerals/iron Oral Solution (CENTRUM) 15 milliLiter(s) Oral daily      HEMATOLOGIC  [x] DVT Prophylaxis: enoxaparin Injectable 40 milliGRAM(s) SubCutaneous every 12 hours    Transfusions:	[] PRBC	[] Platelets		[] FFP	[] Cryoprecipitate    INFECTIOUS DISEASE  Antimicrobials/Immunologic Medications:  cefTRIAXone   IVPB 1000 milliGRAM(s) IV Intermittent every 24 hours    Day #  of  ***    Tubes/Lines/Drains ***  [x] Peripheral IV  [] Central Venous Line     	[] R	[] L	[] IJ	[] Fem	[] SC	Date Placed:   [] Arterial Line		[] R	[] L	[] Fem	[] Rad	[] Ax	Date Placed:   [] PICC		[] Midline		[] Mediport  [] Urinary Catheter		Date Placed:   [x] Necessity of urinary, arterial, and venous catheters discussed    VASCULAR  Exam: Extremities warm, pink, well-perfused. ***    MUSCULOSKELETAL  Exam: All extremities moving spontaneously without limitations. ***    SKIN  Exam: Good skin turgor, no skin breakdown. ***    LABS  --------------------------------------------------------------------------------------  CBC ( @ 01:57)                              9.4<L>                         9.93    )----------------(  247        70.4  % Neutrophils, 13.7  % Lymphocytes, ANC: 6.99                                30.8<L>  CBC ( @ 02:40)                              8.7<L>                         8.55    )----------------(  218        65.7  % Neutrophils, 15.6  % Lymphocytes, ANC: 5.62                                27.4<L>    BMP ( @ 01:57)             140     |  98      |  10    		Ca++ --      Ca 9.1                ---------------------------------( 188<H>		Mg 2.3                3.7     |  34<H>   |  0.42<L>			Ph 3.5     BMP ( @ 02:40)             138     |  96<L>   |  9     		Ca++ --      Ca 9.0                ---------------------------------( 151<H>		Mg 2.1                3.7     |  33<H>   |  0.44<L>			Ph --        LFTs ( @ 01:57)      TPro 7.3 / Alb 3.0<L> / TBili 0.3 / DBili -- / AST 41<H> / ALT 53<H> / AlkPhos 156<H>  LFTs ( @ 02:40)      TPro 7.0 / Alb 2.9<L> / TBili 0.5 / DBili -- / AST 49<H> / ALT 61<H> / AlkPhos 153<H>    Coags ( @ 01:57)  aPTT 31.5 / INR -- / PT --  Coags ( @ 02:40)  aPTT 32.6 / INR 1.18<H> / PT 13.3      ABG ( @ 01:57)     7.42 / 54<H> / 65<L> / 33<H> / 9.9<H> / 91.7<L>%     Lactate:     ABG ( @ 02:40)     7.36 / 65<H> / 75<L> / 33<H> / 10.0<H> / 94.0<L>%     Lactate:         Urinalysis ( @ 13:49):     Color: Orange<!> / Appearance: Turbid<!> / S.032<H> / pH: 6.0 / Gluc: Negative / Ketones: Negative / Bili: Negative / Urobili: 6 mg/dL<!> / Protein :300 mg/dL<!> / Nitrites: Negative / Leuk.Est: Large<!> / RBC: 2-5 / WBC: >50 / Sq Epi:  / Non Sq Epi: few / Bacteria Moderate<!>       -> .Sputum Sputum Culture ( @ 20:52)       Numerous polymorphonuclear leukocytes per low power field  No Squamous epithelial cells per low power field  Rare Gram positive cocci in pairs per oil power field    NG    Normal Respiratory Mayela present    -> .Blood Blood Culture ( @ 16:47)     NG    NG    No growth to date.  --------------------------------------------------------------------------------------    OTHER LABORATORY:     IMAGING STUDIES:   CXR:

## 2021-01-27 NOTE — CONSULT NOTE ADULT - PROBLEM SELECTOR RECOMMENDATION 9
planned for tracheostomy  will coordinate w ICU team re: PEG placement  favor PPI prophylaxis while critically ill planned for tracheostomy  will coordinate w ICU team re: PEG placement, tenatively for tomorrow  favor PPI prophylaxis while critically ill

## 2021-01-28 LAB
ALBUMIN SERPL ELPH-MCNC: 3.1 G/DL — LOW (ref 3.3–5)
ALP SERPL-CCNC: 131 U/L — HIGH (ref 40–120)
ALT FLD-CCNC: 38 U/L — HIGH (ref 4–33)
ANION GAP SERPL CALC-SCNC: 9 MMOL/L — SIGNIFICANT CHANGE UP (ref 7–14)
APPEARANCE UR: ABNORMAL
APTT BLD: 30.7 SEC — SIGNIFICANT CHANGE UP (ref 27–36.3)
AST SERPL-CCNC: 30 U/L — SIGNIFICANT CHANGE UP (ref 4–32)
BACTERIA # UR AUTO: ABNORMAL
BASE EXCESS BLDA CALC-SCNC: 8.7 MMOL/L — HIGH (ref -2–2)
BASOPHILS # BLD AUTO: 0.03 K/UL — SIGNIFICANT CHANGE UP (ref 0–0.2)
BASOPHILS NFR BLD AUTO: 0.3 % — SIGNIFICANT CHANGE UP (ref 0–2)
BILIRUB SERPL-MCNC: 0.3 MG/DL — SIGNIFICANT CHANGE UP (ref 0.2–1.2)
BILIRUB UR-MCNC: NEGATIVE — SIGNIFICANT CHANGE UP
BUN SERPL-MCNC: 16 MG/DL — SIGNIFICANT CHANGE UP (ref 7–23)
CALCIUM SERPL-MCNC: 8.9 MG/DL — SIGNIFICANT CHANGE UP (ref 8.4–10.5)
CHLORIDE SERPL-SCNC: 102 MMOL/L — SIGNIFICANT CHANGE UP (ref 98–107)
CO2 SERPL-SCNC: 31 MMOL/L — SIGNIFICANT CHANGE UP (ref 22–31)
COLOR SPEC: YELLOW — SIGNIFICANT CHANGE UP
CREAT SERPL-MCNC: 0.45 MG/DL — LOW (ref 0.5–1.3)
CRP SERPL-MCNC: 78.5 MG/L — HIGH
D DIMER BLD IA.RAPID-MCNC: 1036 NG/ML DDU — HIGH
DIFF PNL FLD: NEGATIVE — SIGNIFICANT CHANGE UP
EOSINOPHIL # BLD AUTO: 0.11 K/UL — SIGNIFICANT CHANGE UP (ref 0–0.5)
EOSINOPHIL NFR BLD AUTO: 1.1 % — SIGNIFICANT CHANGE UP (ref 0–6)
EPI CELLS # UR: 2 /HPF — SIGNIFICANT CHANGE UP (ref 0–5)
FERRITIN SERPL-MCNC: 167 NG/ML — HIGH (ref 15–150)
GLUCOSE SERPL-MCNC: 207 MG/DL — HIGH (ref 70–99)
GLUCOSE UR QL: NEGATIVE — SIGNIFICANT CHANGE UP
HCO3 BLDA-SCNC: 32 MMOL/L — HIGH (ref 22–26)
HCT VFR BLD CALC: 28 % — LOW (ref 34.5–45)
HGB BLD-MCNC: 8.6 G/DL — LOW (ref 11.5–15.5)
IANC: 7.98 K/UL — SIGNIFICANT CHANGE UP (ref 1.5–8.5)
IMM GRANULOCYTES NFR BLD AUTO: 1.1 % — SIGNIFICANT CHANGE UP (ref 0–1.5)
INR BLD: 1.21 RATIO — HIGH (ref 0.88–1.16)
KETONES UR-MCNC: ABNORMAL
LEUKOCYTE ESTERASE UR-ACNC: NEGATIVE — SIGNIFICANT CHANGE UP
LYMPHOCYTES # BLD AUTO: 1.22 K/UL — SIGNIFICANT CHANGE UP (ref 1–3.3)
LYMPHOCYTES # BLD AUTO: 11.7 % — LOW (ref 13–44)
MAGNESIUM SERPL-MCNC: 2.2 MG/DL — SIGNIFICANT CHANGE UP (ref 1.6–2.6)
MCHC RBC-ENTMCNC: 27.3 PG — SIGNIFICANT CHANGE UP (ref 27–34)
MCHC RBC-ENTMCNC: 30.7 GM/DL — LOW (ref 32–36)
MCV RBC AUTO: 88.9 FL — SIGNIFICANT CHANGE UP (ref 80–100)
MONOCYTES # BLD AUTO: 0.94 K/UL — HIGH (ref 0–0.9)
MONOCYTES NFR BLD AUTO: 9 % — SIGNIFICANT CHANGE UP (ref 2–14)
NEUTROPHILS # BLD AUTO: 7.98 K/UL — HIGH (ref 1.8–7.4)
NEUTROPHILS NFR BLD AUTO: 76.8 % — SIGNIFICANT CHANGE UP (ref 43–77)
NITRITE UR-MCNC: NEGATIVE — SIGNIFICANT CHANGE UP
NRBC # BLD: 0 /100 WBCS — SIGNIFICANT CHANGE UP
NRBC # FLD: 0.05 K/UL — HIGH
PCO2 BLDA: 48 MMHG — SIGNIFICANT CHANGE UP (ref 32–48)
PH BLDA: 7.45 — SIGNIFICANT CHANGE UP (ref 7.35–7.45)
PH UR: 6.5 — SIGNIFICANT CHANGE UP (ref 5–8)
PHOSPHATE SERPL-MCNC: 3.6 MG/DL — SIGNIFICANT CHANGE UP (ref 2.5–4.5)
PLATELET # BLD AUTO: 254 K/UL — SIGNIFICANT CHANGE UP (ref 150–400)
PO2 BLDA: 71 MMHG — LOW (ref 83–108)
POTASSIUM SERPL-MCNC: 3.3 MMOL/L — LOW (ref 3.5–5.3)
POTASSIUM SERPL-SCNC: 3.3 MMOL/L — LOW (ref 3.5–5.3)
PROCALCITONIN SERPL-MCNC: 0.07 NG/ML — SIGNIFICANT CHANGE UP (ref 0.02–0.1)
PROT SERPL-MCNC: 7 G/DL — SIGNIFICANT CHANGE UP (ref 6–8.3)
PROT UR-MCNC: ABNORMAL
PROTHROM AB SERPL-ACNC: 13.8 SEC — HIGH (ref 10.6–13.6)
RBC # BLD: 3.15 M/UL — LOW (ref 3.8–5.2)
RBC # FLD: 16.9 % — HIGH (ref 10.3–14.5)
RBC CASTS # UR COMP ASSIST: 3 /HPF — SIGNIFICANT CHANGE UP (ref 0–4)
SAO2 % BLDA: 94.7 % — LOW (ref 95–99)
SODIUM SERPL-SCNC: 142 MMOL/L — SIGNIFICANT CHANGE UP (ref 135–145)
SP GR SPEC: 1.04 — HIGH (ref 1.01–1.02)
UROBILINOGEN FLD QL: ABNORMAL
WBC # BLD: 10.39 K/UL — SIGNIFICANT CHANGE UP (ref 3.8–10.5)
WBC # FLD AUTO: 10.39 K/UL — SIGNIFICANT CHANGE UP (ref 3.8–10.5)
WBC UR QL: 7 /HPF — HIGH (ref 0–5)

## 2021-01-28 PROCEDURE — 99291 CRITICAL CARE FIRST HOUR: CPT

## 2021-01-28 PROCEDURE — 31600 PLANNED TRACHEOSTOMY: CPT | Mod: GC

## 2021-01-28 PROCEDURE — 76536 US EXAM OF HEAD AND NECK: CPT | Mod: 26,GC

## 2021-01-28 PROCEDURE — 99223 1ST HOSP IP/OBS HIGH 75: CPT | Mod: GC,25

## 2021-01-28 PROCEDURE — 31624 DX BRONCHOSCOPE/LAVAGE: CPT | Mod: GC

## 2021-01-28 PROCEDURE — 71045 X-RAY EXAM CHEST 1 VIEW: CPT | Mod: 26

## 2021-01-28 RX ORDER — FUROSEMIDE 40 MG
20 TABLET ORAL
Refills: 0 | Status: DISCONTINUED | OUTPATIENT
Start: 2021-01-28 | End: 2021-01-29

## 2021-01-28 RX ORDER — CHLORHEXIDINE GLUCONATE 213 G/1000ML
15 SOLUTION TOPICAL EVERY 12 HOURS
Refills: 0 | Status: DISCONTINUED | OUTPATIENT
Start: 2021-01-28 | End: 2021-02-03

## 2021-01-28 RX ORDER — FENTANYL CITRATE 50 UG/ML
200 INJECTION INTRAVENOUS ONCE
Refills: 0 | Status: DISCONTINUED | OUTPATIENT
Start: 2021-01-28 | End: 2021-01-28

## 2021-01-28 RX ORDER — CEFAZOLIN SODIUM 1 G
2000 VIAL (EA) INJECTION ONCE
Refills: 0 | Status: COMPLETED | OUTPATIENT
Start: 2021-01-28 | End: 2021-01-28

## 2021-01-28 RX ORDER — CISATRACURIUM BESYLATE 2 MG/ML
20 INJECTION INTRAVENOUS ONCE
Refills: 0 | Status: COMPLETED | OUTPATIENT
Start: 2021-01-28 | End: 2021-01-28

## 2021-01-28 RX ORDER — POTASSIUM CHLORIDE 20 MEQ
10 PACKET (EA) ORAL
Refills: 0 | Status: COMPLETED | OUTPATIENT
Start: 2021-01-28 | End: 2021-01-28

## 2021-01-28 RX ADMIN — PROPOFOL 6.59 MICROGRAM(S)/KG/MIN: 10 INJECTION, EMULSION INTRAVENOUS at 05:30

## 2021-01-28 RX ADMIN — ALBUTEROL 2 PUFF(S): 90 AEROSOL, METERED ORAL at 17:15

## 2021-01-28 RX ADMIN — Medication 100 MILLIGRAM(S): at 15:34

## 2021-01-28 RX ADMIN — ALBUTEROL 2 PUFF(S): 90 AEROSOL, METERED ORAL at 07:15

## 2021-01-28 RX ADMIN — Medication 100 MILLIEQUIVALENT(S): at 05:00

## 2021-01-28 RX ADMIN — Medication 2: at 00:37

## 2021-01-28 RX ADMIN — Medication 1 APPLICATION(S): at 17:37

## 2021-01-28 RX ADMIN — PROPOFOL 6.59 MICROGRAM(S)/KG/MIN: 10 INJECTION, EMULSION INTRAVENOUS at 00:13

## 2021-01-28 RX ADMIN — FENTANYL CITRATE 200 MICROGRAM(S): 50 INJECTION INTRAVENOUS at 16:30

## 2021-01-28 RX ADMIN — ALBUTEROL 2 PUFF(S): 90 AEROSOL, METERED ORAL at 03:36

## 2021-01-28 RX ADMIN — LACTULOSE 20 GRAM(S): 10 SOLUTION ORAL at 04:32

## 2021-01-28 RX ADMIN — PROPOFOL 6.59 MICROGRAM(S)/KG/MIN: 10 INJECTION, EMULSION INTRAVENOUS at 09:40

## 2021-01-28 RX ADMIN — PROPOFOL 6.59 MICROGRAM(S)/KG/MIN: 10 INJECTION, EMULSION INTRAVENOUS at 19:06

## 2021-01-28 RX ADMIN — Medication 5 MILLIGRAM(S): at 06:49

## 2021-01-28 RX ADMIN — CHLORHEXIDINE GLUCONATE 1 APPLICATION(S): 213 SOLUTION TOPICAL at 04:37

## 2021-01-28 RX ADMIN — CHLORHEXIDINE GLUCONATE 15 MILLILITER(S): 213 SOLUTION TOPICAL at 17:39

## 2021-01-28 RX ADMIN — POLYETHYLENE GLYCOL 3350 17 GRAM(S): 17 POWDER, FOR SOLUTION ORAL at 04:32

## 2021-01-28 RX ADMIN — Medication 1: at 06:07

## 2021-01-28 RX ADMIN — ALBUTEROL 2 PUFF(S): 90 AEROSOL, METERED ORAL at 21:03

## 2021-01-28 RX ADMIN — PANTOPRAZOLE SODIUM 40 MILLIGRAM(S): 20 TABLET, DELAYED RELEASE ORAL at 12:10

## 2021-01-28 RX ADMIN — Medication 100 MILLIEQUIVALENT(S): at 04:32

## 2021-01-28 RX ADMIN — Medication 20 MILLIGRAM(S): at 17:37

## 2021-01-28 RX ADMIN — Medication 2: at 17:45

## 2021-01-28 RX ADMIN — Medication 20 MILLIGRAM(S): at 04:43

## 2021-01-28 RX ADMIN — Medication 1 APPLICATION(S): at 04:43

## 2021-01-28 RX ADMIN — DEXMEDETOMIDINE HYDROCHLORIDE IN 0.9% SODIUM CHLORIDE 27.5 MICROGRAM(S)/KG/HR: 4 INJECTION INTRAVENOUS at 10:50

## 2021-01-28 RX ADMIN — DEXMEDETOMIDINE HYDROCHLORIDE IN 0.9% SODIUM CHLORIDE 27.5 MICROGRAM(S)/KG/HR: 4 INJECTION INTRAVENOUS at 04:32

## 2021-01-28 RX ADMIN — CHLORHEXIDINE GLUCONATE 15 MILLILITER(S): 213 SOLUTION TOPICAL at 04:37

## 2021-01-28 RX ADMIN — Medication 100 MILLIEQUIVALENT(S): at 04:00

## 2021-01-28 RX ADMIN — CISATRACURIUM BESYLATE 20 MILLIGRAM(S): 2 INJECTION INTRAVENOUS at 15:50

## 2021-01-28 NOTE — PROGRESS NOTE ADULT - SUBJECTIVE AND OBJECTIVE BOX
Chief Complaint:  Patient is a 46y old  Female who presents with a chief complaint of COVID (2021 07:46)      Interval Events:     Allergies:  No Known Allergies      Hospital Medications:  ALBUTerol    90 MICROgram(s) HFA Inhaler 2 Puff(s) Inhalation every 6 hours  ceFAZolin   IVPB 2000 milliGRAM(s) IV Intermittent once  chlorhexidine 0.12% Liquid 15 milliLiter(s) Oral Mucosa every 12 hours  chlorhexidine 2% Cloths 1 Application(s) Topical <User Schedule>  dexMEDEtomidine Infusion 1 MICROgram(s)/kG/Hr IV Continuous <Continuous>  dextrose 5%. 1000 milliLiter(s) IV Continuous <Continuous>  dextrose 5%. 1000 milliLiter(s) IV Continuous <Continuous>  diazepam  Injectable 5 milliGRAM(s) IV Push every 4 hours PRN  furosemide   Injectable 20 milliGRAM(s) IV Push two times a day  glucagon  Injectable 1 milliGRAM(s) IntraMuscular once  HYDROmorphone  Injectable 1 milliGRAM(s) IV Push every 3 hours PRN  insulin lispro (ADMELOG) corrective regimen sliding scale   SubCutaneous every 6 hours  methylnaltrexone Injectable 12 milliGRAM(s) SubCutaneous once  multivitamin/minerals/iron Oral Solution (CENTRUM) 15 milliLiter(s) Oral daily  norepinephrine Infusion 0.05 MICROgram(s)/kG/Min IV Continuous <Continuous>  pantoprazole  Injectable 40 milliGRAM(s) IV Push daily  petrolatum Ophthalmic Ointment 1 Application(s) Both EYES two times a day  polyethylene glycol 3350 17 Gram(s) Oral <User Schedule>  propofol Infusion 10 MICROgram(s)/kG/Min IV Continuous <Continuous>  QUEtiapine 100 milliGRAM(s) Oral at bedtime  senna Syrup 10 milliLiter(s) Oral daily      PMHX/PSHX:  No pertinent past medical history    No significant past surgical history        Family history:  FH: type 2 diabetes        ROS:     General:  No wt loss, fevers, chills, night sweats, fatigue,   Eyes:  Good vision, no reported pain  ENT:  No sore throat, pain, runny nose, dysphagia  CV:  No pain, palpitations, hypo/hypertension  Resp:  No dyspnea, cough, tachypnea, wheezing  GI:  See HPI  :  No pain, bleeding, incontinence, nocturia  Muscle:  No pain, weakness  Neuro:  No weakness, tingling, memory problems  Psych:  No fatigue, insomnia, mood problems, depression  Endocrine:  No polyuria, polydipsia, cold/heat intolerance  Heme:  No petechiae, ecchymosis, easy bruisability  Skin:  No rash, edema      PHYSICAL EXAM:     GENERAL:  Appears stated age, well-groomed, well-nourished, no distress  HEENT:  NC/AT,  conjunctivae clear, sclera-anicteric  NECK: Trachea midline, supple  CHEST:  Full & symmetric excursion, no increased effort, breath sounds clear  HEART:  Regular rhythm, no minerva/heave  ABDOMEN:  Soft, non-tender, non-distended, normoactive bowel sounds,  no masses ,no hepato-splenomegaly,   EXTREMITIES:  no cyanosis,clubbing or edema  SKIN:  No rash/erythema/petechiae, no jaundice  NEURO:  Alert, oriented, no asterixis  RECTAL: Deferred    Vital Signs:  Vital Signs Last 24 Hrs  T(C): 37.9 (2021 12:00), Max: 38 (2021 19:43)  T(F): 100.2 (2021 12:00), Max: 100.4 (2021 19:43)  HR: 74 (2021 12:00) (72 - 106)  BP: 103/59 (2021 12:00) (103/59 - 121/-)  BP(mean): 76 (2021 12:00) (76 - 79)  RR: 26 (2021 04:00) (26 - 42)  SpO2: 98% (2021 12:00) (94% - 100%)  Daily     Daily     LABS:                        8.6    10.39 )-----------( 254      ( 2021 01:21 )             28.0         142  |  102  |  16  ----------------------------<  207<H>  3.3<L>   |  31  |  0.45<L>    Ca    8.9      2021 01:20  Phos  3.6       Mg     2.2         TPro  7.0  /  Alb  3.1<L>  /  TBili  0.3  /  DBili  x   /  AST  30  /  ALT  38<H>  /  AlkPhos  131<H>      LIVER FUNCTIONS - ( 2021 01:20 )  Alb: 3.1 g/dL / Pro: 7.0 g/dL / ALK PHOS: 131 U/L / ALT: 38 U/L / AST: 30 U/L / GGT: x           PT/INR - ( 2021 01:19 )   PT: 13.8 sec;   INR: 1.21 ratio         PTT - ( 2021 01:19 )  PTT:30.7 sec  Urinalysis Basic - ( 2021 00:50 )    Color: Yellow / Appearance: Slightly Turbid / S.041 / pH: x  Gluc: x / Ketone: Trace  / Bili: Negative / Urobili: 6 mg/dL   Blood: x / Protein: 100 mg/dL / Nitrite: Negative   Leuk Esterase: Negative / RBC: 3 /HPF / WBC 7 /HPF   Sq Epi: x / Non Sq Epi: 2 /HPF / Bacteria: Occasional          Imaging:             Chief Complaint:  Patient is a 46y old  Female who presents with a chief complaint of COVID (2021 07:46)      Interval Events:   no acute events    Allergies:  No Known Allergies      Hospital Medications:  ALBUTerol    90 MICROgram(s) HFA Inhaler 2 Puff(s) Inhalation every 6 hours  ceFAZolin   IVPB 2000 milliGRAM(s) IV Intermittent once  chlorhexidine 0.12% Liquid 15 milliLiter(s) Oral Mucosa every 12 hours  chlorhexidine 2% Cloths 1 Application(s) Topical <User Schedule>  dexMEDEtomidine Infusion 1 MICROgram(s)/kG/Hr IV Continuous <Continuous>  dextrose 5%. 1000 milliLiter(s) IV Continuous <Continuous>  dextrose 5%. 1000 milliLiter(s) IV Continuous <Continuous>  diazepam  Injectable 5 milliGRAM(s) IV Push every 4 hours PRN  furosemide   Injectable 20 milliGRAM(s) IV Push two times a day  glucagon  Injectable 1 milliGRAM(s) IntraMuscular once  HYDROmorphone  Injectable 1 milliGRAM(s) IV Push every 3 hours PRN  insulin lispro (ADMELOG) corrective regimen sliding scale   SubCutaneous every 6 hours  methylnaltrexone Injectable 12 milliGRAM(s) SubCutaneous once  multivitamin/minerals/iron Oral Solution (CENTRUM) 15 milliLiter(s) Oral daily  norepinephrine Infusion 0.05 MICROgram(s)/kG/Min IV Continuous <Continuous>  pantoprazole  Injectable 40 milliGRAM(s) IV Push daily  petrolatum Ophthalmic Ointment 1 Application(s) Both EYES two times a day  polyethylene glycol 3350 17 Gram(s) Oral <User Schedule>  propofol Infusion 10 MICROgram(s)/kG/Min IV Continuous <Continuous>  QUEtiapine 100 milliGRAM(s) Oral at bedtime  senna Syrup 10 milliLiter(s) Oral daily      PMHX/PSHX:  No pertinent past medical history    No significant past surgical history        Family history:  FH: type 2 diabetes        ROS:     cannot provide    PHYSICAL EXAM:     GENERAL:  Appears stated age, well-groomed, well-nourished, no distress, intubated  HEENT:  NC/AT,  conjunctivae clear, sclera-anicteric  NECK: Trachea midline, supple  CHEST:  Full & symmetric excursion, no increased effort, breath sounds clear  HEART:  Regular rhythm, no minerva/heave  ABDOMEN:  Soft, non-tender, non-distended, normoactive bowel sounds,  no masses ,no hepato-splenomegaly, gastrostomy intact  EXTREMITIES:  no cyanosis,clubbing or edema  SKIN:  No rash/erythema/petechiae, no jaundice  NEURO:  nonverbal, sedated  RECTAL: Deferred    Vital Signs:  Vital Signs Last 24 Hrs  T(C): 37.9 (2021 12:00), Max: 38 (2021 19:43)  T(F): 100.2 (2021 12:00), Max: 100.4 (2021 19:43)  HR: 74 (2021 12:00) (72 - 106)  BP: 103/59 (2021 12:00) (103/59 - 121/-)  BP(mean): 76 (2021 12:00) (76 - 79)  RR: 26 (2021 04:00) (26 - 42)  SpO2: 98% (2021 12:00) (94% - 100%)  Daily     Daily     LABS:                        8.6    10.39 )-----------( 254      ( 2021 01:21 )             28.0         142  |  102  |  16  ----------------------------<  207<H>  3.3<L>   |  31  |  0.45<L>    Ca    8.9      2021 01:20  Phos  3.6       Mg     2.2         TPro  7.0  /  Alb  3.1<L>  /  TBili  0.3  /  DBili  x   /  AST  30  /  ALT  38<H>  /  AlkPhos  131<H>      LIVER FUNCTIONS - ( 2021 01:20 )  Alb: 3.1 g/dL / Pro: 7.0 g/dL / ALK PHOS: 131 U/L / ALT: 38 U/L / AST: 30 U/L / GGT: x           PT/INR - ( 2021 01:19 )   PT: 13.8 sec;   INR: 1.21 ratio         PTT - ( 2021 01:19 )  PTT:30.7 sec  Urinalysis Basic - ( 2021 00:50 )    Color: Yellow / Appearance: Slightly Turbid / S.041 / pH: x  Gluc: x / Ketone: Trace  / Bili: Negative / Urobili: 6 mg/dL   Blood: x / Protein: 100 mg/dL / Nitrite: Negative   Leuk Esterase: Negative / RBC: 3 /HPF / WBC 7 /HPF   Sq Epi: x / Non Sq Epi: 2 /HPF / Bacteria: Occasional          Imaging:

## 2021-01-28 NOTE — PROGRESS NOTE ADULT - ASSESSMENT
46 F w respiratory failure due to COVID 19     Problem/Recommendation - 1:  Problem: Acute respiratory failure with hypoxia. Recommendation:   -for EGD/PEG today     Problem/Recommendation - 2:  ·  Problem: COVID-19.  Recommendation: status post dexamethasone.      Problem/Recommendation - 3:  ·  Problem: Normocytic anemia.  Recommendation: without overt GI bleeding. Likely due to critical illness.  -monitor for GI bleed  -PPI QD.      Problem/Recommendation - 4:  ·  Problem: Abnormal LFTs.  Recommendation: multifactorial, likely NAFLD, COVID, critical illness. Can consider US to r/o gallstone disease, especially if increasing.      Problem/Recommendation - 5:  ·  Problem: Morbid obesity.      Problem/Recommendation - 6:  Problem: Constipation. Recommendation: consider XR abdomen to assess for ileus  on a bowel regimen.    Attending Attestation:   Differential diagnosis and plan of care discussed with patient after the evaluation  35 Minutes spent on total encounter of which more than fifty percent of the encounter was spent counseling and/or coordinating care by the attending physician.    Mele Bolanos M.D.   Gastroenterology and Hepatology  Cell: [phone].

## 2021-01-28 NOTE — CHART NOTE - NSCHARTNOTEFT_GEN_A_CORE
: Oralia Mcdonald    INDICATION: Percutaneous Tracheostomy    PROCEDURE:  [ ] LIMITED ECHO  [ ] LIMITED CHEST  [ ] LIMITED RETROPERITONEAL  [ ] LIMITED ABDOMINAL  [ ] LIMITED DVT  [ ] NEEDLE GUIDANCE VASCULAR  [ ] NEEDLE GUIDANCE THORACENTESIS  [ ] NEEDLE GUIDANCE PARACENTESIS  [ ] NEEDLE GUIDANCE PERICARDIOCENTESIS  [X] NECK ULTRASOUND    FINDINGS: Thyroid cartilage, cricoid cartilage and tracheal rings noted. Thyroid and isthmus noted. No large vessels in the field of percutaneous tracheostomy.       INTERPRETATION: Will proceed with planned tracheostomy.

## 2021-01-28 NOTE — PROGRESS NOTE ADULT - ATTENDING COMMENTS
Dr. Patricio (Attending Physician)  N - Sedated on propofol and precedex, dilaudid, valium, seroquel  P - SIMV prvc, on rate 15, PS 20/8 for supported breaths, comfortable breathing spontaneously  C - No vasopressors was on overnight  GI - TFs held for trach today   - good UO, will keep lasix 20 mg IV bid  H - lovenox bid held overnight for trach  ID - febrile overnight, UTI with E. coli completed ceftriaxone, blood cx sent yesterday  E - ssi not diabetic

## 2021-01-28 NOTE — CHART NOTE - NSCHARTNOTEFT_GEN_A_CORE
Call with questions   Indication:  Tracheostomy  Operators    Loy Mulligan      Findings:  Bronchoscope inserted through ETT. ETT noted to be in good position. ETT withdrawn under bronchoscopic guidance to facilitate tracheostomy.     Bronchoscope inserted through tracheostomy tubs and confirmed in the airway. Indication:  Tracheostomy  Operators    Loy Mulligan      Findings:  Bronchoscope inserted through ETT. ETT noted to be in good position. ETT withdrawn under bronchoscopic guidance to facilitate tracheostomy.     Bronchoscope inserted through tracheostomy tubs and confirmed in the airway.    Attending Note: I was present through out the procedure.

## 2021-01-28 NOTE — CONSULT NOTE ADULT - SUBJECTIVE AND OBJECTIVE BOX
Interventional Pulmonology Consultation for Percutaneous Tracheostomy.    Chief complaint: Inability to wean off mechanical ventilation    HPI: Saba Ward is a 45 y/o F history of COVID-19 ARDS with peristent ventilator dependent respiratory failure who was referred to our service to be evaluated for percutaneous tracheostomy. Patient has respiratory failure due to COVID-19 which required endotracheal intubation and mechanical ventilation for a prolonged period. Repeated weaning trails have failed and after discussions of goals of care with patient’s family, a decision was taken to proceed with prolonged mechanical ventilation for which the patient will require a secure artificial airway. Currently patient is on pressure support/assist control pressure/volume control mechanical ventilation on a FiO2 of 50 %.    PAST MEDICAL & SURGICAL HISTORY:  No pertinent past medical history  No significant past surgical history    MEDICATIONS  (STANDING):  ALBUTerol    90 MICROgram(s) HFA Inhaler 2 Puff(s) Inhalation every 6 hours  chlorhexidine 0.12% Liquid 15 milliLiter(s) Oral Mucosa every 12 hours  chlorhexidine 2% Cloths 1 Application(s) Topical <User Schedule>  dexMEDEtomidine Infusion 1 MICROgram(s)/kG/Hr (27.5 mL/Hr) IV Continuous <Continuous>  dextrose 5%. 1000 milliLiter(s) (50 mL/Hr) IV Continuous <Continuous>  dextrose 5%. 1000 milliLiter(s) (100 mL/Hr) IV Continuous <Continuous>  furosemide   Injectable 20 milliGRAM(s) IV Push two times a day  glucagon  Injectable 1 milliGRAM(s) IntraMuscular once  insulin lispro (ADMELOG) corrective regimen sliding scale   SubCutaneous every 6 hours  methylnaltrexone Injectable 12 milliGRAM(s) SubCutaneous once  multivitamin/minerals/iron Oral Solution (CENTRUM) 15 milliLiter(s) Oral daily  norepinephrine Infusion 0.05 MICROgram(s)/kG/Min (5.15 mL/Hr) IV Continuous <Continuous>  pantoprazole  Injectable 40 milliGRAM(s) IV Push daily  petrolatum Ophthalmic Ointment 1 Application(s) Both EYES two times a day  polyethylene glycol 3350 17 Gram(s) Oral <User Schedule>  propofol Infusion 10 MICROgram(s)/kG/Min (6.59 mL/Hr) IV Continuous <Continuous>  QUEtiapine 100 milliGRAM(s) Oral at bedtime  senna Syrup 10 milliLiter(s) Oral daily    MEDICATIONS  (PRN):  diazepam  Injectable 5 milliGRAM(s) IV Push every 4 hours PRN Agitation / Tachypnea  HYDROmorphone  Injectable 1 milliGRAM(s) IV Push every 3 hours PRN Agitation / Tachypnea    ROS: Unable to perform ROS as patient intubated and sedated.     Examination:  GENERAL:  Appears stated age, well-groomed, well-nourished, no distress  HEENT:  NC/AT,  conjunctivae clear, sclera-anicteric  NECK: Trachea midline, supple  CHEST:  Full & symmetric excursion, no increased effort, breath sounds clear  HEART:  Regular rhythm, no minerva/heave  ABDOMEN:  Soft, non-tender, non-distended, normoactive bowel sounds,  no masses ,no hepato-splenomegaly,   EXTREMITIES:  no cyanosis,clubbing or edema  SKIN:  No rash/erythema/petechiae, no jaundice  NEURO:  Alert, oriented, no asterixis  RECTAL: Deferred    Imaging: Reviewed. Xray.  Laboratory values: *                          8.6    10.39 )-----------( 254      ( 28 Jan 2021 01:21 )               28.0

## 2021-01-28 NOTE — CHART NOTE - NSCHARTNOTEFT_GEN_A_CORE
INTERVENTIONAL PULMONOLOGY POST-TRACHEOSTOMY NOTE      Patient underwent uneventful percutaneous tracheostomy with size 6 shiley. See procedure notes for further details.       Post-tracheostomy care instructions:  -Minimize tension on tracheostomy: Keep tracheostomy elevated on towels to maintain perpendicular to neck and keep enough slack on vent tubing to avoid tension  -Sedate/Restrain patient to ensure does not pull at tracheostomy  -Keep tracheostomy retention collar in place at all times  -Utilize tracheostomy specific in-line suction or red rubber suction tubing through the swivel valve  -First dressing change at 72 hours. If dressings are grossly saturated before that time, reenforce dressings and page pulmonary/critical care fellow  -At 4 days the 2 o'clock and 8 o'clock sutures may be removed. The other 2 sutures are to remain in place for 10 days, please do not remove before then. Sutures may be removed by the primary service. If feel there are issues with the sutures, please contact MICU/Pulmonary fellow  -First tracheostomy change to happen in 3 weeks. If patient still admitted at that time please page pulmonary service to arrange exchange      -If issues with bleeding call pulmonary/critical care fellow on call  -If tracheostomy becomes dislodged prior to initial tracheostomy exchange do NOT attempt to replace the tracheostomy. Call MICU or Anesthesia immediately to intubate the patient orally with an ETT and page pulmonary/critical care fellow on call    Felix Mcdonald MD.   Interventional Pulmonology.

## 2021-01-28 NOTE — PROGRESS NOTE ADULT - ASSESSMENT
46-year-old woman, no PMH who was admitted to the hospital for management ARF with hypoxia secondary to covid-19 pneumonia, is status post intubation from 1/5 to 1/9 for management of acute respiratory distress syndrome, and who was now re-intubated (since 1/16) and in the MICU for management of severe ARDS secondary to covid-19 pneumonia. Transfer to [facility] ICU on 1/21.     Neuro:  -sedated with propofol, will add precedex and wean off of propofol. Plan for possible SBT this afternoon.  -wean slowly as tolerated     CV:  -on levophed for pressure support  -No known history of coronary artery disease or of arrhythmia; sinus rhythm on telemetry     Respiratory: COVID ARDS  intubation 01/05, extubation 01/09, reintubation 01/16, prone 1/21-24  -Current ventilator settings: AC/VC, RR 30, , PEEP 8, FiO2 40;  in the setting of elevated driving pressures, will decrease peep as tolerated; hope to allow for lung-protective ventilation and subsequently to wean current ventilator settings     Renal:  -Serum creatinine at baseline  -continue to monitor urine output -2.2L over past 24 hrs, s/p lasix 40 mg IV overnight  -no electrolyte abnormalities at present  -Will continue to monitor daily metabolic panel and correct as needed     ID:  -S/p 10 days of dexamethasone 1/5-14 and 5 days of remdesivir 1/5-9 for treatment of severe covid-19   -In the setting of need for re-intubation, s/p zosyn administration for five days 1/16-20 for empiric coverage of superimposed bacterial pneumonia;   -No growth to date from cultures- last- blood 1/16  -febrile overnight, pan cultured, Rocephin added for +UA, Ucx pending, Sputum Cx with +Gram stain, awaiting ID and sensitivities.     Endo:  -No active concerns at this time   -A1c 6.6, meeting criteria for diagnosis of diabetes mellitus  -Cont ISS, monitor finger sticks    GI:  -Jevity tube feeds at goal   -No BM since 1/10  -Increase miralax to q8h  -methylnaltrexone for opiod constipation, redose tomorrow if no BM    Heme:  -DVT ppx: lovenox 40 q12h  -Negative bilateral lower extremity dopplers despite rising d-dimer

## 2021-01-28 NOTE — CONSULT NOTE ADULT - ASSESSMENT
Impression and plan: This is a 51Yrs female with respiratory failure due to COVID-19 ARDS and related persistent respiratory failure. After addressing the goals of care, given the family’s decision to continue mechanical ventilation, patient will require tracheostomy tube placement. Family showed good understanding of the indications, contraindications, risks and benefits of percutaneous tracheostomy versus open tracheostomy. Based on ventilatory requirements, neck anatomy and comorbidities, a shared decision was taken to proceed with bedside percutaneous tracheostomy.     We will perform the following:  Perform neck ultrasound prior to the procedure.   Order CBC, BMP and coagulation profile ( Pt, PTT, INR) prior to the procedure.  Hold all anticoagulants/antiplatelet agents prior to the procedure.  Perform flexible bronchoscopy at the time of the percutaneous tracheostomy for endoscopic guidance.  Please see separate post tracheostomy noted. Consent in chart.

## 2021-01-28 NOTE — BRIEF OPERATIVE NOTE - OPERATION/FINDINGS
With the patient in a supine position, the patient had an indwelling 7.5 endotracheal tube through which bronchoscopy was performed for guiding the tracheostomy procedure.  We positioned the patient’s neck in hyperextension and inspected and examined the neck anatomy, and then we selected a spot for the tracheostomy between the 1st and the 2nd tracheal rings. After the neck area was prepared in a sterile fashion, we injected lidocaine mixed with epinephrine in the four quadrants for subcutaneous analgesia. Then we use the small bore finder needle and advanced it perpendicular to the airway while the trachea was held in place. Once the trachea was entered in midline we then advanced the large bore needle right next to the finder needle, remove the finder needle and advance the guide wire through the large needle. The needle was then removed, and then we performed a small horizontal incision to the subcutaneous tissue, 1 cm above and 1 cm below the insertion point. We then used the small blue dilator available in the percutaneous tracheostomy kit firmly advancing it and rotating it over the guide wire. The dilator was then removed and then we inserted the cone dilator over the white stiffening catheter and over the guide wire until the thick black line was noted bronchoscopically. At that point, the cone dilator was removed, and we inserted a number 6 non fenestrated Shiley tracheostomy tube over the 26 Croatian dilator and over the guide wire with the stiffening catheter. Once the tracheostomy tube was properly placed inside the airway and noted bronchoscopically, we removed the dilator, the guide wire and the stiffening catheter. Four 2-0 Vicryl sutures were applied to secure the tracheostomy tube in the four quadrants and a tracheostomy tie was placed as well. There was no evidence of bleeding and the tip of the tube was at 4 cm away from the main rhiannon and 3 cm away from the cords.

## 2021-01-28 NOTE — PROGRESS NOTE ADULT - SUBJECTIVE AND OBJECTIVE BOX
COVID 19 ICU Note    HISTORY  46y Female with COVID-19(+) pneumonia requiring intubation.    24 HOUR EVENTS:   -UTI with E coli, sensitive to Ceftriaxone, received from 25 to 27 Jan  -Fever 38.2, recultured, Swab from mouth ulcers for HSV sent  -pt woke up, bucking vent, sedation deepened with propofol, valium and seroquel added to regimen, on PRVC mode now   -K repleted     SUBJECTIVE/ROS: Due to intubation with sedation, subjective information was not able to be obtained from the patient. History was obtained, to the extent possible, from review of the chart and collateral sources of information.    NEURO  RASS:  Exam: sedated, minimally withdraws to pain in all four extremities, does not follow commands  Meds: dexMEDEtomidine Infusion 1 MICROgram(s)/kG/Hr IV Continuous <Continuous>  diazepam  Injectable 5 milliGRAM(s) IV Push every 4 hours PRN Agitation / Tachypnea  HYDROmorphone  Injectable 1 milliGRAM(s) IV Push every 3 hours PRN Agitation / Tachypnea  propofol Infusion 10 MICROgram(s)/kG/Min IV Continuous <Continuous>  QUEtiapine 100 milliGRAM(s) Oral at bedtime    Adequacy of sedation and pain control has been assessed and adjusted.    RESPIRATORY  RR: 26 (26 - 42)  SpO2: 100% (94% - 100%)  Exam: coarse rhonchi in all lung fields  Mechanical Ventilation: Mode: AC/ CMV (Assist Control/ Continuous Mandatory Ventilation), RR (machine): 30, TV (machine): 300, FiO2: 50, PEEP: 8  ABG - ( 28 Jan 2021 01:19 )  pH: 7.45  /  pCO2: 48    /  pO2: 71    / HCO3: 32    / Base Excess: 8.7   /  SaO2: 94.7    Lactate: x          PaO2 to FiO2 ratio: 71/0.5=142  Peak Pressure:  Plateau Pressure:  Patient not a candidate for a SBT at this time due to ventilator requirements    CARDIOVASCULAR  HR: 74 (72 - 106)  ABP: 103/54 (103/54 - 125/70)  ABP(mean): 66 (66 - 94)  Exam: regualr rate and rhythm, S1S2  Cardiac Rhythm: sinus  Most recent QTc:   Meds: furosemide   Injectable  norepinephrine Infusion (5.15 mL/Hr)      GI/NUTRITION  Diet: Diet, NPO with Tube Feed:   Tube Feeding Modality: Orogastric  Peptamen 1.5 (PEPTAMEN1.5RTH)  Total Volume for 24 Hours (mL): 792  Continuous  Until Goal Tube Feed Rate (mL per Hour): 33  Tube Feed Duration (in Hours): 24  Tube Feed Start Time: 10:00  No Carb Prosource (1pkg = 15gms Protein)     Qty per Day:  4 (01-26-21 @ 09:36)    Most recent bowel movement:  Meds: dextrose 5%. 1000 milliLiter(s) IV Continuous <Continuous>  dextrose 5%. 1000 milliLiter(s) IV Continuous <Continuous>  methylnaltrexone Injectable 12 milliGRAM(s) SubCutaneous once  multivitamin/minerals/iron Oral Solution (CENTRUM) 15 milliLiter(s) Oral daily  pantoprazole  Injectable 40 milliGRAM(s) IV Push daily  polyethylene glycol 3350 17 Gram(s) Oral <User Schedule>  senna Syrup 10 milliLiter(s) Oral daily      GENITOURINARY  I&O's Detail    01-27 @ 07:01  -  01-28 @ 07:00  --------------------------------------------------------  IN:    Dexmedetomidine: 719.4 mL    IV PiggyBack: 400 mL    Norepinephrine: 8.4 mL    Peptamen A.F.: 528 mL    Propofol: 145.2 mL  Total IN: 1801 mL    OUT:    Indwelling Catheter - Urethral (mL): 1105 mL  Total OUT: 1105 mL    Total NET: 696 mL          01-28    142  |  102  |  16  ----------------------------<  207<H>  3.3<L>   |  31  |  0.45<L>    Ca    8.9      28 Jan 2021 01:20  Phos  3.6     01-28  Mg     2.2     01-28    TPro  7.0  /  Alb  3.1<L>  /  TBili  0.3  /  DBili  x   /  AST  30  /  ALT  38<H>  /  AlkPhos  131<H>  01-28    [x] Haque catheter, indication: urine output monitoring in critically ill  Meds: dextrose 5%. 1000 milliLiter(s) IV Continuous <Continuous>  dextrose 5%. 1000 milliLiter(s) IV Continuous <Continuous>  multivitamin/minerals/iron Oral Solution (CENTRUM) 15 milliLiter(s) Oral daily        HEMATOLOGIC  Meds:                         8.6    10.39 )-----------( 254      ( 28 Jan 2021 01:21 )             28.0     PT/INR - ( 28 Jan 2021 01:19 )   PT: 13.8 sec;   INR: 1.21 ratio         PTT - ( 28 Jan 2021 01:19 )  PTT:30.7 sec  16701-28-21 @ 01:20      INFECTIOUS DISEASES  T(C): 36.7, Max: 38 (01-27-21 @ 19:43)  WBC Count: 10.39 (01-28-21 @ 01:21)  WBC Count: 9.93 (01-27-21 @ 01:57)    Recent Cultures:  Specimen Source: .Sputum Sputum, 01-25 @ 18:48; Results   Normal Respiratory Mayela present; Gram Stain:   Numerous polymorphonuclear leukocytes per low power field  No Squamous epithelial cells per low power field  Rare Gram positive cocci in pairs per oil power field; Organism: --  Specimen Source: .Blood Blood, 01-25 @ 16:47; Results   No growth to date.; Gram Stain: --; Organism: --  Specimen Source: .Urine Catheterized, 01-25 @ 15:45; Results   >100,000 CFU/ml Escherichia coli; Gram Stain: --; Organism: Escherichia coli    Meds:   Procalcitonin, Serum: 0.07 ng/mL (01-28-21 @ 01:20)      ENDOCRINE  Fingersticks: 168, 162, 218, 242, 232  Meds: glucagon  Injectable 1 milliGRAM(s) IntraMuscular once  insulin lispro (ADMELOG) corrective regimen sliding scale   SubCutaneous every 6 hours      ACCESS DEVICES:  [x] Peripheral IV  [x] Central Venous Line	[ ] R	[ ] L	[ ] IJ	[ ] Fem [ ] SC		Placed:   [x] Arterial Line			[ ] R	[ ] L	[ ] Fem [ ] Rad [ ] Ax	Placed:   [ ] Shiley HD Access		[ ] R [ ] L  [ ] IJ  [ ] Fem			Placed:  [x] Urinary Catheter, Date Placed:   Necessity of urinary, arterial, and venous catheters discussed.    CODE STATUS:     IMAGING:

## 2021-01-29 LAB
ALBUMIN SERPL ELPH-MCNC: 3.3 G/DL — SIGNIFICANT CHANGE UP (ref 3.3–5)
ALP SERPL-CCNC: 114 U/L — SIGNIFICANT CHANGE UP (ref 40–120)
ALT FLD-CCNC: 28 U/L — SIGNIFICANT CHANGE UP (ref 4–33)
ANION GAP SERPL CALC-SCNC: 11 MMOL/L — SIGNIFICANT CHANGE UP (ref 7–14)
ANION GAP SERPL CALC-SCNC: 11 MMOL/L — SIGNIFICANT CHANGE UP (ref 7–14)
AST SERPL-CCNC: 18 U/L — SIGNIFICANT CHANGE UP (ref 4–32)
BASE EXCESS BLDA CALC-SCNC: 8.8 MMOL/L — HIGH (ref -2–2)
BILIRUB SERPL-MCNC: 0.5 MG/DL — SIGNIFICANT CHANGE UP (ref 0.2–1.2)
BLOOD GAS ARTERIAL COMPREHENSIVE RESULT: SIGNIFICANT CHANGE UP
BLOOD GAS ARTERIAL COMPREHENSIVE RESULT: SIGNIFICANT CHANGE UP
BUN SERPL-MCNC: 20 MG/DL — SIGNIFICANT CHANGE UP (ref 7–23)
BUN SERPL-MCNC: 20 MG/DL — SIGNIFICANT CHANGE UP (ref 7–23)
CALCIUM SERPL-MCNC: 8.7 MG/DL — SIGNIFICANT CHANGE UP (ref 8.4–10.5)
CALCIUM SERPL-MCNC: 8.9 MG/DL — SIGNIFICANT CHANGE UP (ref 8.4–10.5)
CHLORIDE SERPL-SCNC: 103 MMOL/L — SIGNIFICANT CHANGE UP (ref 98–107)
CHLORIDE SERPL-SCNC: 103 MMOL/L — SIGNIFICANT CHANGE UP (ref 98–107)
CO2 SERPL-SCNC: 31 MMOL/L — SIGNIFICANT CHANGE UP (ref 22–31)
CO2 SERPL-SCNC: 31 MMOL/L — SIGNIFICANT CHANGE UP (ref 22–31)
CREAT SERPL-MCNC: 0.5 MG/DL — SIGNIFICANT CHANGE UP (ref 0.5–1.3)
CREAT SERPL-MCNC: 0.5 MG/DL — SIGNIFICANT CHANGE UP (ref 0.5–1.3)
GLUCOSE SERPL-MCNC: 146 MG/DL — HIGH (ref 70–99)
GLUCOSE SERPL-MCNC: 169 MG/DL — HIGH (ref 70–99)
HCO3 BLDA-SCNC: 32 MMOL/L — HIGH (ref 22–26)
HCT VFR BLD CALC: 28.2 % — LOW (ref 34.5–45)
HGB BLD-MCNC: 8.7 G/DL — LOW (ref 11.5–15.5)
MAGNESIUM SERPL-MCNC: 2.1 MG/DL — SIGNIFICANT CHANGE UP (ref 1.6–2.6)
MCHC RBC-ENTMCNC: 27.7 PG — SIGNIFICANT CHANGE UP (ref 27–34)
MCHC RBC-ENTMCNC: 30.9 GM/DL — LOW (ref 32–36)
MCV RBC AUTO: 89.8 FL — SIGNIFICANT CHANGE UP (ref 80–100)
NRBC # BLD: 0 /100 WBCS — SIGNIFICANT CHANGE UP
NRBC # FLD: 0 K/UL — SIGNIFICANT CHANGE UP
PCO2 BLDA: 50 MMHG — HIGH (ref 32–48)
PH BLDA: 7.44 — SIGNIFICANT CHANGE UP (ref 7.35–7.45)
PHOSPHATE SERPL-MCNC: 4.3 MG/DL — SIGNIFICANT CHANGE UP (ref 2.5–4.5)
PLATELET # BLD AUTO: 328 K/UL — SIGNIFICANT CHANGE UP (ref 150–400)
PO2 BLDA: 71 MMHG — LOW (ref 83–108)
POTASSIUM SERPL-MCNC: 3.4 MMOL/L — LOW (ref 3.5–5.3)
POTASSIUM SERPL-MCNC: 3.4 MMOL/L — LOW (ref 3.5–5.3)
POTASSIUM SERPL-SCNC: 3.4 MMOL/L — LOW (ref 3.5–5.3)
POTASSIUM SERPL-SCNC: 3.4 MMOL/L — LOW (ref 3.5–5.3)
PROT SERPL-MCNC: 7 G/DL — SIGNIFICANT CHANGE UP (ref 6–8.3)
RBC # BLD: 3.14 M/UL — LOW (ref 3.8–5.2)
RBC # FLD: 17.3 % — HIGH (ref 10.3–14.5)
SAO2 % BLDA: 94.1 % — LOW (ref 95–99)
SODIUM SERPL-SCNC: 145 MMOL/L — SIGNIFICANT CHANGE UP (ref 135–145)
SODIUM SERPL-SCNC: 145 MMOL/L — SIGNIFICANT CHANGE UP (ref 135–145)
WBC # BLD: 9.89 K/UL — SIGNIFICANT CHANGE UP (ref 3.8–10.5)
WBC # FLD AUTO: 9.89 K/UL — SIGNIFICANT CHANGE UP (ref 3.8–10.5)

## 2021-01-29 PROCEDURE — 99291 CRITICAL CARE FIRST HOUR: CPT

## 2021-01-29 RX ORDER — SPIRONOLACTONE 25 MG/1
25 TABLET, FILM COATED ORAL ONCE
Refills: 0 | Status: COMPLETED | OUTPATIENT
Start: 2021-01-29 | End: 2021-01-29

## 2021-01-29 RX ORDER — ENOXAPARIN SODIUM 100 MG/ML
40 INJECTION SUBCUTANEOUS EVERY 12 HOURS
Refills: 0 | Status: DISCONTINUED | OUTPATIENT
Start: 2021-01-29 | End: 2021-01-29

## 2021-01-29 RX ORDER — ENOXAPARIN SODIUM 100 MG/ML
40 INJECTION SUBCUTANEOUS EVERY 12 HOURS
Refills: 0 | Status: DISCONTINUED | OUTPATIENT
Start: 2021-01-29 | End: 2021-03-03

## 2021-01-29 RX ORDER — DIAZEPAM 5 MG
5 TABLET ORAL EVERY 6 HOURS
Refills: 0 | Status: DISCONTINUED | OUTPATIENT
Start: 2021-01-29 | End: 2021-01-30

## 2021-01-29 RX ORDER — QUETIAPINE FUMARATE 200 MG/1
50 TABLET, FILM COATED ORAL ONCE
Refills: 0 | Status: COMPLETED | OUTPATIENT
Start: 2021-01-29 | End: 2021-01-29

## 2021-01-29 RX ORDER — ACETAMINOPHEN 500 MG
1000 TABLET ORAL ONCE
Refills: 0 | Status: COMPLETED | OUTPATIENT
Start: 2021-01-29 | End: 2021-01-29

## 2021-01-29 RX ORDER — QUETIAPINE FUMARATE 200 MG/1
50 TABLET, FILM COATED ORAL DAILY
Refills: 0 | Status: DISCONTINUED | OUTPATIENT
Start: 2021-01-29 | End: 2021-02-03

## 2021-01-29 RX ORDER — POTASSIUM CHLORIDE 20 MEQ
10 PACKET (EA) ORAL
Refills: 0 | Status: COMPLETED | OUTPATIENT
Start: 2021-01-29 | End: 2021-01-29

## 2021-01-29 RX ORDER — MIDAZOLAM HYDROCHLORIDE 1 MG/ML
4 INJECTION, SOLUTION INTRAMUSCULAR; INTRAVENOUS ONCE
Refills: 0 | Status: DISCONTINUED | OUTPATIENT
Start: 2021-01-29 | End: 2021-01-29

## 2021-01-29 RX ORDER — ACETAMINOPHEN 500 MG
975 TABLET ORAL EVERY 6 HOURS
Refills: 0 | Status: DISCONTINUED | OUTPATIENT
Start: 2021-01-29 | End: 2021-02-01

## 2021-01-29 RX ORDER — CEPHALEXIN 500 MG
500 CAPSULE ORAL
Refills: 0 | Status: DISCONTINUED | OUTPATIENT
Start: 2021-01-29 | End: 2021-01-30

## 2021-01-29 RX ORDER — OXYCODONE HYDROCHLORIDE 5 MG/1
5 TABLET ORAL EVERY 6 HOURS
Refills: 0 | Status: DISCONTINUED | OUTPATIENT
Start: 2021-01-29 | End: 2021-02-04

## 2021-01-29 RX ORDER — OXYCODONE HYDROCHLORIDE 5 MG/1
5 TABLET ORAL ONCE
Refills: 0 | Status: DISCONTINUED | OUTPATIENT
Start: 2021-01-29 | End: 2021-01-29

## 2021-01-29 RX ORDER — FUROSEMIDE 40 MG
40 TABLET ORAL ONCE
Refills: 0 | Status: COMPLETED | OUTPATIENT
Start: 2021-01-29 | End: 2021-01-29

## 2021-01-29 RX ORDER — POLYETHYLENE GLYCOL 3350 17 G/17G
17 POWDER, FOR SOLUTION ORAL DAILY
Refills: 0 | Status: DISCONTINUED | OUTPATIENT
Start: 2021-01-29 | End: 2021-02-07

## 2021-01-29 RX ORDER — POTASSIUM CHLORIDE 20 MEQ
10 PACKET (EA) ORAL ONCE
Refills: 0 | Status: DISCONTINUED | OUTPATIENT
Start: 2021-01-29 | End: 2021-01-29

## 2021-01-29 RX ORDER — POTASSIUM CHLORIDE 20 MEQ
10 PACKET (EA) ORAL
Refills: 0 | Status: COMPLETED | OUTPATIENT
Start: 2021-01-29 | End: 2021-01-30

## 2021-01-29 RX ADMIN — ALBUTEROL 2 PUFF(S): 90 AEROSOL, METERED ORAL at 16:11

## 2021-01-29 RX ADMIN — Medication 1 APPLICATION(S): at 06:43

## 2021-01-29 RX ADMIN — Medication 5 MILLIGRAM(S): at 18:27

## 2021-01-29 RX ADMIN — SENNA PLUS 10 MILLILITER(S): 8.6 TABLET ORAL at 11:48

## 2021-01-29 RX ADMIN — Medication 1: at 05:59

## 2021-01-29 RX ADMIN — ALBUTEROL 2 PUFF(S): 90 AEROSOL, METERED ORAL at 02:52

## 2021-01-29 RX ADMIN — ENOXAPARIN SODIUM 40 MILLIGRAM(S): 100 INJECTION SUBCUTANEOUS at 18:27

## 2021-01-29 RX ADMIN — ALBUTEROL 2 PUFF(S): 90 AEROSOL, METERED ORAL at 10:51

## 2021-01-29 RX ADMIN — ENOXAPARIN SODIUM 40 MILLIGRAM(S): 100 INJECTION SUBCUTANEOUS at 06:00

## 2021-01-29 RX ADMIN — HYDROMORPHONE HYDROCHLORIDE 1 MILLIGRAM(S): 2 INJECTION INTRAMUSCULAR; INTRAVENOUS; SUBCUTANEOUS at 08:40

## 2021-01-29 RX ADMIN — CHLORHEXIDINE GLUCONATE 15 MILLILITER(S): 213 SOLUTION TOPICAL at 18:27

## 2021-01-29 RX ADMIN — QUETIAPINE FUMARATE 50 MILLIGRAM(S): 200 TABLET, FILM COATED ORAL at 22:52

## 2021-01-29 RX ADMIN — CHLORHEXIDINE GLUCONATE 1 APPLICATION(S): 213 SOLUTION TOPICAL at 06:01

## 2021-01-29 RX ADMIN — QUETIAPINE FUMARATE 50 MILLIGRAM(S): 200 TABLET, FILM COATED ORAL at 11:53

## 2021-01-29 RX ADMIN — Medication 1: at 11:36

## 2021-01-29 RX ADMIN — MIDAZOLAM HYDROCHLORIDE 4 MILLIGRAM(S): 1 INJECTION, SOLUTION INTRAMUSCULAR; INTRAVENOUS at 22:52

## 2021-01-29 RX ADMIN — SPIRONOLACTONE 25 MILLIGRAM(S): 25 TABLET, FILM COATED ORAL at 12:50

## 2021-01-29 RX ADMIN — Medication 15 MILLILITER(S): at 16:04

## 2021-01-29 RX ADMIN — Medication 100 MILLIEQUIVALENT(S): at 10:00

## 2021-01-29 RX ADMIN — PANTOPRAZOLE SODIUM 40 MILLIGRAM(S): 20 TABLET, DELAYED RELEASE ORAL at 11:48

## 2021-01-29 RX ADMIN — HYDROMORPHONE HYDROCHLORIDE 1 MILLIGRAM(S): 2 INJECTION INTRAMUSCULAR; INTRAVENOUS; SUBCUTANEOUS at 20:33

## 2021-01-29 RX ADMIN — Medication 5 MILLIGRAM(S): at 12:50

## 2021-01-29 RX ADMIN — POLYETHYLENE GLYCOL 3350 17 GRAM(S): 17 POWDER, FOR SOLUTION ORAL at 11:48

## 2021-01-29 RX ADMIN — Medication 40 MILLIGRAM(S): at 16:05

## 2021-01-29 RX ADMIN — Medication 20 MILLIGRAM(S): at 06:00

## 2021-01-29 RX ADMIN — CHLORHEXIDINE GLUCONATE 15 MILLILITER(S): 213 SOLUTION TOPICAL at 06:00

## 2021-01-29 RX ADMIN — Medication 5 MILLIGRAM(S): at 22:03

## 2021-01-29 RX ADMIN — HYDROMORPHONE HYDROCHLORIDE 1 MILLIGRAM(S): 2 INJECTION INTRAMUSCULAR; INTRAVENOUS; SUBCUTANEOUS at 23:43

## 2021-01-29 RX ADMIN — DEXMEDETOMIDINE HYDROCHLORIDE IN 0.9% SODIUM CHLORIDE 27.5 MICROGRAM(S)/KG/HR: 4 INJECTION INTRAVENOUS at 21:59

## 2021-01-29 RX ADMIN — ALBUTEROL 2 PUFF(S): 90 AEROSOL, METERED ORAL at 21:17

## 2021-01-29 RX ADMIN — Medication 100 MILLIEQUIVALENT(S): at 09:00

## 2021-01-29 RX ADMIN — Medication 100 MILLIEQUIVALENT(S): at 08:30

## 2021-01-29 RX ADMIN — Medication 400 MILLIGRAM(S): at 13:27

## 2021-01-29 RX ADMIN — DEXMEDETOMIDINE HYDROCHLORIDE IN 0.9% SODIUM CHLORIDE 27.5 MICROGRAM(S)/KG/HR: 4 INJECTION INTRAVENOUS at 13:26

## 2021-01-29 RX ADMIN — PROPOFOL 6.59 MICROGRAM(S)/KG/MIN: 10 INJECTION, EMULSION INTRAVENOUS at 08:41

## 2021-01-29 RX ADMIN — Medication 975 MILLIGRAM(S): at 21:58

## 2021-01-29 RX ADMIN — Medication 3: at 23:05

## 2021-01-29 RX ADMIN — Medication 100 MILLIEQUIVALENT(S): at 23:22

## 2021-01-29 NOTE — PROGRESS NOTE ADULT - SUBJECTIVE AND OBJECTIVE BOX
DATE OF SERVICE: 21 @ 17:00    Subjective: Patient seen and examined. No new events except as noted.   trache and peg     MEDICATIONS:  MEDICATIONS  (STANDING):  ALBUTerol    90 MICROgram(s) HFA Inhaler 2 Puff(s) Inhalation every 6 hours  BACItracin   Ointment 1 Application(s) Topical two times a day  chlorhexidine 0.12% Liquid 15 milliLiter(s) Oral Mucosa every 12 hours  chlorhexidine 2% Cloths 1 Application(s) Topical <User Schedule>  dexMEDEtomidine Infusion 1 MICROgram(s)/kG/Hr (27.5 mL/Hr) IV Continuous <Continuous>  dextrose 5%. 1000 milliLiter(s) (50 mL/Hr) IV Continuous <Continuous>  dextrose 5%. 1000 milliLiter(s) (100 mL/Hr) IV Continuous <Continuous>  diazepam    Tablet 5 milliGRAM(s) Oral every 4 hours  enoxaparin Injectable 40 milliGRAM(s) SubCutaneous every 12 hours  glucagon  Injectable 1 milliGRAM(s) IntraMuscular once  insulin lispro (ADMELOG) corrective regimen sliding scale   SubCutaneous every 6 hours  multivitamin/minerals/iron Oral Solution (CENTRUM) 15 milliLiter(s) Oral daily  oxyCODONE    IR 5 milliGRAM(s) Oral every 6 hours  pantoprazole  Injectable 40 milliGRAM(s) IV Push daily  petrolatum Ophthalmic Ointment 1 Application(s) Both EYES two times a day  piperacillin/tazobactam IVPB.. 3.375 Gram(s) IV Intermittent every 8 hours  polyethylene glycol 3350 17 Gram(s) Oral daily  potassium chloride    Tablet ER 40 milliEquivalent(s) Oral once  QUEtiapine 50 milliGRAM(s) Oral daily  QUEtiapine 100 milliGRAM(s) Oral at bedtime  senna Syrup 10 milliLiter(s) Oral daily  vancomycin  IVPB      vancomycin  IVPB 1000 milliGRAM(s) IV Intermittent every 12 hours      PHYSICAL EXAM:  T(C): 37.9 (21 @ 12:00), Max: 38.4 (21 @ 23:00)  HR: 116 (21 @ 14:58) (108 - 146)  BP: --  RR: 29 (21 @ 14:00) (25 - 36)  SpO2: 100% (21 @ 14:58) (94% - 100%)  Wt(kg): --  I&O's Summary    2021 07:01  -  2021 07:00  --------------------------------------------------------  IN: 2192.2 mL / OUT: 2420 mL / NET: -227.8 mL    2021 07:01  -  2021 17:00  --------------------------------------------------------  IN: 1090.5 mL / OUT: 185 mL / NET: 905.5 mL        All labs, Imaging and EKGs personally reviewed                             8.6    11.51 )-----------( 359      ( 2021 03:17 )             27.9               30    138  |  100  |  17  ----------------------------<  271<H>  3.0<L>   |  27  |  0.44<L>    Ca    8.8      2021 03:17  Phos  2.7       Mg     2.0         TPro  7.1  /  Alb  3.3  /  TBili  0.8  /  DBili  x   /  AST  29  /  ALT  32  /  AlkPhos  117  30    PT/INR - ( 2021 03:17 )   PT: 15.9 sec;   INR: 1.42 ratio         PTT - ( 2021 03:17 )  PTT:28.3 sec                 ABG - ( 2021 09:01 )  pH, Arterial: 7.46  pH, Blood: x     /  pCO2: 41    /  pO2: 122   / HCO3: 29    / Base Excess: 5.0   /  SaO2: 98.8              Urinalysis Basic - ( 2021 00:47 )    Color: Orange / Appearance: Turbid / S.033 / pH: x  Gluc: x / Ketone: Small  / Bili: Negative / Urobili: <2 mg/dL   Blood: x / Protein: 30 mg/dL / Nitrite: Negative   Leuk Esterase: Negative / RBC: 1 /HPF / WBC 2 /HPF   Sq Epi: x / Non Sq Epi: Few / Bacteria: Many

## 2021-01-29 NOTE — PROGRESS NOTE ADULT - ATTENDING COMMENTS
Dr. Patricio (Attending Physician)  N - On precedex and propofol, start valium 5 mg q6 hours, and seroquel 50 mg qam, on 100 mg qhs, and oxycodone 5 mg [id number]  P - PS trial 12/8 tolerating well, will send blood gas at 12pm, send abg at 12 pm  C - no vasopressors, BP stable  GI - will restart TFs today, had BM, will decrease miralax to once daily   - -923 yesterday, will give spironolactone 25 mg and lasix 20 mg today  H - lovenox for dvt ppx  ID - fever overnight, wbc low, ceftriaxone completed for UTI cx negative yesterday  E - ssi glucose well controlled

## 2021-01-29 NOTE — CHART NOTE - NSCHARTNOTEFT_GEN_A_CORE
Spoke with Mr. Arnold Ward [phone]. Updates on patient's condition and plan given, all questions were answered.

## 2021-01-29 NOTE — PROGRESS NOTE ADULT - ASSESSMENT
46-year-old woman, no PMH who was admitted to the hospital for management ARF with hypoxia secondary to covid-19 pneumonia, is status post intubation from 1/5 to 1/9 for management of acute respiratory distress syndrome, and who was now re-intubated (since 1/16) and in the MICU for management of severe ARDS secondary to covid-19 pneumonia. Transfer to [facility] ICU on 1/21.

## 2021-01-29 NOTE — PROGRESS NOTE ADULT - ASSESSMENT
46-year-old woman, no PMH who was admitted to the hospital for management ARF with hypoxia secondary to covid-19 pneumonia, is status post intubation from 1/5 to 1/9 for management of acute respiratory distress syndrome, and who was now re-intubated (since 1/16) and in the MICU for management of severe ARDS secondary to covid-19 pneumonia. Transfer to [facility] ICU on 1/21.     Neuro:  -sedated with propofol, increase precedex and wean off of propofol. SBT this morning, continue as tolerated.  -wean slowly as tolerated   -add valium 5mg q6hr. continue seroquel 100mg at night, add 50mg during the day.   -oxycodone 5mg q6 hrs.    CV:  -off levophed  -No known history of coronary artery disease or of arrhythmia    Respiratory: COVID ARDS  intubation 01/05, extubation 01/09, reintubation 01/16, prone 1/21-24, trached 01/28  -SBT today. CPAP settings 12/8, tolerating well. Continue as tolerated. Follow up ABG    Renal:  -Serum creatinine at baseline  -continue to monitor urine output -2.2L over past 24 hrs  -no electrolyte abnormalities at present  -Will continue to monitor daily metabolic panel and correct as needed     ID:  -S/p 10 days of dexamethasone 1/5-14 and 5 days of remdesivir 1/5-9 for treatment of severe covid-19   -In the setting of need for re-intubation, s/p zosyn administration for five days 1/16-20 for empiric coverage of superimposed bacterial pneumonia;   -No growth to date from cultures- last- blood 1/16  -febrile overnight, wbc low. Completed ceftriaxone, urine culture negative.     Endo:  -No active concerns at this time   -A1c 6.6, meeting criteria for diagnosis of diabetes mellitus  -Cont ISS, monitor finger sticks    GI:  -Resume feeds through PEG tube.  -Significant BM yesterday 01/28.   -Decrease miralax to once daily    Heme:  -DVT ppx: lovenox 40 q12h  -Negative bilateral lower extremity dopplers despite rising d-dimer 01/20/21.

## 2021-01-29 NOTE — PROGRESS NOTE ADULT - SUBJECTIVE AND OBJECTIVE BOX
General: NAD  HEENT: NC/AT; PERRL, clear conjunctiva  Neck: supple  Respiratory: CTA b/l  Cardiovascular: +S1/S2; RRR  Abdomen: soft, NT/ND; +BS x4  Extremities: WWP, 2+ peripheral pulses b/l; no LE edema  : genital lesions  Skin: normal color and turgor; no rash  Neurological: intubated & sedated HISTORY  46y Female with COVID-19(+) pneumonia requiring intubation.    OVERNIGHT EVENTS:   - Febrile 101F last night, now afebrile. No interventions at that time.    SUBJECTIVE/ROS: Patient sedated and intubated. History was obtained, to the extent possible, from review of the chart and collateral sources of information.    MEDICATIONS  (STANDING):  acetaminophen  IVPB .. 1000 milliGRAM(s) IV Intermittent once  ALBUTerol    90 MICROgram(s) HFA Inhaler 2 Puff(s) Inhalation every 6 hours  chlorhexidine 0.12% Liquid 15 milliLiter(s) Oral Mucosa every 12 hours  chlorhexidine 2% Cloths 1 Application(s) Topical <User Schedule>  dexMEDEtomidine Infusion 1 MICROgram(s)/kG/Hr (27.5 mL/Hr) IV Continuous <Continuous>  dextrose 5%. 1000 milliLiter(s) (50 mL/Hr) IV Continuous <Continuous>  dextrose 5%. 1000 milliLiter(s) (100 mL/Hr) IV Continuous <Continuous>  diazepam    Tablet 5 milliGRAM(s) Oral every 6 hours  enoxaparin Injectable 40 milliGRAM(s) SubCutaneous every 12 hours  furosemide    Tablet 40 milliGRAM(s) Oral once  glucagon  Injectable 1 milliGRAM(s) IntraMuscular once  insulin lispro (ADMELOG) corrective regimen sliding scale   SubCutaneous every 6 hours  multivitamin/minerals/iron Oral Solution (CENTRUM) 15 milliLiter(s) Oral daily  norepinephrine Infusion 0.05 MICROgram(s)/kG/Min (5.15 mL/Hr) IV Continuous <Continuous>  oxyCODONE    IR 5 milliGRAM(s) Oral every 6 hours  pantoprazole  Injectable 40 milliGRAM(s) IV Push daily  petrolatum Ophthalmic Ointment 1 Application(s) Both EYES two times a day  polyethylene glycol 3350 17 Gram(s) Oral daily  propofol Infusion 10 MICROgram(s)/kG/Min (6.59 mL/Hr) IV Continuous <Continuous>  QUEtiapine 100 milliGRAM(s) Oral at bedtime  QUEtiapine 50 milliGRAM(s) Oral daily  senna Syrup 10 milliLiter(s) Oral daily  spironolactone 25 milliGRAM(s) Oral once    MEDICATIONS  (PRN):  HYDROmorphone  Injectable 1 milliGRAM(s) IV Push every 3 hours PRN Agitation / Tachypnea    ICU Vital Signs Last 24 Hrs  T(C): 38.1 (29 Jan 2021 08:00), Max: 38.3 (28 Jan 2021 20:00)  T(F): 100.6 (29 Jan 2021 08:00), Max: 101 (28 Jan 2021 20:00)  HR: 102 (29 Jan 2021 10:51) (74 - 115)  BP: 99/63 (28 Jan 2021 14:15) (79/53 - 99/63)  BP(mean): 67 (28 Jan 2021 14:15) (56 - 67)  ABP: 122/68 (29 Jan 2021 09:00) (81/47 - 135/71)  ABP(mean): 80 (29 Jan 2021 04:00) (59 - 90)  RR: 34 (29 Jan 2021 09:00) (28 - 38)  SpO2: 99% (29 Jan 2021 10:51) (94% - 100%)    I&O's Summary    28 Jan 2021 07:01  -  29 Jan 2021 07:00  --------------------------------------------------------  IN: 1109.4 mL / OUT: 2035 mL / NET: -925.6 mL    29 Jan 2021 07:01  -  29 Jan 2021 12:29  --------------------------------------------------------  IN: 125.2 mL / OUT: 1095 mL / NET: -969.8 mL                          8.7    9.89  )-----------( 328      ( 29 Jan 2021 05:03 )             28.2     01-29    145  |  103  |  20  ----------------------------<  146<H>  3.4<L>   |  31  |  0.50    Ca    8.9      29 Jan 2021 05:03  Phos  4.3     01-29  Mg     2.1     01-29    TPro  7.0  /  Alb  3.1<L>  /  TBili  0.3  /  DBili  x   /  AST  30  /  ALT  38<H>  /  AlkPhos  131<H>  01-28    PT/INR - ( 28 Jan 2021 01:19 )   PT: 13.8 sec;   INR: 1.21 ratio      PTT - ( 28 Jan 2021 01:19 )  PTT:30.7 sec     HISTORY  46y Female with COVID-19(+) pneumonia requiring intubation.    OVERNIGHT EVENTS:   - Febrile 101F last night, now afebrile. No interventions at that time.    SUBJECTIVE/ROS: Patient sedated and intubated. History was obtained, to the extent possible, from review of the chart and collateral sources of information.    ALLERGIES:  No Known Allergies     VITALS:  ICU Vital Signs Last 24 Hrs  T(C): 38.1 (29 Jan 2021 08:00), Max: 38.3 (28 Jan 2021 20:00)  T(F): 100.6 (29 Jan 2021 08:00), Max: 101 (28 Jan 2021 20:00)  HR: 102 (29 Jan 2021 10:51) (74 - 115)  BP: 99/63 (28 Jan 2021 14:15) (79/53 - 99/63)  BP(mean): 67 (28 Jan 2021 14:15) (56 - 67)  ABP: 122/68 (29 Jan 2021 09:00) (81/47 - 135/71)  ABP(mean): 80 (29 Jan 2021 04:00) (59 - 90)  RR: 34 (29 Jan 2021 09:00) (28 - 38)  SpO2: 99% (29 Jan 2021 10:51) (94% - 100%)    Mode: CPAP with PS FiO2: 50 PEEP: 5 PS: 12 MAP: 13 PIP: 24    I&O's Summary    28 Jan 2021 07:01  -  29 Jan 2021 07:00  --------------------------------------------------------  IN: 1109.4 mL / OUT: 2035 mL / NET: -925.6 mL    29 Jan 2021 07:01  -  29 Jan 2021 12:35  --------------------------------------------------------  IN: 125.2 mL / OUT: 1095 mL / NET: -969.8 mL    LABS:                                   12.4   8.91  )-----------( 275      ( 29 Jan 2021 03:44 )             39.5       01-29    143  |  105  |  59<H>  ----------------------------<  190<H>  4.5   |  28  |  1.54<H>    Ca    8.5      29 Jan 2021 03:44  Phos  3.4     01-29  Mg     3.3     01-29    TPro  7.0  /  Alb  3.0<L>  /  TBili  0.4  /  DBili  x   /  AST  85<H>  /  ALT  94<H>  /  AlkPhos  52  01-29    PHYSICAL EXAM:    General: Intubated, sedated   HEENT: NC/AC  Neck: supple  Respiratory: Mechanical vent sounds  Cardiovascular: +S1/S2; RRR  Abdomen: soft, NT/ND; +BS x4  Extremities: 2+ peripheral pulses   Skin: normal color and turgor; no rash

## 2021-01-29 NOTE — PROGRESS NOTE ADULT - SUBJECTIVE AND OBJECTIVE BOX
Chief Complaint:  Patient is a 46y old  Female who presents with a chief complaint of COVID (2021 07:46)      Interval Events:   s/p PEG Yesterday    Allergies:  No Known Allergies      Hospital Medications:  ALBUTerol    90 MICROgram(s) HFA Inhaler 2 Puff(s) Inhalation every 6 hours  ceFAZolin   IVPB 2000 milliGRAM(s) IV Intermittent once  chlorhexidine 0.12% Liquid 15 milliLiter(s) Oral Mucosa every 12 hours  chlorhexidine 2% Cloths 1 Application(s) Topical <User Schedule>  dexMEDEtomidine Infusion 1 MICROgram(s)/kG/Hr IV Continuous <Continuous>  dextrose 5%. 1000 milliLiter(s) IV Continuous <Continuous>  dextrose 5%. 1000 milliLiter(s) IV Continuous <Continuous>  diazepam  Injectable 5 milliGRAM(s) IV Push every 4 hours PRN  furosemide   Injectable 20 milliGRAM(s) IV Push two times a day  glucagon  Injectable 1 milliGRAM(s) IntraMuscular once  HYDROmorphone  Injectable 1 milliGRAM(s) IV Push every 3 hours PRN  insulin lispro (ADMELOG) corrective regimen sliding scale   SubCutaneous every 6 hours  methylnaltrexone Injectable 12 milliGRAM(s) SubCutaneous once  multivitamin/minerals/iron Oral Solution (CENTRUM) 15 milliLiter(s) Oral daily  norepinephrine Infusion 0.05 MICROgram(s)/kG/Min IV Continuous <Continuous>  pantoprazole  Injectable 40 milliGRAM(s) IV Push daily  petrolatum Ophthalmic Ointment 1 Application(s) Both EYES two times a day  polyethylene glycol 3350 17 Gram(s) Oral <User Schedule>  propofol Infusion 10 MICROgram(s)/kG/Min IV Continuous <Continuous>  QUEtiapine 100 milliGRAM(s) Oral at bedtime  senna Syrup 10 milliLiter(s) Oral daily      PMHX/PSHX:  No pertinent past medical history    No significant past surgical history        Family history:  FH: type 2 diabetes        ROS:     cannot provide    PHYSICAL EXAM:     GENERAL:  Appears stated age, well-groomed, well-nourished, no distress, intubated  HEENT:  NC/AT,  conjunctivae clear, sclera-anicteric  NECK: Trachea midline, supple  CHEST:  Full & symmetric excursion, no increased effort, breath sounds clear  HEART:  Regular rhythm, no minerva/heave  ABDOMEN:  Soft, non-tender, non-distended, normoactive bowel sounds,  no masses ,no hepato-splenomegaly, gastrostomy intact  EXTREMITIES:  no cyanosis,clubbing or edema  SKIN:  No rash/erythema/petechiae, no jaundice  NEURO:  nonverbal, sedated  RECTAL: Deferred    Vital Signs:  Vital Signs Last 24 Hrs  T(C): 37.9 (2021 12:00), Max: 38 (2021 19:43)  T(F): 100.2 (2021 12:00), Max: 100.4 (2021 19:43)  HR: 74 (2021 12:00) (72 - 106)  BP: 103/59 (2021 12:00) (103/59 - 121/-)  BP(mean): 76 (2021 12:00) (76 - 79)  RR: 26 (2021 04:00) (26 - 42)  SpO2: 98% (2021 12:00) (94% - 100%)  Daily     Daily     LABS:                        8.6    10.39 )-----------( 254      ( 2021 01:21 )             28.0         142  |  102  |  16  ----------------------------<  207<H>  3.3<L>   |  31  |  0.45<L>    Ca    8.9      2021 01:20  Phos  3.6       Mg     2.2         TPro  7.0  /  Alb  3.1<L>  /  TBili  0.3  /  DBili  x   /  AST  30  /  ALT  38<H>  /  AlkPhos  131<H>      LIVER FUNCTIONS - ( 2021 01:20 )  Alb: 3.1 g/dL / Pro: 7.0 g/dL / ALK PHOS: 131 U/L / ALT: 38 U/L / AST: 30 U/L / GGT: x           PT/INR - ( 2021 01:19 )   PT: 13.8 sec;   INR: 1.21 ratio         PTT - ( 2021 01:19 )  PTT:30.7 sec  Urinalysis Basic - ( 2021 00:50 )    Color: Yellow / Appearance: Slightly Turbid / S.041 / pH: x  Gluc: x / Ketone: Trace  / Bili: Negative / Urobili: 6 mg/dL   Blood: x / Protein: 100 mg/dL / Nitrite: Negative   Leuk Esterase: Negative / RBC: 3 /HPF / WBC 7 /HPF   Sq Epi: x / Non Sq Epi: 2 /HPF / Bacteria: Occasional          Imaging:

## 2021-01-29 NOTE — PROGRESS NOTE ADULT - ASSESSMENT
46 F w respiratory failure due to COVID 19     Problem/Recommendation - 1:  Problem: Acute respiratory failure with hypoxia. Recommendation: s/p tracheostomy and PEG. No signs of post proceure issues at this time  -may intiate G tube feeds     Problem/Recommendation - 2:  ·  Problem: COVID-19.  Recommendation: status post dexamethasone.      Problem/Recommendation - 3:  ·  Problem: Normocytic anemia.  Recommendation: without overt GI bleeding. Likely due to critical illness.  -monitor for GI bleed  -PPI QD.      Problem/Recommendation - 4:  ·  Problem: Abnormal LFTs.  Recommendation: multifactorial, likely NAFLD, COVID, critical illness. Can consider US to r/o gallstone disease, especially if increasing.      Problem/Recommendation - 5:  ·  Problem: Morbid obesity.      Problem/Recommendation - 6:  Problem: Constipation. Recommendation: consider XR abdomen to assess for ileus  on a bowel regimen.    Attending Attestation:   Differential diagnosis and plan of care discussed with patient after the evaluation  35 Minutes spent on total encounter of which more than fifty percent of the encounter was spent counseling and/or coordinating care by the attending physician.    Mele Bolanos M.D.   Gastroenterology and Hepatology  Cell: [phone].

## 2021-01-30 LAB
-  AMPICILLIN/SULBACTAM: SIGNIFICANT CHANGE UP
-  CEFAZOLIN: SIGNIFICANT CHANGE UP
-  CLINDAMYCIN: SIGNIFICANT CHANGE UP
-  DAPTOMYCIN: SIGNIFICANT CHANGE UP
-  ERYTHROMYCIN: SIGNIFICANT CHANGE UP
-  GENTAMICIN: SIGNIFICANT CHANGE UP
-  LINEZOLID: SIGNIFICANT CHANGE UP
-  OXACILLIN: SIGNIFICANT CHANGE UP
-  PENICILLIN: SIGNIFICANT CHANGE UP
-  RIFAMPIN: SIGNIFICANT CHANGE UP
-  TETRACYCLINE: SIGNIFICANT CHANGE UP
-  TRIMETHOPRIM/SULFAMETHOXAZOLE: SIGNIFICANT CHANGE UP
-  VANCOMYCIN: SIGNIFICANT CHANGE UP
ALBUMIN SERPL ELPH-MCNC: 3.3 G/DL — SIGNIFICANT CHANGE UP (ref 3.3–5)
ALBUMIN SERPL ELPH-MCNC: 3.3 G/DL — SIGNIFICANT CHANGE UP (ref 3.3–5)
ALP SERPL-CCNC: 117 U/L — SIGNIFICANT CHANGE UP (ref 40–120)
ALP SERPL-CCNC: 121 U/L — HIGH (ref 40–120)
ALT FLD-CCNC: 32 U/L — SIGNIFICANT CHANGE UP (ref 4–33)
ALT FLD-CCNC: 48 U/L — HIGH (ref 4–33)
ANION GAP SERPL CALC-SCNC: 11 MMOL/L — SIGNIFICANT CHANGE UP (ref 7–14)
ANION GAP SERPL CALC-SCNC: 12 MMOL/L — SIGNIFICANT CHANGE UP (ref 7–14)
APPEARANCE UR: ABNORMAL
APTT BLD: 28.3 SEC — SIGNIFICANT CHANGE UP (ref 27–36.3)
AST SERPL-CCNC: 29 U/L — SIGNIFICANT CHANGE UP (ref 4–32)
AST SERPL-CCNC: 47 U/L — HIGH (ref 4–32)
BACTERIA # UR AUTO: ABNORMAL
BASE EXCESS BLDA CALC-SCNC: 6.4 MMOL/L — HIGH (ref -2–2)
BASOPHILS # BLD AUTO: 0.06 K/UL — SIGNIFICANT CHANGE UP (ref 0–0.2)
BASOPHILS NFR BLD AUTO: 0.5 % — SIGNIFICANT CHANGE UP (ref 0–2)
BILIRUB SERPL-MCNC: 0.5 MG/DL — SIGNIFICANT CHANGE UP (ref 0.2–1.2)
BILIRUB SERPL-MCNC: 0.8 MG/DL — SIGNIFICANT CHANGE UP (ref 0.2–1.2)
BILIRUB UR-MCNC: NEGATIVE — SIGNIFICANT CHANGE UP
BLOOD GAS ARTERIAL COMPREHENSIVE RESULT: SIGNIFICANT CHANGE UP
BUN SERPL-MCNC: 15 MG/DL — SIGNIFICANT CHANGE UP (ref 7–23)
BUN SERPL-MCNC: 17 MG/DL — SIGNIFICANT CHANGE UP (ref 7–23)
CALCIUM SERPL-MCNC: 8.5 MG/DL — SIGNIFICANT CHANGE UP (ref 8.4–10.5)
CALCIUM SERPL-MCNC: 8.8 MG/DL — SIGNIFICANT CHANGE UP (ref 8.4–10.5)
CHLORIDE SERPL-SCNC: 100 MMOL/L — SIGNIFICANT CHANGE UP (ref 98–107)
CHLORIDE SERPL-SCNC: 100 MMOL/L — SIGNIFICANT CHANGE UP (ref 98–107)
CO2 SERPL-SCNC: 27 MMOL/L — SIGNIFICANT CHANGE UP (ref 22–31)
CO2 SERPL-SCNC: 28 MMOL/L — SIGNIFICANT CHANGE UP (ref 22–31)
COLOR SPEC: ABNORMAL
CREAT SERPL-MCNC: 0.44 MG/DL — LOW (ref 0.5–1.3)
CREAT SERPL-MCNC: 0.48 MG/DL — LOW (ref 0.5–1.3)
CRP SERPL-MCNC: 138 MG/L — HIGH
CULTURE RESULTS: SIGNIFICANT CHANGE UP
D DIMER BLD IA.RAPID-MCNC: 1066 NG/ML DDU — HIGH
DIFF PNL FLD: NEGATIVE — SIGNIFICANT CHANGE UP
EOSINOPHIL # BLD AUTO: 0.03 K/UL — SIGNIFICANT CHANGE UP (ref 0–0.5)
EOSINOPHIL NFR BLD AUTO: 0.3 % — SIGNIFICANT CHANGE UP (ref 0–6)
EPI CELLS # UR: SIGNIFICANT CHANGE UP
FERRITIN SERPL-MCNC: 162 NG/ML — HIGH (ref 15–150)
GLUCOSE SERPL-MCNC: 207 MG/DL — HIGH (ref 70–99)
GLUCOSE SERPL-MCNC: 271 MG/DL — HIGH (ref 70–99)
GLUCOSE UR QL: NEGATIVE — SIGNIFICANT CHANGE UP
GRAM STN FLD: SIGNIFICANT CHANGE UP
HCO3 BLDA-SCNC: 30 MMOL/L — HIGH (ref 22–26)
HCT VFR BLD CALC: 27.9 % — LOW (ref 34.5–45)
HGB BLD-MCNC: 8.6 G/DL — LOW (ref 11.5–15.5)
IANC: 8.78 K/UL — HIGH (ref 1.5–8.5)
IMM GRANULOCYTES NFR BLD AUTO: 0.6 % — SIGNIFICANT CHANGE UP (ref 0–1.5)
INR BLD: 1.42 RATIO — HIGH (ref 0.88–1.16)
KETONES UR-MCNC: ABNORMAL
LEUKOCYTE ESTERASE UR-ACNC: NEGATIVE — SIGNIFICANT CHANGE UP
LYMPHOCYTES # BLD AUTO: 1.59 K/UL — SIGNIFICANT CHANGE UP (ref 1–3.3)
LYMPHOCYTES # BLD AUTO: 13.8 % — SIGNIFICANT CHANGE UP (ref 13–44)
MAGNESIUM SERPL-MCNC: 2 MG/DL — SIGNIFICANT CHANGE UP (ref 1.6–2.6)
MCHC RBC-ENTMCNC: 27.3 PG — SIGNIFICANT CHANGE UP (ref 27–34)
MCHC RBC-ENTMCNC: 30.8 GM/DL — LOW (ref 32–36)
MCV RBC AUTO: 88.6 FL — SIGNIFICANT CHANGE UP (ref 80–100)
METHOD TYPE: SIGNIFICANT CHANGE UP
MONOCYTES # BLD AUTO: 0.98 K/UL — HIGH (ref 0–0.9)
MONOCYTES NFR BLD AUTO: 8.5 % — SIGNIFICANT CHANGE UP (ref 2–14)
NEUTROPHILS # BLD AUTO: 8.78 K/UL — HIGH (ref 1.8–7.4)
NEUTROPHILS NFR BLD AUTO: 76.3 % — SIGNIFICANT CHANGE UP (ref 43–77)
NITRITE UR-MCNC: NEGATIVE — SIGNIFICANT CHANGE UP
NRBC # BLD: 0 /100 WBCS — SIGNIFICANT CHANGE UP
NRBC # FLD: 0 K/UL — SIGNIFICANT CHANGE UP
ORGANISM # SPEC MICROSCOPIC CNT: SIGNIFICANT CHANGE UP
ORGANISM # SPEC MICROSCOPIC CNT: SIGNIFICANT CHANGE UP
PCO2 BLDA: 42 MMHG — SIGNIFICANT CHANGE UP (ref 32–48)
PH BLDA: 7.47 — HIGH (ref 7.35–7.45)
PH UR: 5.5 — SIGNIFICANT CHANGE UP (ref 5–8)
PHOSPHATE SERPL-MCNC: 2.7 MG/DL — SIGNIFICANT CHANGE UP (ref 2.5–4.5)
PLATELET # BLD AUTO: 359 K/UL — SIGNIFICANT CHANGE UP (ref 150–400)
PO2 BLDA: 107 MMHG — SIGNIFICANT CHANGE UP (ref 83–108)
POTASSIUM SERPL-MCNC: 2.9 MMOL/L — CRITICAL LOW (ref 3.5–5.3)
POTASSIUM SERPL-MCNC: 3 MMOL/L — LOW (ref 3.5–5.3)
POTASSIUM SERPL-SCNC: 2.9 MMOL/L — CRITICAL LOW (ref 3.5–5.3)
POTASSIUM SERPL-SCNC: 3 MMOL/L — LOW (ref 3.5–5.3)
PROCALCITONIN SERPL-MCNC: 0.14 NG/ML — HIGH (ref 0.02–0.1)
PROT SERPL-MCNC: 6.9 G/DL — SIGNIFICANT CHANGE UP (ref 6–8.3)
PROT SERPL-MCNC: 7.1 G/DL — SIGNIFICANT CHANGE UP (ref 6–8.3)
PROT UR-MCNC: ABNORMAL
PROTHROM AB SERPL-ACNC: 15.9 SEC — HIGH (ref 10.6–13.6)
RBC # BLD: 3.15 M/UL — LOW (ref 3.8–5.2)
RBC # FLD: 17.8 % — HIGH (ref 10.3–14.5)
RBC CASTS # UR COMP ASSIST: 1 /HPF — SIGNIFICANT CHANGE UP (ref 0–4)
SAO2 % BLDA: 97.8 % — SIGNIFICANT CHANGE UP (ref 95–99)
SODIUM SERPL-SCNC: 138 MMOL/L — SIGNIFICANT CHANGE UP (ref 135–145)
SODIUM SERPL-SCNC: 140 MMOL/L — SIGNIFICANT CHANGE UP (ref 135–145)
SP GR SPEC: 1.03 — HIGH (ref 1.01–1.02)
SPECIMEN SOURCE: SIGNIFICANT CHANGE UP
UROBILINOGEN FLD QL: SIGNIFICANT CHANGE UP
WBC # BLD: 11.51 K/UL — HIGH (ref 3.8–10.5)
WBC # FLD AUTO: 11.51 K/UL — HIGH (ref 3.8–10.5)
WBC UR QL: 2 /HPF — SIGNIFICANT CHANGE UP (ref 0–5)

## 2021-01-30 PROCEDURE — 99291 CRITICAL CARE FIRST HOUR: CPT

## 2021-01-30 RX ORDER — POTASSIUM CHLORIDE 20 MEQ
20 PACKET (EA) ORAL
Refills: 0 | Status: DISCONTINUED | OUTPATIENT
Start: 2021-01-30 | End: 2021-01-30

## 2021-01-30 RX ORDER — FUROSEMIDE 40 MG
20 TABLET ORAL ONCE
Refills: 0 | Status: COMPLETED | OUTPATIENT
Start: 2021-01-30 | End: 2021-01-30

## 2021-01-30 RX ORDER — POTASSIUM CHLORIDE 20 MEQ
40 PACKET (EA) ORAL ONCE
Refills: 0 | Status: COMPLETED | OUTPATIENT
Start: 2021-01-30 | End: 2021-01-30

## 2021-01-30 RX ORDER — VANCOMYCIN HCL 1 G
1000 VIAL (EA) INTRAVENOUS ONCE
Refills: 0 | Status: COMPLETED | OUTPATIENT
Start: 2021-01-30 | End: 2021-01-30

## 2021-01-30 RX ORDER — PIPERACILLIN AND TAZOBACTAM 4; .5 G/20ML; G/20ML
3.38 INJECTION, POWDER, LYOPHILIZED, FOR SOLUTION INTRAVENOUS ONCE
Refills: 0 | Status: COMPLETED | OUTPATIENT
Start: 2021-01-30 | End: 2021-01-30

## 2021-01-30 RX ORDER — DIAZEPAM 5 MG
5 TABLET ORAL EVERY 4 HOURS
Refills: 0 | Status: DISCONTINUED | OUTPATIENT
Start: 2021-01-30 | End: 2021-02-01

## 2021-01-30 RX ORDER — POTASSIUM CHLORIDE 20 MEQ
40 PACKET (EA) ORAL ONCE
Refills: 0 | Status: DISCONTINUED | OUTPATIENT
Start: 2021-01-30 | End: 2021-01-30

## 2021-01-30 RX ORDER — BACITRACIN ZINC 500 UNIT/G
1 OINTMENT IN PACKET (EA) TOPICAL
Refills: 0 | Status: DISCONTINUED | OUTPATIENT
Start: 2021-01-30 | End: 2021-03-03

## 2021-01-30 RX ORDER — POTASSIUM CHLORIDE 20 MEQ
10 PACKET (EA) ORAL
Refills: 0 | Status: COMPLETED | OUTPATIENT
Start: 2021-01-30 | End: 2021-01-30

## 2021-01-30 RX ORDER — VANCOMYCIN HCL 1 G
VIAL (EA) INTRAVENOUS
Refills: 0 | Status: DISCONTINUED | OUTPATIENT
Start: 2021-01-30 | End: 2021-01-31

## 2021-01-30 RX ORDER — VANCOMYCIN HCL 1 G
1000 VIAL (EA) INTRAVENOUS EVERY 12 HOURS
Refills: 0 | Status: DISCONTINUED | OUTPATIENT
Start: 2021-01-30 | End: 2021-01-31

## 2021-01-30 RX ORDER — PIPERACILLIN AND TAZOBACTAM 4; .5 G/20ML; G/20ML
3.38 INJECTION, POWDER, LYOPHILIZED, FOR SOLUTION INTRAVENOUS EVERY 8 HOURS
Refills: 0 | Status: COMPLETED | OUTPATIENT
Start: 2021-01-30 | End: 2021-02-06

## 2021-01-30 RX ADMIN — Medication 100 MILLIEQUIVALENT(S): at 05:42

## 2021-01-30 RX ADMIN — Medication 5 MILLIGRAM(S): at 22:45

## 2021-01-30 RX ADMIN — Medication 1 APPLICATION(S): at 05:08

## 2021-01-30 RX ADMIN — Medication 100 MILLIEQUIVALENT(S): at 01:16

## 2021-01-30 RX ADMIN — Medication 1 APPLICATION(S): at 17:43

## 2021-01-30 RX ADMIN — Medication 2: at 12:07

## 2021-01-30 RX ADMIN — Medication 40 MILLIEQUIVALENT(S): at 20:42

## 2021-01-30 RX ADMIN — HYDROMORPHONE HYDROCHLORIDE 1 MILLIGRAM(S): 2 INJECTION INTRAMUSCULAR; INTRAVENOUS; SUBCUTANEOUS at 20:09

## 2021-01-30 RX ADMIN — ALBUTEROL 2 PUFF(S): 90 AEROSOL, METERED ORAL at 03:47

## 2021-01-30 RX ADMIN — HYDROMORPHONE HYDROCHLORIDE 1 MILLIGRAM(S): 2 INJECTION INTRAMUSCULAR; INTRAVENOUS; SUBCUTANEOUS at 11:12

## 2021-01-30 RX ADMIN — POLYETHYLENE GLYCOL 3350 17 GRAM(S): 17 POWDER, FOR SOLUTION ORAL at 11:12

## 2021-01-30 RX ADMIN — PIPERACILLIN AND TAZOBACTAM 200 GRAM(S): 4; .5 INJECTION, POWDER, LYOPHILIZED, FOR SOLUTION INTRAVENOUS at 02:20

## 2021-01-30 RX ADMIN — Medication 100 MILLIEQUIVALENT(S): at 00:23

## 2021-01-30 RX ADMIN — Medication 975 MILLIGRAM(S): at 20:33

## 2021-01-30 RX ADMIN — Medication 1: at 23:13

## 2021-01-30 RX ADMIN — SENNA PLUS 10 MILLILITER(S): 8.6 TABLET ORAL at 11:12

## 2021-01-30 RX ADMIN — ENOXAPARIN SODIUM 40 MILLIGRAM(S): 100 INJECTION SUBCUTANEOUS at 05:06

## 2021-01-30 RX ADMIN — ENOXAPARIN SODIUM 40 MILLIGRAM(S): 100 INJECTION SUBCUTANEOUS at 17:38

## 2021-01-30 RX ADMIN — CHLORHEXIDINE GLUCONATE 15 MILLILITER(S): 213 SOLUTION TOPICAL at 17:38

## 2021-01-30 RX ADMIN — CHLORHEXIDINE GLUCONATE 15 MILLILITER(S): 213 SOLUTION TOPICAL at 05:06

## 2021-01-30 RX ADMIN — Medication 20 MILLIGRAM(S): at 16:32

## 2021-01-30 RX ADMIN — Medication 5 MILLIGRAM(S): at 20:08

## 2021-01-30 RX ADMIN — Medication 5 MILLIGRAM(S): at 05:07

## 2021-01-30 RX ADMIN — Medication 2: at 17:39

## 2021-01-30 RX ADMIN — HYDROMORPHONE HYDROCHLORIDE 1 MILLIGRAM(S): 2 INJECTION INTRAMUSCULAR; INTRAVENOUS; SUBCUTANEOUS at 02:45

## 2021-01-30 RX ADMIN — Medication 5 MILLIGRAM(S): at 11:12

## 2021-01-30 RX ADMIN — ALBUTEROL 2 PUFF(S): 90 AEROSOL, METERED ORAL at 11:01

## 2021-01-30 RX ADMIN — Medication 15 MILLILITER(S): at 12:07

## 2021-01-30 RX ADMIN — QUETIAPINE FUMARATE 50 MILLIGRAM(S): 200 TABLET, FILM COATED ORAL at 11:12

## 2021-01-30 RX ADMIN — PIPERACILLIN AND TAZOBACTAM 25 GRAM(S): 4; .5 INJECTION, POWDER, LYOPHILIZED, FOR SOLUTION INTRAVENOUS at 17:40

## 2021-01-30 RX ADMIN — PANTOPRAZOLE SODIUM 40 MILLIGRAM(S): 20 TABLET, DELAYED RELEASE ORAL at 11:13

## 2021-01-30 RX ADMIN — PIPERACILLIN AND TAZOBACTAM 25 GRAM(S): 4; .5 INJECTION, POWDER, LYOPHILIZED, FOR SOLUTION INTRAVENOUS at 11:11

## 2021-01-30 RX ADMIN — Medication 100 MILLIEQUIVALENT(S): at 09:17

## 2021-01-30 RX ADMIN — Medication 5 MILLIGRAM(S): at 16:32

## 2021-01-30 RX ADMIN — Medication 5 MILLIGRAM(S): at 02:45

## 2021-01-30 RX ADMIN — Medication 975 MILLIGRAM(S): at 13:49

## 2021-01-30 RX ADMIN — ALBUTEROL 2 PUFF(S): 90 AEROSOL, METERED ORAL at 14:58

## 2021-01-30 RX ADMIN — QUETIAPINE FUMARATE 100 MILLIGRAM(S): 200 TABLET, FILM COATED ORAL at 22:46

## 2021-01-30 RX ADMIN — DEXMEDETOMIDINE HYDROCHLORIDE IN 0.9% SODIUM CHLORIDE 27.5 MICROGRAM(S)/KG/HR: 4 INJECTION INTRAVENOUS at 12:45

## 2021-01-30 RX ADMIN — OXYCODONE HYDROCHLORIDE 5 MILLIGRAM(S): 5 TABLET ORAL at 17:39

## 2021-01-30 RX ADMIN — Medication 3: at 05:11

## 2021-01-30 RX ADMIN — CHLORHEXIDINE GLUCONATE 1 APPLICATION(S): 213 SOLUTION TOPICAL at 05:07

## 2021-01-30 RX ADMIN — Medication 250 MILLIGRAM(S): at 17:40

## 2021-01-30 RX ADMIN — ALBUTEROL 2 PUFF(S): 90 AEROSOL, METERED ORAL at 22:11

## 2021-01-30 RX ADMIN — Medication 100 MILLIEQUIVALENT(S): at 07:38

## 2021-01-30 RX ADMIN — OXYCODONE HYDROCHLORIDE 5 MILLIGRAM(S): 5 TABLET ORAL at 22:46

## 2021-01-30 RX ADMIN — HYDROMORPHONE HYDROCHLORIDE 1 MILLIGRAM(S): 2 INJECTION INTRAMUSCULAR; INTRAVENOUS; SUBCUTANEOUS at 14:29

## 2021-01-30 RX ADMIN — Medication 250 MILLIGRAM(S): at 02:20

## 2021-01-30 NOTE — PROGRESS NOTE ADULT - SUBJECTIVE AND OBJECTIVE BOX
ICU Daily PROGRESS NOTE  ===============================  HPI  46-year-old woman who was admitted to the hospital for management acute respiratory failure with hypoxia secondary to covid-19 pneumonia, is status post intubation from  to  for management of acute respiratory distress syndrome, and who is now re-intubated (since ) and in the MICU for management of severe ARDS secondary to covid-19 pneumonia, transferred to [facility] ICU on . UTI with E coli, sensitive to Ceftriaxone, received from  to .  tracheostomy 6 shiley by advanced pulm and PEG.    Interval Events:   -Tachycardic, tachypneic, wide awake -- gave dilaudid x1 (8:30), switch to AC, diazepam/"oxy" early (10pm), started precedex (10pm), tylenol for fever (10pm), Kx3, versed 4 (10pm), FEVER 101.2F, free water 500cc via peg (11:30pm), dil x1 (11:30), changed diazepam to q4hr dosing (2:30) and gave with dil.  -Pharm: can't take oxy out of pixis.  -Impetigo + smelly secretions = sputum culture, MRSA swab, vanc/zosyn  -UA ordered (JEFF)  -K 3.0 -- x3 runs, 40 po  -baci trach site  -CPAP  at 06:00 --> ABG 8am    VITAL SIGNS, INS/OUTS (last 24 hours):  --------------------------------------------------------------------------------------  T(C): 37.8 (21 @ 04:00), Max: 38.4 (21 @ 23:00)  HR: 114 (21 @ 07:09) (88 - 146)  ABP: 110/54 (21 @ 04:00) (110/54 - 138/76)  ABP(mean): 75 (21 @ 04:00) (10 - 97)  RR: 28 (21 @ 04:00) (28 - 36)  SpO2: 100% (21 @ 07:09) (94% - 100%)  CAPILLARY BLOOD GLUCOSE    POCT Blood Glucose.: 251 mg/dL (2021 05:10)  POCT Blood Glucose.: 154 mg/dL (2021 11:28)     @ 07:  -   @ 07:00  --------------------------------------------------------  IN:    Dexmedetomidine: 153.5 mL    Free Water: 500 mL    IV PiggyBack: 850 mL    Norepinephrine: 2.1 mL    Peptamen A.F.: 680 mL    Propofol: 6.6 mL  Total IN: 2192.2 mL    OUT:    Indwelling Catheter - Urethral (mL): 2360 mL    Oral Fluid: 0 mL    Rectal Tube (mL): 60 mL  Total OUT: 2420 mL    Total NET: -227.8 mL  --------------------------------------------------------------------------------------    EXAM  NEUROLOGY  Exam: Normal, NAD, alert, oriented x3, no focal deficits. PERRLA.       RESPIRATORY  Exam: Lungs clear to auscultation, Normal expansion/effort.    [] Tracheostomy   [] Intubated  Mechanical Ventilation: Mode: CPAP with PS, FiO2: 50, PEEP: 8, PS: 12, MAP: 13    CARDIOVASCULAR  Exam: S1, S2.  Regular rate and rhythm     GI/NUTRITION  Exam: Abdomen soft, Non-tender, Non-distended.  Gastrostomy.      METABOLIC/FLUIDS/ELECTROLYTES  dextrose 5%. 1000 milliLiter(s) IV Continuous <Continuous>  dextrose 5%. 1000 milliLiter(s) IV Continuous <Continuous>  multivitamin/minerals/iron Oral Solution (CENTRUM) 15 milliLiter(s) Oral daily  potassium chloride    Tablet ER 40 milliEquivalent(s) Oral once  potassium chloride  10 mEq/100 mL IVPB 10 milliEquivalent(s) IV Intermittent every 1 hour      HEMATOLOGIC  [x] DVT Prophylaxis: enoxaparin Injectable 40 milliGRAM(s) SubCutaneous every 12 hours    Transfusions:	[] PRBC	[] Platelets		[] FFP	[] Cryoprecipitate    INFECTIOUS DISEASE  Antimicrobials/Immunologic Medications:  piperacillin/tazobactam IVPB.. 3.375 Gram(s) IV Intermittent every 8 hours  vancomycin  IVPB      vancomycin  IVPB 1000 milliGRAM(s) IV Intermittent every 12 hours    VASCULAR  Exam: Extremities warm, pink, well-perfused.      MUSCULOSKELETAL  Exam: All extremities moving spontaneously without limitations.     SKIN  Exam: Good skin turgor, no skin breakdown.      LABS  --------------------------------------------------------------------------------------  CBC ( @ 03:17)                              8.6<L>                         11.51<H>  )----------------(  359        76.3  % Neutrophils, 13.8  % Lymphocytes, ANC: 8.78<H>                              27.9<L>  CBC ( @ 05:03)                              8.7<L>                         9.89    )----------------(  328        --    % Neutrophils, --    % Lymphocytes, ANC: --                                  28.2<L>    BMP ( @ 03:17)             138     |  100     |  17    		Ca++ --      Ca 8.8                ---------------------------------( 271<H>		Mg 2.0                3.0<L>  |  27      |  0.44<L>			Ph 2.7     BMP ( @ 13:23)             145     |  103     |  20    		Ca++ --      Ca 8.7                ---------------------------------( 169<H>		Mg --                 3.4<L>  |  31      |  0.50  			Ph --        LFTs ( @ 03:17)      TPro 7.1 / Alb 3.3 / TBili 0.8 / DBili -- / AST 29 / ALT 32 / AlkPhos 117  LFTs ( @ 13:23)      TPro 7.0 / Alb 3.3 / TBili 0.5 / DBili -- / AST 18 / ALT 28 / AlkPhos 114    Coags ( @ 03:17)  aPTT 28.3 / INR 1.42<H> / PT 15.9<H>      ABG ( @ 03:17)     7.47<H> / 42 / 107 / 30<H> / 6.4<H> / 97.8%     Lactate:     ABG ( @ 23:35)     7.46<H> / 42 / 112<H> / 29<H> / 5.3<H> / 98.3%     Lactate:         Urinalysis ( @ 00:47):     Color: Orange<!> / Appearance: Turbid<!> / S.033<H> / pH: 5.5 / Gluc: Negative / Ketones: Small<!> / Bili: Negative / Urobili: <2 mg/dL / Protein :30 mg/dL<!> / Nitrites: Negative / Leuk.Est: Negative / RBC: 1 / WBC: 2 / Sq Epi:  / Non Sq Epi: Few / Bacteria Many<!>       -> .Other mouth sores Culture ( @ 09:26)     NG    Methicillin resistant Staphylococcus aureus    Numerous Methicillin resistant Staphylococcus aureus    -> .Blood Blood-Peripheral Culture ( @ 08:24)     NG    NG    No growth to date.    -> .Sputum Sputum Culture ( @ 18:48)       Numerous polymorphonuclear leukocytes per low power field  No Squamous epithelial cells per low power field  Rare Gram positive cocci in pairs per oil power field    NG    Normal Respiratory Mayela present    -> .Blood Blood Culture ( @ 16:47)     NG    NG    No growth to date.    -> .Urine Catheterized Culture ( @ 15:45)     NG    Escherichia coli    >100,000 CFU/ml Escherichia coli  --------------------------------------------------------------------------------------    IMAGING STUDIES     ICU Daily PROGRESS NOTE  ===============================  HPI  46-year-old woman who was admitted to the hospital for management acute respiratory failure with hypoxia secondary to covid-19 pneumonia, is status post intubation from  to  for management of acute respiratory distress syndrome, and who is now re-intubated (since ) and in the MICU for management of severe ARDS secondary to covid-19 pneumonia, transferred to [facility] ICU on . UTI with E coli, sensitive to Ceftriaxone, received from  to .  tracheostomy 6 shiley by advanced pulm and PEG.    Interval Events:   -Tachycardic, tachypneic, wide awake -- gave dilaudid x1 (8:30), switch to AC, diazepam/"oxy" early (10pm), started precedex (10pm), tylenol for fever (10pm), Kx3, versed 4 (10pm), FEVER 101.2F, free water 500cc via peg (11:30pm), dil x1 (11:30), changed diazepam to q4hr dosing (2:30) and gave with dil.  -Pharm: can't take oxy out of pixis.  -Impetigo + smelly secretions = sputum culture, MRSA swab, vanc/zosyn  -UA ordered (JEFF)  -K 3.0 -- x3 runs, 40 po  -baci trach site  -CPAP 40  -follow up afternoon bmp    VITAL SIGNS, INS/OUTS (last 24 hours):  --------------------------------------------------------------------------------------  T(C): 37.8 (21 @ 04:00), Max: 38.4 (21 @ 23:00)  HR: 114 (21 @ 07:09) (88 - 146)  ABP: 110/54 (21 @ 04:00) (110/54 - 138/76)  ABP(mean): 75 (21 @ 04:00) (10 - 97)  RR: 28 (21 @ 04:00) (28 - 36)  SpO2: 100% (21 @ 07:09) (94% - 100%)  CAPILLARY BLOOD GLUCOSE    POCT Blood Glucose.: 251 mg/dL (2021 05:10)  POCT Blood Glucose.: 154 mg/dL (2021 11:28)     @ 07:  -   @ 07:00  --------------------------------------------------------  IN:    Dexmedetomidine: 153.5 mL    Free Water: 500 mL    IV PiggyBack: 850 mL    Norepinephrine: 2.1 mL    Peptamen A.F.: 680 mL    Propofol: 6.6 mL  Total IN: 2192.2 mL    OUT:    Indwelling Catheter - Urethral (mL): 2360 mL    Oral Fluid: 0 mL    Rectal Tube (mL): 60 mL  Total OUT: 2420 mL    Total NET: -227.8 mL  --------------------------------------------------------------------------------------    EXAM  NEUROLOGY  Exam: Normal, NAD, alert, oriented x3, no focal deficits. PERRLA.       RESPIRATORY  Exam: Lungs clear to auscultation, Normal expansion/effort.    [] Tracheostomy   [] Intubated  Mechanical Ventilation: Mode: CPAP with PS, FiO2: 50, PEEP: 8, PS: 12, MAP: 13    CARDIOVASCULAR  Exam: S1, S2.  Regular rate and rhythm     GI/NUTRITION  Exam: Abdomen soft, Non-tender, Non-distended.  Gastrostomy.      METABOLIC/FLUIDS/ELECTROLYTES  dextrose 5%. 1000 milliLiter(s) IV Continuous <Continuous>  dextrose 5%. 1000 milliLiter(s) IV Continuous <Continuous>  multivitamin/minerals/iron Oral Solution (CENTRUM) 15 milliLiter(s) Oral daily  potassium chloride    Tablet ER 40 milliEquivalent(s) Oral once  potassium chloride  10 mEq/100 mL IVPB 10 milliEquivalent(s) IV Intermittent every 1 hour      HEMATOLOGIC  [x] DVT Prophylaxis: enoxaparin Injectable 40 milliGRAM(s) SubCutaneous every 12 hours    Transfusions:	[] PRBC	[] Platelets		[] FFP	[] Cryoprecipitate    INFECTIOUS DISEASE  Antimicrobials/Immunologic Medications:  piperacillin/tazobactam IVPB.. 3.375 Gram(s) IV Intermittent every 8 hours  vancomycin  IVPB      vancomycin  IVPB 1000 milliGRAM(s) IV Intermittent every 12 hours    VASCULAR  Exam: Extremities warm, pink, well-perfused.      MUSCULOSKELETAL  Exam: All extremities moving spontaneously without limitations.     SKIN  Exam: Good skin turgor, no skin breakdown.      LABS  --------------------------------------------------------------------------------------  CBC ( @ 03:17)                              8.6<L>                         11.51<H>  )----------------(  359        76.3  % Neutrophils, 13.8  % Lymphocytes, ANC: 8.78<H>                              27.9<L>  CBC ( @ 05:03)                              8.7<L>                         9.89    )----------------(  328        --    % Neutrophils, --    % Lymphocytes, ANC: --                                  28.2<L>    BMP ( @ 03:17)             138     |  100     |  17    		Ca++ --      Ca 8.8                ---------------------------------( 271<H>		Mg 2.0                3.0<L>  |  27      |  0.44<L>			Ph 2.7     BMP ( @ 13:23)             145     |  103     |  20    		Ca++ --      Ca 8.7                ---------------------------------( 169<H>		Mg --                 3.4<L>  |  31      |  0.50  			Ph --        LFTs ( @ 03:17)      TPro 7.1 / Alb 3.3 / TBili 0.8 / DBili -- / AST 29 / ALT 32 / AlkPhos 117  LFTs ( @ 13:23)      TPro 7.0 / Alb 3.3 / TBili 0.5 / DBili -- / AST 18 / ALT 28 / AlkPhos 114    Coags ( @ 03:17)  aPTT 28.3 / INR 1.42<H> / PT 15.9<H>      ABG ( @ 03:17)     7.47<H> / 42 / 107 / 30<H> / 6.4<H> / 97.8%     Lactate:     ABG ( @ 23:35)     7.46<H> / 42 / 112<H> / 29<H> / 5.3<H> / 98.3%     Lactate:         Urinalysis ( @ 00:47):     Color: Orange<!> / Appearance: Turbid<!> / S.033<H> / pH: 5.5 / Gluc: Negative / Ketones: Small<!> / Bili: Negative / Urobili: <2 mg/dL / Protein :30 mg/dL<!> / Nitrites: Negative / Leuk.Est: Negative / RBC: 1 / WBC: 2 / Sq Epi:  / Non Sq Epi: Few / Bacteria Many<!>       -> .Other mouth sores Culture ( @ 09:26)     NG    Methicillin resistant Staphylococcus aureus    Numerous Methicillin resistant Staphylococcus aureus    -> .Blood Blood-Peripheral Culture ( @ 08:24)     NG    NG    No growth to date.    -> .Sputum Sputum Culture ( @ 18:48)       Numerous polymorphonuclear leukocytes per low power field  No Squamous epithelial cells per low power field  Rare Gram positive cocci in pairs per oil power field    NG    Normal Respiratory Mayela present    -> .Blood Blood Culture ( @ 16:47)     NG    NG    No growth to date.    -> .Urine Catheterized Culture ( @ 15:45)     NG    Escherichia coli    >100,000 CFU/ml Escherichia coli  --------------------------------------------------------------------------------------    IMAGING STUDIES     ICU Daily PROGRESS NOTE  ===============================  HPI  46-year-old woman who was admitted to the hospital for management acute respiratory failure with hypoxia secondary to covid-19 pneumonia, is status post intubation from  to  for management of acute respiratory distress syndrome, and who is now re-intubated (since ) and in the MICU for management of severe ARDS secondary to covid-19 pneumonia, transferred to [facility] ICU on . UTI with E coli, sensitive to Ceftriaxone, received from  to .  tracheostomy 6 shiley by advanced pulm and PEG.    Interval Events:   -Tachycardic, tachypneic, wide awake -- gave dilaudid x1 (8:30), switch to AC, diazepam/"oxy" early (10pm), started precedex (10pm), tylenol for fever (10pm), Kx3, versed 4 (10pm), FEVER 101.2F, free water 500cc via peg (11:30pm), dil x1 (11:30), changed diazepam to q4hr dosing (2:30) and gave with dil.  -Pharm: can't take oxy out of pixis.  -Impetigo + smelly secretions = sputum culture, MRSA swab, vanc/zosyn  -UA ordered (JEFF)  -K 3.0 -- x3 runs, 40 po  -baci trach site  -CPAP 40  -follow up afternoon bmp    VITAL SIGNS, INS/OUTS (last 24 hours):  --------------------------------------------------------------------------------------  T(C): 37.8 (21 @ 04:00), Max: 38.4 (21 @ 23:00)  HR: 114 (21 @ 07:09) (88 - 146)  ABP: 110/54 (21 @ 04:00) (110/54 - 138/76)  ABP(mean): 75 (21 @ 04:00) (10 - 97)  RR: 28 (21 @ 04:00) (28 - 36)  SpO2: 100% (21 @ 07:09) (94% - 100%)  CAPILLARY BLOOD GLUCOSE    POCT Blood Glucose.: 251 mg/dL (2021 05:10)  POCT Blood Glucose.: 154 mg/dL (2021 11:28)     @ 07:  -   @ 07:00  --------------------------------------------------------  IN:    Dexmedetomidine: 153.5 mL    Free Water: 500 mL    IV PiggyBack: 850 mL    Norepinephrine: 2.1 mL    Peptamen A.F.: 680 mL    Propofol: 6.6 mL  Total IN: 2192.2 mL    OUT:    Indwelling Catheter - Urethral (mL): 2360 mL    Oral Fluid: 0 mL    Rectal Tube (mL): 60 mL  Total OUT: 2420 mL    Total NET: -227.8 mL  --------------------------------------------------------------------------------------    EXAM  NEUROLOGY  Exam: Normal, NAD, alert, oriented x3, no focal deficits. PERRLA.       RESPIRATORY  Exam: Lungs clear to auscultation, Normal expansion/effort.    [] Tracheostomy   [] Intubated  Mechanical Ventilation: Mode: CPAP with PS, FiO2: 50, PEEP: 8, PS: 12, MAP: 13    CARDIOVASCULAR  Exam: S1, S2.  Regular rate and rhythm     GI/NUTRITION  Exam: Abdomen soft, Non-tender, Non-distended.  Gastrostomy.      METABOLIC/FLUIDS/ELECTROLYTES  dextrose 5%. 1000 milliLiter(s) IV Continuous <Continuous>  dextrose 5%. 1000 milliLiter(s) IV Continuous <Continuous>  multivitamin/minerals/iron Oral Solution (CENTRUM) 15 milliLiter(s) Oral daily  potassium chloride    Tablet ER 40 milliEquivalent(s) Oral once  potassium chloride  10 mEq/100 mL IVPB 10 milliEquivalent(s) IV Intermittent every 1 hour      HEMATOLOGIC  [x] DVT Prophylaxis: enoxaparin Injectable 40 milliGRAM(s) SubCutaneous every 12 hours    Transfusions:	[] PRBC	[] Platelets		[] FFP	[] Cryoprecipitate    INFECTIOUS DISEASE  Antimicrobials/Immunologic Medications:  piperacillin/tazobactam IVPB.. 3.375 Gram(s) IV Intermittent every 8 hours  vancomycin  IVPB      vancomycin  IVPB 1000 milliGRAM(s) IV Intermittent every 12 hours    VASCULAR  Exam: Extremities warm, pink, well-perfused.      MUSCULOSKELETAL  Exam: All extremities moving spontaneously without limitations.  Very weak.    SKIN  Exam: Good skin turgor, no skin breakdown.      LABS  --------------------------------------------------------------------------------------  CBC ( @ 03:17)                              8.6<L>                         11.51<H>  )----------------(  359        76.3  % Neutrophils, 13.8  % Lymphocytes, ANC: 8.78<H>                              27.9<L>  CBC ( @ 05:03)                              8.7<L>                         9.89    )----------------(  328        --    % Neutrophils, --    % Lymphocytes, ANC: --                                  28.2<L>    BMP ( @ 03:17)             138     |  100     |  17    		Ca++ --      Ca 8.8                ---------------------------------( 271<H>		Mg 2.0                3.0<L>  |  27      |  0.44<L>			Ph 2.7     BMP ( @ 13:23)             145     |  103     |  20    		Ca++ --      Ca 8.7                ---------------------------------( 169<H>		Mg --                 3.4<L>  |  31      |  0.50  			Ph --        LFTs ( @ 03:17)      TPro 7.1 / Alb 3.3 / TBili 0.8 / DBili -- / AST 29 / ALT 32 / AlkPhos 117  LFTs ( @ 13:23)      TPro 7.0 / Alb 3.3 / TBili 0.5 / DBili -- / AST 18 / ALT 28 / AlkPhos 114    Coags ( @ 03:17)  aPTT 28.3 / INR 1.42<H> / PT 15.9<H>      ABG ( @ 03:17)     7.47<H> / 42 / 107 / 30<H> / 6.4<H> / 97.8%     Lactate:     ABG ( @ 23:35)     7.46<H> / 42 / 112<H> / 29<H> / 5.3<H> / 98.3%     Lactate:         Urinalysis ( @ 00:47):     Color: Orange<!> / Appearance: Turbid<!> / S.033<H> / pH: 5.5 / Gluc: Negative / Ketones: Small<!> / Bili: Negative / Urobili: <2 mg/dL / Protein :30 mg/dL<!> / Nitrites: Negative / Leuk.Est: Negative / RBC: 1 / WBC: 2 / Sq Epi:  / Non Sq Epi: Few / Bacteria Many<!>       -> .Other mouth sores Culture ( @ 09:26)     NG    Methicillin resistant Staphylococcus aureus    Numerous Methicillin resistant Staphylococcus aureus    -> .Blood Blood-Peripheral Culture ( @ 08:24)     NG    NG    No growth to date.    -> .Sputum Sputum Culture ( @ 18:48)       Numerous polymorphonuclear leukocytes per low power field  No Squamous epithelial cells per low power field  Rare Gram positive cocci in pairs per oil power field    NG    Normal Respiratory Mayela present    -> .Blood Blood Culture ( @ 16:47)     NG    NG    No growth to date.    -> .Urine Catheterized Culture ( @ 15:45)     NG    Escherichia coli    >100,000 CFU/ml Escherichia coli  --------------------------------------------------------------------------------------    IMAGING STUDIES

## 2021-01-30 NOTE — PROGRESS NOTE ADULT - ASSESSMENT
46-year-old woman, no PMH who was admitted to the hospital for management ARF with hypoxia secondary to covid-19 pneumonia, is status post intubation from 1/5 to 1/9 for management of acute respiratory distress syndrome, and who was now re-intubated (since 1/16) and in the MICU for management of severe ARDS secondary to covid-19 pneumonia. Transfer to [facility] ICU on 1/21. UTI with E coli, sensitive to Ceftriaxone, received from 25 to 27 Jan. 1/28 tracheostomy 6 shiley by advanced pulm and PEG.    Neuro:  -sedated with propofol, increase precedex and wean off of propofol. SBT this morning, continue as tolerated.  -wean slowly as tolerated   -add valium 5mg q6hr. continue seroquel 100mg at night, add 50mg during the day.   -oxycodone 5mg q6 hrs.    CV:  -off levophed  -No known history of coronary artery disease or of arrhythmia    Respiratory: COVID ARDS  intubation 01/05, extubation 01/09, reintubation 01/16, prone 1/21-24, trached 01/28  -SBT today. CPAP settings 12/8, tolerating well. Continue as tolerated. Follow up ABG    Renal:  -Serum creatinine at baseline  -continue to monitor urine output -2.2L over past 24 hrs  -no electrolyte abnormalities at present  -Will continue to monitor daily metabolic panel and correct as needed     ID:  -S/p 10 days of dexamethasone 1/5-14 and 5 days of remdesivir 1/5-9 for treatment of severe covid-19   -In the setting of need for re-intubation, s/p zosyn administration for five days 1/16-20 for empiric coverage of superimposed bacterial pneumonia;   -No growth to date from cultures- last- blood 1/16  -febrile overnight, wbc low. Completed ceftriaxone, urine culture negative.     Endo:  -No active concerns at this time   -A1c 6.6, meeting criteria for diagnosis of diabetes mellitus  -Cont ISS, monitor finger sticks    GI:  -Resume feeds through PEG tube.  -Significant BM yesterday 01/28.   -Decrease miralax to once daily    Heme:  -DVT ppx: lovenox 40 q12h  -Negative bilateral lower extremity dopplers despite rising d-dimer 01/20/21. 46-year-old woman, no PMH who was admitted to the hospital for management ARF with hypoxia secondary to covid-19 pneumonia, is status post intubation from 1/5 to 1/9 for management of acute respiratory distress syndrome, and who was now re-intubated (since 1/16) and in the MICU for management of severe ARDS secondary to covid-19 pneumonia. Transfer to [facility] ICU on 1/21. UTI with E coli, sensitive to Ceftriaxone, received from 25 to 27 Jan. 1/28 tracheostomy 6 shiley by advanced pulm and PEG.    Neuro:  - SBT this morning, continue as tolerated.  -wean slowly as tolerated   -add valium 5mg q6hr. continue seroquel 100mg at night, add 50mg during the day.   -oxycodone 5mg q6 hrs.    CV:  -off levophed  -No known history of coronary artery disease or of arrhythmia    Respiratory: COVID ARDS  intubation 01/05, extubation 01/09, reintubation 01/16, prone 1/21-24, trached 01/28  -SBT today. CPAP settings 12/8, tolerating well. Continue as tolerated. Follow up ABG    Renal:  -Serum creatinine at baseline  -continue to monitor urine output -2.2L over past 24 hrs  -f/u afternoon bmp after k repletions  -Will continue to monitor daily metabolic panel and correct as needed     ID:  -S/p 10 days of dexamethasone 1/5-14 and 5 days of remdesivir 1/5-9 for treatment of severe covid-19   -In the setting of need for re-intubation, s/p zosyn administration for five days 1/16-20 for empiric coverage of superimposed bacterial pneumonia;   -No growth to date from cultures- last- blood 1/16  -febrile overnight, wbc low. Completed ceftriaxone, urine culture negative.     Endo:  -No active concerns at this time   -A1c 6.6, meeting criteria for diagnosis of diabetes mellitus  -Cont ISS, monitor finger sticks    GI:  -Resume feeds through PEG tube.  -Significant BM yesterday 01/28.   -Decrease miralax to once daily    Heme:  -DVT ppx: lovenox 40 q12h  -Negative bilateral lower extremity dopplers despite rising d-dimer 01/20/21.

## 2021-01-30 NOTE — PROGRESS NOTE ADULT - ATTENDING COMMENTS
N - On precedex, continue on  valium 5 mg q6 hours, and seroquel 50 mg qam, on 100 mg qhs, and oxycodone 5 mg [id number]- unable to give oxycodone so changed to Dilaudid every 3 hours.  Will try and wean off the Precedex and then think about weaning the other sedation  P - Remains on CPAP and PS and is doing well  C - no vasopressors, BP stable  GI - Continue GT feeds   -Continue lasix every 12 hours-has good response.  Goal negative fluid balance. Recheck lytes this afternoon after potassium repletion.  Consider enteral potassium supplements  H - lovenox for dvt ppx  ID -Febrile.  Started on Vanco and Zosyn last night.  Lesion around mouth positive for staph aureus. Check Vanco levels  E - ssi glucose well controlled

## 2021-01-30 NOTE — PROGRESS NOTE ADULT - ASSESSMENT
46 F w respiratory failure due to COVID 19     Problem/Recommendation - 1:  Problem: Acute respiratory failure with hypoxia. Recommendation: s/p tracheostomy and PEG. No signs of post proceure issues at this time  -c/w G tube feeds     Problem/Recommendation - 2:  ·  Problem: COVID-19.  Recommendation: status post dexamethasone.      Problem/Recommendation - 3:  ·  Problem: Normocytic anemia.  Recommendation: without overt GI bleeding. Likely due to critical illness.  -monitor for GI bleed  -PPI QD.      Problem/Recommendation - 4:  ·  Problem: Abnormal LFTs.  Recommendation: multifactorial, likely NAFLD, COVID, critical illness. Can consider US to r/o gallstone disease, especially if increasing.      Problem/Recommendation - 5:  ·  Problem: Morbid obesity.      Problem/Recommendation - 6:  Problem: Constipation. Recommendation: consider XR abdomen to assess for ileus  on a bowel regimen.    Attending Attestation:   Differential diagnosis and plan of care discussed with patient after the evaluation  35 Minutes spent on total encounter of which more than fifty percent of the encounter was spent counseling and/or coordinating care by the attending physician.    Mele Bolanos M.D.   Gastroenterology and Hepatology  Cell: [phone].

## 2021-01-31 LAB
ALBUMIN SERPL ELPH-MCNC: 2.9 G/DL — LOW (ref 3.3–5)
ALP SERPL-CCNC: 107 U/L — SIGNIFICANT CHANGE UP (ref 40–120)
ALT FLD-CCNC: 39 U/L — HIGH (ref 4–33)
ANION GAP SERPL CALC-SCNC: 10 MMOL/L — SIGNIFICANT CHANGE UP (ref 7–14)
ANION GAP SERPL CALC-SCNC: 10 MMOL/L — SIGNIFICANT CHANGE UP (ref 7–14)
APTT BLD: 28.9 SEC — SIGNIFICANT CHANGE UP (ref 27–36.3)
AST SERPL-CCNC: 28 U/L — SIGNIFICANT CHANGE UP (ref 4–32)
BASE EXCESS BLDA CALC-SCNC: 4.6 MMOL/L — HIGH (ref -2–2)
BASOPHILS # BLD AUTO: 0.05 K/UL — SIGNIFICANT CHANGE UP (ref 0–0.2)
BASOPHILS NFR BLD AUTO: 0.6 % — SIGNIFICANT CHANGE UP (ref 0–2)
BILIRUB SERPL-MCNC: 0.5 MG/DL — SIGNIFICANT CHANGE UP (ref 0.2–1.2)
BUN SERPL-MCNC: 12 MG/DL — SIGNIFICANT CHANGE UP (ref 7–23)
BUN SERPL-MCNC: 14 MG/DL — SIGNIFICANT CHANGE UP (ref 7–23)
CALCIUM SERPL-MCNC: 8.6 MG/DL — SIGNIFICANT CHANGE UP (ref 8.4–10.5)
CALCIUM SERPL-MCNC: 8.7 MG/DL — SIGNIFICANT CHANGE UP (ref 8.4–10.5)
CHLORIDE SERPL-SCNC: 100 MMOL/L — SIGNIFICANT CHANGE UP (ref 98–107)
CHLORIDE SERPL-SCNC: 103 MMOL/L — SIGNIFICANT CHANGE UP (ref 98–107)
CO2 SERPL-SCNC: 26 MMOL/L — SIGNIFICANT CHANGE UP (ref 22–31)
CO2 SERPL-SCNC: 27 MMOL/L — SIGNIFICANT CHANGE UP (ref 22–31)
CREAT SERPL-MCNC: 0.47 MG/DL — LOW (ref 0.5–1.3)
CREAT SERPL-MCNC: 0.49 MG/DL — LOW (ref 0.5–1.3)
EOSINOPHIL # BLD AUTO: 0.64 K/UL — HIGH (ref 0–0.5)
EOSINOPHIL NFR BLD AUTO: 7.1 % — HIGH (ref 0–6)
GAS PNL BLDA: SIGNIFICANT CHANGE UP
GLUCOSE SERPL-MCNC: 165 MG/DL — HIGH (ref 70–99)
GLUCOSE SERPL-MCNC: 226 MG/DL — HIGH (ref 70–99)
HCO3 BLDA-SCNC: 28 MMOL/L — HIGH (ref 22–26)
HCT VFR BLD CALC: 26.7 % — LOW (ref 34.5–45)
HGB BLD-MCNC: 8 G/DL — LOW (ref 11.5–15.5)
IANC: 5.83 K/UL — SIGNIFICANT CHANGE UP (ref 1.5–8.5)
IMM GRANULOCYTES NFR BLD AUTO: 0.6 % — SIGNIFICANT CHANGE UP (ref 0–1.5)
INR BLD: 1.36 RATIO — HIGH (ref 0.88–1.16)
LYMPHOCYTES # BLD AUTO: 1.71 K/UL — SIGNIFICANT CHANGE UP (ref 1–3.3)
LYMPHOCYTES # BLD AUTO: 19 % — SIGNIFICANT CHANGE UP (ref 13–44)
MAGNESIUM SERPL-MCNC: 2 MG/DL — SIGNIFICANT CHANGE UP (ref 1.6–2.6)
MAGNESIUM SERPL-MCNC: 2.1 MG/DL — SIGNIFICANT CHANGE UP (ref 1.6–2.6)
MCHC RBC-ENTMCNC: 26.8 PG — LOW (ref 27–34)
MCHC RBC-ENTMCNC: 30 GM/DL — LOW (ref 32–36)
MCV RBC AUTO: 89.3 FL — SIGNIFICANT CHANGE UP (ref 80–100)
MONOCYTES # BLD AUTO: 0.74 K/UL — SIGNIFICANT CHANGE UP (ref 0–0.9)
MONOCYTES NFR BLD AUTO: 8.2 % — SIGNIFICANT CHANGE UP (ref 2–14)
NEUTROPHILS # BLD AUTO: 5.83 K/UL — SIGNIFICANT CHANGE UP (ref 1.8–7.4)
NEUTROPHILS NFR BLD AUTO: 64.5 % — SIGNIFICANT CHANGE UP (ref 43–77)
NRBC # BLD: 0 /100 WBCS — SIGNIFICANT CHANGE UP
NRBC # FLD: 0 K/UL — SIGNIFICANT CHANGE UP
PCO2 BLDA: 44 MMHG — SIGNIFICANT CHANGE UP (ref 32–48)
PH BLDA: 7.43 — SIGNIFICANT CHANGE UP (ref 7.35–7.45)
PHOSPHATE SERPL-MCNC: 3 MG/DL — SIGNIFICANT CHANGE UP (ref 2.5–4.5)
PHOSPHATE SERPL-MCNC: 3.2 MG/DL — SIGNIFICANT CHANGE UP (ref 2.5–4.5)
PLATELET # BLD AUTO: 379 K/UL — SIGNIFICANT CHANGE UP (ref 150–400)
PO2 BLDA: 106 MMHG — SIGNIFICANT CHANGE UP (ref 83–108)
POTASSIUM SERPL-MCNC: 3.1 MMOL/L — LOW (ref 3.5–5.3)
POTASSIUM SERPL-MCNC: 3.2 MMOL/L — LOW (ref 3.5–5.3)
POTASSIUM SERPL-MCNC: 4.5 MMOL/L — SIGNIFICANT CHANGE UP (ref 3.5–5.3)
POTASSIUM SERPL-SCNC: 3.1 MMOL/L — LOW (ref 3.5–5.3)
POTASSIUM SERPL-SCNC: 3.2 MMOL/L — LOW (ref 3.5–5.3)
POTASSIUM SERPL-SCNC: 4.5 MMOL/L — SIGNIFICANT CHANGE UP (ref 3.5–5.3)
PROT SERPL-MCNC: 6.7 G/DL — SIGNIFICANT CHANGE UP (ref 6–8.3)
PROTHROM AB SERPL-ACNC: 15.4 SEC — HIGH (ref 10.6–13.6)
RBC # BLD: 2.99 M/UL — LOW (ref 3.8–5.2)
RBC # FLD: 17.6 % — HIGH (ref 10.3–14.5)
SAO2 % BLDA: 98.1 % — SIGNIFICANT CHANGE UP (ref 95–99)
SODIUM SERPL-SCNC: 137 MMOL/L — SIGNIFICANT CHANGE UP (ref 135–145)
SODIUM SERPL-SCNC: 139 MMOL/L — SIGNIFICANT CHANGE UP (ref 135–145)
VANCOMYCIN TROUGH SERPL-MCNC: 9.2 UG/ML — LOW (ref 10–20)
WBC # BLD: 9.02 K/UL — SIGNIFICANT CHANGE UP (ref 3.8–10.5)
WBC # FLD AUTO: 9.02 K/UL — SIGNIFICANT CHANGE UP (ref 3.8–10.5)

## 2021-01-31 PROCEDURE — 99291 CRITICAL CARE FIRST HOUR: CPT

## 2021-01-31 RX ORDER — MAGNESIUM SULFATE 500 MG/ML
2 VIAL (ML) INJECTION ONCE
Refills: 0 | Status: COMPLETED | OUTPATIENT
Start: 2021-01-31 | End: 2021-01-31

## 2021-01-31 RX ORDER — VANCOMYCIN HCL 1 G
1000 VIAL (EA) INTRAVENOUS EVERY 8 HOURS
Refills: 0 | Status: DISCONTINUED | OUTPATIENT
Start: 2021-01-31 | End: 2021-02-01

## 2021-01-31 RX ORDER — SODIUM CHLORIDE 9 MG/ML
1000 INJECTION INTRAMUSCULAR; INTRAVENOUS; SUBCUTANEOUS ONCE
Refills: 0 | Status: COMPLETED | OUTPATIENT
Start: 2021-01-31 | End: 2021-01-31

## 2021-01-31 RX ORDER — POTASSIUM CHLORIDE 20 MEQ
40 PACKET (EA) ORAL EVERY 4 HOURS
Refills: 0 | Status: DISCONTINUED | OUTPATIENT
Start: 2021-01-31 | End: 2021-01-31

## 2021-01-31 RX ORDER — FUROSEMIDE 40 MG
20 TABLET ORAL ONCE
Refills: 0 | Status: COMPLETED | OUTPATIENT
Start: 2021-01-31 | End: 2021-01-31

## 2021-01-31 RX ORDER — FUROSEMIDE 40 MG
20 TABLET ORAL EVERY 12 HOURS
Refills: 0 | Status: DISCONTINUED | OUTPATIENT
Start: 2021-01-31 | End: 2021-01-31

## 2021-01-31 RX ORDER — POTASSIUM CHLORIDE 20 MEQ
40 PACKET (EA) ORAL EVERY 4 HOURS
Refills: 0 | Status: COMPLETED | OUTPATIENT
Start: 2021-01-31 | End: 2021-01-31

## 2021-01-31 RX ADMIN — Medication 1 APPLICATION(S): at 05:04

## 2021-01-31 RX ADMIN — SODIUM CHLORIDE 1000 MILLILITER(S): 9 INJECTION INTRAMUSCULAR; INTRAVENOUS; SUBCUTANEOUS at 20:13

## 2021-01-31 RX ADMIN — OXYCODONE HYDROCHLORIDE 5 MILLIGRAM(S): 5 TABLET ORAL at 23:10

## 2021-01-31 RX ADMIN — Medication 1 APPLICATION(S): at 19:41

## 2021-01-31 RX ADMIN — Medication 5 MILLIGRAM(S): at 02:33

## 2021-01-31 RX ADMIN — DEXMEDETOMIDINE HYDROCHLORIDE IN 0.9% SODIUM CHLORIDE 27.5 MICROGRAM(S)/KG/HR: 4 INJECTION INTRAVENOUS at 20:21

## 2021-01-31 RX ADMIN — Medication 250 MILLIGRAM(S): at 23:33

## 2021-01-31 RX ADMIN — CHLORHEXIDINE GLUCONATE 15 MILLILITER(S): 213 SOLUTION TOPICAL at 05:00

## 2021-01-31 RX ADMIN — Medication 250 MILLIGRAM(S): at 16:36

## 2021-01-31 RX ADMIN — ALBUTEROL 2 PUFF(S): 90 AEROSOL, METERED ORAL at 10:29

## 2021-01-31 RX ADMIN — ENOXAPARIN SODIUM 40 MILLIGRAM(S): 100 INJECTION SUBCUTANEOUS at 19:02

## 2021-01-31 RX ADMIN — Medication 40 MILLIEQUIVALENT(S): at 11:14

## 2021-01-31 RX ADMIN — PIPERACILLIN AND TAZOBACTAM 25 GRAM(S): 4; .5 INJECTION, POWDER, LYOPHILIZED, FOR SOLUTION INTRAVENOUS at 19:02

## 2021-01-31 RX ADMIN — Medication 40 MILLIEQUIVALENT(S): at 06:31

## 2021-01-31 RX ADMIN — ALBUTEROL 2 PUFF(S): 90 AEROSOL, METERED ORAL at 15:24

## 2021-01-31 RX ADMIN — Medication 1: at 05:02

## 2021-01-31 RX ADMIN — Medication 20 MILLIGRAM(S): at 09:41

## 2021-01-31 RX ADMIN — Medication 15 MILLILITER(S): at 14:27

## 2021-01-31 RX ADMIN — Medication 50 GRAM(S): at 06:31

## 2021-01-31 RX ADMIN — PIPERACILLIN AND TAZOBACTAM 25 GRAM(S): 4; .5 INJECTION, POWDER, LYOPHILIZED, FOR SOLUTION INTRAVENOUS at 01:10

## 2021-01-31 RX ADMIN — OXYCODONE HYDROCHLORIDE 5 MILLIGRAM(S): 5 TABLET ORAL at 13:23

## 2021-01-31 RX ADMIN — Medication 40 MILLIEQUIVALENT(S): at 14:27

## 2021-01-31 RX ADMIN — ALBUTEROL 2 PUFF(S): 90 AEROSOL, METERED ORAL at 03:26

## 2021-01-31 RX ADMIN — HYDROMORPHONE HYDROCHLORIDE 1 MILLIGRAM(S): 2 INJECTION INTRAMUSCULAR; INTRAVENOUS; SUBCUTANEOUS at 17:08

## 2021-01-31 RX ADMIN — OXYCODONE HYDROCHLORIDE 5 MILLIGRAM(S): 5 TABLET ORAL at 19:02

## 2021-01-31 RX ADMIN — Medication 1: at 19:37

## 2021-01-31 RX ADMIN — ENOXAPARIN SODIUM 40 MILLIGRAM(S): 100 INJECTION SUBCUTANEOUS at 05:00

## 2021-01-31 RX ADMIN — Medication 40 MILLIEQUIVALENT(S): at 09:31

## 2021-01-31 RX ADMIN — DEXMEDETOMIDINE HYDROCHLORIDE IN 0.9% SODIUM CHLORIDE 27.5 MICROGRAM(S)/KG/HR: 4 INJECTION INTRAVENOUS at 13:12

## 2021-01-31 RX ADMIN — SENNA PLUS 10 MILLILITER(S): 8.6 TABLET ORAL at 13:25

## 2021-01-31 RX ADMIN — Medication 5 MILLIGRAM(S): at 23:33

## 2021-01-31 RX ADMIN — Medication 1: at 23:10

## 2021-01-31 RX ADMIN — QUETIAPINE FUMARATE 50 MILLIGRAM(S): 200 TABLET, FILM COATED ORAL at 13:24

## 2021-01-31 RX ADMIN — Medication 1 APPLICATION(S): at 05:00

## 2021-01-31 RX ADMIN — Medication 5 MILLIGRAM(S): at 20:21

## 2021-01-31 RX ADMIN — Medication 250 MILLIGRAM(S): at 06:14

## 2021-01-31 RX ADMIN — Medication 1: at 13:32

## 2021-01-31 RX ADMIN — OXYCODONE HYDROCHLORIDE 5 MILLIGRAM(S): 5 TABLET ORAL at 06:14

## 2021-01-31 RX ADMIN — ALBUTEROL 2 PUFF(S): 90 AEROSOL, METERED ORAL at 22:19

## 2021-01-31 RX ADMIN — PIPERACILLIN AND TAZOBACTAM 25 GRAM(S): 4; .5 INJECTION, POWDER, LYOPHILIZED, FOR SOLUTION INTRAVENOUS at 09:41

## 2021-01-31 RX ADMIN — Medication 5 MILLIGRAM(S): at 13:24

## 2021-01-31 RX ADMIN — Medication 1 APPLICATION(S): at 19:03

## 2021-01-31 RX ADMIN — CHLORHEXIDINE GLUCONATE 1 APPLICATION(S): 213 SOLUTION TOPICAL at 04:59

## 2021-01-31 RX ADMIN — POLYETHYLENE GLYCOL 3350 17 GRAM(S): 17 POWDER, FOR SOLUTION ORAL at 13:23

## 2021-01-31 RX ADMIN — Medication 975 MILLIGRAM(S): at 13:23

## 2021-01-31 RX ADMIN — QUETIAPINE FUMARATE 100 MILLIGRAM(S): 200 TABLET, FILM COATED ORAL at 23:39

## 2021-01-31 RX ADMIN — PANTOPRAZOLE SODIUM 40 MILLIGRAM(S): 20 TABLET, DELAYED RELEASE ORAL at 13:25

## 2021-01-31 RX ADMIN — HYDROMORPHONE HYDROCHLORIDE 1 MILLIGRAM(S): 2 INJECTION INTRAMUSCULAR; INTRAVENOUS; SUBCUTANEOUS at 13:12

## 2021-01-31 RX ADMIN — Medication 975 MILLIGRAM(S): at 04:57

## 2021-01-31 RX ADMIN — Medication 5 MILLIGRAM(S): at 06:14

## 2021-01-31 RX ADMIN — CHLORHEXIDINE GLUCONATE 15 MILLILITER(S): 213 SOLUTION TOPICAL at 19:02

## 2021-01-31 NOTE — PROGRESS NOTE ADULT - SUBJECTIVE AND OBJECTIVE BOX
INTERVAL HPI/OVERNIGHT EVENTS: no significant overnight events    SUBJECTIVE: Patient seen and examined at bedside.     ROS unable to obtain 2/2 clinical status    OBJECTIVE:    VITAL SIGNS:  ICU Vital Signs Last 24 Hrs  T(C): 37.2 (2021 06:36), Max: 38.2 (2021 20:00)  T(F): 99 (2021 06:36), Max: 100.8 (2021 20:00)  HR: 123 (2021 10:26) (87 - 129)  BP: 94/54 (2021 08:00) (94/54 - 120/69)  BP(mean): 72 (2021 08:00) (67 - 88)  ABP: 94/54 (2021 08:00) (94/54 - 131/79)  ABP(mean): 72 (2021 08:00) (72 - 95)  RR: 27 (2021 08:00) (25 - 30)  SpO2: 97% (2021 10:26) (97% - 100%)    Mode: CPAP with PS +5, FiO2: 30, PEEP: 8     @ 07: @ 07:00  --------------------------------------------------------  IN: 5313.1 mL / OUT: 1830 mL / NET: 3483.1 mL     @ 07: @ 11:00  --------------------------------------------------------  IN: 363.7 mL / OUT: 130 mL / NET: 233.7 mL      CAPILLARY BLOOD GLUCOSE      POCT Blood Glucose.: 187 mg/dL (2021 04:33)      PHYSICAL EXAM:    General: NAD, awake  HEENT: NC/AT; PERRL, clear conjunctiva  Neck: supple  Respiratory: CTA b/l  Cardiovascular: +S1/S2; RRR  Abdomen: soft, NT/ND  Extremities: WWP, 2+ peripheral pulses b/l; + pitting edema in b/l LE  Skin: normal color and turgor; no rash  Neurological: tracking, appears alert    MEDICATIONS:  MEDICATIONS  (STANDING):  ALBUTerol    90 MICROgram(s) HFA Inhaler 2 Puff(s) Inhalation every 6 hours  BACItracin   Ointment 1 Application(s) Topical two times a day  chlorhexidine 0.12% Liquid 15 milliLiter(s) Oral Mucosa every 12 hours  chlorhexidine 2% Cloths 1 Application(s) Topical <User Schedule>  dexMEDEtomidine Infusion 1 MICROgram(s)/kG/Hr (27.5 mL/Hr) IV Continuous <Continuous>  dextrose 5%. 1000 milliLiter(s) (50 mL/Hr) IV Continuous <Continuous>  dextrose 5%. 1000 milliLiter(s) (100 mL/Hr) IV Continuous <Continuous>  diazepam    Tablet 5 milliGRAM(s) Oral every 4 hours  enoxaparin Injectable 40 milliGRAM(s) SubCutaneous every 12 hours  glucagon  Injectable 1 milliGRAM(s) IntraMuscular once  insulin lispro (ADMELOG) corrective regimen sliding scale   SubCutaneous every 6 hours  multivitamin/minerals/iron Oral Solution (CENTRUM) 15 milliLiter(s) Oral daily  oxyCODONE    IR 5 milliGRAM(s) Oral every 6 hours  pantoprazole  Injectable 40 milliGRAM(s) IV Push daily  petrolatum Ophthalmic Ointment 1 Application(s) Both EYES two times a day  piperacillin/tazobactam IVPB.. 3.375 Gram(s) IV Intermittent every 8 hours  polyethylene glycol 3350 17 Gram(s) Oral daily  potassium chloride   Powder 40 milliEquivalent(s) Oral every 4 hours  QUEtiapine 100 milliGRAM(s) Oral at bedtime  QUEtiapine 50 milliGRAM(s) Oral daily  senna Syrup 10 milliLiter(s) Oral daily  vancomycin  IVPB      vancomycin  IVPB 1000 milliGRAM(s) IV Intermittent every 12 hours    MEDICATIONS  (PRN):  acetaminophen   Tablet .. 975 milliGRAM(s) Oral every 6 hours PRN Temp greater or equal to 38C (100.4F)  HYDROmorphone  Injectable 1 milliGRAM(s) IV Push every 3 hours PRN Agitation / Tachypnea      ALLERGIES:  Allergies    No Known Allergies    Intolerances        LABS:                        8.0    9.02  )-----------( 379      ( 2021 05:12 )             26.7         x   |  x   |  x   ----------------------------<  x   3.2<L>   |  x   |  x     Ca    8.6      2021 05:12  Phos  3.2       Mg     2.0         TPro  6.7  /  Alb  2.9<L>  /  TBili  0.5  /  DBili  x   /  AST  28  /  ALT  39<H>  /  AlkPhos  107      PT/INR - ( 2021 05:12 )   PT: 15.4 sec;   INR: 1.36 ratio         PTT - ( 2021 05:12 )  PTT:28.9 sec  Urinalysis Basic - ( 2021 00:47 )    Color: Orange / Appearance: Turbid / S.033 / pH: x  Gluc: x / Ketone: Small  / Bili: Negative / Urobili: <2 mg/dL   Blood: x / Protein: 30 mg/dL / Nitrite: Negative   Leuk Esterase: Negative / RBC: 1 /HPF / WBC 2 /HPF   Sq Epi: x / Non Sq Epi: Few / Bacteria: Many        RADIOLOGY & ADDITIONAL TESTS: Reviewed.

## 2021-01-31 NOTE — PROGRESS NOTE ADULT - ATTENDING COMMENTS
N - Wean slowly as tolerated. Signs of delirium and withdrawal.   P CPAP and PS sprints  C - no vasopressors, BP stable  GI - Continue GT feeds   -Continue lasix every 12 hours-has good response.  Goal negative fluid balance. Recheck lytes this afternoon after potassium repletion.  Enteral potassium supplements  H - lovenox for dvt ppx  ID - Vanco and Zosyn for R/O. follow Cx.   E - ssi glucose well controlled

## 2021-01-31 NOTE — PROGRESS NOTE ADULT - ASSESSMENT
47y/o F with no significant PMHx admitted for COVID ARDS, initially intubated 1/5-1/9, required re-intubation 1/16 and now s/p trach and PEG POD #3 (1/28). Course also notable for UTI s/p CTX 1/25-27, MRSA+ mouth culture, and new fever 1/30 on empiric Abx.    Resp:  -SBT this morning +5/8, 30%, continue as tolerated (prior vent settings , RR 26, PEEP 8)  -ABG 12pm  -alb q6h    CV: no issues at this time    FEN/GI/Renal:  -continue tube feeds via PEG  -bowel regimen  -no ROMAN  -hypokalemia, increase enteral KCl and recheck BMP in afternoon  -Lasix 20mg now, goal negative    ID: febrile O/N 1/30, sputum culture pending. Last bcx 1/28 NGTD. MRSA+ mouth swab  -continue empiric vanc and zosyn for at least 48hrs, vanc trough tonight  -f/u sputum culture    Heme: negative b/l LE dopplers 1/20 despite rising d-dimer  -lovenox ppx    Endo:  -insulin sliding scale    Neuro:  -precedex, wean we tolerated  -valium 5mg q4 standing  -oxy 5mg q6 standing  -seroquel BID

## 2021-02-01 LAB
-  AMIKACIN: SIGNIFICANT CHANGE UP
-  AMOXICILLIN/CLAVULANIC ACID: SIGNIFICANT CHANGE UP
-  AMOXICILLIN/CLAVULANIC ACID: SIGNIFICANT CHANGE UP
-  AMPICILLIN/SULBACTAM: SIGNIFICANT CHANGE UP
-  AMPICILLIN/SULBACTAM: SIGNIFICANT CHANGE UP
-  AMPICILLIN: SIGNIFICANT CHANGE UP
-  AMPICILLIN: SIGNIFICANT CHANGE UP
-  AZTREONAM: SIGNIFICANT CHANGE UP
-  CEFAZOLIN: SIGNIFICANT CHANGE UP
-  CEFAZOLIN: SIGNIFICANT CHANGE UP
-  CEFEPIME: SIGNIFICANT CHANGE UP
-  CEFOXITIN: SIGNIFICANT CHANGE UP
-  CEFTRIAXONE: SIGNIFICANT CHANGE UP
-  CEFTRIAXONE: SIGNIFICANT CHANGE UP
-  CIPROFLOXACIN: SIGNIFICANT CHANGE UP
-  CIPROFLOXACIN: SIGNIFICANT CHANGE UP
-  CLINDAMYCIN: SIGNIFICANT CHANGE UP
-  DAPTOMYCIN: SIGNIFICANT CHANGE UP
-  ERTAPENEM: SIGNIFICANT CHANGE UP
-  ERYTHROMYCIN: SIGNIFICANT CHANGE UP
-  GENTAMICIN: SIGNIFICANT CHANGE UP
-  GENTAMICIN: SIGNIFICANT CHANGE UP
-  IMIPENEM: SIGNIFICANT CHANGE UP
-  LEVOFLOXACIN: SIGNIFICANT CHANGE UP
-  LEVOFLOXACIN: SIGNIFICANT CHANGE UP
-  LINEZOLID: SIGNIFICANT CHANGE UP
-  MEROPENEM: SIGNIFICANT CHANGE UP
-  MEROPENEM: SIGNIFICANT CHANGE UP
-  MOXIFLOXACIN(AEROBIC): SIGNIFICANT CHANGE UP
-  OXACILLIN: SIGNIFICANT CHANGE UP
-  PENICILLIN: SIGNIFICANT CHANGE UP
-  PIPERACILLIN/TAZOBACTAM: SIGNIFICANT CHANGE UP
-  RIFAMPIN: SIGNIFICANT CHANGE UP
-  TETRACYCLINE: SIGNIFICANT CHANGE UP
-  TOBRAMYCIN: SIGNIFICANT CHANGE UP
-  TRIMETHOPRIM/SULFAMETHOXAZOLE: SIGNIFICANT CHANGE UP
-  TRIMETHOPRIM/SULFAMETHOXAZOLE: SIGNIFICANT CHANGE UP
-  VANCOMYCIN: SIGNIFICANT CHANGE UP
ALBUMIN SERPL ELPH-MCNC: 3.1 G/DL — LOW (ref 3.3–5)
ALP SERPL-CCNC: 110 U/L — SIGNIFICANT CHANGE UP (ref 40–120)
ALT FLD-CCNC: 44 U/L — HIGH (ref 4–33)
ANION GAP SERPL CALC-SCNC: 8 MMOL/L — SIGNIFICANT CHANGE UP (ref 7–14)
AST SERPL-CCNC: 26 U/L — SIGNIFICANT CHANGE UP (ref 4–32)
BASE EXCESS BLDA CALC-SCNC: 0.8 MMOL/L — SIGNIFICANT CHANGE UP (ref -2–2)
BASOPHILS # BLD AUTO: 0.07 K/UL — SIGNIFICANT CHANGE UP (ref 0–0.2)
BASOPHILS NFR BLD AUTO: 0.8 % — SIGNIFICANT CHANGE UP (ref 0–2)
BILIRUB SERPL-MCNC: 0.3 MG/DL — SIGNIFICANT CHANGE UP (ref 0.2–1.2)
BLOOD GAS ARTERIAL - LYTES,HGB,ICA,LACT RESULT: SIGNIFICANT CHANGE UP
BUN SERPL-MCNC: 10 MG/DL — SIGNIFICANT CHANGE UP (ref 7–23)
CALCIUM SERPL-MCNC: 8.8 MG/DL — SIGNIFICANT CHANGE UP (ref 8.4–10.5)
CHLORIDE SERPL-SCNC: 104 MMOL/L — SIGNIFICANT CHANGE UP (ref 98–107)
CO2 SERPL-SCNC: 24 MMOL/L — SIGNIFICANT CHANGE UP (ref 22–31)
CREAT SERPL-MCNC: 0.44 MG/DL — LOW (ref 0.5–1.3)
CULTURE RESULTS: SIGNIFICANT CHANGE UP
EOSINOPHIL # BLD AUTO: 0.73 K/UL — HIGH (ref 0–0.5)
EOSINOPHIL NFR BLD AUTO: 8.4 % — HIGH (ref 0–6)
GLUCOSE SERPL-MCNC: 204 MG/DL — HIGH (ref 70–99)
HCO3 BLDA-SCNC: 25 MMOL/L — SIGNIFICANT CHANGE UP (ref 22–26)
HCT VFR BLD CALC: 27.4 % — LOW (ref 34.5–45)
HGB BLD-MCNC: 8.2 G/DL — LOW (ref 11.5–15.5)
IANC: 5.3 K/UL — SIGNIFICANT CHANGE UP (ref 1.5–8.5)
IMM GRANULOCYTES NFR BLD AUTO: 1.4 % — SIGNIFICANT CHANGE UP (ref 0–1.5)
LYMPHOCYTES # BLD AUTO: 1.85 K/UL — SIGNIFICANT CHANGE UP (ref 1–3.3)
LYMPHOCYTES # BLD AUTO: 21.3 % — SIGNIFICANT CHANGE UP (ref 13–44)
MCHC RBC-ENTMCNC: 27.2 PG — SIGNIFICANT CHANGE UP (ref 27–34)
MCHC RBC-ENTMCNC: 29.9 GM/DL — LOW (ref 32–36)
MCV RBC AUTO: 90.7 FL — SIGNIFICANT CHANGE UP (ref 80–100)
METHOD TYPE: SIGNIFICANT CHANGE UP
METHOD TYPE: SIGNIFICANT CHANGE UP
MONOCYTES # BLD AUTO: 0.62 K/UL — SIGNIFICANT CHANGE UP (ref 0–0.9)
MONOCYTES NFR BLD AUTO: 7.1 % — SIGNIFICANT CHANGE UP (ref 2–14)
NEUTROPHILS # BLD AUTO: 5.3 K/UL — SIGNIFICANT CHANGE UP (ref 1.8–7.4)
NEUTROPHILS NFR BLD AUTO: 61 % — SIGNIFICANT CHANGE UP (ref 43–77)
NRBC # BLD: 0 /100 WBCS — SIGNIFICANT CHANGE UP
NRBC # FLD: 0 K/UL — SIGNIFICANT CHANGE UP
ORGANISM # SPEC MICROSCOPIC CNT: SIGNIFICANT CHANGE UP
PCO2 BLDA: 39 MMHG — SIGNIFICANT CHANGE UP (ref 32–48)
PH BLDA: 7.42 — SIGNIFICANT CHANGE UP (ref 7.35–7.45)
PLATELET # BLD AUTO: 463 K/UL — HIGH (ref 150–400)
PO2 BLDA: 80 MMHG — LOW (ref 83–108)
POTASSIUM SERPL-MCNC: 4.1 MMOL/L — SIGNIFICANT CHANGE UP (ref 3.5–5.3)
POTASSIUM SERPL-SCNC: 4.1 MMOL/L — SIGNIFICANT CHANGE UP (ref 3.5–5.3)
PROT SERPL-MCNC: 6.9 G/DL — SIGNIFICANT CHANGE UP (ref 6–8.3)
RBC # BLD: 3.02 M/UL — LOW (ref 3.8–5.2)
RBC # FLD: 16.9 % — HIGH (ref 10.3–14.5)
SAO2 % BLDA: 95.8 % — SIGNIFICANT CHANGE UP (ref 95–99)
SODIUM SERPL-SCNC: 136 MMOL/L — SIGNIFICANT CHANGE UP (ref 135–145)
SPECIMEN SOURCE: SIGNIFICANT CHANGE UP
WBC # BLD: 8.69 K/UL — SIGNIFICANT CHANGE UP (ref 3.8–10.5)
WBC # FLD AUTO: 8.69 K/UL — SIGNIFICANT CHANGE UP (ref 3.8–10.5)

## 2021-02-01 PROCEDURE — 99291 CRITICAL CARE FIRST HOUR: CPT

## 2021-02-01 RX ORDER — DIAZEPAM 5 MG
5 TABLET ORAL
Refills: 0 | Status: DISCONTINUED | OUTPATIENT
Start: 2021-02-01 | End: 2021-02-02

## 2021-02-01 RX ORDER — MIDODRINE HYDROCHLORIDE 2.5 MG/1
10 TABLET ORAL
Refills: 0 | Status: DISCONTINUED | OUTPATIENT
Start: 2021-02-01 | End: 2021-02-02

## 2021-02-01 RX ORDER — VANCOMYCIN HCL 1 G
1250 VIAL (EA) INTRAVENOUS EVERY 12 HOURS
Refills: 0 | Status: DISCONTINUED | OUTPATIENT
Start: 2021-02-01 | End: 2021-02-05

## 2021-02-01 RX ORDER — PHENYLEPHRINE HYDROCHLORIDE 10 MG/ML
0.1 INJECTION INTRAVENOUS
Qty: 40 | Refills: 0 | Status: DISCONTINUED | OUTPATIENT
Start: 2021-02-01 | End: 2021-02-02

## 2021-02-01 RX ORDER — ACETAMINOPHEN 500 MG
650 TABLET ORAL EVERY 6 HOURS
Refills: 0 | Status: DISCONTINUED | OUTPATIENT
Start: 2021-02-01 | End: 2021-02-24

## 2021-02-01 RX ADMIN — PIPERACILLIN AND TAZOBACTAM 25 GRAM(S): 4; .5 INJECTION, POWDER, LYOPHILIZED, FOR SOLUTION INTRAVENOUS at 02:40

## 2021-02-01 RX ADMIN — Medication 2: at 18:48

## 2021-02-01 RX ADMIN — Medication 1: at 23:41

## 2021-02-01 RX ADMIN — Medication 1 APPLICATION(S): at 04:01

## 2021-02-01 RX ADMIN — Medication 650 MILLIGRAM(S): at 17:36

## 2021-02-01 RX ADMIN — ALBUTEROL 2 PUFF(S): 90 AEROSOL, METERED ORAL at 15:42

## 2021-02-01 RX ADMIN — OXYCODONE HYDROCHLORIDE 5 MILLIGRAM(S): 5 TABLET ORAL at 04:01

## 2021-02-01 RX ADMIN — QUETIAPINE FUMARATE 100 MILLIGRAM(S): 200 TABLET, FILM COATED ORAL at 21:56

## 2021-02-01 RX ADMIN — CHLORHEXIDINE GLUCONATE 1 APPLICATION(S): 213 SOLUTION TOPICAL at 04:02

## 2021-02-01 RX ADMIN — Medication 650 MILLIGRAM(S): at 23:39

## 2021-02-01 RX ADMIN — ALBUTEROL 2 PUFF(S): 90 AEROSOL, METERED ORAL at 03:58

## 2021-02-01 RX ADMIN — Medication 166.67 MILLIGRAM(S): at 17:37

## 2021-02-01 RX ADMIN — CHLORHEXIDINE GLUCONATE 15 MILLILITER(S): 213 SOLUTION TOPICAL at 17:49

## 2021-02-01 RX ADMIN — Medication 1 APPLICATION(S): at 17:49

## 2021-02-01 RX ADMIN — Medication 1 APPLICATION(S): at 04:04

## 2021-02-01 RX ADMIN — Medication 2 MILLIGRAM(S): at 17:36

## 2021-02-01 RX ADMIN — PIPERACILLIN AND TAZOBACTAM 25 GRAM(S): 4; .5 INJECTION, POWDER, LYOPHILIZED, FOR SOLUTION INTRAVENOUS at 11:13

## 2021-02-01 RX ADMIN — Medication 1: at 12:15

## 2021-02-01 RX ADMIN — HYDROMORPHONE HYDROCHLORIDE 1 MILLIGRAM(S): 2 INJECTION INTRAMUSCULAR; INTRAVENOUS; SUBCUTANEOUS at 15:52

## 2021-02-01 RX ADMIN — Medication 2: at 05:21

## 2021-02-01 RX ADMIN — ALBUTEROL 2 PUFF(S): 90 AEROSOL, METERED ORAL at 20:18

## 2021-02-01 RX ADMIN — ALBUTEROL 2 PUFF(S): 90 AEROSOL, METERED ORAL at 07:02

## 2021-02-01 RX ADMIN — Medication 15 MILLILITER(S): at 12:15

## 2021-02-01 RX ADMIN — ENOXAPARIN SODIUM 40 MILLIGRAM(S): 100 INJECTION SUBCUTANEOUS at 04:01

## 2021-02-01 RX ADMIN — Medication 5 MILLIGRAM(S): at 03:52

## 2021-02-01 RX ADMIN — DEXMEDETOMIDINE HYDROCHLORIDE IN 0.9% SODIUM CHLORIDE 27.5 MICROGRAM(S)/KG/HR: 4 INJECTION INTRAVENOUS at 03:52

## 2021-02-01 RX ADMIN — Medication 250 MILLIGRAM(S): at 08:05

## 2021-02-01 RX ADMIN — OXYCODONE HYDROCHLORIDE 5 MILLIGRAM(S): 5 TABLET ORAL at 23:39

## 2021-02-01 RX ADMIN — HYDROMORPHONE HYDROCHLORIDE 1 MILLIGRAM(S): 2 INJECTION INTRAMUSCULAR; INTRAVENOUS; SUBCUTANEOUS at 12:41

## 2021-02-01 RX ADMIN — HYDROMORPHONE HYDROCHLORIDE 1 MILLIGRAM(S): 2 INJECTION INTRAMUSCULAR; INTRAVENOUS; SUBCUTANEOUS at 08:05

## 2021-02-01 RX ADMIN — PIPERACILLIN AND TAZOBACTAM 25 GRAM(S): 4; .5 INJECTION, POWDER, LYOPHILIZED, FOR SOLUTION INTRAVENOUS at 17:37

## 2021-02-01 RX ADMIN — ENOXAPARIN SODIUM 40 MILLIGRAM(S): 100 INJECTION SUBCUTANEOUS at 17:37

## 2021-02-01 RX ADMIN — OXYCODONE HYDROCHLORIDE 5 MILLIGRAM(S): 5 TABLET ORAL at 17:36

## 2021-02-01 RX ADMIN — Medication 650 MILLIGRAM(S): at 11:12

## 2021-02-01 RX ADMIN — PHENYLEPHRINE HYDROCHLORIDE 4.12 MICROGRAM(S)/KG/MIN: 10 INJECTION INTRAVENOUS at 00:23

## 2021-02-01 RX ADMIN — Medication 5 MILLIGRAM(S): at 19:24

## 2021-02-01 RX ADMIN — CHLORHEXIDINE GLUCONATE 15 MILLILITER(S): 213 SOLUTION TOPICAL at 04:00

## 2021-02-01 NOTE — PHYSICAL THERAPY INITIAL EVALUATION ADULT - ADDITIONAL COMMENTS
As per EMR, pt lives at home with  and 3 kids. Pt was independent in functional activities prior to admission without use of assistive device.     Pt left semi-winslow in bed, NAD. +call bell. RN aware of session. Isolation precautions maintained. lines and tubes intact. SpO2 maintained at and above 90% throughout session.
Pt lives with her  and 3 kids in private home, pts bedroom is on the second floor. Pt was independent in all ADL prior to admission. Pt denies using assistive device prior to admission.

## 2021-02-01 NOTE — PHYSICAL THERAPY INITIAL EVALUATION ADULT - PHYSICAL ASSIST/NONPHYSICAL ASSIST: SIT/SUPINE, REHAB EVAL
set-up required/verbal cues/nonverbal cues (demo/gestures)/1 person assist
verbal cues/nonverbal cues (demo/gestures)/2 person assist

## 2021-02-01 NOTE — PHYSICAL THERAPY INITIAL EVALUATION ADULT - LEVEL OF INDEPENDENCE: SUPINE/SIT, REHAB EVAL
minimum assist (75% patients effort)
moderate assist (50% patients effort)/maximum assist (25% patients effort)

## 2021-02-01 NOTE — PHYSICAL THERAPY INITIAL EVALUATION ADULT - GENERAL OBSERVATIONS, REHAB EVAL
Pt received supine in bed, all lines intact, NAD.
Pt received semi-winslow in bed, +trach, +cardiac monitor, +flexiseal, +burdick catheter, +feeding tube, NAD. Pt agreeable to PT consultation. Cleared for PT as per RN

## 2021-02-01 NOTE — PHYSICAL THERAPY INITIAL EVALUATION ADULT - IMPAIRMENTS CONTRIBUTING IMPAIRED BED MOBILITY, REHAB EVAL
impaired balance/impaired postural control/decreased strength
impaired balance/cognition/narrow base of support/impaired postural control/decreased strength

## 2021-02-01 NOTE — PHYSICAL THERAPY INITIAL EVALUATION ADULT - IMPAIRMENTS FOUND, PT EVAL
gait, locomotion, and balance/muscle strength
aerobic capacity/endurance/gait, locomotion, and balance/muscle strength

## 2021-02-01 NOTE — PROGRESS NOTE ADULT - ATTENDING COMMENTS
46yr F COVID ARDS s/p trach peg on 28th  overnight required anni for pressor support    awake, ?Czech speaking, occasionally follows  standing tylenol  standing oxy  standing seroquel  decrease valium dose to bid  start qhs midodrine (hypotensive overnight likely due to sedatives)  appears anxious but denies being so, if on long term antidep will restart them  PEEP 8/30% PIP 29  will extend CPAP to 4 hours if comfortable  trach 6.0   PF ratio >300  PBW 66kg  off vasopressor  peptamen TEN @50  rectal tube in place 350cc output  dc pantoprazole given TEN start   q8hrs  SSI  no CVC in place  remove a line  vanc zosyn, will increase vanc dose given level and check tomorrow  Bcx grew enterobacter  VTE PPX  knee and rt cheek skin ulcers

## 2021-02-01 NOTE — PHYSICAL THERAPY INITIAL EVALUATION ADULT - PERTINENT HX OF CURRENT PROBLEM, REHAB EVAL
47 y/o Female with no significant PMHx admitted for COVID ARDS, initially intubated 1/5-1/9, required re-intubation 1/16 and now s/p trach and PEG POD #7 (2/1). Course also notable for UTI s/p CTX 1/25-27, MRSA+ mouth culture, and new fever 1/30/2021 on vanc and zosyn, sputum culture +enterobacter aerogenes and staph aureus.
Patient is a 46 year old woman with no past medical history coming in with shortness of breath, cough productive of white sputum, pleuritic rib pain (moderate in severity) for two days and diarrhea for one day.

## 2021-02-01 NOTE — PHYSICAL THERAPY INITIAL EVALUATION ADULT - CRITERIA FOR SKILLED THERAPEUTIC INTERVENTIONS
impairments found/rehab potential/anticipated discharge recommendation
impairments found/functional limitations in following categories/rehab potential

## 2021-02-01 NOTE — PROGRESS NOTE ADULT - ASSESSMENT
47y/o F with no significant PMHx admitted for COVID ARDS, initially intubated 1/5-1/9, required re-intubation 1/16 and now s/p trach and PEG POD #7 (2/1). Course also notable for UTI s/p CTX 1/25-27, MRSA+ mouth culture, and new fever 1/30 on vanc and zosyn, sputum culture +enterobacter aerogenes and staph aureus.     Resp:  -SBT this morning +10/8, 30%, wean to +8/8 as tolerated; (prior vent settings , RR 26, PEEP 8)  -most recent ABG 7.44/37/61/25   -   -alb q6h     CV: no issues at this time  - off levophed since this AM, had mild hypotension overnight likely 2/2 sedating medications  - added midodrine 10mg qHS today 2/1     FEN/GI/Renal:  -continue tube feeds via PEG Peptamen 1.5 50cc q4h  -bowel regimen  -no ROMAN  -goal net even/slightly net negative     ID: febrile O/N 1/30, sputum culture pending. Last bcx 1/28 NGTD.   - c/w vanco 1250mg q12 (1/30- ) , to complete tentatively 7 day course; dose increased today for subtherapeutic vanco at 9.2   - vanco trough tonight   - c/w zosyn (1/30-  ), to complete tentatively 10 day course   - sputum culture +enterobacter aerogenes and +staph aureus   - MRSA+ mouth swab  -f/u sputum culture    Heme: negative b/l LE dopplers 1/20 despite rising d-dimer  -lovenox 40mg q12h prophylactic dosing     Endo:  -insulin sliding scale    Neuro:  -precedex off (since 2/1 7am)  - weaned valium today 2/1 to valium 5mg q12h (from valium 5mg q4 standing)  - oxy 5mg q6 standing  - Dilaudid q3h PRN  -seroquel BID   45y/o F with no significant PMHx admitted for COVID ARDS, initially intubated 1/5-1/9, required re-intubation 1/16 and now s/p trach and PEG POD #7 (2/1). Course also notable for UTI s/p CTX 1/25-27, MRSA+ mouth culture, and new fever 1/30 on vanc and zosyn, sputum culture +enterobacter aerogenes and staph aureus.     Resp:  -SBT this morning +10/8, 30%, wean to +8/8 as tolerated; (prior vent settings , RR 26, PEEP 8)  -most recent ABG 7.44/37/61/25   -   -alb q6h     CV: no issues at this time  - off levophed since this AM, had mild hypotension overnight likely 2/2 sedating medications  - added midodrine 10mg qHS today 2/1     FEN/GI/Renal:  -continue tube feeds via PEG Peptamen 1.5 50cc q4h  -bowel regimen  -no ROMAN  -goal net even/slightly net negative     ID: febrile O/N 1/30, sputum +enterobacter, +staph aureus.   - c/w vanco 1250mg q12 (1/30- ) , to complete tentatively 7 day course; dose increased today for subtherapeutic vanco at 9.2   - vanco trough tonight   - c/w zosyn (1/30-  ), to complete tentatively 10 day course   - sputum culture +enterobacter aerogenes and +staph aureus   - Last bcx 1/28 NGTD.   - MRSA+ mouth swab    Heme: negative b/l LE dopplers 1/20 despite rising d-dimer  -lovenox 40mg q12h prophylactic dosing     Endo:  -insulin sliding scale    Neuro:  -precedex off (since 2/1 7am)  - weaned valium today 2/1 to valium 5mg q12h (from valium 5mg q4 standing)  - oxy 5mg q6 standing  - Dilaudid q3h PRN  -seroquel BID

## 2021-02-01 NOTE — PHYSICAL THERAPY INITIAL EVALUATION ADULT - PLANNED THERAPY INTERVENTIONS, PT EVAL
bed mobility training/gait training/transfer training
balance training/bed mobility training/gait training/strengthening/transfer training

## 2021-02-01 NOTE — PROGRESS NOTE ADULT - SUBJECTIVE AND OBJECTIVE BOX
CHIEF COMPLAINT: Patient is a 46y old  Female who presents with a chief complaint of COVID (31 Jan 2021 10:59)    Interval Events:  Overnight required restarting levophed for mild hypotension 80s/40s after being given nighttime valium and seroquel. Trialed levophed back off this AM. SBT trial this AM.     REVIEW OF SYSTEMS:  Unable to obtain due to clinical status.     OBJECTIVE:  ICU Vital Signs Last 24 Hrs  T(C): 37.3 (01 Feb 2021 12:00), Max: 37.7 (31 Jan 2021 16:00)  T(F): 99.2 (01 Feb 2021 12:00), Max: 99.8 (31 Jan 2021 16:00)  HR: 124 (01 Feb 2021 12:00) (78 - 127)  BP: --  BP(mean): --  ABP: 157/83 (01 Feb 2021 12:00) (80/40 - 163/82)  ABP(mean): 73 (01 Feb 2021 06:30) (58 - 99)  RR: 36 (01 Feb 2021 12:00) (24 - 36)  SpO2: 96% (01 Feb 2021 12:00) (91% - 100%)    Mode: CPAP with PS, FiO2: 30, PEEP: 8, PS: 8, MAP: 12, PIP: 19    01-31 @ 07:01  -  02-01 @ 07:00  --------------------------------------------------------  IN: 3997 mL / OUT: 3330 mL / NET: 667 mL      CAPILLARY BLOOD GLUCOSE      POCT Blood Glucose.: 158 mg/dL (01 Feb 2021 11:41)      PHYSICAL EXAM:  General: NAD, awake, looking around   HEENT: NC/AT; PERRL, clear conjunctiva; +superficial abrasions at left cheek   Neck: supple  Respiratory: CTA b/l  Cardiovascular: +S1/S2; RRR  Abdomen: soft, NT/ND  Extremities: WWP, 2+ peripheral pulses b/l; + pitting edema in b/l LE  Skin: normal color and turgor; no rash  Neurological: tracking, appears alert    HOSPITAL MEDICATIONS:  MEDICATIONS  (STANDING):  acetaminophen    Suspension .. 650 milliGRAM(s) Oral every 6 hours  ALBUTerol    90 MICROgram(s) HFA Inhaler 2 Puff(s) Inhalation every 6 hours  BACItracin   Ointment 1 Application(s) Topical two times a day  chlorhexidine 0.12% Liquid 15 milliLiter(s) Oral Mucosa every 12 hours  chlorhexidine 2% Cloths 1 Application(s) Topical <User Schedule>  dexMEDEtomidine Infusion 1 MICROgram(s)/kG/Hr (27.5 mL/Hr) IV Continuous <Continuous>  dextrose 5%. 1000 milliLiter(s) (50 mL/Hr) IV Continuous <Continuous>  dextrose 5%. 1000 milliLiter(s) (100 mL/Hr) IV Continuous <Continuous>  diazepam    Tablet 5 milliGRAM(s) Oral two times a day  enoxaparin Injectable 40 milliGRAM(s) SubCutaneous every 12 hours  glucagon  Injectable 1 milliGRAM(s) IntraMuscular once  insulin lispro (ADMELOG) corrective regimen sliding scale   SubCutaneous every 6 hours  midodrine 10 milliGRAM(s) Oral <User Schedule>  multivitamin/minerals/iron Oral Solution (CENTRUM) 15 milliLiter(s) Oral daily  oxyCODONE    IR 5 milliGRAM(s) Oral every 6 hours  petrolatum Ophthalmic Ointment 1 Application(s) Both EYES two times a day  phenylephrine    Infusion 0.1 MICROgram(s)/kG/Min (4.12 mL/Hr) IV Continuous <Continuous>  piperacillin/tazobactam IVPB.. 3.375 Gram(s) IV Intermittent every 8 hours  polyethylene glycol 3350 17 Gram(s) Oral daily  QUEtiapine 100 milliGRAM(s) Oral at bedtime  QUEtiapine 50 milliGRAM(s) Oral daily  senna Syrup 10 milliLiter(s) Oral daily  vancomycin  IVPB 1250 milliGRAM(s) IV Intermittent every 12 hours    MEDICATIONS  (PRN):  HYDROmorphone  Injectable 1 milliGRAM(s) IV Push every 3 hours PRN Agitation / Tachypnea      LABS:  (02-01 @ 05:00)                        8.2  8.69 )-----------( 463                 27.4    Neutrophils = 5.30 (61.0%)  Lymphocytes = 1.85 (21.3%)  Eosinophils = 0.73 (8.4%)  Basophils = 0.07 (0.8%)  Monocytes = 0.62 (7.1%)  Bands = --%    WBC Trend: 8.69<--, 9.02<--, 11.51<--  Hb Trend: 8.2<--, 8.0<--, 8.6<--, 8.7<--, 8.6<--  Plt Trend: 463<--, 379<--, 359<--, 328<--, 254<--  02-01    136  |  104  |  10  ----------------------------<  204<H>  4.1   |  24  |  0.44<L>    Ca    8.8      01 Feb 2021 05:00  Phos  3.0     01-31  Mg     2.1     01-31    TPro  6.9  /  Alb  3.1<L>  /  TBili  0.3  /  DBili  x   /  AST  26  /  ALT  44<H>  /  AlkPhos  110  02-01    Creatinine Trend: 0.44<--, 0.47<--, 0.49<--, 0.48<--, 0.44<--, 0.50<--  PT/INR - ( 31 Jan 2021 05:12 )   PT: 15.4 sec;   INR: 1.36 ratio         PTT - ( 31 Jan 2021 05:12 )  PTT:28.9 sec    Arterial Blood Gas:  02-01 @ 12:44  7.44/37/61/25/91.5/0.9  ABG lactate: --  Arterial Blood Gas:  02-01 @ 05:00  7.42/39/80/25/95.8/0.8  ABG lactate: --  Arterial Blood Gas:  01-31 @ 13:25  7.44/40/145/27/99.0/2.9  ABG lactate: --  Arterial Blood Gas:  01-31 @ 06:42  7.43/44/106/28/98.1/4.6  ABG lactate: --            MICROBIOLOGY:   Blood Cx:  Urine Cx:  Sputum Cx:  Legionella:  RVP:    RADIOLOGY:  X Ray:  CT:  MRI:  Ultrasound:  [ ] Reviewed and interpreted by me    EKG:

## 2021-02-01 NOTE — PHYSICAL THERAPY INITIAL EVALUATION ADULT - BED MOBILITY LIMITATIONS, REHAB EVAL
decreased ability to use legs for bridging/pushing/impaired ability to control trunk for mobility
impaired ability to control trunk for mobility

## 2021-02-02 LAB
ALBUMIN SERPL ELPH-MCNC: 3.4 G/DL — SIGNIFICANT CHANGE UP (ref 3.3–5)
ALP SERPL-CCNC: 125 U/L — HIGH (ref 40–120)
ALT FLD-CCNC: 41 U/L — HIGH (ref 4–33)
ANION GAP SERPL CALC-SCNC: 13 MMOL/L — SIGNIFICANT CHANGE UP (ref 7–14)
AST SERPL-CCNC: 30 U/L — SIGNIFICANT CHANGE UP (ref 4–32)
BASOPHILS # BLD AUTO: 0.09 K/UL — SIGNIFICANT CHANGE UP (ref 0–0.2)
BASOPHILS # BLD AUTO: 0.09 K/UL — SIGNIFICANT CHANGE UP (ref 0–0.2)
BASOPHILS NFR BLD AUTO: 1.1 % — SIGNIFICANT CHANGE UP (ref 0–2)
BASOPHILS NFR BLD AUTO: 1.1 % — SIGNIFICANT CHANGE UP (ref 0–2)
BILIRUB SERPL-MCNC: 0.2 MG/DL — SIGNIFICANT CHANGE UP (ref 0.2–1.2)
BLOOD GAS ARTERIAL - LYTES,HGB,ICA,LACT RESULT: SIGNIFICANT CHANGE UP
BLOOD GAS ARTERIAL - LYTES,HGB,ICA,LACT RESULT: SIGNIFICANT CHANGE UP
BUN SERPL-MCNC: 9 MG/DL — SIGNIFICANT CHANGE UP (ref 7–23)
CALCIUM SERPL-MCNC: 9.3 MG/DL — SIGNIFICANT CHANGE UP (ref 8.4–10.5)
CHLORIDE SERPL-SCNC: 99 MMOL/L — SIGNIFICANT CHANGE UP (ref 98–107)
CO2 SERPL-SCNC: 23 MMOL/L — SIGNIFICANT CHANGE UP (ref 22–31)
CREAT SERPL-MCNC: 0.54 MG/DL — SIGNIFICANT CHANGE UP (ref 0.5–1.3)
CULTURE RESULTS: SIGNIFICANT CHANGE UP
CULTURE RESULTS: SIGNIFICANT CHANGE UP
EOSINOPHIL # BLD AUTO: 0.61 K/UL — HIGH (ref 0–0.5)
EOSINOPHIL # BLD AUTO: 0.69 K/UL — HIGH (ref 0–0.5)
EOSINOPHIL NFR BLD AUTO: 7.3 % — HIGH (ref 0–6)
EOSINOPHIL NFR BLD AUTO: 8.1 % — HIGH (ref 0–6)
GLUCOSE SERPL-MCNC: 201 MG/DL — HIGH (ref 70–99)
HCT VFR BLD CALC: 24.7 % — LOW (ref 34.5–45)
HCT VFR BLD CALC: 26.2 % — LOW (ref 34.5–45)
HGB BLD-MCNC: 7.8 G/DL — LOW (ref 11.5–15.5)
HGB BLD-MCNC: 8 G/DL — LOW (ref 11.5–15.5)
IANC: 4.88 K/UL — SIGNIFICANT CHANGE UP (ref 1.5–8.5)
IANC: 5.02 K/UL — SIGNIFICANT CHANGE UP (ref 1.5–8.5)
IMM GRANULOCYTES NFR BLD AUTO: 3.1 % — HIGH (ref 0–1.5)
IMM GRANULOCYTES NFR BLD AUTO: 4.6 % — HIGH (ref 0–1.5)
LYMPHOCYTES # BLD AUTO: 1.71 K/UL — SIGNIFICANT CHANGE UP (ref 1–3.3)
LYMPHOCYTES # BLD AUTO: 1.77 K/UL — SIGNIFICANT CHANGE UP (ref 1–3.3)
LYMPHOCYTES # BLD AUTO: 20.6 % — SIGNIFICANT CHANGE UP (ref 13–44)
LYMPHOCYTES # BLD AUTO: 20.9 % — SIGNIFICANT CHANGE UP (ref 13–44)
MAGNESIUM SERPL-MCNC: 2.1 MG/DL — SIGNIFICANT CHANGE UP (ref 1.6–2.6)
MCHC RBC-ENTMCNC: 27.4 PG — SIGNIFICANT CHANGE UP (ref 27–34)
MCHC RBC-ENTMCNC: 27.4 PG — SIGNIFICANT CHANGE UP (ref 27–34)
MCHC RBC-ENTMCNC: 30.5 GM/DL — LOW (ref 32–36)
MCHC RBC-ENTMCNC: 31.6 GM/DL — LOW (ref 32–36)
MCV RBC AUTO: 86.7 FL — SIGNIFICANT CHANGE UP (ref 80–100)
MCV RBC AUTO: 89.7 FL — SIGNIFICANT CHANGE UP (ref 80–100)
MONOCYTES # BLD AUTO: 0.63 K/UL — SIGNIFICANT CHANGE UP (ref 0–0.9)
MONOCYTES # BLD AUTO: 0.66 K/UL — SIGNIFICANT CHANGE UP (ref 0–0.9)
MONOCYTES NFR BLD AUTO: 7.6 % — SIGNIFICANT CHANGE UP (ref 2–14)
MONOCYTES NFR BLD AUTO: 7.8 % — SIGNIFICANT CHANGE UP (ref 2–14)
NEUTROPHILS # BLD AUTO: 4.88 K/UL — SIGNIFICANT CHANGE UP (ref 1.8–7.4)
NEUTROPHILS # BLD AUTO: 5.02 K/UL — SIGNIFICANT CHANGE UP (ref 1.8–7.4)
NEUTROPHILS NFR BLD AUTO: 57.5 % — SIGNIFICANT CHANGE UP (ref 43–77)
NEUTROPHILS NFR BLD AUTO: 60.3 % — SIGNIFICANT CHANGE UP (ref 43–77)
NRBC # BLD: 0 /100 WBCS — SIGNIFICANT CHANGE UP
NRBC # BLD: 0 /100 WBCS — SIGNIFICANT CHANGE UP
NRBC # FLD: 0 K/UL — SIGNIFICANT CHANGE UP
NRBC # FLD: 0.02 K/UL — HIGH
PHOSPHATE SERPL-MCNC: 4.2 MG/DL — SIGNIFICANT CHANGE UP (ref 2.5–4.5)
PLATELET # BLD AUTO: 493 K/UL — HIGH (ref 150–400)
PLATELET # BLD AUTO: 534 K/UL — HIGH (ref 150–400)
POTASSIUM SERPL-MCNC: 4 MMOL/L — SIGNIFICANT CHANGE UP (ref 3.5–5.3)
POTASSIUM SERPL-SCNC: 4 MMOL/L — SIGNIFICANT CHANGE UP (ref 3.5–5.3)
PROCALCITONIN SERPL-MCNC: 0.11 NG/ML — HIGH (ref 0.02–0.1)
PROT SERPL-MCNC: 6.9 G/DL — SIGNIFICANT CHANGE UP (ref 6–8.3)
RBC # BLD: 2.85 M/UL — LOW (ref 3.8–5.2)
RBC # BLD: 2.92 M/UL — LOW (ref 3.8–5.2)
RBC # FLD: 16.9 % — HIGH (ref 10.3–14.5)
RBC # FLD: 17 % — HIGH (ref 10.3–14.5)
SODIUM SERPL-SCNC: 135 MMOL/L — SIGNIFICANT CHANGE UP (ref 135–145)
SPECIMEN SOURCE: SIGNIFICANT CHANGE UP
SPECIMEN SOURCE: SIGNIFICANT CHANGE UP
WBC # BLD: 8.32 K/UL — SIGNIFICANT CHANGE UP (ref 3.8–10.5)
WBC # BLD: 8.48 K/UL — SIGNIFICANT CHANGE UP (ref 3.8–10.5)
WBC # FLD AUTO: 8.32 K/UL — SIGNIFICANT CHANGE UP (ref 3.8–10.5)
WBC # FLD AUTO: 8.48 K/UL — SIGNIFICANT CHANGE UP (ref 3.8–10.5)

## 2021-02-02 PROCEDURE — 99233 SBSQ HOSP IP/OBS HIGH 50: CPT | Mod: GC

## 2021-02-02 RX ORDER — HALOPERIDOL DECANOATE 100 MG/ML
5 INJECTION INTRAMUSCULAR ONCE
Refills: 0 | Status: COMPLETED | OUTPATIENT
Start: 2021-02-02 | End: 2021-02-02

## 2021-02-02 RX ADMIN — ENOXAPARIN SODIUM 40 MILLIGRAM(S): 100 INJECTION SUBCUTANEOUS at 17:20

## 2021-02-02 RX ADMIN — Medication 650 MILLIGRAM(S): at 11:34

## 2021-02-02 RX ADMIN — QUETIAPINE FUMARATE 50 MILLIGRAM(S): 200 TABLET, FILM COATED ORAL at 11:23

## 2021-02-02 RX ADMIN — Medication 15 MILLILITER(S): at 11:48

## 2021-02-02 RX ADMIN — Medication 166.67 MILLIGRAM(S): at 18:27

## 2021-02-02 RX ADMIN — OXYCODONE HYDROCHLORIDE 5 MILLIGRAM(S): 5 TABLET ORAL at 11:23

## 2021-02-02 RX ADMIN — CHLORHEXIDINE GLUCONATE 15 MILLILITER(S): 213 SOLUTION TOPICAL at 17:20

## 2021-02-02 RX ADMIN — Medication 1 APPLICATION(S): at 05:10

## 2021-02-02 RX ADMIN — PIPERACILLIN AND TAZOBACTAM 25 GRAM(S): 4; .5 INJECTION, POWDER, LYOPHILIZED, FOR SOLUTION INTRAVENOUS at 17:35

## 2021-02-02 RX ADMIN — PIPERACILLIN AND TAZOBACTAM 25 GRAM(S): 4; .5 INJECTION, POWDER, LYOPHILIZED, FOR SOLUTION INTRAVENOUS at 11:19

## 2021-02-02 RX ADMIN — Medication 650 MILLIGRAM(S): at 05:12

## 2021-02-02 RX ADMIN — Medication 5 MILLIGRAM(S): at 05:13

## 2021-02-02 RX ADMIN — HYDROMORPHONE HYDROCHLORIDE 1 MILLIGRAM(S): 2 INJECTION INTRAMUSCULAR; INTRAVENOUS; SUBCUTANEOUS at 02:59

## 2021-02-02 RX ADMIN — ENOXAPARIN SODIUM 40 MILLIGRAM(S): 100 INJECTION SUBCUTANEOUS at 05:12

## 2021-02-02 RX ADMIN — Medication 166.67 MILLIGRAM(S): at 09:01

## 2021-02-02 RX ADMIN — HYDROMORPHONE HYDROCHLORIDE 1 MILLIGRAM(S): 2 INJECTION INTRAMUSCULAR; INTRAVENOUS; SUBCUTANEOUS at 07:46

## 2021-02-02 RX ADMIN — ALBUTEROL 2 PUFF(S): 90 AEROSOL, METERED ORAL at 05:28

## 2021-02-02 RX ADMIN — HALOPERIDOL DECANOATE 5 MILLIGRAM(S): 100 INJECTION INTRAMUSCULAR at 12:06

## 2021-02-02 RX ADMIN — Medication 1 APPLICATION(S): at 18:24

## 2021-02-02 RX ADMIN — Medication 650 MILLIGRAM(S): at 23:55

## 2021-02-02 RX ADMIN — Medication 1: at 04:33

## 2021-02-02 RX ADMIN — OXYCODONE HYDROCHLORIDE 5 MILLIGRAM(S): 5 TABLET ORAL at 05:13

## 2021-02-02 RX ADMIN — Medication 0.1 MILLIGRAM(S): at 20:23

## 2021-02-02 RX ADMIN — CHLORHEXIDINE GLUCONATE 1 APPLICATION(S): 213 SOLUTION TOPICAL at 05:13

## 2021-02-02 RX ADMIN — ALBUTEROL 2 PUFF(S): 90 AEROSOL, METERED ORAL at 08:10

## 2021-02-02 RX ADMIN — Medication 1 APPLICATION(S): at 17:20

## 2021-02-02 RX ADMIN — Medication 1: at 17:24

## 2021-02-02 RX ADMIN — QUETIAPINE FUMARATE 100 MILLIGRAM(S): 200 TABLET, FILM COATED ORAL at 20:24

## 2021-02-02 RX ADMIN — Medication 0.1 MILLIGRAM(S): at 11:54

## 2021-02-02 RX ADMIN — Medication 650 MILLIGRAM(S): at 17:35

## 2021-02-02 RX ADMIN — CHLORHEXIDINE GLUCONATE 15 MILLILITER(S): 213 SOLUTION TOPICAL at 05:12

## 2021-02-02 RX ADMIN — OXYCODONE HYDROCHLORIDE 5 MILLIGRAM(S): 5 TABLET ORAL at 23:55

## 2021-02-02 RX ADMIN — OXYCODONE HYDROCHLORIDE 5 MILLIGRAM(S): 5 TABLET ORAL at 17:35

## 2021-02-02 RX ADMIN — PIPERACILLIN AND TAZOBACTAM 25 GRAM(S): 4; .5 INJECTION, POWDER, LYOPHILIZED, FOR SOLUTION INTRAVENOUS at 02:55

## 2021-02-02 NOTE — CHART NOTE - NSCHARTNOTEFT_GEN_A_CORE
Source: Patient [ ]    Family [ ]     other [X ] Unable to conduct a face to face interview or nutrition-focused physical exam due to limited contact restrictions related to patient's medical condition and isolation precautions. Obtained subjective information from extensive chart review.     Current Diet : Diet, NPO with Tube Feed:   Tube Feeding Modality: Gastrostomy  Peptamen 1.5 (PEPTAMEN1.5RTH)  Total Volume for 24 Hours (mL): 1200  Bolus  Total Volume of Bolus (mL):  200  Total # of Feeds: 6  Tube Feed Frequency: Every 4 hours   Tube Feed Start Time: 15:00  Bolus Feed Rate (mL per Hour): 50   Bolus Feed Duration (in Hours): 24  No Carb Prosource (1pkg = 15gms Protein)     Qty per Day:  4 (01-29-21 @ 13:55)    As ordered, EN provides a total volume of 1,200mL, 1,800 KCal, total of 141.6g protein, and 924mL free water/day    Reported:  [ ] nausea  [ ] vomiting [ ] diarrhea [ ] constipation  [ ]chewing problems [ ] swallowing issues  [ ] other:   PO intake:  < 50% [ ] 50-75% [ ]   % [ ]  other :    Current Weight and trends: Admission weight 109.8kg 1/3, Most recent weight obtained 1/27 99.1kg    Edema: 1+ dependent; generalized    Patient Hx:  47y/o F with no significant PMHx admitted for COVID ARDS, initially intubated 1/5-1/9, required re-intubation 1/16 and now s/p trach and PEG 1/28. Course also notable for UTI s/p CTX 1/25-27, MRSA+ mouth culture, and new fever 1/30 on vanc and zosyn, sputum culture +enterobacter aerogenes and staph aureus pending Tx to RCU.     Interval Nutrition Hx:  No GI distress (nausea/vomiting/diarrhea/constipation), Small liquid BM 2/1. Pt ordered for Bolus feeds via PEG, however, per chart review running continuously at 50mL/hr vs. PEG 50mL q4h per progress noted. Pt did receive a total of 1,100mL total volume of Peptamen 1.5 in the past 24hours. Pt ordered for miralax/senna at this time, c/w bowel regimen.        __________________ Pertinent Medications__________________   acetaminophen    Suspension ..  ALBUTerol    90 MICROgram(s) HFA Inhaler  BACItracin   Ointment  chlorhexidine 0.12% Liquid  chlorhexidine 2% Cloths  dexMEDEtomidine Infusion  dextrose 5%.  dextrose 5%.  enoxaparin Injectable  glucagon  Injectable  insulin lispro (ADMELOG) corrective regimen sliding scale  midodrine  multivitamin/minerals/iron Oral Solution (CENTRUM)  oxyCODONE    IR  petrolatum Ophthalmic Ointment  phenylephrine    Infusion  piperacillin/tazobactam IVPB..  polyethylene glycol 3350  QUEtiapine  QUEtiapine  senna Syrup  vancomycin  IVPB      __________________ Pertinent Labs__________________   02-02 Na135 mmol/L Glu 148 mg/dL<H> K+ 4.0 mmol/L Cr  0.41 mg/dL<L> BUN 7 mg/dL 01-31 Phos 3.0 mg/dL 02-02 Alb 3.1 g/dL<L> 01-23 Chol 258 mg/dL<H> LDL --    HDL 19 mg/dL<L> Trig 334 mg/dL<H>        Skin: abrasion to r. cheek.     Estimated Needs:   [X ] no change since previous assessment (calculated using IBW 56.8kg)   Estimated Energy Needs (25-30Kcal/kg): 1420-1704Kcal/kg  Estimated Protein Needs (2g/kg): 113.6g protein/day      Previous Nutrition Diagnosis:     [ ] Inadequate Energy Intake [ ]Inadequate Oral Intake [ ] Excessive Energy Intake   [ ] Underweight [ ] Increased Nutrient Needs [ x] Overweight/Obesity   [ ] Altered GI Function [ ] Unintended Weight Loss [ ] Food & Nutrition Related Knowledge Deficit [ ] Malnutrition     Nutrition Diagnosis is [x ] ongoing  [ ] resolved [ ] not applicable     New Nutrition Diagnosis: [ x] not applicable      Nutrition Recommendations:  1- Suggest the provision of Peptamen 1.5 at 45mL/hr vs. 180mL q6hr for a total of 6 feeds/day via PEG+ No Carb Prosource (15 grams protein/ 30 mL packet) x2  Both will provide 1,080mL total volume, 1,620 KCal, a total of 103.4g protein and 831mL free water/day.    2- Monitor weights, labs, BM's, skin integrity, EN tolerance    3- Additional free water per MD.     4- c/w Multivitamin, senna, Miralax.      5- RD remains available, re-consult as needed. Caroline Ac, MS, RDN Pager #[phone]

## 2021-02-02 NOTE — PROGRESS NOTE ADULT - ASSESSMENT
47y/o F with no significant PMHx admitted for COVID ARDS, initially intubated 1/5-1/9, required re-intubation 1/16 and now s/p trach and PEG POD #7 (2/1) who is currently clinically stable on CPAP/PS 8/8 since yesterday AM. Course also notable for UTI s/p CTX 1/25-27, MRSA+ mouth culture, and new fever 1/30 on vanc and zosyn with sputum culture +enterobacter aerogenes and MRSA.      Neuro:  - clonidine 0.1m  -precedex off (since 2/1 7am)  -valium discontinued today.  - oxy 5mg q6 standing  - Dilaudid q3h PRN  -seroquel BID    Resp:  -SBT this morning +10/8, 30%, wean to +8/8 as tolerated; (prior vent settings , RR 26, PEEP 8)  -most recent ABG 7.44/37/61/25   -   -alb q6h     CV: no issues at this time  - off levophed since this AM, had mild hypotension overnight likely 2/2 sedating medications  - added midodrine 10mg qHS today 2/1     FEN/GI/Renal:  -continue tube feeds via PEG Peptamen 1.5 50cc q4h  -bowel regimen  -no ROMAN  -goal net even/slightly net negative     ID: febrile O/N 1/30, sputum +enterobacter, +staph aureus.   - c/w vanco 1250mg q12 (1/30- ) , to complete tentatively 7 day course; dose increased today for subtherapeutic vanco at 9.2   - vanco trough tonight   - c/w zosyn (1/30-  ), to complete tentatively 10 day course   - sputum culture +enterobacter aerogenes and +staph aureus   - Last bcx 1/28 NGTD.   - MRSA+ mouth swab    Heme: negative b/l LE dopplers 1/20 despite rising d-dimer  -lovenox 40mg q12h prophylactic dosing     Endo:  -insulin sliding scale       47y/o F with no significant PMHx admitted for COVID ARDS, initially intubated 1/5-1/9, required re-intubation 1/16 and now s/p trach and PEG POD #7 (2/1) who is currently clinically stable on CPAP/PS 8/8 since yesterday AM. Course also notable for UTI s/p CTX 1/25-27, MRSA+ mouth culture, and new fever 1/30 on vanc and zosyn with sputum culture +enterobacter aerogenes and MRSA.      Neuro: possible precedex withdrawal   - start clonidine 0.1mg q8h for precedex withdrawal   - Tylenol q6 standing   - oxy 5mg q6 standing   - Dilaudid 1mg q3h PRN  -seroquel 50mg qAM, 100mg qHS   -precedex off (since 2/1 7am)  -valium discontinued today.     CV: mildly hypertensive overnight likely 2/2  - off levophed since 2/1.   - midodrine discontinued.    Resp:  -CPAP/PS 8/8, 30%, tolerating well   - prior vent settings VC/AC , RR 26, PEEP 8  -most recent ABG 7.42/38/115/25   -  -discontinued alb q6h (may be contributing to tachycardia/anxiety)    FEN/GI  -continue tube feeds via PEG Peptamen 1.5 45cc/h continuous per nutrition   - free water 250cc q8h   -miralax qD, senna qD    Renal:  -no RMOAN  -goal net even/slightly net negative     ID: febrile 1/30, sputum +enterobacter, +MRSA   - c/w vanco 1250mg q12 (1/30- ) , to complete tentatively 7 day course  - vanco trough tonight   - c/w zosyn (1/30-  ), to complete tentatively 10 day course   - Last bcx 1/28 NGTD.   - MRSA+ mouth swab    Endo:  -insulin sliding scale    Heme: negative b/l LE dopplers 1/20 despite rising d-dimer  -lovenox 40mg q12h prophylactic dosing        45y/o F with no significant PMHx admitted for COVID ARDS, initially intubated 1/5-1/9, required re-intubation 1/16 and now s/p trach and PEG POD #7 (2/1) who is currently clinically stable on CPAP/PS 8/8 since yesterday AM. Course also notable for UTI s/p CTX 1/25-27, MRSA+ mouth culture, and new fever 1/30 on vanc and zosyn with sputum culture +enterobacter aerogenes and MRSA.      Neuro: possible precedex withdrawal   - start clonidine 0.1mg q8h for precedex withdrawal   - Tylenol q6 standing   - oxy 5mg q6 standing   - Dilaudid 1mg q3h PRN  -seroquel 50mg qAM, 100mg qHS   -precedex off (since 2/1 7am)  -valium discontinued today.     CV: mildly hypertensive overnight likely 2/2 withdrawal  - off levophed since 2/1.   - midodrine discontinued.    Resp:  -CPAP/PS 8/8, 30%, tolerating well   - prior vent settings VC/AC , RR 26, PEEP 8  -most recent ABG 7.42/38/115/25   -  -discontinued alb q6h (may be contributing to tachycardia/anxiety)    FEN/GI  -continue tube feeds via PEG Peptamen 1.5 45cc/h continuous per nutrition   - free water 250cc q8h   -miralax qD, senna qD    Renal:  -no ROMAN  -goal net even/slightly net negative     ID: febrile 1/30, sputum +enterobacter, +MRSA   - c/w vanco 1250mg q12 (1/30- ) , to complete tentatively 7 day course  - vanco trough tonight   - c/w zosyn (1/30-  ), to complete tentatively 10 day course   - Last bcx 1/28 NGTD.   - MRSA+ mouth swab    Endo:  -insulin sliding scale    Heme: negative b/l LE dopplers 1/20 despite rising d-dimer  -lovenox 40mg q12h prophylactic dosing

## 2021-02-02 NOTE — PROGRESS NOTE ADULT - ATTENDING COMMENTS
46yr F COVID ARDS s/p trach peg on 28th  overnight required anni for pressor support    in AM rounds slightly somnolent, recent Dilaudid admin  standing seroquel, tylenol, oxy  decrease valium dose to 2.5 mg  dc midodrine given overnight was hypertensive  CPAP 24hrs 8/8 30%, gas approprite  may wean PS tomorrow  trach 6.0   PF ratio >300  PBW 66kg  off vasopressor  peptamen TEN @50  rectal tube removed and a skin manager applied   q8hrs  SSI  no CVC in place  remove a line  vanc zosyn, will increase vanc dose given level and check tomorrow  Bcx grew enterobacter  VTE PPX  knee and rt cheek skin ulcers  patient seen by [facility], will be transitioned when bed available.

## 2021-02-02 NOTE — PROGRESS NOTE ADULT - ASSESSMENT
46 F w respiratory failure due to COVID 19     Problem/Recommendation - 1:  Problem: Acute respiratory failure with hypoxia. Recommendation: s/p tracheostomy and PEG. No signs of post proceure issues at this time  -c/w G tube feeds  -please restart protonix     Problem/Recommendation - 2:  ·  Problem: COVID-19.  Recommendation: status post dexamethasone.      Problem/Recommendation - 3:  ·  Problem: Normocytic anemia.  Recommendation: without overt GI bleeding. Likely due to critical illness. Trended down today  -monitor for GI bleed  -PPI QD.      Problem/Recommendation - 4:  ·  Problem: Abnormal LFTs.  Recommendation: multifactorial, likely NAFLD, COVID, critical illness. Can consider US to r/o gallstone disease, especially if increasing.      Problem/Recommendation - 5:  ·  Problem: Morbid obesity.      Problem/Recommendation - 6:  Problem: Constipation. Recommendation: consider XR abdomen to assess for ileus  on a bowel regimen.    Attending Attestation:   Differential diagnosis and plan of care discussed with patient after the evaluation  35 Minutes spent on total encounter of which more than fifty percent of the encounter was spent counseling and/or coordinating care by the attending physician.    Mele Bolanos M.D.   Gastroenterology and Hepatology  Cell: [phone].

## 2021-02-02 NOTE — PROGRESS NOTE ADULT - SUBJECTIVE AND OBJECTIVE BOX
DATE OF SERVICE: 02-02-21 @ 19:28    Subjective: Patient seen and examined. No new events except as noted.   S/P PEG and trache         MEDICATIONS:  MEDICATIONS  (STANDING):  acetaminophen    Suspension .. 650 milliGRAM(s) Oral every 6 hours  BACItracin   Ointment 1 Application(s) Topical two times a day  chlorhexidine 0.12% Liquid 15 milliLiter(s) Oral Mucosa every 12 hours  chlorhexidine 2% Cloths 1 Application(s) Topical <User Schedule>  cloNIDine 0.1 milliGRAM(s) Oral every 8 hours  dextrose 5%. 1000 milliLiter(s) (50 mL/Hr) IV Continuous <Continuous>  dextrose 5%. 1000 milliLiter(s) (100 mL/Hr) IV Continuous <Continuous>  enoxaparin Injectable 40 milliGRAM(s) SubCutaneous every 12 hours  glucagon  Injectable 1 milliGRAM(s) IntraMuscular once  insulin lispro (ADMELOG) corrective regimen sliding scale   SubCutaneous every 6 hours  multivitamin/minerals/iron Oral Solution (CENTRUM) 15 milliLiter(s) Oral daily  oxyCODONE    IR 5 milliGRAM(s) Oral every 6 hours  petrolatum Ophthalmic Ointment 1 Application(s) Both EYES two times a day  piperacillin/tazobactam IVPB.. 3.375 Gram(s) IV Intermittent every 8 hours  polyethylene glycol 3350 17 Gram(s) Oral daily  QUEtiapine 100 milliGRAM(s) Oral at bedtime  QUEtiapine 50 milliGRAM(s) Oral daily  senna Syrup 10 milliLiter(s) Oral daily  vancomycin  IVPB 1250 milliGRAM(s) IV Intermittent every 12 hours      PHYSICAL EXAM:  T(C): 37.2 (02-02-21 @ 14:00), Max: 37.6 (02-02-21 @ 00:00)  HR: 117 (02-02-21 @ 19:10) (92 - 125)  BP: 116/60 (02-02-21 @ 15:00) (98/65 - 116/60)  RR: 98 (02-02-21 @ 15:00) (19 - 114)  SpO2: 97% (02-02-21 @ 19:10) (95% - 99%)  Wt(kg): --  I&O's Summary    01 Feb 2021 07:01  -  02 Feb 2021 07:00  --------------------------------------------------------  IN: 2150 mL / OUT: 1345 mL / NET: 805 mL    02 Feb 2021 07:01  -  02 Feb 2021 19:28  --------------------------------------------------------  IN: 1050 mL / OUT: 625 mL / NET: 425 mL          Appearance: trache 	  HEENT:  trache  Lymphatic: No lymphadenopathy   Cardiovascular: Normal S1 S2  Respiratory: B/L air entry   Gastrointestinal:  + PEG   Skin: No rashes,  warm to touch        All labs, Imaging and EKGs personally reviewed                           7.8    8.32  )-----------( 493      ( 02 Feb 2021 04:16 )             24.7               02-02    135  |  102  |  7   ----------------------------<  148<H>  4.0   |  24  |  0.41<L>    Ca    8.9      02 Feb 2021 04:18    TPro  6.8  /  Alb  3.1<L>  /  TBili  0.2  /  DBili  x   /  AST  23  /  ALT  39<H>  /  AlkPhos  105  02-02                     ABG - ( 02 Feb 2021 04:16 )  pH, Arterial: 7.42  pH, Blood: x     /  pCO2: 38    /  pO2: 115   / HCO3: 25    / Base Excess: 0.5   /  SaO2: 98.6

## 2021-02-02 NOTE — PROGRESS NOTE ADULT - ASSESSMENT
46 F w respiratory failure due to COVID 19     Problem/Recommendation - 1:  Problem: Acute respiratory failure with hypoxia. Recommendation: s/p tracheostomy and PEG. No signs of post proceure issues at this time  -c/w G tube feeds  - PPI per team     Problem/Recommendation - 2:  ·  Problem: COVID-19.  Recommendation: status post dexamethasone.      Problem/Recommendation - 3:  ·  Problem: Normocytic anemia.  Recommendation: without overt GI bleeding. Likely due to critical illness. Trended down today  -monitor for GI bleed  -PPI QD.      Problem/Recommendation - 4:  ·  Problem: Abnormal LFTs.  Recommendation: multifactorial, likely NAFLD, COVID, critical illness. Can consider US to r/o gallstone disease, especially if increasing.   defer to GI      Problem/Recommendation - 5:  ·  Problem: Morbid obesity.      Problem/Recommendation - 6:  Problem: Constipation. Recommendation: consider XR abdomen to assess for ileus  on a bowel regimen.

## 2021-02-02 NOTE — PROGRESS NOTE ADULT - SUBJECTIVE AND OBJECTIVE BOX
Medicine Progress Note    SUBJECTIVE  Patient is a 46y old  Female who presents with a chief complaint of COVID (01 Feb 2021 13:16), admitted for COVID ARDS, initially intubated 1/5-1/9, required re-intubation 1/16 and now s/p trach and PEG POD #7 (2/1). Course also notable for UTI s/p CTX 1/25-27, MRSA+ mouth culture, and new fever 1/30 on vanc and zosyn, sputum culture +enterobacter aerogenes and staph aureus.       OVERNIGHT EVENTS:  Tolerating CPAP 8/8 well. Overnight was tachycardic to 120s and anxious-appearing/delirious, possible precedex withdrawal. Dilaudid given this AM with improvement. Hypertensive, so midodrine stopped overnight. No fevers.    ADDITIONAL REVIEW OF SYSTEMS:    MEDICATIONS  (STANDING):  acetaminophen    Suspension .. 650 milliGRAM(s) Oral every 6 hours  ALBUTerol    90 MICROgram(s) HFA Inhaler 2 Puff(s) Inhalation every 6 hours  BACItracin   Ointment 1 Application(s) Topical two times a day  chlorhexidine 0.12% Liquid 15 milliLiter(s) Oral Mucosa every 12 hours  chlorhexidine 2% Cloths 1 Application(s) Topical <User Schedule>  dexMEDEtomidine Infusion 1 MICROgram(s)/kG/Hr (27.5 mL/Hr) IV Continuous <Continuous>  dextrose 5%. 1000 milliLiter(s) (50 mL/Hr) IV Continuous <Continuous>  dextrose 5%. 1000 milliLiter(s) (100 mL/Hr) IV Continuous <Continuous>  enoxaparin Injectable 40 milliGRAM(s) SubCutaneous every 12 hours  glucagon  Injectable 1 milliGRAM(s) IntraMuscular once  insulin lispro (ADMELOG) corrective regimen sliding scale   SubCutaneous every 6 hours  midodrine 10 milliGRAM(s) Oral <User Schedule>  multivitamin/minerals/iron Oral Solution (CENTRUM) 15 milliLiter(s) Oral daily  oxyCODONE    IR 5 milliGRAM(s) Oral every 6 hours  petrolatum Ophthalmic Ointment 1 Application(s) Both EYES two times a day  phenylephrine    Infusion 0.1 MICROgram(s)/kG/Min (4.12 mL/Hr) IV Continuous <Continuous>  piperacillin/tazobactam IVPB.. 3.375 Gram(s) IV Intermittent every 8 hours  polyethylene glycol 3350 17 Gram(s) Oral daily  QUEtiapine 50 milliGRAM(s) Oral daily  QUEtiapine 100 milliGRAM(s) Oral at bedtime  senna Syrup 10 milliLiter(s) Oral daily  vancomycin  IVPB 1250 milliGRAM(s) IV Intermittent every 12 hours    MEDICATIONS  (PRN):  HYDROmorphone  Injectable 1 milliGRAM(s) IV Push every 3 hours PRN Agitation / Tachypnea    CAPILLARY BLOOD GLUCOSE      POCT Blood Glucose.: 153 mg/dL (02 Feb 2021 03:46)  POCT Blood Glucose.: 195 mg/dL (01 Feb 2021 23:02)  POCT Blood Glucose.: 231 mg/dL (01 Feb 2021 18:00)  POCT Blood Glucose.: 158 mg/dL (01 Feb 2021 11:41)    I&O's Summary    01 Feb 2021 07:01  -  02 Feb 2021 07:00  --------------------------------------------------------  IN: 2150 mL / OUT: 1345 mL / NET: 805 mL    02 Feb 2021 07:01  -  02 Feb 2021 11:20  --------------------------------------------------------  IN: 250 mL / OUT: 125 mL / NET: 125 mL        PHYSICAL EXAM:  Vital Signs Last 24 Hrs  T(C): 37.6 (02 Feb 2021 04:00), Max: 37.6 (02 Feb 2021 00:00)  T(F): 99.7 (02 Feb 2021 04:00), Max: 99.7 (02 Feb 2021 04:00)  HR: 92 (02 Feb 2021 10:00) (92 - 135)  BP: --  BP(mean): --  RR: 21 (02 Feb 2021 04:00) (21 - 36)  SpO2: 97% (02 Feb 2021 10:00) (95% - 100%)  General: NAD, awake, looking around  HEENT: NC/AT; PERRL, clear conjunctiva; +superficial abrasions at left cheek   Neck: supple  Respiratory: CTA b/l  Cardiovascular: +S1/S2; RRR  Abdomen: soft, NT/ND  Extremities: WWP, 2+ peripheral pulses b/l; + mild pitting edema in b/l LE  Skin: normal color and turgor; no rash  Neurological: tracking, appears alert    LABS:                        7.8    8.32  )-----------( 493      ( 02 Feb 2021 04:16 )             24.7     02-02    135  |  102  |  7   ----------------------------<  148<H>  4.0   |  24  |  0.41<L>    Ca    8.9      02 Feb 2021 04:18  Phos  3.0     01-31  Mg     2.1     01-31    TPro  6.8  /  Alb  3.1<L>  /  TBili  0.2  /  DBili  x   /  AST  23  /  ALT  39<H>  /  AlkPhos  105  02-02    Culture - Sputum . (01.30.21 @ 02:15)   Gram Stain:   Numerous polymorphonuclear leukocytes seen per low power field   Rare Squamous epithelial cells seen per low power field   Moderate Gram Positive Rods seen per oil power field   Few Gram Positive Cocci in Pairs and Chains seen per oil power field   - Amikacin: S <=16   - Amoxicillin/Clavulanic Acid: R >16/8   - Amoxicillin/Clavulanic Acid: R >4/2   - Ampicillin: R >8   - Ampicillin: R 16 These ampicillin results predict results for amoxicillin   - Ampicillin/Sulbactam: R <=4/2 Enterobacter, Citrobacter, and Serratia may develop resistance during prolonged therapy (3-4 days)   - Ampicillin/Sulbactam: R <=8/4   - Aztreonam: S <=4   - Cefazolin: R >16 Enterobacter, Citrobacter, and Serratia may develop resistance during prolonged therapy (3-4 days)   - Cefazolin: R >16   - Cefepime: S <=2   - Cefoxitin: R >16   - Ceftriaxone: S <=1 Enterobacter, Citrobacter, and Serratia may develop resistance during prolonged therapy   - Ceftriaxone: R >32   - Ciprofloxacin: R >2   - Ciprofloxacin: S <=0.25   - Clindamycin: R >4   - Daptomycin: S 0.5   - Ertapenem: S <=0.5   - Erythromycin: R >4   - Gentamicin: R >8 Should not be used as monotherapy   - Gentamicin: S <=2   - Imipenem: S <=1   - Levofloxacin: S <=0.5   - Levofloxacin: R >4   - Linezolid: S 2   - Meropenem: R >8   - Meropenem: S <=1   - Moxifloxacin(Aerobic): R >4   - Oxacillin: R >2   - Penicillin: R >8   - Piperacillin/Tazobactam: S <=8   - RIF- Rifampin: S <=1 Should not be used as monotherapy   - Tetra/Doxy: S <=1   - Tobramycin: S <=2   - Trimethoprim/Sulfamethoxazole: S <=0.5/9.5   - Trimethoprim/Sulfamethoxazole: S <=0.5/9.5   - Vancomycin: S 1   Specimen Source: .Sputum Sputum trap   Culture Results:   Moderate Enterobacter aerogenes   Moderate Methicillin resistant Staphylococcus aureus   Normal Respiratory Mayela present   Organism Identification: Enterobacter aerogenes   Methicillin resistant Staphylococcus aureus   Organism: Enterobacter aerogenes   Organism: Methicillin resistant Staphylococcus aureus   Method Type: BIN   Method Type: BIN     COVID-19 PCR: Detected (03 Jan 2021 16:34)      RADIOLOGY & ADDITIONAL TESTS:  Imaging from Last 24 Hours:    Electrocardiogram/QTc Interval:    COORDINATION OF CARE:  Care Discussed with Consultants/Other Providers:

## 2021-02-03 LAB
ALBUMIN SERPL ELPH-MCNC: 3.3 G/DL — SIGNIFICANT CHANGE UP (ref 3.3–5)
ALP SERPL-CCNC: 131 U/L — HIGH (ref 40–120)
ALT FLD-CCNC: 45 U/L — HIGH (ref 4–33)
ANION GAP SERPL CALC-SCNC: 15 MMOL/L — HIGH (ref 7–14)
AST SERPL-CCNC: 38 U/L — HIGH (ref 4–32)
BILIRUB SERPL-MCNC: 0.3 MG/DL — SIGNIFICANT CHANGE UP (ref 0.2–1.2)
BUN SERPL-MCNC: 11 MG/DL — SIGNIFICANT CHANGE UP (ref 7–23)
CALCIUM SERPL-MCNC: 9.6 MG/DL — SIGNIFICANT CHANGE UP (ref 8.4–10.5)
CHLORIDE SERPL-SCNC: 101 MMOL/L — SIGNIFICANT CHANGE UP (ref 98–107)
CO2 SERPL-SCNC: 22 MMOL/L — SIGNIFICANT CHANGE UP (ref 22–31)
CREAT SERPL-MCNC: 0.62 MG/DL — SIGNIFICANT CHANGE UP (ref 0.5–1.3)
GLUCOSE SERPL-MCNC: 167 MG/DL — HIGH (ref 70–99)
MAGNESIUM SERPL-MCNC: 2 MG/DL — SIGNIFICANT CHANGE UP (ref 1.6–2.6)
PHOSPHATE SERPL-MCNC: 4.5 MG/DL — SIGNIFICANT CHANGE UP (ref 2.5–4.5)
POTASSIUM SERPL-MCNC: 3.8 MMOL/L — SIGNIFICANT CHANGE UP (ref 3.5–5.3)
POTASSIUM SERPL-SCNC: 3.8 MMOL/L — SIGNIFICANT CHANGE UP (ref 3.5–5.3)
PROT SERPL-MCNC: 7 G/DL — SIGNIFICANT CHANGE UP (ref 6–8.3)
SODIUM SERPL-SCNC: 138 MMOL/L — SIGNIFICANT CHANGE UP (ref 135–145)
VANCOMYCIN TROUGH SERPL-MCNC: 15.4 UG/ML — SIGNIFICANT CHANGE UP (ref 10–20)

## 2021-02-03 PROCEDURE — 99233 SBSQ HOSP IP/OBS HIGH 50: CPT | Mod: GC

## 2021-02-03 RX ORDER — ALBUTEROL 90 UG/1
2 AEROSOL, METERED ORAL EVERY 6 HOURS
Refills: 0 | Status: DISCONTINUED | OUTPATIENT
Start: 2021-02-03 | End: 2021-03-03

## 2021-02-03 RX ORDER — CHLORHEXIDINE GLUCONATE 213 G/1000ML
15 SOLUTION TOPICAL EVERY 12 HOURS
Refills: 0 | Status: DISCONTINUED | OUTPATIENT
Start: 2021-02-03 | End: 2021-02-05

## 2021-02-03 RX ORDER — QUETIAPINE FUMARATE 200 MG/1
100 TABLET, FILM COATED ORAL DAILY
Refills: 0 | Status: DISCONTINUED | OUTPATIENT
Start: 2021-02-03 | End: 2021-02-04

## 2021-02-03 RX ORDER — QUETIAPINE FUMARATE 200 MG/1
150 TABLET, FILM COATED ORAL AT BEDTIME
Refills: 0 | Status: DISCONTINUED | OUTPATIENT
Start: 2021-02-03 | End: 2021-02-04

## 2021-02-03 RX ADMIN — Medication 166.67 MILLIGRAM(S): at 18:31

## 2021-02-03 RX ADMIN — CHLORHEXIDINE GLUCONATE 1 APPLICATION(S): 213 SOLUTION TOPICAL at 05:55

## 2021-02-03 RX ADMIN — Medication 0.1 MILLIGRAM(S): at 21:55

## 2021-02-03 RX ADMIN — CHLORHEXIDINE GLUCONATE 15 MILLILITER(S): 213 SOLUTION TOPICAL at 05:52

## 2021-02-03 RX ADMIN — Medication 0.1 MILLIGRAM(S): at 13:11

## 2021-02-03 RX ADMIN — CHLORHEXIDINE GLUCONATE 15 MILLILITER(S): 213 SOLUTION TOPICAL at 17:01

## 2021-02-03 RX ADMIN — Medication 15 MILLILITER(S): at 16:23

## 2021-02-03 RX ADMIN — Medication 650 MILLIGRAM(S): at 11:06

## 2021-02-03 RX ADMIN — Medication 1 APPLICATION(S): at 17:02

## 2021-02-03 RX ADMIN — POLYETHYLENE GLYCOL 3350 17 GRAM(S): 17 POWDER, FOR SOLUTION ORAL at 11:06

## 2021-02-03 RX ADMIN — ENOXAPARIN SODIUM 40 MILLIGRAM(S): 100 INJECTION SUBCUTANEOUS at 05:55

## 2021-02-03 RX ADMIN — OXYCODONE HYDROCHLORIDE 5 MILLIGRAM(S): 5 TABLET ORAL at 23:18

## 2021-02-03 RX ADMIN — SENNA PLUS 10 MILLILITER(S): 8.6 TABLET ORAL at 11:07

## 2021-02-03 RX ADMIN — Medication 650 MILLIGRAM(S): at 23:26

## 2021-02-03 RX ADMIN — PIPERACILLIN AND TAZOBACTAM 25 GRAM(S): 4; .5 INJECTION, POWDER, LYOPHILIZED, FOR SOLUTION INTRAVENOUS at 09:29

## 2021-02-03 RX ADMIN — PIPERACILLIN AND TAZOBACTAM 25 GRAM(S): 4; .5 INJECTION, POWDER, LYOPHILIZED, FOR SOLUTION INTRAVENOUS at 00:01

## 2021-02-03 RX ADMIN — Medication 1 APPLICATION(S): at 05:55

## 2021-02-03 RX ADMIN — QUETIAPINE FUMARATE 50 MILLIGRAM(S): 200 TABLET, FILM COATED ORAL at 11:06

## 2021-02-03 RX ADMIN — Medication 1: at 11:19

## 2021-02-03 RX ADMIN — Medication 0.1 MILLIGRAM(S): at 05:52

## 2021-02-03 RX ADMIN — Medication 1: at 17:12

## 2021-02-03 RX ADMIN — QUETIAPINE FUMARATE 150 MILLIGRAM(S): 200 TABLET, FILM COATED ORAL at 21:55

## 2021-02-03 RX ADMIN — Medication 650 MILLIGRAM(S): at 17:01

## 2021-02-03 RX ADMIN — Medication 166.67 MILLIGRAM(S): at 05:51

## 2021-02-03 RX ADMIN — Medication 2: at 05:52

## 2021-02-03 RX ADMIN — OXYCODONE HYDROCHLORIDE 5 MILLIGRAM(S): 5 TABLET ORAL at 17:02

## 2021-02-03 RX ADMIN — ENOXAPARIN SODIUM 40 MILLIGRAM(S): 100 INJECTION SUBCUTANEOUS at 17:01

## 2021-02-03 RX ADMIN — OXYCODONE HYDROCHLORIDE 5 MILLIGRAM(S): 5 TABLET ORAL at 05:52

## 2021-02-03 RX ADMIN — PIPERACILLIN AND TAZOBACTAM 25 GRAM(S): 4; .5 INJECTION, POWDER, LYOPHILIZED, FOR SOLUTION INTRAVENOUS at 17:04

## 2021-02-03 RX ADMIN — OXYCODONE HYDROCHLORIDE 5 MILLIGRAM(S): 5 TABLET ORAL at 11:06

## 2021-02-03 RX ADMIN — Medication 650 MILLIGRAM(S): at 05:52

## 2021-02-03 NOTE — PROGRESS NOTE ADULT - SUBJECTIVE AND OBJECTIVE BOX
Chief Complaint:  Patient is a 46y old  Female who presents with a chief complaint of COVID (2021 07:46)      Interval Events:   no acute events    Allergies:  No Known Allergies      Hospital Medications:  ALBUTerol    90 MICROgram(s) HFA Inhaler 2 Puff(s) Inhalation every 6 hours  ceFAZolin   IVPB 2000 milliGRAM(s) IV Intermittent once  chlorhexidine 0.12% Liquid 15 milliLiter(s) Oral Mucosa every 12 hours  chlorhexidine 2% Cloths 1 Application(s) Topical <User Schedule>  dexMEDEtomidine Infusion 1 MICROgram(s)/kG/Hr IV Continuous <Continuous>  dextrose 5%. 1000 milliLiter(s) IV Continuous <Continuous>  dextrose 5%. 1000 milliLiter(s) IV Continuous <Continuous>  diazepam  Injectable 5 milliGRAM(s) IV Push every 4 hours PRN  furosemide   Injectable 20 milliGRAM(s) IV Push two times a day  glucagon  Injectable 1 milliGRAM(s) IntraMuscular once  HYDROmorphone  Injectable 1 milliGRAM(s) IV Push every 3 hours PRN  insulin lispro (ADMELOG) corrective regimen sliding scale   SubCutaneous every 6 hours  methylnaltrexone Injectable 12 milliGRAM(s) SubCutaneous once  multivitamin/minerals/iron Oral Solution (CENTRUM) 15 milliLiter(s) Oral daily  norepinephrine Infusion 0.05 MICROgram(s)/kG/Min IV Continuous <Continuous>  pantoprazole  Injectable 40 milliGRAM(s) IV Push daily  petrolatum Ophthalmic Ointment 1 Application(s) Both EYES two times a day  polyethylene glycol 3350 17 Gram(s) Oral <User Schedule>  propofol Infusion 10 MICROgram(s)/kG/Min IV Continuous <Continuous>  QUEtiapine 100 milliGRAM(s) Oral at bedtime  senna Syrup 10 milliLiter(s) Oral daily      PMHX/PSHX:  No pertinent past medical history    No significant past surgical history        Family history:  FH: type 2 diabetes        ROS:     cannot provide    PHYSICAL EXAM:     GENERAL:  Appears stated age, well-groomed, well-nourished, no distress, intubated  HEENT:  NC/AT,  conjunctivae clear, sclera-anicteric  NECK: Trachea midline, supple  CHEST:  Full & symmetric excursion, no increased effort, breath sounds clear  HEART:  Regular rhythm, no minerva/heave  ABDOMEN:  Soft, non-tender, non-distended, normoactive bowel sounds,  no masses ,no hepato-splenomegaly, gastrostomy intact  EXTREMITIES:  no cyanosis,clubbing or edema  SKIN:  No rash/erythema/petechiae, no jaundice  NEURO:  nonverbal, sedated  RECTAL: Deferred    Vital Signs:  Vital Signs Last 24 Hrs  T(C): 37.9 (2021 12:00), Max: 38 (2021 19:43)  T(F): 100.2 (2021 12:00), Max: 100.4 (2021 19:43)  HR: 74 (2021 12:00) (72 - 106)  BP: 103/59 (2021 12:00) (103/59 - 121/-)  BP(mean): 76 (2021 12:00) (76 - 79)  RR: 26 (2021 04:00) (26 - 42)  SpO2: 98% (2021 12:00) (94% - 100%)  Daily     Daily     LABS:                        8.6    10.39 )-----------( 254      ( 2021 01:21 )             28.0         142  |  102  |  16  ----------------------------<  207<H>  3.3<L>   |  31  |  0.45<L>    Ca    8.9      2021 01:20  Phos  3.6       Mg     2.2         TPro  7.0  /  Alb  3.1<L>  /  TBili  0.3  /  DBili  x   /  AST  30  /  ALT  38<H>  /  AlkPhos  131<H>      LIVER FUNCTIONS - ( 2021 01:20 )  Alb: 3.1 g/dL / Pro: 7.0 g/dL / ALK PHOS: 131 U/L / ALT: 38 U/L / AST: 30 U/L / GGT: x           PT/INR - ( 2021 01:19 )   PT: 13.8 sec;   INR: 1.21 ratio         PTT - ( 2021 01:19 )  PTT:30.7 sec  Urinalysis Basic - ( 2021 00:50 )    Color: Yellow / Appearance: Slightly Turbid / S.041 / pH: x  Gluc: x / Ketone: Trace  / Bili: Negative / Urobili: 6 mg/dL   Blood: x / Protein: 100 mg/dL / Nitrite: Negative   Leuk Esterase: Negative / RBC: 3 /HPF / WBC 7 /HPF   Sq Epi: x / Non Sq Epi: 2 /HPF / Bacteria: Occasional          Imaging:

## 2021-02-03 NOTE — PROGRESS NOTE ADULT - ASSESSMENT
46 F w respiratory failure due to COVID 19     Problem/Recommendation - 1:  Problem: Acute respiratory failure with hypoxia. Recommendation: s/p tracheostomy and PEG. No signs of post proceure issues at this time  -c/w G tube feeds  -please restart protonix     Problem/Recommendation - 2:  ·  Problem: COVID-19.  Recommendation: status post dexamethasone.      Problem/Recommendation - 3:  ·  Problem: Normocytic anemia.  Recommendation: without overt GI bleeding. Likely due to critical illness. Trended down today  -monitor for GI bleed  -PPI QD.      Problem/Recommendation - 4:  ·  Problem: Abnormal LFTs.  Recommendation: multifactorial, likely NAFLD, COVID, critical illness. Can consider US to r/o gallstone disease, especially if increasing.      Problem/Recommendation - 5:  ·  Problem: Morbid obesity.      Problem/Recommendation - 6:  Problem: Constipation. Recommendation: consider XR abdomen to assess for ileus  on a bowel regimen.    Attending Attestation:   Differential diagnosis and plan of care discussed with patient after the evaluation  35 Minutes spent on total encounter of which more than fifty percent of the encounter was spent counseling and/or coordinating care by the attending physician.    Mele Bolanos M.D.   Gastroenterology and Hepatology  Cell: [phone].

## 2021-02-03 NOTE — PROGRESS NOTE ADULT - SUBJECTIVE AND OBJECTIVE BOX
Medicine Progress Note    Patient is a 46y old  Female who presents with a chief complaint of COVID (03 Feb 2021 12:24)      SUBJECTIVE   Patient is a 46y old  Female w/no PMH admitted for COVID ARDS, initially intubated 1/5-1/9, required re-intubation 1/16 and now s/p trach and PEG POD #9 (2/3). Course also notable for UTI s/p CTX 1/25-27 and MRSA and enterobacter aerogenes pneumonia on vanco and zosyn.    OVERNIGHT EVENTS:  Overnight did well on CPAP/PS 8/8 from respiratory standpoint. Continuing to be anxious appearing, delirious, tachycardic. No fevers.     ADDITIONAL REVIEW OF SYSTEMS:    MEDICATIONS  (STANDING):  acetaminophen    Suspension .. 650 milliGRAM(s) Oral every 6 hours  BACItracin   Ointment 1 Application(s) Topical two times a day  chlorhexidine 0.12% Liquid 15 milliLiter(s) Oral Mucosa every 12 hours  chlorhexidine 2% Cloths 1 Application(s) Topical <User Schedule>  cloNIDine 0.1 milliGRAM(s) Oral every 8 hours  dextrose 5%. 1000 milliLiter(s) (50 mL/Hr) IV Continuous <Continuous>  dextrose 5%. 1000 milliLiter(s) (100 mL/Hr) IV Continuous <Continuous>  enoxaparin Injectable 40 milliGRAM(s) SubCutaneous every 12 hours  glucagon  Injectable 1 milliGRAM(s) IntraMuscular once  insulin lispro (ADMELOG) corrective regimen sliding scale   SubCutaneous every 6 hours  multivitamin/minerals/iron Oral Solution (CENTRUM) 15 milliLiter(s) Oral daily  oxyCODONE    IR 5 milliGRAM(s) Oral every 6 hours  petrolatum Ophthalmic Ointment 1 Application(s) Both EYES two times a day  piperacillin/tazobactam IVPB.. 3.375 Gram(s) IV Intermittent every 8 hours  polyethylene glycol 3350 17 Gram(s) Oral daily  QUEtiapine 50 milliGRAM(s) Oral daily  QUEtiapine 100 milliGRAM(s) Oral at bedtime  senna Syrup 10 milliLiter(s) Oral daily  vancomycin  IVPB 1250 milliGRAM(s) IV Intermittent every 12 hours    MEDICATIONS  (PRN):  HYDROmorphone  Injectable 1 milliGRAM(s) IV Push every 3 hours PRN Agitation / Tachypnea    CAPILLARY BLOOD GLUCOSE      POCT Blood Glucose.: 174 mg/dL (03 Feb 2021 11:15)  POCT Blood Glucose.: 222 mg/dL (03 Feb 2021 05:46)  POCT Blood Glucose.: 148 mg/dL (02 Feb 2021 23:43)  POCT Blood Glucose.: 165 mg/dL (02 Feb 2021 17:22)    I&O's Summary    02 Feb 2021 07:01  -  03 Feb 2021 07:00  --------------------------------------------------------  IN: 3400 mL / OUT: 2425 mL / NET: 975 mL    03 Feb 2021 07:01  -  03 Feb 2021 12:25  --------------------------------------------------------  IN: 730 mL / OUT: 705 mL / NET: 25 mL        PHYSICAL EXAM:  Vital Signs Last 24 Hrs  T(C): 37.2 (03 Feb 2021 10:00), Max: 37.8 (03 Feb 2021 04:00)  T(F): 98.9 (03 Feb 2021 10:00), Max: 100 (03 Feb 2021 04:00)  HR: 114 (03 Feb 2021 11:58) (99 - 126)  BP: 140/88 (03 Feb 2021 11:00) (98/65 - 145/89)  BP(mean): 100 (03 Feb 2021 11:00) (70 - 107)  RR: 24 (03 Feb 2021 11:00) (20 - 30)  SpO2: 98% (03 Feb 2021 11:58) (95% - 99%)    General: NAD, anxious appearing, awake, looking around  HEENT: NC/AT; PERRL, clear conjunctiva; +superficial abrasions at left cheek   Neck: supple  Respiratory: good air entry b/l   Cardiovascular: +S1/S2; RRR  Abdomen: soft, NT/ND  Extremities: WWP, 2+ peripheral pulses b/l; + mild pitting edema in b/l LE  Skin: normal color and turgor; no rash  Neurological: tracking, appears alert    LABS:                        8.0    8.48  )-----------( 534      ( 02 Feb 2021 21:17 )             26.2     02-03    138  |  101  |  11  ----------------------------<  167<H>  3.8   |  22  |  0.62    Ca    9.6      03 Feb 2021 06:41  Phos  4.5     02-03  Mg     2.0     02-03    TPro  7.0  /  Alb  3.3  /  TBili  0.3  /  DBili  x   /  AST  38<H>  /  ALT  45<H>  /  AlkPhos  131<H>  02-03      sputum culture +enterobacter aerogenes, +MRSA     COVID-19 PCR: Detected (03 Jan 2021 16:34)      RADIOLOGY & ADDITIONAL TESTS:  Imaging from Last 24 Hours:    Electrocardiogram/QTc Interval:    COORDINATION OF CARE:  Care Discussed with Consultants/Other Providers:

## 2021-02-03 NOTE — PROGRESS NOTE ADULT - ATTENDING COMMENTS
46yr F COVID ARDS s/p trach peg on 28th  overnight required anni for pressor support    more awake, dc dilaudid since not used, more calm today  standing seroquel, tylenol, oxy, clonidine  increase seroquel 100-150qhs  CPAP down to 5/8 30%, monitor for signs of respiratory distress  trach 6.0   PF ratio >300  PBW 66kg  off vasopressor  peptamen TEN @50  no BMs today   q8hrs  SSI  no CVC in place  vanc through 6th for MRSA sputum, zosyn 8th for enterobacteremia   vanc trough appropriate  VTE PPX  knee and rt cheek skin ulcers  patient seen by [facility], will be transitioned when bed available.

## 2021-02-03 NOTE — PROGRESS NOTE ADULT - SUBJECTIVE AND OBJECTIVE BOX
DATE OF SERVICE: 02-03-21     Subjective: Patient seen and examined. No new events except as noted.   trache and peg       MEDICATIONS:  MEDICATIONS  (STANDING):  acetaminophen    Suspension .. 650 milliGRAM(s) Oral every 6 hours  BACItracin   Ointment 1 Application(s) Topical two times a day  chlorhexidine 0.12% Liquid 15 milliLiter(s) Oral Mucosa every 12 hours  chlorhexidine 2% Cloths 1 Application(s) Topical <User Schedule>  cloNIDine 0.1 milliGRAM(s) Oral every 8 hours  dextrose 5%. 1000 milliLiter(s) (50 mL/Hr) IV Continuous <Continuous>  dextrose 5%. 1000 milliLiter(s) (100 mL/Hr) IV Continuous <Continuous>  enoxaparin Injectable 40 milliGRAM(s) SubCutaneous every 12 hours  glucagon  Injectable 1 milliGRAM(s) IntraMuscular once  insulin lispro (ADMELOG) corrective regimen sliding scale   SubCutaneous every 6 hours  multivitamin/minerals/iron Oral Solution (CENTRUM) 15 milliLiter(s) Oral daily  oxyCODONE    IR 5 milliGRAM(s) Oral every 6 hours  petrolatum Ophthalmic Ointment 1 Application(s) Both EYES two times a day  piperacillin/tazobactam IVPB.. 3.375 Gram(s) IV Intermittent every 8 hours  polyethylene glycol 3350 17 Gram(s) Oral daily  QUEtiapine 100 milliGRAM(s) Oral daily  QUEtiapine 150 milliGRAM(s) Oral at bedtime  senna Syrup 10 milliLiter(s) Oral daily  vancomycin  IVPB 1250 milliGRAM(s) IV Intermittent every 12 hours      PHYSICAL EXAM:  T(C): 37.3 (02-03-21 @ 20:40), Max: 37.8 (02-03-21 @ 04:00)  HR: 117 (02-03-21 @ 20:40) (108 - 126)  BP: 148/88 (02-03-21 @ 20:40) (106/68 - 148/88)  RR: 22 (02-03-21 @ 20:40) (21 - 33)  SpO2: 96% (02-03-21 @ 20:40) (95% - 99%)  Wt(kg): --  I&O's Summary    02 Feb 2021 07:01  -  03 Feb 2021 07:00  --------------------------------------------------------  IN: 3400 mL / OUT: 2425 mL / NET: 975 mL    03 Feb 2021 07:01  -  03 Feb 2021 21:53  --------------------------------------------------------  IN: 1660 mL / OUT: 1450 mL / NET: 210 mL            All labs, Imaging and EKGs personally reviewed                             8.0    8.48  )-----------( 534      ( 02 Feb 2021 21:17 )             26.2               02-03    138  |  101  |  11  ----------------------------<  167<H>  3.8   |  22  |  0.62    Ca    9.6      03 Feb 2021 06:41  Phos  4.5     02-03  Mg     2.0     02-03    TPro  7.0  /  Alb  3.3  /  TBili  0.3  /  DBili  x   /  AST  38<H>  /  ALT  45<H>  /  AlkPhos  131<H>  02-03                     ABG - ( 02 Feb 2021 21:17 )  pH, Arterial: 7.42  pH, Blood: x     /  pCO2: 38    /  pO2: 101   / HCO3: 25    / Base Excess: 0.3   /  SaO2: 97.9

## 2021-02-03 NOTE — PROGRESS NOTE ADULT - ASSESSMENT
46y old  Female w/no significant PMH admitted for COVID ARDS, initially intubated 1/5-1/9, required re-intubation 1/16 and now s/p trach and PEG POD #9 (2/3) who has been stable on CPAP/PS for two days now and improving. Course also notable for UTI s/p CTX 1/25-27 and MRSA and enterobacter aerogenes pneumonia on vanco and zosyn. She has been anxious, awake, intermittently tachycardic and tachypneic and is likely combination of ICU delirium and precedex withdrawal. She is stable to go to RCU.     Neuro: possible precedex withdrawal and ICU delirium   - continue clonidine 0.1mg q8h for precedex withdrawal   - Tylenol q6 standing   - oxy 5mg q6 standing   - s/p Dilaudid 1mg q3h PRN (opiates likely contributing to ICU delirium)  -increase seroquel to 100mg qAM, 150mg qHS (from 50mg qAM, 100mg qHS)  -precedex off (since 2/1 7am)  -s/p valium.    CV: no issues.   - off levophed since 2/1.     Resp:  -CPAP/PS 8/5, 30%  - prior vent settings VC/AC , RR 26, PEEP 8  -alb q6h PRN    FEN/GI  -continue tube feeds via PEG Peptamen 1.5 45cc/h continuous per nutrition   - free water 250cc q8h   -miralax qD, senna qD    Renal:  -no ROMAN  -goal net even/slightly net negative     ID: febrile 1/30, sputum +enterobacter, +MRSA   - c/w vanco 1250mg q12 (1/30- ) , to complete tentatively 7 day course  - c/w zosyn (1/30-  ), to complete tentatively 10 day course   - Last bcx 1/28 NGTD.   - MRSA+ mouth swab    Endo:  -insulin sliding scale    Heme: negative b/l LE dopplers 1/20 despite rising d-dimer  -lovenox 40mg q12h prophylactic dosing

## 2021-02-04 LAB
HHV SPEC CULT: ABNORMAL
Lab: ABNORMAL

## 2021-02-04 PROCEDURE — 99233 SBSQ HOSP IP/OBS HIGH 50: CPT | Mod: GC

## 2021-02-04 RX ORDER — QUETIAPINE FUMARATE 200 MG/1
50 TABLET, FILM COATED ORAL
Refills: 0 | Status: DISCONTINUED | OUTPATIENT
Start: 2021-02-04 | End: 2021-02-05

## 2021-02-04 RX ORDER — CLONAZEPAM 1 MG
1 TABLET ORAL
Refills: 0 | Status: DISCONTINUED | OUTPATIENT
Start: 2021-02-04 | End: 2021-02-11

## 2021-02-04 RX ORDER — OXYCODONE HYDROCHLORIDE 5 MG/1
5 TABLET ORAL EVERY 6 HOURS
Refills: 0 | Status: DISCONTINUED | OUTPATIENT
Start: 2021-02-04 | End: 2021-02-05

## 2021-02-04 RX ORDER — QUETIAPINE FUMARATE 200 MG/1
100 TABLET, FILM COATED ORAL AT BEDTIME
Refills: 0 | Status: DISCONTINUED | OUTPATIENT
Start: 2021-02-04 | End: 2021-02-05

## 2021-02-04 RX ADMIN — Medication 1 MILLIGRAM(S): at 18:30

## 2021-02-04 RX ADMIN — CHLORHEXIDINE GLUCONATE 15 MILLILITER(S): 213 SOLUTION TOPICAL at 04:14

## 2021-02-04 RX ADMIN — Medication 1 APPLICATION(S): at 17:14

## 2021-02-04 RX ADMIN — Medication 650 MILLIGRAM(S): at 11:59

## 2021-02-04 RX ADMIN — OXYCODONE HYDROCHLORIDE 5 MILLIGRAM(S): 5 TABLET ORAL at 04:14

## 2021-02-04 RX ADMIN — ENOXAPARIN SODIUM 40 MILLIGRAM(S): 100 INJECTION SUBCUTANEOUS at 17:12

## 2021-02-04 RX ADMIN — OXYCODONE HYDROCHLORIDE 5 MILLIGRAM(S): 5 TABLET ORAL at 12:00

## 2021-02-04 RX ADMIN — Medication 15 MILLILITER(S): at 12:53

## 2021-02-04 RX ADMIN — CHLORHEXIDINE GLUCONATE 15 MILLILITER(S): 213 SOLUTION TOPICAL at 17:13

## 2021-02-04 RX ADMIN — CHLORHEXIDINE GLUCONATE 1 APPLICATION(S): 213 SOLUTION TOPICAL at 04:16

## 2021-02-04 RX ADMIN — Medication 166.67 MILLIGRAM(S): at 04:15

## 2021-02-04 RX ADMIN — SENNA PLUS 10 MILLILITER(S): 8.6 TABLET ORAL at 11:59

## 2021-02-04 RX ADMIN — Medication 2: at 05:46

## 2021-02-04 RX ADMIN — Medication 650 MILLIGRAM(S): at 17:13

## 2021-02-04 RX ADMIN — Medication 1: at 11:59

## 2021-02-04 RX ADMIN — Medication 0.1 MILLIGRAM(S): at 04:14

## 2021-02-04 RX ADMIN — Medication 650 MILLIGRAM(S): at 04:14

## 2021-02-04 RX ADMIN — Medication 0.1 MILLIGRAM(S): at 22:35

## 2021-02-04 RX ADMIN — PIPERACILLIN AND TAZOBACTAM 25 GRAM(S): 4; .5 INJECTION, POWDER, LYOPHILIZED, FOR SOLUTION INTRAVENOUS at 17:13

## 2021-02-04 RX ADMIN — Medication 0.1 MILLIGRAM(S): at 12:52

## 2021-02-04 RX ADMIN — Medication 2: at 00:19

## 2021-02-04 RX ADMIN — PIPERACILLIN AND TAZOBACTAM 25 GRAM(S): 4; .5 INJECTION, POWDER, LYOPHILIZED, FOR SOLUTION INTRAVENOUS at 09:10

## 2021-02-04 RX ADMIN — POLYETHYLENE GLYCOL 3350 17 GRAM(S): 17 POWDER, FOR SOLUTION ORAL at 12:00

## 2021-02-04 RX ADMIN — Medication 1 APPLICATION(S): at 04:16

## 2021-02-04 RX ADMIN — ENOXAPARIN SODIUM 40 MILLIGRAM(S): 100 INJECTION SUBCUTANEOUS at 04:15

## 2021-02-04 RX ADMIN — QUETIAPINE FUMARATE 100 MILLIGRAM(S): 200 TABLET, FILM COATED ORAL at 22:35

## 2021-02-04 RX ADMIN — OXYCODONE HYDROCHLORIDE 5 MILLIGRAM(S): 5 TABLET ORAL at 17:13

## 2021-02-04 RX ADMIN — QUETIAPINE FUMARATE 100 MILLIGRAM(S): 200 TABLET, FILM COATED ORAL at 08:29

## 2021-02-04 RX ADMIN — Medication 1: at 17:14

## 2021-02-04 RX ADMIN — PIPERACILLIN AND TAZOBACTAM 25 GRAM(S): 4; .5 INJECTION, POWDER, LYOPHILIZED, FOR SOLUTION INTRAVENOUS at 00:20

## 2021-02-04 RX ADMIN — Medication 166.67 MILLIGRAM(S): at 18:22

## 2021-02-04 RX ADMIN — Medication 1 APPLICATION(S): at 04:15

## 2021-02-04 NOTE — PROGRESS NOTE ADULT - SUBJECTIVE AND OBJECTIVE BOX
DATE OF SERVICE: 02-04-21    Subjective: Patient seen and examined. No new events except as noted.   calm         MEDICATIONS:  MEDICATIONS  (STANDING):  acetaminophen    Suspension .. 650 milliGRAM(s) Oral every 6 hours  BACItracin   Ointment 1 Application(s) Topical two times a day  chlorhexidine 0.12% Liquid 15 milliLiter(s) Oral Mucosa every 12 hours  chlorhexidine 2% Cloths 1 Application(s) Topical <User Schedule>  clonazePAM  Tablet 1 milliGRAM(s) Oral <User Schedule>  cloNIDine 0.1 milliGRAM(s) Oral every 8 hours  dextrose 5%. 1000 milliLiter(s) (50 mL/Hr) IV Continuous <Continuous>  dextrose 5%. 1000 milliLiter(s) (100 mL/Hr) IV Continuous <Continuous>  enoxaparin Injectable 40 milliGRAM(s) SubCutaneous every 12 hours  glucagon  Injectable 1 milliGRAM(s) IntraMuscular once  insulin lispro (ADMELOG) corrective regimen sliding scale   SubCutaneous every 6 hours  multivitamin/minerals/iron Oral Solution (CENTRUM) 15 milliLiter(s) Oral daily  oxyCODONE    IR 5 milliGRAM(s) Oral every 6 hours  petrolatum Ophthalmic Ointment 1 Application(s) Both EYES two times a day  piperacillin/tazobactam IVPB.. 3.375 Gram(s) IV Intermittent every 8 hours  polyethylene glycol 3350 17 Gram(s) Oral daily  QUEtiapine 100 milliGRAM(s) Oral at bedtime  QUEtiapine 50 milliGRAM(s) Oral <User Schedule>  senna Syrup 10 milliLiter(s) Oral daily  vancomycin  IVPB 1250 milliGRAM(s) IV Intermittent every 12 hours      PHYSICAL EXAM:  T(C): 37 (02-04-21 @ 20:51), Max: 37.7 (02-04-21 @ 17:07)  HR: 106 (02-04-21 @ 20:51) (106 - 128)  BP: 129/85 (02-04-21 @ 20:51) (121/81 - 144/97)  RR: 27 (02-04-21 @ 20:51) (27 - 31)  SpO2: 99% (02-04-21 @ 20:51) (95% - 99%)  Wt(kg): --  I&O's Summary    03 Feb 2021 07:01 - 04 Feb 2021 07:00  --------------------------------------------------------  IN: 2700 mL / OUT: 2850 mL / NET: -150 mL    04 Feb 2021 07:01 - 04 Feb 2021 23:26  --------------------------------------------------------  IN: 540 mL / OUT: 2150 mL / NET: -1610 mL          Appearance: awake  HEENT:  trache   Lymphatic: No lymphadenopathy   Cardiovascular: Normal S1 S2  Respiratory: normal effort , clear  Gastrointestinal:  peg   Skin: No rashes,  warm to touch  Psychiatry:  Mood & affect appropriate  Musculuskeletal: + weakness       All labs, Imaging and EKGs personally reviewed                   02-03    138  |  101  |  11  ----------------------------<  167<H>  3.8   |  22  |  0.62    Ca    9.6      03 Feb 2021 06:41  Phos  4.5     02-03  Mg     2.0     02-03    TPro  7.0  /  Alb  3.3  /  TBili  0.3  /  DBili  x   /  AST  38<H>  /  ALT  45<H>  /  AlkPhos  131<H>  02-03

## 2021-02-04 NOTE — PROGRESS NOTE ADULT - SUBJECTIVE AND OBJECTIVE BOX
CHIEF COMPLAINT: Patient is a 46y old  Female who presents with a chief complaint of COVID (03 Feb 2021 16:23)      Interval Events: Pt seen with team at bedside .  Transferred from [facility] to [facility] yesterday     REVIEW OF SYSTEMS:  [x ] Unable to assess ROS because AMS    OBJECTIVE:  ICU Vital Signs Last 24 Hrs  T(C): 37.3 (04 Feb 2021 04:12), Max: 37.4 (03 Feb 2021 23:10)  T(F): 99.1 (04 Feb 2021 04:12), Max: 99.4 (03 Feb 2021 23:10)  HR: 128 (04 Feb 2021 04:12) (108 - 134)  BP: 124/88 (04 Feb 2021 04:12) (106/68 - 148/88)  BP(mean): 101 (03 Feb 2021 19:00) (76 - 105)  ABP: --  ABP(mean): --  RR: 28 (04 Feb 2021 04:12) (21 - 35)  SpO2: 97% (04 Feb 2021 04:12) (95% - 100%)    Mode: CPAP with PS, FiO2: 30, PEEP: 8, PS: 8, MAP: 10, PIP: 18    02-03 @ 07:01  -  02-04 @ 07:00  --------------------------------------------------------  IN: 2700 mL / OUT: 2850 mL / NET: -150 mL      CAPILLARY BLOOD GLUCOSE      POCT Blood Glucose.: 211 mg/dL (04 Feb 2021 05:42)      HOSPITAL MEDICATIONS:  MEDICATIONS  (STANDING):  acetaminophen    Suspension .. 650 milliGRAM(s) Oral every 6 hours  BACItracin   Ointment 1 Application(s) Topical two times a day  chlorhexidine 0.12% Liquid 15 milliLiter(s) Oral Mucosa every 12 hours  chlorhexidine 2% Cloths 1 Application(s) Topical <User Schedule>  cloNIDine 0.1 milliGRAM(s) Oral every 8 hours  dextrose 5%. 1000 milliLiter(s) (50 mL/Hr) IV Continuous <Continuous>  dextrose 5%. 1000 milliLiter(s) (100 mL/Hr) IV Continuous <Continuous>  enoxaparin Injectable 40 milliGRAM(s) SubCutaneous every 12 hours  glucagon  Injectable 1 milliGRAM(s) IntraMuscular once  insulin lispro (ADMELOG) corrective regimen sliding scale   SubCutaneous every 6 hours  multivitamin/minerals/iron Oral Solution (CENTRUM) 15 milliLiter(s) Oral daily  oxyCODONE    IR 5 milliGRAM(s) Oral every 6 hours  petrolatum Ophthalmic Ointment 1 Application(s) Both EYES two times a day  piperacillin/tazobactam IVPB.. 3.375 Gram(s) IV Intermittent every 8 hours  polyethylene glycol 3350 17 Gram(s) Oral daily  QUEtiapine 100 milliGRAM(s) Oral daily  QUEtiapine 150 milliGRAM(s) Oral at bedtime  senna Syrup 10 milliLiter(s) Oral daily  vancomycin  IVPB 1250 milliGRAM(s) IV Intermittent every 12 hours    MEDICATIONS  (PRN):  ALBUTerol    90 MICROgram(s) HFA Inhaler 2 Puff(s) Inhalation every 6 hours PRN Wheezing      LABS:                        8.0    8.48  )-----------( 534      ( 02 Feb 2021 21:17 )             26.2     02-03    138  |  101  |  11  ----------------------------<  167<H>  3.8   |  22  |  0.62    Ca    9.6      03 Feb 2021 06:41  Phos  4.5     02-03  Mg     2.0     02-03    TPro  7.0  /  Alb  3.3  /  TBili  0.3  /  DBili  x   /  AST  38<H>  /  ALT  45<H>  /  AlkPhos  131<H>  02-03        Arterial Blood Gas:  02-02 @ 21:17  7.42/38/101/25/97.9/0.3  ABG lactate: --        MICROBIOLOGY:     RADIOLOGY:  [ ] Reviewed and interpreted by me    PULMONARY FUNCTION TESTS:    EKG:

## 2021-02-04 NOTE — PROGRESS NOTE ADULT - ASSESSMENT
46 F w respiratory failure due to COVID 19     Problem/Recommendation - 1:  Problem: Acute respiratory failure with hypoxia. Recommendation: s/p tracheostomy and PEG. No signs of post proceure issues at this time  -c/w G tube feeds  - PPI per team  - [facility] care      Problem/Recommendation - 2:  ·  Problem: COVID-19.  Recommendation: status post dexamethasone.      Problem/Recommendation - 3:  ·  Problem: Normocytic anemia.  Recommendation: without overt GI bleeding. Likely due to critical illness. Trended down today  -monitor for GI bleed  -PPI QD.      Problem/Recommendation - 4:  ·  Problem: Abnormal LFTs.  Recommendation: multifactorial, likely NAFLD, COVID, critical illness. Can consider US to r/o gallstone disease, especially if increasing.   defer to GI      Problem/Recommendation - 5:  ·  Problem: Morbid obesity.      Problem/Recommendation - 6:  Problem: Constipation. Recommendation: consider XR abdomen to assess for ileus  on a bowel regimen.

## 2021-02-04 NOTE — PROGRESS NOTE ADULT - ASSESSMENT
46 F w respiratory failure due to COVID 19     Problem/Recommendation - 1:  Problem: Acute respiratory failure with hypoxia. Recommendation: s/p tracheostomy and PEG. No signs of post proceure issues at this time  -c/w G tube feeds  -please restart protonix     Problem/Recommendation - 2:  ·  Problem: COVID-19.  Recommendation: status post dexamethasone.      Problem/Recommendation - 3:  ·  Problem: Normocytic anemia.  Recommendation: without overt GI bleeding. Likely due to critical illness. Trended down today  -monitor for GI bleed  -PPI QD.      Problem/Recommendation - 4:  ·  Problem: Abnormal LFTs.  Recommendation: multifactorial, likely NAFLD, COVID, critical illness. Can consider US to r/o gallstone disease, especially if increasing.      Problem/Recommendation - 5:  ·  Problem: Morbid obesity.      Problem/Recommendation - 6:  Problem: Constipation. Recommendation: consider XR abdomen to assess for ileus  on a bowel regimen.    Attending Attestation:   Differential diagnosis and plan of care discussed with patient after the evaluation  35 Minutes spent on total encounter of which more than fifty percent of the encounter was spent counseling and/or coordinating care by the attending physician.    Mele Bolanos M.D.   Gastroenterology and Hepatology  Cell: [phone].

## 2021-02-04 NOTE — PROGRESS NOTE ADULT - ATTENDING COMMENTS
Acute hypoxemic respiratory failure in setting of COVID pneumonia s/p trach 1/28.  MRSA and Enterobacter pneumonia on Vanc until 6th and Zosyn until 8th.   Taper Seroquel.  Weaning trials as tolerated - currently on PS.  OOB/PT.

## 2021-02-04 NOTE — PROGRESS NOTE ADULT - ASSESSMENT
46y old  Female w/no significant PMH admitted for COVID ARDS, initially intubated 1/5-1/9, required re-intubation 1/16 and now s/p trach and PEG POD #9 (2/3) who has been stable on CPAP/PS for two days now and improving. Course also notable for UTI s/p CTX 1/25-27 and MRSA and enterobacter aerogenes pneumonia on vanco and zosyn. She has been anxious, awake, intermittently tachycardic and tachypneic and is likely combination of ICU delirium and precedex withdrawal. She is stable to go to RCU.     #AMS   - continue clonidine 0.1mg q8h for precedex withdrawal   - Tylenol q6 standing   - oxy 5mg q6 standing   - s/p Dilaudid 1mg q3h PRN (opiates likely contributing to ICU delirium)  -increase seroquel to 100mg qAM, 150mg qHS (from 50mg qAM, 100mg qHS)  -precedex off (since 2/1 7am)  -s/p valium.    #HTN  - off levophed since 2/1.     #AHRF sec to COVID   -CPAP/PS 8/5, 30%  - prior vent settings VC/AC , RR 26, PEEP 8  -alb q6h PRN    #Dysphagia   -continue tube feeds via PEG Peptamen 1.5 45cc/h continuous per nutrition   - free water 250cc q8h   -miralax qD, senna qD    #PNA   febrile 1/30, sputum +enterobacter, +MRSA   - c/w vanco 1250mg q12 (1/30- ) , to complete tentatively 7 day course  - c/w zosyn (1/30-  ), to complete tentatively 10 day course   - Last bcx 1/28 NGTD.   - MRSA+ mouth swab    DM  -insulin sliding scale    Heme: negative b/l LE dopplers 1/20 despite rising d-dimer  -  DVT ppx   lovenox 40mg q12h prophylactic dosing        46y old  Female w/no significant PMH admitted for COVID ARDS, initially intubated 1/5-1/9, required re-intubation 1/16 and now s/p trach and PEG POD #9 (2/3) who has been stable on CPAP/PS for two days now and improving. Course also notable for UTI s/p CTX 1/25-27 and MRSA and enterobacter aerogenes pneumonia on vanco and zosyn. She has been anxious, awake, intermittently tachycardic and tachypneic and is likely combination of ICU delirium and precedex withdrawal. She is stable to go to RCU.     #AMS   - continue clonidine 0.1mg q8h for precedex withdrawal   - Tylenol q6 standing   - oxy 5mg q6 standing   - s/p Dilaudid 1mg q3h PRN (opiates likely contributing to ICU delirium)  -increase seroquel to 100mg qAM, 150mg qHS (from 50mg qAM, 100mg qHS)  -precedex off (since 2/1 7am)  -s/p valium.  - tapering seroquel as pt is cooperative     #HTN  - off levophed since 2/1.     #AHRF sec to COVID   -CPAP/PS 8/5, 30%  - prior vent settings VC/AC , RR 26, PEEP 8  -alb q6h PRN    #Urinary Retention   -TOV in progress   - Pt with large residuals >500 when str cath   - Most likely will need burdick replaced     #Dysphagia   -continue tube feeds via PEG Peptamen 1.5 45cc/h continuous per nutrition   - free water 250cc q8h   -miralax qD, senna qD    #PNA   febrile 1/30, sputum +enterobacter, +MRSA   - c/w vanco 1250mg q12 (1/30- ) , to complete tentatively 7 day course  - c/w zosyn (1/30-  ), to complete tentatively 10 day course   - Last bcx 1/28 NGTD.   - MRSA+ mouth swab    DM  -insulin sliding scale    Heme: negative b/l LE dopplers 1/20 despite rising d-dimer  -  DVT ppx   lovenox 40mg q12h prophylactic dosing

## 2021-02-05 LAB
ANION GAP SERPL CALC-SCNC: 14 MMOL/L — SIGNIFICANT CHANGE UP (ref 7–14)
BUN SERPL-MCNC: 11 MG/DL — SIGNIFICANT CHANGE UP (ref 7–23)
CALCIUM SERPL-MCNC: 10.3 MG/DL — SIGNIFICANT CHANGE UP (ref 8.4–10.5)
CHLORIDE SERPL-SCNC: 101 MMOL/L — SIGNIFICANT CHANGE UP (ref 98–107)
CO2 SERPL-SCNC: 23 MMOL/L — SIGNIFICANT CHANGE UP (ref 22–31)
CREAT SERPL-MCNC: 0.63 MG/DL — SIGNIFICANT CHANGE UP (ref 0.5–1.3)
GLUCOSE SERPL-MCNC: 137 MG/DL — HIGH (ref 70–99)
HCT VFR BLD CALC: 28.3 % — LOW (ref 34.5–45)
HGB BLD-MCNC: 8.6 G/DL — LOW (ref 11.5–15.5)
MAGNESIUM SERPL-MCNC: 2.2 MG/DL — SIGNIFICANT CHANGE UP (ref 1.6–2.6)
MCHC RBC-ENTMCNC: 27.1 PG — SIGNIFICANT CHANGE UP (ref 27–34)
MCHC RBC-ENTMCNC: 30.4 GM/DL — LOW (ref 32–36)
MCV RBC AUTO: 89.3 FL — SIGNIFICANT CHANGE UP (ref 80–100)
NRBC # BLD: 0 /100 WBCS — SIGNIFICANT CHANGE UP
NRBC # FLD: 0.02 K/UL — HIGH
PHOSPHATE SERPL-MCNC: 4.6 MG/DL — HIGH (ref 2.5–4.5)
PLATELET # BLD AUTO: 605 K/UL — HIGH (ref 150–400)
POTASSIUM SERPL-MCNC: 4.3 MMOL/L — SIGNIFICANT CHANGE UP (ref 3.5–5.3)
POTASSIUM SERPL-SCNC: 4.3 MMOL/L — SIGNIFICANT CHANGE UP (ref 3.5–5.3)
RBC # BLD: 3.17 M/UL — LOW (ref 3.8–5.2)
RBC # FLD: 17.3 % — HIGH (ref 10.3–14.5)
SODIUM SERPL-SCNC: 138 MMOL/L — SIGNIFICANT CHANGE UP (ref 135–145)
VANCOMYCIN FLD-MCNC: 9 UG/ML — SIGNIFICANT CHANGE UP
VANCOMYCIN TROUGH SERPL-MCNC: 20.1 UG/ML — HIGH (ref 10–20)
WBC # BLD: 10.44 K/UL — SIGNIFICANT CHANGE UP (ref 3.8–10.5)
WBC # FLD AUTO: 10.44 K/UL — SIGNIFICANT CHANGE UP (ref 3.8–10.5)

## 2021-02-05 PROCEDURE — 99233 SBSQ HOSP IP/OBS HIGH 50: CPT

## 2021-02-05 RX ORDER — QUETIAPINE FUMARATE 200 MG/1
50 TABLET, FILM COATED ORAL AT BEDTIME
Refills: 0 | Status: DISCONTINUED | OUTPATIENT
Start: 2021-02-05 | End: 2021-02-05

## 2021-02-05 RX ORDER — QUETIAPINE FUMARATE 200 MG/1
50 TABLET, FILM COATED ORAL
Refills: 0 | Status: COMPLETED | OUTPATIENT
Start: 2021-02-05 | End: 2021-02-05

## 2021-02-05 RX ORDER — VANCOMYCIN HCL 1 G
1250 VIAL (EA) INTRAVENOUS EVERY 12 HOURS
Refills: 0 | Status: DISCONTINUED | OUTPATIENT
Start: 2021-02-05 | End: 2021-02-06

## 2021-02-05 RX ORDER — OXYCODONE HYDROCHLORIDE 5 MG/1
5 TABLET ORAL EVERY 12 HOURS
Refills: 0 | Status: DISCONTINUED | OUTPATIENT
Start: 2021-02-05 | End: 2021-02-10

## 2021-02-05 RX ORDER — VANCOMYCIN HCL 1 G
1000 VIAL (EA) INTRAVENOUS EVERY 12 HOURS
Refills: 0 | Status: DISCONTINUED | OUTPATIENT
Start: 2021-02-05 | End: 2021-02-05

## 2021-02-05 RX ORDER — QUETIAPINE FUMARATE 200 MG/1
100 TABLET, FILM COATED ORAL AT BEDTIME
Refills: 0 | Status: DISCONTINUED | OUTPATIENT
Start: 2021-02-05 | End: 2021-02-05

## 2021-02-05 RX ADMIN — OXYCODONE HYDROCHLORIDE 5 MILLIGRAM(S): 5 TABLET ORAL at 17:00

## 2021-02-05 RX ADMIN — Medication 1: at 12:30

## 2021-02-05 RX ADMIN — Medication 650 MILLIGRAM(S): at 16:51

## 2021-02-05 RX ADMIN — PIPERACILLIN AND TAZOBACTAM 25 GRAM(S): 4; .5 INJECTION, POWDER, LYOPHILIZED, FOR SOLUTION INTRAVENOUS at 09:02

## 2021-02-05 RX ADMIN — Medication 1 APPLICATION(S): at 17:01

## 2021-02-05 RX ADMIN — CHLORHEXIDINE GLUCONATE 15 MILLILITER(S): 213 SOLUTION TOPICAL at 04:13

## 2021-02-05 RX ADMIN — Medication 1 MILLIGRAM(S): at 04:13

## 2021-02-05 RX ADMIN — Medication 1 APPLICATION(S): at 04:33

## 2021-02-05 RX ADMIN — PIPERACILLIN AND TAZOBACTAM 25 GRAM(S): 4; .5 INJECTION, POWDER, LYOPHILIZED, FOR SOLUTION INTRAVENOUS at 16:49

## 2021-02-05 RX ADMIN — Medication 650 MILLIGRAM(S): at 01:10

## 2021-02-05 RX ADMIN — QUETIAPINE FUMARATE 50 MILLIGRAM(S): 200 TABLET, FILM COATED ORAL at 09:02

## 2021-02-05 RX ADMIN — Medication 0.1 MILLIGRAM(S): at 04:13

## 2021-02-05 RX ADMIN — PIPERACILLIN AND TAZOBACTAM 25 GRAM(S): 4; .5 INJECTION, POWDER, LYOPHILIZED, FOR SOLUTION INTRAVENOUS at 01:07

## 2021-02-05 RX ADMIN — OXYCODONE HYDROCHLORIDE 5 MILLIGRAM(S): 5 TABLET ORAL at 12:32

## 2021-02-05 RX ADMIN — Medication 650 MILLIGRAM(S): at 12:29

## 2021-02-05 RX ADMIN — Medication 650 MILLIGRAM(S): at 04:13

## 2021-02-05 RX ADMIN — CHLORHEXIDINE GLUCONATE 15 MILLILITER(S): 213 SOLUTION TOPICAL at 17:00

## 2021-02-05 RX ADMIN — Medication 1 APPLICATION(S): at 04:14

## 2021-02-05 RX ADMIN — ENOXAPARIN SODIUM 40 MILLIGRAM(S): 100 INJECTION SUBCUTANEOUS at 16:51

## 2021-02-05 RX ADMIN — Medication 1: at 17:55

## 2021-02-05 RX ADMIN — Medication 15 MILLILITER(S): at 12:31

## 2021-02-05 RX ADMIN — Medication 1: at 05:43

## 2021-02-05 RX ADMIN — Medication 166.67 MILLIGRAM(S): at 20:03

## 2021-02-05 RX ADMIN — Medication 1 APPLICATION(S): at 16:50

## 2021-02-05 RX ADMIN — POLYETHYLENE GLYCOL 3350 17 GRAM(S): 17 POWDER, FOR SOLUTION ORAL at 12:29

## 2021-02-05 RX ADMIN — ENOXAPARIN SODIUM 40 MILLIGRAM(S): 100 INJECTION SUBCUTANEOUS at 04:13

## 2021-02-05 RX ADMIN — Medication 0.1 MILLIGRAM(S): at 12:32

## 2021-02-05 RX ADMIN — SENNA PLUS 10 MILLILITER(S): 8.6 TABLET ORAL at 20:04

## 2021-02-05 RX ADMIN — Medication 0.1 MILLIGRAM(S): at 20:04

## 2021-02-05 RX ADMIN — OXYCODONE HYDROCHLORIDE 5 MILLIGRAM(S): 5 TABLET ORAL at 04:13

## 2021-02-05 RX ADMIN — QUETIAPINE FUMARATE 50 MILLIGRAM(S): 200 TABLET, FILM COATED ORAL at 20:04

## 2021-02-05 RX ADMIN — OXYCODONE HYDROCHLORIDE 5 MILLIGRAM(S): 5 TABLET ORAL at 01:10

## 2021-02-05 RX ADMIN — Medication 1 MILLIGRAM(S): at 17:00

## 2021-02-05 NOTE — PROGRESS NOTE ADULT - ASSESSMENT
46y old  Female w/no significant PMH admitted for COVID ARDS, initially intubated 1/5-1/9, required re-intubation 1/16 and now s/p trach and PEG POD #9 (2/3) who has been stable on CPAP/PS for two days now and improving. Course also notable for UTI s/p CTX 1/25-27 and MRSA and enterobacter aerogenes pneumonia on vanco and zosyn. She has been anxious, awake, intermittently tachycardic and tachypneic and is likely combination of ICU delirium and precedex withdrawal. She is stable to go to RCU.     #AMS   - continue clonidine 0.1mg q8h for precedex withdrawal   - Tylenol q6 standing   - oxy 5mg q6 standing   - s/p Dilaudid 1mg q3h PRN (opiates likely contributing to ICU delirium)  -increase seroquel to 100mg qAM, 150mg qHS (from 50mg qAM, 100mg qHS)  -precedex off (since 2/1 7am)  -s/p valium.  - tapering seroquel as pt is cooperative /Klonopin for anxiety     #HTN  - off levophed since 2/1.     #AHRF sec to COVID   -CPAP/PS 8/5, 30%  - prior vent settings VC/AC , RR 26, PEEP 8  -alb q6h PRN    #Urinary Retention   -TOV in progress   - Pt with large residuals >500 when str cath   - Most likely will need burdick replaced     #Dysphagia   -continue tube feeds via PEG Peptamen 1.5 45cc/h continuous per nutrition   - free water 250cc q8h   -miralax qD, senna qD    #PNA   febrile 1/30, sputum +enterobacter, +MRSA   - c/w vanco 1250mg q12 (1/30- ) , to complete tentatively 7 day course  - c/w zosyn (1/30-  ), to complete tentatively 10 day course   - Last bcx 1/28 NGTD.   - MRSA+ mouth swab    DM  -insulin sliding scale    Heme: negative b/l LE dopplers 1/20 despite rising d-dimer  -  DVT ppx   lovenox 40mg q12h prophylactic dosing        46y old  Female w/no significant PMH admitted for COVID ARDS, initially intubated 1/5-1/9, required re-intubation 1/16 and now s/p trach and PEG POD #9 (2/3) who has been stable on CPAP/PS for two days now and improving. Course also notable for UTI s/p CTX 1/25-27 and MRSA and enterobacter aerogenes pneumonia on vanco and zosyn. She has been anxious, awake, intermittently tachycardic and tachypneic and is likely combination of ICU delirium and precedex withdrawal. She is stable to go to RCU.     #AMS   - continue clonidine 0.1mg q8h for precedex withdrawal   - Tylenol q6 standing   - oxy 5mg q6 standing   - s/p Dilaudid 1mg q3h PRN (opiates likely contributing to ICU delirium)  -increase seroquel to 100mg qAM, 150mg qHS (from 50mg qAM, 100mg qHS)  -precedex off (since 2/1 7am)  -s/p valium.  - tapering seroquel as pt is cooperative /Klonopin for anxiety started   - dc seroquel keep klonopin less anxious/restless     #HTN  - off levophed since 2/1.     #AHRF sec to COVID   -CPAP/PS 8/5, 30%  - prior vent settings VC/AC , RR 26, PEEP 8  -alb q6h PRN    #Urinary Retention   -TOV in progress   - Pt with large residuals >500 when str cath   - Most likely will need burdick replaced   - Failed TOV - give trial again       #Dysphagia   -continue tube feeds via PEG Peptamen 1.5 45cc/h continuous per nutrition   - free water 250cc q8h   -miralax qD, senna qD    #PNA   febrile 1/30, sputum +enterobacter, +MRSA   - c/w vanco 1250mg q12 (1/30- ) , to complete tentatively 7 day course  - c/w zosyn (1/30-  ), to complete tentatively 10 day course   - Last bcx 1/28 NGTD.   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

## 2021-02-05 NOTE — PROGRESS NOTE ADULT - SUBJECTIVE AND OBJECTIVE BOX
CHIEF COMPLAINT:    Interval Events:    REVIEW OF SYSTEMS:  Constitutional:   Eyes:  ENT:  CV:  Resp:  GI:  :  MSK:  Integumentary:  Neurological:  Psychiatric:  Endocrine:  Hematologic/Lymphatic:  Allergic/Immunologic:  [ ] All other systems negative  [ ] Unable to assess ROS because ________    OBJECTIVE:  ICU Vital Signs Last 24 Hrs  T(C): 36 (05 Feb 2021 04:12), Max: 37.7 (04 Feb 2021 17:07)  T(F): 96.8 (05 Feb 2021 04:12), Max: 99.9 (04 Feb 2021 17:07)  HR: 102 (05 Feb 2021 04:12) (95 - 128)  BP: 128/82 (05 Feb 2021 04:12) (126/89 - 144/97)  BP(mean): --  ABP: --  ABP(mean): --  RR: 21 (05 Feb 2021 04:12) (21 - 31)  SpO2: 98% (05 Feb 2021 04:12) (95% - 99%)    Mode: CPAP with PS, FiO2: 30, PEEP: 8, PS: 8, MAP: 8, PIP: 15    02-03 @ 07:01 - 02-04 @ 07:00  --------------------------------------------------------  IN: 2700 mL / OUT: 2850 mL / NET: -150 mL    02-04 @ 07:01 - 02-05 @ 06:56  --------------------------------------------------------  IN: 1580 mL / OUT: 3550 mL / NET: -1970 mL      CAPILLARY BLOOD GLUCOSE      POCT Blood Glucose.: 160 mg/dL (05 Feb 2021 05:08)      PHYSICAL EXAM:  General:   HEENT:   Lymph Nodes:  Neck:   Respiratory:   Cardiovascular:   Abdomen:   Extremities:   Skin:   Neurological:  Psychiatry:    HOSPITAL MEDICATIONS:  MEDICATIONS  (STANDING):  acetaminophen    Suspension .. 650 milliGRAM(s) Oral every 6 hours  BACItracin   Ointment 1 Application(s) Topical two times a day  chlorhexidine 0.12% Liquid 15 milliLiter(s) Oral Mucosa every 12 hours  chlorhexidine 2% Cloths 1 Application(s) Topical <User Schedule>  clonazePAM  Tablet 1 milliGRAM(s) Oral <User Schedule>  cloNIDine 0.1 milliGRAM(s) Oral every 8 hours  dextrose 5%. 1000 milliLiter(s) (100 mL/Hr) IV Continuous <Continuous>  dextrose 5%. 1000 milliLiter(s) (50 mL/Hr) IV Continuous <Continuous>  enoxaparin Injectable 40 milliGRAM(s) SubCutaneous every 12 hours  glucagon  Injectable 1 milliGRAM(s) IntraMuscular once  insulin lispro (ADMELOG) corrective regimen sliding scale   SubCutaneous every 6 hours  multivitamin/minerals/iron Oral Solution (CENTRUM) 15 milliLiter(s) Oral daily  oxyCODONE    IR 5 milliGRAM(s) Oral every 6 hours  petrolatum Ophthalmic Ointment 1 Application(s) Both EYES two times a day  piperacillin/tazobactam IVPB.. 3.375 Gram(s) IV Intermittent every 8 hours  polyethylene glycol 3350 17 Gram(s) Oral daily  QUEtiapine 100 milliGRAM(s) Oral at bedtime  QUEtiapine 50 milliGRAM(s) Oral <User Schedule>  senna Syrup 10 milliLiter(s) Oral daily  vancomycin  IVPB 1250 milliGRAM(s) IV Intermittent every 12 hours    MEDICATIONS  (PRN):  ALBUTerol    90 MICROgram(s) HFA Inhaler 2 Puff(s) Inhalation every 6 hours PRN Wheezing      LABS:                        8.6    10.44 )-----------( 605      ( 05 Feb 2021 04:22 )             28.3     02-05    138  |  101  |  11  ----------------------------<  137<H>  4.3   |  23  |  0.63    Ca    10.3      05 Feb 2021 04:22  Phos  4.6     02-05  Mg     2.2     02-05                MICROBIOLOGY:     RADIOLOGY:  [ ] Reviewed and interpreted by me    PULMONARY FUNCTION TESTS:    EKG: CHIEF COMPLAINT: Patient is a 46y old  Female who presents with a chief complaint of COVID (05 Feb 2021 12:27)      Interval Events: Pt seen and evaluated at bedside     REVIEW OF SYSTEMS:  [x ] Unable to assess ROS because AMS    OBJECTIVE:  ICU Vital Signs Last 24 Hrs  T(C): 36 (05 Feb 2021 04:12), Max: 37.7 (04 Feb 2021 17:07)  T(F): 96.8 (05 Feb 2021 04:12), Max: 99.9 (04 Feb 2021 17:07)  HR: 102 (05 Feb 2021 04:12) (95 - 128)  BP: 128/82 (05 Feb 2021 04:12) (126/89 - 144/97)  BP(mean): --  ABP: --  ABP(mean): --  RR: 21 (05 Feb 2021 04:12) (21 - 31)  SpO2: 98% (05 Feb 2021 04:12) (95% - 99%)    Mode: CPAP with PS, FiO2: 30, PEEP: 8, PS: 8, MAP: 8, PIP: 15    02-03 @ 07:01 - 02-04 @ 07:00  --------------------------------------------------------  IN: 2700 mL / OUT: 2850 mL / NET: -150 mL    02-04 @ 07:01  - 02-05 @ 06:56  --------------------------------------------------------  IN: 1580 mL / OUT: 3550 mL / NET: -1970 mL      CAPILLARY BLOOD GLUCOSE      POCT Blood Glucose.: 160 mg/dL (05 Feb 2021 05:08)      PHYSICAL EXAM:  General:   HEENT:   Lymph Nodes:  Neck:   Respiratory:   Cardiovascular:   Abdomen:   Extremities:   Skin:   Neurological:  Psychiatry:    HOSPITAL MEDICATIONS:  MEDICATIONS  (STANDING):  acetaminophen    Suspension .. 650 milliGRAM(s) Oral every 6 hours  BACItracin   Ointment 1 Application(s) Topical two times a day  chlorhexidine 0.12% Liquid 15 milliLiter(s) Oral Mucosa every 12 hours  chlorhexidine 2% Cloths 1 Application(s) Topical <User Schedule>  clonazePAM  Tablet 1 milliGRAM(s) Oral <User Schedule>  cloNIDine 0.1 milliGRAM(s) Oral every 8 hours  dextrose 5%. 1000 milliLiter(s) (100 mL/Hr) IV Continuous <Continuous>  dextrose 5%. 1000 milliLiter(s) (50 mL/Hr) IV Continuous <Continuous>  enoxaparin Injectable 40 milliGRAM(s) SubCutaneous every 12 hours  glucagon  Injectable 1 milliGRAM(s) IntraMuscular once  insulin lispro (ADMELOG) corrective regimen sliding scale   SubCutaneous every 6 hours  multivitamin/minerals/iron Oral Solution (CENTRUM) 15 milliLiter(s) Oral daily  oxyCODONE    IR 5 milliGRAM(s) Oral every 6 hours  petrolatum Ophthalmic Ointment 1 Application(s) Both EYES two times a day  piperacillin/tazobactam IVPB.. 3.375 Gram(s) IV Intermittent every 8 hours  polyethylene glycol 3350 17 Gram(s) Oral daily  QUEtiapine 100 milliGRAM(s) Oral at bedtime  QUEtiapine 50 milliGRAM(s) Oral <User Schedule>  senna Syrup 10 milliLiter(s) Oral daily  vancomycin  IVPB 1250 milliGRAM(s) IV Intermittent every 12 hours    MEDICATIONS  (PRN):  ALBUTerol    90 MICROgram(s) HFA Inhaler 2 Puff(s) Inhalation every 6 hours PRN Wheezing      LABS:                        8.6    10.44 )-----------( 605      ( 05 Feb 2021 04:22 )             28.3     02-05    138  |  101  |  11  ----------------------------<  137<H>  4.3   |  23  |  0.63    Ca    10.3      05 Feb 2021 04:22  Phos  4.6     02-05  Mg     2.2     02-05                MICROBIOLOGY:     RADIOLOGY:  [ ] Reviewed and interpreted by me    PULMONARY FUNCTION TESTS:    EKG:

## 2021-02-05 NOTE — PROGRESS NOTE ADULT - ASSESSMENT
46 F w respiratory failure due to COVID 19     Problem/Recommendation - 1:  Problem: Acute respiratory failure with hypoxia. Recommendation: s/p tracheostomy and PEG. No signs of post proceure issues at this time  -c/w G tube feeds       Problem/Recommendation - 2:  ·  Problem: COVID-19.  Recommendation: status post dexamethasone.      Problem/Recommendation - 3:  ·  Problem: Normocytic anemia.  Recommendation: without overt GI bleeding. Likely due to critical illness. Trended down today  -monitor for GI bleed  -PPI QD.      Problem/Recommendation - 4:  ·  Problem: Abnormal LFTs.  Recommendation: multifactorial, likely NAFLD, COVID, critical illness. Can consider US to r/o gallstone disease, especially if increasing.      Problem/Recommendation - 5:  ·  Problem: Morbid obesity.      Problem/Recommendation - 6:  Problem: Constipation. Recommendation: consider XR abdomen to assess for ileus  on a bowel regimen.    Attending Attestation:   Differential diagnosis and plan of care discussed with patient after the evaluation  35 Minutes spent on total encounter of which more than fifty percent of the encounter was spent counseling and/or coordinating care by the attending physician.    Mele Bolanos M.D.   Gastroenterology and Hepatology  Cell: [phone].

## 2021-02-05 NOTE — PROGRESS NOTE ADULT - ATTENDING COMMENTS
Acute hypoxemic respiratory failure in setting of COVID pneumonia s/p trach 1/28.  MRSA and Enterobacter pneumonia on Vanc until 6th and Zosyn until 8th.   Taper Seroquel. Klonopin added for anxiety.  Tolerating PS trials. Trach collar when mor awake.  OOB/PT.

## 2021-02-06 PROCEDURE — 99232 SBSQ HOSP IP/OBS MODERATE 35: CPT

## 2021-02-06 PROCEDURE — 93010 ELECTROCARDIOGRAM REPORT: CPT

## 2021-02-06 RX ORDER — ACETAMINOPHEN 500 MG
1000 TABLET ORAL ONCE
Refills: 0 | Status: COMPLETED | OUTPATIENT
Start: 2021-02-06 | End: 2021-02-06

## 2021-02-06 RX ORDER — QUETIAPINE FUMARATE 200 MG/1
25 TABLET, FILM COATED ORAL DAILY
Refills: 0 | Status: DISCONTINUED | OUTPATIENT
Start: 2021-02-06 | End: 2021-02-07

## 2021-02-06 RX ORDER — PIPERACILLIN AND TAZOBACTAM 4; .5 G/20ML; G/20ML
3.38 INJECTION, POWDER, LYOPHILIZED, FOR SOLUTION INTRAVENOUS ONCE
Refills: 0 | Status: COMPLETED | OUTPATIENT
Start: 2021-02-06 | End: 2021-02-06

## 2021-02-06 RX ORDER — PIPERACILLIN AND TAZOBACTAM 4; .5 G/20ML; G/20ML
3.38 INJECTION, POWDER, LYOPHILIZED, FOR SOLUTION INTRAVENOUS EVERY 8 HOURS
Refills: 0 | Status: DISCONTINUED | OUTPATIENT
Start: 2021-02-06 | End: 2021-02-08

## 2021-02-06 RX ORDER — QUETIAPINE FUMARATE 200 MG/1
75 TABLET, FILM COATED ORAL AT BEDTIME
Refills: 0 | Status: DISCONTINUED | OUTPATIENT
Start: 2021-02-06 | End: 2021-02-07

## 2021-02-06 RX ADMIN — PIPERACILLIN AND TAZOBACTAM 25 GRAM(S): 4; .5 INJECTION, POWDER, LYOPHILIZED, FOR SOLUTION INTRAVENOUS at 01:33

## 2021-02-06 RX ADMIN — Medication 400 MILLIGRAM(S): at 03:31

## 2021-02-06 RX ADMIN — Medication 1 APPLICATION(S): at 17:14

## 2021-02-06 RX ADMIN — OXYCODONE HYDROCHLORIDE 5 MILLIGRAM(S): 5 TABLET ORAL at 04:53

## 2021-02-06 RX ADMIN — Medication 166.67 MILLIGRAM(S): at 04:48

## 2021-02-06 RX ADMIN — Medication 0.1 MILLIGRAM(S): at 04:48

## 2021-02-06 RX ADMIN — Medication 650 MILLIGRAM(S): at 04:47

## 2021-02-06 RX ADMIN — SENNA PLUS 10 MILLILITER(S): 8.6 TABLET ORAL at 21:37

## 2021-02-06 RX ADMIN — Medication 1 APPLICATION(S): at 04:58

## 2021-02-06 RX ADMIN — Medication 1 APPLICATION(S): at 17:15

## 2021-02-06 RX ADMIN — Medication 15 MILLILITER(S): at 12:33

## 2021-02-06 RX ADMIN — QUETIAPINE FUMARATE 25 MILLIGRAM(S): 200 TABLET, FILM COATED ORAL at 09:36

## 2021-02-06 RX ADMIN — Medication 1 MILLIGRAM(S): at 04:48

## 2021-02-06 RX ADMIN — QUETIAPINE FUMARATE 75 MILLIGRAM(S): 200 TABLET, FILM COATED ORAL at 21:36

## 2021-02-06 RX ADMIN — Medication 650 MILLIGRAM(S): at 23:25

## 2021-02-06 RX ADMIN — Medication 1: at 23:24

## 2021-02-06 RX ADMIN — PIPERACILLIN AND TAZOBACTAM 25 GRAM(S): 4; .5 INJECTION, POWDER, LYOPHILIZED, FOR SOLUTION INTRAVENOUS at 17:10

## 2021-02-06 RX ADMIN — Medication 0.1 MILLIGRAM(S): at 12:33

## 2021-02-06 RX ADMIN — Medication 1 APPLICATION(S): at 04:49

## 2021-02-06 RX ADMIN — PIPERACILLIN AND TAZOBACTAM 25 GRAM(S): 4; .5 INJECTION, POWDER, LYOPHILIZED, FOR SOLUTION INTRAVENOUS at 23:23

## 2021-02-06 RX ADMIN — Medication 1 MILLIGRAM(S): at 17:15

## 2021-02-06 RX ADMIN — Medication 1: at 17:16

## 2021-02-06 RX ADMIN — Medication 1 MILLIGRAM(S): at 02:25

## 2021-02-06 RX ADMIN — Medication 650 MILLIGRAM(S): at 00:02

## 2021-02-06 RX ADMIN — Medication 650 MILLIGRAM(S): at 17:14

## 2021-02-06 RX ADMIN — OXYCODONE HYDROCHLORIDE 5 MILLIGRAM(S): 5 TABLET ORAL at 17:15

## 2021-02-06 RX ADMIN — Medication 0.1 MILLIGRAM(S): at 21:36

## 2021-02-06 RX ADMIN — Medication 1: at 04:57

## 2021-02-06 RX ADMIN — CHLORHEXIDINE GLUCONATE 1 APPLICATION(S): 213 SOLUTION TOPICAL at 04:49

## 2021-02-06 RX ADMIN — POLYETHYLENE GLYCOL 3350 17 GRAM(S): 17 POWDER, FOR SOLUTION ORAL at 12:41

## 2021-02-06 RX ADMIN — Medication 1 MILLIGRAM(S): at 03:31

## 2021-02-06 RX ADMIN — PIPERACILLIN AND TAZOBACTAM 200 GRAM(S): 4; .5 INJECTION, POWDER, LYOPHILIZED, FOR SOLUTION INTRAVENOUS at 09:37

## 2021-02-06 RX ADMIN — ENOXAPARIN SODIUM 40 MILLIGRAM(S): 100 INJECTION SUBCUTANEOUS at 04:47

## 2021-02-06 RX ADMIN — ENOXAPARIN SODIUM 40 MILLIGRAM(S): 100 INJECTION SUBCUTANEOUS at 17:13

## 2021-02-06 NOTE — PROGRESS NOTE ADULT - ASSESSMENT
46y old  Female w/no significant PMH admitted for COVID ARDS, initially intubated 1/5-1/9, required re-intubation 1/16 and now s/p trach and PEG POD #9 (2/3) who has been stable on CPAP/PS for two days now and improving. Course also notable for UTI s/p CTX 1/25-27 and MRSA and enterobacter aerogenes pneumonia on vanco and zosyn. She has been anxious, awake, intermittently tachycardic and tachypneic and is likely combination of ICU delirium and precedex withdrawal. She is stable to go to RCU.     #AMS   - continue clonidine 0.1mg q8h for precedex withdrawal   - Tylenol q6 standing   - oxy 5mg q6 standing   - s/p Dilaudid 1mg q3h PRN (opiates likely contributing to ICU delirium)  -increase seroquel to 100mg qAM, 150mg qHS (from 50mg qAM, 100mg qHS)  -precedex off (since 2/1 7am)  -s/p valium.  - tapering seroquel as pt is cooperative /Klonopin for anxiety started   - dc seroquel keep klonopin less anxious/restless     #HTN  - off levophed since 2/1.     #AHRF sec to COVID   -CPAP/PS 8/5, 30%  - prior vent settings VC/AC , RR 26, PEEP 8  -alb q6h PRN    #Urinary Retention   -TOV in progress   - Pt with large residuals >500 when str cath   - Most likely will need burdick replaced   - Failed TOV - give trial again       #Dysphagia   -continue tube feeds via PEG Peptamen 1.5 45cc/h continuous per nutrition   - free water 250cc q8h   -miralax qD, senna qD    #PNA   febrile 1/30, sputum +enterobacter, +MRSA   - c/w vanco 1250mg q12 (1/30- ) , to complete tentatively 7 day course  - c/w zosyn (1/30-  ), to complete tentatively 10 day course   - Last bcx 1/28 NGTD.   - MRSA+ mouth swab  - HSV1 from 1/27 mouth swab - no new lesions noted     DM  -insulin sliding scale    Heme: negative b/l LE dopplers 1/20 despite rising d-dimer  -  DVT ppx   lovenox 40mg q12h prophylactic dosing    46y old  Female w/no significant PMH admitted for COVID ARDS, initially intubated 1/5-1/9, required re-intubation 1/16 and now s/p trach and PEG POD #9 (2/3) who has been stable on CPAP/PS for two days now and improving. Course also notable for UTI s/p CTX 1/25-27 and MRSA and enterobacter aerogenes pneumonia on vanco and zosyn. She has been anxious, awake, intermittently tachycardic and tachypneic and is likely combination of ICU delirium and precedex withdrawal. She is stable to go to RCU.     #AMS   - continue clonidine 0.1mg q8h for precedex withdrawal   - Tylenol q6 standing   - oxy 5mg q6 standing   - s/p Dilaudid 1mg q3h PRN (opiates likely contributing to ICU delirium)  -increase seroquel to 100mg qAM, 150mg qHS (from 50mg qAM, 100mg qHS)  -precedex off (since 2/1 7am)  -s/p valium.  - tapering seroquel as pt is cooperative /Klonopin for anxiety started   - dc seroquel keep klonopin less anxious/restless     #HTN  - off levophed since 2/1.     #AHRF sec to COVID   -CPAP/PS 8/5, 30%  - prior vent settings VC/AC , RR 26, PEEP 8  -alb q6h PRN    #Urinary Retention   -TOV in progress   - Pt with large residuals >500 when str cath   - Most likely will need burdick replaced   - Failed TOV - give trial again       #Dysphagia   -continue tube feeds via PEG Peptamen 1.5 45cc/h continuous per nutrition   - free water 250cc q8h   -miralax qD, senna qD    #PNA   febrile 1/30, sputum +enterobacter, +MRSA   - c/w vanco 1250mg q12 (1/30- ) , to complete tentatively 7 day course  - c/w zosyn (1/30-  ), to complete tentatively 10 day course   - Last bcx 1/28 NGTD.   - MRSA+ mouth swab  - HSV1 from 1/27 mouth swab - no new lesions noted     DM  -insulin sliding scale    Heme: negative b/l LE dopplers 1/20 despite rising d-dimer  -  DVT ppx   lovenox 40mg q12h prophylactic dosing  ***************  2/6-RCU transfer    46y old  Female w/no significant PMH admitted for COVID ARDS, initially intubated 1/5-1/9, required re-intubation 1/16 and now s/p trach and PEG POD #9 (2/3) who has been stable on CPAP/PS for two days now and improving. Course also notable for UTI s/p CTX 1/25-27 and MRSA and enterobacter aerogenes pneumonia on vanco and zosyn. She has been anxious, awake, intermittently tachycardic and tachypneic and is likely combination of ICU delirium and precedex withdrawal. She is stable to go to RCU.     #AMS   - continue clonidine 0.1mg q8h for precedex withdrawal   - Tylenol q6 standing   - oxy 5mg q6 standing   - s/p Dilaudid 1mg q3h PRN (opiates likely contributing to ICU delirium)  -increase seroquel to 100mg qAM, 150mg qHS (from 50mg qAM, 100mg qHS)  -precedex off (since 2/1 7am)  -s/p valium.  - Restarted seroquel as patient very anxious overnight 2/5 requiring ativan IVP   - Wean off seroquel and klonopin as tolerated    #HTN  - off levophed since 2/1.     #AHRF sec to COVID   -CPAP/PS 8/5, 30%  - prior vent settings VC/AC , RR 26, PEEP 8  -alb q6h PRN    #Urinary Retention   - Pt with large residuals >500 when str cath   - Most likely will need burdick replaced   - Failed TOV - give trial again     #Dysphagia   - PEG placed 1/28  -continue tube feeds via PEG Peptamen 1.5 45cc/h continuous per nutrition   - free water 250cc q8h   -miralax qD, senna qD    #PNA   febrile 1/30, sputum +enterobacter, +MRSA   - c/w vanco 1250mg q12 (1/30-2/6) , to complete tentatively 7 day course  - c/w zosyn (1/30- 2/8), to complete tentatively 10 day course   - Last bcx 1/28 NGTD.   - MRSA+ mouth swab  - HSV1 from 1/27 mouth swab - no new lesions noted     #Anemia  - No signs of GIB likely 2/2 prolonged hospital course and blood draws  - Trend hgb   - Keep hgb>7.0    #DM  -insulin sliding scale    Elevated D-dimer:   - negative b/l LE dopplers 1/20 despite rising d-dimer    DVT ppx   lovenox 40mg q12h prophylactic dosing  ***************  2/6-RCU transfer

## 2021-02-06 NOTE — CHART NOTE - NSCHARTNOTEFT_GEN_A_CORE
Called by RN to evaluate pt at bedside for severe anxiety. Pt weaned off precedex in micu, started on klonopin and seroquel, seroquel was decreased last night from 150mg to 50mg. Pt noted to be tachy to 140-150s with "tremors" however rest of vitals stable.     REVIEW OF SYSTEMS:  Constitutional Symptoms: No weakness, fevers, chills  Eyes: No visual changes, headache, eye pain, double vision  Ears, Nose, Mouth, Throat: No runny nose, sinus pain, ear pain, tinnitus, sore throat, dysphagia, odynophagia  Cardiovascular: No chest pain, palpitations, edema  Respiratory: No cough, wheezing, hemoptysis, shortness of breath  Gastrointestinal: No abdominal pain, dysphagia, anorexia, N/V/D/C, hematemesis, BRBPR, melena  Musculoskeletal: No joint pain, joint swelling, decreased ROM or strength  Neurologic:  No seizures, headache, paraesthesia, numbness, limb weakness  Positives and pertinent negatives noted and all other systems negative.    VITAL SIGNS:  T(C): 37.2 (02-06-21 @ 04:40), Max: 37.8 (02-06-21 @ 03:18)  HR: 116 (02-06-21 @ 04:40) (81 - 145)  BP: 113/72 (02-06-21 @ 04:40) (102/56 - 124/66)  RR: 24 (02-06-21 @ 04:40) (20 - 36)  SpO2: 100% (02-06-21 @ 04:40) (96% - 103%)      PHYSICAL EXAM:  GENERAL: Appears very anxious  HEAD:  Atraumatic, Normocephalic  EYES: EOMI, PERRLA, conjunctiva and sclera clear  NECK: Supple, no lymphadenopathy, no JVD  CHEST/LUNG: CTAB; No wheezes, rales, or rhonchi  HEART: Tachycardic, regular rhythm  NEUROLOGY: AAO x 4, no focal deficits        ASSESSMENT/PLAN:     -Ativan 1 mg x2 IVP given  -12 lead EKG performed - sinus tach (no previous in chart noted for comparison) however no acute ST/T wave abnormalities noted  -Afebrile, rectal temp 100  -Tylenol IV given for pain    s/p interventions, HR now in 110s-120s      Will continue to monitor closely.  CHARLENE Fisher PA-C  [id number]

## 2021-02-06 NOTE — PROGRESS NOTE ADULT - ASSESSMENT
46 F w respiratory failure due to COVID 19     Problem/Recommendation - 1:  Problem: Acute respiratory failure with hypoxia. Recommendation: s/p tracheostomy and PEG. No signs of post proceure issues at this time  -c/w G tube feeds       Problem/Recommendation - 2:  ·  Problem: COVID-19.  Recommendation: status post dexamethasone.      Problem/Recommendation - 3:  ·  Problem: Normocytic anemia.  Recommendation: without overt GI bleeding. Likely due to critical illness. Trended down today  -monitor for GI bleed  -PPI QD.      Problem/Recommendation - 4:  ·  Problem: Abnormal LFTs.  Recommendation: multifactorial, likely NAFLD, COVID, critical illness. Can consider US to r/o gallstone disease, especially if increasing.      Problem/Recommendation - 5:  ·  Problem: Morbid obesity.         Attending Attestation:   Differential diagnosis and plan of care discussed with patient after the evaluation  35 Minutes spent on total encounter of which more than fifty percent of the encounter was spent counseling and/or coordinating care by the attending physician.    Mele Bolanos M.D.   Gastroenterology and Hepatology  Cell: [phone].

## 2021-02-06 NOTE — PROGRESS NOTE ADULT - ASSESSMENT
46 F w respiratory failure due to COVID 19     Problem/Recommendation - 1:  Problem: Acute respiratory failure with hypoxia. Recommendation: s/p tracheostomy and PEG. No signs of post proceure issues at this time  -c/w G tube feeds  - PPI per team  - RCU care   - ativan for agitation      Problem/Recommendation - 2:  ·  Problem: COVID-19.  Recommendation: status post dexamethasone.      Problem/Recommendation - 3:  ·  Problem: Normocytic anemia.  Recommendation: without overt GI bleeding. Likely due to critical illness. Trended down today  -monitor for GI bleed  -PPI QD.      Problem/Recommendation - 4:  ·  Problem: Abnormal LFTs.  Recommendation: multifactorial, likely NAFLD, COVID, critical illness. Can consider US to r/o gallstone disease, especially if increasing.   defer to GI      Problem/Recommendation - 5:  ·  Problem: Morbid obesity.      Problem/Recommendation - 6:  Problem: Constipation. Recommendation: consider XR abdomen to assess for ileus  on a bowel regimen.

## 2021-02-06 NOTE — PROGRESS NOTE ADULT - SUBJECTIVE AND OBJECTIVE BOX
CHIEF COMPLAINT: Patient is a 46y old Female who presents with a chief complaint of COVID.    Interval Events:    REVIEW OF SYSTEMS:  [ ] All other systems negative  [ ] Unable to assess ROS because ________    OBJECTIVE:  ICU Vital Signs Last 24 Hrs  T(C): 37.2 (06 Feb 2021 04:40), Max: 37.8 (06 Feb 2021 03:18)  T(F): 98.9 (06 Feb 2021 04:40), Max: 100 (06 Feb 2021 03:18)  HR: 107 (06 Feb 2021 06:52) (81 - 145)  BP: 113/72 (06 Feb 2021 04:40) (102/56 - 124/66)  RR: 24 (06 Feb 2021 04:40) (20 - 36)  SpO2: 99% (06 Feb 2021 06:52) (96% - 103%)    Mode: AC/ CMV (Assist Control/ Continuous Mandatory Ventilation), RR (machine): 16, TV (machine): 300, FiO2: 40, PEEP: 5, MAP: 8, PIP: 15    02-05 @ 07:01  -  02-06 @ 07:00  --------------------------------------------------------  IN: 2340 mL / OUT: 1500 mL / NET: 840 mL      CAPILLARY BLOOD GLUCOSE      POCT Blood Glucose.: 155 mg/dL (06 Feb 2021 04:54)      HOSPITAL MEDICATIONS:  MEDICATIONS  (STANDING):  acetaminophen    Suspension .. 650 milliGRAM(s) Oral every 6 hours  BACItracin   Ointment 1 Application(s) Topical two times a day  chlorhexidine 2% Cloths 1 Application(s) Topical <User Schedule>  clonazePAM  Tablet 1 milliGRAM(s) Oral <User Schedule>  cloNIDine 0.1 milliGRAM(s) Oral every 8 hours  dextrose 5%. 1000 milliLiter(s) (50 mL/Hr) IV Continuous <Continuous>  dextrose 5%. 1000 milliLiter(s) (100 mL/Hr) IV Continuous <Continuous>  enoxaparin Injectable 40 milliGRAM(s) SubCutaneous every 12 hours  glucagon  Injectable 1 milliGRAM(s) IntraMuscular once  insulin lispro (ADMELOG) corrective regimen sliding scale   SubCutaneous every 6 hours  multivitamin/minerals/iron Oral Solution (CENTRUM) 15 milliLiter(s) Oral daily  oxyCODONE    IR 5 milliGRAM(s) Oral every 12 hours  petrolatum Ophthalmic Ointment 1 Application(s) Both EYES two times a day  polyethylene glycol 3350 17 Gram(s) Oral daily  senna Syrup 10 milliLiter(s) Oral daily  vancomycin  IVPB 1250 milliGRAM(s) IV Intermittent every 12 hours    MEDICATIONS  (PRN):  ALBUTerol    90 MICROgram(s) HFA Inhaler 2 Puff(s) Inhalation every 6 hours PRN Wheezing      LABS:                         MICROBIOLOGY:     RADIOLOGY:  [ ] Reviewed and interpreted by me    PULMONARY FUNCTION TESTS:    EKG: CHIEF COMPLAINT: Patient is a 46y old Female who presents with a chief complaint of COVID.    Interval Events: Episode of agitation overnight requiring ativan 2mg IVP. Seroquel recently decreased.   Follow up acute hypoxic respiratory failure 2/2 COVID, Delirium/agitation, and enterobacter pneumonia     REVIEW OF SYSTEMS:  [x] Unable to assess ROS because patient is AMS.    OBJECTIVE:  ICU Vital Signs Last 24 Hrs  T(C): 37.2 (06 Feb 2021 04:40), Max: 37.8 (06 Feb 2021 03:18)  T(F): 98.9 (06 Feb 2021 04:40), Max: 100 (06 Feb 2021 03:18)  HR: 107 (06 Feb 2021 06:52) (81 - 145)  BP: 113/72 (06 Feb 2021 04:40) (102/56 - 124/66)  RR: 24 (06 Feb 2021 04:40) (20 - 36)  SpO2: 99% (06 Feb 2021 06:52) (96% - 103%)    Mode: AC/ CMV (Assist Control/ Continuous Mandatory Ventilation), RR (machine): 16, TV (machine): 300, FiO2: 40, PEEP: 5, MAP: 8, PIP: 15    02-05 @ 07:01  -  02-06 @ 07:00  --------------------------------------------------------  IN: 2340 mL / OUT: 1500 mL / NET: 840 mL      CAPILLARY BLOOD GLUCOSE      POCT Blood Glucose.: 155 mg/dL (06 Feb 2021 04:54)      HOSPITAL MEDICATIONS:  MEDICATIONS  (STANDING):  acetaminophen    Suspension .. 650 milliGRAM(s) Oral every 6 hours  BACItracin   Ointment 1 Application(s) Topical two times a day  chlorhexidine 2% Cloths 1 Application(s) Topical <User Schedule>  clonazePAM  Tablet 1 milliGRAM(s) Oral <User Schedule>  cloNIDine 0.1 milliGRAM(s) Oral every 8 hours  dextrose 5%. 1000 milliLiter(s) (50 mL/Hr) IV Continuous <Continuous>  dextrose 5%. 1000 milliLiter(s) (100 mL/Hr) IV Continuous <Continuous>  enoxaparin Injectable 40 milliGRAM(s) SubCutaneous every 12 hours  glucagon  Injectable 1 milliGRAM(s) IntraMuscular once  insulin lispro (ADMELOG) corrective regimen sliding scale   SubCutaneous every 6 hours  multivitamin/minerals/iron Oral Solution (CENTRUM) 15 milliLiter(s) Oral daily  oxyCODONE    IR 5 milliGRAM(s) Oral every 12 hours  petrolatum Ophthalmic Ointment 1 Application(s) Both EYES two times a day  polyethylene glycol 3350 17 Gram(s) Oral daily  senna Syrup 10 milliLiter(s) Oral daily  vancomycin  IVPB 1250 milliGRAM(s) IV Intermittent every 12 hours    MEDICATIONS  (PRN):  ALBUTerol    90 MICROgram(s) HFA Inhaler 2 Puff(s) Inhalation every 6 hours PRN Wheezing      LABS:                  MICROBIOLOGY:     RADIOLOGY:  [ ] Reviewed and interpreted by me    PULMONARY FUNCTION TESTS:    EKG:

## 2021-02-06 NOTE — PROGRESS NOTE ADULT - SUBJECTIVE AND OBJECTIVE BOX
DATE OF SERVICE: 02-06-21 @ 16:48    Subjective: Patient seen and examined. No new events except as noted.   awake, agitated       MEDICATIONS:  MEDICATIONS  (STANDING):  acetaminophen    Suspension .. 650 milliGRAM(s) Oral every 6 hours  BACItracin   Ointment 1 Application(s) Topical two times a day  chlorhexidine 2% Cloths 1 Application(s) Topical <User Schedule>  clonazePAM  Tablet 1 milliGRAM(s) Oral <User Schedule>  cloNIDine 0.1 milliGRAM(s) Oral every 8 hours  dextrose 5%. 1000 milliLiter(s) (50 mL/Hr) IV Continuous <Continuous>  dextrose 5%. 1000 milliLiter(s) (100 mL/Hr) IV Continuous <Continuous>  enoxaparin Injectable 40 milliGRAM(s) SubCutaneous every 12 hours  glucagon  Injectable 1 milliGRAM(s) IntraMuscular once  insulin lispro (ADMELOG) corrective regimen sliding scale   SubCutaneous every 6 hours  multivitamin/minerals/iron Oral Solution (CENTRUM) 15 milliLiter(s) Oral daily  oxyCODONE    IR 5 milliGRAM(s) Oral every 12 hours  petrolatum Ophthalmic Ointment 1 Application(s) Both EYES two times a day  piperacillin/tazobactam IVPB.. 3.375 Gram(s) IV Intermittent every 8 hours  polyethylene glycol 3350 17 Gram(s) Oral daily  QUEtiapine 25 milliGRAM(s) Oral daily  QUEtiapine 75 milliGRAM(s) Oral at bedtime  senna Syrup 10 milliLiter(s) Oral daily      PHYSICAL EXAM:  T(C): 36.7 (02-06-21 @ 12:28), Max: 37.8 (02-06-21 @ 03:18)  HR: 101 (02-06-21 @ 14:50) (89 - 145)  BP: 123/85 (02-06-21 @ 12:28) (102/56 - 124/66)  RR: 22 (02-06-21 @ 12:28) (20 - 36)  SpO2: 98% (02-06-21 @ 14:50) (97% - 100%)  Wt(kg): --  I&O's Summary    05 Feb 2021 07:01  -  06 Feb 2021 07:00  --------------------------------------------------------  IN: 2340 mL / OUT: 1500 mL / NET: 840 mL          Appearance: Normal	  HEENT:  trache   Lymphatic: No lymphadenopathy   Cardiovascular: Normal S1 S2, no JVD  Respiratory: normal effort , clear  Gastrointestinal:  PEG  Skin: No rashes,  warm to touch  Psychiatry:  Mood & affect appropriate  Musculuskeletal: No edema      All labs, Imaging and EKGs personally reviewed                           8.6    10.44 )-----------( 605      ( 05 Feb 2021 04:22 )             28.3               02-05    138  |  101  |  11  ----------------------------<  137<H>  4.3   |  23  |  0.63    Ca    10.3      05 Feb 2021 04:22  Phos  4.6     02-05  Mg     2.2     02-05

## 2021-02-06 NOTE — PROGRESS NOTE ADULT - ATTENDING COMMENTS
Acute hypoxemic respiratory failure in setting of COVID pneumonia s/p trach 1/28.  MRSA and Enterobacter pneumonia on Vanc until 6th and Zosyn until 8th.   Taper Seroquel. Klonopin added for anxiety.  Tolerating PS trials. Trach collar when mor awake.  OOB/PT. as above-  Acute hypoxemic respiratory failure in setting of COVID pneumonia s/p trach 1/28.  Resp-on vent A/C; PS trial and TC once MS improves  ID--MRSA and Enterobacter pneumonia on Vanc until Feb. 6th and Zosyn until 8th.   Neuro--re-dosing of Seroquel. Klonopin added for anxiety. Improved  GI-TF in place (peg 1/28)    DM-BS control    DVT prophylaxis-lovenox rx  OOB/PT-as able  Chapito Santoro MD-Pulmonary   [phone]

## 2021-02-07 LAB
ANION GAP SERPL CALC-SCNC: 11 MMOL/L — SIGNIFICANT CHANGE UP (ref 7–14)
BUN SERPL-MCNC: 13 MG/DL — SIGNIFICANT CHANGE UP (ref 7–23)
CALCIUM SERPL-MCNC: 9.9 MG/DL — SIGNIFICANT CHANGE UP (ref 8.4–10.5)
CHLORIDE SERPL-SCNC: 96 MMOL/L — LOW (ref 98–107)
CO2 SERPL-SCNC: 28 MMOL/L — SIGNIFICANT CHANGE UP (ref 22–31)
CREAT SERPL-MCNC: 0.6 MG/DL — SIGNIFICANT CHANGE UP (ref 0.5–1.3)
GLUCOSE SERPL-MCNC: 134 MG/DL — HIGH (ref 70–99)
HCT VFR BLD CALC: 30.1 % — LOW (ref 34.5–45)
HGB BLD-MCNC: 9.3 G/DL — LOW (ref 11.5–15.5)
MAGNESIUM SERPL-MCNC: 2.1 MG/DL — SIGNIFICANT CHANGE UP (ref 1.6–2.6)
MCHC RBC-ENTMCNC: 27.6 PG — SIGNIFICANT CHANGE UP (ref 27–34)
MCHC RBC-ENTMCNC: 30.9 GM/DL — LOW (ref 32–36)
MCV RBC AUTO: 89.3 FL — SIGNIFICANT CHANGE UP (ref 80–100)
NRBC # BLD: 0 /100 WBCS — SIGNIFICANT CHANGE UP
NRBC # FLD: 0 K/UL — SIGNIFICANT CHANGE UP
PHOSPHATE SERPL-MCNC: 4.2 MG/DL — SIGNIFICANT CHANGE UP (ref 2.5–4.5)
PLATELET # BLD AUTO: 605 K/UL — HIGH (ref 150–400)
POTASSIUM SERPL-MCNC: 4.2 MMOL/L — SIGNIFICANT CHANGE UP (ref 3.5–5.3)
POTASSIUM SERPL-SCNC: 4.2 MMOL/L — SIGNIFICANT CHANGE UP (ref 3.5–5.3)
RBC # BLD: 3.37 M/UL — LOW (ref 3.8–5.2)
RBC # FLD: 17.3 % — HIGH (ref 10.3–14.5)
SODIUM SERPL-SCNC: 135 MMOL/L — SIGNIFICANT CHANGE UP (ref 135–145)
WBC # BLD: 13.39 K/UL — HIGH (ref 3.8–10.5)
WBC # FLD AUTO: 13.39 K/UL — HIGH (ref 3.8–10.5)

## 2021-02-07 PROCEDURE — 99232 SBSQ HOSP IP/OBS MODERATE 35: CPT

## 2021-02-07 RX ORDER — POLYETHYLENE GLYCOL 3350 17 G/17G
17 POWDER, FOR SOLUTION ORAL
Refills: 0 | Status: DISCONTINUED | OUTPATIENT
Start: 2021-02-07 | End: 2021-02-10

## 2021-02-07 RX ORDER — QUETIAPINE FUMARATE 200 MG/1
50 TABLET, FILM COATED ORAL AT BEDTIME
Refills: 0 | Status: DISCONTINUED | OUTPATIENT
Start: 2021-02-07 | End: 2021-02-24

## 2021-02-07 RX ORDER — QUETIAPINE FUMARATE 200 MG/1
12.5 TABLET, FILM COATED ORAL DAILY
Refills: 0 | Status: DISCONTINUED | OUTPATIENT
Start: 2021-02-08 | End: 2021-02-26

## 2021-02-07 RX ADMIN — CHLORHEXIDINE GLUCONATE 1 APPLICATION(S): 213 SOLUTION TOPICAL at 05:45

## 2021-02-07 RX ADMIN — Medication 1: at 11:30

## 2021-02-07 RX ADMIN — Medication 0.1 MILLIGRAM(S): at 05:37

## 2021-02-07 RX ADMIN — OXYCODONE HYDROCHLORIDE 5 MILLIGRAM(S): 5 TABLET ORAL at 17:06

## 2021-02-07 RX ADMIN — Medication 650 MILLIGRAM(S): at 22:44

## 2021-02-07 RX ADMIN — Medication 0.1 MILLIGRAM(S): at 22:39

## 2021-02-07 RX ADMIN — Medication 1 MILLIGRAM(S): at 17:06

## 2021-02-07 RX ADMIN — Medication 1 APPLICATION(S): at 05:36

## 2021-02-07 RX ADMIN — POLYETHYLENE GLYCOL 3350 17 GRAM(S): 17 POWDER, FOR SOLUTION ORAL at 17:07

## 2021-02-07 RX ADMIN — PIPERACILLIN AND TAZOBACTAM 25 GRAM(S): 4; .5 INJECTION, POWDER, LYOPHILIZED, FOR SOLUTION INTRAVENOUS at 17:05

## 2021-02-07 RX ADMIN — Medication 1: at 05:44

## 2021-02-07 RX ADMIN — ENOXAPARIN SODIUM 40 MILLIGRAM(S): 100 INJECTION SUBCUTANEOUS at 05:37

## 2021-02-07 RX ADMIN — PIPERACILLIN AND TAZOBACTAM 25 GRAM(S): 4; .5 INJECTION, POWDER, LYOPHILIZED, FOR SOLUTION INTRAVENOUS at 09:07

## 2021-02-07 RX ADMIN — Medication 0.1 MILLIGRAM(S): at 13:06

## 2021-02-07 RX ADMIN — QUETIAPINE FUMARATE 25 MILLIGRAM(S): 200 TABLET, FILM COATED ORAL at 11:30

## 2021-02-07 RX ADMIN — Medication 1 APPLICATION(S): at 17:07

## 2021-02-07 RX ADMIN — Medication 10 MILLIGRAM(S): at 22:39

## 2021-02-07 RX ADMIN — Medication 650 MILLIGRAM(S): at 11:30

## 2021-02-07 RX ADMIN — Medication 1 APPLICATION(S): at 17:05

## 2021-02-07 RX ADMIN — Medication 1 APPLICATION(S): at 05:37

## 2021-02-07 RX ADMIN — OXYCODONE HYDROCHLORIDE 5 MILLIGRAM(S): 5 TABLET ORAL at 05:36

## 2021-02-07 RX ADMIN — Medication 1 MILLIGRAM(S): at 05:36

## 2021-02-07 RX ADMIN — POLYETHYLENE GLYCOL 3350 17 GRAM(S): 17 POWDER, FOR SOLUTION ORAL at 11:30

## 2021-02-07 RX ADMIN — Medication 1: at 17:21

## 2021-02-07 RX ADMIN — SENNA PLUS 10 MILLILITER(S): 8.6 TABLET ORAL at 22:39

## 2021-02-07 RX ADMIN — Medication 15 MILLILITER(S): at 11:29

## 2021-02-07 RX ADMIN — Medication 650 MILLIGRAM(S): at 17:05

## 2021-02-07 RX ADMIN — QUETIAPINE FUMARATE 50 MILLIGRAM(S): 200 TABLET, FILM COATED ORAL at 22:39

## 2021-02-07 RX ADMIN — ENOXAPARIN SODIUM 40 MILLIGRAM(S): 100 INJECTION SUBCUTANEOUS at 17:06

## 2021-02-07 RX ADMIN — Medication 650 MILLIGRAM(S): at 05:37

## 2021-02-07 NOTE — PROGRESS NOTE ADULT - ATTENDING COMMENTS
as above-  Acute hypoxemic respiratory failure in setting of COVID pneumonia s/p trach 1/28.  Resp-on vent A/C; PS trial and TC once MS improves  ID--MRSA and Enterobacter pneumonia on Vanc until Feb. 6th and Zosyn until 8th.   Neuro--re-dosing of Seroquel. Klonopin added for anxiety. Improved  GI-TF in place (peg 1/28)    DM-BS control    DVT prophylaxis-lovenox rx  OOB/PT-as able  Chapito Santoro MD-Pulmonary   [phone] as above-no changes o/n  Acute hypoxemic respiratory failure in setting of COVID pneumonia s/p trach 1/28.  Resp-on vent A/C; PS trial and TC once MS improves  ID--MRSA and Enterobacter pneumonia on Vanc until Feb. 6th and Zosyn until 8th.   Neuro--re-dosing of Seroquel. Klonopin added for anxiety. Improved  GI-TF in place (peg 1/28)    DM-BS control    DVT prophylaxis-lovenox rx  OOB/PT-as able  Chapito Santoro MD-Pulmonary   [phone] as above-no changes o/n  Acute hypoxemic respiratory failure in setting of COVID pneumonia s/p trach 1/28.  Resp-on vent A/C; PS trial and TC once MS improves  ID--MRSA and Enterobacter pneumonia on Vanc until Feb. 6th and Zosyn until 8th.   Neuro--re-dosing of Seroqul lucia progress. Klonopin added for anxiety. Improved  GI-TF in place (peg 1/28)    DM-BS control    DVT prophylaxis-lovenox rx  OOB/PT-as able  Chapito Santoro MD-Pulmonary   [phone]

## 2021-02-07 NOTE — PROGRESS NOTE ADULT - ASSESSMENT
46y old  Female w/no significant PMH admitted for COVID ARDS, initially intubated 1/5-1/9, required re-intubation 1/16 and now s/p trach and PEG POD #9 (2/3) who has been stable on CPAP/PS for two days now and improving. Course also notable for UTI s/p CTX 1/25-27 and MRSA and enterobacter aerogenes pneumonia on vanco and zosyn. She has been anxious, awake, intermittently tachycardic and tachypneic and is likely combination of ICU delirium and precedex withdrawal. She is stable to go to RCU.     #AMS   - continue clonidine 0.1mg q8h for precedex withdrawal   - Tylenol q6 standing   - oxy 5mg q6 standing   - s/p Dilaudid 1mg q3h PRN (opiates likely contributing to ICU delirium)  -increase seroquel to 100mg qAM, 150mg qHS (from 50mg qAM, 100mg qHS)  -precedex off (since 2/1 7am)  -s/p valium.  - Restarted seroquel as patient very anxious overnight 2/5 requiring ativan IVP   - Wean off seroquel and klonopin as tolerated    #HTN  - off levophed since 2/1.     #AHRF sec to COVID   -CPAP/PS 8/5, 30%  - prior vent settings VC/AC , RR 26, PEEP 8  -alb q6h PRN    #Urinary Retention   - Pt with large residuals >500 when str cath   - Most likely will need burdick replaced   - Failed TOV - give trial again     #Dysphagia   - PEG placed 1/28  -continue tube feeds via PEG Peptamen 1.5 45cc/h continuous per nutrition   - free water 250cc q8h   -miralax qD, senna qD    #PNA   febrile 1/30, sputum +enterobacter, +MRSA   - c/w vanco 1250mg q12 (1/30-2/6) , to complete tentatively 7 day course  - c/w zosyn (1/30- 2/8), to complete tentatively 10 day course   - Last bcx 1/28 NGTD.   - MRSA+ mouth swab  - HSV1 from 1/27 mouth swab - no new lesions noted     #Anemia  - No signs of GIB likely 2/2 prolonged hospital course and blood draws  - Trend hgb   - Keep hgb>7.0    #DM  -insulin sliding scale    Elevated D-dimer:   - negative b/l LE dopplers 1/20 despite rising d-dimer    DVT ppx   lovenox 40mg q12h prophylactic dosing  ***************  2/6-RCU transfer    46y old  Female w/no significant PMH admitted for COVID ARDS, initially intubated 1/5-1/9, required re-intubation 1/16 and now s/p trach and PEG POD #9 (2/3) who has been stable on CPAP/PS for two days now and improving. Course also notable for UTI s/p CTX 1/25-27 and MRSA and enterobacter aerogenes pneumonia on vanco and zosyn. She has been anxious, awake, intermittently tachycardic and tachypneic and is likely combination of ICU delirium and precedex withdrawal. She is stable to go to RCU.     #AMS   - continue clonidine 0.1mg q8h for precedex withdrawal   - Tylenol q6 standing   - oxy 5mg q6 standing   - s/p Dilaudid 1mg q3h PRN (opiates likely contributing to ICU delirium)  -increase seroquel to 100mg qAM, 150mg qHS (from 50mg qAM, 100mg qHS)  -precedex off (since 2/1 7am)  -s/p valium.  - Restarted seroquel as patient very anxious overnight 2/5 requiring ativan IVP   - Wean off seroquel and klonopin as tolerated    #HTN  - off levophed since 2/1.     #AHRF sec to COVID   -CPAP/PS 8/5, 30%  - prior vent settings VC/AC , RR 26, PEEP 8  -alb q6h PRN    #Urinary Retention   - Pt with large residuals >500 when str cath   - Most likely will need burdick replaced   - Failed TOV - give trial again     #Dysphagia   - PEG placed 1/28  -continue tube feeds via PEG Peptamen 1.5 45cc/h continuous per nutrition   - free water 250cc q8h   -miralax qD, senna qD    #PNA   febrile 1/30, sputum +enterobacter, +MRSA   - c/w vanco 1250mg q12 (1/30-2/6) , to complete tentatively 7 day course  - c/w zosyn (1/30- 2/8), to complete tentatively 10 day course   - Last bcx 1/28 NGTD.   - MRSA+ mouth swab  - HSV1 from 1/27 mouth swab - no new lesions noted     #Anemia  - No signs of GIB likely 2/2 prolonged hospital course and blood draws  - Trend hgb   - Keep hgb>7.0    #DM  -insulin sliding scale    Elevated D-dimer:   - negative b/l LE dopplers 1/20 despite rising d-dimer    DVT ppx   lovenox 40mg q12h prophylactic dosing  ***************  2/6-RCU transfer   2/7-stable o/n   46y old  Female w/no significant PMH admitted for COVID ARDS, initially intubated 1/5-1/9, required re-intubation 1/16 and now s/p trach and PEG POD #9 (2/3) who has been stable on CPAP/PS for two days now and improving. Course also notable for UTI s/p CTX 1/25-27 and MRSA and enterobacter aerogenes pneumonia on vanco and zosyn. She has been anxious, awake, intermittently tachycardic and tachypneic and is likely combination of ICU delirium and precedex withdrawal. She is stable to go to RCU.     #AMS   - continue clonidine 0.1mg q8h for precedex withdrawal   - Tylenol q6 standing   - oxy 5mg q6 standing   - s/p Dilaudid 1mg q3h PRN (opiates likely contributing to ICU delirium)  -increase seroquel to 100mg qAM, 150mg qHS (from 50mg qAM, 100mg qHS)  -precedex off (since 2/1 7am)  -s/p valium.  - 2/7 Decreased seroquel from 75 to 50 qhs and 25 to 12.5 qam.    - Wean off seroquel and klonopin as tolerated.     #HTN  - off levophed since 2/1.     #AHRF sec to COVID   - CPAP/PS 8/5, 30%  - prior vent settings VC/AC , RR 26, PEEP 8  - alb q6h PRN    #Urinary Retention   - Pt with large residuals >500 when str cath   - Most likely will need burdick replaced   - Failed TOV - give trial again     #Dysphagia   - PEG placed 1/28  - continue tube feeds via PEG Peptamen 1.5 45cc/h continuous per nutrition   - free water 250cc q8h   - miralax switched to BID, senna qD; hasn't had BM in 2-3 days.     #PNA   febrile 1/30, sputum +enterobacter, +MRSA   - c/w vanco 1250mg q12 (1/30-2/6) , to complete tentatively 7 day course  - c/w zosyn (1/30- 2/8), to complete tentatively 10 day course   - Last bcx 1/28 NGTD.   - MRSA+ mouth swab  - HSV1 from 1/27 mouth swab - no new lesions noted     #Anemia  - No signs of GIB likely 2/2 prolonged hospital course and blood draws  - Trend hgb   - Keep hgb>7.0    #DM  -insulin sliding scale    Elevated D-dimer:   - negative b/l LE dopplers 1/20 despite rising d-dimer    DVT ppx   lovenox 40mg q12h prophylactic dosing  ***************  2/6-RCU transfer   2/7-stable o/n   46y old  Female w/no significant PMH admitted for COVID ARDS, initially intubated 1/5-1/9, required re-intubation 1/16 and now s/p trach and PEG POD #9 (2/3) who has been stable on CPAP/PS for two days now and improving. Course also notable for UTI s/p CTX 1/25-27 and MRSA and enterobacter aerogenes pneumonia on vanco and zosyn. She has been anxious, awake, intermittently tachycardic and tachypneic and is likely combination of ICU delirium and precedex withdrawal. She is stable to go to RCU.     #AMS   - continue clonidine 0.1mg q8h for precedex withdrawal   - Tylenol q6 standing   - oxy 5mg q6 standing   - s/p Dilaudid 1mg q3h PRN (opiates likely contributing to ICU delirium)  -increase seroquel to 100mg qAM, 150mg qHS (from 50mg qAM, 100mg qHS)  -precedex off (since 2/1 7am)  -s/p valium.  - 2/7 Decreased seroquel from 75 to 50 qhs and 25 to 12.5 qam.    - Wean off seroquel and klonopin as tolerated.     #HTN  - off levophed since 2/1.     #AHRF sec to COVID   - CPAP/PS 8/5, 30%  - prior vent settings VC/AC , RR 26, PEEP 8  - alb q6h PRN    #Urinary Retention   - Pt with large residuals >500 when str cath   - Most likely will need burdick replaced   - Failed TOV - give trial again     #Dysphagia   - PEG placed 1/28  - continue tube feeds via PEG Peptamen 1.5 45cc/h continuous per nutrition   - free water 250cc q8h   - miralax switched to BID, senna qD, and dulcolax supp added 2/7; hasn't had BM in 2-3 days.     #PNA   febrile 1/30, sputum +enterobacter, +MRSA   - c/w vanco 1250mg q12 (1/30-2/6) , to complete tentatively 7 day course  - c/w zosyn (1/30- 2/8), to complete tentatively 10 day course   - Last bcx 1/28 NGTD.   - MRSA+ mouth swab  - HSV1 from 1/27 mouth swab - no new lesions noted     #Anemia  - No signs of GIB likely 2/2 prolonged hospital course and blood draws  - Trend hgb   - Keep hgb>7.0    #DM  -insulin sliding scale    Elevated D-dimer:   - negative b/l LE dopplers 1/20 despite rising d-dimer    DVT ppx   lovenox 40mg q12h prophylactic dosing  ***************  2/6-RCU transfer   2/7-stable o/n

## 2021-02-07 NOTE — PROGRESS NOTE ADULT - ASSESSMENT
46 F w respiratory failure due to COVID 19     Problem/Recommendation - 1:  Problem: Acute respiratory failure with hypoxia. Recommendation: s/p tracheostomy and PEG. No signs of post proceure issues at this time  -c/w G tube feeds  - PPI per team  - RCU care   - ativan for agitation PRN   Seroquel and Klonopin   TOV as tolerated      Problem/Recommendation - 2:  ·  Problem: COVID-19.  Recommendation: status post dexamethasone.      Problem/Recommendation - 3:  ·  Problem: Normocytic anemia.  Recommendation: without overt GI bleeding. Likely due to critical illness.   -monitor for GI bleed  -PPI QD.      Problem/Recommendation - 4:  ·  Problem: Abnormal LFTs.  Recommendation: multifactorial, likely NAFLD, COVID, critical illness. Can consider US to r/o gallstone disease, especially if increasing.   defer to GI      Problem/Recommendation - 5:  ·  Problem: Morbid obesity.      Problem/Recommendation - 6:  Problem: Constipation. Recommendation: consider XR abdomen to assess for ileus  on a bowel regimen.

## 2021-02-07 NOTE — PROGRESS NOTE ADULT - ASSESSMENT
46 F w respiratory failure due to COVID 19     Problem/Recommendation - 1:  Problem: Acute respiratory failure with hypoxia. Recommendation: s/p tracheostomy and PEG. No signs of post proceure issues at this time  -c/w G tube feeds       Problem/Recommendation - 2:  ·  Problem: COVID-19.  Recommendation: status post dexamethasone.   on abx per ID for staph in sputum     Problem/Recommendation - 3:  ·  Problem: Normocytic anemia.  Recommendation: without overt GI bleeding. Likely due to critical illness. Trended down today  -monitor for GI bleed  -PPI QD.      Problem/Recommendation - 4:  ·  Problem: Abnormal LFTs.  Recommendation: multifactorial, likely NAFLD, COVID, critical illness. Can consider US to r/o gallstone disease, especially if increasing.      Problem/Recommendation - 5:  ·  Problem: Morbid obesity.         Attending Attestation:   Differential diagnosis and plan of care discussed with patient after the evaluation  35 Minutes spent on total encounter of which more than fifty percent of the encounter was spent counseling and/or coordinating care by the attending physician.    Mele Bolanos M.D.   Gastroenterology and Hepatology  Cell: [phone].

## 2021-02-07 NOTE — PROGRESS NOTE ADULT - SUBJECTIVE AND OBJECTIVE BOX
CHIEF COMPLAINT: Patient is a 46y old Female who presents with a chief complaint of COVID.  Follow up acute hypoxic respiratory failure 2/2 COVID, Delirium/agitation, and enterobacter pneumonia    Interval Events:    REVIEW OF SYSTEMS:  [ ] All other systems negative  [ ] Unable to assess ROS because ________    OBJECTIVE:  ICU Vital Signs Last 24 Hrs  T(C): 37.1 (07 Feb 2021 05:34), Max: 37.1 (07 Feb 2021 05:34)  T(F): 98.8 (07 Feb 2021 05:34), Max: 98.8 (07 Feb 2021 05:34)  HR: 99 (07 Feb 2021 07:04) (99 - 119)  BP: 131/82 (07 Feb 2021 05:34) (121/75 - 131/82)  RR: 24 (07 Feb 2021 05:34) (22 - 24)  SpO2: 99% (07 Feb 2021 07:04) (98% - 100%)    Mode: AC/ CMV (Assist Control/ Continuous Mandatory Ventilation), RR (machine): 16, TV (machine): 300, FiO2: 40, PEEP: 5, MAP: 9, PIP: 21    02-06 @ 07:01  -  02-07 @ 07:00  --------------------------------------------------------  IN: 1180 mL / OUT: 2600 mL / NET: -1420 mL      CAPILLARY BLOOD GLUCOSE      POCT Blood Glucose.: 165 mg/dL (07 Feb 2021 05:41)      HOSPITAL MEDICATIONS:  MEDICATIONS  (STANDING):  acetaminophen    Suspension .. 650 milliGRAM(s) Oral every 6 hours  BACItracin   Ointment 1 Application(s) Topical two times a day  chlorhexidine 2% Cloths 1 Application(s) Topical <User Schedule>  clonazePAM  Tablet 1 milliGRAM(s) Oral <User Schedule>  cloNIDine 0.1 milliGRAM(s) Oral every 8 hours  dextrose 5%. 1000 milliLiter(s) (50 mL/Hr) IV Continuous <Continuous>  dextrose 5%. 1000 milliLiter(s) (100 mL/Hr) IV Continuous <Continuous>  enoxaparin Injectable 40 milliGRAM(s) SubCutaneous every 12 hours  glucagon  Injectable 1 milliGRAM(s) IntraMuscular once  insulin lispro (ADMELOG) corrective regimen sliding scale   SubCutaneous every 6 hours  multivitamin/minerals/iron Oral Solution (CENTRUM) 15 milliLiter(s) Oral daily  oxyCODONE    IR 5 milliGRAM(s) Oral every 12 hours  petrolatum Ophthalmic Ointment 1 Application(s) Both EYES two times a day  piperacillin/tazobactam IVPB.. 3.375 Gram(s) IV Intermittent every 8 hours  polyethylene glycol 3350 17 Gram(s) Oral daily  QUEtiapine 25 milliGRAM(s) Oral daily  QUEtiapine 75 milliGRAM(s) Oral at bedtime  senna Syrup 10 milliLiter(s) Oral daily    MEDICATIONS  (PRN):  ALBUTerol    90 MICROgram(s) HFA Inhaler 2 Puff(s) Inhalation every 6 hours PRN Wheezing      LABS:            MICROBIOLOGY:     RADIOLOGY:  [ ] Reviewed and interpreted by me    PULMONARY FUNCTION TESTS:    EKG: CHIEF COMPLAINT: Patient is a 46y old Female who presents with a chief complaint of COVID.  Follow up acute hypoxic respiratory failure 2/2 COVID, Delirium/agitation, and enterobacter pneumonia    Interval Events: No agitation noted overnight. Will re-dose seroquel at lower dose.     REVIEW OF SYSTEMS:   [x] Unable to assess ROS because patient is AMS    OBJECTIVE:  ICU Vital Signs Last 24 Hrs  T(C): 37.1 (07 Feb 2021 05:34), Max: 37.1 (07 Feb 2021 05:34)  T(F): 98.8 (07 Feb 2021 05:34), Max: 98.8 (07 Feb 2021 05:34)  HR: 99 (07 Feb 2021 07:04) (99 - 119)  BP: 131/82 (07 Feb 2021 05:34) (121/75 - 131/82)  RR: 24 (07 Feb 2021 05:34) (22 - 24)  SpO2: 99% (07 Feb 2021 07:04) (98% - 100%)    Mode: AC/ CMV (Assist Control/ Continuous Mandatory Ventilation), RR (machine): 16, TV (machine): 300, FiO2: 40, PEEP: 5, MAP: 9, PIP: 21    02-06 @ 07:01  -  02-07 @ 07:00  --------------------------------------------------------  IN: 1180 mL / OUT: 2600 mL / NET: -1420 mL      CAPILLARY BLOOD GLUCOSE      POCT Blood Glucose.: 165 mg/dL (07 Feb 2021 05:41)      HOSPITAL MEDICATIONS:  MEDICATIONS  (STANDING):  acetaminophen    Suspension .. 650 milliGRAM(s) Oral every 6 hours  BACItracin   Ointment 1 Application(s) Topical two times a day  chlorhexidine 2% Cloths 1 Application(s) Topical <User Schedule>  clonazePAM  Tablet 1 milliGRAM(s) Oral <User Schedule>  cloNIDine 0.1 milliGRAM(s) Oral every 8 hours  dextrose 5%. 1000 milliLiter(s) (50 mL/Hr) IV Continuous <Continuous>  dextrose 5%. 1000 milliLiter(s) (100 mL/Hr) IV Continuous <Continuous>  enoxaparin Injectable 40 milliGRAM(s) SubCutaneous every 12 hours  glucagon  Injectable 1 milliGRAM(s) IntraMuscular once  insulin lispro (ADMELOG) corrective regimen sliding scale   SubCutaneous every 6 hours  multivitamin/minerals/iron Oral Solution (CENTRUM) 15 milliLiter(s) Oral daily  oxyCODONE    IR 5 milliGRAM(s) Oral every 12 hours  petrolatum Ophthalmic Ointment 1 Application(s) Both EYES two times a day  piperacillin/tazobactam IVPB.. 3.375 Gram(s) IV Intermittent every 8 hours  polyethylene glycol 3350 17 Gram(s) Oral daily  QUEtiapine 25 milliGRAM(s) Oral daily  QUEtiapine 75 milliGRAM(s) Oral at bedtime  senna Syrup 10 milliLiter(s) Oral daily    MEDICATIONS  (PRN):  ALBUTerol    90 MICROgram(s) HFA Inhaler 2 Puff(s) Inhalation every 6 hours PRN Wheezing      LABS:                        9.3    13.39 )-----------( 605      ( 07 Feb 2021 07:12 )             30.1     02-07    135  |  96<L>  |  13  ----------------------------<  134<H>  4.2   |  28  |  0.60    Ca    9.9      07 Feb 2021 07:12  Phos  4.2     02-07  Mg     2.1     02-07      MICROBIOLOGY:     RADIOLOGY:  [ ] Reviewed and interpreted by me    PULMONARY FUNCTION TESTS:    EKG:

## 2021-02-07 NOTE — PROGRESS NOTE ADULT - SUBJECTIVE AND OBJECTIVE BOX
DATE OF SERVICE: 02-07-21     Subjective: Patient seen and examined. No new events except as noted.   adjusted psych meds for anxiety       MEDICATIONS:  MEDICATIONS  (STANDING):  acetaminophen    Suspension .. 650 milliGRAM(s) Oral every 6 hours  BACItracin   Ointment 1 Application(s) Topical two times a day  bisacodyl Suppository 10 milliGRAM(s) Rectal at bedtime  chlorhexidine 2% Cloths 1 Application(s) Topical <User Schedule>  clonazePAM  Tablet 1 milliGRAM(s) Oral <User Schedule>  cloNIDine 0.1 milliGRAM(s) Oral every 8 hours  dextrose 5%. 1000 milliLiter(s) (50 mL/Hr) IV Continuous <Continuous>  dextrose 5%. 1000 milliLiter(s) (100 mL/Hr) IV Continuous <Continuous>  enoxaparin Injectable 40 milliGRAM(s) SubCutaneous every 12 hours  glucagon  Injectable 1 milliGRAM(s) IntraMuscular once  insulin lispro (ADMELOG) corrective regimen sliding scale   SubCutaneous every 6 hours  multivitamin/minerals/iron Oral Solution (CENTRUM) 15 milliLiter(s) Oral daily  oxyCODONE    IR 5 milliGRAM(s) Oral every 12 hours  petrolatum Ophthalmic Ointment 1 Application(s) Both EYES two times a day  piperacillin/tazobactam IVPB.. 3.375 Gram(s) IV Intermittent every 8 hours  polyethylene glycol 3350 17 Gram(s) Oral two times a day  QUEtiapine 50 milliGRAM(s) Oral at bedtime  senna Syrup 10 milliLiter(s) Oral daily      PHYSICAL EXAM:  T(C): 36.9 (02-07-21 @ 16:00), Max: 37.1 (02-07-21 @ 05:34)  HR: 103 (02-07-21 @ 20:03) (94 - 117)  BP: 109/72 (02-07-21 @ 16:00) (109/72 - 131/82)  RR: 24 (02-07-21 @ 16:00) (24 - 24)  SpO2: 100% (02-07-21 @ 20:03) (99% - 100%)  Wt(kg): --  I&O's Summary    06 Feb 2021 07:01  -  07 Feb 2021 07:00  --------------------------------------------------------  IN: 2320 mL / OUT: 2600 mL / NET: -280 mL    07 Feb 2021 07:01  -  07 Feb 2021 21:59  --------------------------------------------------------  IN: 500 mL / OUT: 600 mL / NET: -100 mL          Appearance: awake, clam   HEENT:  Trache   Lymphatic: No lymphadenopathy   Cardiovascular: Normal S1 S2  Respiratory: normal effort , clear  Gastrointestinal:  Soft, + PEG   Skin: No rashes,  warm to touch  Psychiatry:  Mood & affect appropriate  Musculuskeletal: edema BL     All labs, Imaging and EKGs personally reviewed                             9.3    13.39 )-----------( 605      ( 07 Feb 2021 07:12 )             30.1               02-07    135  |  96<L>  |  13  ----------------------------<  134<H>  4.2   |  28  |  0.60    Ca    9.9      07 Feb 2021 07:12  Phos  4.2     02-07  Mg     2.1     02-07

## 2021-02-08 ENCOUNTER — TRANSCRIPTION ENCOUNTER (OUTPATIENT)
Age: 46
End: 2021-02-08

## 2021-02-08 LAB
ALBUMIN SERPL ELPH-MCNC: 3.7 G/DL — SIGNIFICANT CHANGE UP (ref 3.3–5)
ALP SERPL-CCNC: 109 U/L — SIGNIFICANT CHANGE UP (ref 40–120)
ALT FLD-CCNC: 24 U/L — SIGNIFICANT CHANGE UP (ref 4–33)
ANION GAP SERPL CALC-SCNC: 11 MMOL/L — SIGNIFICANT CHANGE UP (ref 7–14)
AST SERPL-CCNC: 19 U/L — SIGNIFICANT CHANGE UP (ref 4–32)
BILIRUB SERPL-MCNC: 0.2 MG/DL — SIGNIFICANT CHANGE UP (ref 0.2–1.2)
BUN SERPL-MCNC: 14 MG/DL — SIGNIFICANT CHANGE UP (ref 7–23)
CALCIUM SERPL-MCNC: 9.7 MG/DL — SIGNIFICANT CHANGE UP (ref 8.4–10.5)
CHLORIDE SERPL-SCNC: 96 MMOL/L — LOW (ref 98–107)
CO2 SERPL-SCNC: 28 MMOL/L — SIGNIFICANT CHANGE UP (ref 22–31)
CREAT SERPL-MCNC: 0.58 MG/DL — SIGNIFICANT CHANGE UP (ref 0.5–1.3)
GLUCOSE SERPL-MCNC: 138 MG/DL — HIGH (ref 70–99)
HCT VFR BLD CALC: 29.2 % — LOW (ref 34.5–45)
HGB BLD-MCNC: 8.9 G/DL — LOW (ref 11.5–15.5)
MAGNESIUM SERPL-MCNC: 2.1 MG/DL — SIGNIFICANT CHANGE UP (ref 1.6–2.6)
MCHC RBC-ENTMCNC: 27.2 PG — SIGNIFICANT CHANGE UP (ref 27–34)
MCHC RBC-ENTMCNC: 30.5 GM/DL — LOW (ref 32–36)
MCV RBC AUTO: 89.3 FL — SIGNIFICANT CHANGE UP (ref 80–100)
NRBC # BLD: 0 /100 WBCS — SIGNIFICANT CHANGE UP
NRBC # FLD: 0 K/UL — SIGNIFICANT CHANGE UP
PLATELET # BLD AUTO: 511 K/UL — HIGH (ref 150–400)
POTASSIUM SERPL-MCNC: 4.2 MMOL/L — SIGNIFICANT CHANGE UP (ref 3.5–5.3)
POTASSIUM SERPL-SCNC: 4.2 MMOL/L — SIGNIFICANT CHANGE UP (ref 3.5–5.3)
PROT SERPL-MCNC: 7.5 G/DL — SIGNIFICANT CHANGE UP (ref 6–8.3)
RBC # BLD: 3.27 M/UL — LOW (ref 3.8–5.2)
RBC # FLD: 17.2 % — HIGH (ref 10.3–14.5)
SODIUM SERPL-SCNC: 135 MMOL/L — SIGNIFICANT CHANGE UP (ref 135–145)
WBC # BLD: 9.82 K/UL — SIGNIFICANT CHANGE UP (ref 3.8–10.5)
WBC # FLD AUTO: 9.82 K/UL — SIGNIFICANT CHANGE UP (ref 3.8–10.5)

## 2021-02-08 PROCEDURE — 99223 1ST HOSP IP/OBS HIGH 75: CPT | Mod: GC

## 2021-02-08 RX ORDER — PIPERACILLIN AND TAZOBACTAM 4; .5 G/20ML; G/20ML
3.38 INJECTION, POWDER, LYOPHILIZED, FOR SOLUTION INTRAVENOUS
Refills: 0 | Status: COMPLETED | OUTPATIENT
Start: 2021-02-08 | End: 2021-02-08

## 2021-02-08 RX ORDER — MUPIROCIN 20 MG/G
1 OINTMENT TOPICAL
Refills: 0 | Status: COMPLETED | OUTPATIENT
Start: 2021-02-08 | End: 2021-02-15

## 2021-02-08 RX ORDER — CHLORHEXIDINE GLUCONATE 213 G/1000ML
1 SOLUTION TOPICAL DAILY
Refills: 0 | Status: DISCONTINUED | OUTPATIENT
Start: 2021-02-08 | End: 2021-03-03

## 2021-02-08 RX ORDER — MUPIROCIN 20 MG/G
1 OINTMENT TOPICAL
Refills: 0 | Status: DISCONTINUED | OUTPATIENT
Start: 2021-02-08 | End: 2021-02-08

## 2021-02-08 RX ORDER — ACYCLOVIR SODIUM 500 MG
400 VIAL (EA) INTRAVENOUS THREE TIMES A DAY
Refills: 0 | Status: COMPLETED | OUTPATIENT
Start: 2021-02-08 | End: 2021-02-15

## 2021-02-08 RX ADMIN — OXYCODONE HYDROCHLORIDE 5 MILLIGRAM(S): 5 TABLET ORAL at 05:32

## 2021-02-08 RX ADMIN — Medication 650 MILLIGRAM(S): at 13:03

## 2021-02-08 RX ADMIN — Medication 1 MILLIGRAM(S): at 05:32

## 2021-02-08 RX ADMIN — Medication 10 MILLIGRAM(S): at 21:49

## 2021-02-08 RX ADMIN — MUPIROCIN 1 APPLICATION(S): 20 OINTMENT TOPICAL at 17:25

## 2021-02-08 RX ADMIN — Medication 15 MILLILITER(S): at 14:15

## 2021-02-08 RX ADMIN — Medication 0.1 MILLIGRAM(S): at 05:32

## 2021-02-08 RX ADMIN — Medication 650 MILLIGRAM(S): at 17:27

## 2021-02-08 RX ADMIN — SENNA PLUS 10 MILLILITER(S): 8.6 TABLET ORAL at 21:50

## 2021-02-08 RX ADMIN — Medication 1 APPLICATION(S): at 05:33

## 2021-02-08 RX ADMIN — POLYETHYLENE GLYCOL 3350 17 GRAM(S): 17 POWDER, FOR SOLUTION ORAL at 17:28

## 2021-02-08 RX ADMIN — Medication 650 MILLIGRAM(S): at 23:57

## 2021-02-08 RX ADMIN — Medication 0.1 MILLIGRAM(S): at 13:03

## 2021-02-08 RX ADMIN — Medication 1 MILLIGRAM(S): at 23:57

## 2021-02-08 RX ADMIN — POLYETHYLENE GLYCOL 3350 17 GRAM(S): 17 POWDER, FOR SOLUTION ORAL at 05:32

## 2021-02-08 RX ADMIN — Medication 400 MILLIGRAM(S): at 21:49

## 2021-02-08 RX ADMIN — ENOXAPARIN SODIUM 40 MILLIGRAM(S): 100 INJECTION SUBCUTANEOUS at 17:27

## 2021-02-08 RX ADMIN — OXYCODONE HYDROCHLORIDE 5 MILLIGRAM(S): 5 TABLET ORAL at 17:27

## 2021-02-08 RX ADMIN — CHLORHEXIDINE GLUCONATE 1 APPLICATION(S): 213 SOLUTION TOPICAL at 06:28

## 2021-02-08 RX ADMIN — Medication 0.1 MILLIGRAM(S): at 21:50

## 2021-02-08 RX ADMIN — Medication 1 MILLIGRAM(S): at 17:27

## 2021-02-08 RX ADMIN — QUETIAPINE FUMARATE 50 MILLIGRAM(S): 200 TABLET, FILM COATED ORAL at 21:50

## 2021-02-08 RX ADMIN — ENOXAPARIN SODIUM 40 MILLIGRAM(S): 100 INJECTION SUBCUTANEOUS at 05:32

## 2021-02-08 RX ADMIN — PIPERACILLIN AND TAZOBACTAM 25 GRAM(S): 4; .5 INJECTION, POWDER, LYOPHILIZED, FOR SOLUTION INTRAVENOUS at 00:20

## 2021-02-08 RX ADMIN — Medication 1 APPLICATION(S): at 17:30

## 2021-02-08 RX ADMIN — Medication 1: at 12:58

## 2021-02-08 RX ADMIN — Medication 400 MILLIGRAM(S): at 13:01

## 2021-02-08 RX ADMIN — Medication 1: at 00:20

## 2021-02-08 RX ADMIN — Medication 650 MILLIGRAM(S): at 05:32

## 2021-02-08 RX ADMIN — PIPERACILLIN AND TAZOBACTAM 25 GRAM(S): 4; .5 INJECTION, POWDER, LYOPHILIZED, FOR SOLUTION INTRAVENOUS at 10:04

## 2021-02-08 RX ADMIN — Medication 1: at 06:02

## 2021-02-08 RX ADMIN — PIPERACILLIN AND TAZOBACTAM 25 GRAM(S): 4; .5 INJECTION, POWDER, LYOPHILIZED, FOR SOLUTION INTRAVENOUS at 17:26

## 2021-02-08 RX ADMIN — Medication 1 APPLICATION(S): at 06:28

## 2021-02-08 RX ADMIN — QUETIAPINE FUMARATE 12.5 MILLIGRAM(S): 200 TABLET, FILM COATED ORAL at 13:03

## 2021-02-08 NOTE — PROGRESS NOTE ADULT - SUBJECTIVE AND OBJECTIVE BOX
CHIEF COMPLAINT: Patient is a 46y old  Female who presents with a chief complaint of COVID (07 Feb 2021 15:58)    SUBJECTIVE: No interval events overnight.     [ ] All other systems negative  [ ] Unable to assess ROS because ________    OBJECTIVE:  ICU Vital Signs Last 24 Hrs  T(C): 36.9 (08 Feb 2021 05:00), Max: 37.2 (08 Feb 2021 01:58)  T(F): 98.5 (08 Feb 2021 05:00), Max: 98.9 (08 Feb 2021 01:58)  HR: 96 (08 Feb 2021 06:16) (94 - 108)  BP: 114/67 (08 Feb 2021 05:00) (106/65 - 117/73)  BP(mean): 86 (07 Feb 2021 09:10) (86 - 86)  ABP: --  ABP(mean): --  RR: 25 (08 Feb 2021 05:00) (24 - 26)  SpO2: 100% (08 Feb 2021 06:16) (99% - 100%)    Mode: AC/ CMV (Assist Control/ Continuous Mandatory Ventilation), RR (machine): 16, TV (machine): 300, FiO2: 40, PEEP: 5, MAP: 7, PIP: 18    02-07 @ 07:01  -  02-08 @ 07:00  --------------------------------------------------------  IN: 1740 mL / OUT: 1300 mL / NET: 440 mL    CAPILLARY BLOOD GLUCOSE  POCT Blood Glucose.: 165 mg/dL (08 Feb 2021 05:46)    PHYSICAL EXAM:  General:   HEENT:   Lymph Nodes:  Neck:   Respiratory:   Cardiovascular:   Abdomen:   Extremities:   Skin:   Neurological:  Psychiatry:    HOSPITAL MEDICATIONS:  MEDICATIONS  (STANDING):  acetaminophen    Suspension .. 650 milliGRAM(s) Oral every 6 hours  BACItracin   Ointment 1 Application(s) Topical two times a day  bisacodyl Suppository 10 milliGRAM(s) Rectal at bedtime  chlorhexidine 4% Liquid 1 Application(s) Topical daily  clonazePAM  Tablet 1 milliGRAM(s) Oral <User Schedule>  cloNIDine 0.1 milliGRAM(s) Oral every 8 hours  dextrose 5%. 1000 milliLiter(s) (50 mL/Hr) IV Continuous <Continuous>  dextrose 5%. 1000 milliLiter(s) (100 mL/Hr) IV Continuous <Continuous>  enoxaparin Injectable 40 milliGRAM(s) SubCutaneous every 12 hours  glucagon  Injectable 1 milliGRAM(s) IntraMuscular once  insulin lispro (ADMELOG) corrective regimen sliding scale   SubCutaneous every 6 hours  multivitamin/minerals/iron Oral Solution (CENTRUM) 15 milliLiter(s) Oral daily  mupirocin 2% Ointment 1 Application(s) Topical two times a day  oxyCODONE    IR 5 milliGRAM(s) Oral every 12 hours  polyethylene glycol 3350 17 Gram(s) Oral two times a day  QUEtiapine 12.5 milliGRAM(s) Oral daily  QUEtiapine 50 milliGRAM(s) Oral at bedtime  senna Syrup 10 milliLiter(s) Oral daily    MEDICATIONS  (PRN):  ALBUTerol    90 MICROgram(s) HFA Inhaler 2 Puff(s) Inhalation every 6 hours PRN Wheezing    LABS:                        8.9    9.82  )-----------( 511      ( 08 Feb 2021 04:46 )             29.2     02-08    135  |  96<L>  |  14  ----------------------------<  138<H>  4.2   |  28  |  0.58    Ca    9.7      08 Feb 2021 04:46  Phos  4.2     02-07  Mg     2.1     02-08    TPro  7.5  /  Alb  3.7  /  TBili  0.2  /  DBili  x   /  AST  19  /  ALT  24  /  AlkPhos  109  02-08 CHIEF COMPLAINT: Patient is a 46y old  Female who presents with a chief complaint of COVID (07 Feb 2021 15:58)    SUBJECTIVE: No interval events overnight. Seen by bedside and noted to be crying, supportive provided and nursing notified by bedside to continue supportive care. Unable to assess ROS given vented. Re-visited bedside and noted to be on facetime with . Appears sad, but no longer crying and more receptive to conversation.  speaks English and explained current medical standings; he expressed comprehension.  is a great support system for patient.     OBJECTIVE:  ICU Vital Signs Last 24 Hrs  T(C): 36.9 (08 Feb 2021 05:00), Max: 37.2 (08 Feb 2021 01:58)  T(F): 98.5 (08 Feb 2021 05:00), Max: 98.9 (08 Feb 2021 01:58)  HR: 96 (08 Feb 2021 06:16) (94 - 108)  BP: 114/67 (08 Feb 2021 05:00) (106/65 - 117/73)  BP(mean): 86 (07 Feb 2021 09:10) (86 - 86)  ABP: --  ABP(mean): --  RR: 25 (08 Feb 2021 05:00) (24 - 26)  SpO2: 100% (08 Feb 2021 06:16) (99% - 100%)    Mode: AC/ CMV (Assist Control/ Continuous Mandatory Ventilation), RR (machine): 16, TV (machine): 300, FiO2: 40, PEEP: 5, MAP: 7, PIP: 18    02-07 @ 07:01  -  02-08 @ 07:00  --------------------------------------------------------  IN: 1740 mL / OUT: 1300 mL / NET: 440 mL    CAPILLARY BLOOD GLUCOSE  POCT Blood Glucose.: 165 mg/dL (08 Feb 2021 05:46)    PHYSICAL EXAM:  General: NAD and well groomed   Neck: Trach clean, dry and intact. Sutures removed today.   Cardio: RRR, S1/S2, no murmurs or rubs.   Pulm: Coarse vent sounds noted throughout, equal bilaterally.   GI: Soft, NDNT, BS (+). PEG (+)   : Haque intact and draining yellow urine.   MS: No pedal edema . Moves all extremities, but limited by weakness.   Neuro: Awake and alert. No focal neurological deficits noted.   Skin: Warm and dry. No jaundice or cyanosis   Psych: Depressed/ Sad     HOSPITAL MEDICATIONS:  MEDICATIONS  (STANDING):  acetaminophen    Suspension .. 650 milliGRAM(s) Oral every 6 hours  BACItracin   Ointment 1 Application(s) Topical two times a day  bisacodyl Suppository 10 milliGRAM(s) Rectal at bedtime  chlorhexidine 4% Liquid 1 Application(s) Topical daily  clonazePAM  Tablet 1 milliGRAM(s) Oral <User Schedule>  cloNIDine 0.1 milliGRAM(s) Oral every 8 hours  dextrose 5%. 1000 milliLiter(s) (50 mL/Hr) IV Continuous <Continuous>  dextrose 5%. 1000 milliLiter(s) (100 mL/Hr) IV Continuous <Continuous>  enoxaparin Injectable 40 milliGRAM(s) SubCutaneous every 12 hours  glucagon  Injectable 1 milliGRAM(s) IntraMuscular once  insulin lispro (ADMELOG) corrective regimen sliding scale   SubCutaneous every 6 hours  multivitamin/minerals/iron Oral Solution (CENTRUM) 15 milliLiter(s) Oral daily  mupirocin 2% Ointment 1 Application(s) Topical two times a day  oxyCODONE    IR 5 milliGRAM(s) Oral every 12 hours  polyethylene glycol 3350 17 Gram(s) Oral two times a day  QUEtiapine 12.5 milliGRAM(s) Oral daily  QUEtiapine 50 milliGRAM(s) Oral at bedtime  senna Syrup 10 milliLiter(s) Oral daily    MEDICATIONS  (PRN):  ALBUTerol    90 MICROgram(s) HFA Inhaler 2 Puff(s) Inhalation every 6 hours PRN Wheezing    LABS:                        8.9    9.82  )-----------( 511      ( 08 Feb 2021 04:46 )             29.2     02-08    135  |  96<L>  |  14  ----------------------------<  138<H>  4.2   |  28  |  0.58    Ca    9.7      08 Feb 2021 04:46  Phos  4.2     02-07  Mg     2.1     02-08    TPro  7.5  /  Alb  3.7  /  TBili  0.2  /  DBili  x   /  AST  19  /  ALT  24  /  AlkPhos  109  02-08

## 2021-02-08 NOTE — PROGRESS NOTE ADULT - ASSESSMENT
46 F w respiratory failure due to COVID 19     Problem/Recommendation - 1:  Problem: Acute respiratory failure with hypoxia. Recommendation: s/p tracheostomy and PEG. No signs of post proceure issues at this time  -c/w G tube feeds  - PPI per team  - RCU care   - ativan for agitation PRN   - Seroquel and Klonopin   - TOV as tolerated      Problem/Recommendation - 2:  ·  Problem: COVID-19.  Recommendation: status post dexamethasone.      Problem/Recommendation - 3:  ·  Problem: Normocytic anemia.  Recommendation: without overt GI bleeding. Likely due to critical illness.   -monitor for GI bleed  -PPI QD.      Problem/Recommendation - 4:  ·  Problem: Abnormal LFTs.  Recommendation: multifactorial, likely NAFLD, COVID, critical illness. Can consider US to r/o gallstone disease, especially if increasing.   defer to GI      Problem/Recommendation - 5:  ·  Problem: Morbid obesity.      Problem/Recommendation - 6:  Problem: Constipation. Recommendation: consider XR abdomen to assess for ileus  on a bowel regimen.

## 2021-02-08 NOTE — DISCHARGE NOTE PROVIDER - NSDCMRMEDTOKEN_GEN_ALL_CORE_FT
Multiple Vitamins oral tablet: 1 tab(s) orally once a day  polyethylene glycol 3350 oral powder for reconstitution: 17 gram(s) orally 2 times a day for constipation  senna oral tablet: 2 tab(s) orally once a day (at bedtime) for constipation   albuterol 90 mcg/inh inhalation aerosol: 2 puff(s) inhaled every 6 hours, As needed, Wheezing  clonazePAM 0.5 mg oral tablet: 1 tab(s) orally every 12 hours MDD:2  mirtazapine 15 mg oral tablet: 1 tab(s) orally once a day (at bedtime)  Multiple Vitamins oral tablet: 1 tab(s) orally once a day  naproxen 250 mg oral tablet: 1 tab(s) orally every 6 hours, As needed, Moderate Pain (4 - 6)  polyethylene glycol 3350 oral powder for reconstitution: 17 gram(s) orally 2 times a day for constipation  senna oral tablet: 2 tab(s) orally once a day (at bedtime) for constipation   albuterol 90 mcg/inh inhalation aerosol: 2 puff(s) inhaled every 6 hours, As needed, Wheezing  alcohol swabs : Apply topically to affected area 4 times a day   clonazePAM 0.5 mg oral tablet: 1 tab(s) orally every 12 hours MDD:2  clotrimazole 1% vaginal cream with applicator: 1 applicatorful vaginal once a day (at bedtime) as needed for vaginal itch   glucometer (per patient&#x27;s insurance): Test blood sugars four times a day. Dispense #1 glucometer.  lancets: 1 application subcutaneously 4 times a day   mirtazapine 15 mg oral tablet: 1 tab(s) orally once a day (at bedtime)  Multiple Vitamins oral tablet: 1 tab(s) orally once a day  naproxen 250 mg oral tablet: 1 tab(s) orally every 6 hours, As needed, Moderate Pain (4 - 6)  polyethylene glycol 3350 oral powder for reconstitution: 17 gram(s) orally 2 times a day for constipation  senna oral tablet: 2 tab(s) orally once a day (at bedtime) for constipation  test strips (per patient&#x27;s insurance): 1 application subcutaneously 4 times a day. ** Compatible with patient&#x27;s glucometer **

## 2021-02-08 NOTE — DISCHARGE NOTE PROVIDER - HOSPITAL COURSE
47 YO F with Morbid Obesity and DM2 A1C 6.6 admitted for ARDS second to SARS-COV-2, intubated 1/5-1/10 then transferred to Medicine, however failed BIPAP and reintubated 1/16. Course complicated ECOLI UTI, Mouth Sores and Enterobacter and MRSA VAP. s/p Tracheostomy 1/28 and PEG 1/26. Now transferred to RCU.     In RCU, chart reviewed and patient noted with complete empiric Zosyn (1/16-1/20) prior when cultures were negative. ECOLI (+) on UCx from 1/25, SCx 1/30 with Enterobacter Aerogenes and MRSA. 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 Nose culture 1/30 (+) for MRSA. s/p Zosyn (1/30-2/8), Vancomycin (1/30-2/6) and Bactroban (2/8-2/14)         45 YO F with Morbid Obesity and DM2 A1C 6.6 admitted for ARDS second to SARS-COV-2, intubated 1/5-1/10 then transferred to Medicine, however failed BIPAP and reintubated 1/16. Course complicated ECOLI UTI, Mouth Sores and Enterobacter and MRSA VAP. s/p Tracheostomy 1/28 and PEG 1/26. Now transferred to RCU.       # ICU Delirium/ Agitation /Depression   - Weaned off sedation and extubated   - c/w Seroquel 12.5mg qhs and Klonopin 1mg BID  - c/w Remeron with supportive care.    - Monitor mentation. Much improved    # ARDS second to SARS-COV-2 vs Superimposed Enterobacter and MRSA PNA   - s/p Remdesivir, Decadron and ABX as below  - s/p Prolonged ICU stay and Tracheostomy on 1/28. s/p TC and PMV.   - s/p Exchanged 6DCT for 6CFN on 2/23.   - Proventil and Chest PT q6hr PRN  - Decannulated successfully on 2/26. Saturating well on 2LNC  - Ambulation test done, will require Home Oxygen on discharge    # Septic vs Vasoplegic Shock (resolved)  - Off all Pressors. Monitor HR/ BP     # Dysphagia with PEG   - s/p PEG 1/26, and removed by GI on 3/2  - Bowel regimen (Senna and Miralax and Dulcolax PRN)   - Passed CINE on 2/16, c/w Regular diet    # /UTI/Vaginal Itching  - Passed TOV 2/23 and noted with dysuria. UA (+) and now treating empirically for UTI.   - Hx of E. coli UTI sensitive to Cipro. c/w Cipro (2/23 - until 3/3).  - Hyponatremia/ Hypernatremia, monitor electrolytes and renal function as tolerated.   - c/w on Clotrimazole 1% cream qhs x 7 days    # Sepsis second to SARS-COV-2 vs Enterobacter and MRSA PNA vs HSV Type 1   - s/p Empiric ABX for PNA with Zosyn (1/16-1/20), however cultures negative   - UCx ECOLI 1/25 s/p CTX (1/25-1/27).   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   - c/w Cipro for Dysuria (2/23 - until 3/3). UCx showing E.coli sens to Cipro     # DM2 A1C 6.6 (1/2021)   - Continue on ISS and monitor FS Q6H    # DISPO - PT recommends Home w/ home PT, Home care Service, and Home Oxygen.                 - DC planning on 3/3. 45 YO F with Morbid Obesity and DM2 A1C 6.6 admitted for ARDS second to SARS-COV-2, intubated 1/5-1/10 then transferred to Medicine, however failed BIPAP and reintubated 1/16. Course complicated ECOLI UTI, Mouth Sores and Enterobacter and MRSA VAP. s/p Tracheostomy 1/28 and PEG 1/26. Now transferred to RCU.       # ICU Delirium/ Agitation /Depression   - Weaned off sedation and extubated   - c/w Seroquel 12.5mg qhs and Klonopin 1mg BID  - c/w Remeron with supportive care.    - Monitor mentation. Much improved    # ARDS second to SARS-COV-2 vs Superimposed Enterobacter and MRSA PNA   - s/p Remdesivir, Decadron and ABX as below  - s/p Prolonged ICU stay and Tracheostomy on 1/28. s/p TC and PMV.   - s/p Exchanged 6DCT for 6CFN on 2/23.   - Proventil and Chest PT q6hr PRN  - Decannulated successfully on 2/26. Saturating well on 2LNC  - Ambulation test done, will require Home Oxygen on discharge    # Septic vs Vasoplegic Shock (resolved)  - Off all Pressors. Monitor HR/ BP     # Dysphagia with PEG   - s/p PEG 1/26, and removed by GI on 3/2  - Bowel regimen (Senna and Miralax and Dulcolax PRN)   - Passed CINE on 2/16, c/w Regular diet    # /UTI/Vaginal Itching  - Passed TOV 2/23 and noted with dysuria. UA (+) and now treating empirically for UTI.   - Hx of E. coli UTI sensitive to Cipro. c/w Cipro (2/23 - until 3/3).  - Hyponatremia/ Hypernatremia, monitor electrolytes and renal function as tolerated.   - c/w on Clotrimazole 1% cream qhs x 7 days    # Sepsis second to SARS-COV-2 vs Enterobacter and MRSA PNA vs HSV Type 1   - s/p Empiric ABX for PNA with Zosyn (1/16-1/20), however cultures negative   - UCx ECOLI 1/25 s/p CTX (1/25-1/27).   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   - c/w Cipro for Dysuria (2/23 - until 3/3). UCx showing E.coli sens to Cipro     # DM2 A1C 6.6 (1/2021)   - Continue on ISS and monitor FS Q6H    # DISPO - PT recommends Home w/ home PT, Home care Service, and Home Oxygen.                 - DC planning on 3/3.    As per Dr. Lisker patient is medically cleared for discharge 3/3- patient to go home with home o2 and follow up with [facility] 3/4. 47 YO F with Morbid Obesity and DM2 A1C 6.6 admitted for ARDS second to SARS-COV-2, intubated 1/5-1/10 then transferred to Medicine, however failed BIPAP and reintubated 1/16. Course complicated ECOLI UTI, Mouth Sores and Enterobacter and MRSA VAP. s/p Tracheostomy 1/28 and PEG 1/26. Now transferred to RCU.       # ICU Delirium/ Agitation /Depression   - Weaned off sedation and extubated   - c/w Seroquel 12.5mg qhs and Klonopin 1mg BID  - c/w Remeron with supportive care.    - Monitor mentation. Much improved  -Discharge on Klonopin 0.5mg BID, and Remeron. Discontinue Seroquel upon discharge     # ARDS second to SARS-COV-2 vs Superimposed Enterobacter and MRSA PNA   - s/p Remdesivir, Decadron and ABX as below  - s/p Prolonged ICU stay and Tracheostomy on 1/28. s/p TC and PMV.   - s/p Exchanged 6DCT for 6CFN on 2/23.   - Proventil and Chest PT q6hr PRN  - Decannulated successfully on 2/26. Saturating well on 2LNC  - Ambulation test done, will require Home Oxygen on discharge    # Septic vs Vasoplegic Shock (resolved)  - Off all Pressors. Monitor HR/ BP     # Dysphagia with PEG   - s/p PEG 1/26, and removed by GI on 3/2  - Bowel regimen (Senna and Miralax and Dulcolax PRN)   - Passed CINE on 2/16, c/w Regular diet    # /UTI/Vaginal Itching  - Passed TOV 2/23 and noted with dysuria. UA (+) and now treating empirically for UTI.   - Hx of E. coli UTI sensitive to Cipro. c/w Cipro (2/23 - until 3/3).  - Hyponatremia/ Hypernatremia, monitor electrolytes and renal function as tolerated.   - c/w on Clotrimazole 1% cream qhs x 7 days    # Sepsis second to SARS-COV-2 vs Enterobacter and MRSA PNA vs HSV Type 1   - s/p Empiric ABX for PNA with Zosyn (1/16-1/20), however cultures negative   - UCx ECOLI 1/25 s/p CTX (1/25-1/27).   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   - c/w Cipro for Dysuria (2/23 - until 3/3). UCx showing E.coli sens to Cipro     # DM2 A1C 6.6 (1/2021)   - Continue on ISS and monitor FS Q6H    # DISPO - PT recommends Home w/ home PT, Home care Service, and Home Oxygen.                 - DC planning on 3/3.    As per Dr. Lisker patient is medically cleared for discharge 3/3- patient to go home with home o2 and follow up with [facility] 3/4.

## 2021-02-08 NOTE — DISCHARGE NOTE PROVIDER - CARE PROVIDER_API CALL
Lisker, Gita N (MD)  Critical Care Medicine; Pulmonary Disease  [address], [facility], [address]  [place] [address]  Phone: [phone]  Fax: [phone]  Follow Up Time: 1-3 days

## 2021-02-08 NOTE — PROGRESS NOTE ADULT - SUBJECTIVE AND OBJECTIVE BOX
DATE OF SERVICE: 02-08-21     Subjective: Patient seen and examined. No new events except as noted.   one to one     REVIEW OF SYSTEMS:    CONSTITUTIONAL: No weakness, fevers or chills  EYES/ENT: No visual changes;  No vertigo or throat pain   NECK: No pain or stiffness  RESPIRATORY: No cough, wheezing, hemoptysis; No shortness of breath  CARDIOVASCULAR: No chest pain or palpitations  GASTROINTESTINAL: No abdominal or epigastric pain.   GENITOURINARY: No dysuria, frequency or hematuria  NEUROLOGICAL: No numbness or weakness  SKIN: No itching, burning, rashes, or lesions   All other review of systems is negative unless indicated above.    MEDICATIONS:  MEDICATIONS  (STANDING):  acetaminophen    Suspension .. 650 milliGRAM(s) Oral every 6 hours  acyclovir    Suspension 400 milliGRAM(s) Oral three times a day  BACItracin   Ointment 1 Application(s) Topical two times a day  bisacodyl Suppository 10 milliGRAM(s) Rectal at bedtime  chlorhexidine 4% Liquid 1 Application(s) Topical daily  clonazePAM  Tablet 1 milliGRAM(s) Oral <User Schedule>  cloNIDine 0.1 milliGRAM(s) Oral every 8 hours  dextrose 5%. 1000 milliLiter(s) (50 mL/Hr) IV Continuous <Continuous>  dextrose 5%. 1000 milliLiter(s) (100 mL/Hr) IV Continuous <Continuous>  enoxaparin Injectable 40 milliGRAM(s) SubCutaneous every 12 hours  glucagon  Injectable 1 milliGRAM(s) IntraMuscular once  insulin lispro (ADMELOG) corrective regimen sliding scale   SubCutaneous every 6 hours  multivitamin/minerals/iron Oral Solution (CENTRUM) 15 milliLiter(s) Oral daily  mupirocin 2% Ointment 1 Application(s) Topical two times a day  oxyCODONE    IR 5 milliGRAM(s) Oral every 12 hours  piperacillin/tazobactam IVPB.. 3.375 Gram(s) IV Intermittent <User Schedule>  polyethylene glycol 3350 17 Gram(s) Oral two times a day  QUEtiapine 12.5 milliGRAM(s) Oral daily  QUEtiapine 50 milliGRAM(s) Oral at bedtime  senna Syrup 10 milliLiter(s) Oral daily      PHYSICAL EXAM:  T(C): 36.3 (02-08-21 @ 13:00), Max: 37.2 (02-08-21 @ 01:58)  HR: 98 (02-08-21 @ 14:46) (96 - 107)  BP: 122/86 (02-08-21 @ 13:00) (102/76 - 122/86)  RR: 21 (02-08-21 @ 13:00) (21 - 26)  SpO2: 100% (02-08-21 @ 14:46) (99% - 100%)  Wt(kg): --  I&O's Summary    07 Feb 2021 07:01  -  08 Feb 2021 07:00  --------------------------------------------------------  IN: 1740 mL / OUT: 1300 mL / NET: 440 mL          Appearance: Normal	  HEENT:  PERRLA   Lymphatic: No lymphadenopathy   Cardiovascular: Normal S1 S2, no JVD  Respiratory: normal effort , clear  Gastrointestinal:  Soft, Non-tender  Skin: No rashes,  warm to touch  Psychiatry:  Mood & affect appropriate  Musculuskeletal: No edema      All labs, Imaging and EKGs personally reviewed                             8.9    9.82  )-----------( 511      ( 08 Feb 2021 04:46 )             29.2               02-08    135  |  96<L>  |  14  ----------------------------<  138<H>  4.2   |  28  |  0.58    Ca    9.7      08 Feb 2021 04:46  Phos  4.2     02-07  Mg     2.1     02-08    TPro  7.5  /  Alb  3.7  /  TBili  0.2  /  DBili  x   /  AST  19  /  ALT  24  /  AlkPhos  109  02-08

## 2021-02-08 NOTE — PROGRESS NOTE ADULT - ASSESSMENT
45 YO F with Morbid Obesity and DM2 A1C 6.6 admitted for ARDS second to SARS-COV-2, intubated 1/5-1/10 then transferred to Medicine, however failed BIPAP and reintubated 1/16. Course complicated UTI, Mouth Sores and Enterobacter and MRSA VAP. s/p Tracheostomy 1/28 and PEG 1/26. Now transferred to RCU.     # ICU Delirium/ Agitation   - Now weaned off sedation, however delirious.  - Continue on Seroquel 12.5mg AM and 50mg QHS and Klonopin 1mg BID  - Limit agitation and pain likely from mouth sores. Continue Oxy IR PRN.   - Monitor mentation.      # ARDS second to SARS-COV-2 vs Superimposed Enterobacter and MRSA PNA   - Completed Remdesivir, Decadron and ABX as belowed.   - s/p Prolonged ICU stay and trached on 1/28  - Continue on Vent and PS trials as tolerated.   - Proventil and Chest PT Q6H. Suction PRN. Trach care QD.     # Septic vs Vasoplegic Shock   - Off all Pressors. Monitor HR/ BP     # Dysphagic with PEG   - s/p PEG 1/26 and continue on TF as tolerated.   - Constipated and bowel reigmen (Senna, Miralax and Dulcolax) started.     # Hyponatremia/ Hypernatremia   - Continue on Free H20 150 Q8H.   - Monitor electrolytes and renal function as tolerated.     # Sepsis second to SARS-COV-2 vs Enterobacter and MRSA PNA vs HSV Type 1   - Completed empiric ABX for PNA with Zosyn (1/16-1/20), however cultures negative   - ECOLI UTI (1/25) s/p CTX (1/25-1/27).   - SCx 1/30 with Enterobacter Aerogenes and MRSA   - Noted with mouth sores (+) for MRSA 1/28 and HSV Type 1 on culture 1/29  - s/p Zosyn/ Vancomycin (1/30-2/6)   - "Fever" (TMAX 100F rectally) 2/6, but likely in setting delirium. Zosyn was started. Hold off on ABX and monitor for now. IF spikes then reculture.     # DM2 A1C 6.6 (1/2021)   - Continue on ISS and monitor FS Q6H    # Wounds   - WOC recommendations appreciated.      # DVT PPX with Lovenox BID   # DISPO - RCU    45 YO F with Morbid Obesity and DM2 A1C 6.6 admitted for ARDS second to SARS-COV-2, intubated 1/5-1/10 then transferred to Medicine, however failed BIPAP and reintubated 1/16. Course complicated UTI, Mouth Sores and Enterobacter and MRSA VAP. s/p Tracheostomy 1/28 and PEG 1/26. Now transferred to RCU.     # ICU Delirium/ Agitation   - Now weaned off sedation, however delirious.  - Continue on Seroquel 12.5mg AM and 50mg QHS and Klonopin 1mg BID  - Limit agitation and pain likely from mouth sores. Continue Oxy IR PRN.   - Monitor mentation.      # ARDS second to SARS-COV-2 vs Superimposed Enterobacter and MRSA PNA   - Completed Remdesivir, Decadron and ABX as belowed.   - s/p Prolonged ICU stay and trached on 1/28  - Continue on Vent and PS trials as tolerated.   - Proventil and Chest PT Q6H. Suction PRN. Trach care QD.     # Septic vs Vasoplegic Shock   - Off all Pressors. Monitor HR/ BP     # Dysphagic with PEG   - s/p PEG 1/26 and continue on TF as tolerated.   - Constipated and bowel reigmen (Senna, Miralax and Dulcolax) started.     # Hyponatremia/ Hypernatremia   - Continue on Free H20 150 Q8H.   - Monitor electrolytes and renal function as tolerated.     # Sepsis second to SARS-COV-2 vs Enterobacter and MRSA PNA vs HSV Type 1   - Completed empiric ABX for PNA with Zosyn (1/16-1/20), however cultures negative   - ECOLI UTI (1/25) s/p CTX (1/25-1/27).   - SCx 1/30 with Enterobacter Aerogenes and MRSA   - Mouth sores (+) for MRSA 1/28 and HSV Type 1 on culture 1/29  - Nose culture 1/30 (+) for MRSA   - s/p Zosyn/ Vancomycin (1/30-2/6)   - "Fever" (TMAX 100F rectally) 2/6, but likely in setting agitation. Zosyn was started. Hold off on ABX and monitor for now. IF spikes then reculture.   - Continue on Bactroban (2/8-2/14)    # DM2 A1C 6.6 (1/2021)   - Continue on ISS and monitor FS Q6H    # Wounds   - WOC recommendations appreciated.      # DVT PPX with Lovenox BID   # DISPO - [facility]CU    45 YO F with Morbid Obesity and DM2 A1C 6.6 admitted for ARDS second to SARS-COV-2, intubated 1/5-1/10 then transferred to Medicine, however failed BIPAP and reintubated 1/16. Course complicated UTI, Mouth Sores and Enterobacter and MRSA VAP. s/p Tracheostomy 1/28 and PEG 1/26. Now transferred to RCU.     # ICU Delirium/ Agitation   - Now weaned off sedation, however delirious.  - Continue on Seroquel 12.5mg AM and 50mg QHS and Klonopin 1mg BID  - Limit agitation and pain likely from mouth sores. Continue Oxy IR PRN.   - Monitor mentation.      # ARDS second to SARS-COV-2 vs Superimposed Enterobacter and MRSA PNA   - Completed Remdesivir, Decadron and ABX as belowed.   - s/p Prolonged ICU stay and trached on 1/28  - Continue on Vent and PS trials as tolerated.   - Proventil and Chest PT Q6H. Suction PRN. Trach care QD.     # Septic vs Vasoplegic Shock   - Off all Pressors. Monitor HR/ BP     # Dysphagic with PEG   - s/p PEG 1/26 and continue on TF as tolerated.   - Constipated and bowel reigmen (Senna, Miralax and Dulcolax) started.     # Hyponatremia/ Hypernatremia   - Continue on Free H20 150 Q8H.   - Monitor electrolytes and renal function as tolerated.     # Sepsis second to SARS-COV-2 vs Enterobacter and MRSA PNA vs HSV Type 1   - Completed empiric ABX for PNA with Zosyn (1/16-1/20), however cultures negative   - ECOLI UTI (1/25) s/p CTX (1/25-1/27).   - SCx 1/30 with Enterobacter Aerogenes and MRSA   - Mouth sores (+) for MRSA 1/28 and HSV Type 1 on culture 1/29  - Nose culture 1/30 (+) for MRSA   - s/p Zosyn (1/30-2/8), Vancomycin (1/30-2/6) and Bactroban (2/8-2/14)    # DM2 A1C 6.6 (1/2021)   - Continue on ISS and monitor FS Q6H    # Wounds   - WOC recommendations appreciated.      # DVT PPX with Lovenox BID   # DISPO - [facility]    47 YO F with Morbid Obesity and DM2 A1C 6.6 admitted for ARDS second to SARS-COV-2, intubated 1/5-1/10 then transferred to Medicine, however failed BIPAP and reintubated 1/16. Course complicated UTI, Mouth Sores and Enterobacter and MRSA VAP. s/p Tracheostomy 1/28 and PEG 1/26. Now transferred to RCU.     # ICU Delirium/ Agitation   - Now weaned off sedation, however delirious.  - Continue on Seroquel 12.5mg AM and 50mg QHS and Klonopin 1mg BID  - Limit pain likely from mouth sores. Continue Oxy IR PRN.   - Depressed, however  remains great support system via Facetime.   - Monitor mentation.      # ARDS second to SARS-COV-2 vs Superimposed Enterobacter and MRSA PNA   - Completed Remdesivir, Decadron and ABX as belowed.   - s/p Prolonged ICU stay and trached on 1/28  - Continue on Vent and PS 5/5/40 trials as tolerated. Attempt TC tomorrow if able.   - Proventil and Chest PT Q6H. Suction PRN. Trach care QD.     # Septic vs Vasoplegic Shock   - Off all Pressors. Monitor HR/ BP     # Dysphagic with PEG   - s/p PEG 1/26 and continue on TF as tolerated.   - Constipated and bowel regimen (Senna, Miralax and Dulcolax) started.     # Hyponatremia/ Hypernatremia   - Continue on Free H20 150 Q8H.   - Monitor electrolytes and renal function as tolerated.     # Sepsis second to SARS-COV-2 vs Enterobacter and MRSA PNA vs HSV Type 1   - Completed empiric ABX for PNA with Zosyn (1/16-1/20), however cultures negative   - ECOLI UTI (1/25) s/p CTX (1/25-1/27).   - SCx 1/30 with Enterobacter Aerogenes and MRSA   - Mouth sores (+) for MRSA 1/28 and HSV Type 1 on culture 1/29  - Nose culture 1/30 (+) for MRSA   - s/p Zosyn (1/30-2/8), Vancomycin (1/30-2/6) and Bactroban (2/8-2/14)    # DM2 A1C 6.6 (1/2021)   - Continue on ISS and monitor FS Q6H    # Wounds   - WOC recommendations appreciated.      # DVT PPX with Lovenox BID   # DISPO - PT recommends Rehab. Wound be a good acute rehab candidate if able to come of vent.

## 2021-02-08 NOTE — PROGRESS NOTE ADULT - ASSESSMENT
46 F w respiratory failure due to COVID 19     Problem/Recommendation - 1:  Problem: Acute respiratory failure with hypoxia. Recommendation: s/p tracheostomy and PEG. No signs of post proceure issues at this time  -c/w G tube feeds       Problem/Recommendation - 2:  ·  Problem: COVID-19.  Recommendation: status post dexamethasone.   on abx per ID for staph in sputum     Problem/Recommendation - 3:  ·  Problem: Normocytic anemia.  Recommendation: without overt GI bleeding. Likely due to critical illness. Trended down today  -monitor for GI bleed  -PPI QD.      Problem/Recommendation - 4:  ·  Problem: Abnormal LFTs.  Recommendation: multifactorial, likely NAFLD, COVID, critical illness. Can consider US to r/o gallstone disease, especially if increasing.      Problem/Recommendation - 5:  ·  Problem: Morbid obesity.         Attending Attestation:   Differential diagnosis and plan of care discussed with patient after the evaluation  35 Minutes spent on total encounter of which more than fifty percent of the encounter was spent counseling and/or coordinating care by the attending physician.    Mele Bolanos M.D.   Gastroenterology and Hepatology  Cell: [phone].

## 2021-02-08 NOTE — CHART NOTE - NSCHARTNOTEFT_GEN_A_CORE
[facility] PA NOTE     POD 12 s/p Trach 1/28 and noted with trach sutures still intact.   Trach sutures x 3 removed. No 8 o clock suture noted to be present.   Trach ties secured.   Patient tolerated well with no complications.     Amelie Monsivais PA-C  Department of Medicine/ [facility]   In House Pager #[phone]

## 2021-02-08 NOTE — DISCHARGE NOTE PROVIDER - NSDCCPCAREPLAN_GEN_ALL_CORE_FT
PRINCIPAL DISCHARGE DIAGNOSIS  Diagnosis: COVID-19  Assessment and Plan of Treatment: You have been diagnosed with the COVID-19 virus during your hospital stay. You have been intubated 1/16, extubated trach placed 1/28 and decanulated 2/26. Peg tube was removed 3/2. Continue home  oxygen as needed. Monitor for fevers, shortness of breath and cough primarily.  Monitor your temperature daily to not any changes and increases.  It has been determined that you no longer need hospitalization.         SECONDARY DISCHARGE DIAGNOSES  Diagnosis: Dysuria  Assessment and Plan of Treatment: We have treated for urinary tract infection. Monitor for signs/symptoms of infection, such as, fever/chills, burning/pain with urination, urinary frequency/hesitancy, cloudy urine, or blood in urine.      Diagnosis: Dysphagia  Assessment and Plan of Treatment: You had a peg tube placed this admission but now are able to tolerate foods by mouth. Peg tube was removed 3/2/21. Continue regular diet as tolerated.    Diagnosis: Depression  Assessment and Plan of Treatment: You were started on Seroquel, Klonopin, and remeron this admission to help with anxiety and depression. Please follow up with your primary care doctors for further recommendations as to when to possibly stop medications in the future.       Diagnosis: Diabetes  Assessment and Plan of Treatment: Continue consistent carbohydrate diet.  Monitor blood glucose levels throughout the day before meals and at bedtime.  Record blood sugars and bring to outpatient providers appointment in order to be reviewed by your doctor for management modifications.  Be aware of diabetes management symptoms including feeling cool and clammy may be related to low glucose levels.  Feeling hot and dry may indicate high glucose levels.  If  you feel these symptoms, check your blood sugar.  Make regular podiatry appointments in order to have feet checked for wounds and toe nails cut by a doctor to prevent infections.      Diagnosis: Abnormal LFTs  Assessment and Plan of Treatment: Monitor your liver function outpatient with your primary care doctor within 1-2 weeks of discharge.     PRINCIPAL DISCHARGE DIAGNOSIS  Diagnosis: COVID-19  Assessment and Plan of Treatment: You have been diagnosed with the COVID-19 virus during your hospital stay. You have been intubated 1/16, extubated trach placed 1/28 and decanulated 2/26. Peg tube was removed 3/2. Continue home  oxygen as needed. Monitor for fevers, shortness of breath and cough primarily.  Monitor your temperature daily to not any changes and increases.  It has been determined that you no longer need hospitalization.   Follow up with the [facility] and [facility] program with Dr. Lisker for further pulmonary followup.         SECONDARY DISCHARGE DIAGNOSES  Diagnosis: Dysuria  Assessment and Plan of Treatment: We have treated for urinary tract infection. Monitor for signs/symptoms of infection, such as, fever/chills, burning/pain with urination, urinary frequency/hesitancy, cloudy urine, or blood in urine.      Diagnosis: Dysphagia  Assessment and Plan of Treatment: You had a peg tube placed this admission but now are able to tolerate foods by mouth. Peg tube was removed 3/2/21. Continue regular diet as tolerated.    Diagnosis: Depression  Assessment and Plan of Treatment: You were started on Klonopin and remeron this admission to help with anxiety and depression. Please follow up with your primary care doctors for further recommendations as to when to possibly stop medications in the future.       Diagnosis: Diabetes  Assessment and Plan of Treatment: Your hemoglobin a1c is 6.6 Continue consistent carbohydrate diet.  Monitor blood glucose levels throughout the day before meals and at bedtime.  Record blood sugars and bring to outpatient providers appointment in order to be reviewed by your doctor for management modifications.  Be aware of diabetes management symptoms including feeling cool and clammy may be related to low glucose levels.  Feeling hot and dry may indicate high glucose levels.  If  you feel these symptoms, check your blood sugar.  Make regular podiatry appointments in order to have feet checked for wounds and toe nails cut by a doctor to prevent infections.      Diagnosis: Abnormal LFTs  Assessment and Plan of Treatment: Monitor your liver function outpatient with your primary care doctor within 1-2 weeks of discharge.     PRINCIPAL DISCHARGE DIAGNOSIS  Diagnosis: COVID-19  Assessment and Plan of Treatment: You have been diagnosed with the COVID-19 virus during your hospital stay. You have been intubated 1/16, extubated and trach placed 1/28 and decanulated 2/26. Peg tube was removed 3/2. Continue home  oxygen as needed. Monitor for fevers, shortness of breath and cough primarily.  Monitor your temperature daily to not any changes and increases.  It has been determined that you no longer need hospitalization.   Follow up with the [facility] and [facility] program with Dr. Lisker 3/4/2021 telehealth for further pulmonary recommendations.         SECONDARY DISCHARGE DIAGNOSES  Diagnosis: Sepsis  Assessment and Plan of Treatment: Sputum culture with enterobacter aerogenes was treated with antibiotics. Mouth sores positive for MRSA treated as well. Continue close follow up with your PCP within 1 week to continue care outpatient and monitor for further signs of infection.    Diagnosis: Dysuria  Assessment and Plan of Treatment: Urine culture with Ecoli treated with antibitocs.  Monitor for signs/symptoms of infection, such as, fever/chills, burning/pain with urination, urinary frequency/hesitancy, cloudy urine, or blood in urine.      Diagnosis: Dysphagia  Assessment and Plan of Treatment: You had a peg tube placed this admission but now are able to tolerate foods by mouth. Peg tube was removed 3/2/21. Continue regular diet as tolerated.    Diagnosis: Depression  Assessment and Plan of Treatment: You were started on Klonopin and remeron this admission to help with anxiety and depression. Please follow up with your primary care doctors for further recommendations as to when to possibly stop medications in the future.       Diagnosis: Diabetes  Assessment and Plan of Treatment: Your hemoglobin a1c is 6.6 Continue consistent carbohydrate diet.  Monitor blood glucose levels throughout the day before meals and at bedtime.  Record blood sugars and bring to outpatient providers appointment in order to be reviewed by your doctor for management modifications.  Be aware of diabetes management symptoms including feeling cool and clammy may be related to low glucose levels.  Feeling hot and dry may indicate high glucose levels.  If  you feel these symptoms, check your blood sugar.  Make regular podiatry appointments in order to have feet checked for wounds and toe nails cut by a doctor to prevent infections.      Diagnosis: Vaginal itching  Assessment and Plan of Treatment: Continue clotrimazole 1% cream as prescribed for a total of 7 days. Follow up with your pcp if you have futher symptoms.    Diagnosis: Abnormal LFTs  Assessment and Plan of Treatment: Monitor your liver function outpatient with your primary care doctor within 1-2 weeks of discharge.    Diagnosis: Normocytic anemia  Assessment and Plan of Treatment: Follow-up with your outpatient provider if further treatment is warranted. Monitor for signs/symptoms indicating worsening of disease, such as, easy bleeding/bruising, pale skin, fatigue, dizziness, increased heart rate, or chest pain.       PRINCIPAL DISCHARGE DIAGNOSIS  Diagnosis: COVID-19  Assessment and Plan of Treatment: You have been diagnosed with the COVID-19 virus during your hospital stay. You have been intubated 1/16, extubated and trach placed 1/28 and decanulated 2/26. Peg tube was removed 3/2. Continue home  oxygen as needed. Monitor for fevers, shortness of breath and cough primarily.  Monitor your temperature daily to not any changes and increases.  It has been determined that you no longer need hospitalization.   Follow up with the [facility] and [facility] program with Dr. Lisker 3/4/2021 telehealth for further pulmonary recommendations.         SECONDARY DISCHARGE DIAGNOSES  Diagnosis: Sepsis  Assessment and Plan of Treatment: Sputum culture with enterobacter aerogenes was treated with antibiotics. Mouth sores positive for MRSA treated as well. Continue close follow up with your PCP within 1 week to continue care outpatient and monitor for further signs of infection.    Diagnosis: Dysuria  Assessment and Plan of Treatment: Urine culture with Ecoli treated with antibiotics.  Monitor for signs/symptoms of infection, such as, fever/chills, burning/pain with urination, urinary frequency/hesitancy, cloudy urine, or blood in urine.      Diagnosis: Dysphagia  Assessment and Plan of Treatment: You had a peg tube placed this admission but now are able to tolerate foods by mouth. Peg tube was removed 3/2/21. Continue regular diet as tolerated.    Diagnosis: Depression  Assessment and Plan of Treatment: You were started on Klonopin and remeron this admission to help with anxiety and depression. Please follow up with your primary care doctors for further recommendations as to when to possibly stop medications in the future.       Diagnosis: Diabetes  Assessment and Plan of Treatment: Your hemoglobin a1c is 6.6 Continue consistent carbohydrate diet.  Monitor blood glucose levels throughout the day before meals and at bedtime.  Record blood sugars and bring to outpatient providers appointment in order to be reviewed by your doctor for management modifications.  Be aware of diabetes management symptoms including feeling cool and clammy may be related to low glucose levels.  Feeling hot and dry may indicate high glucose levels.  If  you feel these symptoms, check your blood sugar.  Make regular podiatry appointments in order to have feet checked for wounds and toe nails cut by a doctor to prevent infections.      Diagnosis: Vaginal itching  Assessment and Plan of Treatment: Continue clotrimazole 1% cream as prescribed for a total of 7 days. Follow up with your pcp if you have futher symptoms.    Diagnosis: Abnormal LFTs  Assessment and Plan of Treatment: Monitor your liver function outpatient with your primary care doctor within 1-2 weeks of discharge.    Diagnosis: Normocytic anemia  Assessment and Plan of Treatment: Follow-up with your outpatient provider if further treatment is warranted. Monitor for signs/symptoms indicating worsening of disease, such as, easy bleeding/bruising, pale skin, fatigue, dizziness, increased heart rate, or chest pain.

## 2021-02-08 NOTE — DISCHARGE NOTE PROVIDER - DETAILS OF MALNUTRITION DIAGNOSIS/DIAGNOSES
This patient has been assessed with a concern for Malnutrition and was treated during this hospitalization for the following Nutrition diagnosis/diagnoses:     -  01/06/2021: Morbid obesity (BMI > 40)   This patient has been assessed with a concern for Malnutrition and was treated during this hospitalization for the following Nutrition diagnosis/diagnoses:     -  01/06/2021: Morbid obesity (BMI > 40)    This patient has been assessed with a concern for Malnutrition and was treated during this hospitalization for the following Nutrition diagnosis/diagnoses:     -  01/06/2021: Morbid obesity (BMI > 40)   This patient has been assessed with a concern for Malnutrition and was treated during this hospitalization for the following Nutrition diagnosis/diagnoses:     -  01/06/2021: Morbid obesity (BMI > 40)    This patient has been assessed with a concern for Malnutrition and was treated during this hospitalization for the following Nutrition diagnosis/diagnoses:     -  01/06/2021: Morbid obesity (BMI > 40)    This patient has been assessed with a concern for Malnutrition and was treated during this hospitalization for the following Nutrition diagnosis/diagnoses:     -  01/06/2021: Morbid obesity (BMI > 40)   This patient has been assessed with a concern for Malnutrition and was treated during this hospitalization for the following Nutrition diagnosis/diagnoses:     -  01/06/2021: Morbid obesity (BMI > 40)    This patient has been assessed with a concern for Malnutrition and was treated during this hospitalization for the following Nutrition diagnosis/diagnoses:     -  01/06/2021: Morbid obesity (BMI > 40)    This patient has been assessed with a concern for Malnutrition and was treated during this hospitalization for the following Nutrition diagnosis/diagnoses:     -  01/06/2021: Morbid obesity (BMI > 40)    This patient has been assessed with a concern for Malnutrition and was treated during this hospitalization for the following Nutrition diagnosis/diagnoses:     -  01/06/2021: Morbid obesity (BMI > 40)   This patient has been assessed with a concern for Malnutrition and was treated during this hospitalization for the following Nutrition diagnosis/diagnoses:     -  01/06/2021: Morbid obesity (BMI > 40)    This patient has been assessed with a concern for Malnutrition and was treated during this hospitalization for the following Nutrition diagnosis/diagnoses:     -  01/06/2021: Morbid obesity (BMI > 40)    This patient has been assessed with a concern for Malnutrition and was treated during this hospitalization for the following Nutrition diagnosis/diagnoses:     -  01/06/2021: Morbid obesity (BMI > 40)    This patient has been assessed with a concern for Malnutrition and was treated during this hospitalization for the following Nutrition diagnosis/diagnoses:     -  01/06/2021: Morbid obesity (BMI > 40)    This patient has been assessed with a concern for Malnutrition and was treated during this hospitalization for the following Nutrition diagnosis/diagnoses:     -  01/06/2021: Morbid obesity (BMI > 40)   This patient has been assessed with a concern for Malnutrition and was treated during this hospitalization for the following Nutrition diagnosis/diagnoses:     -  01/06/2021: Morbid obesity (BMI > 40)    This patient has been assessed with a concern for Malnutrition and was treated during this hospitalization for the following Nutrition diagnosis/diagnoses:     -  01/06/2021: Morbid obesity (BMI > 40)    This patient has been assessed with a concern for Malnutrition and was treated during this hospitalization for the following Nutrition diagnosis/diagnoses:     -  01/06/2021: Morbid obesity (BMI > 40)    This patient has been assessed with a concern for Malnutrition and was treated during this hospitalization for the following Nutrition diagnosis/diagnoses:     -  01/06/2021: Morbid obesity (BMI > 40)    This patient has been assessed with a concern for Malnutrition and was treated during this hospitalization for the following Nutrition diagnosis/diagnoses:     -  01/06/2021: Morbid obesity (BMI > 40)    This patient has been assessed with a concern for Malnutrition and was treated during this hospitalization for the following Nutrition diagnosis/diagnoses:     -  01/06/2021: Morbid obesity (BMI > 40)   This patient has been assessed with a concern for Malnutrition and was treated during this hospitalization for the following Nutrition diagnosis/diagnoses:     -  01/06/2021: Morbid obesity (BMI > 40)    This patient has been assessed with a concern for Malnutrition and was treated during this hospitalization for the following Nutrition diagnosis/diagnoses:     -  01/06/2021: Morbid obesity (BMI > 40)    This patient has been assessed with a concern for Malnutrition and was treated during this hospitalization for the following Nutrition diagnosis/diagnoses:     -  01/06/2021: Morbid obesity (BMI > 40)    This patient has been assessed with a concern for Malnutrition and was treated during this hospitalization for the following Nutrition diagnosis/diagnoses:     -  01/06/2021: Morbid obesity (BMI > 40)    This patient has been assessed with a concern for Malnutrition and was treated during this hospitalization for the following Nutrition diagnosis/diagnoses:     -  01/06/2021: Morbid obesity (BMI > 40)    This patient has been assessed with a concern for Malnutrition and was treated during this hospitalization for the following Nutrition diagnosis/diagnoses:     -  01/06/2021: Morbid obesity (BMI > 40)    This patient has been assessed with a concern for Malnutrition and was treated during this hospitalization for the following Nutrition diagnosis/diagnoses:     -  01/06/2021: Morbid obesity (BMI > 40)

## 2021-02-08 NOTE — DISCHARGE NOTE PROVIDER - NSFOLLOWUPCLINICS_GEN_ALL_ED_FT
[facility] Pulmonolgy and Sleep Medicine  Pulmonology  [address] [place] [address], [place] [address]  Phone: [phone]  Fax:   Follow Up Time: 1 week

## 2021-02-08 NOTE — PROGRESS NOTE ADULT - ATTENDING COMMENTS
Agree with above. Seen and examined with team on rounds. On vent s/p PEG, on abx for MRSA and enterococcus. Will try weaning trials as tolerated. Oral acycovir for oral lesions.

## 2021-02-09 LAB
ANION GAP SERPL CALC-SCNC: 11 MMOL/L — SIGNIFICANT CHANGE UP (ref 7–14)
BUN SERPL-MCNC: 15 MG/DL — SIGNIFICANT CHANGE UP (ref 7–23)
CALCIUM SERPL-MCNC: 9.8 MG/DL — SIGNIFICANT CHANGE UP (ref 8.4–10.5)
CHLORIDE SERPL-SCNC: 96 MMOL/L — LOW (ref 98–107)
CO2 SERPL-SCNC: 28 MMOL/L — SIGNIFICANT CHANGE UP (ref 22–31)
CREAT SERPL-MCNC: 0.58 MG/DL — SIGNIFICANT CHANGE UP (ref 0.5–1.3)
GLUCOSE SERPL-MCNC: 137 MG/DL — HIGH (ref 70–99)
HCT VFR BLD CALC: 30.8 % — LOW (ref 34.5–45)
HGB BLD-MCNC: 9.6 G/DL — LOW (ref 11.5–15.5)
MAGNESIUM SERPL-MCNC: 2.1 MG/DL — SIGNIFICANT CHANGE UP (ref 1.6–2.6)
MCHC RBC-ENTMCNC: 27.8 PG — SIGNIFICANT CHANGE UP (ref 27–34)
MCHC RBC-ENTMCNC: 31.2 GM/DL — LOW (ref 32–36)
MCV RBC AUTO: 89.3 FL — SIGNIFICANT CHANGE UP (ref 80–100)
NRBC # BLD: 0 /100 WBCS — SIGNIFICANT CHANGE UP
NRBC # FLD: 0 K/UL — SIGNIFICANT CHANGE UP
PHOSPHATE SERPL-MCNC: 4 MG/DL — SIGNIFICANT CHANGE UP (ref 2.5–4.5)
PLATELET # BLD AUTO: 532 K/UL — HIGH (ref 150–400)
POTASSIUM SERPL-MCNC: 3.7 MMOL/L — SIGNIFICANT CHANGE UP (ref 3.5–5.3)
POTASSIUM SERPL-SCNC: 3.7 MMOL/L — SIGNIFICANT CHANGE UP (ref 3.5–5.3)
RBC # BLD: 3.45 M/UL — LOW (ref 3.8–5.2)
RBC # FLD: 16.7 % — HIGH (ref 10.3–14.5)
SODIUM SERPL-SCNC: 135 MMOL/L — SIGNIFICANT CHANGE UP (ref 135–145)
WBC # BLD: 10.11 K/UL — SIGNIFICANT CHANGE UP (ref 3.8–10.5)
WBC # FLD AUTO: 10.11 K/UL — SIGNIFICANT CHANGE UP (ref 3.8–10.5)

## 2021-02-09 PROCEDURE — 99223 1ST HOSP IP/OBS HIGH 75: CPT | Mod: GC

## 2021-02-09 RX ORDER — MIRTAZAPINE 45 MG/1
15 TABLET, ORALLY DISINTEGRATING ORAL AT BEDTIME
Refills: 0 | Status: DISCONTINUED | OUTPATIENT
Start: 2021-02-16 | End: 2021-03-03

## 2021-02-09 RX ORDER — CHLORHEXIDINE GLUCONATE 213 G/1000ML
15 SOLUTION TOPICAL EVERY 12 HOURS
Refills: 0 | Status: DISCONTINUED | OUTPATIENT
Start: 2021-02-09 | End: 2021-02-14

## 2021-02-09 RX ORDER — MIRTAZAPINE 45 MG/1
7.5 TABLET, ORALLY DISINTEGRATING ORAL AT BEDTIME
Refills: 0 | Status: COMPLETED | OUTPATIENT
Start: 2021-02-09 | End: 2021-02-15

## 2021-02-09 RX ADMIN — Medication 1 APPLICATION(S): at 07:34

## 2021-02-09 RX ADMIN — Medication 0.1 MILLIGRAM(S): at 13:17

## 2021-02-09 RX ADMIN — Medication 400 MILLIGRAM(S): at 13:16

## 2021-02-09 RX ADMIN — Medication 650 MILLIGRAM(S): at 22:51

## 2021-02-09 RX ADMIN — MUPIROCIN 1 APPLICATION(S): 20 OINTMENT TOPICAL at 05:25

## 2021-02-09 RX ADMIN — Medication 400 MILLIGRAM(S): at 22:48

## 2021-02-09 RX ADMIN — Medication 1 MILLIGRAM(S): at 16:59

## 2021-02-09 RX ADMIN — MUPIROCIN 1 APPLICATION(S): 20 OINTMENT TOPICAL at 17:01

## 2021-02-09 RX ADMIN — Medication 1 APPLICATION(S): at 17:00

## 2021-02-09 RX ADMIN — Medication 650 MILLIGRAM(S): at 05:24

## 2021-02-09 RX ADMIN — ENOXAPARIN SODIUM 40 MILLIGRAM(S): 100 INJECTION SUBCUTANEOUS at 05:25

## 2021-02-09 RX ADMIN — POLYETHYLENE GLYCOL 3350 17 GRAM(S): 17 POWDER, FOR SOLUTION ORAL at 05:25

## 2021-02-09 RX ADMIN — OXYCODONE HYDROCHLORIDE 5 MILLIGRAM(S): 5 TABLET ORAL at 05:24

## 2021-02-09 RX ADMIN — Medication 0.1 MILLIGRAM(S): at 05:25

## 2021-02-09 RX ADMIN — OXYCODONE HYDROCHLORIDE 5 MILLIGRAM(S): 5 TABLET ORAL at 16:59

## 2021-02-09 RX ADMIN — Medication 1 MILLIGRAM(S): at 05:24

## 2021-02-09 RX ADMIN — Medication 0.1 MILLIGRAM(S): at 22:48

## 2021-02-09 RX ADMIN — Medication 650 MILLIGRAM(S): at 13:16

## 2021-02-09 RX ADMIN — ENOXAPARIN SODIUM 40 MILLIGRAM(S): 100 INJECTION SUBCUTANEOUS at 17:00

## 2021-02-09 RX ADMIN — QUETIAPINE FUMARATE 50 MILLIGRAM(S): 200 TABLET, FILM COATED ORAL at 22:50

## 2021-02-09 RX ADMIN — MIRTAZAPINE 7.5 MILLIGRAM(S): 45 TABLET, ORALLY DISINTEGRATING ORAL at 22:48

## 2021-02-09 RX ADMIN — CHLORHEXIDINE GLUCONATE 15 MILLILITER(S): 213 SOLUTION TOPICAL at 17:01

## 2021-02-09 RX ADMIN — POLYETHYLENE GLYCOL 3350 17 GRAM(S): 17 POWDER, FOR SOLUTION ORAL at 17:00

## 2021-02-09 RX ADMIN — CHLORHEXIDINE GLUCONATE 1 APPLICATION(S): 213 SOLUTION TOPICAL at 05:26

## 2021-02-09 RX ADMIN — QUETIAPINE FUMARATE 12.5 MILLIGRAM(S): 200 TABLET, FILM COATED ORAL at 13:17

## 2021-02-09 RX ADMIN — Medication 650 MILLIGRAM(S): at 16:59

## 2021-02-09 RX ADMIN — Medication 1: at 05:34

## 2021-02-09 RX ADMIN — Medication 400 MILLIGRAM(S): at 05:24

## 2021-02-09 NOTE — PROGRESS NOTE ADULT - ASSESSMENT
47 YO F with Morbid Obesity and DM2 A1C 6.6 admitted for ARDS second to SARS-COV-2, intubated 1/5-1/10 then transferred to Medicine, however failed BIPAP and reintubated 1/16. Course complicated UTI, Mouth Sores and Enterobacter and MRSA VAP. s/p Tracheostomy 1/28 and PEG 1/26. Now transferred to RCU.     # ICU Delirium/ Agitation   - Now weaned off sedation, however delirious.  - Continue on Seroquel 12.5mg AM and 50mg QHS and Klonopin 1mg BID  - Limit pain likely from mouth sores. Continue Oxy IR PRN.   - Monitor mentation.      # Depression  - Crying consistently, however  remains great support system via Facetime.   - Patient able to assess  whenever.   - Hold medications and continue supportive care.     # ARDS second to SARS-COV-2 vs Superimposed Enterobacter and MRSA PNA   - Completed Remdesivir, Decadron and ABX as belowed.   - s/p Prolonged ICU stay and Tracheostomy on 1/28  - Continue on Vent and PS 5/5/40 trials as tolerated.   - Proventil and Chest PT Q6H. Suction PRN. Trach care QD.     # Septic vs Vasoplegic Shock   - Off all Pressors. Monitor HR/ BP     # Dysphagic with PEG   - s/p PEG 1/26 and continue on TF as tolerated.   - Constipated and bowel regimen (Senna, Miralax and Dulcolax) started.     # Hyponatremia/ Hypernatremia   - Monitor electrolytes and renal function as tolerated.     # Sepsis second to SARS-COV-2 vs Enterobacter and MRSA PNA vs HSV Type 1   - Completed empiric ABX for PNA with Zosyn (1/16-1/20), however cultures negative   - ECOLI UTI (1/25) s/p CTX (1/25-1/27).   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     # DM2 A1C 6.6 (1/2021)   - Continue on ISS and monitor FS Q6H    # Wounds   - WOC recommendations appreciated.      # DVT PPX with Lovenox BID   # CODE STATUS - Full Code   # DISPO - PT recommends Rehab. Wound be a good acute rehab candidate if able to come of vent.    45 YO F with Morbid Obesity and DM2 A1C 6.6 admitted for ARDS second to SARS-COV-2, intubated 1/5-1/10 then transferred to Medicine, however failed BIPAP and reintubated 1/16. Course complicated UTI, Mouth Sores and Enterobacter and MRSA VAP. s/p Tracheostomy 1/28 and PEG 1/26. Now transferred to RCU.     # ICU Delirium/ Agitation   - Now weaned off sedation, however delirious.  - Continue on Seroquel 12.5mg AM and 50mg QHS and Klonopin 1mg BID  - Limit pain likely from mouth sores. Continue Oxy IR PRN.   - Monitor mentation.      # Depression  - Crying consistently, however  remains great support system via Facetime.   - Patient able to assess  whenever.   - Hold medications and continue supportive care.     # ARDS second to SARS-COV-2 vs Superimposed Enterobacter and MRSA PNA   - Completed Remdesivir, Decadron and ABX as belowed.   - s/p Prolonged ICU stay and Tracheostomy on 1/28  - Continue on Vent and PS 5/5/40 trials as tolerated.   - Proventil and Chest PT Q6H. Suction PRN. Trach care QD.     # Septic vs Vasoplegic Shock   - Off all Pressors. Monitor HR/ BP     # Dysphagic with PEG   - s/p PEG 1/26 and continue on TF as tolerated.   - Constipated and bowel regimen (Senna, Miralax and Dulcolax) started.     #   - Failed TOV 2/3. Keep Haque for now and TOV again when more ambulatory.   - Hyponatremia/ Hypernatremia, monitor electrolytes and renal function as tolerated.     # Sepsis second to SARS-COV-2 vs Enterobacter and MRSA PNA vs HSV Type 1   - Completed empiric ABX for PNA with Zosyn (1/16-1/20), however cultures negative   - ECOLI UTI (1/25) s/p CTX (1/25-1/27).   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     # DM2 A1C 6.6 (1/2021)   - Continue on ISS and monitor FS Q6H    # Wounds   - WOC recommendations appreciated.      # DVT PPX with Lovenox BID   # CODE STATUS - Full Code   # DISPO - PT recommends Rehab. Wound be a good acute rehab candidate if able to come of vent.    45 YO F with Morbid Obesity and DM2 A1C 6.6 admitted for ARDS second to SARS-COV-2, intubated 1/5-1/10 then transferred to Medicine, however failed BIPAP and reintubated 1/16. Course complicated UTI, Mouth Sores and Enterobacter and MRSA VAP. s/p Tracheostomy 1/28 and PEG 1/26. Now transferred to RCU.     # ICU Delirium/ Agitation   - Now weaned off sedation, however delirious.  - Continue on Seroquel 12.5mg AM and 50mg QHS and Klonopin 1mg BID   - Monitor mentation.      # Depression  - Crying consistently, however  remains great support system via Facetime.   - Started on Remeron.   - Supportive care.     # ARDS second to SARS-COV-2 vs Superimposed Enterobacter and MRSA PNA   - Completed Remdesivir, Decadron and ABX as belowed.   - s/p Prolonged ICU stay and Tracheostomy on 1/28  - Tolerated TC well today. Keep on TC during the day and PS 5/5/30 overnight.    - Proventil and Chest PT Q6H. Suction PRN. Trach care QD.     # Septic vs Vasoplegic Shock   - Off all Pressors. Monitor HR/ BP     # Dysphagic with PEG   - s/p PEG 1/26 and continue on TF as tolerated.   - Constipated and bowel regimen (Senna and Miralax and Dulcolax PRN) started with improvement.     #   - Failed TOV 2/3. Keep Haque for now and TOV again when more ambulatory.   - Hyponatremia/ Hypernatremia, monitor electrolytes and renal function as tolerated.     # Sepsis second to SARS-COV-2 vs Enterobacter and MRSA PNA vs HSV Type 1   - Completed empiric ABX for PNA with Zosyn (1/16-1/20), however cultures negative   - ECOLI UTI (1/25) s/p CTX (1/25-1/27).   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     # DM2 A1C 6.6 (1/2021)   - Continue on ISS and monitor FS Q6H    # Wounds   - WOC recommendations appreciated.      # DVT PPX with Lovenox BID   # CODE STATUS - Full Code   # DISPO - PT recommends Rehab.

## 2021-02-09 NOTE — PROGRESS NOTE ADULT - SUBJECTIVE AND OBJECTIVE BOX
CHIEF COMPLAINT: Patient is a 46y old  Female who presents with a chief complaint of COVID (08 Feb 2021 15:25)    SUBJECTIVE: No interval events overnight     OBJECTIVE:  ICU Vital Signs Last 24 Hrs  T(C): 37.2 (09 Feb 2021 05:20), Max: 37.2 (08 Feb 2021 10:00)  T(F): 98.9 (09 Feb 2021 05:20), Max: 98.9 (08 Feb 2021 10:00)  HR: 88 (09 Feb 2021 06:51) (88 - 100)  BP: 112/75 (09 Feb 2021 05:20) (102/76 - 122/86)  BP(mean): --  ABP: --  ABP(mean): --  RR: 24 (09 Feb 2021 05:20) (21 - 24)  SpO2: 100% (09 Feb 2021 06:51) (99% - 100%)    Mode: CPAP with PS, FiO2: 40, PEEP: 5, PS: 8, MAP: 8, PIP: 20    02-08 @ 07:01  -  02-09 @ 07:00  --------------------------------------------------------  IN: 1990 mL / OUT: 1850 mL / NET: 140 mL    CAPILLARY BLOOD GLUCOSE  POCT Blood Glucose.: 158 mg/dL (09 Feb 2021 05:27)    PHYSICAL EXAM:  General: NAD and well groomed   Neck: Trach clean, dry and intact. Sutures removed today.   Cardio: RRR, S1/S2, no murmurs or rubs.   Pulm: Coarse vent sounds noted throughout, equal bilaterally.   GI: Soft, NDNT, BS (+). PEG (+)   : Haque intact and draining yellow urine.   MS: No pedal edema . Moves all extremities, but limited by weakness.   Neuro: Awake and alert. No focal neurological deficits noted.   Skin: Warm and dry. No jaundice or cyanosis   Psych: Depressed/ Sad     HOSPITAL MEDICATIONS:  MEDICATIONS  (STANDING):  acetaminophen    Suspension .. 650 milliGRAM(s) Oral every 6 hours  acyclovir    Suspension 400 milliGRAM(s) Oral three times a day  BACItracin   Ointment 1 Application(s) Topical two times a day  bisacodyl Suppository 10 milliGRAM(s) Rectal at bedtime  chlorhexidine 4% Liquid 1 Application(s) Topical daily  clonazePAM  Tablet 1 milliGRAM(s) Oral <User Schedule>  cloNIDine 0.1 milliGRAM(s) Oral every 8 hours  dextrose 5%. 1000 milliLiter(s) (50 mL/Hr) IV Continuous <Continuous>  dextrose 5%. 1000 milliLiter(s) (100 mL/Hr) IV Continuous <Continuous>  enoxaparin Injectable 40 milliGRAM(s) SubCutaneous every 12 hours  glucagon  Injectable 1 milliGRAM(s) IntraMuscular once  insulin lispro (ADMELOG) corrective regimen sliding scale   SubCutaneous every 6 hours  multivitamin/minerals/iron Oral Solution (CENTRUM) 15 milliLiter(s) Oral daily  mupirocin 2% Ointment 1 Application(s) Topical two times a day  oxyCODONE    IR 5 milliGRAM(s) Oral every 12 hours  polyethylene glycol 3350 17 Gram(s) Oral two times a day  QUEtiapine 12.5 milliGRAM(s) Oral daily  QUEtiapine 50 milliGRAM(s) Oral at bedtime  senna Syrup 10 milliLiter(s) Oral daily    MEDICATIONS  (PRN):  ALBUTerol    90 MICROgram(s) HFA Inhaler 2 Puff(s) Inhalation every 6 hours PRN Wheezing    LABS:                        9.6    10.11 )-----------( 532      ( 09 Feb 2021 06:23 )             30.8     02-09    135  |  96<L>  |  15  ----------------------------<  137<H>  3.7   |  28  |  0.58    Ca    9.8      09 Feb 2021 05:35  Phos  4.0     02-09  Mg     2.1     02-09    TPro  7.5  /  Alb  3.7  /  TBili  0.2  /  DBili  x   /  AST  19  /  ALT  24  /  AlkPhos  109  02-08 CHIEF COMPLAINT: Patient is a 46y old  Female who presents with a chief complaint of COVID (08 Feb 2021 15:25)    SUBJECTIVE: No interval events overnight. Seen by bedside and noted to be crying and depressed. Supportive care provided. Placed on PS support and noted to tolerate well. ROS limited second to poor phonation on vent. Will attempt TC today.      OBJECTIVE:  ICU Vital Signs Last 24 Hrs  T(C): 37.2 (09 Feb 2021 05:20), Max: 37.2 (08 Feb 2021 10:00)  T(F): 98.9 (09 Feb 2021 05:20), Max: 98.9 (08 Feb 2021 10:00)  HR: 88 (09 Feb 2021 06:51) (88 - 100)  BP: 112/75 (09 Feb 2021 05:20) (102/76 - 122/86)  BP(mean): --  ABP: --  ABP(mean): --  RR: 24 (09 Feb 2021 05:20) (21 - 24)  SpO2: 100% (09 Feb 2021 06:51) (99% - 100%)    Mode: CPAP with PS, FiO2: 40, PEEP: 5, PS: 8, MAP: 8, PIP: 20    02-08 @ 07:01  -  02-09 @ 07:00  --------------------------------------------------------  IN: 1990 mL / OUT: 1850 mL / NET: 140 mL    CAPILLARY BLOOD GLUCOSE  POCT Blood Glucose.: 158 mg/dL (09 Feb 2021 05:27)    PHYSICAL EXAM:  General: NAD and well groomed   Neck: Trach clean, dry and intact. Sutures removed today.   Cardio: RRR, S1/S2, no murmurs or rubs.   Pulm: Coarse vent sounds noted throughout, equal bilaterally.   GI: Soft, NDNT, BS (+). PEG (+)   : Haque intact and draining yellow urine.   MS: No pedal edema . Moves all extremities, but limited by weakness.   Neuro: Awake and alert. No focal neurological deficits noted.   Skin: Warm and dry. No jaundice or cyanosis   Psych: Depressed/ Sad     HOSPITAL MEDICATIONS:  MEDICATIONS  (STANDING):  acetaminophen    Suspension .. 650 milliGRAM(s) Oral every 6 hours  acyclovir    Suspension 400 milliGRAM(s) Oral three times a day  BACItracin   Ointment 1 Application(s) Topical two times a day  bisacodyl Suppository 10 milliGRAM(s) Rectal at bedtime  chlorhexidine 4% Liquid 1 Application(s) Topical daily  clonazePAM  Tablet 1 milliGRAM(s) Oral <User Schedule>  cloNIDine 0.1 milliGRAM(s) Oral every 8 hours  dextrose 5%. 1000 milliLiter(s) (50 mL/Hr) IV Continuous <Continuous>  dextrose 5%. 1000 milliLiter(s) (100 mL/Hr) IV Continuous <Continuous>  enoxaparin Injectable 40 milliGRAM(s) SubCutaneous every 12 hours  glucagon  Injectable 1 milliGRAM(s) IntraMuscular once  insulin lispro (ADMELOG) corrective regimen sliding scale   SubCutaneous every 6 hours  multivitamin/minerals/iron Oral Solution (CENTRUM) 15 milliLiter(s) Oral daily  mupirocin 2% Ointment 1 Application(s) Topical two times a day  oxyCODONE    IR 5 milliGRAM(s) Oral every 12 hours  polyethylene glycol 3350 17 Gram(s) Oral two times a day  QUEtiapine 12.5 milliGRAM(s) Oral daily  QUEtiapine 50 milliGRAM(s) Oral at bedtime  senna Syrup 10 milliLiter(s) Oral daily    MEDICATIONS  (PRN):  ALBUTerol    90 MICROgram(s) HFA Inhaler 2 Puff(s) Inhalation every 6 hours PRN Wheezing    LABS:                        9.6    10.11 )-----------( 532      ( 09 Feb 2021 06:23 )             30.8     02-09    135  |  96<L>  |  15  ----------------------------<  137<H>  3.7   |  28  |  0.58    Ca    9.8      09 Feb 2021 05:35  Phos  4.0     02-09  Mg     2.1     02-09    TPro  7.5  /  Alb  3.7  /  TBili  0.2  /  DBili  x   /  AST  19  /  ALT  24  /  AlkPhos  109  02-08

## 2021-02-09 NOTE — H&P ADULT - NSCORESITESY/N_GEN_A_CORE_RD
No Render Risk Assessment In Note?: no Additional Notes: .015 samples used Detail Level: Simple Additional Notes: Patient consent was obtained to proceed with the visit and recommended plan of care after discussion of all risks and benefits, including the risks of COVID-19 exposure.

## 2021-02-09 NOTE — PROGRESS NOTE ADULT - SUBJECTIVE AND OBJECTIVE BOX
DATE OF SERVICE: 02-09-21     Subjective: Patient seen and examined. No new events except as noted.       MEDICATIONS:  MEDICATIONS  (STANDING):  acetaminophen    Suspension .. 650 milliGRAM(s) Oral every 6 hours  acyclovir    Suspension 400 milliGRAM(s) Oral three times a day  BACItracin   Ointment 1 Application(s) Topical two times a day  chlorhexidine 0.12% Liquid 15 milliLiter(s) Oral Mucosa every 12 hours  chlorhexidine 4% Liquid 1 Application(s) Topical daily  clonazePAM  Tablet 1 milliGRAM(s) Oral <User Schedule>  cloNIDine 0.1 milliGRAM(s) Oral every 8 hours  dextrose 5%. 1000 milliLiter(s) (50 mL/Hr) IV Continuous <Continuous>  dextrose 5%. 1000 milliLiter(s) (100 mL/Hr) IV Continuous <Continuous>  enoxaparin Injectable 40 milliGRAM(s) SubCutaneous every 12 hours  glucagon  Injectable 1 milliGRAM(s) IntraMuscular once  insulin lispro (ADMELOG) corrective regimen sliding scale   SubCutaneous every 6 hours  mirtazapine 7.5 milliGRAM(s) Oral at bedtime  multivitamin 1 Tablet(s) Oral daily  mupirocin 2% Ointment 1 Application(s) Topical two times a day  oxyCODONE    IR 5 milliGRAM(s) Oral every 12 hours  polyethylene glycol 3350 17 Gram(s) Oral two times a day  QUEtiapine 12.5 milliGRAM(s) Oral daily  QUEtiapine 50 milliGRAM(s) Oral at bedtime  senna Syrup 10 milliLiter(s) Oral daily      PHYSICAL EXAM:  T(C): 36.9 (02-09-21 @ 17:00), Max: 37.2 (02-09-21 @ 05:20)  HR: 105 (02-09-21 @ 17:00) (88 - 105)  BP: 108/73 (02-09-21 @ 17:00) (105/71 - 120/80)  RR: 20 (02-09-21 @ 17:00) (20 - 24)  SpO2: 98% (02-09-21 @ 17:00) (98% - 100%)  Wt(kg): --  I&O's Summary    08 Feb 2021 07:01  -  09 Feb 2021 07:00  --------------------------------------------------------  IN: 1990 mL / OUT: 1850 mL / NET: 140 mL          Appearance: awake, calm   HEENT: trache   Lymphatic: No lymphadenopathy   Cardiovascular: Normal S1 S2  Respiratory: normal effort , clear  Gastrointestinal:  Soft, PEG   Skin: No rashes,  warm to touch  Psychiatry:  Mood & affect appropriate  Musculuskeletal: No edema      All labs, Imaging and EKGs personally reviewed                             9.6    10.11 )-----------( 532      ( 09 Feb 2021 06:23 )             30.8               02-09    135  |  96<L>  |  15  ----------------------------<  137<H>  3.7   |  28  |  0.58    Ca    9.8      09 Feb 2021 05:35  Phos  4.0     02-09  Mg     2.1     02-09    TPro  7.5  /  Alb  3.7  /  TBili  0.2  /  DBili  x   /  AST  19  /  ALT  24  /  AlkPhos  109  02-08

## 2021-02-09 NOTE — PROGRESS NOTE ADULT - ATTENDING COMMENTS
Agree with above. Seen and examined with team on rounds. On acyclovir. Trach collar as tolerated. Supportive care.

## 2021-02-09 NOTE — PROGRESS NOTE ADULT - ASSESSMENT
46 F w respiratory failure due to COVID 19     Problem/Recommendation - 1:  Problem: Acute respiratory failure with hypoxia. Recommendation: s/p tracheostomy and PEG. No signs of post proceure issues at this time  -c/w G tube feeds       Problem/Recommendation - 2:  ·  Problem: COVID-19.  Recommendation: status post dexamethasone.   on abx per ID for staph in sputum     Problem/Recommendation - 3:  ·  Problem: Normocytic anemia.  Recommendation: without overt GI bleeding. Likely due to critical illness. Trended down today  -monitor for GI bleed  -PPI QD.      Problem/Recommendation - 4:  ·  Problem: Abnormal LFTs.  Recommendation: multifactorial, likely NAFLD, COVID, critical illness. Can consider US to r/o gallstone disease, especially if increasing.      Problem/Recommendation - 5:  ·  Problem: Morbid obesity.         Attending Attestation:   Differential diagnosis and plan of care discussed with patient after the evaluation  35 Minutes spent on total encounter of which more than fifty percent of the encounter was spent counseling and/or coordinating care by the attending physician.    Mele Bolanos M.D.   Gastroenterology and Hepatology  Cell: [phone].

## 2021-02-10 LAB
ANION GAP SERPL CALC-SCNC: 12 MMOL/L — SIGNIFICANT CHANGE UP (ref 7–14)
BUN SERPL-MCNC: 14 MG/DL — SIGNIFICANT CHANGE UP (ref 7–23)
CALCIUM SERPL-MCNC: 9.5 MG/DL — SIGNIFICANT CHANGE UP (ref 8.4–10.5)
CHLORIDE SERPL-SCNC: 97 MMOL/L — LOW (ref 98–107)
CO2 SERPL-SCNC: 27 MMOL/L — SIGNIFICANT CHANGE UP (ref 22–31)
CREAT SERPL-MCNC: 0.54 MG/DL — SIGNIFICANT CHANGE UP (ref 0.5–1.3)
GLUCOSE SERPL-MCNC: 168 MG/DL — HIGH (ref 70–99)
HCT VFR BLD CALC: 30 % — LOW (ref 34.5–45)
HGB BLD-MCNC: 9.5 G/DL — LOW (ref 11.5–15.5)
MAGNESIUM SERPL-MCNC: 1.9 MG/DL — SIGNIFICANT CHANGE UP (ref 1.6–2.6)
MCHC RBC-ENTMCNC: 27.6 PG — SIGNIFICANT CHANGE UP (ref 27–34)
MCHC RBC-ENTMCNC: 31.7 GM/DL — LOW (ref 32–36)
MCV RBC AUTO: 87.2 FL — SIGNIFICANT CHANGE UP (ref 80–100)
NRBC # BLD: 0 /100 WBCS — SIGNIFICANT CHANGE UP
NRBC # FLD: 0 K/UL — SIGNIFICANT CHANGE UP
PHOSPHATE SERPL-MCNC: 3.9 MG/DL — SIGNIFICANT CHANGE UP (ref 2.5–4.5)
PLATELET # BLD AUTO: 462 K/UL — HIGH (ref 150–400)
POTASSIUM SERPL-MCNC: 3.8 MMOL/L — SIGNIFICANT CHANGE UP (ref 3.5–5.3)
POTASSIUM SERPL-SCNC: 3.8 MMOL/L — SIGNIFICANT CHANGE UP (ref 3.5–5.3)
RBC # BLD: 3.44 M/UL — LOW (ref 3.8–5.2)
RBC # FLD: 16.6 % — HIGH (ref 10.3–14.5)
SODIUM SERPL-SCNC: 136 MMOL/L — SIGNIFICANT CHANGE UP (ref 135–145)
WBC # BLD: 9.14 K/UL — SIGNIFICANT CHANGE UP (ref 3.8–10.5)
WBC # FLD AUTO: 9.14 K/UL — SIGNIFICANT CHANGE UP (ref 3.8–10.5)

## 2021-02-10 PROCEDURE — 99223 1ST HOSP IP/OBS HIGH 75: CPT | Mod: GC

## 2021-02-10 PROCEDURE — 99222 1ST HOSP IP/OBS MODERATE 55: CPT

## 2021-02-10 RX ORDER — POLYETHYLENE GLYCOL 3350 17 G/17G
17 POWDER, FOR SOLUTION ORAL DAILY
Refills: 0 | Status: DISCONTINUED | OUTPATIENT
Start: 2021-02-10 | End: 2021-02-17

## 2021-02-10 RX ORDER — OXYCODONE HYDROCHLORIDE 5 MG/1
5 TABLET ORAL EVERY 12 HOURS
Refills: 0 | Status: DISCONTINUED | OUTPATIENT
Start: 2021-02-10 | End: 2021-02-11

## 2021-02-10 RX ADMIN — CHLORHEXIDINE GLUCONATE 1 APPLICATION(S): 213 SOLUTION TOPICAL at 05:27

## 2021-02-10 RX ADMIN — Medication 400 MILLIGRAM(S): at 21:09

## 2021-02-10 RX ADMIN — MUPIROCIN 1 APPLICATION(S): 20 OINTMENT TOPICAL at 05:28

## 2021-02-10 RX ADMIN — Medication 1 MILLIGRAM(S): at 17:45

## 2021-02-10 RX ADMIN — ENOXAPARIN SODIUM 40 MILLIGRAM(S): 100 INJECTION SUBCUTANEOUS at 05:28

## 2021-02-10 RX ADMIN — MUPIROCIN 1 APPLICATION(S): 20 OINTMENT TOPICAL at 17:47

## 2021-02-10 RX ADMIN — Medication 1: at 06:09

## 2021-02-10 RX ADMIN — Medication 1 MILLIGRAM(S): at 05:27

## 2021-02-10 RX ADMIN — MIRTAZAPINE 7.5 MILLIGRAM(S): 45 TABLET, ORALLY DISINTEGRATING ORAL at 21:09

## 2021-02-10 RX ADMIN — Medication 0.1 MILLIGRAM(S): at 21:09

## 2021-02-10 RX ADMIN — Medication 650 MILLIGRAM(S): at 05:26

## 2021-02-10 RX ADMIN — ENOXAPARIN SODIUM 40 MILLIGRAM(S): 100 INJECTION SUBCUTANEOUS at 17:47

## 2021-02-10 RX ADMIN — Medication 650 MILLIGRAM(S): at 17:45

## 2021-02-10 RX ADMIN — Medication 400 MILLIGRAM(S): at 05:27

## 2021-02-10 RX ADMIN — CHLORHEXIDINE GLUCONATE 15 MILLILITER(S): 213 SOLUTION TOPICAL at 17:47

## 2021-02-10 RX ADMIN — Medication 1 APPLICATION(S): at 05:28

## 2021-02-10 RX ADMIN — Medication 1 TABLET(S): at 12:55

## 2021-02-10 RX ADMIN — Medication 0.1 MILLIGRAM(S): at 05:27

## 2021-02-10 RX ADMIN — Medication 30 MILLILITER(S): at 12:55

## 2021-02-10 RX ADMIN — Medication 0.1 MILLIGRAM(S): at 13:02

## 2021-02-10 RX ADMIN — Medication 400 MILLIGRAM(S): at 12:57

## 2021-02-10 RX ADMIN — OXYCODONE HYDROCHLORIDE 5 MILLIGRAM(S): 5 TABLET ORAL at 17:45

## 2021-02-10 RX ADMIN — Medication 650 MILLIGRAM(S): at 12:55

## 2021-02-10 RX ADMIN — POLYETHYLENE GLYCOL 3350 17 GRAM(S): 17 POWDER, FOR SOLUTION ORAL at 21:09

## 2021-02-10 RX ADMIN — Medication 1 APPLICATION(S): at 17:47

## 2021-02-10 RX ADMIN — CHLORHEXIDINE GLUCONATE 15 MILLILITER(S): 213 SOLUTION TOPICAL at 05:27

## 2021-02-10 RX ADMIN — QUETIAPINE FUMARATE 50 MILLIGRAM(S): 200 TABLET, FILM COATED ORAL at 21:09

## 2021-02-10 RX ADMIN — QUETIAPINE FUMARATE 12.5 MILLIGRAM(S): 200 TABLET, FILM COATED ORAL at 12:55

## 2021-02-10 RX ADMIN — OXYCODONE HYDROCHLORIDE 5 MILLIGRAM(S): 5 TABLET ORAL at 05:27

## 2021-02-10 NOTE — PROGRESS NOTE ADULT - ASSESSMENT
46 F w respiratory failure due to COVID 19     Problem/Recommendation - 1:  Problem: Acute respiratory failure with hypoxia. Recommendation: s/p tracheostomy and PEG. No signs of post proceure issues at this time  - c/w G tube feeds  - PPI per team  - RCU care   - ativan for agitation PRN   - Seroquel and Klonopin   - TOV as tolerated   - Rehab eval      Problem/Recommendation - 2:  ·  Problem: COVID-19.  Recommendation: status post dexamethasone.      Problem/Recommendation - 3:  ·  Problem: Normocytic anemia.  Recommendation: without overt GI bleeding. Likely due to critical illness.   -monitor for GI bleed  -PPI QD.      Problem/Recommendation - 4:  ·  Problem: Abnormal LFTs.  Recommendation: multifactorial, likely NAFLD, COVID, critical illness. Can consider US to r/o gallstone disease, especially if increasing.   defer to GI      Problem/Recommendation - 5:  ·  Problem: Morbid obesity.      Problem/Recommendation - 6:  Problem: Constipation. Recommendation: consider XR abdomen to assess for ileus  on a bowel regimen.

## 2021-02-10 NOTE — PROGRESS NOTE ADULT - SUBJECTIVE AND OBJECTIVE BOX
DATE OF SERVICE: 02-10-21     Subjective: Patient seen and examined. No new events except as noted.       MEDICATIONS:  MEDICATIONS  (STANDING):  acetaminophen    Suspension .. 650 milliGRAM(s) Oral every 6 hours  acyclovir    Suspension 400 milliGRAM(s) Oral three times a day  BACItracin   Ointment 1 Application(s) Topical two times a day  chlorhexidine 0.12% Liquid 15 milliLiter(s) Oral Mucosa every 12 hours  chlorhexidine 4% Liquid 1 Application(s) Topical daily  clonazePAM  Tablet 1 milliGRAM(s) Oral <User Schedule>  cloNIDine 0.1 milliGRAM(s) Oral every 8 hours  dextrose 5%. 1000 milliLiter(s) (50 mL/Hr) IV Continuous <Continuous>  dextrose 5%. 1000 milliLiter(s) (100 mL/Hr) IV Continuous <Continuous>  enoxaparin Injectable 40 milliGRAM(s) SubCutaneous every 12 hours  glucagon  Injectable 1 milliGRAM(s) IntraMuscular once  insulin lispro (ADMELOG) corrective regimen sliding scale   SubCutaneous every 6 hours  mirtazapine 7.5 milliGRAM(s) Oral at bedtime  multivitamin 1 Tablet(s) Oral daily  mupirocin 2% Ointment 1 Application(s) Topical two times a day  oxyCODONE    Solution 5 milliGRAM(s) Oral every 12 hours  polyethylene glycol 3350 17 Gram(s) Oral two times a day  QUEtiapine 12.5 milliGRAM(s) Oral daily  QUEtiapine 50 milliGRAM(s) Oral at bedtime  senna Syrup 10 milliLiter(s) Oral daily      PHYSICAL EXAM:  T(C): 37 (02-10-21 @ 13:00), Max: 37 (02-10-21 @ 13:00)  HR: 98 (02-10-21 @ 13:00) (81 - 105)  BP: 127/87 (02-10-21 @ 13:00) (108/73 - 127/87)  RR: 22 (02-10-21 @ 13:00) (20 - 36)  SpO2: 97% (02-10-21 @ 13:00) (97% - 100%)  Wt(kg): --  I&O's Summary    09 Feb 2021 07:01  -  10 Feb 2021 07:00  --------------------------------------------------------  IN: 1540 mL / OUT: 1200 mL / NET: 340 mL          Appearance: awake, calm   HEENT:  trache   Lymphatic: No lymphadenopathy   Cardiovascular: Normal S1 S2  Respiratory: normal effort , clear  Gastrointestinal:  Soft, PEG   Skin: No rashes,  warm to touch  Psychiatry:  Mood & affect appropriate  Musculuskeletal: No edema      All labs, Imaging and EKGs personally reviewed                           9.5    9.14  )-----------( 462      ( 10 Feb 2021 06:26 )             30.0               02-10    136  |  97<L>  |  14  ----------------------------<  168<H>  3.8   |  27  |  0.54    Ca    9.5      10 Feb 2021 06:26  Phos  3.9     02-10  Mg     1.9     02-10

## 2021-02-10 NOTE — CONSULT NOTE ADULT - SUBJECTIVE AND OBJECTIVE BOX
Patient is a 46y old  Female who presents with a chief complaint of COVID (10 Feb 2021 07:00)      HPI:  Patient is a 46 year old woman with no past medical history coming in with shortness of breath, cough productive of white sputum, pleuritic rib pain (moderate in severity) for two days and diarrhea for one day. Patient  tested positive for COVID 12/29. Patient denies loss of taste or smell. Patient denies sore throat however states mouth is dry. Patient denies any history of lung disease or smoking. Patient states she was having subjective fevers at home however never took her temperature. Patient was taking an unknown antibiotic prescribed by a doctor. Unable to give name of doctor or name of abx. Patient dyspneic during with talking during interview.   ED Course: T 98.6, HR 96, /90, S 80% on RA. White count 10k. Hemoglobin 11.0. Plt 236. D-dimer 243. CRP 81.3. . CXR peripheral opacities consistent w/ COVID. Patient given 6 of dexamethasone. Patient admitted for acute hypoxic respiratory failure in the setting of likely COVID infection.  (03 Jan 2021 18:35)    admitted for ARDS second to SARS-COV-2, intubated 1/5-1/10 then transferred to Medicine, however failed BIPAP and reintubated 1/16. Course complicated UTI, Mouth Sores and Enterobacter and MRSA VAP. s/p Tracheostomy 1/28 and PEG 1/26. Now transferred to RCU.       REVIEW OF SYSTEMS  Constitutional - No fever, No weight loss, No fatigue  HEENT - No eye pain, No visual disturbances, No difficulty hearing, No tinnitus, No vertigo, No neck pain  Respiratory - No cough, No wheezing, No shortness of breath  Cardiovascular - No chest pain, No palpitations  Gastrointestinal - No abdominal pain, No nausea, No vomiting, No diarrhea, No constipation  Genitourinary - No dysuria, No frequency, No hematuria, No incontinence  Neurological - No headaches, No memory loss, No loss of strength, No numbness, No tremors  Skin - No itching, No rashes, No lesions   Endocrine - No temperature intolerance  Musculoskeletal - No joint pain, No joint swelling, No muscle pain  Psychiatric - + depression, No anxiety    PAST MEDICAL & SURGICAL HISTORY  No pertinent past medical history  No significant past surgical history        SOCIAL HISTORY  Smoking - Denied  EtOH - Denied   Drugs - Denied    FUNCTIONAL HISTORY  Lives at home with her  and 3 children  Independent    CURRENT FUNCTIONAL STATUS  last seen 2/3, PT attempted 2/9 however patient was receiving patient care  PT to follow up  participated in exercises in bed on 2/3    FAMILY HISTORY   FH: type 2 diabetes        RECENT LABS/IMAGING  CBC Full  -  ( 10 Feb 2021 06:26 )  WBC Count : 9.14 K/uL  RBC Count : 3.44 M/uL  Hemoglobin : 9.5 g/dL  Hematocrit : 30.0 %  Platelet Count - Automated : 462 K/uL  Mean Cell Volume : 87.2 fL  Mean Cell Hemoglobin : 27.6 pg  Mean Cell Hemoglobin Concentration : 31.7 gm/dL  Auto Neutrophil # : x  Auto Lymphocyte # : x  Auto Monocyte # : x  Auto Eosinophil # : x  Auto Basophil # : x  Auto Neutrophil % : x  Auto Lymphocyte % : x  Auto Monocyte % : x  Auto Eosinophil % : x  Auto Basophil % : x    02-10    136  |  97<L>  |  14  ----------------------------<  168<H>  3.8   |  27  |  0.54    Ca    9.5      10 Feb 2021 06:26  Phos  3.9     02-10  Mg     1.9     02-10          VITALS  T(C): 36.8 (02-10-21 @ 10:20), Max: 37 (02-09-21 @ 13:00)  HR: 96 (02-10-21 @ 10:35) (81 - 105)  BP: 117/77 (02-10-21 @ 10:20) (108/73 - 124/76)  RR: 24 (02-10-21 @ 10:20) (20 - 36)  SpO2: 97% (02-10-21 @ 10:35) (97% - 100%)  Wt(kg): --    ALLERGIES  No Known Allergies      MEDICATIONS   acetaminophen    Suspension .. 650 milliGRAM(s) Oral every 6 hours  acyclovir    Suspension 400 milliGRAM(s) Oral three times a day  ALBUTerol    90 MICROgram(s) HFA Inhaler 2 Puff(s) Inhalation every 6 hours PRN  BACItracin   Ointment 1 Application(s) Topical two times a day  bisacodyl Suppository 10 milliGRAM(s) Rectal at bedtime PRN  chlorhexidine 0.12% Liquid 15 milliLiter(s) Oral Mucosa every 12 hours  chlorhexidine 4% Liquid 1 Application(s) Topical daily  clonazePAM  Tablet 1 milliGRAM(s) Oral <User Schedule>  cloNIDine 0.1 milliGRAM(s) Oral every 8 hours  dextrose 5%. 1000 milliLiter(s) IV Continuous <Continuous>  dextrose 5%. 1000 milliLiter(s) IV Continuous <Continuous>  enoxaparin Injectable 40 milliGRAM(s) SubCutaneous every 12 hours  glucagon  Injectable 1 milliGRAM(s) IntraMuscular once  insulin lispro (ADMELOG) corrective regimen sliding scale   SubCutaneous every 6 hours  mirtazapine 7.5 milliGRAM(s) Oral at bedtime  multivitamin 1 Tablet(s) Oral daily  mupirocin 2% Ointment 1 Application(s) Topical two times a day  oxyCODONE    IR 5 milliGRAM(s) Oral every 12 hours  polyethylene glycol 3350 17 Gram(s) Oral two times a day  QUEtiapine 12.5 milliGRAM(s) Oral daily  QUEtiapine 50 milliGRAM(s) Oral at bedtime  senna Syrup 10 milliLiter(s) Oral daily      ----------------------------------------------------------------------------------------  PHYSICAL EXAM  Constitutional - NAD, Comfortable  HEENT - NCAT, EOMI  Neck - Supple, No limited ROM  Chest - CTA bilaterally, No wheeze, No rhonchi, No crackles  Cardiovascular - RRR, S1S2, No murmurs  Abdomen - BS+, Soft, NTND  Extremities - No C/C/E, No calf tenderness   Neurologic Exam -                    Cognitive - Awake, Alert, AAO to self, place, date, year, situation     Communication - Fluent, No dysarthria     Cranial Nerves - CN 2-12 intact     Motor - No focal deficits                    LEFT    UE - ShAB 5/5, EF 5/5, EE 5/5, WE 5/5,  5/5                    RIGHT UE - ShAB 5/5, EF 5/5, EE 5/5, WE 5/5,  5/5                    LEFT    LE - HF 5/5, KE 5/5, DF 5/5, PF 5/5                    RIGHT LE - HF 5/5, KE 5/5, DF 5/5, PF 5/5        Sensory - Intact to LT     Reflexes - DTR Intact, No primitive reflexive     Coordination - FTN intact     OculoVestibular - No saccades, No nystagmus, VOR         Balance - WNL Static  Psychiatric - depressed   ----------------------------------------------------------------------------------------  ASSESSMENT/PLAN   47 yo f admitted for ARDS second to SARS-COV-2, intubated 1/5-1/10 then transferred to Medicine, however failed BIPAP and reintubated 1/16. Course complicated UTI, Mouth Sores and Enterobacter and MRSA VAP. s/p Tracheostomy 1/28 and PEG 1/26.   contact precautions for MRSA  depression- on remeron  delirium- on seroquel  urinary retention/ UTI: burdick, s/p abx for e coli UTI   also treated with zosyn for pneumonia  MRSA/HSV: on bactroban, acyclovir until 2/14  Diet: npo with tube fees  DVT PPX - lovenox  Rehab -    Recommend ACUTE inpatient rehabilitation for the functional deficits consisting of 3 hours of therapy/day & 24 hour RN/daily PMR physician for comorbid medical management. Will continue to follow for ongoing rehab needs and recommendations.  Patient is a 46y old  Female who presents with a chief complaint of COVID (10 Feb 2021 07:00)      HPI:  Patient is a 46 year old woman with no past medical history coming in with shortness of breath, cough productive of white sputum, pleuritic rib pain (moderate in severity) for two days and diarrhea for one day. Patient  tested positive for COVID 12/29. Patient denies loss of taste or smell. Patient denies sore throat however states mouth is dry. Patient denies any history of lung disease or smoking. Patient states she was having subjective fevers at home however never took her temperature. Patient was taking an unknown antibiotic prescribed by a doctor. Unable to give name of doctor or name of abx. Patient dyspneic during with talking during interview.   ED Course: T 98.6, HR 96, /90, S 80% on RA. White count 10k. Hemoglobin 11.0. Plt 236. D-dimer 243. CRP 81.3. . CXR peripheral opacities consistent w/ COVID. Patient given 6 of dexamethasone. Patient admitted for acute hypoxic respiratory failure in the setting of likely COVID infection.  (03 Jan 2021 18:35)    admitted for ARDS second to SARS-COV-2, intubated 1/5-1/10 then transferred to Medicine, however failed BIPAP and reintubated 1/16. Course complicated UTI, Mouth Sores and Enterobacter and MRSA VAP. s/p Tracheostomy 1/28 and PEG 1/26. Now transferred to RCU.     Interviewed with  ID [id number]  Patient denies pain    REVIEW OF SYSTEMS  Constitutional - No fever, No weight loss, No fatigue  HEENT - No eye pain, No visual disturbances, No difficulty hearing, No tinnitus, No vertigo, No neck pain  Respiratory - No cough, No wheezing, No shortness of breath  Cardiovascular - No chest pain, No palpitations  Gastrointestinal - No abdominal pain, No nausea, No vomiting, No diarrhea, No constipation  Genitourinary - No dysuria, No frequency, No hematuria, No incontinence  Neurological - No headaches, No memory loss, + loss of strength, No numbness, No tremors  Skin - No itching, No rashes, No lesions   Endocrine - No temperature intolerance  Musculoskeletal - No joint pain, No joint swelling, No muscle pain  Psychiatric - + depression, No anxiety    PAST MEDICAL & SURGICAL HISTORY  No pertinent past medical history  No significant past surgical history      SOCIAL HISTORY  Smoking - Denied  EtOH - Denied   Drugs - Denied    FUNCTIONAL HISTORY  Lives at home with her  and 3 children, in house with 12 stairs to enter  Independent at baseline    CURRENT FUNCTIONAL STATUS  last seen 2/3, PT attempted 2/9 however patient was receiving patient care  PT to follow up today  participated in exercises in bed on 2/3    FAMILY HISTORY   FH: type 2 diabetes        RECENT LABS/IMAGING  CBC Full  -  ( 10 Feb 2021 06:26 )  WBC Count : 9.14 K/uL  RBC Count : 3.44 M/uL  Hemoglobin : 9.5 g/dL  Hematocrit : 30.0 %  Platelet Count - Automated : 462 K/uL  Mean Cell Volume : 87.2 fL  Mean Cell Hemoglobin : 27.6 pg  Mean Cell Hemoglobin Concentration : 31.7 gm/dL  Auto Neutrophil # : x  Auto Lymphocyte # : x  Auto Monocyte # : x  Auto Eosinophil # : x  Auto Basophil # : x  Auto Neutrophil % : x  Auto Lymphocyte % : x  Auto Monocyte % : x  Auto Eosinophil % : x  Auto Basophil % : x    02-10    136  |  97<L>  |  14  ----------------------------<  168<H>  3.8   |  27  |  0.54    Ca    9.5      10 Feb 2021 06:26  Phos  3.9     02-10  Mg     1.9     02-10          VITALS  T(C): 36.8 (02-10-21 @ 10:20), Max: 37 (02-09-21 @ 13:00)  HR: 96 (02-10-21 @ 10:35) (81 - 105)  BP: 117/77 (02-10-21 @ 10:20) (108/73 - 124/76)  RR: 24 (02-10-21 @ 10:20) (20 - 36)  SpO2: 97% (02-10-21 @ 10:35) (97% - 100%)  Wt(kg): --    ALLERGIES  No Known Allergies      MEDICATIONS   acetaminophen    Suspension .. 650 milliGRAM(s) Oral every 6 hours  acyclovir    Suspension 400 milliGRAM(s) Oral three times a day  ALBUTerol    90 MICROgram(s) HFA Inhaler 2 Puff(s) Inhalation every 6 hours PRN  BACItracin   Ointment 1 Application(s) Topical two times a day  bisacodyl Suppository 10 milliGRAM(s) Rectal at bedtime PRN  chlorhexidine 0.12% Liquid 15 milliLiter(s) Oral Mucosa every 12 hours  chlorhexidine 4% Liquid 1 Application(s) Topical daily  clonazePAM  Tablet 1 milliGRAM(s) Oral <User Schedule>  cloNIDine 0.1 milliGRAM(s) Oral every 8 hours  dextrose 5%. 1000 milliLiter(s) IV Continuous <Continuous>  dextrose 5%. 1000 milliLiter(s) IV Continuous <Continuous>  enoxaparin Injectable 40 milliGRAM(s) SubCutaneous every 12 hours  glucagon  Injectable 1 milliGRAM(s) IntraMuscular once  insulin lispro (ADMELOG) corrective regimen sliding scale   SubCutaneous every 6 hours  mirtazapine 7.5 milliGRAM(s) Oral at bedtime  multivitamin 1 Tablet(s) Oral daily  mupirocin 2% Ointment 1 Application(s) Topical two times a day  oxyCODONE    IR 5 milliGRAM(s) Oral every 12 hours  polyethylene glycol 3350 17 Gram(s) Oral two times a day  QUEtiapine 12.5 milliGRAM(s) Oral daily  QUEtiapine 50 milliGRAM(s) Oral at bedtime  senna Syrup 10 milliLiter(s) Oral daily      ----------------------------------------------------------------------------------------  PHYSICAL EXAM  Constitutional - NAD, Comfortable  HEENT - abrasion R cheek,  + trache   Chest - no respiratory distress  Cardiovascular - no edema  Abdomen - Soft, NTND, + PEG  Extremities - no calf tenderness   Neurologic Exam -                    Cognitive - Awake, Alert, AAO to self, place, date, year, situation     Communication - Fluent, No dysarthria     Cranial Nerves - CN 2-12 intact     Motor -                      LEFT    UE -4-/5                    RIGHT UE - 4-/5                    LEFT    LE - 4-/5                                    RIGHT LE -4-/5       Sensory - Intact to LT        Balance - WNL Static  Psychiatric - depressed   ----------------------------------------------------------------------------------------  ASSESSMENT/PLAN   47 yo f admitted for ARDS second to SARS-COV-2, intubated 1/5-1/10 then transferred to Medicine, however failed BIPAP and reintubated 1/16. Course complicated UTI, Mouth Sores and Enterobacter and MRSA VAP. s/p Tracheostomy 1/28 and PEG 1/26.   contact precautions for MRSA  depression- on remeron  delirium- on seroquel  urinary retention/ UTI: burdick, s/p abx for e coli UTI   also treated with zosyn for pneumonia  MRSA/HSV: on bactroban, acyclovir until 2/14  Diet: npo with tube feeds  DVT PPX - lovenox  Rehab -    Recommend ACUTE inpatient rehabilitation for the functional deficits consisting of 3 hours of therapy/day & 24 hour RN/daily PMR physician for comorbid medical management. Will continue to follow for ongoing rehab needs and recommendations.

## 2021-02-10 NOTE — PROGRESS NOTE ADULT - ATTENDING COMMENTS
Agree with above. Seen and examined with team on rounds. On trach collar, on abx, PT following. Rehab placement to follow. Supportive care.

## 2021-02-10 NOTE — PROGRESS NOTE ADULT - ASSESSMENT
46 F w respiratory failure due to COVID 19     Problem/Recommendation - 1:  Problem: Acute respiratory failure with hypoxia. Recommendation: s/p tracheostomy and PEG. No signs of post proceure issues at this time  -c/w G tube feeds       Problem/Recommendation - 2:  ·  Problem: COVID-19.  Recommendation: status post dexamethasone.   on abx per ID for staph in sputum     Problem/Recommendation - 3:  ·  Problem: Normocytic anemia.  Recommendation: without overt GI bleeding. Likely due to critical illness. Trended down today  -monitor for GI bleed  -PPI QD.      Problem/Recommendation - 4:  ·  Problem: Abnormal LFTs.  Recommendation: multifactorial, likely NAFLD, COVID, critical illness. Can consider US to r/o gallstone disease, especially if increasing.      Problem/Recommendation - 5:  ·  Problem: Morbid obesity.         Attending Attestation:   Differential diagnosis and plan of care discussed with patient after the evaluation  35 Minutes spent on total encounter of which more than fifty percent of the encounter was spent counseling and/or coordinating care by the attending physician.    Mele Bolanos M.D.   Gastroenterology and Hepatology  Cell: [phone].

## 2021-02-10 NOTE — PROGRESS NOTE ADULT - ASSESSMENT
45 YO F with Morbid Obesity and DM2 A1C 6.6 admitted for ARDS second to SARS-COV-2, intubated 1/5-1/10 then transferred to Medicine, however failed BIPAP and reintubated 1/16. Course complicated UTI, Mouth Sores and Enterobacter and MRSA VAP. s/p Tracheostomy 1/28 and PEG 1/26. Now transferred to RCU.     # ICU Delirium/ Agitation   - Now weaned off sedation, however delirious.  - Continue on Seroquel 12.5mg AM and 50mg QHS and Klonopin 1mg BID   - Monitor mentation.      # Depression  - Crying consistently, however  remains great support system via Facetime.   - Started on Remeron.   - Supportive care.     # ARDS second to SARS-COV-2 vs Superimposed Enterobacter and MRSA PNA   - Completed Remdesivir, Decadron and ABX as belowed.   - s/p Prolonged ICU stay and Tracheostomy on 1/28  - Tolerated TC well today. Keep on TC during the day and PS 5/5/30 overnight.    - Proventil and Chest PT Q6H. Suction PRN. Trach care QD.     # Septic vs Vasoplegic Shock   - Off all Pressors. Monitor HR/ BP     # Dysphagic with PEG   - s/p PEG 1/26 and continue on TF as tolerated.   - Constipated and bowel regimen (Senna and Miralax and Dulcolax PRN) started with improvement.     #   - Failed TOV 2/3. Keep Haque for now and TOV again when more ambulatory.   - Hyponatremia/ Hypernatremia, monitor electrolytes and renal function as tolerated.     # Sepsis second to SARS-COV-2 vs Enterobacter and MRSA PNA vs HSV Type 1   - Completed empiric ABX for PNA with Zosyn (1/16-1/20), however cultures negative   - ECOLI UTI (1/25) s/p CTX (1/25-1/27).   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     # DM2 A1C 6.6 (1/2021)   - Continue on ISS and monitor FS Q6H    # Wounds   - WOC recommendations appreciated.      # DVT PPX with Lovenox BID   # CODE STATUS - Full Code   # DISPO - PT recommends Rehab.    45 YO F with Morbid Obesity and DM2 A1C 6.6 admitted for ARDS second to SARS-COV-2, intubated 1/5-1/10 then transferred to Medicine, however failed BIPAP and reintubated 1/16. Course complicated UTI, Mouth Sores and Enterobacter and MRSA VAP. s/p Tracheostomy 1/28 and PEG 1/26. Now transferred to RCU.     # ICU Delirium/ Agitation   - Now weaned off sedation, however delirious.  - Continue on Seroquel 12.5mg AM and 50mg QHS and Klonopin 1mg BID   - Taper oxycodone   - Monitor mentation.      # Depression  - Crying consistently, however  remains great support system via Facetime.   - Started on Remeron.   - Supportive care.     # ARDS second to SARS-COV-2 vs Superimposed Enterobacter and MRSA PNA   - Completed Remdesivir, Decadron and ABX as belowed.   - s/p Prolonged ICU stay and Tracheostomy on 1/28  - Tolerated TC well today. Keep on TC during the day and PS 5/5/30 overnight.    - Proventil and Chest PT Q6H. Suction PRN. Trach care QD.     # Septic vs Vasoplegic Shock   - Off all Pressors. Monitor HR/ BP     # Dysphagic with PEG   - s/p PEG 1/26 and continue on TF as tolerated.   - Constipated and bowel regimen (Senna and Miralax and Dulcolax PRN) started with improvement.     #   - Failed TOV 2/3. Keep Haque for now and TOV again when more ambulatory.   - Hyponatremia/ Hypernatremia, monitor electrolytes and renal function as tolerated.     # Sepsis second to SARS-COV-2 vs Enterobacter and MRSA PNA vs HSV Type 1   - Completed empiric ABX for PNA with Zosyn (1/16-1/20), however cultures negative   - ECOLI UTI (1/25) s/p CTX (1/25-1/27).   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     # DM2 A1C 6.6 (1/2021)   - Continue on ISS and monitor FS Q6H    # Wounds   - WOC recommendations appreciated.      # DVT PPX with Lovenox BID   # CODE STATUS - Full Code   # DISPO - PT recommends Rehab.    47 YO F with Morbid Obesity and DM2 A1C 6.6 admitted for ARDS second to SARS-COV-2, intubated 1/5-1/10 then transferred to Medicine, however failed BIPAP and reintubated 1/16. Course complicated UTI, Mouth Sores and Enterobacter and MRSA VAP. s/p Tracheostomy 1/28 and PEG 1/26. Now transferred to RCU.     # ICU Delirium/ Agitation   - Now weaned off sedation, however delirious.  - Continue on Seroquel 12.5mg AM and 50mg QHS and Klonopin 1mg BID   - Taper oxycodone   - Monitor mentation.      # Depression  - Crying consistently, however  remains great support system via Facetime.   - Started on Remeron.   - Supportive care.     # ARDS second to SARS-COV-2 vs Superimposed Enterobacter and MRSA PNA   - Completed Remdesivir, Decadron and ABX as belowed.   - s/p Prolonged ICU stay and Tracheostomy on 1/28  - Tolerated TC well today. Keep on TC during the day and PS 5/5/30 overnight.    - Proventil and Chest PT Q6H. Suction PRN. Trach care QD.   - Trial of PMV with supervision     # Septic vs Vasoplegic Shock   - Off all Pressors. Monitor HR/ BP     # Dysphagic with PEG   - s/p PEG 1/26 and continue on TF as tolerated.   - Constipated and bowel regimen (Senna and Miralax and Dulcolax PRN) started with improvement.   - Having increased stools miralax decreased     #   - Failed TOV 2/3. Keep Haque for now and TOV again when more ambulatory.   - Hyponatremia/ Hypernatremia, monitor electrolytes and renal function as tolerated.     # Sepsis second to SARS-COV-2 vs Enterobacter and MRSA PNA vs HSV Type 1   - Completed empiric ABX for PNA with Zosyn (1/16-1/20), however cultures negative   - ECOLI UTI (1/25) s/p CTX (1/25-1/27).   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     # DM2 A1C 6.6 (1/2021)   - Continue on ISS and monitor FS Q6H    # Wounds   - WOC recommendations appreciated.      # DVT PPX with Lovenox BID   # CODE STATUS - Full Code   # DISPO - PT recommends Rehab.

## 2021-02-10 NOTE — PROGRESS NOTE ADULT - SUBJECTIVE AND OBJECTIVE BOX
CHIEF COMPLAINT: Patient is a 46y old  Female who presents with a chief complaint of COVID (09 Feb 2021 18:28)      Interval Events: Pt seen and evaluated at bedside with team     REVIEW OF SYSTEMS:  Constitutional:   Eyes:  ENT:  CV:  Resp:  GI:  :  MSK:  Integumentary:  Neurological:  Psychiatric:  Endocrine:  Hematologic/Lymphatic:  Allergic/Immunologic:  [ ] All other systems negative      OBJECTIVE:  ICU Vital Signs Last 24 Hrs  T(C): 36.3 (10 Feb 2021 04:46), Max: 37 (09 Feb 2021 13:00)  T(F): 97.4 (10 Feb 2021 04:46), Max: 98.6 (09 Feb 2021 13:00)  HR: 101 (10 Feb 2021 04:46) (82 - 105)  BP: 111/84 (10 Feb 2021 04:46) (108/73 - 124/76)  BP(mean): --  ABP: --  ABP(mean): --  RR: 36 (10 Feb 2021 04:46) (20 - 36)  SpO2: 99% (10 Feb 2021 04:46) (98% - 100%)    Mode: CPAP with PS, FiO2: 40, PEEP: 5, PS: 8, MAP: 3, PIP: 14    02-09 @ 07:01  -  02-10 @ 07:00  --------------------------------------------------------  IN: 700 mL / OUT: 1200 mL / NET: -500 mL      CAPILLARY BLOOD GLUCOSE      POCT Blood Glucose.: 172 mg/dL (10 Feb 2021 05:49)        HOSPITAL MEDICATIONS:  MEDICATIONS  (STANDING):  acetaminophen    Suspension .. 650 milliGRAM(s) Oral every 6 hours  acyclovir    Suspension 400 milliGRAM(s) Oral three times a day  BACItracin   Ointment 1 Application(s) Topical two times a day  chlorhexidine 0.12% Liquid 15 milliLiter(s) Oral Mucosa every 12 hours  chlorhexidine 4% Liquid 1 Application(s) Topical daily  clonazePAM  Tablet 1 milliGRAM(s) Oral <User Schedule>  cloNIDine 0.1 milliGRAM(s) Oral every 8 hours  dextrose 5%. 1000 milliLiter(s) (50 mL/Hr) IV Continuous <Continuous>  dextrose 5%. 1000 milliLiter(s) (100 mL/Hr) IV Continuous <Continuous>  enoxaparin Injectable 40 milliGRAM(s) SubCutaneous every 12 hours  glucagon  Injectable 1 milliGRAM(s) IntraMuscular once  insulin lispro (ADMELOG) corrective regimen sliding scale   SubCutaneous every 6 hours  mirtazapine 7.5 milliGRAM(s) Oral at bedtime  multivitamin 1 Tablet(s) Oral daily  mupirocin 2% Ointment 1 Application(s) Topical two times a day  oxyCODONE    IR 5 milliGRAM(s) Oral every 12 hours  polyethylene glycol 3350 17 Gram(s) Oral two times a day  QUEtiapine 12.5 milliGRAM(s) Oral daily  QUEtiapine 50 milliGRAM(s) Oral at bedtime  senna Syrup 10 milliLiter(s) Oral daily    MEDICATIONS  (PRN):  ALBUTerol    90 MICROgram(s) HFA Inhaler 2 Puff(s) Inhalation every 6 hours PRN Wheezing  bisacodyl Suppository 10 milliGRAM(s) Rectal at bedtime PRN Constipation      LABS:                        9.5    9.14  )-----------( 462      ( 10 Feb 2021 06:26 )             30.0     02-09    135  |  96<L>  |  15  ----------------------------<  137<H>  3.7   |  28  |  0.58    Ca    9.8      09 Feb 2021 05:35  Phos  4.0     02-09  Mg     2.1     02-09                MICROBIOLOGY:     RADIOLOGY:  [ ] Reviewed and interpreted by me    PULMONARY FUNCTION TESTS:    EKG: CHIEF COMPLAINT: Patient is a 46y old  Female who presents with a chief complaint of COVID (09 Feb 2021 18:28)      Interval Events: Pt seen and evaluated at bedside with team     REVIEW OF SYSTEMS:  Denies pain /sob   Difficult to verbalize sec to trach       OBJECTIVE:  ICU Vital Signs Last 24 Hrs  T(C): 36.3 (10 Feb 2021 04:46), Max: 37 (09 Feb 2021 13:00)  T(F): 97.4 (10 Feb 2021 04:46), Max: 98.6 (09 Feb 2021 13:00)  HR: 101 (10 Feb 2021 04:46) (82 - 105)  BP: 111/84 (10 Feb 2021 04:46) (108/73 - 124/76)  BP(mean): --  ABP: --  ABP(mean): --  RR: 36 (10 Feb 2021 04:46) (20 - 36)  SpO2: 99% (10 Feb 2021 04:46) (98% - 100%)    Mode: CPAP with PS, FiO2: 40, PEEP: 5, PS: 8, MAP: 3, PIP: 14    02-09 @ 07:01  -  02-10 @ 07:00  --------------------------------------------------------  IN: 700 mL / OUT: 1200 mL / NET: -500 mL      CAPILLARY BLOOD GLUCOSE      POCT Blood Glucose.: 172 mg/dL (10 Feb 2021 05:49)        HOSPITAL MEDICATIONS:  MEDICATIONS  (STANDING):  acetaminophen    Suspension .. 650 milliGRAM(s) Oral every 6 hours  acyclovir    Suspension 400 milliGRAM(s) Oral three times a day  BACItracin   Ointment 1 Application(s) Topical two times a day  chlorhexidine 0.12% Liquid 15 milliLiter(s) Oral Mucosa every 12 hours  chlorhexidine 4% Liquid 1 Application(s) Topical daily  clonazePAM  Tablet 1 milliGRAM(s) Oral <User Schedule>  cloNIDine 0.1 milliGRAM(s) Oral every 8 hours  dextrose 5%. 1000 milliLiter(s) (50 mL/Hr) IV Continuous <Continuous>  dextrose 5%. 1000 milliLiter(s) (100 mL/Hr) IV Continuous <Continuous>  enoxaparin Injectable 40 milliGRAM(s) SubCutaneous every 12 hours  glucagon  Injectable 1 milliGRAM(s) IntraMuscular once  insulin lispro (ADMELOG) corrective regimen sliding scale   SubCutaneous every 6 hours  mirtazapine 7.5 milliGRAM(s) Oral at bedtime  multivitamin 1 Tablet(s) Oral daily  mupirocin 2% Ointment 1 Application(s) Topical two times a day  oxyCODONE    IR 5 milliGRAM(s) Oral every 12 hours  polyethylene glycol 3350 17 Gram(s) Oral two times a day  QUEtiapine 12.5 milliGRAM(s) Oral daily  QUEtiapine 50 milliGRAM(s) Oral at bedtime  senna Syrup 10 milliLiter(s) Oral daily    MEDICATIONS  (PRN):  ALBUTerol    90 MICROgram(s) HFA Inhaler 2 Puff(s) Inhalation every 6 hours PRN Wheezing  bisacodyl Suppository 10 milliGRAM(s) Rectal at bedtime PRN Constipation      LABS:                        9.5    9.14  )-----------( 462      ( 10 Feb 2021 06:26 )             30.0     02-09    135  |  96<L>  |  15  ----------------------------<  137<H>  3.7   |  28  |  0.58    Ca    9.8      09 Feb 2021 05:35  Phos  4.0     02-09  Mg     2.1     02-09                MICROBIOLOGY:     RADIOLOGY:  [ ] Reviewed and interpreted by me    PULMONARY FUNCTION TESTS:    EKG:

## 2021-02-11 LAB
ANION GAP SERPL CALC-SCNC: 11 MMOL/L — SIGNIFICANT CHANGE UP (ref 7–14)
BUN SERPL-MCNC: 20 MG/DL — SIGNIFICANT CHANGE UP (ref 7–23)
CALCIUM SERPL-MCNC: 9.6 MG/DL — SIGNIFICANT CHANGE UP (ref 8.4–10.5)
CHLORIDE SERPL-SCNC: 98 MMOL/L — SIGNIFICANT CHANGE UP (ref 98–107)
CO2 SERPL-SCNC: 29 MMOL/L — SIGNIFICANT CHANGE UP (ref 22–31)
CREAT SERPL-MCNC: 0.57 MG/DL — SIGNIFICANT CHANGE UP (ref 0.5–1.3)
GLUCOSE SERPL-MCNC: 120 MG/DL — HIGH (ref 70–99)
HCG UR QL: NEGATIVE — SIGNIFICANT CHANGE UP
HCT VFR BLD CALC: 32 % — LOW (ref 34.5–45)
HGB BLD-MCNC: 10 G/DL — LOW (ref 11.5–15.5)
MAGNESIUM SERPL-MCNC: 2.1 MG/DL — SIGNIFICANT CHANGE UP (ref 1.6–2.6)
MCHC RBC-ENTMCNC: 27.5 PG — SIGNIFICANT CHANGE UP (ref 27–34)
MCHC RBC-ENTMCNC: 31.3 GM/DL — LOW (ref 32–36)
MCV RBC AUTO: 88.2 FL — SIGNIFICANT CHANGE UP (ref 80–100)
NRBC # BLD: 0 /100 WBCS — SIGNIFICANT CHANGE UP
NRBC # FLD: 0 K/UL — SIGNIFICANT CHANGE UP
PHOSPHATE SERPL-MCNC: 4.1 MG/DL — SIGNIFICANT CHANGE UP (ref 2.5–4.5)
PLATELET # BLD AUTO: 503 K/UL — HIGH (ref 150–400)
POTASSIUM SERPL-MCNC: 3.6 MMOL/L — SIGNIFICANT CHANGE UP (ref 3.5–5.3)
POTASSIUM SERPL-SCNC: 3.6 MMOL/L — SIGNIFICANT CHANGE UP (ref 3.5–5.3)
RBC # BLD: 3.63 M/UL — LOW (ref 3.8–5.2)
RBC # FLD: 16.8 % — HIGH (ref 10.3–14.5)
SODIUM SERPL-SCNC: 138 MMOL/L — SIGNIFICANT CHANGE UP (ref 135–145)
WBC # BLD: 8.91 K/UL — SIGNIFICANT CHANGE UP (ref 3.8–10.5)
WBC # FLD AUTO: 8.91 K/UL — SIGNIFICANT CHANGE UP (ref 3.8–10.5)

## 2021-02-11 PROCEDURE — 99223 1ST HOSP IP/OBS HIGH 75: CPT | Mod: GC

## 2021-02-11 RX ORDER — CLONAZEPAM 1 MG
1 TABLET ORAL
Refills: 0 | Status: DISCONTINUED | OUTPATIENT
Start: 2021-02-11 | End: 2021-02-18

## 2021-02-11 RX ORDER — OXYCODONE HYDROCHLORIDE 5 MG/1
5 TABLET ORAL DAILY
Refills: 0 | Status: DISCONTINUED | OUTPATIENT
Start: 2021-02-11 | End: 2021-02-18

## 2021-02-11 RX ADMIN — MUPIROCIN 1 APPLICATION(S): 20 OINTMENT TOPICAL at 05:03

## 2021-02-11 RX ADMIN — MIRTAZAPINE 7.5 MILLIGRAM(S): 45 TABLET, ORALLY DISINTEGRATING ORAL at 21:08

## 2021-02-11 RX ADMIN — ENOXAPARIN SODIUM 40 MILLIGRAM(S): 100 INJECTION SUBCUTANEOUS at 17:08

## 2021-02-11 RX ADMIN — Medication 650 MILLIGRAM(S): at 11:29

## 2021-02-11 RX ADMIN — Medication 400 MILLIGRAM(S): at 13:20

## 2021-02-11 RX ADMIN — Medication 1 MILLIGRAM(S): at 05:06

## 2021-02-11 RX ADMIN — POLYETHYLENE GLYCOL 3350 17 GRAM(S): 17 POWDER, FOR SOLUTION ORAL at 11:32

## 2021-02-11 RX ADMIN — Medication 1 TABLET(S): at 11:30

## 2021-02-11 RX ADMIN — Medication 650 MILLIGRAM(S): at 00:04

## 2021-02-11 RX ADMIN — Medication 1 MILLIGRAM(S): at 17:08

## 2021-02-11 RX ADMIN — OXYCODONE HYDROCHLORIDE 5 MILLIGRAM(S): 5 TABLET ORAL at 05:01

## 2021-02-11 RX ADMIN — Medication 650 MILLIGRAM(S): at 17:06

## 2021-02-11 RX ADMIN — OXYCODONE HYDROCHLORIDE 5 MILLIGRAM(S): 5 TABLET ORAL at 11:37

## 2021-02-11 RX ADMIN — Medication 0.1 MILLIGRAM(S): at 05:03

## 2021-02-11 RX ADMIN — Medication 650 MILLIGRAM(S): at 23:37

## 2021-02-11 RX ADMIN — Medication 650 MILLIGRAM(S): at 05:01

## 2021-02-11 RX ADMIN — Medication 1 APPLICATION(S): at 05:03

## 2021-02-11 RX ADMIN — ENOXAPARIN SODIUM 40 MILLIGRAM(S): 100 INJECTION SUBCUTANEOUS at 05:02

## 2021-02-11 RX ADMIN — MUPIROCIN 1 APPLICATION(S): 20 OINTMENT TOPICAL at 17:08

## 2021-02-11 RX ADMIN — Medication 0.1 MILLIGRAM(S): at 12:20

## 2021-02-11 RX ADMIN — Medication 1 APPLICATION(S): at 17:08

## 2021-02-11 RX ADMIN — CHLORHEXIDINE GLUCONATE 15 MILLILITER(S): 213 SOLUTION TOPICAL at 05:03

## 2021-02-11 RX ADMIN — Medication 0.1 MILLIGRAM(S): at 21:07

## 2021-02-11 RX ADMIN — CHLORHEXIDINE GLUCONATE 15 MILLILITER(S): 213 SOLUTION TOPICAL at 17:07

## 2021-02-11 RX ADMIN — QUETIAPINE FUMARATE 12.5 MILLIGRAM(S): 200 TABLET, FILM COATED ORAL at 11:30

## 2021-02-11 RX ADMIN — Medication 400 MILLIGRAM(S): at 21:07

## 2021-02-11 RX ADMIN — QUETIAPINE FUMARATE 50 MILLIGRAM(S): 200 TABLET, FILM COATED ORAL at 21:07

## 2021-02-11 RX ADMIN — CHLORHEXIDINE GLUCONATE 1 APPLICATION(S): 213 SOLUTION TOPICAL at 05:03

## 2021-02-11 RX ADMIN — Medication 400 MILLIGRAM(S): at 05:07

## 2021-02-11 NOTE — PROGRESS NOTE ADULT - SUBJECTIVE AND OBJECTIVE BOX
DATE OF SERVICE: 02-11-21    Subjective: Patient seen and examined. No new events except as noted.         MEDICATIONS:  MEDICATIONS  (STANDING):  acetaminophen    Suspension .. 650 milliGRAM(s) Oral every 6 hours  acyclovir    Suspension 400 milliGRAM(s) Oral three times a day  BACItracin   Ointment 1 Application(s) Topical two times a day  chlorhexidine 0.12% Liquid 15 milliLiter(s) Oral Mucosa every 12 hours  chlorhexidine 4% Liquid 1 Application(s) Topical daily  clonazePAM  Tablet 1 milliGRAM(s) Oral <User Schedule>  cloNIDine 0.1 milliGRAM(s) Oral once  dextrose 5%. 1000 milliLiter(s) (50 mL/Hr) IV Continuous <Continuous>  dextrose 5%. 1000 milliLiter(s) (100 mL/Hr) IV Continuous <Continuous>  enoxaparin Injectable 40 milliGRAM(s) SubCutaneous every 12 hours  glucagon  Injectable 1 milliGRAM(s) IntraMuscular once  insulin lispro (ADMELOG) corrective regimen sliding scale   SubCutaneous every 6 hours  mirtazapine 7.5 milliGRAM(s) Oral at bedtime  multivitamin 1 Tablet(s) Oral daily  mupirocin 2% Ointment 1 Application(s) Topical two times a day  oxyCODONE    Solution 5 milliGRAM(s) Oral daily  polyethylene glycol 3350 17 Gram(s) Oral daily  QUEtiapine 12.5 milliGRAM(s) Oral daily  QUEtiapine 50 milliGRAM(s) Oral at bedtime  senna Syrup 10 milliLiter(s) Oral daily      PHYSICAL EXAM:  T(C): 36.1 (02-11-21 @ 09:50), Max: 37.2 (02-11-21 @ 05:00)  HR: 104 (02-11-21 @ 12:41) (84 - 104)  BP: 100/74 (02-11-21 @ 09:50) (100/74 - 127/80)  RR: 21 (02-11-21 @ 09:50) (21 - 24)  SpO2: 94% (02-11-21 @ 12:41) (94% - 100%)  Wt(kg): --  I&O's Summary    10 Feb 2021 07:01  -  11 Feb 2021 07:00  --------------------------------------------------------  IN: 700 mL / OUT: 850 mL / NET: -150 mL          Appearance: Normal	  HEENT:  trache  Lymphatic: No lymphadenopathy   Cardiovascular: Normal S1 S2, no JVD  Respiratory: normal effort , clear  Gastrointestinal:  PEG  Skin: No rashes,  warm to touch  Psychiatry:  Mood & affect appropriate  Musculuskeletal: No edema      All labs, Imaging and EKGs personally reviewed                             10.0   8.91  )-----------( 503      ( 11 Feb 2021 07:07 )             32.0               02-11    138  |  98  |  20  ----------------------------<  120<H>  3.6   |  29  |  0.57    Ca    9.6      11 Feb 2021 07:07  Phos  4.1     02-11  Mg     2.1     02-11

## 2021-02-11 NOTE — PROGRESS NOTE ADULT - ATTENDING COMMENTS
Agree with above. Seen and examined with team on rounds. On trach collar with pressure support at night. Will try 24h of trach collar. Speech and swallow eval. Supportive care.

## 2021-02-11 NOTE — SWALLOW BEDSIDE ASSESSMENT ADULT - COMMENTS
Pulmonary Progress Note 2/11/21: 47 YO F with Morbid Obesity and DM2 A1C 6.6 admitted for ARDS second to SARS-COV-2, intubated 1/5-1/10 then transferred to Medicine, however failed BIPAP and reintubated 1/16. Course complicated UTI, Mouth Sores and Enterobacter and MRSA VAP. s/p Tracheostomy 1/28 and PEG 1/26. Now transferred to RCU.     Patient was seen upright at bedside with PCA present. #6 PERC Tracheostomy (Cuff-Deflated) on Trach Collar Status. Patient was alert/awake and responsive to simple questions. Patient able to follow simple directions. Secretion management was adequate. Low volume/mildly hoarse vocal quality noted with clinician digital occlusion. BASELINE STATS: SpO2 100%

## 2021-02-11 NOTE — PROGRESS NOTE ADULT - ASSESSMENT
45 YO F with Morbid Obesity and DM2 A1C 6.6 admitted for ARDS second to SARS-COV-2, intubated 1/5-1/10 then transferred to Medicine, however failed BIPAP and reintubated 1/16. Course complicated UTI, Mouth Sores and Enterobacter and MRSA VAP. s/p Tracheostomy 1/28 and PEG 1/26. Now transferred to RCU.     # ICU Delirium/ Agitation   - Now weaned off sedation, however delirious.  - Continue on Seroquel 12.5mg AM and 50mg QHS and Klonopin 1mg BID   - Taper oxycodone   - Monitor mentation.      # Depression  - Crying consistently, however  remains great support system via Facetime.   - Started on Remeron.   - Supportive care.     # ARDS second to SARS-COV-2 vs Superimposed Enterobacter and MRSA PNA   - Completed Remdesivir, Decadron and ABX as belowed.   - s/p Prolonged ICU stay and Tracheostomy on 1/28  - Tolerated TC well today. Keep on TC during the day and PS 5/5/30 overnight.    - Proventil and Chest PT Q6H. Suction PRN. Trach care QD.   - Trial of PMV with supervision and pt tolerating     # Septic vs Vasoplegic Shock   - Off all Pressors. Monitor HR/ BP     # Dysphagic with PEG   - s/p PEG 1/26 and continue on TF as tolerated.   - Constipated and bowel regimen (Senna and Miralax and Dulcolax PRN) started with improvement.   - Having increased stools miralax decreased     #   - Failed TOV 2/3. Keep Hqaue for now and TOV again when more ambulatory.   - Hyponatremia/ Hypernatremia, monitor electrolytes and renal function as tolerated.     # Sepsis second to SARS-COV-2 vs Enterobacter and MRSA PNA vs HSV Type 1   - Completed empiric ABX for PNA with Zosyn (1/16-1/20), however cultures negative   - ECOLI UTI (1/25) s/p CTX (1/25-1/27).   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     # DM2 A1C 6.6 (1/2021)   - Continue on ISS and monitor FS Q6H    # Wounds   - WOC recommendations appreciated.      # DVT PPX with Lovenox BID   # CODE STATUS - Full Code   # DISPO - PT recommends Rehab.    47 YO F with Morbid Obesity and DM2 A1C 6.6 admitted for ARDS second to SARS-COV-2, intubated 1/5-1/10 then transferred to Medicine, however failed BIPAP and reintubated 1/16. Course complicated UTI, Mouth Sores and Enterobacter and MRSA VAP. s/p Tracheostomy 1/28 and PEG 1/26. Now transferred to RCU.     # ICU Delirium/ Agitation   - Now weaned off sedation, however delirious.  - Continue on Seroquel 12.5mg AM and 50mg QHS and Klonopin 1mg BID   - Taper oxycodone   - Monitor mentation.      # Depression  - Crying consistently, however  remains great support system via Facetime.   - Started on Remeron. Increased supervision at bedside   - Supportive care.     # ARDS second to SARS-COV-2 vs Superimposed Enterobacter and MRSA PNA   - Completed Remdesivir, Decadron and ABX as belowed.   - s/p Prolonged ICU stay and Tracheostomy on 1/28  - Tolerated TC well today. Keep on TC during the day and PS 5/5/30 overnight.    - Proventil and Chest PT Q6H. Suction PRN. Trach care QD.   - Trial of PMV with supervision and pt tolerating - Trial of pt off vent overnight   - S&S eval done - keep NPO for now CIne 2/12    # Septic vs Vasoplegic Shock   - Off all Pressors. Monitor HR/ BP     # Dysphagic with PEG   - s/p PEG 1/26 and continue on TF as tolerated.   - Constipated and bowel regimen (Senna and Miralax and Dulcolax PRN) started with improvement.   - Having increased stools miralax decreased     #   - Failed TOV 2/3. Keep Haque for now and TOV again when more ambulatory.   - Hyponatremia/ Hypernatremia, monitor electrolytes and renal function as tolerated.   - Will do TOV as pt oob to chair     # Sepsis second to SARS-COV-2 vs Enterobacter and MRSA PNA vs HSV Type 1   - Completed empiric ABX for PNA with Zosyn (1/16-1/20), however cultures negative   - ECOLI UTI (1/25) s/p CTX (1/25-1/27).   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     # DM2 A1C 6.6 (1/2021)   - Continue on ISS and monitor FS Q6H    # Wounds   - WOC recommendations appreciated.      # DVT PPX with Lovenox BID   # CODE STATUS - Full Code   # DISPO - PT recommends Rehab.

## 2021-02-11 NOTE — PROGRESS NOTE ADULT - ASSESSMENT
46 F w respiratory failure due to COVID 19     Problem/Recommendation - 1:  Problem: Acute respiratory failure with hypoxia. Recommendation: s/p tracheostomy and PEG. No signs of post proceure issues at this time  -c/w G tube feeds, for MBS to determine candidacy for oral nutrition       Problem/Recommendation - 2:  ·  Problem: COVID-19.  Recommendation: status post dexamethasone.   on abx per ID for staph in sputum     Problem/Recommendation - 3:  ·  Problem: Normocytic anemia.  Recommendation: without overt GI bleeding. Likely due to critical illness. Trended down today  -monitor for GI bleed  -PPI QD.      Problem/Recommendation - 4:  ·  Problem: Abnormal LFTs.  Recommendation: multifactorial, likely NAFLD, COVID, critical illness. Can consider US to r/o gallstone disease, especially if increasing.      Problem/Recommendation - 5:  ·  Problem: Morbid obesity.         Attending Attestation:   Differential diagnosis and plan of care discussed with patient after the evaluation  35 Minutes spent on total encounter of which more than fifty percent of the encounter was spent counseling and/or coordinating care by the attending physician.    Mele Bolanos M.D.   Gastroenterology and Hepatology  Cell: [phone].

## 2021-02-11 NOTE — SWALLOW BEDSIDE ASSESSMENT ADULT - SWALLOW EVAL: RECOMMENDED DIET
1. Oral means of nutrition/hydration/medication contraindicated 2. Continue with non oral means of nutrition/hydration/medication via PEG tube

## 2021-02-11 NOTE — PROGRESS NOTE ADULT - ASSESSMENT
46 F w respiratory failure due to COVID 19     Problem/Recommendation - 1:  Problem: Acute respiratory failure with hypoxia. Recommendation: s/p tracheostomy and PEG. No signs of post proceure issues at this time  - c/w G tube feeds  - PPI per team  - RCU care   - ativan for agitation PRN   - Seroquel and Klonopin   - TOV as tolerated   - Rehab eval   - speech and swallow , NPO      Problem/Recommendation - 2:  ·  Problem: COVID-19.  Recommendation: status post dexamethasone.      Problem/Recommendation - 3:  ·  Problem: Normocytic anemia.  Recommendation: without overt GI bleeding. Likely due to critical illness.   -monitor for GI bleed  -PPI QD.      Problem/Recommendation - 4:  ·  Problem: Abnormal LFTs.  Recommendation: multifactorial, likely NAFLD, COVID, critical illness. Can consider US to r/o gallstone disease, especially if increasing.   defer to GI      Problem/Recommendation - 5:  ·  Problem: Morbid obesity.      Problem/Recommendation - 6:  Problem: Constipation. Recommendation: consider XR abdomen to assess for ileus  on a bowel regimen.

## 2021-02-11 NOTE — SWALLOW BEDSIDE ASSESSMENT ADULT - SWALLOW EVAL: DIAGNOSIS
Patient Patient was given about 4 oz puree consistency and about 4oz honey thick liquids mixed with green dye food coloring to subjectively assess for gross aspiration. Patient demonstrated oropharyngeal dysphagia marked by adequate stripping of bolus from utensil, adequate oral containment, and slow/extended bolus manipulation and formation. There was laryngeal elevation upon digital palpation with initiation of pharyngeal swallow. One episode of cough response noted on the initial trial of honey thick liquids. RN provided immediate intratracheal suctioning with no evidence of green dye return. RN provided intratracheal suctioning 1 hour post deglutition and reported.... Patient was given about 4 oz puree consistency and about 4oz honey thick liquids mixed with green dye food coloring to subjectively assess for gross aspiration. Patient demonstrated oropharyngeal dysphagia marked by adequate stripping of bolus from utensil, adequate oral containment, and slow/extended bolus manipulation and formation. There was laryngeal elevation upon digital palpation with initiation of pharyngeal swallow. One episode of cough response noted on the initial trial of honey thick liquids. RN provided immediate intratracheal suctioning with no evidence of green dye return. RN provided intratracheal suctioning 1 hour post deglutition and reported no delayed green dye return.

## 2021-02-11 NOTE — PROGRESS NOTE ADULT - SUBJECTIVE AND OBJECTIVE BOX
CHIEF COMPLAINT: Patient is a 46y old  Female who presents with a chief complaint of COVID (10 Feb 2021 18:22)      Interval Events: Pt seen and evaluated by team.  Tolerating PMV.  Seen by PMR and is a candidate for acute rehab     REVIEW OF SYSTEMS:  Constitutional:   Eyes:  ENT:  CV:  Resp:  GI:  :  MSK:  Integumentary:  Neurological:  Psychiatric:  Endocrine:  Hematologic/Lymphatic:  Allergic/Immunologic:  [ ] All other systems negative      OBJECTIVE:  ICU Vital Signs Last 24 Hrs  T(C): 37.2 (11 Feb 2021 05:00), Max: 37.2 (11 Feb 2021 05:00)  T(F): 98.9 (11 Feb 2021 05:00), Max: 98.9 (11 Feb 2021 05:00)  HR: 90 (11 Feb 2021 05:00) (87 - 100)  BP: 114/76 (11 Feb 2021 05:00) (114/76 - 127/87)  BP(mean): --  ABP: --  ABP(mean): --  RR: 22 (11 Feb 2021 05:00) (20 - 24)  SpO2: 99% (11 Feb 2021 05:00) (95% - 100%)    Mode: CPAP with PS, FiO2: 40, PEEP: 5, PS: 8, MAP: 8, PIP: 15    02-10 @ 07:01  -  02-11 @ 07:00  --------------------------------------------------------  IN: 700 mL / OUT: 850 mL / NET: -150 mL      CAPILLARY BLOOD GLUCOSE      POCT Blood Glucose.: 129 mg/dL (11 Feb 2021 05:51)    HOSPITAL MEDICATIONS:  MEDICATIONS  (STANDING):  acetaminophen    Suspension .. 650 milliGRAM(s) Oral every 6 hours  acyclovir    Suspension 400 milliGRAM(s) Oral three times a day  BACItracin   Ointment 1 Application(s) Topical two times a day  chlorhexidine 0.12% Liquid 15 milliLiter(s) Oral Mucosa every 12 hours  chlorhexidine 4% Liquid 1 Application(s) Topical daily  clonazePAM  Tablet 1 milliGRAM(s) Oral <User Schedule>  cloNIDine 0.1 milliGRAM(s) Oral every 8 hours  dextrose 5%. 1000 milliLiter(s) (50 mL/Hr) IV Continuous <Continuous>  dextrose 5%. 1000 milliLiter(s) (100 mL/Hr) IV Continuous <Continuous>  enoxaparin Injectable 40 milliGRAM(s) SubCutaneous every 12 hours  glucagon  Injectable 1 milliGRAM(s) IntraMuscular once  insulin lispro (ADMELOG) corrective regimen sliding scale   SubCutaneous every 6 hours  mirtazapine 7.5 milliGRAM(s) Oral at bedtime  multivitamin 1 Tablet(s) Oral daily  mupirocin 2% Ointment 1 Application(s) Topical two times a day  oxyCODONE    Solution 5 milliGRAM(s) Oral every 12 hours  polyethylene glycol 3350 17 Gram(s) Oral daily  QUEtiapine 12.5 milliGRAM(s) Oral daily  QUEtiapine 50 milliGRAM(s) Oral at bedtime  senna Syrup 10 milliLiter(s) Oral daily    MEDICATIONS  (PRN):  ALBUTerol    90 MICROgram(s) HFA Inhaler 2 Puff(s) Inhalation every 6 hours PRN Wheezing  bisacodyl Suppository 10 milliGRAM(s) Rectal at bedtime PRN Constipation      LABS:                        10.0   8.91  )-----------( 503      ( 11 Feb 2021 07:07 )             32.0     02-10    136  |  97<L>  |  14  ----------------------------<  168<H>  3.8   |  27  |  0.54    Ca    9.5      10 Feb 2021 06:26  Phos  3.9     02-10  Mg     1.9     02-10                MICROBIOLOGY:     RADIOLOGY:  [ ] Reviewed and interpreted by me    PULMONARY FUNCTION TESTS:    EKG: CHIEF COMPLAINT: Patient is a 46y old  Female who presents with a chief complaint of COVID (10 Feb 2021 18:22)      Interval Events: Pt seen and evaluated by team.  Tolerating PMV.  Seen by PMR and is a candidate for acute rehab     REVIEW OF SYSTEMS:  Constitutional: Denies cough /fever  ENT: Denies   CV: Denies   Resp: Denies   GI: Denies   MSK: intermittent back pain   [x ] All other systems negative      OBJECTIVE:  ICU Vital Signs Last 24 Hrs  T(C): 37.2 (11 Feb 2021 05:00), Max: 37.2 (11 Feb 2021 05:00)  T(F): 98.9 (11 Feb 2021 05:00), Max: 98.9 (11 Feb 2021 05:00)  HR: 90 (11 Feb 2021 05:00) (87 - 100)  BP: 114/76 (11 Feb 2021 05:00) (114/76 - 127/87)  BP(mean): --  ABP: --  ABP(mean): --  RR: 22 (11 Feb 2021 05:00) (20 - 24)  SpO2: 99% (11 Feb 2021 05:00) (95% - 100%)    Mode: CPAP with PS, FiO2: 40, PEEP: 5, PS: 8, MAP: 8, PIP: 15    02-10 @ 07:01  -  02-11 @ 07:00  --------------------------------------------------------  IN: 700 mL / OUT: 850 mL / NET: -150 mL      CAPILLARY BLOOD GLUCOSE      POCT Blood Glucose.: 129 mg/dL (11 Feb 2021 05:51)    HOSPITAL MEDICATIONS:  MEDICATIONS  (STANDING):  acetaminophen    Suspension .. 650 milliGRAM(s) Oral every 6 hours  acyclovir    Suspension 400 milliGRAM(s) Oral three times a day  BACItracin   Ointment 1 Application(s) Topical two times a day  chlorhexidine 0.12% Liquid 15 milliLiter(s) Oral Mucosa every 12 hours  chlorhexidine 4% Liquid 1 Application(s) Topical daily  clonazePAM  Tablet 1 milliGRAM(s) Oral <User Schedule>  cloNIDine 0.1 milliGRAM(s) Oral every 8 hours  dextrose 5%. 1000 milliLiter(s) (50 mL/Hr) IV Continuous <Continuous>  dextrose 5%. 1000 milliLiter(s) (100 mL/Hr) IV Continuous <Continuous>  enoxaparin Injectable 40 milliGRAM(s) SubCutaneous every 12 hours  glucagon  Injectable 1 milliGRAM(s) IntraMuscular once  insulin lispro (ADMELOG) corrective regimen sliding scale   SubCutaneous every 6 hours  mirtazapine 7.5 milliGRAM(s) Oral at bedtime  multivitamin 1 Tablet(s) Oral daily  mupirocin 2% Ointment 1 Application(s) Topical two times a day  oxyCODONE    Solution 5 milliGRAM(s) Oral every 12 hours  polyethylene glycol 3350 17 Gram(s) Oral daily  QUEtiapine 12.5 milliGRAM(s) Oral daily  QUEtiapine 50 milliGRAM(s) Oral at bedtime  senna Syrup 10 milliLiter(s) Oral daily    MEDICATIONS  (PRN):  ALBUTerol    90 MICROgram(s) HFA Inhaler 2 Puff(s) Inhalation every 6 hours PRN Wheezing  bisacodyl Suppository 10 milliGRAM(s) Rectal at bedtime PRN Constipation      LABS:                        10.0   8.91  )-----------( 503      ( 11 Feb 2021 07:07 )             32.0     02-10    136  |  97<L>  |  14  ----------------------------<  168<H>  3.8   |  27  |  0.54    Ca    9.5      10 Feb 2021 06:26  Phos  3.9     02-10  Mg     1.9     02-10                MICROBIOLOGY:     RADIOLOGY:  [ ] Reviewed and interpreted by me    PULMONARY FUNCTION TESTS:    EKG:

## 2021-02-12 PROCEDURE — 99232 SBSQ HOSP IP/OBS MODERATE 35: CPT

## 2021-02-12 PROCEDURE — 99223 1ST HOSP IP/OBS HIGH 75: CPT | Mod: GC

## 2021-02-12 RX ADMIN — Medication 0.1 MILLIGRAM(S): at 21:05

## 2021-02-12 RX ADMIN — Medication 400 MILLIGRAM(S): at 15:15

## 2021-02-12 RX ADMIN — MIRTAZAPINE 7.5 MILLIGRAM(S): 45 TABLET, ORALLY DISINTEGRATING ORAL at 21:05

## 2021-02-12 RX ADMIN — SENNA PLUS 10 MILLILITER(S): 8.6 TABLET ORAL at 21:05

## 2021-02-12 RX ADMIN — CHLORHEXIDINE GLUCONATE 1 APPLICATION(S): 213 SOLUTION TOPICAL at 05:31

## 2021-02-12 RX ADMIN — Medication 400 MILLIGRAM(S): at 21:04

## 2021-02-12 RX ADMIN — Medication 1 TABLET(S): at 11:41

## 2021-02-12 RX ADMIN — OXYCODONE HYDROCHLORIDE 5 MILLIGRAM(S): 5 TABLET ORAL at 11:41

## 2021-02-12 RX ADMIN — Medication 1 APPLICATION(S): at 17:15

## 2021-02-12 RX ADMIN — POLYETHYLENE GLYCOL 3350 17 GRAM(S): 17 POWDER, FOR SOLUTION ORAL at 11:42

## 2021-02-12 RX ADMIN — Medication 650 MILLIGRAM(S): at 23:52

## 2021-02-12 RX ADMIN — CHLORHEXIDINE GLUCONATE 15 MILLILITER(S): 213 SOLUTION TOPICAL at 05:31

## 2021-02-12 RX ADMIN — ENOXAPARIN SODIUM 40 MILLIGRAM(S): 100 INJECTION SUBCUTANEOUS at 17:14

## 2021-02-12 RX ADMIN — QUETIAPINE FUMARATE 50 MILLIGRAM(S): 200 TABLET, FILM COATED ORAL at 21:05

## 2021-02-12 RX ADMIN — Medication 1 APPLICATION(S): at 05:31

## 2021-02-12 RX ADMIN — Medication 650 MILLIGRAM(S): at 11:41

## 2021-02-12 RX ADMIN — CHLORHEXIDINE GLUCONATE 15 MILLILITER(S): 213 SOLUTION TOPICAL at 17:15

## 2021-02-12 RX ADMIN — ENOXAPARIN SODIUM 40 MILLIGRAM(S): 100 INJECTION SUBCUTANEOUS at 05:31

## 2021-02-12 RX ADMIN — Medication 1: at 05:50

## 2021-02-12 RX ADMIN — Medication 650 MILLIGRAM(S): at 05:30

## 2021-02-12 RX ADMIN — MUPIROCIN 1 APPLICATION(S): 20 OINTMENT TOPICAL at 17:15

## 2021-02-12 RX ADMIN — Medication 1 MILLIGRAM(S): at 05:30

## 2021-02-12 RX ADMIN — Medication 1 MILLIGRAM(S): at 17:14

## 2021-02-12 RX ADMIN — MUPIROCIN 1 APPLICATION(S): 20 OINTMENT TOPICAL at 05:32

## 2021-02-12 RX ADMIN — Medication 400 MILLIGRAM(S): at 05:31

## 2021-02-12 RX ADMIN — QUETIAPINE FUMARATE 12.5 MILLIGRAM(S): 200 TABLET, FILM COATED ORAL at 11:41

## 2021-02-12 RX ADMIN — Medication 650 MILLIGRAM(S): at 17:14

## 2021-02-12 NOTE — OCCUPATIONAL THERAPY INITIAL EVALUATION ADULT - PATIENT/FAMILY/SIGNIFICANT OTHER GOALS STATEMENT, OT EVAL
Patient did not state a goal at this time
Pt. reports she wants to improve performance with functional mobility and self-care tasks.

## 2021-02-12 NOTE — OCCUPATIONAL THERAPY INITIAL EVALUATION ADULT - PHYSICAL ASSIST/NONPHYSICAL ASSIST: SIT/SUPINE, REHAB EVAL
verbal cues/nonverbal cues (demo/gestures)/2 person assist
set-up required/verbal cues/nonverbal cues (demo/gestures)/2 person assist

## 2021-02-12 NOTE — PROGRESS NOTE ADULT - ASSESSMENT
46 F w respiratory failure due to COVID 19     Problem/Recommendation - 1:  Problem: Acute respiratory failure with hypoxia. Recommendation: s/p tracheostomy and PEG. No signs of post proceure issues at this time  - c/w G tube feeds  - PPI per team  - RCU care   - ativan for agitation PRN   - Seroquel and Klonopin   - TOV as tolerated   - Rehab eval   - speech and swallow   - monitor hgb level, mild drop, transfuse if any further drop      Problem/Recommendation - 2:  ·  Problem: COVID-19.  Recommendation: status post dexamethasone.      Problem/Recommendation - 3:  ·  Problem: Normocytic anemia.  Recommendation: without overt GI bleeding. Likely due to critical illness.   -monitor for GI bleed  -PPI QD.      Problem/Recommendation - 4:  ·  Problem: Abnormal LFTs.  Recommendation: multifactorial, likely NAFLD, COVID, critical illness. Can consider US to r/o gallstone disease, especially if increasing.   defer to GI      Problem/Recommendation - 5:  ·  Problem: Morbid obesity.      Problem/Recommendation - 6:  Problem: Constipation. Recommendation: consider XR abdomen to assess for ileus  on a bowel regimen.

## 2021-02-12 NOTE — OCCUPATIONAL THERAPY INITIAL EVALUATION ADULT - LEVEL OF INDEPENDENCE: SIT/STAND, REHAB EVAL
Not appropriate to assess at this time
minimum assist (75% patients effort)/moderate assist (50% patients effort)

## 2021-02-12 NOTE — PROGRESS NOTE ADULT - ATTENDING COMMENTS
Agree with above. Seen and examined with team on rounds. Garland removed. Trach collar now. Will cap her for 24 hrs. OT eval, ready for acute rehab. Supportive care.

## 2021-02-12 NOTE — OCCUPATIONAL THERAPY INITIAL EVALUATION ADULT - PHYSICAL ASSIST/NONPHYSICAL ASSIST:DRESS LOWER BODY, OT EVAL
verbal cues/nonverbal cues (demo/gestures)/1 person assist
set-up required/verbal cues/1 person assist

## 2021-02-12 NOTE — OCCUPATIONAL THERAPY INITIAL EVALUATION ADULT - NS ASR FOLLOW COMMAND OT EVAL
100% of the time/able to follow multistep instructions
100% of the time/able to follow single-step instructions

## 2021-02-12 NOTE — OCCUPATIONAL THERAPY INITIAL EVALUATION ADULT - IMPAIRMENTS CONTRIBUTING IMPAIRED BED MOBILITY, REHAB EVAL
SpO2 at 80% after ~3-4 minutes of sitting at edge of bed, returned to 89% after ~2 minutes in supine./impaired balance/decreased strength
impaired balance/decreased strength

## 2021-02-12 NOTE — OCCUPATIONAL THERAPY INITIAL EVALUATION ADULT - PERTINENT HX OF CURRENT PROBLEM, REHAB EVAL
Pt is a 46 year old female with no past medical history who presented to [facility] on 1/3/21 with shortness of breath, cough productive of white sputum, pleuritic rib pain (moderate in severity) for two days and diarrhea for one day. CXR peripheral opacities consistent with COVID. Pt admitted for acute hypoxic respiratory failure in the setting of likely COVID infection. (See continue below)
46 year old female initially presenting with SOB, productive cough, pleuritic rib pain and diarrhea with AHRF 2/2 COVID-19, now transferred to MICU on 1/5/20 s/p RRT for desaturation and intubated. Now extubated on 1/8/2021.

## 2021-02-12 NOTE — OCCUPATIONAL THERAPY INITIAL EVALUATION ADULT - GENERAL OBSERVATIONS, REHAB EVAL
Pt. received semisupine in bed. No acute distress. Patient agreed to evaluation from Occupational Therapist. +IV, +Tele, +O2 via Trach, +PEG tube.
Patient received semisupine in bed in NAD. +IV. +CPAP. +cardiac monitor. SpO2 89%. Per covering JONES Palomares, patient okay to participate in OT evaluation.

## 2021-02-12 NOTE — OCCUPATIONAL THERAPY INITIAL EVALUATION ADULT - LIVES WITH, PROFILE
Patient lives in a private home with her  and 3 children with steps to manage.
Pt. reports she lives with her  and children in a house with 1 step to enter. Once inside, pt. reports she has full flight of steps to negotiate to 2nd floor where main bedroom and bathroom are located. Per pt., she has a bathtub in her bathroom.

## 2021-02-12 NOTE — PROGRESS NOTE ADULT - ASSESSMENT
45 YO F with Morbid Obesity and DM2 A1C 6.6 admitted for ARDS second to SARS-COV-2, intubated 1/5-1/10 then transferred to Medicine, however failed BIPAP and reintubated 1/16. Course complicated UTI, Mouth Sores and Enterobacter and MRSA VAP. s/p Tracheostomy 1/28 and PEG 1/26. Now transferred to RCU.     # ICU Delirium/ Agitation   - Now weaned off sedation, however delirious.  - Continue on Seroquel 12.5mg AM and 50mg QHS and Klonopin 1mg BID   - Taper oxycodone   - Monitor mentation.      # Depression  - Crying consistently, however  remains great support system via Facetime.   - Started on Remeron. Increased supervision at bedside   - Supportive care.     # ARDS second to SARS-COV-2 vs Superimposed Enterobacter and MRSA PNA   - Completed Remdesivir, Decadron and ABX as belowed.   - s/p Prolonged ICU stay and Tracheostomy on 1/28  - Tolerated TC well today. Keep on TC during the day and PS 5/5/30 overnight.    - Proventil and Chest PT Q6H. Suction PRN. Trach care QD.   - Trial of PMV with supervision and pt tolerating   - Pt tolerating TC 24/7  - S&S eval done - keep NPO for now CIne 2/12    # Septic vs Vasoplegic Shock   - Off all Pressors. Monitor HR/ BP     # Dysphagic with PEG   - s/p PEG 1/26 and continue on TF as tolerated.   - Constipated and bowel regimen (Senna and Miralax and Dulcolax PRN) started with improvement.   - Having increased stools miralax decreased     #   - Failed TOV 2/3. Keep Haque for now and TOV again when more ambulatory.   - Hyponatremia/ Hypernatremia, monitor electrolytes and renal function as tolerated.   - Will do TOV as pt oob to chair     # Sepsis second to SARS-COV-2 vs Enterobacter and MRSA PNA vs HSV Type 1   - Completed empiric ABX for PNA with Zosyn (1/16-1/20), however cultures negative   - ECOLI UTI (1/25) s/p CTX (1/25-1/27).   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     # DM2 A1C 6.6 (1/2021)   - Continue on ISS and monitor FS Q6H    # Wounds   - WOC recommendations appreciated.      # DVT PPX with Lovenox BID   # CODE STATUS - Full Code   # DISPO - PT recommends Rehab.    45 YO F with Morbid Obesity and DM2 A1C 6.6 admitted for ARDS second to SARS-COV-2, intubated 1/5-1/10 then transferred to Medicine, however failed BIPAP and reintubated 1/16. Course complicated UTI, Mouth Sores and Enterobacter and MRSA VAP. s/p Tracheostomy 1/28 and PEG 1/26. Now transferred to RCU.     # ICU Delirium/ Agitation   - Now weaned off sedation, however delirious.  - Continue on Seroquel 12.5mg AM and 50mg QHS and Klonopin 1mg BID   - Taper oxycodone   - Mentation improved / talking with PMV     # Depression  - Crying consistently, however  remains great support system via Facetime.   - Started on Remeron. Increased supervision at bedside Less tearful   - Supportive care.     # ARDS second to SARS-COV-2 vs Superimposed Enterobacter and MRSA PNA   - Completed Remdesivir, Decadron and ABX as belowed.   - s/p Prolonged ICU stay and Tracheostomy on 1/28  - Tolerated TC well today. Keep on TC during the day and PS 5/5/30 overnight.    - Proventil and Chest PT Q6H. Suction PRN. Trach care QD.   - Trial of PMV with supervision and pt tolerating   - Pt tolerating TC 24/7  - S&S eval done - keep NPO for now CIne 2/12    # Septic vs Vasoplegic Shock   - Off all Pressors. Monitor HR/ BP     # Dysphagic with PEG   - s/p PEG 1/26 and continue on TF as tolerated.   - Constipated and bowel regimen (Senna and Miralax and Dulcolax PRN) started with improvement.   - Having increased stools miralax decreased   - S&S eval in progress- CIne today 2/12    #   - Failed TOV 2/3. Keep Haque for now and TOV again when more ambulatory.   - Hyponatremia/ Hypernatremia, monitor electrolytes and renal function as tolerated.   - Will do TOV as pt oob to chair     # Sepsis second to SARS-COV-2 vs Enterobacter and MRSA PNA vs HSV Type 1   - Completed empiric ABX for PNA with Zosyn (1/16-1/20), however cultures negative   - ECOLI UTI (1/25) s/p CTX (1/25-1/27).   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     # DM2 A1C 6.6 (1/2021)   - Continue on ISS and monitor FS Q6H    # Wounds   - WOC recommendations appreciated.      # DVT PPX with Lovenox BID   # CODE STATUS - Full Code   # DISPO - PT recommends Rehab.

## 2021-02-12 NOTE — OCCUPATIONAL THERAPY INITIAL EVALUATION ADULT - PHYSICAL ASSIST/NONPHYSICAL ASSIST: GROOMING, OT EVAL
set-up required/verbal cues/1 person assist
verbal cues/nonverbal cues (demo/gestures)/1 person assist

## 2021-02-12 NOTE — OCCUPATIONAL THERAPY INITIAL EVALUATION ADULT - PHYSICAL ASSIST/NONPHYSICAL ASSIST: SUPINE/SIT, REHAB EVAL
verbal cues/nonverbal cues (demo/gestures)/2 person assist
set-up required/verbal cues/nonverbal cues (demo/gestures)/1 person assist

## 2021-02-12 NOTE — PROGRESS NOTE ADULT - ASSESSMENT
46 F w respiratory failure due to COVID 19     Problem/Recommendation - 1:  Problem: Acute respiratory failure with hypoxia. Recommendation: s/p tracheostomy and PEG. No signs of post proceure issues at this time  -c/w G tube feeds, for MBS to determine candidacy for oral nutrition       Problem/Recommendation - 2:  ·  Problem: COVID-19.  Recommendation: status post dexamethasone.   on abx per ID for staph in sputum     Problem/Recommendation - 3:  ·  Problem: Normocytic anemia.  Recommendation: without overt GI bleeding. Likely due to critical illness. Trended down today  -monitor for GI bleed  -PPI QD.      Problem/Recommendation - 4:  ·  Problem: Abnormal LFTs.  Recommendation: multifactorial, likely NAFLD, COVID, critical illness. Can consider US to r/o gallstone disease, especially if increasing.      Problem/Recommendation - 5:  ·  Problem: Morbid obesity.         Attending Attestation:   Differential diagnosis and plan of care discussed with patient after the evaluation  35 Minutes spent on total encounter of which more than fifty percent of the encounter was spent counseling and/or coordinating care by the attending physician.    Mele Bolanos M.D.   Gastroenterology and Hepatology  Cell: [phone].

## 2021-02-12 NOTE — PROGRESS NOTE ADULT - SUBJECTIVE AND OBJECTIVE BOX
Patient is a 46y old  Female who presents with a chief complaint of COVID (12 Feb 2021 06:53)      Interval history:  tolerating TC overnight per chart, trial of PMV, patient tolerating   TOV today  planned for cinesophogram today (NPO today)    REVIEW OF SYSTEMS  Constitutional - No fever, No weight loss, No fatigue  HEENT - No eye pain, No visual disturbances, No difficulty hearing, No tinnitus, No vertigo, No neck pain  Respiratory - No cough, No wheezing, No shortness of breath  Cardiovascular - No chest pain, No palpitations  Gastrointestinal - No abdominal pain, No nausea, No vomiting, No diarrhea, No constipation  Genitourinary - No dysuria, No frequency, No hematuria, No incontinence  Neurological - No headaches, No memory loss, + loss of strength, No numbness, No tremors  Skin - No itching, No rashes, No lesions   Endocrine - No temperature intolerance  Musculoskeletal - No joint pain, No joint swelling, No muscle pain  Psychiatric - + depression, No anxiety    PAST MEDICAL & SURGICAL HISTORY  No pertinent past medical history  No significant past surgical history      CURRENT FUNCTIONAL STATUS  2/10 Pt performed sit <> transfer training x 2 with max assist of 2 persons + 1 person to keep bilateral knees in extension with a rolling walker  on schedule for PT today    FAMILY HISTORY   FH: type 2 diabetes        RECENT LABS/IMAGING  CBC Full  -  ( 11 Feb 2021 07:07 )  WBC Count : 8.91 K/uL  RBC Count : 3.63 M/uL  Hemoglobin : 10.0 g/dL  Hematocrit : 32.0 %  Platelet Count - Automated : 503 K/uL  Mean Cell Volume : 88.2 fL  Mean Cell Hemoglobin : 27.5 pg  Mean Cell Hemoglobin Concentration : 31.3 gm/dL  Auto Neutrophil # : x  Auto Lymphocyte # : x  Auto Monocyte # : x  Auto Eosinophil # : x  Auto Basophil # : x  Auto Neutrophil % : x  Auto Lymphocyte % : x  Auto Monocyte % : x  Auto Eosinophil % : x  Auto Basophil % : x    02-11    138  |  98  |  20  ----------------------------<  120<H>  3.6   |  29  |  0.57    Ca    9.6      11 Feb 2021 07:07  Phos  4.1     02-11  Mg     2.1     02-11          VITALS  T(C): 36.8 (02-12-21 @ 10:02), Max: 37.1 (02-11-21 @ 18:21)  HR: 97 (02-12-21 @ 10:02) (92 - 104)  BP: 117/83 (02-12-21 @ 10:02) (105/74 - 117/83)  RR: 22 (02-12-21 @ 10:02) (18 - 22)  SpO2: 100% (02-12-21 @ 10:02) (94% - 100%)  Wt(kg): --    ALLERGIES  No Known Allergies      MEDICATIONS   acetaminophen    Suspension .. 650 milliGRAM(s) Oral every 6 hours  acyclovir    Suspension 400 milliGRAM(s) Oral three times a day  ALBUTerol    90 MICROgram(s) HFA Inhaler 2 Puff(s) Inhalation every 6 hours PRN  BACItracin   Ointment 1 Application(s) Topical two times a day  bisacodyl Suppository 10 milliGRAM(s) Rectal at bedtime PRN  chlorhexidine 0.12% Liquid 15 milliLiter(s) Oral Mucosa every 12 hours  chlorhexidine 4% Liquid 1 Application(s) Topical daily  clonazePAM  Tablet 1 milliGRAM(s) Oral <User Schedule>  cloNIDine 0.1 milliGRAM(s) Oral <User Schedule>  dextrose 5%. 1000 milliLiter(s) IV Continuous <Continuous>  dextrose 5%. 1000 milliLiter(s) IV Continuous <Continuous>  enoxaparin Injectable 40 milliGRAM(s) SubCutaneous every 12 hours  glucagon  Injectable 1 milliGRAM(s) IntraMuscular once  insulin lispro (ADMELOG) corrective regimen sliding scale   SubCutaneous every 6 hours  mirtazapine 7.5 milliGRAM(s) Oral at bedtime  multivitamin 1 Tablet(s) Oral daily  mupirocin 2% Ointment 1 Application(s) Topical two times a day  oxyCODONE    Solution 5 milliGRAM(s) Oral daily  polyethylene glycol 3350 17 Gram(s) Oral daily  QUEtiapine 12.5 milliGRAM(s) Oral daily  QUEtiapine 50 milliGRAM(s) Oral at bedtime  senna Syrup 10 milliLiter(s) Oral daily      ----------------------------------------------------------------------------------------   PHYSICAL EXAM  Constitutional - NAD, Comfortable  HEENT - abrasion R cheek,  + trache   Chest - no respiratory distress  Cardiovascular - no edema  Abdomen - Soft, NTND, + PEG  Extremities - no calf tenderness   Neurologic Exam -                    Cognitive - Awake, Alert, AAO to self, place, date, year, situation     Communication - Fluent, No dysarthria     Cranial Nerves - CN 2-12 intact     Motor -                      LEFT    UE -4-/5                    RIGHT UE - 4-/5                    LEFT    LE - 4-/5                                    RIGHT LE -4-/5       Sensory - Intact to LT        Balance - WNL Static  Psychiatric - depressed   ----------------------------------------------------------------------------------------  ASSESSMENT/PLAN   47 yo f admitted for ARDS second to SARS-COV-2, intubated 1/5-1/10 then transferred to Medicine, however failed BIPAP and reintubated 1/16. Course complicated UTI, Mouth Sores and Enterobacter and MRSA VAP. s/p Tracheostomy 1/28 and PEG 1/26.   contact precautions for MRSA  depression- on remeron  delirium- on seroquel  urinary retention/ UTI: TOV  also treated with zosyn for pneumonia  MRSA/HSV: on bactroban, acyclovir until 2/14  Diet: npo with tube feeds  DVT PPX - lovenox  Rehab -    Recommend ACUTE inpatient rehabilitation for the functional deficits consisting of 3 hours of therapy/day & 24 hour RN/daily PMR physician for comorbid medical management. Will continue to follow for ongoing rehab needs and recommendations.      Patient is a 46y old  Female who presents with a chief complaint of COVID (12 Feb 2021 06:53)      Interval history:  tolerating TC overnight per chart, tolerates PMV   TOV today  planned for cinesophogram today (NPO today)    REVIEW OF SYSTEMS  Constitutional - No fever, No weight loss, No fatigue  HEENT - No eye pain, No visual disturbances, No difficulty hearing, No tinnitus, No vertigo, No neck pain  Respiratory - No cough, No wheezing, No shortness of breath  Cardiovascular - No chest pain, No palpitations  Gastrointestinal - No abdominal pain, No nausea, No vomiting, No diarrhea, No constipation  Genitourinary - No dysuria, No frequency, No hematuria, No incontinence  Neurological - No headaches, No memory loss, + loss of strength, No numbness, No tremors  Skin - No itching, No rashes, + healing R facial wound    Endocrine - No temperature intolerance  Musculoskeletal - No joint pain, No joint swelling, No muscle pain  Psychiatric - + depression, No anxiety    PAST MEDICAL & SURGICAL HISTORY  No pertinent past medical history  No significant past surgical history      CURRENT FUNCTIONAL STATUS  2/10 Pt performed sit <> transfer training x 2 with max assist of 2 persons + 1 person to keep bilateral knees in extension with a rolling walker  on schedule for PT today    FAMILY HISTORY   FH: type 2 diabetes        RECENT LABS/IMAGING  CBC Full  -  ( 11 Feb 2021 07:07 )  WBC Count : 8.91 K/uL  RBC Count : 3.63 M/uL  Hemoglobin : 10.0 g/dL  Hematocrit : 32.0 %  Platelet Count - Automated : 503 K/uL  Mean Cell Volume : 88.2 fL  Mean Cell Hemoglobin : 27.5 pg  Mean Cell Hemoglobin Concentration : 31.3 gm/dL  Auto Neutrophil # : x  Auto Lymphocyte # : x  Auto Monocyte # : x  Auto Eosinophil # : x  Auto Basophil # : x  Auto Neutrophil % : x  Auto Lymphocyte % : x  Auto Monocyte % : x  Auto Eosinophil % : x  Auto Basophil % : x    02-11    138  |  98  |  20  ----------------------------<  120<H>  3.6   |  29  |  0.57    Ca    9.6      11 Feb 2021 07:07  Phos  4.1     02-11  Mg     2.1     02-11          VITALS  T(C): 36.8 (02-12-21 @ 10:02), Max: 37.1 (02-11-21 @ 18:21)  HR: 97 (02-12-21 @ 10:02) (92 - 104)  BP: 117/83 (02-12-21 @ 10:02) (105/74 - 117/83)  RR: 22 (02-12-21 @ 10:02) (18 - 22)  SpO2: 100% (02-12-21 @ 10:02) (94% - 100%)  Wt(kg): --    ALLERGIES  No Known Allergies      MEDICATIONS   acetaminophen    Suspension .. 650 milliGRAM(s) Oral every 6 hours  acyclovir    Suspension 400 milliGRAM(s) Oral three times a day  ALBUTerol    90 MICROgram(s) HFA Inhaler 2 Puff(s) Inhalation every 6 hours PRN  BACItracin   Ointment 1 Application(s) Topical two times a day  bisacodyl Suppository 10 milliGRAM(s) Rectal at bedtime PRN  chlorhexidine 0.12% Liquid 15 milliLiter(s) Oral Mucosa every 12 hours  chlorhexidine 4% Liquid 1 Application(s) Topical daily  clonazePAM  Tablet 1 milliGRAM(s) Oral <User Schedule>  cloNIDine 0.1 milliGRAM(s) Oral <User Schedule>  dextrose 5%. 1000 milliLiter(s) IV Continuous <Continuous>  dextrose 5%. 1000 milliLiter(s) IV Continuous <Continuous>  enoxaparin Injectable 40 milliGRAM(s) SubCutaneous every 12 hours  glucagon  Injectable 1 milliGRAM(s) IntraMuscular once  insulin lispro (ADMELOG) corrective regimen sliding scale   SubCutaneous every 6 hours  mirtazapine 7.5 milliGRAM(s) Oral at bedtime  multivitamin 1 Tablet(s) Oral daily  mupirocin 2% Ointment 1 Application(s) Topical two times a day  oxyCODONE    Solution 5 milliGRAM(s) Oral daily  polyethylene glycol 3350 17 Gram(s) Oral daily  QUEtiapine 12.5 milliGRAM(s) Oral daily  QUEtiapine 50 milliGRAM(s) Oral at bedtime  senna Syrup 10 milliLiter(s) Oral daily      ----------------------------------------------------------------------------------------   PHYSICAL EXAM  Constitutional - NAD, Comfortable  HEENT - healing abrasion R cheek,  + trache   Chest - no respiratory distress  Cardiovascular - no edema  Abdomen - Soft, NTND, + PEG  Extremities - no calf tenderness   Neurologic Exam -                    Cognitive - Awake, Alert, AAO to self, place, date, year, situation     Communication - Fluent, No dysarthria     Cranial Nerves - CN 2-12 intact     Motor -                      LEFT    UE -4/5                    RIGHT UE - 4/5                    LEFT    LE - 4/5                                    RIGHT LE -4/5       Sensory - Intact to LT        Balance - WNL Static  Psychiatric - depressed   ----------------------------------------------------------------------------------------  ASSESSMENT/PLAN   47 yo f admitted for ARDS second to SARS-COV-2, intubated 1/5-1/10 then transferred to Medicine, however failed BIPAP and reintubated 1/16. Course complicated UTI, Mouth Sores and Enterobacter and MRSA VAP. s/p Tracheostomy 1/28 and PEG 1/26.   contact precautions for MRSA  depression- on remeron. holistic nurse if available  delirium- on seroquel  urinary retention/ UTI: TOV  also treated with zosyn for pneumonia  MRSA/HSV: on bactroban, acyclovir until 2/14  Diet: npo with tube feeds  DVT PPX - lovenox  continue bedside PT and OT  OOB to chair daily  Rehab -    Recommend ACUTE inpatient rehabilitation for the functional deficits consisting of 3 hours of therapy/day & 24 hour RN/daily PMR physician for comorbid medical management. Will continue to follow for ongoing rehab needs and recommendations.

## 2021-02-12 NOTE — OCCUPATIONAL THERAPY INITIAL EVALUATION ADULT - ORIENTATION, REHAB EVAL
Unable to accurately assess; patient on CPAP with hypophonic speech. Patient at least A&Ox1
oriented to person, place, time and situation

## 2021-02-12 NOTE — OCCUPATIONAL THERAPY INITIAL EVALUATION ADULT - MD ORDER
Occupational Therapy (OT) to evaluate and treat. Occupational Therapy (OT) to evaluate and treat. Increase as Tolerated. Per JONES Talamantes, pt is okay to participate in OT evaluation and perform activity as tolerated.

## 2021-02-12 NOTE — OCCUPATIONAL THERAPY INITIAL EVALUATION ADULT - DIAGNOSIS, OT EVAL
s/p respiratory failure, s/p COVID-19, s/p extubated; decreased functional mobility, decreased ADL performance
s/p COVID; Acute hypoxic respiratory failure; ECOLI UTI; Mouth Sores; MRSA; Decreased sitting/standing balance; Decreased functional mobility; Decreased ADL management

## 2021-02-12 NOTE — PROGRESS NOTE ADULT - MENTAL STATUS
Awake alert /using PMV   Asking for water /Following directions
Eyes open awake at times
Opens eyes /tracking
arousable, opens eyes tracking
Awake alert   Follows commands

## 2021-02-12 NOTE — PROGRESS NOTE ADULT - SUBJECTIVE AND OBJECTIVE BOX
CHIEF COMPLAINT: Patient is a 46y old  Female who presents with a chief complaint of COVID (11 Feb 2021 22:19)      Interval Events: Pt seen and evaluated at bedside with team.  Tolerated TC overnight . TOV in progress - pt oob to chair and for cine today.    REVIEW OF SYSTEMS:  Constitutional:   Eyes:  ENT:  CV:  Resp:  GI:  :  MSK:  Integumentary:  Neurological:  Psychiatric:  Endocrine:  Hematologic/Lymphatic:  Allergic/Immunologic:  [ x] All other systems negative      OBJECTIVE:  ICU Vital Signs Last 24 Hrs  T(C): 36.7 (12 Feb 2021 05:28), Max: 37.1 (11 Feb 2021 18:21)  T(F): 98 (12 Feb 2021 05:28), Max: 98.7 (11 Feb 2021 18:21)  HR: 92 (12 Feb 2021 05:28) (84 - 104)  BP: 105/74 (12 Feb 2021 05:28) (100/74 - 115/73)  BP(mean): --  ABP: --  ABP(mean): --  RR: 18 (12 Feb 2021 05:28) (18 - 22)  SpO2: 98% (12 Feb 2021 05:28) (94% - 99%)    Mode: standby, FiO2: 40    02-10 @ 07:01  -  02-11 @ 07:00  --------------------------------------------------------  IN: 700 mL / OUT: 850 mL / NET: -150 mL      CAPILLARY BLOOD GLUCOSE      POCT Blood Glucose.: 170 mg/dL (12 Feb 2021 05:43)      PHYSICAL EXAM:  General:   HEENT:   Lymph Nodes:  Neck:   Respiratory:   Cardiovascular:   Abdomen:   Extremities:   Skin:   Neurological:  Psychiatry:    HOSPITAL MEDICATIONS:  MEDICATIONS  (STANDING):  acetaminophen    Suspension .. 650 milliGRAM(s) Oral every 6 hours  acyclovir    Suspension 400 milliGRAM(s) Oral three times a day  BACItracin   Ointment 1 Application(s) Topical two times a day  chlorhexidine 0.12% Liquid 15 milliLiter(s) Oral Mucosa every 12 hours  chlorhexidine 4% Liquid 1 Application(s) Topical daily  clonazePAM  Tablet 1 milliGRAM(s) Oral <User Schedule>  cloNIDine 0.1 milliGRAM(s) Oral <User Schedule>  dextrose 5%. 1000 milliLiter(s) (50 mL/Hr) IV Continuous <Continuous>  dextrose 5%. 1000 milliLiter(s) (100 mL/Hr) IV Continuous <Continuous>  enoxaparin Injectable 40 milliGRAM(s) SubCutaneous every 12 hours  glucagon  Injectable 1 milliGRAM(s) IntraMuscular once  insulin lispro (ADMELOG) corrective regimen sliding scale   SubCutaneous every 6 hours  mirtazapine 7.5 milliGRAM(s) Oral at bedtime  multivitamin 1 Tablet(s) Oral daily  mupirocin 2% Ointment 1 Application(s) Topical two times a day  oxyCODONE    Solution 5 milliGRAM(s) Oral daily  polyethylene glycol 3350 17 Gram(s) Oral daily  QUEtiapine 12.5 milliGRAM(s) Oral daily  QUEtiapine 50 milliGRAM(s) Oral at bedtime  senna Syrup 10 milliLiter(s) Oral daily    MEDICATIONS  (PRN):  ALBUTerol    90 MICROgram(s) HFA Inhaler 2 Puff(s) Inhalation every 6 hours PRN Wheezing  bisacodyl Suppository 10 milliGRAM(s) Rectal at bedtime PRN Constipation      LABS:                        10.0   8.91  )-----------( 503      ( 11 Feb 2021 07:07 )             32.0     02-11    138  |  98  |  20  ----------------------------<  120<H>  3.6   |  29  |  0.57    Ca    9.6      11 Feb 2021 07:07  Phos  4.1     02-11  Mg     2.1     02-11                MICROBIOLOGY:     RADIOLOGY:  [ ] Reviewed and interpreted by me    PULMONARY FUNCTION TESTS:    EKG: CHIEF COMPLAINT: Patient is a 46y old  Female who presents with a chief complaint of COVID (11 Feb 2021 22:19)      Interval Events: Pt seen and evaluated at bedside with team.  Tolerated TC overnight . TOV in progress - pt oob to chair and for cine today.    REVIEW OF SYSTEMS:  Constitutional: Denies cough/fever/pain   ENT: Denies   CV: Denies   Resp: Denies   GI: Thirsty /Hungry   MSK: Denies   Psychiatric: Less anxious   [ x] All other systems negative      OBJECTIVE:  ICU Vital Signs Last 24 Hrs  T(C): 36.7 (12 Feb 2021 05:28), Max: 37.1 (11 Feb 2021 18:21)  T(F): 98 (12 Feb 2021 05:28), Max: 98.7 (11 Feb 2021 18:21)  HR: 92 (12 Feb 2021 05:28) (84 - 104)  BP: 105/74 (12 Feb 2021 05:28) (100/74 - 115/73)  BP(mean): --  ABP: --  ABP(mean): --  RR: 18 (12 Feb 2021 05:28) (18 - 22)  SpO2: 98% (12 Feb 2021 05:28) (94% - 99%)    Mode: standby, FiO2: 40    02-10 @ 07:01  -  02-11 @ 07:00  --------------------------------------------------------  IN: 700 mL / OUT: 850 mL / NET: -150 mL      CAPILLARY BLOOD GLUCOSE      POCT Blood Glucose.: 170 mg/dL (12 Feb 2021 05:43)    HOSPITAL MEDICATIONS:  MEDICATIONS  (STANDING):  acetaminophen    Suspension .. 650 milliGRAM(s) Oral every 6 hours  acyclovir    Suspension 400 milliGRAM(s) Oral three times a day  BACItracin   Ointment 1 Application(s) Topical two times a day  chlorhexidine 0.12% Liquid 15 milliLiter(s) Oral Mucosa every 12 hours  chlorhexidine 4% Liquid 1 Application(s) Topical daily  clonazePAM  Tablet 1 milliGRAM(s) Oral <User Schedule>  cloNIDine 0.1 milliGRAM(s) Oral <User Schedule>  dextrose 5%. 1000 milliLiter(s) (50 mL/Hr) IV Continuous <Continuous>  dextrose 5%. 1000 milliLiter(s) (100 mL/Hr) IV Continuous <Continuous>  enoxaparin Injectable 40 milliGRAM(s) SubCutaneous every 12 hours  glucagon  Injectable 1 milliGRAM(s) IntraMuscular once  insulin lispro (ADMELOG) corrective regimen sliding scale   SubCutaneous every 6 hours  mirtazapine 7.5 milliGRAM(s) Oral at bedtime  multivitamin 1 Tablet(s) Oral daily  mupirocin 2% Ointment 1 Application(s) Topical two times a day  oxyCODONE    Solution 5 milliGRAM(s) Oral daily  polyethylene glycol 3350 17 Gram(s) Oral daily  QUEtiapine 12.5 milliGRAM(s) Oral daily  QUEtiapine 50 milliGRAM(s) Oral at bedtime  senna Syrup 10 milliLiter(s) Oral daily    MEDICATIONS  (PRN):  ALBUTerol    90 MICROgram(s) HFA Inhaler 2 Puff(s) Inhalation every 6 hours PRN Wheezing  bisacodyl Suppository 10 milliGRAM(s) Rectal at bedtime PRN Constipation      LABS:                        10.0   8.91  )-----------( 503      ( 11 Feb 2021 07:07 )             32.0     02-11    138  |  98  |  20  ----------------------------<  120<H>  3.6   |  29  |  0.57    Ca    9.6      11 Feb 2021 07:07  Phos  4.1     02-11  Mg     2.1     02-11                MICROBIOLOGY:     RADIOLOGY:  [ ] Reviewed and interpreted by me    PULMONARY FUNCTION TESTS:    EKG:

## 2021-02-12 NOTE — OCCUPATIONAL THERAPY INITIAL EVALUATION ADULT - ADDITIONAL COMMENTS
(See continue from above) Pt intubated 1/5/21-1/10/21 then transferred to Medicine, however failed BIPAP and reintubated 1/16/21. Pt.'s course complicated ECOLI UTI, Mouth Sores and Enterobacter and MRSA VAP. Pt is s/p Tracheostomy 1/28/21 and PEG 1/26/21. (See continue from above) Pt intubated 1/5/21-1/10/21 then transferred to Medicine, however failed BIPAP and reintubated 1/16/21. Pt.'s course complicated by ECOLI UTI, Mouth Sores and Enterobacter and MRSA VAP. Pt is s/p Tracheostomy 1/28/21 and PEG 1/26/21.

## 2021-02-12 NOTE — PROGRESS NOTE ADULT - SUBJECTIVE AND OBJECTIVE BOX
DATE OF SERVICE: 02-12-21     Subjective: Patient seen and examined. No new events except as noted.   doing okay       REVIEW OF SYSTEMS:    CONSTITUTIONAL: No weakness, fevers or chills  EYES/ENT: No visual changes;  No vertigo or throat pain   NECK: No pain or stiffness  RESPIRATORY: No cough, wheezing, hemoptysis; No shortness of breath  CARDIOVASCULAR: No chest pain or palpitations  GASTROINTESTINAL: No abdominal or epigastric pain. No nausea, vomiting, or hematemesis; No diarrhea or constipation. No melena or hematochezia.  GENITOURINARY: No dysuria, frequency or hematuria  NEUROLOGICAL: No numbness or weakness  SKIN: No itching, burning, rashes, or lesions   All other review of systems is negative unless indicated above.    MEDICATIONS:  MEDICATIONS  (STANDING):  acetaminophen    Suspension .. 650 milliGRAM(s) Oral every 6 hours  acyclovir    Suspension 400 milliGRAM(s) Oral three times a day  BACItracin   Ointment 1 Application(s) Topical two times a day  chlorhexidine 0.12% Liquid 15 milliLiter(s) Oral Mucosa every 12 hours  chlorhexidine 4% Liquid 1 Application(s) Topical daily  clonazePAM  Tablet 1 milliGRAM(s) Oral <User Schedule>  dextrose 5%. 1000 milliLiter(s) (50 mL/Hr) IV Continuous <Continuous>  dextrose 5%. 1000 milliLiter(s) (100 mL/Hr) IV Continuous <Continuous>  enoxaparin Injectable 40 milliGRAM(s) SubCutaneous every 12 hours  glucagon  Injectable 1 milliGRAM(s) IntraMuscular once  insulin lispro (ADMELOG) corrective regimen sliding scale   SubCutaneous every 6 hours  mirtazapine 7.5 milliGRAM(s) Oral at bedtime  multivitamin 1 Tablet(s) Oral daily  mupirocin 2% Ointment 1 Application(s) Topical two times a day  oxyCODONE    Solution 5 milliGRAM(s) Oral daily  polyethylene glycol 3350 17 Gram(s) Oral daily  QUEtiapine 12.5 milliGRAM(s) Oral daily  QUEtiapine 50 milliGRAM(s) Oral at bedtime  senna Syrup 10 milliLiter(s) Oral daily      PHYSICAL EXAM:  T(C): 36.7 (02-12-21 @ 20:48), Max: 36.8 (02-12-21 @ 10:02)  HR: 101 (02-12-21 @ 20:48) (87 - 101)  BP: 125/77 (02-12-21 @ 20:48) (105/74 - 125/77)  RR: 20 (02-12-21 @ 20:48) (18 - 22)  SpO2: 99% (02-12-21 @ 20:48) (98% - 100%)  Wt(kg): --  I&O's Summary    12 Feb 2021 07:01  -  12 Feb 2021 23:14  --------------------------------------------------------  IN: 0 mL / OUT: 910 mL / NET: -910 mL          Appearance: Normal	  HEENT:  PERRLA   Lymphatic: No lymphadenopathy   Cardiovascular: Normal S1 S2, no JVD  Respiratory: normal effort , clear  Gastrointestinal:  Soft, Non-tender  Skin: No rashes,  warm to touch  Psychiatry:  Mood & affect appropriate  Musculuskeletal: No edema      All labs, Imaging and EKGs personally reviewed                             10.0   8.91  )-----------( 503      ( 11 Feb 2021 07:07 )             32.0               02-11    138  |  98  |  20  ----------------------------<  120<H>  3.6   |  29  |  0.57    Ca    9.6      11 Feb 2021 07:07  Phos  4.1     02-11  Mg     2.1     02-11

## 2021-02-12 NOTE — OCCUPATIONAL THERAPY INITIAL EVALUATION ADULT - PRECAUTIONS/LIMITATIONS, REHAB EVAL
[facility]/CONTACT- COVID-19+/aspiration precautions/fall precautions/isolation precautions/oxygen therapy device and L/min
Contact Precautions- MRSA in Psutum/fall precautions

## 2021-02-13 PROCEDURE — 99233 SBSQ HOSP IP/OBS HIGH 50: CPT

## 2021-02-13 RX ADMIN — Medication 400 MILLIGRAM(S): at 15:53

## 2021-02-13 RX ADMIN — MIRTAZAPINE 7.5 MILLIGRAM(S): 45 TABLET, ORALLY DISINTEGRATING ORAL at 23:02

## 2021-02-13 RX ADMIN — Medication 650 MILLIGRAM(S): at 11:44

## 2021-02-13 RX ADMIN — Medication 650 MILLIGRAM(S): at 05:32

## 2021-02-13 RX ADMIN — QUETIAPINE FUMARATE 50 MILLIGRAM(S): 200 TABLET, FILM COATED ORAL at 23:02

## 2021-02-13 RX ADMIN — Medication 1 MILLIGRAM(S): at 05:31

## 2021-02-13 RX ADMIN — CHLORHEXIDINE GLUCONATE 15 MILLILITER(S): 213 SOLUTION TOPICAL at 05:32

## 2021-02-13 RX ADMIN — MUPIROCIN 1 APPLICATION(S): 20 OINTMENT TOPICAL at 05:38

## 2021-02-13 RX ADMIN — CHLORHEXIDINE GLUCONATE 15 MILLILITER(S): 213 SOLUTION TOPICAL at 17:42

## 2021-02-13 RX ADMIN — CHLORHEXIDINE GLUCONATE 1 APPLICATION(S): 213 SOLUTION TOPICAL at 05:32

## 2021-02-13 RX ADMIN — MUPIROCIN 1 APPLICATION(S): 20 OINTMENT TOPICAL at 17:43

## 2021-02-13 RX ADMIN — Medication 400 MILLIGRAM(S): at 05:31

## 2021-02-13 RX ADMIN — Medication 650 MILLIGRAM(S): at 17:43

## 2021-02-13 RX ADMIN — Medication 1 MILLIGRAM(S): at 17:43

## 2021-02-13 RX ADMIN — ENOXAPARIN SODIUM 40 MILLIGRAM(S): 100 INJECTION SUBCUTANEOUS at 05:32

## 2021-02-13 RX ADMIN — Medication 1 TABLET(S): at 11:44

## 2021-02-13 RX ADMIN — SENNA PLUS 10 MILLILITER(S): 8.6 TABLET ORAL at 23:02

## 2021-02-13 RX ADMIN — OXYCODONE HYDROCHLORIDE 5 MILLIGRAM(S): 5 TABLET ORAL at 11:44

## 2021-02-13 RX ADMIN — Medication 1 APPLICATION(S): at 05:32

## 2021-02-13 RX ADMIN — QUETIAPINE FUMARATE 12.5 MILLIGRAM(S): 200 TABLET, FILM COATED ORAL at 11:44

## 2021-02-13 RX ADMIN — POLYETHYLENE GLYCOL 3350 17 GRAM(S): 17 POWDER, FOR SOLUTION ORAL at 11:45

## 2021-02-13 RX ADMIN — Medication 30 MILLILITER(S): at 18:30

## 2021-02-13 RX ADMIN — Medication 1 APPLICATION(S): at 17:43

## 2021-02-13 RX ADMIN — Medication 650 MILLIGRAM(S): at 23:02

## 2021-02-13 RX ADMIN — ENOXAPARIN SODIUM 40 MILLIGRAM(S): 100 INJECTION SUBCUTANEOUS at 17:43

## 2021-02-13 NOTE — PROGRESS NOTE ADULT - ATTENDING COMMENTS
Patient seen and examined, data and imaging personally reviewed by me.  History as noted.  Agree with plan as outlined above.

## 2021-02-13 NOTE — PROGRESS NOTE ADULT - ASSESSMENT
47 YO F with Morbid Obesity and DM2 A1C 6.6 admitted for ARDS second to SARS-COV-2, intubated 1/5-1/10 then transferred to Medicine, however failed BIPAP and reintubated 1/16. Course complicated UTI, Mouth Sores and Enterobacter and MRSA VAP. s/p Tracheostomy 1/28 and PEG 1/26. Now transferred to RCU.     # ICU Delirium/ Agitation   - Now weaned off sedation, however delirious.  - Continue on Seroquel 12.5mg AM and 50mg QHS and Klonopin 1mg BID   - Taper oxycodone   - Mentation improved / talking with PMV     # Depression  - Crying consistently, however  remains great support system via Facetime.   - Started on Remeron. Increased supervision at bedside Less tearful   - Supportive care.     # ARDS second to SARS-COV-2 vs Superimposed Enterobacter and MRSA PNA   - Completed Remdesivir, Decadron and ABX as belowed.   - s/p Prolonged ICU stay and Tracheostomy on 1/28  - Tolerated TC well today. Keep on TC during the day and PS 5/5/30 overnight.    - Proventil and Chest PT Q6H. Suction PRN. Trach care QD.   - Trial of PMV with supervision and pt tolerating   - Pt tolerating TC 24/7  - S&S eval done - keep NPO for now CIne 2/12    # Septic vs Vasoplegic Shock   - Off all Pressors. Monitor HR/ BP     # Dysphagic with PEG   - s/p PEG 1/26 and continue on TF as tolerated.   - Constipated and bowel regimen (Senna and Miralax and Dulcolax PRN) started with improvement.   - Having increased stools miralax decreased   - S&S eval in progress- CIne today 2/12    #   - Failed TOV 2/3. Keep Haque for now and TOV again when more ambulatory.   - Hyponatremia/ Hypernatremia, monitor electrolytes and renal function as tolerated.   - Will do TOV as pt oob to chair     # Sepsis second to SARS-COV-2 vs Enterobacter and MRSA PNA vs HSV Type 1   - Completed empiric ABX for PNA with Zosyn (1/16-1/20), however cultures negative   - ECOLI UTI (1/25) s/p CTX (1/25-1/27).   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     # DM2 A1C 6.6 (1/2021)   - Continue on ISS and monitor FS Q6H    # Wounds   - WOC recommendations appreciated.      # DVT PPX with Lovenox BID   # CODE STATUS - Full Code   # DISPO - PT recommends Rehab.    47 YO F with Morbid Obesity and DM2 A1C 6.6 admitted for ARDS second to SARS-COV-2, intubated 1/5-1/10 then transferred to Medicine, however failed BIPAP and reintubated 1/16. Course complicated UTI, Mouth Sores and Enterobacter and MRSA VAP. s/p Tracheostomy 1/28 and PEG 1/26. Now transferred to RCU.     # ICU Delirium/ Agitation   - Weaned off sedation  - Continue on Seroquel 12.5mg AM and 50mg QHS and Klonopin 1mg BID   - Taper oxycodone   - Mentation improved / talking with PMV     # Depression  - Crying consistently, however  remains great support system via Facetime.   - Started on Remeron, with plan for up titration to 15 mg on 2/16 (ordered)  - Increased supervision at bedside Less tearful   - Supportive care.     # ARDS second to SARS-COV-2 vs Superimposed Enterobacter and MRSA PNA   - Completed Remdesivir, Decadron and ABX as belowed.   - s/p Prolonged ICU stay and Tracheostomy on 1/28  - Tolerating TC 24/7 - did not require PS overnight   - Proventil and Chest PT Q6H. Suction PRN. Trach care QD.   - PMV, pt tolerating   - S&S eval done - keep NPO for now. Cine not performed on 2/12, re-scheduled for Tuesday 2/16    # Septic vs Vasoplegic Shock   - Off all Pressors. Monitor HR/ BP     # Dysphagic with PEG   - s/p PEG 1/26 and continue on TF as tolerated.   - Constipated and bowel regimen (Senna and Miralax and Dulcolax PRN) started with improvement.   - Having increased stools miralax decreased   - S&S eval done, rec keep NPO until Cine done. Cine not performed on 2/12, re-scheduled for Tuesday 2/16    #   - Failed TOV 2/3  - Attempted repeat TOV, 2/11 - however failed, burdick replaced   - Hyponatremia/ Hypernatremia, monitor electrolytes and renal function as tolerated.     # Sepsis second to SARS-COV-2 vs Enterobacter and MRSA PNA vs HSV Type 1   - Completed empiric ABX for PNA with Zosyn (1/16-1/20), however cultures negative   - ECOLI UTI (1/25) s/p CTX (1/25-1/27).   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     # DM2 A1C 6.6 (1/2021)   - Continue on ISS and monitor FS Q6H    # Wounds   - WOC recommendations appreciated.      # DVT PPX with Lovenox BID   # CODE STATUS - Full Code   # DISPO - PT recommends Rehab.

## 2021-02-13 NOTE — PROGRESS NOTE ADULT - ASSESSMENT
46 F w respiratory failure due to COVID 19     Problem/Recommendation - 1:  Problem: Acute respiratory failure with hypoxia. Recommendation: s/p tracheostomy and PEG. No signs of post proceure issues at this time  -c/w G tube feeds, for MBS to determine candidacy for oral nutrition       Problem/Recommendation - 2:  ·  Problem: COVID-19.  Recommendation: status post dexamethasone.   on abx per ID for staph in sputum     Problem/Recommendation - 3:  ·  Problem: Normocytic anemia.  Recommendation: without overt GI bleeding. Likely due to critical illness. Trended down today  -monitor for GI bleed  -PPI QD.      Problem/Recommendation - 4:  ·  Problem: Abnormal LFTs.  Recommendation: multifactorial, likely NAFLD, COVID, critical illness. Can consider US to r/o gallstone disease, especially if increasing.      Problem/Recommendation - 5:  ·  Problem: Morbid obesity.         Attending Attestation:   Differential diagnosis and plan of care discussed with patient after the evaluation  35 Minutes spent on total encounter of which more than fifty percent of the encounter was spent counseling and/or coordinating care by the attending physician.    Mele Bolanos M.D.   Gastroenterology and Hepatology  Cell: [phone].

## 2021-02-13 NOTE — PROGRESS NOTE ADULT - SUBJECTIVE AND OBJECTIVE BOX
CHIEF COMPLAINT: Patient is a 46y old  Female who presents with a chief complaint of COVID (12 Feb 2021 16:41)    Interval Events: Tolerated TC overnight    REVIEW OF SYSTEMS:  Constitutional:   Eyes:  ENT:  CV:  Resp:  GI:  :  MSK:  Integumentary:  Neurological:  Psychiatric:  Endocrine:  Hematologic/Lymphatic:  Allergic/Immunologic:  [ ] All other systems negative  [ ] Unable to assess ROS because ________    OBJECTIVE:  ICU Vital Signs Last 24 Hrs  T(C): 36.8 (13 Feb 2021 05:19), Max: 36.8 (12 Feb 2021 10:02)  T(F): 98.3 (13 Feb 2021 05:19), Max: 98.3 (13 Feb 2021 05:19)  HR: 97 (13 Feb 2021 05:19) (87 - 101)  BP: 124/83 (13 Feb 2021 05:19) (117/83 - 125/77)  BP(mean): --  ABP: --  ABP(mean): --  RR: 18 (13 Feb 2021 05:19) (18 - 22)  SpO2: 98% (13 Feb 2021 05:19) (98% - 100%)    Mode: standby    02-12 @ 07:01  -  02-13 @ 07:00  --------------------------------------------------------  IN: 0 mL / OUT: 910 mL / NET: -910 mL      CAPILLARY BLOOD GLUCOSE      POCT Blood Glucose.: 143 mg/dL (13 Feb 2021 05:34)      PHYSICAL EXAM:  General:   HEENT:   Lymph Nodes:  Neck:   Respiratory:   Cardiovascular:   Abdomen:   Extremities:   Skin:   Neurological:  Psychiatry:    HOSPITAL MEDICATIONS:  MEDICATIONS  (STANDING):  acetaminophen    Suspension .. 650 milliGRAM(s) Oral every 6 hours  acyclovir    Suspension 400 milliGRAM(s) Oral three times a day  BACItracin   Ointment 1 Application(s) Topical two times a day  chlorhexidine 0.12% Liquid 15 milliLiter(s) Oral Mucosa every 12 hours  chlorhexidine 4% Liquid 1 Application(s) Topical daily  clonazePAM  Tablet 1 milliGRAM(s) Oral <User Schedule>  dextrose 5%. 1000 milliLiter(s) (50 mL/Hr) IV Continuous <Continuous>  dextrose 5%. 1000 milliLiter(s) (100 mL/Hr) IV Continuous <Continuous>  enoxaparin Injectable 40 milliGRAM(s) SubCutaneous every 12 hours  glucagon  Injectable 1 milliGRAM(s) IntraMuscular once  insulin lispro (ADMELOG) corrective regimen sliding scale   SubCutaneous every 6 hours  mirtazapine 7.5 milliGRAM(s) Oral at bedtime  multivitamin 1 Tablet(s) Oral daily  mupirocin 2% Ointment 1 Application(s) Topical two times a day  oxyCODONE    Solution 5 milliGRAM(s) Oral daily  polyethylene glycol 3350 17 Gram(s) Oral daily  QUEtiapine 12.5 milliGRAM(s) Oral daily  QUEtiapine 50 milliGRAM(s) Oral at bedtime  senna Syrup 10 milliLiter(s) Oral daily    MEDICATIONS  (PRN):  ALBUTerol    90 MICROgram(s) HFA Inhaler 2 Puff(s) Inhalation every 6 hours PRN Wheezing  bisacodyl Suppository 10 milliGRAM(s) Rectal at bedtime PRN Constipation      LABS:                    MICROBIOLOGY:     RADIOLOGY:  [ ] Reviewed and interpreted by me    PULMONARY FUNCTION TESTS:    EKG:    I&O's Summary    12 Feb 2021 07:01  -  13 Feb 2021 07:00  --------------------------------------------------------  IN: 0 mL / OUT: 910 mL / NET: -910 mL     CHIEF COMPLAINT: Patient is a 46y old  Female who presents with a chief complaint of COVID (12 Feb 2021 16:41)    Interval Events: Tolerated TC overnight. Did no pass TOV, burdick catheter placed. Upset that cine did not happen on Friday, aware re-scheduled for Tuesday as she is thirsty    REVIEW OF SYSTEMS:  Constitutional: Denies fever/pain   ENT: Denies   CV: Denies   Resp: + cough, +secretions   GI: +thirsty  MSK: Denies   Psychiatric: +anxious  [ x] All other systems negative      OBJECTIVE:  ICU Vital Signs Last 24 Hrs  T(C): 36.8 (13 Feb 2021 05:19), Max: 36.8 (12 Feb 2021 10:02)  T(F): 98.3 (13 Feb 2021 05:19), Max: 98.3 (13 Feb 2021 05:19)  HR: 97 (13 Feb 2021 05:19) (87 - 101)  BP: 124/83 (13 Feb 2021 05:19) (117/83 - 125/77)  BP(mean): --  ABP: --  ABP(mean): --  RR: 18 (13 Feb 2021 05:19) (18 - 22)  SpO2: 98% (13 Feb 2021 05:19) (98% - 100%)    Mode: standby    02-12 @ 07:01  -  02-13 @ 07:00  --------------------------------------------------------  IN: 0 mL / OUT: 910 mL / NET: -910 mL      CAPILLARY BLOOD GLUCOSE      POCT Blood Glucose.: 143 mg/dL (13 Feb 2021 05:34)      HOSPITAL MEDICATIONS:  MEDICATIONS  (STANDING):  acetaminophen    Suspension .. 650 milliGRAM(s) Oral every 6 hours  acyclovir    Suspension 400 milliGRAM(s) Oral three times a day  BACItracin   Ointment 1 Application(s) Topical two times a day  chlorhexidine 0.12% Liquid 15 milliLiter(s) Oral Mucosa every 12 hours  chlorhexidine 4% Liquid 1 Application(s) Topical daily  clonazePAM  Tablet 1 milliGRAM(s) Oral <User Schedule>  dextrose 5%. 1000 milliLiter(s) (50 mL/Hr) IV Continuous <Continuous>  dextrose 5%. 1000 milliLiter(s) (100 mL/Hr) IV Continuous <Continuous>  enoxaparin Injectable 40 milliGRAM(s) SubCutaneous every 12 hours  glucagon  Injectable 1 milliGRAM(s) IntraMuscular once  insulin lispro (ADMELOG) corrective regimen sliding scale   SubCutaneous every 6 hours  mirtazapine 7.5 milliGRAM(s) Oral at bedtime  multivitamin 1 Tablet(s) Oral daily  mupirocin 2% Ointment 1 Application(s) Topical two times a day  oxyCODONE    Solution 5 milliGRAM(s) Oral daily  polyethylene glycol 3350 17 Gram(s) Oral daily  QUEtiapine 12.5 milliGRAM(s) Oral daily  QUEtiapine 50 milliGRAM(s) Oral at bedtime  senna Syrup 10 milliLiter(s) Oral daily    MEDICATIONS  (PRN):  ALBUTerol    90 MICROgram(s) HFA Inhaler 2 Puff(s) Inhalation every 6 hours PRN Wheezing  bisacodyl Suppository 10 milliGRAM(s) Rectal at bedtime PRN Constipation      LABS:      MICROBIOLOGY:     RADIOLOGY:  [ ] Reviewed and interpreted by me    PULMONARY FUNCTION TESTS:    EKG:    I&O's Summary    12 Feb 2021 07:01  -  13 Feb 2021 07:00  --------------------------------------------------------  IN: 0 mL / OUT: 910 mL / NET: -910 mL     CHIEF COMPLAINT: Patient is a 46y old  Female who presents with a chief complaint of COVID (12 Feb 2021 16:41)    Interval Events: Tolerated TC overnight. Did no pass TOV, burdick catheter placed. Upset that cine did not happen on Friday since she is thirsty, aware re-scheduled for Tuesday    REVIEW OF SYSTEMS:  Constitutional: Denies fever/pain   ENT: Denies   CV: Denies   Resp: + cough, +secretions   GI: +thirsty  MSK: Denies   Psychiatric: +anxious  [ x] All other systems negative      OBJECTIVE:  ICU Vital Signs Last 24 Hrs  T(C): 36.8 (13 Feb 2021 05:19), Max: 36.8 (12 Feb 2021 10:02)  T(F): 98.3 (13 Feb 2021 05:19), Max: 98.3 (13 Feb 2021 05:19)  HR: 97 (13 Feb 2021 05:19) (87 - 101)  BP: 124/83 (13 Feb 2021 05:19) (117/83 - 125/77)  BP(mean): --  ABP: --  ABP(mean): --  RR: 18 (13 Feb 2021 05:19) (18 - 22)  SpO2: 98% (13 Feb 2021 05:19) (98% - 100%)    Mode: standby    02-12 @ 07:01  -  02-13 @ 07:00  --------------------------------------------------------  IN: 0 mL / OUT: 910 mL / NET: -910 mL      CAPILLARY BLOOD GLUCOSE      POCT Blood Glucose.: 143 mg/dL (13 Feb 2021 05:34)      HOSPITAL MEDICATIONS:  MEDICATIONS  (STANDING):  acetaminophen    Suspension .. 650 milliGRAM(s) Oral every 6 hours  acyclovir    Suspension 400 milliGRAM(s) Oral three times a day  BACItracin   Ointment 1 Application(s) Topical two times a day  chlorhexidine 0.12% Liquid 15 milliLiter(s) Oral Mucosa every 12 hours  chlorhexidine 4% Liquid 1 Application(s) Topical daily  clonazePAM  Tablet 1 milliGRAM(s) Oral <User Schedule>  dextrose 5%. 1000 milliLiter(s) (50 mL/Hr) IV Continuous <Continuous>  dextrose 5%. 1000 milliLiter(s) (100 mL/Hr) IV Continuous <Continuous>  enoxaparin Injectable 40 milliGRAM(s) SubCutaneous every 12 hours  glucagon  Injectable 1 milliGRAM(s) IntraMuscular once  insulin lispro (ADMELOG) corrective regimen sliding scale   SubCutaneous every 6 hours  mirtazapine 7.5 milliGRAM(s) Oral at bedtime  multivitamin 1 Tablet(s) Oral daily  mupirocin 2% Ointment 1 Application(s) Topical two times a day  oxyCODONE    Solution 5 milliGRAM(s) Oral daily  polyethylene glycol 3350 17 Gram(s) Oral daily  QUEtiapine 12.5 milliGRAM(s) Oral daily  QUEtiapine 50 milliGRAM(s) Oral at bedtime  senna Syrup 10 milliLiter(s) Oral daily    MEDICATIONS  (PRN):  ALBUTerol    90 MICROgram(s) HFA Inhaler 2 Puff(s) Inhalation every 6 hours PRN Wheezing  bisacodyl Suppository 10 milliGRAM(s) Rectal at bedtime PRN Constipation      LABS:      MICROBIOLOGY:     RADIOLOGY:  [ ] Reviewed and interpreted by me    PULMONARY FUNCTION TESTS:    EKG:    I&O's Summary    12 Feb 2021 07:01  -  13 Feb 2021 07:00  --------------------------------------------------------  IN: 0 mL / OUT: 910 mL / NET: -910 mL

## 2021-02-13 NOTE — PROGRESS NOTE ADULT - SUBJECTIVE AND OBJECTIVE BOX
DATE OF SERVICE: 02-13-21 @ 19:21    Subjective: Patient seen and examined. No new events except as noted.   doing okay  anxious         MEDICATIONS:  MEDICATIONS  (STANDING):  acetaminophen    Suspension .. 650 milliGRAM(s) Oral every 6 hours  acyclovir    Suspension 400 milliGRAM(s) Oral three times a day  BACItracin   Ointment 1 Application(s) Topical two times a day  chlorhexidine 0.12% Liquid 15 milliLiter(s) Oral Mucosa every 12 hours  chlorhexidine 4% Liquid 1 Application(s) Topical daily  clonazePAM  Tablet 1 milliGRAM(s) Oral <User Schedule>  dextrose 5%. 1000 milliLiter(s) (50 mL/Hr) IV Continuous <Continuous>  dextrose 5%. 1000 milliLiter(s) (100 mL/Hr) IV Continuous <Continuous>  enoxaparin Injectable 40 milliGRAM(s) SubCutaneous every 12 hours  glucagon  Injectable 1 milliGRAM(s) IntraMuscular once  insulin lispro (ADMELOG) corrective regimen sliding scale   SubCutaneous every 6 hours  mirtazapine 7.5 milliGRAM(s) Oral at bedtime  multivitamin 1 Tablet(s) Oral daily  mupirocin 2% Ointment 1 Application(s) Topical two times a day  oxyCODONE    Solution 5 milliGRAM(s) Oral daily  polyethylene glycol 3350 17 Gram(s) Oral daily  QUEtiapine 12.5 milliGRAM(s) Oral daily  QUEtiapine 50 milliGRAM(s) Oral at bedtime  senna Syrup 10 milliLiter(s) Oral daily      PHYSICAL EXAM:  T(C): 36.1 (02-13-21 @ 17:28), Max: 37.1 (02-13-21 @ 09:31)  HR: 98 (02-13-21 @ 17:28) (71 - 107)  BP: 111/83 (02-13-21 @ 17:28) (111/83 - 125/77)  RR: 20 (02-13-21 @ 17:28) (18 - 20)  SpO2: 99% (02-13-21 @ 17:28) (96% - 99%)  Wt(kg): --  I&O's Summary    12 Feb 2021 07:01  -  13 Feb 2021 07:00  --------------------------------------------------------  IN: 0 mL / OUT: 910 mL / NET: -910 mL    13 Feb 2021 07:01  -  13 Feb 2021 19:21  --------------------------------------------------------  IN: 0 mL / OUT: 600 mL / NET: -600 mL          Appearance: Normal	  HEENT:  trache  Lymphatic: No lymphadenopathy   Cardiovascular: Normal S1 S2  Respiratory: normal effort , clear  Gastrointestinal:  Soft, PEG  Skin: No rashes,  warm to touch  Psychiatry:  Mood & affect appropriate  Musculuskeletal: No edema      All labs, Imaging and EKGs personally reviewed

## 2021-02-13 NOTE — PROGRESS NOTE ADULT - ASSESSMENT
46 F w respiratory failure due to COVID 19     Problem/Recommendation - 1:  Problem: Acute respiratory failure with hypoxia. Recommendation: s/p tracheostomy and PEG. No signs of post proceure issues at this time  - c/w G tube feeds  - PPI per team  - RCU care   - ativan for agitation PRN   - Seroquel and Klonopin   - TOV as tolerated   - Rehab eval   - speech and swallow        Problem/Recommendation - 2:  ·  Problem: COVID-19.  Recommendation: status post dexamethasone.      Problem/Recommendation - 3:  ·  Problem: Normocytic anemia.  Recommendation: without overt GI bleeding. Likely due to critical illness.   -monitor for GI bleed  -PPI QD.      Problem/Recommendation - 4:  ·  Problem: Abnormal LFTs.  Recommendation: multifactorial, likely NAFLD, COVID, critical illness. Can consider US to r/o gallstone disease, especially if increasing.   defer to GI      Problem/Recommendation - 5:  ·  Problem: Morbid obesity.      Problem/Recommendation - 6:  Problem: Constipation. Recommendation: consider XR abdomen to assess for ileus  on a bowel regimen.

## 2021-02-14 PROCEDURE — 99233 SBSQ HOSP IP/OBS HIGH 50: CPT

## 2021-02-14 RX ADMIN — Medication 650 MILLIGRAM(S): at 11:50

## 2021-02-14 RX ADMIN — Medication 1 MILLIGRAM(S): at 06:03

## 2021-02-14 RX ADMIN — ENOXAPARIN SODIUM 40 MILLIGRAM(S): 100 INJECTION SUBCUTANEOUS at 17:36

## 2021-02-14 RX ADMIN — MUPIROCIN 1 APPLICATION(S): 20 OINTMENT TOPICAL at 17:38

## 2021-02-14 RX ADMIN — Medication 400 MILLIGRAM(S): at 12:22

## 2021-02-14 RX ADMIN — QUETIAPINE FUMARATE 12.5 MILLIGRAM(S): 200 TABLET, FILM COATED ORAL at 12:21

## 2021-02-14 RX ADMIN — Medication 650 MILLIGRAM(S): at 06:03

## 2021-02-14 RX ADMIN — POLYETHYLENE GLYCOL 3350 17 GRAM(S): 17 POWDER, FOR SOLUTION ORAL at 12:22

## 2021-02-14 RX ADMIN — Medication 1 APPLICATION(S): at 06:03

## 2021-02-14 RX ADMIN — Medication 1 APPLICATION(S): at 17:37

## 2021-02-14 RX ADMIN — QUETIAPINE FUMARATE 50 MILLIGRAM(S): 200 TABLET, FILM COATED ORAL at 20:48

## 2021-02-14 RX ADMIN — Medication 400 MILLIGRAM(S): at 06:02

## 2021-02-14 RX ADMIN — Medication 650 MILLIGRAM(S): at 17:37

## 2021-02-14 RX ADMIN — CHLORHEXIDINE GLUCONATE 1 APPLICATION(S): 213 SOLUTION TOPICAL at 06:03

## 2021-02-14 RX ADMIN — MUPIROCIN 1 APPLICATION(S): 20 OINTMENT TOPICAL at 06:04

## 2021-02-14 RX ADMIN — Medication 1 TABLET(S): at 12:22

## 2021-02-14 RX ADMIN — CHLORHEXIDINE GLUCONATE 15 MILLILITER(S): 213 SOLUTION TOPICAL at 06:03

## 2021-02-14 RX ADMIN — Medication 400 MILLIGRAM(S): at 00:05

## 2021-02-14 RX ADMIN — Medication 650 MILLIGRAM(S): at 23:21

## 2021-02-14 RX ADMIN — Medication 1: at 05:55

## 2021-02-14 RX ADMIN — Medication 400 MILLIGRAM(S): at 20:48

## 2021-02-14 RX ADMIN — ENOXAPARIN SODIUM 40 MILLIGRAM(S): 100 INJECTION SUBCUTANEOUS at 06:03

## 2021-02-14 RX ADMIN — OXYCODONE HYDROCHLORIDE 5 MILLIGRAM(S): 5 TABLET ORAL at 12:21

## 2021-02-14 RX ADMIN — Medication 1 MILLIGRAM(S): at 17:36

## 2021-02-14 RX ADMIN — MIRTAZAPINE 7.5 MILLIGRAM(S): 45 TABLET, ORALLY DISINTEGRATING ORAL at 20:48

## 2021-02-14 NOTE — PROGRESS NOTE ADULT - SUBJECTIVE AND OBJECTIVE BOX
CHIEF COMPLAINT: Patient is a 46y old  Female who presents with a chief complaint of COVID (13 Feb 2021 19:21)    SUBJECTIVE: No interval events overnight.     OBJECTIVE:  ICU Vital Signs Last 24 Hrs  T(C): 36.6 (14 Feb 2021 05:51), Max: 37.1 (13 Feb 2021 09:31)  T(F): 97.8 (14 Feb 2021 05:51), Max: 98.8 (13 Feb 2021 09:31)  HR: 100 (14 Feb 2021 07:10) (71 - 107)  BP: 132/75 (14 Feb 2021 05:51) (111/83 - 132/75)  BP(mean): 92 (13 Feb 2021 17:28) (91 - 92)  ABP: --  ABP(mean): --  RR: 18 (14 Feb 2021 05:51) (18 - 21)  SpO2: 99% (14 Feb 2021 07:10) (96% - 99%)    Mode: standby, FiO2: 40    02-13 @ 07:01  -  02-14 @ 07:00  --------------------------------------------------------  IN: 0 mL / OUT: 900 mL / NET: -900 mL    CAPILLARY BLOOD GLUCOSE  POCT Blood Glucose.: 170 mg/dL (14 Feb 2021 05:38)    PHYSICAL EXAM:  General:   HEENT:   Lymph Nodes:  Neck:   Respiratory:   Cardiovascular:   Abdomen:   Extremities:   Skin:   Neurological:  Psychiatry:    HOSPITAL MEDICATIONS:  MEDICATIONS  (STANDING):  acetaminophen    Suspension .. 650 milliGRAM(s) Oral every 6 hours  acyclovir    Suspension 400 milliGRAM(s) Oral three times a day  BACItracin   Ointment 1 Application(s) Topical two times a day  chlorhexidine 0.12% Liquid 15 milliLiter(s) Oral Mucosa every 12 hours  chlorhexidine 4% Liquid 1 Application(s) Topical daily  clonazePAM  Tablet 1 milliGRAM(s) Oral <User Schedule>  dextrose 5%. 1000 milliLiter(s) (50 mL/Hr) IV Continuous <Continuous>  dextrose 5%. 1000 milliLiter(s) (100 mL/Hr) IV Continuous <Continuous>  enoxaparin Injectable 40 milliGRAM(s) SubCutaneous every 12 hours  glucagon  Injectable 1 milliGRAM(s) IntraMuscular once  insulin lispro (ADMELOG) corrective regimen sliding scale   SubCutaneous every 6 hours  mirtazapine 7.5 milliGRAM(s) Oral at bedtime  multivitamin 1 Tablet(s) Oral daily  mupirocin 2% Ointment 1 Application(s) Topical two times a day  oxyCODONE    Solution 5 milliGRAM(s) Oral daily  polyethylene glycol 3350 17 Gram(s) Oral daily  QUEtiapine 12.5 milliGRAM(s) Oral daily  QUEtiapine 50 milliGRAM(s) Oral at bedtime  senna Syrup 10 milliLiter(s) Oral daily    MEDICATIONS  (PRN):  ALBUTerol    90 MICROgram(s) HFA Inhaler 2 Puff(s) Inhalation every 6 hours PRN Wheezing  aluminum hydroxide/magnesium hydroxide/simethicone Suspension 30 milliLiter(s) Oral every 6 hours PRN Dyspepsia  bisacodyl Suppository 10 milliGRAM(s) Rectal at bedtime PRN Constipation    LABS:     CHIEF COMPLAINT: Patient is a 46y old  Female who presents with a chief complaint of COVID (13 Feb 2021 19:21)    SUBJECTIVE: No interval events overnight. Clinically unchanged. No new complaints. VSS, and medications reviewed. c/w current management.     OBJECTIVE:  ICU Vital Signs Last 24 Hrs  T(C): 36.6 (14 Feb 2021 05:51), Max: 37.1 (13 Feb 2021 09:31)  T(F): 97.8 (14 Feb 2021 05:51), Max: 98.8 (13 Feb 2021 09:31)  HR: 100 (14 Feb 2021 07:10) (71 - 107)  BP: 132/75 (14 Feb 2021 05:51) (111/83 - 132/75)  BP(mean): 92 (13 Feb 2021 17:28) (91 - 92)  ABP: --  ABP(mean): --  RR: 18 (14 Feb 2021 05:51) (18 - 21)  SpO2: 99% (14 Feb 2021 07:10) (96% - 99%)    Mode: standby, FiO2: 40    02-13 @ 07:01  -  02-14 @ 07:00  --------------------------------------------------------  IN: 0 mL / OUT: 900 mL / NET: -900 mL    CAPILLARY BLOOD GLUCOSE  POCT Blood Glucose.: 170 mg/dL (14 Feb 2021 05:38)    PHYSICAL EXAM  General: well appearing, nad  Neck: +trach collar, are c/d/i  Respiratory: clear b/l  Cardiovascular: +s1/s2  Abdomen: +BS, soft, nt/nd, no peritoneal signs noted, +PEG  Extremities: No pedal edema  Skin: as per RN AAI sheet  Neurological: no new focal deficits    HOSPITAL MEDICATIONS:  MEDICATIONS  (STANDING):  acetaminophen    Suspension .. 650 milliGRAM(s) Oral every 6 hours  acyclovir    Suspension 400 milliGRAM(s) Oral three times a day  BACItracin   Ointment 1 Application(s) Topical two times a day  chlorhexidine 0.12% Liquid 15 milliLiter(s) Oral Mucosa every 12 hours  chlorhexidine 4% Liquid 1 Application(s) Topical daily  clonazePAM  Tablet 1 milliGRAM(s) Oral <User Schedule>  dextrose 5%. 1000 milliLiter(s) (50 mL/Hr) IV Continuous <Continuous>  dextrose 5%. 1000 milliLiter(s) (100 mL/Hr) IV Continuous <Continuous>  enoxaparin Injectable 40 milliGRAM(s) SubCutaneous every 12 hours  glucagon  Injectable 1 milliGRAM(s) IntraMuscular once  insulin lispro (ADMELOG) corrective regimen sliding scale   SubCutaneous every 6 hours  mirtazapine 7.5 milliGRAM(s) Oral at bedtime  multivitamin 1 Tablet(s) Oral daily  mupirocin 2% Ointment 1 Application(s) Topical two times a day  oxyCODONE    Solution 5 milliGRAM(s) Oral daily  polyethylene glycol 3350 17 Gram(s) Oral daily  QUEtiapine 12.5 milliGRAM(s) Oral daily  QUEtiapine 50 milliGRAM(s) Oral at bedtime  senna Syrup 10 milliLiter(s) Oral daily    MEDICATIONS  (PRN):  ALBUTerol    90 MICROgram(s) HFA Inhaler 2 Puff(s) Inhalation every 6 hours PRN Wheezing  aluminum hydroxide/magnesium hydroxide/simethicone Suspension 30 milliLiter(s) Oral every 6 hours PRN Dyspepsia  bisacodyl Suppository 10 milliGRAM(s) Rectal at bedtime PRN Constipation    LABS:

## 2021-02-14 NOTE — PROGRESS NOTE ADULT - SUBJECTIVE AND OBJECTIVE BOX
DATE OF SERVICE: 02-14-21 @ 19:25    Subjective: Patient seen and examined. No new events except as noted.   doing okay         MEDICATIONS:  MEDICATIONS  (STANDING):  acetaminophen    Suspension .. 650 milliGRAM(s) Oral every 6 hours  acyclovir    Suspension 400 milliGRAM(s) Oral three times a day  BACItracin   Ointment 1 Application(s) Topical two times a day  chlorhexidine 4% Liquid 1 Application(s) Topical daily  clonazePAM  Tablet 1 milliGRAM(s) Oral <User Schedule>  dextrose 5%. 1000 milliLiter(s) (50 mL/Hr) IV Continuous <Continuous>  dextrose 5%. 1000 milliLiter(s) (100 mL/Hr) IV Continuous <Continuous>  enoxaparin Injectable 40 milliGRAM(s) SubCutaneous every 12 hours  glucagon  Injectable 1 milliGRAM(s) IntraMuscular once  insulin lispro (ADMELOG) corrective regimen sliding scale   SubCutaneous every 6 hours  mirtazapine 7.5 milliGRAM(s) Oral at bedtime  multivitamin 1 Tablet(s) Oral daily  mupirocin 2% Ointment 1 Application(s) Topical two times a day  oxyCODONE    Solution 5 milliGRAM(s) Oral daily  polyethylene glycol 3350 17 Gram(s) Oral daily  QUEtiapine 12.5 milliGRAM(s) Oral daily  QUEtiapine 50 milliGRAM(s) Oral at bedtime  senna Syrup 10 milliLiter(s) Oral daily      PHYSICAL EXAM:  T(C): 37 (02-14-21 @ 12:55), Max: 37 (02-14-21 @ 12:55)  HR: 110 (02-14-21 @ 12:55) (99 - 110)  BP: 124/79 (02-14-21 @ 12:55) (123/83 - 132/75)  RR: 20 (02-14-21 @ 12:55) (18 - 21)  SpO2: 99% (02-14-21 @ 12:55) (97% - 99%)  Wt(kg): --  I&O's Summary    13 Feb 2021 07:01  -  14 Feb 2021 07:00  --------------------------------------------------------  IN: 0 mL / OUT: 900 mL / NET: -900 mL    14 Feb 2021 07:01  -  14 Feb 2021 19:25  --------------------------------------------------------  IN: 780 mL / OUT: 400 mL / NET: 380 mL          Appearance: Normal	  HEENT:  trache    Lymphatic: No lymphadenopathy   Cardiovascular: Normal S1 S2, no JVD  Respiratory: normal effort , clear  Gastrointestinal:  Soft, Non-tender  Skin: No rashes,  warm to touch  Psychiatry:  Mood & affect appropriate  Musculuskeletal: No edema      All labs, Imaging and EKGs personally reviewed

## 2021-02-14 NOTE — PROGRESS NOTE ADULT - ASSESSMENT
47 YO F with Morbid Obesity and DM2 A1C 6.6 admitted for ARDS second to SARS-COV-2, intubated 1/5-1/10 then transferred to Medicine, however failed BIPAP and reintubated 1/16. Course complicated ECOLI UTI, Mouth Sores and Enterobacter and MRSA VAP. s/p Tracheostomy 1/28 and PEG 1/26. Now transferred to RCU.     # ICU Delirium/ Agitation   - Now weaned off sedation, however delirious.  - Continue on Seroquel 12.5mg AM and 50mg QHS and Klonopin 1mg BID   - Monitor mentation.      # Depression  - Crying consistently, however  remains great support system via Facetime.   - Started on Remeron.   - Supportive care.     # ARDS second to SARS-COV-2 vs Superimposed Enterobacter and MRSA PNA   - Completed Remdesivir, Decadron and ABX as belowed.   - s/p Prolonged ICU stay and Tracheostomy on 1/28  - Tolerating TC well. PMV when secretions improves.   - Proventil and Chest PT Q6H. Suction PRN. Trach care QD.     # Septic vs Vasoplegic Shock   - Off all Pressors. Monitor HR/ BP     # Dysphagic with PEG   - s/p PEG 1/26 and continue on TF as tolerated.   - Constipated and bowel regimen (Senna and Miralax and Dulcolax PRN) started with improvement.   - Pending CINE on 2/16    #   - Failed TOV 2/3 and 2/11. Keep Haque for now and TOV again at Rehab.   - Hyponatremia/ Hypernatremia, monitor electrolytes and renal function as tolerated.     # Sepsis second to SARS-COV-2 vs Enterobacter and MRSA PNA vs HSV Type 1   - Completed empiric ABX for PNA with Zosyn (1/16-1/20), however cultures negative   - ECOLI UTI (1/25) s/p CTX (1/25-1/27).   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     # DM2 A1C 6.6 (1/2021)   - Continue on ISS and monitor FS Q6H    # Wounds   - WOC recommendations appreciated.      # DVT PPX with Lovenox BID   # CODE STATUS - Full Code   # DISPO - PT recommends Rehab.    47 YO F with Morbid Obesity and DM2 A1C 6.6 admitted for ARDS second to SARS-COV-2, intubated 1/5-1/10 then transferred to Medicine, however failed BIPAP and reintubated 1/16. Course complicated ECOLI UTI, Mouth Sores and Enterobacter and MRSA VAP. s/p Tracheostomy 1/28 and PEG 1/26. Now transferred to RCU.     # ICU Delirium/ Agitation   - Now weaned off sedation, however delirious.  - Continue on Seroquel 12.5mg AM and 50mg QHS and Klonopin 1mg BID   - Monitor mentation.      # Depression  - Crying consistently, however  remains great support system via Facetime.   - c/w Remeron.   - Supportive care.     # ARDS second to SARS-COV-2 vs Superimposed Enterobacter and MRSA PNA   - s/p Remdesivir, Decadron and ABX as belowed.   - s/p Prolonged ICU stay and Tracheostomy on 1/28  - Tolerating TC well. PMV when secretions improves.   - Proventil and Chest PT Q6H. Suction PRN. Trach care QD.     # Septic vs Vasoplegic Shock (resolved)  - Off all Pressors. Monitor HR/ BP     # Dysphagia with PEG   - s/p PEG 1/26 and continue on TF as tolerated.   - Constipated and bowel regimen (Senna and Miralax and Dulcolax PRN) started with improvement.   - Pending CINE on 2/16    #   - Failed TOV 2/3 and 2/11. Keep Haque for now and TOV again at Rehab.   - Hyponatremia/ Hypernatremia, monitor electrolytes and renal function as tolerated.     # Sepsis second to SARS-COV-2 vs Enterobacter and MRSA PNA vs HSV Type 1   - Completed empiric ABX for PNA with Zosyn (1/16-1/20), however cultures negative   - ECOLI UTI (1/25) s/p CTX (1/25-1/27).   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     # DM2 A1C 6.6 (1/2021)   - Continue on ISS and monitor FS Q6H    # Wounds   - WOC recommendations appreciated.      # DVT PPX with Lovenox BID   # CODE STATUS - Full Code   # DISPO - PT recommends Rehab.

## 2021-02-15 LAB
ALBUMIN SERPL ELPH-MCNC: 4 G/DL — SIGNIFICANT CHANGE UP (ref 3.3–5)
ALP SERPL-CCNC: 92 U/L — SIGNIFICANT CHANGE UP (ref 40–120)
ALT FLD-CCNC: 19 U/L — SIGNIFICANT CHANGE UP (ref 4–33)
ANION GAP SERPL CALC-SCNC: 13 MMOL/L — SIGNIFICANT CHANGE UP (ref 7–14)
AST SERPL-CCNC: 26 U/L — SIGNIFICANT CHANGE UP (ref 4–32)
BILIRUB SERPL-MCNC: 0.3 MG/DL — SIGNIFICANT CHANGE UP (ref 0.2–1.2)
BUN SERPL-MCNC: 12 MG/DL — SIGNIFICANT CHANGE UP (ref 7–23)
CALCIUM SERPL-MCNC: 10 MG/DL — SIGNIFICANT CHANGE UP (ref 8.4–10.5)
CHLORIDE SERPL-SCNC: 100 MMOL/L — SIGNIFICANT CHANGE UP (ref 98–107)
CO2 SERPL-SCNC: 26 MMOL/L — SIGNIFICANT CHANGE UP (ref 22–31)
CREAT SERPL-MCNC: 0.59 MG/DL — SIGNIFICANT CHANGE UP (ref 0.5–1.3)
GLUCOSE SERPL-MCNC: 122 MG/DL — HIGH (ref 70–99)
HCT VFR BLD CALC: 33.8 % — LOW (ref 34.5–45)
HGB BLD-MCNC: 10.4 G/DL — LOW (ref 11.5–15.5)
MAGNESIUM SERPL-MCNC: 2.1 MG/DL — SIGNIFICANT CHANGE UP (ref 1.6–2.6)
MCHC RBC-ENTMCNC: 27.6 PG — SIGNIFICANT CHANGE UP (ref 27–34)
MCHC RBC-ENTMCNC: 30.8 GM/DL — LOW (ref 32–36)
MCV RBC AUTO: 89.7 FL — SIGNIFICANT CHANGE UP (ref 80–100)
NRBC # BLD: 0 /100 WBCS — SIGNIFICANT CHANGE UP
NRBC # FLD: 0 K/UL — SIGNIFICANT CHANGE UP
PHOSPHATE SERPL-MCNC: 4.5 MG/DL — SIGNIFICANT CHANGE UP (ref 2.5–4.5)
PLATELET # BLD AUTO: 354 K/UL — SIGNIFICANT CHANGE UP (ref 150–400)
POTASSIUM SERPL-MCNC: 4 MMOL/L — SIGNIFICANT CHANGE UP (ref 3.5–5.3)
POTASSIUM SERPL-SCNC: 4 MMOL/L — SIGNIFICANT CHANGE UP (ref 3.5–5.3)
PROT SERPL-MCNC: 7.9 G/DL — SIGNIFICANT CHANGE UP (ref 6–8.3)
RBC # BLD: 3.77 M/UL — LOW (ref 3.8–5.2)
RBC # FLD: 16.6 % — HIGH (ref 10.3–14.5)
SODIUM SERPL-SCNC: 139 MMOL/L — SIGNIFICANT CHANGE UP (ref 135–145)
WBC # BLD: 8.57 K/UL — SIGNIFICANT CHANGE UP (ref 3.8–10.5)
WBC # FLD AUTO: 8.57 K/UL — SIGNIFICANT CHANGE UP (ref 3.8–10.5)

## 2021-02-15 PROCEDURE — 99233 SBSQ HOSP IP/OBS HIGH 50: CPT

## 2021-02-15 PROCEDURE — 93010 ELECTROCARDIOGRAM REPORT: CPT

## 2021-02-15 RX ORDER — ONDANSETRON 8 MG/1
4 TABLET, FILM COATED ORAL ONCE
Refills: 0 | Status: COMPLETED | OUTPATIENT
Start: 2021-02-15 | End: 2021-02-15

## 2021-02-15 RX ADMIN — ENOXAPARIN SODIUM 40 MILLIGRAM(S): 100 INJECTION SUBCUTANEOUS at 05:57

## 2021-02-15 RX ADMIN — Medication 650 MILLIGRAM(S): at 11:06

## 2021-02-15 RX ADMIN — Medication 30 MILLILITER(S): at 12:00

## 2021-02-15 RX ADMIN — Medication 650 MILLIGRAM(S): at 22:48

## 2021-02-15 RX ADMIN — Medication 30 MILLILITER(S): at 05:56

## 2021-02-15 RX ADMIN — Medication 650 MILLIGRAM(S): at 17:25

## 2021-02-15 RX ADMIN — ALBUTEROL 2 PUFF(S): 90 AEROSOL, METERED ORAL at 20:22

## 2021-02-15 RX ADMIN — CHLORHEXIDINE GLUCONATE 1 APPLICATION(S): 213 SOLUTION TOPICAL at 05:58

## 2021-02-15 RX ADMIN — Medication 1 MILLIGRAM(S): at 17:24

## 2021-02-15 RX ADMIN — Medication 1 APPLICATION(S): at 05:57

## 2021-02-15 RX ADMIN — Medication 400 MILLIGRAM(S): at 05:56

## 2021-02-15 RX ADMIN — ENOXAPARIN SODIUM 40 MILLIGRAM(S): 100 INJECTION SUBCUTANEOUS at 17:25

## 2021-02-15 RX ADMIN — QUETIAPINE FUMARATE 12.5 MILLIGRAM(S): 200 TABLET, FILM COATED ORAL at 11:06

## 2021-02-15 RX ADMIN — Medication 30 MILLILITER(S): at 22:12

## 2021-02-15 RX ADMIN — POLYETHYLENE GLYCOL 3350 17 GRAM(S): 17 POWDER, FOR SOLUTION ORAL at 11:06

## 2021-02-15 RX ADMIN — QUETIAPINE FUMARATE 50 MILLIGRAM(S): 200 TABLET, FILM COATED ORAL at 23:57

## 2021-02-15 RX ADMIN — Medication 650 MILLIGRAM(S): at 05:57

## 2021-02-15 RX ADMIN — MUPIROCIN 1 APPLICATION(S): 20 OINTMENT TOPICAL at 05:58

## 2021-02-15 RX ADMIN — ONDANSETRON 4 MILLIGRAM(S): 8 TABLET, FILM COATED ORAL at 23:43

## 2021-02-15 RX ADMIN — OXYCODONE HYDROCHLORIDE 5 MILLIGRAM(S): 5 TABLET ORAL at 11:05

## 2021-02-15 RX ADMIN — Medication 1 MILLIGRAM(S): at 05:56

## 2021-02-15 RX ADMIN — MIRTAZAPINE 7.5 MILLIGRAM(S): 45 TABLET, ORALLY DISINTEGRATING ORAL at 23:57

## 2021-02-15 RX ADMIN — Medication 1 TABLET(S): at 11:06

## 2021-02-15 RX ADMIN — Medication 1 APPLICATION(S): at 17:26

## 2021-02-15 NOTE — PROGRESS NOTE ADULT - SUBJECTIVE AND OBJECTIVE BOX
DATE OF SERVICE: 02-15-21     Subjective: Patient seen and examined. No new events except as noted.   doing okay today   calm, asking for food     REVIEW OF SYSTEMS:    CONSTITUTIONAL: No weakness, fevers or chills  EYES/ENT: No visual changes;  No vertigo or throat pain   NECK: No pain or stiffness  RESPIRATORY: No cough, wheezing, hemoptysis; No shortness of breath  CARDIOVASCULAR: No chest pain or palpitations  GASTROINTESTINAL: No abdominal or epigastric pain. No nausea, vomiting, or hematemesis; No diarrhea or constipation. No melena or hematochezia.  GENITOURINARY: No dysuria, frequency or hematuria  NEUROLOGICAL: No numbness or weakness  SKIN: No itching, burning, rashes, or lesions   All other review of systems is negative unless indicated above.    MEDICATIONS:  MEDICATIONS  (STANDING):  acetaminophen    Suspension .. 650 milliGRAM(s) Oral every 6 hours  BACItracin   Ointment 1 Application(s) Topical two times a day  chlorhexidine 4% Liquid 1 Application(s) Topical daily  clonazePAM  Tablet 1 milliGRAM(s) Oral <User Schedule>  dextrose 5%. 1000 milliLiter(s) (50 mL/Hr) IV Continuous <Continuous>  dextrose 5%. 1000 milliLiter(s) (100 mL/Hr) IV Continuous <Continuous>  enoxaparin Injectable 40 milliGRAM(s) SubCutaneous every 12 hours  glucagon  Injectable 1 milliGRAM(s) IntraMuscular once  insulin lispro (ADMELOG) corrective regimen sliding scale   SubCutaneous every 6 hours  mirtazapine 7.5 milliGRAM(s) Oral at bedtime  multivitamin 1 Tablet(s) Oral daily  oxyCODONE    Solution 5 milliGRAM(s) Oral daily  polyethylene glycol 3350 17 Gram(s) Oral daily  QUEtiapine 12.5 milliGRAM(s) Oral daily  QUEtiapine 50 milliGRAM(s) Oral at bedtime  senna Syrup 10 milliLiter(s) Oral daily      PHYSICAL EXAM:  T(C): 36.4 (02-15-21 @ 13:00), Max: 37.1 (02-15-21 @ 05:51)  HR: 105 (02-15-21 @ 13:00) (102 - 106)  BP: 120/83 (02-15-21 @ 13:00) (118/68 - 130/89)  RR: 21 (02-15-21 @ 13:00) (20 - 22)  SpO2: 99% (02-15-21 @ 13:00) (99% - 100%)  Wt(kg): --  I&O's Summary    14 Feb 2021 07:01  -  15 Feb 2021 07:00  --------------------------------------------------------  IN: 780 mL / OUT: 700 mL / NET: 80 mL          Appearance: Normal	  HEENT:  PERRLA , trache  Lymphatic: No lymphadenopathy   Cardiovascular: Normal S1 S2, no JVD  Respiratory: normal effort , clear  Gastrointestinal:  Soft, PEG   Skin: No rashes,  warm to touch  Psychiatry:  Mood & affect appropriate  Musculuskeletal: No edema      All labs, Imaging and EKGs personally reviewed                             10.4   8.57  )-----------( 354      ( 15 Feb 2021 07:03 )             33.8               02-15    139  |  100  |  12  ----------------------------<  122<H>  4.0   |  26  |  0.59    Ca    10.0      15 Feb 2021 06:37  Phos  4.5     02-15  Mg     2.1     02-15    TPro  7.9  /  Alb  4.0  /  TBili  0.3  /  DBili  x   /  AST  26  /  ALT  19  /  AlkPhos  92  02-15

## 2021-02-15 NOTE — PROGRESS NOTE ADULT - ASSESSMENT
45 YO F with Morbid Obesity and DM2 A1C 6.6 admitted for ARDS second to SARS-COV-2, intubated 1/5-1/10 then transferred to Medicine, however failed BIPAP and reintubated 1/16. Course complicated ECOLI UTI, Mouth Sores and Enterobacter and MRSA VAP. s/p Tracheostomy 1/28 and PEG 1/26. Now transferred to RCU.     # ICU Delirium/ Agitation   - Now weaned off sedation, however delirious.  - Continue on Seroquel 12.5mg AM and 50mg QHS and Klonopin 1mg BID   - Monitor mentation.      # Depression  - Crying consistently, however  remains great support system via Facetime.   - c/w Remeron.   - Supportive care.     # ARDS second to SARS-COV-2 vs Superimposed Enterobacter and MRSA PNA   - s/p Remdesivir, Decadron and ABX as belowed.   - s/p Prolonged ICU stay and Tracheostomy on 1/28  - Tolerating TC well. PMV when secretions improves.   - Proventil and Chest PT Q6H. Suction PRN. Trach care QD.     # Septic vs Vasoplegic Shock (resolved)  - Off all Pressors. Monitor HR/ BP     # Dysphagia with PEG   - s/p PEG 1/26 and continue on TF as tolerated.   - Constipated and bowel regimen (Senna and Miralax and Dulcolax PRN) started with improvement.   - Pending CINE on 2/16    #   - Failed TOV 2/3 and 2/11. Keep Haque for now and TOV again at Rehab.   - Hyponatremia/ Hypernatremia, monitor electrolytes and renal function as tolerated.     # Sepsis second to SARS-COV-2 vs Enterobacter and MRSA PNA vs HSV Type 1   - Completed empiric ABX for PNA with Zosyn (1/16-1/20), however cultures negative   - ECOLI UTI (1/25) s/p CTX (1/25-1/27).   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     # DM2 A1C 6.6 (1/2021)   - Continue on ISS and monitor FS Q6H    # Wounds   - WOC recommendations appreciated.      # DVT PPX with Lovenox BID   # CODE STATUS - Full Code   # DISPO - PT recommends Rehab.    47 YO F with Morbid Obesity and DM2 A1C 6.6 admitted for ARDS second to SARS-COV-2, intubated 1/5-1/10 then transferred to Medicine, however failed BIPAP and reintubated 1/16. Course complicated ECOLI UTI, Mouth Sores and Enterobacter and MRSA VAP. s/p Tracheostomy 1/28 and PEG 1/26. Now transferred to RCU.     # ICU Delirium/ Agitation   - Now weaned off sedation, however delirious.  - c/w Seroquel 12.5mg AM and 50mg QHS and Klonopin 1mg BID   - Monitor mentation.      # Depression  - Mood much improved  - c/w Remeron.   - Supportive care.     # ARDS second to SARS-COV-2 vs Superimposed Enterobacter and MRSA PNA   - s/p Remdesivir, Decadron and ABX as belowed.   - s/p Prolonged ICU stay and Tracheostomy on 1/28  - Tolerating TC on 28% well. PMV when secretions improves.   - Proventil and Chest PT Q6H. Suction PRN. Trach care QD.     # Septic vs Vasoplegic Shock (resolved)  - Off all Pressors. Monitor HR/ BP     # Dysphagia with PEG   - s/p PEG 1/26 and continue on TF as tolerated.   - c/w current bowel regimen (Senna and Miralax and Dulcolax PRN)   - Pending CINE on 2/16    #   - Failed TOV 2/3 and 2/11. Keep Haque for now and TOV again at Rehab.   - Hyponatremia/ Hypernatremia, monitor electrolytes and renal function as tolerated.     # Sepsis second to SARS-COV-2 vs Enterobacter and MRSA PNA vs HSV Type 1   - Completed empiric ABX for PNA with Zosyn (1/16-1/20), however cultures negative   - ECOLI UTI (1/25) s/p CTX (1/25-1/27).   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     # DM2 A1C 6.6 (1/2021)   - Continue on ISS and monitor FS Q6H    # Wounds   - WOC recommendations appreciated.      # DVT PPX with Lovenox BID   # CODE STATUS - Full Code   # DISPO - PT recommends Rehab.

## 2021-02-15 NOTE — PROGRESS NOTE ADULT - SUBJECTIVE AND OBJECTIVE BOX
CHIEF COMPLAINT: Patient is a 46y old  Female who presents with a chief complaint of COVID (14 Feb 2021 19:25)    Interval Events:    REVIEW OF SYSTEMS:  Constitutional:   Eyes:  ENT:  CV:  Resp:  GI:  :  MSK:  Integumentary:  Neurological:  Psychiatric:  Endocrine:  Hematologic/Lymphatic:  Allergic/Immunologic:  [ ] All other systems negative  [ ] Unable to assess ROS because ________    OBJECTIVE:  ICU Vital Signs Last 24 Hrs  T(C): 37.1 (15 Feb 2021 05:51), Max: 37.1 (15 Feb 2021 05:51)  T(F): 98.8 (15 Feb 2021 05:51), Max: 98.8 (15 Feb 2021 05:51)  HR: 103 (15 Feb 2021 07:28) (102 - 110)  BP: 122/85 (15 Feb 2021 05:51) (118/68 - 124/79)  BP(mean): --  ABP: --  ABP(mean): --  RR: 22 (15 Feb 2021 07:28) (20 - 22)  SpO2: 100% (15 Feb 2021 07:28) (99% - 100%)    Mode: standby    02-14 @ 07:01  -  02-15 @ 07:00  --------------------------------------------------------  IN: 780 mL / OUT: 700 mL / NET: 80 mL      CAPILLARY BLOOD GLUCOSE      POCT Blood Glucose.: 112 mg/dL (14 Feb 2021 23:12)      PHYSICAL EXAM:  General:   HEENT:   Lymph Nodes:  Neck:   Respiratory:   Cardiovascular:   Abdomen:   Extremities:   Skin:   Neurological:  Psychiatry:    HOSPITAL MEDICATIONS:  MEDICATIONS  (STANDING):  acetaminophen    Suspension .. 650 milliGRAM(s) Oral every 6 hours  BACItracin   Ointment 1 Application(s) Topical two times a day  chlorhexidine 4% Liquid 1 Application(s) Topical daily  clonazePAM  Tablet 1 milliGRAM(s) Oral <User Schedule>  dextrose 5%. 1000 milliLiter(s) (50 mL/Hr) IV Continuous <Continuous>  dextrose 5%. 1000 milliLiter(s) (100 mL/Hr) IV Continuous <Continuous>  enoxaparin Injectable 40 milliGRAM(s) SubCutaneous every 12 hours  glucagon  Injectable 1 milliGRAM(s) IntraMuscular once  insulin lispro (ADMELOG) corrective regimen sliding scale   SubCutaneous every 6 hours  mirtazapine 7.5 milliGRAM(s) Oral at bedtime  multivitamin 1 Tablet(s) Oral daily  oxyCODONE    Solution 5 milliGRAM(s) Oral daily  polyethylene glycol 3350 17 Gram(s) Oral daily  QUEtiapine 12.5 milliGRAM(s) Oral daily  QUEtiapine 50 milliGRAM(s) Oral at bedtime  senna Syrup 10 milliLiter(s) Oral daily    MEDICATIONS  (PRN):  ALBUTerol    90 MICROgram(s) HFA Inhaler 2 Puff(s) Inhalation every 6 hours PRN Wheezing  aluminum hydroxide/magnesium hydroxide/simethicone Suspension 30 milliLiter(s) Oral every 6 hours PRN Dyspepsia  bisacodyl Suppository 10 milliGRAM(s) Rectal at bedtime PRN Constipation      LABS:                        10.4   8.57  )-----------( 354      ( 15 Feb 2021 07:03 )             33.8     02-15    139  |  100  |  12  ----------------------------<  122<H>  4.0   |  26  |  0.59    Ca    10.0      15 Feb 2021 06:37  Phos  4.5     02-15  Mg     2.1     02-15    TPro  7.9  /  Alb  4.0  /  TBili  0.3  /  DBili  x   /  AST  26  /  ALT  19  /  AlkPhos  92  02-15              MICROBIOLOGY:     RADIOLOGY:  [ ] Reviewed and interpreted by me    PULMONARY FUNCTION TESTS:    EKG: CHIEF COMPLAINT: Patient is a 46y old  Female who presents with a chief complaint of COVID (14 Feb 2021 19:25)    Interval Events: None reported overnight. Pt with one episode of nbnb emesis. Maalox given, and patient felt much better. Plan for CINE tomorrow.    REVIEW OF SYSTEMS:  see above  [x] All other systems negative      OBJECTIVE:  ICU Vital Signs Last 24 Hrs  T(C): 37.1 (15 Feb 2021 05:51), Max: 37.1 (15 Feb 2021 05:51)  T(F): 98.8 (15 Feb 2021 05:51), Max: 98.8 (15 Feb 2021 05:51)  HR: 103 (15 Feb 2021 07:28) (102 - 110)  BP: 122/85 (15 Feb 2021 05:51) (118/68 - 124/79)  BP(mean): --  ABP: --  ABP(mean): --  RR: 22 (15 Feb 2021 07:28) (20 - 22)  SpO2: 100% (15 Feb 2021 07:28) (99% - 100%)    Mode: standby    02-14 @ 07:01  -  02-15 @ 07:00  --------------------------------------------------------  IN: 780 mL / OUT: 700 mL / NET: 80 mL      CAPILLARY BLOOD GLUCOSE      POCT Blood Glucose.: 112 mg/dL (14 Feb 2021 23:12)    PHYSICAL EXAM  General: well appearing, nad - saturating well on TC 28%  Neck: +trach collar, are c/d/i  Respiratory: clear b/l  Cardiovascular: +s1/s2  Abdomen: +BS, soft, nt/nd, no peritoneal signs noted, +PEG  Extremities: No pedal edema  Skin: as per RN AAI sheet  Neurological: no new focal deficits    HOSPITAL MEDICATIONS:  MEDICATIONS  (STANDING):  acetaminophen    Suspension .. 650 milliGRAM(s) Oral every 6 hours  BACItracin   Ointment 1 Application(s) Topical two times a day  chlorhexidine 4% Liquid 1 Application(s) Topical daily  clonazePAM  Tablet 1 milliGRAM(s) Oral <User Schedule>  dextrose 5%. 1000 milliLiter(s) (50 mL/Hr) IV Continuous <Continuous>  dextrose 5%. 1000 milliLiter(s) (100 mL/Hr) IV Continuous <Continuous>  enoxaparin Injectable 40 milliGRAM(s) SubCutaneous every 12 hours  glucagon  Injectable 1 milliGRAM(s) IntraMuscular once  insulin lispro (ADMELOG) corrective regimen sliding scale   SubCutaneous every 6 hours  mirtazapine 7.5 milliGRAM(s) Oral at bedtime  multivitamin 1 Tablet(s) Oral daily  oxyCODONE    Solution 5 milliGRAM(s) Oral daily  polyethylene glycol 3350 17 Gram(s) Oral daily  QUEtiapine 12.5 milliGRAM(s) Oral daily  QUEtiapine 50 milliGRAM(s) Oral at bedtime  senna Syrup 10 milliLiter(s) Oral daily    MEDICATIONS  (PRN):  ALBUTerol    90 MICROgram(s) HFA Inhaler 2 Puff(s) Inhalation every 6 hours PRN Wheezing  aluminum hydroxide/magnesium hydroxide/simethicone Suspension 30 milliLiter(s) Oral every 6 hours PRN Dyspepsia  bisacodyl Suppository 10 milliGRAM(s) Rectal at bedtime PRN Constipation      LABS:                        10.4   8.57  )-----------( 354      ( 15 Feb 2021 07:03 )             33.8     02-15    139  |  100  |  12  ----------------------------<  122<H>  4.0   |  26  |  0.59    Ca    10.0      15 Feb 2021 06:37  Phos  4.5     02-15  Mg     2.1     02-15    TPro  7.9  /  Alb  4.0  /  TBili  0.3  /  DBili  x   /  AST  26  /  ALT  19  /  AlkPhos  92  02-15        MICROBIOLOGY:     RADIOLOGY:  [ ] Reviewed and interpreted by me    PULMONARY FUNCTION TESTS:    EKG:

## 2021-02-15 NOTE — PROGRESS NOTE ADULT - ASSESSMENT
46 F w respiratory failure due to COVID 19     Problem/Recommendation - 1:  Problem: Acute respiratory failure with hypoxia. Recommendation: s/p tracheostomy and PEG. No signs of post proceure issues at this time  - c/w G tube feeds  - PPI per team  - RCU care   - ativan for agitation PRN   - Seroquel and Klonopin   - TOV as tolerated   - Rehab eval   - speech and swallow , follow up recs        Problem/Recommendation - 2:  ·  Problem: COVID-19.  Recommendation: status post dexamethasone.      Problem/Recommendation - 3:  ·  Problem: Normocytic anemia.  Recommendation: without overt GI bleeding. Likely due to critical illness.   -monitor for GI bleed  -PPI QD.      Problem/Recommendation - 4:  ·  Problem: Abnormal LFTs.  Recommendation: multifactorial, likely NAFLD, COVID, critical illness. Can consider US to r/o gallstone disease, especially if increasing.   defer to GI      Problem/Recommendation - 5:  ·  Problem: Morbid obesity.      Problem/Recommendation - 6:  Problem: Constipation. Recommendation: consider XR abdomen to assess for ileus  on a bowel regimen.

## 2021-02-16 PROCEDURE — 99233 SBSQ HOSP IP/OBS HIGH 50: CPT

## 2021-02-16 PROCEDURE — 99232 SBSQ HOSP IP/OBS MODERATE 35: CPT

## 2021-02-16 PROCEDURE — 74230 X-RAY XM SWLNG FUNCJ C+: CPT | Mod: 26

## 2021-02-16 RX ORDER — OXYCODONE HYDROCHLORIDE 5 MG/1
5 TABLET ORAL ONCE
Refills: 0 | Status: DISCONTINUED | OUTPATIENT
Start: 2021-02-16 | End: 2021-02-17

## 2021-02-16 RX ADMIN — Medication 1 APPLICATION(S): at 06:58

## 2021-02-16 RX ADMIN — MIRTAZAPINE 15 MILLIGRAM(S): 45 TABLET, ORALLY DISINTEGRATING ORAL at 23:52

## 2021-02-16 RX ADMIN — Medication 1 TABLET(S): at 10:59

## 2021-02-16 RX ADMIN — Medication 650 MILLIGRAM(S): at 06:26

## 2021-02-16 RX ADMIN — SENNA PLUS 10 MILLILITER(S): 8.6 TABLET ORAL at 23:52

## 2021-02-16 RX ADMIN — CHLORHEXIDINE GLUCONATE 1 APPLICATION(S): 213 SOLUTION TOPICAL at 06:27

## 2021-02-16 RX ADMIN — Medication 650 MILLIGRAM(S): at 23:52

## 2021-02-16 RX ADMIN — Medication 1 APPLICATION(S): at 16:16

## 2021-02-16 RX ADMIN — Medication 650 MILLIGRAM(S): at 16:16

## 2021-02-16 RX ADMIN — Medication 1 MILLIGRAM(S): at 16:16

## 2021-02-16 RX ADMIN — ENOXAPARIN SODIUM 40 MILLIGRAM(S): 100 INJECTION SUBCUTANEOUS at 06:58

## 2021-02-16 RX ADMIN — Medication 650 MILLIGRAM(S): at 10:59

## 2021-02-16 RX ADMIN — QUETIAPINE FUMARATE 12.5 MILLIGRAM(S): 200 TABLET, FILM COATED ORAL at 10:59

## 2021-02-16 RX ADMIN — ENOXAPARIN SODIUM 40 MILLIGRAM(S): 100 INJECTION SUBCUTANEOUS at 16:16

## 2021-02-16 RX ADMIN — QUETIAPINE FUMARATE 50 MILLIGRAM(S): 200 TABLET, FILM COATED ORAL at 23:52

## 2021-02-16 RX ADMIN — OXYCODONE HYDROCHLORIDE 5 MILLIGRAM(S): 5 TABLET ORAL at 10:59

## 2021-02-16 NOTE — PROGRESS NOTE ADULT - SUBJECTIVE AND OBJECTIVE BOX
DATE OF SERVICE: 02-16-21    Subjective: Patient seen and examined. No new events except as noted.   doing okay      MEDICATIONS:  MEDICATIONS  (STANDING):  acetaminophen    Suspension .. 650 milliGRAM(s) Oral every 6 hours  BACItracin   Ointment 1 Application(s) Topical two times a day  chlorhexidine 4% Liquid 1 Application(s) Topical daily  clonazePAM  Tablet 1 milliGRAM(s) Oral <User Schedule>  dextrose 5%. 1000 milliLiter(s) (50 mL/Hr) IV Continuous <Continuous>  dextrose 5%. 1000 milliLiter(s) (100 mL/Hr) IV Continuous <Continuous>  enoxaparin Injectable 40 milliGRAM(s) SubCutaneous every 12 hours  glucagon  Injectable 1 milliGRAM(s) IntraMuscular once  insulin lispro (ADMELOG) corrective regimen sliding scale   SubCutaneous every 6 hours  mirtazapine 15 milliGRAM(s) Oral at bedtime  multivitamin 1 Tablet(s) Oral daily  oxyCODONE    Solution 5 milliGRAM(s) Oral once  oxyCODONE    Solution 5 milliGRAM(s) Oral daily  polyethylene glycol 3350 17 Gram(s) Oral daily  QUEtiapine 12.5 milliGRAM(s) Oral daily  QUEtiapine 50 milliGRAM(s) Oral at bedtime  senna Syrup 10 milliLiter(s) Oral daily      PHYSICAL EXAM:  T(C): 36.6 (02-16-21 @ 14:50), Max: 36.8 (02-15-21 @ 22:30)  HR: 94 (02-16-21 @ 14:50) (94 - 119)  BP: 121/81 (02-16-21 @ 14:50) (121/81 - 131/88)  RR: 24 (02-16-21 @ 14:50) (24 - 38)  SpO2: 98% (02-16-21 @ 14:50) (95% - 99%)  Wt(kg): --  I&O's Summary    15 Feb 2021 07:01  -  16 Feb 2021 07:00  --------------------------------------------------------  IN: 784 mL / OUT: 400 mL / NET: 384 mL          Appearance: Normal	  HEENT: trache  Lymphatic: No lymphadenopathy   Cardiovascular: Normal S1 S2, no JVD  Respiratory: normal effort , clear  Gastrointestinal: PEG  Skin: No rashes,  warm to touch  Psychiatry:  Mood & affect appropriate  Musculuskeletal: No edema      All labs, Imaging and EKGs personally reviewed                           10.4   8.57  )-----------( 354      ( 15 Feb 2021 07:03 )             33.8               02-15    139  |  100  |  12  ----------------------------<  122<H>  4.0   |  26  |  0.59    Ca    10.0      15 Feb 2021 06:37  Phos  4.5     02-15  Mg     2.1     02-15    TPro  7.9  /  Alb  4.0  /  TBili  0.3  /  DBili  x   /  AST  26  /  ALT  19  /  AlkPhos  92  02-15

## 2021-02-16 NOTE — SWALLOW VFSS/MBS ASSESSMENT ADULT - COMMENTS
Pulmonary Note 2/16/2021 - 45 YO F with Morbid Obesity and DM2 A1C 6.6 admitted for ARDS second to SARS-COV-2, intubated 1/5-1/10 then transferred to Medicine, however failed BIPAP and reintubated 1/16. Course complicated ECOLI UTI, Mouth Sores and Enterobacter and MRSA VAP. s/p Tracheostomy 1/28 and PEG 1/26. Now transferred to RCU.     Of Note: Patient was seen for a Green Dye Swallow Test on 2/11/2021 (See Consult).    Patient arrived to Radiology for Cinesophagram accompanied by staff member on Enhanced Care.  Patient transferred to a specialized seating unit with lateral view projection.  Patient is with #6 PERC Tracheostomy with One Way Speaking Valve (Marco A Melvin) in place. Patient is able to verbally communicate basic wants and needs.

## 2021-02-16 NOTE — SWALLOW VFSS/MBS ASSESSMENT ADULT - DIAGNOSTIC IMPRESSIONS
Patient presents with Mild Oral Stage and Moderate Pharyngeal Stage Dysphagia. The Oral Stage is characterized by adequate oral containment, slow chewing for solid with ability to break down solid, adequate bolus manipulation, adequate tongue motion with adequate anterior to posterior transfer of the bolus; with adequate oral clearance post swallow.  The Pharyngeal Stage is characterized by delayed initiation of the pharyngeal swallow (Bolus head is at the vallecular for Nectar Thick Liquids), reduced laryngeal elevation with incomplete laryngeal vestibular closure, adequate tongue base retraction, and adequate pharyngeal constriction. There is adequate pharyngeal clearance post swallow for puree/solids. There was Deep Laryngeal Penetration during the swallow for Nectar Thick Liquids to the level of the vocal folds. Patient is not sensate to the Penetration given no reflexive cough response. Patient is verbally cued to cough.  There was No Aspiration observed before, during or after the swallow for puree/solids/honey thick liquids.  Of Note: Cinesophagram was completed with and without One Way Speaking Valve in place with PO Trials.

## 2021-02-16 NOTE — SWALLOW VFSS/MBS ASSESSMENT ADULT - ORAL PHASE
within functional limits With One Way Speaking Valve in place/within functional limits Within functional limits

## 2021-02-16 NOTE — SWALLOW VFSS/MBS ASSESSMENT ADULT - PHARYNGEAL PHASE COMMENTS
adequate initiation of the pharyngeal swallow, adequate laryngeal elevation, adequate tongue base retraction, adequate pharyngeal constriction adequate initiation of the pharyngeal swallow, adequate laryngeal elevation, adequate tongue base retraction, adequate pharyngeal constriction, delayed initiation of the pharyngeal swallow, adequate laryngeal elevation, adequate tongue base retraction, adequate pharyngeal constriction

## 2021-02-16 NOTE — PROVIDER CONTACT NOTE (MEDICATION) - SITUATION
Pt vomited x1
Pt still c/o of headache and now kidney pain.
pt c/o headache 8/10, n/v/d
pt c/o of headache 9/10

## 2021-02-16 NOTE — PROVIDER CONTACT NOTE (MEDICATION) - ACTION/TREATMENT ORDERED:
Per Sharma   PA notified, acetaminophen given. PA to review chart for additional medication.
Lynne ENCINAS notified and assessed pt at bedside. Pt given acetaminophen and Maalox. Pt refused ativan, EKG done, Zofran ordered and given with relief.
Lynne ENCINAS notified. Continue to monitor headache, will order Toradol if headache continues.   Will review chart for kidney functions and possible US/CT of kidney.
Text page sent to Valerie ENCINAS, Maalox given with relief.

## 2021-02-16 NOTE — PROGRESS NOTE ADULT - ATTENDING COMMENTS
47 YO F with Morbid Obesity and DM2 p/w  ARDS second to SARS-COV-2, Course complicated w/ ECOLI UTI,  Enterobacter and MRSA VAP and s/p Tracheostomy 1/28. Pt today awake and denies any SOB, cough. Did have HA earlier but resolved now after tylenol. Moving all ext equally and no focal deficits. Passed on cine swallow but needs honey thick. Speaking well via PMV.

## 2021-02-16 NOTE — PROVIDER CONTACT NOTE (MEDICATION) - ASSESSMENT
Pt still c/o of headache and kidney pain. Pt already received acetaminophen  with some relieve. Pt refused Ativan. Maalox and Zofran given for n/v/d
See vitals. Pt crying and c/o of headache.
Very small emesis.
Pt c/o n/v/d and headache 8/10, inconsolable crying, SOB, de-sating and very anxious.

## 2021-02-16 NOTE — PROGRESS NOTE ADULT - SUBJECTIVE AND OBJECTIVE BOX
Patient is a 46y old  Female who presents with a chief complaint of COVID (16 Feb 2021 09:42)    Interval history: pt reports headache, states she had nausea and vomiting yesterday.        REVIEW OF SYSTEMS  Constitutional - No fever, No weight loss, No fatigue  HEENT - No eye pain, No visual disturbances, No difficulty hearing, No tinnitus, No vertigo, No neck pain  Respiratory - No cough, No wheezing, No shortness of breath  Cardiovascular - No chest pain, No palpitations  Gastrointestinal - No abdominal pain, No nausea, No vomiting, No diarrhea, No constipation  Genitourinary - No dysuria, No frequency, No hematuria, No incontinence  Neurological - No headaches, No memory loss, + loss of strength, No numbness, No tremors  Skin - No itching, No rashes, + healing R facial wound    Endocrine - No temperature intolerance  Musculoskeletal - No joint pain, No joint swelling, No muscle pain  Psychiatric - + depression, No anxiety    PAST MEDICAL & SURGICAL HISTORY  No pertinent past medical history  No significant past surgical history       CURRENT FUNCTIONAL STATUS   bed mobility, transfer training and ambulation with a rolling walker  with mod/min assist of 2 persons ,ambulated 8 side steps at bedside with a rolling walker    FAMILY HISTORY   FH: type 2 diabetes        RECENT LABS/IMAGING  CBC Full  -  ( 15 Feb 2021 07:03 )  WBC Count : 8.57 K/uL  RBC Count : 3.77 M/uL  Hemoglobin : 10.4 g/dL  Hematocrit : 33.8 %  Platelet Count - Automated : 354 K/uL  Mean Cell Volume : 89.7 fL  Mean Cell Hemoglobin : 27.6 pg  Mean Cell Hemoglobin Concentration : 30.8 gm/dL  Auto Neutrophil # : x  Auto Lymphocyte # : x  Auto Monocyte # : x  Auto Eosinophil # : x  Auto Basophil # : x  Auto Neutrophil % : x  Auto Lymphocyte % : x  Auto Monocyte % : x  Auto Eosinophil % : x  Auto Basophil % : x    02-15    139  |  100  |  12  ----------------------------<  122<H>  4.0   |  26  |  0.59    Ca    10.0      15 Feb 2021 06:37  Phos  4.5     02-15  Mg     2.1     02-15    TPro  7.9  /  Alb  4.0  /  TBili  0.3  /  DBili  x   /  AST  26  /  ALT  19  /  AlkPhos  92  02-15        VITALS  T(C): 36.6 (02-16-21 @ 14:50), Max: 36.8 (02-15-21 @ 22:30)  HR: 94 (02-16-21 @ 14:50) (94 - 119)  BP: 121/81 (02-16-21 @ 14:50) (121/81 - 131/88)  RR: 24 (02-16-21 @ 14:50) (24 - 38)  SpO2: 98% (02-16-21 @ 14:50) (95% - 99%)  Wt(kg): --    ALLERGIES  No Known Allergies      MEDICATIONS   acetaminophen    Suspension .. 650 milliGRAM(s) Oral every 6 hours  ALBUTerol    90 MICROgram(s) HFA Inhaler 2 Puff(s) Inhalation every 6 hours PRN  aluminum hydroxide/magnesium hydroxide/simethicone Suspension 30 milliLiter(s) Oral every 6 hours PRN  BACItracin   Ointment 1 Application(s) Topical two times a day  bisacodyl Suppository 10 milliGRAM(s) Rectal at bedtime PRN  chlorhexidine 4% Liquid 1 Application(s) Topical daily  clonazePAM  Tablet 1 milliGRAM(s) Oral <User Schedule>  dextrose 5%. 1000 milliLiter(s) IV Continuous <Continuous>  dextrose 5%. 1000 milliLiter(s) IV Continuous <Continuous>  enoxaparin Injectable 40 milliGRAM(s) SubCutaneous every 12 hours  glucagon  Injectable 1 milliGRAM(s) IntraMuscular once  insulin lispro (ADMELOG) corrective regimen sliding scale   SubCutaneous every 6 hours  mirtazapine 15 milliGRAM(s) Oral at bedtime  multivitamin 1 Tablet(s) Oral daily  oxyCODONE    Solution 5 milliGRAM(s) Oral once  oxyCODONE    Solution 5 milliGRAM(s) Oral daily  polyethylene glycol 3350 17 Gram(s) Oral daily  QUEtiapine 12.5 milliGRAM(s) Oral daily  QUEtiapine 50 milliGRAM(s) Oral at bedtime  senna Syrup 10 milliLiter(s) Oral daily      ----------------------------------------------------------------------------------------    PHYSICAL EXAM  Constitutional - NAD, Comfortable  HEENT - healing abrasion R cheek,  + trache   Chest - no respiratory distress  Cardiovascular - no edema  Abdomen - Soft, NTND, + PEG, burdick  Extremities - no calf tenderness   Neurologic Exam -                    Cognitive - Awake, Alert, AAO to self, place, date, year, situation     Communication - Fluent, No dysarthria     Cranial Nerves - CN 2-12 intact     Motor -                      LEFT    UE -4/5                    RIGHT UE - 4/5                    LEFT    LE - 4/5                                    RIGHT LE -4/5       Sensory - Intact to LT        Balance - WNL Static  Psychiatric - depressed   ----------------------------------------------------------------------------------------  ASSESSMENT/PLAN   47 yo f admitted for ARDS second to SARS-COV-2, intubated 1/5-1/10 then transferred to Medicine, however failed BIPAP and reintubated 1/16. Course complicated UTI, Mouth Sores and Enterobacter and MRSA VAP. s/p Tracheostomy 1/28 and PEG 1/26.   contact precautions for MRSA  depression: on remeron  delirium- on seroquel  pain: oxy ir 5mg daily with bowel regimen  urinary retention/ UTI: burdick  also treated with zosyn for pneumonia  MRSA/HSV: on bactroban, s/p acyclovir    Diet: dysphagia 2 mech soft honey thick  DVT PPX - lovenox  continue bedside PT and OT  OOB to chair daily  Rehab - when medically cleared,    Recommend ACUTE inpatient rehabilitation for the functional deficits consisting of 3 hours of therapy/day & 24 hour RN/daily PMR physician for comorbid medical management. Will continue to follow for ongoing rehab needs and recommendations.

## 2021-02-16 NOTE — SWALLOW VFSS/MBS ASSESSMENT ADULT - ROSENBEK'S PENETRATION ASPIRATION SCALE
With One Way Speaking Valve in Place/(1) no aspiration, contrast does not enter airway With One Way Speaking Valve in place/(5) contrast contacts vocal cords, visible residue remains (penetration) Without One Way Speaking Valve/(1) no aspiration, contrast does not enter airway With One Way Speaking Valve in place/(1) no aspiration, contrast does not enter airway Without One Way Speaking Valve/(2) contrast enters airway, remains above the vocal cords, no residue remains (penetration)

## 2021-02-16 NOTE — PROGRESS NOTE ADULT - ASSESSMENT
45 YO F with Morbid Obesity and DM2 A1C 6.6 admitted for ARDS second to SARS-COV-2, intubated 1/5-1/10 then transferred to Medicine, however failed BIPAP and reintubated 1/16. Course complicated ECOLI UTI, Mouth Sores and Enterobacter and MRSA VAP. s/p Tracheostomy 1/28 and PEG 1/26. Now transferred to RCU.     # ICU Delirium/ Agitation   - Now weaned off sedation, however delirious.  - c/w Seroquel 12.5mg AM and 50mg QHS and Klonopin 1mg BID   - Monitor mentation.      # Depression  - Mood much improved  - c/w Remeron.   - Supportive care.     # ARDS second to SARS-COV-2 vs Superimposed Enterobacter and MRSA PNA   - s/p Remdesivir, Decadron and ABX as belowed.   - s/p Prolonged ICU stay and Tracheostomy on 1/28  - Tolerating TC on 28% well. PMV when secretions improves.   - Proventil and Chest PT Q6H. Suction PRN. Trach care QD.     # Septic vs Vasoplegic Shock (resolved)  - Off all Pressors. Monitor HR/ BP     # Dysphagia with PEG   - s/p PEG 1/26 and continue on TF as tolerated.   - c/w current bowel regimen (Senna and Miralax and Dulcolax PRN)   - Pending CINE on 2/16    #   - Failed TOV 2/3 and 2/11. Keep Haque for now and TOV again at Rehab.   - Hyponatremia/ Hypernatremia, monitor electrolytes and renal function as tolerated.     # Sepsis second to SARS-COV-2 vs Enterobacter and MRSA PNA vs HSV Type 1   - Completed empiric ABX for PNA with Zosyn (1/16-1/20), however cultures negative   - ECOLI UTI (1/25) s/p CTX (1/25-1/27).   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     # DM2 A1C 6.6 (1/2021)   - Continue on ISS and monitor FS Q6H    # Wounds   - WOC recommendations appreciated.      # DVT PPX with Lovenox BID   # CODE STATUS - Full Code   # DISPO - PT recommends Rehab.    47 YO F with Morbid Obesity and DM2 A1C 6.6 admitted for ARDS second to SARS-COV-2, intubated 1/5-1/10 then transferred to Medicine, however failed BIPAP and reintubated 1/16. Course complicated ECOLI UTI, Mouth Sores and Enterobacter and MRSA VAP. s/p Tracheostomy 1/28 and PEG 1/26. Now transferred to RCU.     # ICU Delirium/ Agitation   - Now weaned off sedation, however delirious.  - c/w Seroquel 12.5mg AM and 50mg QHS and Klonopin 1mg BID   - Monitor mentation.      # Depression  - Mood much improved  - c/w Remeron.   - Supportive care.     # ARDS second to SARS-COV-2 vs Superimposed Enterobacter and MRSA PNA   - s/p Remdesivir, Decadron and ABX as belowed.   - s/p Prolonged ICU stay and Tracheostomy on 1/28  - Tolerating TC on 28% well. PMV when secretions improves.   - Proventil and Chest PT Q6H. Suction PRN. Trach care QD.     # Septic vs Vasoplegic Shock (resolved)  - Off all Pressors. Monitor HR/ BP     # Dysphagia with PEG   - s/p PEG 1/26 and continue on TF as tolerated.   - c/w current bowel regimen (Senna and Miralax and Dulcolax PRN)   - Passed CINE on 2/16, started on Mechanical soft Honey thick liquid    #   - Failed TOV 2/3 and 2/11. Keep Haque for now and TOV again at Rehab.   - Hyponatremia/ Hypernatremia, monitor electrolytes and renal function as tolerated.     # Sepsis second to SARS-COV-2 vs Enterobacter and MRSA PNA vs HSV Type 1   - Completed empiric ABX for PNA with Zosyn (1/16-1/20), however cultures negative   - ECOLI UTI (1/25) s/p CTX (1/25-1/27).   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     # DM2 A1C 6.6 (1/2021)   - Continue on ISS and monitor FS Q6H    # Wounds   - WOC recommendations appreciated.      # DVT PPX with Lovenox BID   # CODE STATUS - Full Code   # DISPO - PT recommends Rehab.

## 2021-02-16 NOTE — SWALLOW VFSS/MBS ASSESSMENT ADULT - RECOMMENDED CONSISTENCY
1.) Mechanical Soft Consistency with Honey Thick Liquids. Patient to wear One Way Speaking Valve during meals.   2.) Feeding/Swallowing Guidelines: Upright position; encourage small bites, chew mechanical soft solids well, single cup sips of honey thick liquids.  3.) Aspiration Precautions  4.) Maintain Good Oral Hygiene Care

## 2021-02-16 NOTE — SWALLOW VFSS/MBS ASSESSMENT ADULT - ESOPHAGEAL STAGE
Pharyngeal Stage - adequate initiation of the pharyngeal swallow, adequate laryngeal elevation, adequate tongue base retraction, adequate pharyngeal constriction Pharyngeal Swallow - adequate initiation of the pharyngeal swallow, adequate laryngeal elevation, adequate tongue base retraction, adequate pharyngeal constriction

## 2021-02-16 NOTE — PROGRESS NOTE ADULT - SUBJECTIVE AND OBJECTIVE BOX
CHIEF COMPLAINT: Patient is a 46y old  Female who presents with a chief complaint of COVID (15 Feb 2021 17:08)    Interval Events:    REVIEW OF SYSTEMS:  Constitutional:   Eyes:  ENT:  CV:  Resp:  GI:  :  MSK:  Integumentary:  Neurological:  Psychiatric:  Endocrine:  Hematologic/Lymphatic:  Allergic/Immunologic:  [ ] All other systems negative  [ ] Unable to assess ROS because ________    OBJECTIVE:  ICU Vital Signs Last 24 Hrs  T(C): 36.7 (16 Feb 2021 06:21), Max: 36.8 (15 Feb 2021 22:30)  T(F): 98.1 (16 Feb 2021 06:21), Max: 98.2 (15 Feb 2021 22:30)  HR: 99 (16 Feb 2021 06:21) (99 - 119)  BP: 131/88 (16 Feb 2021 06:21) (120/83 - 131/88)  BP(mean): --  ABP: --  ABP(mean): --  RR: 24 (16 Feb 2021 06:21) (21 - 38)  SpO2: 98% (16 Feb 2021 06:21) (95% - 99%)        02-15 @ 07:01  -  02-16 @ 07:00  --------------------------------------------------------  IN: 784 mL / OUT: 400 mL / NET: 384 mL      CAPILLARY BLOOD GLUCOSE      POCT Blood Glucose.: 132 mg/dL (16 Feb 2021 05:32)      PHYSICAL EXAM:  General:   HEENT:   Lymph Nodes:  Neck:   Respiratory:   Cardiovascular:   Abdomen:   Extremities:   Skin:   Neurological:  Psychiatry:    HOSPITAL MEDICATIONS:  MEDICATIONS  (STANDING):  acetaminophen    Suspension .. 650 milliGRAM(s) Oral every 6 hours  BACItracin   Ointment 1 Application(s) Topical two times a day  chlorhexidine 4% Liquid 1 Application(s) Topical daily  clonazePAM  Tablet 1 milliGRAM(s) Oral <User Schedule>  dextrose 5%. 1000 milliLiter(s) (50 mL/Hr) IV Continuous <Continuous>  dextrose 5%. 1000 milliLiter(s) (100 mL/Hr) IV Continuous <Continuous>  enoxaparin Injectable 40 milliGRAM(s) SubCutaneous every 12 hours  glucagon  Injectable 1 milliGRAM(s) IntraMuscular once  insulin lispro (ADMELOG) corrective regimen sliding scale   SubCutaneous every 6 hours  mirtazapine 15 milliGRAM(s) Oral at bedtime  multivitamin 1 Tablet(s) Oral daily  oxyCODONE    Solution 5 milliGRAM(s) Oral once  oxyCODONE    Solution 5 milliGRAM(s) Oral daily  polyethylene glycol 3350 17 Gram(s) Oral daily  QUEtiapine 12.5 milliGRAM(s) Oral daily  QUEtiapine 50 milliGRAM(s) Oral at bedtime  senna Syrup 10 milliLiter(s) Oral daily    MEDICATIONS  (PRN):  ALBUTerol    90 MICROgram(s) HFA Inhaler 2 Puff(s) Inhalation every 6 hours PRN Wheezing  aluminum hydroxide/magnesium hydroxide/simethicone Suspension 30 milliLiter(s) Oral every 6 hours PRN Dyspepsia  bisacodyl Suppository 10 milliGRAM(s) Rectal at bedtime PRN Constipation      LABS:                        10.4   8.57  )-----------( 354      ( 15 Feb 2021 07:03 )             33.8     02-15    139  |  100  |  12  ----------------------------<  122<H>  4.0   |  26  |  0.59    Ca    10.0      15 Feb 2021 06:37  Phos  4.5     02-15  Mg     2.1     02-15    TPro  7.9  /  Alb  4.0  /  TBili  0.3  /  DBili  x   /  AST  26  /  ALT  19  /  AlkPhos  92  02-15              MICROBIOLOGY:     RADIOLOGY:  [ ] Reviewed and interpreted by me    PULMONARY FUNCTION TESTS:    EKG: CHIEF COMPLAINT: Patient is a 46y old  Female who presents with a chief complaint of COVID (15 Feb 2021 17:08)    Interval Events: One isolated emesis reported overnight associated with mild headache. No neuro deficits, or vision changes. Otherwise, no new complaints. Pt passed CINE today, started on recommended diet. VSS, medications reviewed.     REVIEW OF SYSTEMS:  see above  [x] All other systems negative      OBJECTIVE:  ICU Vital Signs Last 24 Hrs  T(C): 36.7 (16 Feb 2021 06:21), Max: 36.8 (15 Feb 2021 22:30)  T(F): 98.1 (16 Feb 2021 06:21), Max: 98.2 (15 Feb 2021 22:30)  HR: 99 (16 Feb 2021 06:21) (99 - 119)  BP: 131/88 (16 Feb 2021 06:21) (120/83 - 131/88)  BP(mean): --  ABP: --  ABP(mean): --  RR: 24 (16 Feb 2021 06:21) (21 - 38)  SpO2: 98% (16 Feb 2021 06:21) (95% - 99%)        02-15 @ 07:01  -  02-16 @ 07:00  --------------------------------------------------------  IN: 784 mL / OUT: 400 mL / NET: 384 mL      CAPILLARY BLOOD GLUCOSE      POCT Blood Glucose.: 132 mg/dL (16 Feb 2021 05:32)      PHYSICAL EXAM  General: well appearing, nad - saturating well on TC 40%  Neck: +trach collar, are c/d/i  Respiratory: clear b/l  Cardiovascular: +s1/s2  Abdomen: +BS, soft, nt/nd, no peritoneal signs noted, +PEG  Extremities: No pedal edema  Skin: as per RN AAI sheet  Neurological: no new focal deficits      HOSPITAL MEDICATIONS:  MEDICATIONS  (STANDING):  acetaminophen    Suspension .. 650 milliGRAM(s) Oral every 6 hours  BACItracin   Ointment 1 Application(s) Topical two times a day  chlorhexidine 4% Liquid 1 Application(s) Topical daily  clonazePAM  Tablet 1 milliGRAM(s) Oral <User Schedule>  dextrose 5%. 1000 milliLiter(s) (50 mL/Hr) IV Continuous <Continuous>  dextrose 5%. 1000 milliLiter(s) (100 mL/Hr) IV Continuous <Continuous>  enoxaparin Injectable 40 milliGRAM(s) SubCutaneous every 12 hours  glucagon  Injectable 1 milliGRAM(s) IntraMuscular once  insulin lispro (ADMELOG) corrective regimen sliding scale   SubCutaneous every 6 hours  mirtazapine 15 milliGRAM(s) Oral at bedtime  multivitamin 1 Tablet(s) Oral daily  oxyCODONE    Solution 5 milliGRAM(s) Oral once  oxyCODONE    Solution 5 milliGRAM(s) Oral daily  polyethylene glycol 3350 17 Gram(s) Oral daily  QUEtiapine 12.5 milliGRAM(s) Oral daily  QUEtiapine 50 milliGRAM(s) Oral at bedtime  senna Syrup 10 milliLiter(s) Oral daily    MEDICATIONS  (PRN):  ALBUTerol    90 MICROgram(s) HFA Inhaler 2 Puff(s) Inhalation every 6 hours PRN Wheezing  aluminum hydroxide/magnesium hydroxide/simethicone Suspension 30 milliLiter(s) Oral every 6 hours PRN Dyspepsia  bisacodyl Suppository 10 milliGRAM(s) Rectal at bedtime PRN Constipation      LABS:                        10.4   8.57  )-----------( 354      ( 15 Feb 2021 07:03 )             33.8     02-15    139  |  100  |  12  ----------------------------<  122<H>  4.0   |  26  |  0.59    Ca    10.0      15 Feb 2021 06:37  Phos  4.5     02-15  Mg     2.1     02-15    TPro  7.9  /  Alb  4.0  /  TBili  0.3  /  DBili  x   /  AST  26  /  ALT  19  /  AlkPhos  92  02-15              MICROBIOLOGY:     RADIOLOGY:  [ ] Reviewed and interpreted by me    PULMONARY FUNCTION TESTS:    EKG:

## 2021-02-17 LAB
ANION GAP SERPL CALC-SCNC: 11 MMOL/L — SIGNIFICANT CHANGE UP (ref 7–14)
BUN SERPL-MCNC: 11 MG/DL — SIGNIFICANT CHANGE UP (ref 7–23)
CALCIUM SERPL-MCNC: 9.6 MG/DL — SIGNIFICANT CHANGE UP (ref 8.4–10.5)
CHLORIDE SERPL-SCNC: 98 MMOL/L — SIGNIFICANT CHANGE UP (ref 98–107)
CO2 SERPL-SCNC: 28 MMOL/L — SIGNIFICANT CHANGE UP (ref 22–31)
CREAT SERPL-MCNC: 0.59 MG/DL — SIGNIFICANT CHANGE UP (ref 0.5–1.3)
GLUCOSE SERPL-MCNC: 109 MG/DL — HIGH (ref 70–99)
HCT VFR BLD CALC: 33.7 % — LOW (ref 34.5–45)
HGB BLD-MCNC: 10.6 G/DL — LOW (ref 11.5–15.5)
MAGNESIUM SERPL-MCNC: 2.1 MG/DL — SIGNIFICANT CHANGE UP (ref 1.6–2.6)
MCHC RBC-ENTMCNC: 27.9 PG — SIGNIFICANT CHANGE UP (ref 27–34)
MCHC RBC-ENTMCNC: 31.5 GM/DL — LOW (ref 32–36)
MCV RBC AUTO: 88.7 FL — SIGNIFICANT CHANGE UP (ref 80–100)
NRBC # BLD: 0 /100 WBCS — SIGNIFICANT CHANGE UP
NRBC # FLD: 0 K/UL — SIGNIFICANT CHANGE UP
PHOSPHATE SERPL-MCNC: 4.7 MG/DL — HIGH (ref 2.5–4.5)
PLATELET # BLD AUTO: 280 K/UL — SIGNIFICANT CHANGE UP (ref 150–400)
POTASSIUM SERPL-MCNC: 3.8 MMOL/L — SIGNIFICANT CHANGE UP (ref 3.5–5.3)
POTASSIUM SERPL-SCNC: 3.8 MMOL/L — SIGNIFICANT CHANGE UP (ref 3.5–5.3)
RBC # BLD: 3.8 M/UL — SIGNIFICANT CHANGE UP (ref 3.8–5.2)
RBC # FLD: 16.5 % — HIGH (ref 10.3–14.5)
SODIUM SERPL-SCNC: 137 MMOL/L — SIGNIFICANT CHANGE UP (ref 135–145)
WBC # BLD: 7.55 K/UL — SIGNIFICANT CHANGE UP (ref 3.8–10.5)
WBC # FLD AUTO: 7.55 K/UL — SIGNIFICANT CHANGE UP (ref 3.8–10.5)

## 2021-02-17 PROCEDURE — 99233 SBSQ HOSP IP/OBS HIGH 50: CPT

## 2021-02-17 RX ADMIN — Medication 1 TABLET(S): at 10:34

## 2021-02-17 RX ADMIN — Medication 650 MILLIGRAM(S): at 06:20

## 2021-02-17 RX ADMIN — Medication 1 MILLIGRAM(S): at 16:38

## 2021-02-17 RX ADMIN — Medication 1: at 11:45

## 2021-02-17 RX ADMIN — QUETIAPINE FUMARATE 12.5 MILLIGRAM(S): 200 TABLET, FILM COATED ORAL at 10:34

## 2021-02-17 RX ADMIN — ENOXAPARIN SODIUM 40 MILLIGRAM(S): 100 INJECTION SUBCUTANEOUS at 16:36

## 2021-02-17 RX ADMIN — Medication 1 MILLIGRAM(S): at 06:21

## 2021-02-17 RX ADMIN — CHLORHEXIDINE GLUCONATE 1 APPLICATION(S): 213 SOLUTION TOPICAL at 06:21

## 2021-02-17 RX ADMIN — OXYCODONE HYDROCHLORIDE 5 MILLIGRAM(S): 5 TABLET ORAL at 20:23

## 2021-02-17 RX ADMIN — ENOXAPARIN SODIUM 40 MILLIGRAM(S): 100 INJECTION SUBCUTANEOUS at 06:20

## 2021-02-17 RX ADMIN — Medication 1 APPLICATION(S): at 16:36

## 2021-02-17 RX ADMIN — Medication 650 MILLIGRAM(S): at 16:35

## 2021-02-17 RX ADMIN — Medication 1 APPLICATION(S): at 06:20

## 2021-02-17 RX ADMIN — Medication 650 MILLIGRAM(S): at 23:46

## 2021-02-17 RX ADMIN — MIRTAZAPINE 15 MILLIGRAM(S): 45 TABLET, ORALLY DISINTEGRATING ORAL at 21:22

## 2021-02-17 RX ADMIN — Medication 650 MILLIGRAM(S): at 10:34

## 2021-02-17 RX ADMIN — Medication 1: at 05:39

## 2021-02-17 RX ADMIN — OXYCODONE HYDROCHLORIDE 5 MILLIGRAM(S): 5 TABLET ORAL at 10:38

## 2021-02-17 RX ADMIN — QUETIAPINE FUMARATE 50 MILLIGRAM(S): 200 TABLET, FILM COATED ORAL at 21:22

## 2021-02-17 NOTE — PROGRESS NOTE ADULT - ASSESSMENT
46 F w respiratory failure due to COVID 19     Problem/Recommendation - 1:  Problem: Acute respiratory failure with hypoxia. Recommendation: s/p tracheostomy and PEG. No signs of post proceure issues at this time  - c/w G tube feeds  - PPI per team  - RCU care   - ativan for agitation PRN   - Seroquel and Klonopin   - TOV as tolerated   - Rehab eval   - speech and swallow , follow up recs   - rehab placement        Problem/Recommendation - 2:  ·  Problem: COVID-19.  Recommendation: status post dexamethasone.      Problem/Recommendation - 3:  ·  Problem: Normocytic anemia.  Recommendation: without overt GI bleeding. Likely due to critical illness.   -monitor for GI bleed  -PPI QD.      Problem/Recommendation - 4:  ·  Problem: Abnormal LFTs.  Recommendation: multifactorial, likely NAFLD, COVID, critical illness. Can consider US to r/o gallstone disease, especially if increasing.   defer to GI      Problem/Recommendation - 5:  ·  Problem: Morbid obesity.      Problem/Recommendation - 6:  Problem: Constipation. Recommendation: consider XR abdomen to assess for ileus  on a bowel regimen.

## 2021-02-17 NOTE — PROGRESS NOTE ADULT - ASSESSMENT
45 YO F with Morbid Obesity and DM2 A1C 6.6 admitted for ARDS second to SARS-COV-2, intubated 1/5-1/10 then transferred to Medicine, however failed BIPAP and reintubated 1/16. Course complicated ECOLI UTI, Mouth Sores and Enterobacter and MRSA VAP. s/p Tracheostomy 1/28 and PEG 1/26. Now transferred to RCU.     # ICU Delirium/ Agitation   - Now weaned off sedation, however delirious.  - c/w Seroquel 12.5mg AM and 50mg QHS and Klonopin 1mg BID   - Monitor mentation.      # Depression  - Mood much improved  - c/w Remeron.   - Supportive care.     # ARDS second to SARS-COV-2 vs Superimposed Enterobacter and MRSA PNA   - s/p Remdesivir, Decadron and ABX as belowed.   - s/p Prolonged ICU stay and Tracheostomy on 1/28  - Tolerating TC on 28% well. PMV when secretions improves.   - Proventil and Chest PT Q6H. Suction PRN. Trach care QD.     # Septic vs Vasoplegic Shock (resolved)  - Off all Pressors. Monitor HR/ BP     # Dysphagia with PEG   - s/p PEG 1/26 and continue on TF as tolerated.   - c/w current bowel regimen (Senna and Miralax and Dulcolax PRN)   - Passed CINE on 2/16, started on Mechanical soft Honey thick liquid    #   - Failed TOV 2/3 and 2/11. Keep Haque for now and TOV again at Rehab.   - Hyponatremia/ Hypernatremia, monitor electrolytes and renal function as tolerated.     # Sepsis second to SARS-COV-2 vs Enterobacter and MRSA PNA vs HSV Type 1   - Completed empiric ABX for PNA with Zosyn (1/16-1/20), however cultures negative   - ECOLI UTI (1/25) s/p CTX (1/25-1/27).   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     # DM2 A1C 6.6 (1/2021)   - Continue on ISS and monitor FS Q6H    # Wounds   - WOC recommendations appreciated.      # DVT PPX with Lovenox BID   # CODE STATUS - Full Code   # DISPO - PT recommends Rehab.

## 2021-02-17 NOTE — PROGRESS NOTE ADULT - ATTENDING COMMENTS
47 YO F with Morbid Obesity and DM2 p/w  ARDS second to SARS-COV-2, Course complicated w/ Ecoli UTI,  Enterobacter and MRSA VAP and s/p Tracheostomy 1/28. Pt today awake and denies any SOB, cough.  Moving all ext equally and no focal deficits. Passed  cine swallow but needs honey thick liquids. Speaking well via PMV.

## 2021-02-17 NOTE — CHART NOTE - NSCHARTNOTEFT_GEN_A_CORE
NUTRITION FOLLOW-UP:     Pt. w PMHx morbid obesity, DM2, ARDS 2/2 to COVID requiring intubation/sedation, now s/p trach/PEG. Was receiving Peptamen 1.5 via PEG.  However, as of yesterday (2/16) Pt. had MBS with findings of Mild Oral Stage and Moderate Pharyngeal Stage Dysphagia.  SLP recommendation for mechanical soft foods with honey thickened liquids.  Would monitor adequacy of PO intake.  Will add Magic Cup (thickened PO supplement) to provide additional calories with meals.  Continue Multivitamin for micronutrient coverage.  Also noted on Remeron, which may help promote appetite.      No noted vomiting/diarrhea/constipation @ this time.  Last BM today (2/17).      Variable weight status throughout admission.  May be somewhat fluid related. Continue to monitor.    Weight:  96.1kg (2/10)  94.5kg (2/8)  92.4kg (2/2)  90.4kg (1/23)  89.3kg (1/16)  109.8kg (admission 1/3)    Edema:  Generalized, nonpitting.    Skin:  No noted pressure injuries.        Pertinent Medications: MEDICATIONS  (STANDING):  acetaminophen    Suspension .. 650 milliGRAM(s) Oral every 6 hours  BACItracin   Ointment 1 Application(s) Topical two times a day  chlorhexidine 4% Liquid 1 Application(s) Topical daily  clonazePAM  Tablet 1 milliGRAM(s) Oral <User Schedule>  dextrose 5%. 1000 milliLiter(s) (50 mL/Hr) IV Continuous <Continuous>  dextrose 5%. 1000 milliLiter(s) (100 mL/Hr) IV Continuous <Continuous>  enoxaparin Injectable 40 milliGRAM(s) SubCutaneous every 12 hours  glucagon  Injectable 1 milliGRAM(s) IntraMuscular once  insulin lispro (ADMELOG) corrective regimen sliding scale   SubCutaneous every 6 hours  mirtazapine 15 milliGRAM(s) Oral at bedtime  multivitamin 1 Tablet(s) Oral daily  oxyCODONE    Solution 5 milliGRAM(s) Oral once  oxyCODONE    Solution 5 milliGRAM(s) Oral daily  polyethylene glycol 3350 17 Gram(s) Oral daily  QUEtiapine 12.5 milliGRAM(s) Oral daily  QUEtiapine 50 milliGRAM(s) Oral at bedtime  senna Syrup 10 milliLiter(s) Oral daily    MEDICATIONS  (PRN):  ALBUTerol    90 MICROgram(s) HFA Inhaler 2 Puff(s) Inhalation every 6 hours PRN Wheezing  aluminum hydroxide/magnesium hydroxide/simethicone Suspension 30 milliLiter(s) Oral every 6 hours PRN Dyspepsia  bisacodyl Suppository 10 milliGRAM(s) Rectal at bedtime PRN Constipation    Pertinent Labs:  02-17 Na137 mmol/L Glu 109 mg/dL<H> K+ 3.8 mmol/L Cr  0.59 mg/dL BUN 11 mg/dL 02-17 Phos 4.7 mg/dL<H> 02-15 Alb 4.0 g/dL 01-23 Chol 258 mg/dL<H> LDL --    HDL 19 mg/dL<L> Trig 334 mg/dL<H>      CAPILLARY BLOOD GLUCOSE      POCT Blood Glucose.: 157 mg/dL (17 Feb 2021 11:30)  POCT Blood Glucose.: 155 mg/dL (17 Feb 2021 05:18)  POCT Blood Glucose.: 103 mg/dL (17 Feb 2021 00:06)  POCT Blood Glucose.: 143 mg/dL (16 Feb 2021 16:52)          Diet, Dysphagia 2 Mechanical Soft-Honey Consistency Fluid (02-16-21 @ 11:24)          PLAN/RECOMMENDATIONS:    1) Continue current diet, as tolerated.  Monitor adequacy of PO intake & document in flow sheets.  Aspiration precautions.    2) Obtain current & weekly weights  3) Continue Multivitamin     RDN remains available and will f/u PRN.          Anna Marie Hassan RDN, CDN pager [phone]

## 2021-02-17 NOTE — PROGRESS NOTE ADULT - SUBJECTIVE AND OBJECTIVE BOX
CHIEF COMPLAINT: Patient is a 46y old Female who presents with a chief complaint of COVID.    Interval Events:    REVIEW OF SYSTEMS:  [ ] All other systems negative  [ ] Unable to assess ROS because ________    OBJECTIVE:  ICU Vital Signs Last 24 Hrs  T(C): 36.4 (17 Feb 2021 05:34), Max: 36.9 (16 Feb 2021 23:43)  T(F): 97.6 (17 Feb 2021 05:34), Max: 98.4 (16 Feb 2021 23:43)  HR: 100 (17 Feb 2021 07:07) (94 - 100)  BP: 126/91 (17 Feb 2021 05:34) (121/81 - 126/91)  BP(mean): 94 (16 Feb 2021 14:50) (94 - 94)  RR: 23 (17 Feb 2021 07:07) (18 - 24)  SpO2: 98% (17 Feb 2021 07:07) (98% - 99%)      CAPILLARY BLOOD GLUCOSE      POCT Blood Glucose.: 155 mg/dL (17 Feb 2021 05:18)      HOSPITAL MEDICATIONS:  MEDICATIONS  (STANDING):  acetaminophen    Suspension .. 650 milliGRAM(s) Oral every 6 hours  BACItracin   Ointment 1 Application(s) Topical two times a day  chlorhexidine 4% Liquid 1 Application(s) Topical daily  clonazePAM  Tablet 1 milliGRAM(s) Oral <User Schedule>  dextrose 5%. 1000 milliLiter(s) (50 mL/Hr) IV Continuous <Continuous>  dextrose 5%. 1000 milliLiter(s) (100 mL/Hr) IV Continuous <Continuous>  enoxaparin Injectable 40 milliGRAM(s) SubCutaneous every 12 hours  glucagon  Injectable 1 milliGRAM(s) IntraMuscular once  insulin lispro (ADMELOG) corrective regimen sliding scale   SubCutaneous every 6 hours  mirtazapine 15 milliGRAM(s) Oral at bedtime  multivitamin 1 Tablet(s) Oral daily  oxyCODONE    Solution 5 milliGRAM(s) Oral once  oxyCODONE    Solution 5 milliGRAM(s) Oral daily  polyethylene glycol 3350 17 Gram(s) Oral daily  QUEtiapine 12.5 milliGRAM(s) Oral daily  QUEtiapine 50 milliGRAM(s) Oral at bedtime  senna Syrup 10 milliLiter(s) Oral daily    MEDICATIONS  (PRN):  ALBUTerol    90 MICROgram(s) HFA Inhaler 2 Puff(s) Inhalation every 6 hours PRN Wheezing  aluminum hydroxide/magnesium hydroxide/simethicone Suspension 30 milliLiter(s) Oral every 6 hours PRN Dyspepsia  bisacodyl Suppository 10 milliGRAM(s) Rectal at bedtime PRN Constipation      LABS:      MICROBIOLOGY:     RADIOLOGY:  [ ] Reviewed and interpreted by me    PULMONARY FUNCTION TESTS:    EKG: CHIEF COMPLAINT: Patient is a 46y old Female who presents with a chief complaint of COVID.    Interval Events: Episode of crying overnight.     REVIEW OF SYSTEMS:  Denies fever, chills, SOB, CP, abd pain.   [x] All other systems negative    OBJECTIVE:  ICU Vital Signs Last 24 Hrs  T(C): 36.4 (17 Feb 2021 05:34), Max: 36.9 (16 Feb 2021 23:43)  T(F): 97.6 (17 Feb 2021 05:34), Max: 98.4 (16 Feb 2021 23:43)  HR: 100 (17 Feb 2021 07:07) (94 - 100)  BP: 126/91 (17 Feb 2021 05:34) (121/81 - 126/91)  BP(mean): 94 (16 Feb 2021 14:50) (94 - 94)  RR: 23 (17 Feb 2021 07:07) (18 - 24)  SpO2: 98% (17 Feb 2021 07:07) (98% - 99%)      CAPILLARY BLOOD GLUCOSE      POCT Blood Glucose.: 155 mg/dL (17 Feb 2021 05:18)      HOSPITAL MEDICATIONS:  MEDICATIONS  (STANDING):  acetaminophen    Suspension .. 650 milliGRAM(s) Oral every 6 hours  BACItracin   Ointment 1 Application(s) Topical two times a day  chlorhexidine 4% Liquid 1 Application(s) Topical daily  clonazePAM  Tablet 1 milliGRAM(s) Oral <User Schedule>  dextrose 5%. 1000 milliLiter(s) (50 mL/Hr) IV Continuous <Continuous>  dextrose 5%. 1000 milliLiter(s) (100 mL/Hr) IV Continuous <Continuous>  enoxaparin Injectable 40 milliGRAM(s) SubCutaneous every 12 hours  glucagon  Injectable 1 milliGRAM(s) IntraMuscular once  insulin lispro (ADMELOG) corrective regimen sliding scale   SubCutaneous every 6 hours  mirtazapine 15 milliGRAM(s) Oral at bedtime  multivitamin 1 Tablet(s) Oral daily  oxyCODONE    Solution 5 milliGRAM(s) Oral once  oxyCODONE    Solution 5 milliGRAM(s) Oral daily  polyethylene glycol 3350 17 Gram(s) Oral daily  QUEtiapine 12.5 milliGRAM(s) Oral daily  QUEtiapine 50 milliGRAM(s) Oral at bedtime  senna Syrup 10 milliLiter(s) Oral daily    MEDICATIONS  (PRN):  ALBUTerol    90 MICROgram(s) HFA Inhaler 2 Puff(s) Inhalation every 6 hours PRN Wheezing  aluminum hydroxide/magnesium hydroxide/simethicone Suspension 30 milliLiter(s) Oral every 6 hours PRN Dyspepsia  bisacodyl Suppository 10 milliGRAM(s) Rectal at bedtime PRN Constipation      LABS:      MICROBIOLOGY:     RADIOLOGY:  [ ] Reviewed and interpreted by me    PULMONARY FUNCTION TESTS:    EKG:

## 2021-02-17 NOTE — PROGRESS NOTE ADULT - SUBJECTIVE AND OBJECTIVE BOX
DATE OF SERVICE: 02-17-21 @ 17:24    Subjective: Patient seen and examined. No new events except as noted.   doing okay     REVIEW OF SYSTEMS:    CONSTITUTIONAL: No weakness, fevers or chills  EYES/ENT: No visual changes;  No vertigo or throat pain   NECK: No pain or stiffness  RESPIRATORY: No cough, wheezing, hemoptysis; No shortness of breath  CARDIOVASCULAR: No chest pain or palpitations  GASTROINTESTINAL: No abdominal or epigastric pain.  GENITOURINARY: No dysuria, frequency or hematuria  NEUROLOGICAL: No numbness or weakness  SKIN: No itching, burning, rashes, or lesions   All other review of systems is negative unless indicated above.    MEDICATIONS:  MEDICATIONS  (STANDING):  acetaminophen    Suspension .. 650 milliGRAM(s) Oral every 6 hours  BACItracin   Ointment 1 Application(s) Topical two times a day  chlorhexidine 4% Liquid 1 Application(s) Topical daily  clonazePAM  Tablet 1 milliGRAM(s) Oral <User Schedule>  dextrose 5%. 1000 milliLiter(s) (50 mL/Hr) IV Continuous <Continuous>  dextrose 5%. 1000 milliLiter(s) (100 mL/Hr) IV Continuous <Continuous>  enoxaparin Injectable 40 milliGRAM(s) SubCutaneous every 12 hours  glucagon  Injectable 1 milliGRAM(s) IntraMuscular once  insulin lispro (ADMELOG) corrective regimen sliding scale   SubCutaneous every 6 hours  mirtazapine 15 milliGRAM(s) Oral at bedtime  multivitamin 1 Tablet(s) Oral daily  oxyCODONE    Solution 5 milliGRAM(s) Oral once  oxyCODONE    Solution 5 milliGRAM(s) Oral daily  QUEtiapine 12.5 milliGRAM(s) Oral daily  QUEtiapine 50 milliGRAM(s) Oral at bedtime      PHYSICAL EXAM:  T(C): 37.1 (02-17-21 @ 13:08), Max: 37.1 (02-17-21 @ 13:08)  HR: 98 (02-17-21 @ 13:08) (96 - 100)  BP: 118/81 (02-17-21 @ 13:08) (118/81 - 126/91)  RR: 18 (02-17-21 @ 13:08) (18 - 23)  SpO2: 99% (02-17-21 @ 13:08) (98% - 99%)  Wt(kg): --  I&O's Summary        Appearance: Normal	  HEENT:  trache   Lymphatic: No lymphadenopathy   Cardiovascular: Normal S1 S2, no JVD  Respiratory: normal effort , clear  Gastrointestinal:  Soft, Non-tender  Skin: No rashes,  warm to touch  Psychiatry:  Mood & affect appropriate  Musculuskeletal: No edema      All labs, Imaging and EKGs personally reviewed                           10.6   7.55  )-----------( 280      ( 17 Feb 2021 07:30 )             33.7               02-17    137  |  98  |  11  ----------------------------<  109<H>  3.8   |  28  |  0.59    Ca    9.6      17 Feb 2021 07:30  Phos  4.7     02-17  Mg     2.1     02-17

## 2021-02-18 PROCEDURE — 99233 SBSQ HOSP IP/OBS HIGH 50: CPT

## 2021-02-18 PROCEDURE — 99232 SBSQ HOSP IP/OBS MODERATE 35: CPT

## 2021-02-18 RX ORDER — OXYCODONE HYDROCHLORIDE 5 MG/1
5 TABLET ORAL EVERY 6 HOURS
Refills: 0 | Status: DISCONTINUED | OUTPATIENT
Start: 2021-02-18 | End: 2021-02-25

## 2021-02-18 RX ORDER — IPRATROPIUM/ALBUTEROL SULFATE 18-103MCG
3 AEROSOL WITH ADAPTER (GRAM) INHALATION ONCE
Refills: 0 | Status: COMPLETED | OUTPATIENT
Start: 2021-02-18 | End: 2021-02-18

## 2021-02-18 RX ORDER — OXYCODONE HYDROCHLORIDE 5 MG/1
5 TABLET ORAL EVERY 6 HOURS
Refills: 0 | Status: DISCONTINUED | OUTPATIENT
Start: 2021-02-18 | End: 2021-02-18

## 2021-02-18 RX ORDER — CLONAZEPAM 1 MG
1 TABLET ORAL
Refills: 0 | Status: DISCONTINUED | OUTPATIENT
Start: 2021-02-18 | End: 2021-02-25

## 2021-02-18 RX ORDER — INSULIN LISPRO 100/ML
VIAL (ML) SUBCUTANEOUS AT BEDTIME
Refills: 0 | Status: DISCONTINUED | OUTPATIENT
Start: 2021-02-18 | End: 2021-03-03

## 2021-02-18 RX ORDER — INSULIN LISPRO 100/ML
VIAL (ML) SUBCUTANEOUS
Refills: 0 | Status: DISCONTINUED | OUTPATIENT
Start: 2021-02-18 | End: 2021-03-03

## 2021-02-18 RX ORDER — CLONAZEPAM 1 MG
1 TABLET ORAL
Refills: 0 | Status: DISCONTINUED | OUTPATIENT
Start: 2021-02-18 | End: 2021-02-18

## 2021-02-18 RX ADMIN — OXYCODONE HYDROCHLORIDE 5 MILLIGRAM(S): 5 TABLET ORAL at 11:52

## 2021-02-18 RX ADMIN — Medication 1 MILLIGRAM(S): at 05:47

## 2021-02-18 RX ADMIN — QUETIAPINE FUMARATE 12.5 MILLIGRAM(S): 200 TABLET, FILM COATED ORAL at 11:51

## 2021-02-18 RX ADMIN — Medication 1 APPLICATION(S): at 05:43

## 2021-02-18 RX ADMIN — Medication 650 MILLIGRAM(S): at 05:47

## 2021-02-18 RX ADMIN — Medication 1 TABLET(S): at 11:51

## 2021-02-18 RX ADMIN — Medication 650 MILLIGRAM(S): at 11:51

## 2021-02-18 RX ADMIN — CHLORHEXIDINE GLUCONATE 1 APPLICATION(S): 213 SOLUTION TOPICAL at 05:47

## 2021-02-18 RX ADMIN — OXYCODONE HYDROCHLORIDE 5 MILLIGRAM(S): 5 TABLET ORAL at 17:04

## 2021-02-18 RX ADMIN — MIRTAZAPINE 15 MILLIGRAM(S): 45 TABLET, ORALLY DISINTEGRATING ORAL at 22:11

## 2021-02-18 RX ADMIN — ENOXAPARIN SODIUM 40 MILLIGRAM(S): 100 INJECTION SUBCUTANEOUS at 17:04

## 2021-02-18 RX ADMIN — ENOXAPARIN SODIUM 40 MILLIGRAM(S): 100 INJECTION SUBCUTANEOUS at 05:47

## 2021-02-18 RX ADMIN — Medication 650 MILLIGRAM(S): at 23:21

## 2021-02-18 RX ADMIN — Medication 3 MILLILITER(S): at 17:49

## 2021-02-18 RX ADMIN — Medication 1 APPLICATION(S): at 17:04

## 2021-02-18 RX ADMIN — QUETIAPINE FUMARATE 50 MILLIGRAM(S): 200 TABLET, FILM COATED ORAL at 22:11

## 2021-02-18 RX ADMIN — Medication 1 MILLIGRAM(S): at 17:03

## 2021-02-18 RX ADMIN — Medication 650 MILLIGRAM(S): at 17:04

## 2021-02-18 NOTE — PROGRESS NOTE ADULT - SUBJECTIVE AND OBJECTIVE BOX
Patient is a 46y old  Female who presents with a chief complaint of COVID (18 Feb 2021 07:38)      HPI:  Patient is a 46 year old woman with no past medical history coming in with shortness of breath, cough productive of white sputum, pleuritic rib pain (moderate in severity) for two days and diarrhea for one day. Patient  tested positive for COVID 12/29. Patient denies loss of taste or smell. Patient denies sore throat however states mouth is dry. Patient denies any history of lung disease or smoking. Patient states she was having subjective fevers at home however never took her temperature. Patient was taking an unknown antibiotic prescribed by a doctor. Unable to give name of doctor or name of abx. Patient dyspneic during with talking during interview.   ED Course: T 98.6, HR 96, /90, S 80% on RA. White count 10k. Hemoglobin 11.0. Plt 236. D-dimer 243. CRP 81.3. . CXR peripheral opacities consistent w/ COVID. Patient given 6 of dexamethasone. Patient admitted for acute hypoxic respiratory failure in the setting of likely COVID infection.  (03 Jan 2021 18:35)    Interval history: patient reports headache for 5 days, states she had nausea and vomiting the first day but only headache since then.    Tolerating diet, reports regular bm.  tolerating trach collar    REVIEW OF SYSTEMS  Constitutional - No fever, No weight loss, No fatigue  HEENT - No eye pain, No visual disturbances, No difficulty hearing, No tinnitus, No vertigo, No neck pain  Respiratory - No cough, No wheezing, No shortness of breath  Cardiovascular - No chest pain, No palpitations  Gastrointestinal - No abdominal pain, No nausea, No vomiting, No diarrhea, No constipation  Genitourinary - No dysuria, No frequency, No hematuria, No incontinence  Neurological - + headaches, No memory loss, + loss of strength, No numbness, No tremors  Skin - No itching, No rashes, No lesions   Endocrine - No temperature intolerance  Musculoskeletal - No joint pain, No joint swelling, No muscle pain  Psychiatric - No depression, No anxiety    PAST MEDICAL & SURGICAL HISTORY  No pertinent past medical history  No significant past surgical history       CURRENT FUNCTIONAL STATUS  2/16 Bed Mobility  Bed Mobility Training Rehab Potential: good, to achieve stated therapy goals  Bed Mobility Training Symptoms Noted During/After Treatment: none  Bed Mobility Training Sit-to-Supine: moderate assist (50% patient effort);  2 person assist;  verbal cues;  set-up required;  bed rails  Bed Mobility Training Supine-to-Sit: minimum assist (75% patient effort);  1 person + 1 person to manage equipment;  verbal cues;  set-up required;  bed rails  Bed Mobility Training Limitations: decreased ability to use arms for pushing/pulling;  decreased ability to use legs for bridging/pushing;  impaired ability to control trunk for mobility;  decreased strength    Sit-Stand Transfer Training  Sit-to-Stand Transfer Training Rehab Potential: good, to achieve stated therapy goals  Sit-to-Stand Transfer Training Symptoms Noted During/After Treatment: none  Transfer Training Sit-to-Stand Transfer: moderate assist (50% patient effort);  2 person assist;  verbal cues;  set-up required;  rolling walker  Transfer Training Stand-to-Sit Transfer: moderate assist (50% patient effort);  2 person assist;  verbal cues;  set-up required;  rolling walker  Sit-to-Stand Transfer Training Transfer Safety Analysis: decreased balance;  decreased step length;  decreased strength;  impaired balance;  rolling walker    Gait Training  Gait Training Rehab Potential: good, to achieve stated therapy goals  Gait Training Symptoms Noted During/After Treatment: none  Gait Training: moderate assist (50% patient effort);  2 person assist;  verbal cues;  set-up required;  rolling walker;  8 side steps at bedside  Gait Analysis: 3-point gait   decreased roma;  decreased velocity of limb motion;  shuffling;  decreased step length;  impaired balance;  decreased strength;  8 side steps at bedside ;  rolling walker    Therapeutic Exercise  Therapeutic Exercise Rehab Effort: good  Therapeutic Exercise Symptoms Noted During/After Treatment: none  Therapeutic Exercise Detail: Patient performed seated balancing activities and  therapeutic exercises to extremities as tolerated in all planes while seated at bedside.           FAMILY HISTORY   FH: type 2 diabetes        RECENT LABS/IMAGING  CBC Full  -  ( 17 Feb 2021 07:30 )  WBC Count : 7.55 K/uL  RBC Count : 3.80 M/uL  Hemoglobin : 10.6 g/dL  Hematocrit : 33.7 %  Platelet Count - Automated : 280 K/uL  Mean Cell Volume : 88.7 fL  Mean Cell Hemoglobin : 27.9 pg  Mean Cell Hemoglobin Concentration : 31.5 gm/dL  Auto Neutrophil # : x  Auto Lymphocyte # : x  Auto Monocyte # : x  Auto Eosinophil # : x  Auto Basophil # : x  Auto Neutrophil % : x  Auto Lymphocyte % : x  Auto Monocyte % : x  Auto Eosinophil % : x  Auto Basophil % : x    02-17    137  |  98  |  11  ----------------------------<  109<H>  3.8   |  28  |  0.59    Ca    9.6      17 Feb 2021 07:30  Phos  4.7     02-17  Mg     2.1     02-17          VITALS  T(C): 36.6 (02-18-21 @ 05:44), Max: 37.1 (02-17-21 @ 13:08)  HR: 92 (02-18-21 @ 05:44) (92 - 109)  BP: 131/87 (02-18-21 @ 05:44) (118/81 - 131/87)  RR: 18 (02-18-21 @ 05:44) (18 - 20)  SpO2: 98% (02-18-21 @ 05:44) (93% - 99%)  Wt(kg): --    ALLERGIES  No Known Allergies      MEDICATIONS   acetaminophen    Suspension .. 650 milliGRAM(s) Oral every 6 hours  ALBUTerol    90 MICROgram(s) HFA Inhaler 2 Puff(s) Inhalation every 6 hours PRN  aluminum hydroxide/magnesium hydroxide/simethicone Suspension 30 milliLiter(s) Oral every 6 hours PRN  BACItracin   Ointment 1 Application(s) Topical two times a day  bisacodyl Suppository 10 milliGRAM(s) Rectal at bedtime PRN  chlorhexidine 4% Liquid 1 Application(s) Topical daily  clonazePAM  Tablet 1 milliGRAM(s) Oral <User Schedule>  dextrose 5%. 1000 milliLiter(s) IV Continuous <Continuous>  dextrose 5%. 1000 milliLiter(s) IV Continuous <Continuous>  enoxaparin Injectable 40 milliGRAM(s) SubCutaneous every 12 hours  glucagon  Injectable 1 milliGRAM(s) IntraMuscular once  insulin lispro (ADMELOG) corrective regimen sliding scale   SubCutaneous every 6 hours  mirtazapine 15 milliGRAM(s) Oral at bedtime  multivitamin 1 Tablet(s) Oral daily  oxyCODONE    Solution 5 milliGRAM(s) Oral daily  QUEtiapine 12.5 milliGRAM(s) Oral daily  QUEtiapine 50 milliGRAM(s) Oral at bedtime      ----------------------------------------------------------------------------------------  PHYSICAL EXAM  Constitutional - NAD, Comfortable  HEENT - healing abrasion R cheek,  + trache   Chest - no respiratory distress  Cardiovascular - no edema  Abdomen - Soft, NTND, + PEG, burdick  Extremities - no calf tenderness   Neurologic Exam -                    Cognitive - Awake, Alert, AAO to self, place, date, year, situation     Communication - Fluent, No dysarthria     Cranial Nerves - CN 2-12 intact     Motor -                      LEFT    UE -4/5                    RIGHT UE - 4/5                    LEFT    LE - 4/5                                    RIGHT LE -4/5       Sensory - Intact to LT        Balance - WNL Static  Psychiatric - stable  ----------------------------------------------------------------------------------------  ASSESSMENT/PLAN   47 yo f admitted for ARDS second to SARS-COV-2, intubated 1/5-1/10 then transferred to Medicine, however failed BIPAP and reintubated 1/16. Course complicated UTI, Mouth Sores and Enterobacter and MRSA VAP. s/p Tracheostomy 1/28 and PEG 1/26.  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 Will continue to follow for ongoing rehab needs and recommendations.

## 2021-02-18 NOTE — PROGRESS NOTE ADULT - ATTENDING COMMENTS
47 YO F with Morbid Obesity and DM2 p/w  ARDS second to SARS-COV-2, Course complicated w/ Ecoli UTI,  Enterobacter and MRSA VAP and s/p Tracheostomy 1/28. Pt today awake and denies any SOB, cough.  Moving all ext equally and no focal deficits. Passed  cine swallow but needs honey thick liquids. Speaking well via PMV. Pt unable to be changed to uncuffed trach as had brief episodes of mild hypoxia on TC assoc w/ significant coughing.

## 2021-02-18 NOTE — PROGRESS NOTE ADULT - ASSESSMENT
47 YO F with Morbid Obesity and DM2 A1C 6.6 admitted for ARDS second to SARS-COV-2, intubated 1/5-1/10 then transferred to Medicine, however failed BIPAP and reintubated 1/16. Course complicated ECOLI UTI, Mouth Sores and Enterobacter and MRSA VAP. s/p Tracheostomy 1/28 and PEG 1/26. Now transferred to RCU.     # ICU Delirium/ Agitation   - Now weaned off sedation, however delirious.  - c/w Seroquel 12.5mg AM and 50mg QHS and Klonopin 1mg BID   - Monitor mentation.      # Depression  - Mood much improved  - c/w Remeron.   - Supportive care.     # ARDS second to SARS-COV-2 vs Superimposed Enterobacter and MRSA PNA   - s/p Remdesivir, Decadron and ABX as belowed.   - s/p Prolonged ICU stay and Tracheostomy on 1/28  - Tolerating TC on 28% well. PMV when secretions improves.   - Proventil and Chest PT Q6H. Suction PRN. Trach care QD.     # Septic vs Vasoplegic Shock (resolved)  - Off all Pressors. Monitor HR/ BP     # Dysphagia with PEG   - s/p PEG 1/26 and continue on TF as tolerated.   - c/w current bowel regimen (Senna and Miralax and Dulcolax PRN)   - Passed CINE on 2/16, started on Mechanical soft Honey thick liquid    #   - Failed TOV 2/3 and 2/11. Keep Haque for now and TOV again at Rehab.   - Hyponatremia/ Hypernatremia, monitor electrolytes and renal function as tolerated.     # Sepsis second to SARS-COV-2 vs Enterobacter and MRSA PNA vs HSV Type 1   - Completed empiric ABX for PNA with Zosyn (1/16-1/20), however cultures negative   - ECOLI UTI (1/25) s/p CTX (1/25-1/27).   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     # DM2 A1C 6.6 (1/2021)   - Continue on ISS and monitor FS Q6H    # Wounds   - WOC recommendations appreciated.      # DVT PPX with Lovenox BID   # CODE STATUS - Full Code   # DISPO - PT recommends Rehab.    47 YO F with Morbid Obesity and DM2 A1C 6.6 admitted for ARDS second to SARS-COV-2, intubated 1/5-1/10 then transferred to Medicine, however failed BIPAP and reintubated 1/16. Course complicated ECOLI UTI, Mouth Sores and Enterobacter and MRSA VAP. s/p Tracheostomy 1/28 and PEG 1/26. Now transferred to RCU.     # ICU Delirium/ Agitation   - Now weaned off sedation, however delirious.  - c/w Seroquel 12.5mg AM and 50mg QHS and Klonopin 1mg BID   - Monitor mentation.  Still gets very anxious at times    # Depression  - Mood much improved  - c/w Remeron.   - Supportive care.     # ARDS second to SARS-COV-2 vs Superimposed Enterobacter and MRSA PNA   - s/p Remdesivir, Decadron and ABX as belowed.   - s/p Prolonged ICU stay and Tracheostomy on 1/28  - Tolerating TC on 28% well. PMV when secretions improves.   - Unable to change Trach from 6 cuffed to cuffless due to desatting and anxiety 2/18  - Proventil and Chest PT Q6H. Suction PRN. Trach care QD.     # Septic vs Vasoplegic Shock (resolved)  - Off all Pressors. Monitor HR/ BP     # Dysphagia with PEG   - s/p PEG 1/26 and continue on TF as tolerated.   - c/w current bowel regimen (Senna and Miralax and Dulcolax PRN)   - Passed CINE on 2/16, started on Mechanical soft Honey thick liquid    #   - Failed TOV 2/3 and 2/11. Keep Haque for now and TOV again at Rehab.   - Hyponatremia/ Hypernatremia, monitor electrolytes and renal function as tolerated.     # Sepsis second to SARS-COV-2 vs Enterobacter and MRSA PNA vs HSV Type 1   - Completed empiric ABX for PNA with Zosyn (1/16-1/20), however cultures negative   - ECOLI UTI (1/25) s/p CTX (1/25-1/27).   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     # DM2 A1C 6.6 (1/2021)   - Continue on ISS and monitor FS Q6H    # Wounds   - WOC recommendations appreciated.      # DVT PPX with Lovenox BID   # CODE STATUS - Full Code   # DISPO - PT recommends Rehab.

## 2021-02-18 NOTE — PROGRESS NOTE ADULT - SUBJECTIVE AND OBJECTIVE BOX
CHIEF COMPLAINT: Patient is a 46y old  Female who presents with a chief complaint of COVID.    Interval Events:    REVIEW OF SYSTEMS:  [ ] All other systems negative  [ ] Unable to assess ROS because ________    OBJECTIVE:  ICU Vital Signs Last 24 Hrs  T(C): 36.6 (18 Feb 2021 05:44), Max: 37.1 (17 Feb 2021 13:08)  T(F): 97.9 (18 Feb 2021 05:44), Max: 98.7 (17 Feb 2021 13:08)  HR: 92 (18 Feb 2021 05:44) (92 - 109)  BP: 131/87 (18 Feb 2021 05:44) (118/81 - 131/87)  RR: 18 (18 Feb 2021 05:44) (18 - 20)  SpO2: 98% (18 Feb 2021 05:44) (93% - 99%)      02-17 @ 07:01  -  02-18 @ 07:00  --------------------------------------------------------  IN: 300 mL / OUT: 700 mL / NET: -400 mL      CAPILLARY BLOOD GLUCOSE      POCT Blood Glucose.: 113 mg/dL (18 Feb 2021 05:49)      HOSPITAL MEDICATIONS:  MEDICATIONS  (STANDING):  acetaminophen    Suspension .. 650 milliGRAM(s) Oral every 6 hours  BACItracin   Ointment 1 Application(s) Topical two times a day  chlorhexidine 4% Liquid 1 Application(s) Topical daily  clonazePAM  Tablet 1 milliGRAM(s) Oral <User Schedule>  dextrose 5%. 1000 milliLiter(s) (50 mL/Hr) IV Continuous <Continuous>  dextrose 5%. 1000 milliLiter(s) (100 mL/Hr) IV Continuous <Continuous>  enoxaparin Injectable 40 milliGRAM(s) SubCutaneous every 12 hours  glucagon  Injectable 1 milliGRAM(s) IntraMuscular once  insulin lispro (ADMELOG) corrective regimen sliding scale   SubCutaneous every 6 hours  mirtazapine 15 milliGRAM(s) Oral at bedtime  multivitamin 1 Tablet(s) Oral daily  oxyCODONE    Solution 5 milliGRAM(s) Oral daily  QUEtiapine 12.5 milliGRAM(s) Oral daily  QUEtiapine 50 milliGRAM(s) Oral at bedtime    MEDICATIONS  (PRN):  ALBUTerol    90 MICROgram(s) HFA Inhaler 2 Puff(s) Inhalation every 6 hours PRN Wheezing  aluminum hydroxide/magnesium hydroxide/simethicone Suspension 30 milliLiter(s) Oral every 6 hours PRN Dyspepsia  bisacodyl Suppository 10 milliGRAM(s) Rectal at bedtime PRN Constipation      LABS:                        MICROBIOLOGY:     RADIOLOGY:  [ ] Reviewed and interpreted by me    PULMONARY FUNCTION TESTS:    EKG: CHIEF COMPLAINT: Patient is a 46y old  Female who presents with a chief complaint of COVID.    Interval Events: No intervals events noted overnight.     REVIEW OF SYSTEMS:  Denies fever, chills, SOB, CP, abd pain, N/V  [x] All other systems negative    OBJECTIVE:  ICU Vital Signs Last 24 Hrs  T(C): 36.6 (18 Feb 2021 05:44), Max: 37.1 (17 Feb 2021 13:08)  T(F): 97.9 (18 Feb 2021 05:44), Max: 98.7 (17 Feb 2021 13:08)  HR: 92 (18 Feb 2021 05:44) (92 - 109)  BP: 131/87 (18 Feb 2021 05:44) (118/81 - 131/87)  RR: 18 (18 Feb 2021 05:44) (18 - 20)  SpO2: 98% (18 Feb 2021 05:44) (93% - 99%)      02-17 @ 07:01  -  02-18 @ 07:00  --------------------------------------------------------  IN: 300 mL / OUT: 700 mL / NET: -400 mL      CAPILLARY BLOOD GLUCOSE      POCT Blood Glucose.: 113 mg/dL (18 Feb 2021 05:49)      HOSPITAL MEDICATIONS:  MEDICATIONS  (STANDING):  acetaminophen    Suspension .. 650 milliGRAM(s) Oral every 6 hours  BACItracin   Ointment 1 Application(s) Topical two times a day  chlorhexidine 4% Liquid 1 Application(s) Topical daily  clonazePAM  Tablet 1 milliGRAM(s) Oral <User Schedule>  dextrose 5%. 1000 milliLiter(s) (50 mL/Hr) IV Continuous <Continuous>  dextrose 5%. 1000 milliLiter(s) (100 mL/Hr) IV Continuous <Continuous>  enoxaparin Injectable 40 milliGRAM(s) SubCutaneous every 12 hours  glucagon  Injectable 1 milliGRAM(s) IntraMuscular once  insulin lispro (ADMELOG) corrective regimen sliding scale   SubCutaneous every 6 hours  mirtazapine 15 milliGRAM(s) Oral at bedtime  multivitamin 1 Tablet(s) Oral daily  oxyCODONE    Solution 5 milliGRAM(s) Oral daily  QUEtiapine 12.5 milliGRAM(s) Oral daily  QUEtiapine 50 milliGRAM(s) Oral at bedtime    MEDICATIONS  (PRN):  ALBUTerol    90 MICROgram(s) HFA Inhaler 2 Puff(s) Inhalation every 6 hours PRN Wheezing  aluminum hydroxide/magnesium hydroxide/simethicone Suspension 30 milliLiter(s) Oral every 6 hours PRN Dyspepsia  bisacodyl Suppository 10 milliGRAM(s) Rectal at bedtime PRN Constipation      LABS:                        MICROBIOLOGY:     RADIOLOGY:  [ ] Reviewed and interpreted by me    PULMONARY FUNCTION TESTS:    EKG:

## 2021-02-18 NOTE — PROGRESS NOTE ADULT - SUBJECTIVE AND OBJECTIVE BOX
DATE OF SERVICE: 02-18-21     Subjective: Patient seen and examined. No new events except as noted.   headache   toleratign oral feeding     REVIEW OF SYSTEMS:    CONSTITUTIONAL: headache   EYES/ENT: No visual changes;  No vertigo or throat pain   NECK: No pain or stiffness  RESPIRATORY: No cough, wheezing, hemoptysis; No shortness of breath  CARDIOVASCULAR: No chest pain or palpitations  GASTROINTESTINAL: No abdominal or epigastric pain. No nausea, vomiting, or hematemesis; No diarrhea or constipation. No melena or hematochezia.  GENITOURINARY: No dysuria, frequency or hematuria  NEUROLOGICAL: No numbness or weakness  SKIN: No itching, burning, rashes, or lesions   All other review of systems is negative unless indicated above.    MEDICATIONS:  MEDICATIONS  (STANDING):  acetaminophen    Suspension .. 650 milliGRAM(s) Oral every 6 hours  BACItracin   Ointment 1 Application(s) Topical two times a day  chlorhexidine 4% Liquid 1 Application(s) Topical daily  clonazePAM  Tablet 1 milliGRAM(s) Oral <User Schedule>  dextrose 5%. 1000 milliLiter(s) (50 mL/Hr) IV Continuous <Continuous>  dextrose 5%. 1000 milliLiter(s) (100 mL/Hr) IV Continuous <Continuous>  enoxaparin Injectable 40 milliGRAM(s) SubCutaneous every 12 hours  glucagon  Injectable 1 milliGRAM(s) IntraMuscular once  insulin lispro (ADMELOG) corrective regimen sliding scale   SubCutaneous three times a day before meals  insulin lispro (ADMELOG) corrective regimen sliding scale   SubCutaneous at bedtime  mirtazapine 15 milliGRAM(s) Oral at bedtime  multivitamin 1 Tablet(s) Oral daily  QUEtiapine 12.5 milliGRAM(s) Oral daily  QUEtiapine 50 milliGRAM(s) Oral at bedtime      PHYSICAL EXAM:  T(C): 37.3 (02-18-21 @ 17:02), Max: 37.3 (02-18-21 @ 17:02)  HR: 102 (02-18-21 @ 19:25) (92 - 111)  BP: 125/88 (02-18-21 @ 17:02) (125/88 - 131/87)  RR: 102 (02-18-21 @ 19:25) (18 - 102)  SpO2: 96% (02-18-21 @ 19:25) (96% - 100%)  Wt(kg): --  I&O's Summary    17 Feb 2021 07:01  -  18 Feb 2021 07:00  --------------------------------------------------------  IN: 300 mL / OUT: 700 mL / NET: -400 mL    18 Feb 2021 07:01  -  18 Feb 2021 23:05  --------------------------------------------------------  IN: 800 mL / OUT: 480 mL / NET: 320 mL          Appearance: Normal	  HEENT:  trache   Lymphatic: No lymphadenopathy   Cardiovascular: Normal S1 S2, no JVD  Respiratory: normal effort , clear  Gastrointestinal:  Soft, PEG   Skin: No rashes,  warm to touch  Psychiatry:  Mood & affect appropriate  Musculuskeletal: No edema      All labs, Imaging and EKGs personally reviewed                               10.6   7.55  )-----------( 280      ( 17 Feb 2021 07:30 )             33.7               02-17    137  |  98  |  11  ----------------------------<  109<H>  3.8   |  28  |  0.59    Ca    9.6      17 Feb 2021 07:30  Phos  4.7     02-17  Mg     2.1     02-17

## 2021-02-19 LAB
ANION GAP SERPL CALC-SCNC: 12 MMOL/L — SIGNIFICANT CHANGE UP (ref 7–14)
BUN SERPL-MCNC: 10 MG/DL — SIGNIFICANT CHANGE UP (ref 7–23)
CALCIUM SERPL-MCNC: 9.2 MG/DL — SIGNIFICANT CHANGE UP (ref 8.4–10.5)
CHLORIDE SERPL-SCNC: 100 MMOL/L — SIGNIFICANT CHANGE UP (ref 98–107)
CO2 SERPL-SCNC: 27 MMOL/L — SIGNIFICANT CHANGE UP (ref 22–31)
CREAT SERPL-MCNC: 0.64 MG/DL — SIGNIFICANT CHANGE UP (ref 0.5–1.3)
GLUCOSE SERPL-MCNC: 114 MG/DL — HIGH (ref 70–99)
HCT VFR BLD CALC: 32 % — LOW (ref 34.5–45)
HGB BLD-MCNC: 10.2 G/DL — LOW (ref 11.5–15.5)
MAGNESIUM SERPL-MCNC: 2.1 MG/DL — SIGNIFICANT CHANGE UP (ref 1.6–2.6)
MCHC RBC-ENTMCNC: 28.1 PG — SIGNIFICANT CHANGE UP (ref 27–34)
MCHC RBC-ENTMCNC: 31.9 GM/DL — LOW (ref 32–36)
MCV RBC AUTO: 88.2 FL — SIGNIFICANT CHANGE UP (ref 80–100)
NRBC # BLD: 0 /100 WBCS — SIGNIFICANT CHANGE UP
NRBC # FLD: 0 K/UL — SIGNIFICANT CHANGE UP
PLATELET # BLD AUTO: 235 K/UL — SIGNIFICANT CHANGE UP (ref 150–400)
POTASSIUM SERPL-MCNC: 4 MMOL/L — SIGNIFICANT CHANGE UP (ref 3.5–5.3)
POTASSIUM SERPL-SCNC: 4 MMOL/L — SIGNIFICANT CHANGE UP (ref 3.5–5.3)
RBC # BLD: 3.63 M/UL — LOW (ref 3.8–5.2)
RBC # FLD: 16.5 % — HIGH (ref 10.3–14.5)
SODIUM SERPL-SCNC: 139 MMOL/L — SIGNIFICANT CHANGE UP (ref 135–145)
WBC # BLD: 7.39 K/UL — SIGNIFICANT CHANGE UP (ref 3.8–10.5)
WBC # FLD AUTO: 7.39 K/UL — SIGNIFICANT CHANGE UP (ref 3.8–10.5)

## 2021-02-19 PROCEDURE — 99233 SBSQ HOSP IP/OBS HIGH 50: CPT

## 2021-02-19 RX ORDER — POLYETHYLENE GLYCOL 3350 17 G/17G
17 POWDER, FOR SOLUTION ORAL
Refills: 0 | Status: DISCONTINUED | OUTPATIENT
Start: 2021-02-19 | End: 2021-02-27

## 2021-02-19 RX ADMIN — Medication 650 MILLIGRAM(S): at 22:41

## 2021-02-19 RX ADMIN — Medication 1 MILLIGRAM(S): at 06:35

## 2021-02-19 RX ADMIN — MIRTAZAPINE 15 MILLIGRAM(S): 45 TABLET, ORALLY DISINTEGRATING ORAL at 22:41

## 2021-02-19 RX ADMIN — Medication 650 MILLIGRAM(S): at 06:35

## 2021-02-19 RX ADMIN — Medication 650 MILLIGRAM(S): at 18:32

## 2021-02-19 RX ADMIN — Medication 650 MILLIGRAM(S): at 12:07

## 2021-02-19 RX ADMIN — Medication 30 MILLILITER(S): at 22:41

## 2021-02-19 RX ADMIN — Medication 1 APPLICATION(S): at 06:35

## 2021-02-19 RX ADMIN — CHLORHEXIDINE GLUCONATE 1 APPLICATION(S): 213 SOLUTION TOPICAL at 06:35

## 2021-02-19 RX ADMIN — ENOXAPARIN SODIUM 40 MILLIGRAM(S): 100 INJECTION SUBCUTANEOUS at 18:32

## 2021-02-19 RX ADMIN — OXYCODONE HYDROCHLORIDE 5 MILLIGRAM(S): 5 TABLET ORAL at 12:07

## 2021-02-19 RX ADMIN — Medication 1 MILLIGRAM(S): at 18:32

## 2021-02-19 RX ADMIN — QUETIAPINE FUMARATE 12.5 MILLIGRAM(S): 200 TABLET, FILM COATED ORAL at 12:07

## 2021-02-19 RX ADMIN — ENOXAPARIN SODIUM 40 MILLIGRAM(S): 100 INJECTION SUBCUTANEOUS at 06:35

## 2021-02-19 RX ADMIN — Medication 1 TABLET(S): at 12:07

## 2021-02-19 RX ADMIN — Medication 1 APPLICATION(S): at 18:32

## 2021-02-19 RX ADMIN — POLYETHYLENE GLYCOL 3350 17 GRAM(S): 17 POWDER, FOR SOLUTION ORAL at 12:14

## 2021-02-19 RX ADMIN — QUETIAPINE FUMARATE 50 MILLIGRAM(S): 200 TABLET, FILM COATED ORAL at 22:41

## 2021-02-19 NOTE — PROGRESS NOTE ADULT - SUBJECTIVE AND OBJECTIVE BOX
CHIEF COMPLAINT: Patient is a 46y old  Female who presents with a chief complaint of COVID.    Interval Events:     REVIEW OF SYSTEMS:  [ ] All other systems negative  [ ] Unable to assess ROS because ________    OBJECTIVE:  ICU Vital Signs Last 24 Hrs  T(C): 36.9 (19 Feb 2021 06:27), Max: 37.3 (18 Feb 2021 17:02)  T(F): 98.4 (19 Feb 2021 06:27), Max: 99.2 (18 Feb 2021 17:02)  HR: 108 (19 Feb 2021 06:27) (100 - 111)  BP: 128/86 (19 Feb 2021 06:27) (120/77 - 129/70)  RR: 18 (19 Feb 2021 06:27) (12 - 19)  SpO2: 93% (19 Feb 2021 06:27) (93% - 100%)      02-18 @ 07:01  -  02-19 @ 07:00  --------------------------------------------------------  IN: 900 mL / OUT: 630 mL / NET: 270 mL      CAPILLARY BLOOD GLUCOSE      POCT Blood Glucose.: 104 mg/dL (18 Feb 2021 22:07)      HOSPITAL MEDICATIONS:  MEDICATIONS  (STANDING):  acetaminophen    Suspension .. 650 milliGRAM(s) Oral every 6 hours  BACItracin   Ointment 1 Application(s) Topical two times a day  chlorhexidine 4% Liquid 1 Application(s) Topical daily  clonazePAM  Tablet 1 milliGRAM(s) Oral <User Schedule>  dextrose 5%. 1000 milliLiter(s) (50 mL/Hr) IV Continuous <Continuous>  dextrose 5%. 1000 milliLiter(s) (100 mL/Hr) IV Continuous <Continuous>  enoxaparin Injectable 40 milliGRAM(s) SubCutaneous every 12 hours  glucagon  Injectable 1 milliGRAM(s) IntraMuscular once  insulin lispro (ADMELOG) corrective regimen sliding scale   SubCutaneous three times a day before meals  insulin lispro (ADMELOG) corrective regimen sliding scale   SubCutaneous at bedtime  mirtazapine 15 milliGRAM(s) Oral at bedtime  multivitamin 1 Tablet(s) Oral daily  QUEtiapine 12.5 milliGRAM(s) Oral daily  QUEtiapine 50 milliGRAM(s) Oral at bedtime    MEDICATIONS  (PRN):  ALBUTerol    90 MICROgram(s) HFA Inhaler 2 Puff(s) Inhalation every 6 hours PRN Wheezing  aluminum hydroxide/magnesium hydroxide/simethicone Suspension 30 milliLiter(s) Oral every 6 hours PRN Dyspepsia  bisacodyl Suppository 10 milliGRAM(s) Rectal at bedtime PRN Constipation  oxyCODONE    Solution 5 milliGRAM(s) Oral every 6 hours PRN Severe Pain (7 - 10)      LABS:                           MICROBIOLOGY:     RADIOLOGY:  [ ] Reviewed and interpreted by me    PULMONARY FUNCTION TESTS:    EKG: CHIEF COMPLAINT: Patient is a 46y old  Female who presents with a chief complaint of COVID.    Interval Events: No interval events noted overnight.     REVIEW OF SYSTEMS:  Denies fever, chills, SOB, CP, abd pain, depression.   [x] All other systems negative    OBJECTIVE:  ICU Vital Signs Last 24 Hrs  T(C): 36.9 (19 Feb 2021 06:27), Max: 37.3 (18 Feb 2021 17:02)  T(F): 98.4 (19 Feb 2021 06:27), Max: 99.2 (18 Feb 2021 17:02)  HR: 108 (19 Feb 2021 06:27) (100 - 111)  BP: 128/86 (19 Feb 2021 06:27) (120/77 - 129/70)  RR: 18 (19 Feb 2021 06:27) (12 - 19)  SpO2: 93% (19 Feb 2021 06:27) (93% - 100%)      02-18 @ 07:01  -  02-19 @ 07:00  --------------------------------------------------------  IN: 900 mL / OUT: 630 mL / NET: 270 mL      CAPILLARY BLOOD GLUCOSE      POCT Blood Glucose.: 104 mg/dL (18 Feb 2021 22:07)      HOSPITAL MEDICATIONS:  MEDICATIONS  (STANDING):  acetaminophen    Suspension .. 650 milliGRAM(s) Oral every 6 hours  BACItracin   Ointment 1 Application(s) Topical two times a day  chlorhexidine 4% Liquid 1 Application(s) Topical daily  clonazePAM  Tablet 1 milliGRAM(s) Oral <User Schedule>  dextrose 5%. 1000 milliLiter(s) (50 mL/Hr) IV Continuous <Continuous>  dextrose 5%. 1000 milliLiter(s) (100 mL/Hr) IV Continuous <Continuous>  enoxaparin Injectable 40 milliGRAM(s) SubCutaneous every 12 hours  glucagon  Injectable 1 milliGRAM(s) IntraMuscular once  insulin lispro (ADMELOG) corrective regimen sliding scale   SubCutaneous three times a day before meals  insulin lispro (ADMELOG) corrective regimen sliding scale   SubCutaneous at bedtime  mirtazapine 15 milliGRAM(s) Oral at bedtime  multivitamin 1 Tablet(s) Oral daily  QUEtiapine 12.5 milliGRAM(s) Oral daily  QUEtiapine 50 milliGRAM(s) Oral at bedtime    MEDICATIONS  (PRN):  ALBUTerol    90 MICROgram(s) HFA Inhaler 2 Puff(s) Inhalation every 6 hours PRN Wheezing  aluminum hydroxide/magnesium hydroxide/simethicone Suspension 30 milliLiter(s) Oral every 6 hours PRN Dyspepsia  bisacodyl Suppository 10 milliGRAM(s) Rectal at bedtime PRN Constipation  oxyCODONE    Solution 5 milliGRAM(s) Oral every 6 hours PRN Severe Pain (7 - 10)      LABS:                           MICROBIOLOGY:     RADIOLOGY:  [ ] Reviewed and interpreted by me    PULMONARY FUNCTION TESTS:    EKG:

## 2021-02-19 NOTE — PROGRESS NOTE ADULT - ASSESSMENT
45 YO F with Morbid Obesity and DM2 A1C 6.6 admitted for ARDS second to SARS-COV-2, intubated 1/5-1/10 then transferred to Medicine, however failed BIPAP and reintubated 1/16. Course complicated ECOLI UTI, Mouth Sores and Enterobacter and MRSA VAP. s/p Tracheostomy 1/28 and PEG 1/26. Now transferred to RCU.     # ICU Delirium/ Agitation   - Now weaned off sedation, however delirious.  - c/w Seroquel 12.5mg AM and 50mg QHS and Klonopin 1mg BID   - Monitor mentation.  Still gets very anxious at times    # Depression  - Mood much improved  - c/w Remeron.   - Supportive care.     # ARDS second to SARS-COV-2 vs Superimposed Enterobacter and MRSA PNA   - s/p Remdesivir, Decadron and ABX as belowed.   - s/p Prolonged ICU stay and Tracheostomy on 1/28  - Tolerating TC on 28% well. PMV when secretions improves.   - Unable to change Trach from 6 cuffed to cuffless due to desatting and anxiety 2/18  - Proventil and Chest PT Q6H. Suction PRN. Trach care QD.     # Septic vs Vasoplegic Shock (resolved)  - Off all Pressors. Monitor HR/ BP     # Dysphagia with PEG   - s/p PEG 1/26 and continue on TF as tolerated.   - c/w current bowel regimen (Senna and Miralax and Dulcolax PRN)   - Passed CINE on 2/16, started on Mechanical soft Honey thick liquid    #   - Failed TOV 2/3 and 2/11. Keep Haque for now and TOV again at Rehab.   - Hyponatremia/ Hypernatremia, monitor electrolytes and renal function as tolerated.     # Sepsis second to SARS-COV-2 vs Enterobacter and MRSA PNA vs HSV Type 1   - Completed empiric ABX for PNA with Zosyn (1/16-1/20), however cultures negative   - ECOLI UTI (1/25) s/p CTX (1/25-1/27).   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     # DM2 A1C 6.6 (1/2021)   - Continue on ISS and monitor FS Q6H    # Wounds   - WOC recommendations appreciated.      # DVT PPX with Lovenox BID   # CODE STATUS - Full Code   # DISPO - PT recommends Rehab.

## 2021-02-19 NOTE — PROGRESS NOTE ADULT - ATTENDING COMMENTS
47 YO F with Morbid Obesity and DM2 p/w  ARDS second to SARS-COV-2, Course complicated w/ Ecoli UTI,  Enterobacter and MRSA VAP and s/p Tracheostomy 1/28. Pt today awake and denies any SOB, cough.  Moving all ext equally and no focal deficits. Passed  cine swallow but needs honey thick liquids. Speaking well via PMV. Pt unable to be changed to uncuffed trach yesterday as had brief episodes of mild hypoxia  assoc w/ significant coughing. Pt today back to tolerating TC on minimal fio2 and denies respiratory sx.

## 2021-02-19 NOTE — PROGRESS NOTE ADULT - ASSESSMENT
46 F w respiratory failure due to COVID 19     Problem/Recommendation - 1:  Problem: Acute respiratory failure with hypoxia. Recommendation: s/p tracheostomy and PEG. No signs of post proceure issues at this time  - c/w G tube feeds  - PPI per team  - RCU care   - ativan for agitation PRN   - Seroquel and Klonopin   - Rehab eval   - speech and swallow , follow up recs   - rehab placement        Problem/Recommendation - 2:  ·  Problem: COVID-19.  Recommendation: status post dexamethasone.      Problem/Recommendation - 3:  ·  Problem: Normocytic anemia.  Recommendation: without overt GI bleeding. Likely due to critical illness.   -monitor for GI bleed  -PPI QD.      Problem/Recommendation - 4:  ·  Problem: Abnormal LFTs.  Recommendation: multifactorial, likely NAFLD, COVID, critical illness. Can consider US to r/o gallstone disease, especially if increasing.   defer to GI      Problem/Recommendation - 5:  ·  Problem: Morbid obesity.      Problem/Recommendation - 6:  Problem: Constipation. Recommendation: consider XR abdomen to assess for ileus  on a bowel regimen.

## 2021-02-19 NOTE — PROGRESS NOTE ADULT - SUBJECTIVE AND OBJECTIVE BOX
DATE OF SERVICE: 02-19-21     Subjective: Patient seen and examined. No new events except as noted.   doing okay  mild respiratory distress likley due to anxiety     MEDICATIONS:  MEDICATIONS  (STANDING):  acetaminophen    Suspension .. 650 milliGRAM(s) Oral every 6 hours  BACItracin   Ointment 1 Application(s) Topical two times a day  chlorhexidine 4% Liquid 1 Application(s) Topical daily  clonazePAM  Tablet 1 milliGRAM(s) Oral <User Schedule>  dextrose 5%. 1000 milliLiter(s) (50 mL/Hr) IV Continuous <Continuous>  dextrose 5%. 1000 milliLiter(s) (100 mL/Hr) IV Continuous <Continuous>  enoxaparin Injectable 40 milliGRAM(s) SubCutaneous every 12 hours  glucagon  Injectable 1 milliGRAM(s) IntraMuscular once  insulin lispro (ADMELOG) corrective regimen sliding scale   SubCutaneous three times a day before meals  insulin lispro (ADMELOG) corrective regimen sliding scale   SubCutaneous at bedtime  mirtazapine 15 milliGRAM(s) Oral at bedtime  multivitamin 1 Tablet(s) Oral daily  QUEtiapine 12.5 milliGRAM(s) Oral daily  QUEtiapine 50 milliGRAM(s) Oral at bedtime      PHYSICAL EXAM:  T(C): 37.2 (02-19-21 @ 18:27), Max: 37.2 (02-19-21 @ 18:27)  HR: 100 (02-19-21 @ 18:27) (98 - 108)  BP: 114/69 (02-19-21 @ 18:27) (114/69 - 128/86)  RR: 18 (02-19-21 @ 18:27) (12 - 18)  SpO2: 100% (02-19-21 @ 18:27) (93% - 100%)  Wt(kg): --  I&O's Summary    18 Feb 2021 07:01  -  19 Feb 2021 07:00  --------------------------------------------------------  IN: 900 mL / OUT: 630 mL / NET: 270 mL    19 Feb 2021 07:01  -  19 Feb 2021 19:00  --------------------------------------------------------  IN: 810 mL / OUT: 800 mL / NET: 10 mL          Appearance: Normal	  HEENT:  PERRLA , trache   Lymphatic: No lymphadenopathy   Cardiovascular: Normal S1 S2, no JVD  Respiratory: normal effort , clear  Gastrointestinal:  Soft, Non-tender  Skin: No rashes,  warm to touch  Psychiatry:  Mood & affect appropriate  Musculuskeletal: No edema      All labs, Imaging and EKGs personally reviewed                             10.2   7.39  )-----------( 235      ( 19 Feb 2021 06:53 )             32.0               02-19    139  |  100  |  10  ----------------------------<  114<H>  4.0   |  27  |  0.64    Ca    9.2      19 Feb 2021 06:53  Mg     2.1     02-19

## 2021-02-20 PROCEDURE — 99233 SBSQ HOSP IP/OBS HIGH 50: CPT | Mod: GC

## 2021-02-20 RX ADMIN — Medication 650 MILLIGRAM(S): at 18:29

## 2021-02-20 RX ADMIN — Medication 650 MILLIGRAM(S): at 06:48

## 2021-02-20 RX ADMIN — ENOXAPARIN SODIUM 40 MILLIGRAM(S): 100 INJECTION SUBCUTANEOUS at 18:29

## 2021-02-20 RX ADMIN — MIRTAZAPINE 15 MILLIGRAM(S): 45 TABLET, ORALLY DISINTEGRATING ORAL at 22:12

## 2021-02-20 RX ADMIN — OXYCODONE HYDROCHLORIDE 5 MILLIGRAM(S): 5 TABLET ORAL at 22:13

## 2021-02-20 RX ADMIN — Medication 1 APPLICATION(S): at 06:48

## 2021-02-20 RX ADMIN — Medication 1 TABLET(S): at 13:16

## 2021-02-20 RX ADMIN — Medication 1 MILLIGRAM(S): at 18:29

## 2021-02-20 RX ADMIN — OXYCODONE HYDROCHLORIDE 5 MILLIGRAM(S): 5 TABLET ORAL at 13:16

## 2021-02-20 RX ADMIN — QUETIAPINE FUMARATE 50 MILLIGRAM(S): 200 TABLET, FILM COATED ORAL at 22:13

## 2021-02-20 RX ADMIN — CHLORHEXIDINE GLUCONATE 1 APPLICATION(S): 213 SOLUTION TOPICAL at 06:49

## 2021-02-20 RX ADMIN — Medication 30 MILLILITER(S): at 18:29

## 2021-02-20 RX ADMIN — OXYCODONE HYDROCHLORIDE 5 MILLIGRAM(S): 5 TABLET ORAL at 06:48

## 2021-02-20 RX ADMIN — Medication 1 APPLICATION(S): at 18:29

## 2021-02-20 RX ADMIN — ENOXAPARIN SODIUM 40 MILLIGRAM(S): 100 INJECTION SUBCUTANEOUS at 06:49

## 2021-02-20 RX ADMIN — Medication 650 MILLIGRAM(S): at 22:13

## 2021-02-20 RX ADMIN — Medication 1 MILLIGRAM(S): at 06:48

## 2021-02-20 RX ADMIN — QUETIAPINE FUMARATE 12.5 MILLIGRAM(S): 200 TABLET, FILM COATED ORAL at 13:17

## 2021-02-20 RX ADMIN — Medication 650 MILLIGRAM(S): at 13:16

## 2021-02-20 NOTE — PROGRESS NOTE ADULT - SUBJECTIVE AND OBJECTIVE BOX
DATE OF SERVICE: 02-20-21     Subjective: Patient seen and examined. No new events except as noted.   doing okay     REVIEW OF SYSTEMS:    CONSTITUTIONAL: No weakness, fevers or chills  EYES/ENT: No visual changes;  No vertigo or throat pain   NECK: No pain or stiffness  RESPIRATORY: No cough, wheezing, hemoptysis; No shortness of breath  CARDIOVASCULAR: No chest pain or palpitations  GASTROINTESTINAL: No abdominal or epigastric pain. No nausea, vomiting, or hematemesis; No diarrhea or constipation. No melena or hematochezia.  GENITOURINARY: No dysuria, frequency or hematuria  NEUROLOGICAL: No numbness or weakness  SKIN: No itching, burning, rashes, or lesions   All other review of systems is negative unless indicated above.    MEDICATIONS:  MEDICATIONS  (STANDING):  acetaminophen    Suspension .. 650 milliGRAM(s) Oral every 6 hours  BACItracin   Ointment 1 Application(s) Topical two times a day  chlorhexidine 4% Liquid 1 Application(s) Topical daily  clonazePAM  Tablet 1 milliGRAM(s) Oral <User Schedule>  dextrose 5%. 1000 milliLiter(s) (50 mL/Hr) IV Continuous <Continuous>  dextrose 5%. 1000 milliLiter(s) (100 mL/Hr) IV Continuous <Continuous>  enoxaparin Injectable 40 milliGRAM(s) SubCutaneous every 12 hours  glucagon  Injectable 1 milliGRAM(s) IntraMuscular once  insulin lispro (ADMELOG) corrective regimen sliding scale   SubCutaneous three times a day before meals  insulin lispro (ADMELOG) corrective regimen sliding scale   SubCutaneous at bedtime  mirtazapine 15 milliGRAM(s) Oral at bedtime  multivitamin 1 Tablet(s) Oral daily  QUEtiapine 12.5 milliGRAM(s) Oral daily  QUEtiapine 50 milliGRAM(s) Oral at bedtime      PHYSICAL EXAM:  T(C): 36.1 (02-20-21 @ 18:28), Max: 36.9 (02-20-21 @ 06:43)  HR: 100 (02-20-21 @ 18:28) (95 - 100)  BP: 125/82 (02-20-21 @ 18:28) (120/87 - 125/82)  RR: 18 (02-20-21 @ 19:23) (18 - 20)  SpO2: 98% (02-20-21 @ 19:23) (97% - 98%)  Wt(kg): --  I&O's Summary    19 Feb 2021 07:01  -  20 Feb 2021 07:00  --------------------------------------------------------  IN: 810 mL / OUT: 1800 mL / NET: -990 mL    20 Feb 2021 07:01  -  20 Feb 2021 22:09  --------------------------------------------------------  IN: 800 mL / OUT: 600 mL / NET: 200 mL          Appearance: Normal	  HEENT:  trache  Lymphatic: No lymphadenopathy   Cardiovascular: Normal S1 S2, no JVD  Respiratory: normal effort , clear  Gastrointestinal:  Soft, Non-tender  Skin: No rashes,  warm to touch  Psychiatry:  Mood & affect appropriate  Musculuskeletal: No edema      All labs, Imaging and EKGs personally reviewed                           10.2   7.39  )-----------( 235      ( 19 Feb 2021 06:53 )             32.0               02-19    139  |  100  |  10  ----------------------------<  114<H>  4.0   |  27  |  0.64    Ca    9.2      19 Feb 2021 06:53  Mg     2.1     02-19

## 2021-02-20 NOTE — PROGRESS NOTE ADULT - ATTENDING COMMENTS
47 YO F with Morbid Obesity and DM2 p/w  ARDS second to SARS-COV-2, Course complicated w/ Ecoli UTI,  Enterobacter and MRSA VAP and s/p Tracheostomy 1/28. Pt today awake and denies any SOB, cough.  Moving all ext equally and no focal deficits. Passed  cine swallow but needs honey thick liquids. Speaking well via PMV. Pt unable to be changed to uncuffed trach yesterday as had brief episodes of mild hypoxia  assoc w/ significant coughing. Pt today back to tolerating TC on minimal fio2 and denies respiratory sx. 45 YO F with Morbid Obesity and DM2 p/w  ARDS second to SARS-COV-2, Course complicated w/ Ecoli UTI,  Enterobacter and MRSA VAP and s/p Tracheostomy 1/28. Pt today awake and denies any SOB, cough.  Moving all ext equally and no focal deficits. Passed  cine swallow but needs honey thick liquids. Speaking well via PMV. Pt unable to be changed to uncuffed trach yesterday as had brief episodes of mild hypoxia  assoc w/ significant coughing. Pt today back to tolerating TC on minimal fio2 and denies respiratory sx. --

## 2021-02-20 NOTE — PROGRESS NOTE ADULT - SUBJECTIVE AND OBJECTIVE BOX
CHIEF COMPLAINT:    Interval Events:    REVIEW OF SYSTEMS:  Constitutional:   Eyes:  ENT:  CV:  Resp:  GI:  :  MSK:  Integumentary:  Neurological:  Psychiatric:  Endocrine:  Hematologic/Lymphatic:  Allergic/Immunologic:  [ ] All other systems negative  [ ] Unable to assess ROS because ________    OBJECTIVE:  ICU Vital Signs Last 24 Hrs  T(C): 36.9 (20 Feb 2021 06:43), Max: 37.2 (19 Feb 2021 18:27)  T(F): 98.5 (20 Feb 2021 06:43), Max: 99 (19 Feb 2021 18:27)  HR: 95 (20 Feb 2021 06:43) (86 - 112)  BP: 120/87 (20 Feb 2021 06:43) (114/69 - 136/93)  BP(mean): --  ABP: --  ABP(mean): --  RR: 20 (20 Feb 2021 06:43) (17 - 20)  SpO2: 97% (20 Feb 2021 06:43) (93% - 100%)        02-19 @ 07:01  -  02-20 @ 07:00  --------------------------------------------------------  IN: 810 mL / OUT: 1800 mL / NET: -990 mL      CAPILLARY BLOOD GLUCOSE      POCT Blood Glucose.: 120 mg/dL (20 Feb 2021 08:03)      PHYSICAL EXAM:  General:   HEENT:   Lymph Nodes:  Neck:   Respiratory:   Cardiovascular:   Abdomen:   Extremities:   Skin:   Neurological:  Psychiatry:    HOSPITAL MEDICATIONS:  MEDICATIONS  (STANDING):  acetaminophen    Suspension .. 650 milliGRAM(s) Oral every 6 hours  BACItracin   Ointment 1 Application(s) Topical two times a day  chlorhexidine 4% Liquid 1 Application(s) Topical daily  clonazePAM  Tablet 1 milliGRAM(s) Oral <User Schedule>  dextrose 5%. 1000 milliLiter(s) (50 mL/Hr) IV Continuous <Continuous>  dextrose 5%. 1000 milliLiter(s) (100 mL/Hr) IV Continuous <Continuous>  enoxaparin Injectable 40 milliGRAM(s) SubCutaneous every 12 hours  glucagon  Injectable 1 milliGRAM(s) IntraMuscular once  insulin lispro (ADMELOG) corrective regimen sliding scale   SubCutaneous three times a day before meals  insulin lispro (ADMELOG) corrective regimen sliding scale   SubCutaneous at bedtime  mirtazapine 15 milliGRAM(s) Oral at bedtime  multivitamin 1 Tablet(s) Oral daily  QUEtiapine 12.5 milliGRAM(s) Oral daily  QUEtiapine 50 milliGRAM(s) Oral at bedtime    MEDICATIONS  (PRN):  ALBUTerol    90 MICROgram(s) HFA Inhaler 2 Puff(s) Inhalation every 6 hours PRN Wheezing  aluminum hydroxide/magnesium hydroxide/simethicone Suspension 30 milliLiter(s) Oral every 6 hours PRN Dyspepsia  bisacodyl Suppository 10 milliGRAM(s) Rectal at bedtime PRN Constipation  oxyCODONE    Solution 5 milliGRAM(s) Oral every 6 hours PRN Severe Pain (7 - 10)  polyethylene glycol 3350 17 Gram(s) Oral two times a day PRN Constipation      LABS:                        10.2   7.39  )-----------( 235      ( 19 Feb 2021 06:53 )             32.0     02-19    139  |  100  |  10  ----------------------------<  114<H>  4.0   |  27  |  0.64    Ca    9.2      19 Feb 2021 06:53  Mg     2.1     02-19                MICROBIOLOGY:     RADIOLOGY:  [ ] Reviewed and interpreted by me    PULMONARY FUNCTION TESTS:    EKG: CHIEF COMPLAINT: COVID     Interval Events: No interval events over night     REVIEW OF SYSTEMS:  Constitutional: denies fevers   Eyes:  ENT:  CV: denies chest pain, denies palpitations   Resp: reports shortness of breath with activity  GI: denies abdominal pain, nausea, vomiting   :  MSK:  Integumentary:  Neurological:  Psychiatric: reports anxiety when attempting to cap the trach or while lying flat in bed for long periods of time   Endocrine:  Hematologic/Lymphatic:  Allergic/Immunologic:  [x ] All other systems negative  [ ] Unable to assess ROS because ________    OBJECTIVE:  ICU Vital Signs Last 24 Hrs  T(C): 36.9 (20 Feb 2021 06:43), Max: 37.2 (19 Feb 2021 18:27)  T(F): 98.5 (20 Feb 2021 06:43), Max: 99 (19 Feb 2021 18:27)  HR: 95 (20 Feb 2021 06:43) (86 - 112)  BP: 120/87 (20 Feb 2021 06:43) (114/69 - 136/93)  BP(mean): --  ABP: --  ABP(mean): --  RR: 20 (20 Feb 2021 06:43) (17 - 20)  SpO2: 97% (20 Feb 2021 06:43) (93% - 100%)        02-19 @ 07:01  -  02-20 @ 07:00  --------------------------------------------------------  IN: 810 mL / OUT: 1800 mL / NET: -990 mL      CAPILLARY BLOOD GLUCOSE      POCT Blood Glucose.: 120 mg/dL (20 Feb 2021 08:03)      PHYSICAL EXAM:  General: alert, sitting up in a chair in NAD   HEENT:   Lymph Nodes:  Neck: Trach present, PMV in place - patient tolerating well   Respiratory: bilateral lung sounds clear, no excessive secretions coming from the trach   Cardiovascular: regular rate and rhythm, S1 and S2 present   Abdomen: soft, non-tender, PEG in place   Extremities:   Skin:   Neurological: alert and oriented x 4   Psychiatry: patient calm, affect appropriate     HOSPITAL MEDICATIONS:  MEDICATIONS  (STANDING):  acetaminophen    Suspension .. 650 milliGRAM(s) Oral every 6 hours  BACItracin   Ointment 1 Application(s) Topical two times a day  chlorhexidine 4% Liquid 1 Application(s) Topical daily  clonazePAM  Tablet 1 milliGRAM(s) Oral <User Schedule>  dextrose 5%. 1000 milliLiter(s) (50 mL/Hr) IV Continuous <Continuous>  dextrose 5%. 1000 milliLiter(s) (100 mL/Hr) IV Continuous <Continuous>  enoxaparin Injectable 40 milliGRAM(s) SubCutaneous every 12 hours  glucagon  Injectable 1 milliGRAM(s) IntraMuscular once  insulin lispro (ADMELOG) corrective regimen sliding scale   SubCutaneous three times a day before meals  insulin lispro (ADMELOG) corrective regimen sliding scale   SubCutaneous at bedtime  mirtazapine 15 milliGRAM(s) Oral at bedtime  multivitamin 1 Tablet(s) Oral daily  QUEtiapine 12.5 milliGRAM(s) Oral daily  QUEtiapine 50 milliGRAM(s) Oral at bedtime    MEDICATIONS  (PRN):  ALBUTerol    90 MICROgram(s) HFA Inhaler 2 Puff(s) Inhalation every 6 hours PRN Wheezing  aluminum hydroxide/magnesium hydroxide/simethicone Suspension 30 milliLiter(s) Oral every 6 hours PRN Dyspepsia  bisacodyl Suppository 10 milliGRAM(s) Rectal at bedtime PRN Constipation  oxyCODONE    Solution 5 milliGRAM(s) Oral every 6 hours PRN Severe Pain (7 - 10)  polyethylene glycol 3350 17 Gram(s) Oral two times a day PRN Constipation      LABS:                        10.2   7.39  )-----------( 235      ( 19 Feb 2021 06:53 )             32.0     02-19    139  |  100  |  10  ----------------------------<  114<H>  4.0   |  27  |  0.64    Ca    9.2      19 Feb 2021 06:53  Mg     2.1     02-19                MICROBIOLOGY:     RADIOLOGY:  [ ] Reviewed and interpreted by me    PULMONARY FUNCTION TESTS:    EKG:

## 2021-02-20 NOTE — PROGRESS NOTE ADULT - ASSESSMENT
45 YO F with Morbid Obesity and DM2 A1C 6.6 admitted for ARDS second to SARS-COV-2, intubated 1/5-1/10 then transferred to Medicine, however failed BIPAP and reintubated 1/16. Course complicated ECOLI UTI, Mouth Sores and Enterobacter and MRSA VAP. s/p Tracheostomy 1/28 and PEG 1/26. Now transferred to [facility].     # ICU Delirium/ Agitation   - Now weaned off sedation, however delirious.  - c/w Seroquel 12.5mg AM and 50mg QHS and Klonopin 1mg BID   - Monitor mentation.  Still gets very anxious at times    # Depression  - Mood much improved  - c/w Remeron.   - Supportive care.     # ARDS second to SARS-COV-2 vs Superimposed Enterobacter and MRSA PNA   - s/p Remdesivir, Decadron and ABX as belowed.   - s/p Prolonged ICU stay and Tracheostomy on 1/28  - Tolerating TC on 28% well. PMV when secretions improves.   - Unable to change Trach from 6 cuffed to cuffless due to desatting and anxiety 2/18  - Proventil and Chest PT Q6H. Suction PRN. Trach care QD.     # Septic vs Vasoplegic Shock (resolved)  - Off all Pressors. Monitor HR/ BP     # Dysphagia with PEG   - s/p PEG 1/26 and continue on TF as tolerated.   - c/w current bowel regimen (Senna and Miralax and Dulcolax PRN)   - Passed CINE on 2/16, started on Mechanical soft Honey thick liquid    #   - Failed TOV 2/3 and 2/11. Keep Haque for now and TOV again at Rehab.   - Hyponatremia/ Hypernatremia, monitor electrolytes and renal function as tolerated.     # Sepsis second to SARS-COV-2 vs Enterobacter and MRSA PNA vs HSV Type 1   - Completed empiric ABX for PNA with Zosyn (1/16-1/20), however cultures negative   - ECOLI UTI (1/25) s/p CTX (1/25-1/27).   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     # DM2 A1C 6.6 (1/2021)   - Continue on ISS and monitor FS Q6H    # Wounds   - WOC recommendations appreciated.      # DVT PPX with Lovenox BID   # CODE STATUS - Full Code   # DISPO - PT recommends Rehab.    45 YO F with Morbid Obesity and DM2 A1C 6.6 admitted for ARDS second to SARS-COV-2, intubated 1/5-1/10 then transferred to Medicine, however failed BIPAP and reintubated 1/16. Course complicated ECOLI UTI, Mouth Sores and Enterobacter and MRSA VAP. s/p Tracheostomy 1/28 and PEG 1/26. Now transferred to [facility].     # ICU Delirium/ Agitation   - Now weaned off sedation  - c/w Seroquel 12.5mg AM and 50mg QHS and Klonopin 1mg BID   - Monitor mentation.  Still gets very anxious at times    # Depression  - Mood much improved  - c/w Remeron.   - Supportive care.     # ARDS second to SARS-COV-2 vs Superimposed Enterobacter and MRSA PNA   - s/p Remdesivir, Decadron and ABX as belowed.   - s/p Prolonged ICU stay and Tracheostomy on 1/28  - Tolerating TC on 28% well. PMV when secretions improves.   - Unable to change Trach from 6 cuffed to cuffless due to desatting and anxiety 2/18  - Discussed capping trach this afternoon, patient seemed agreeable  - Proventil and Chest PT Q6H. Suction PRN. Trach care QD.     # Septic vs Vasoplegic Shock (resolved)  - Off all Pressors. Monitor HR/ BP     # Dysphagia with PEG   - s/p PEG 1/26 and continue on TF as tolerated.   - c/w current bowel regimen (Senna and Miralax and Dulcolax PRN)   - Passed CINE on 2/16, started on Mechanical soft Honey thick liquid    #   - Failed TOV 2/3 and 2/11. Keep Haque for now and TOV again at Rehab.   - Hyponatremia/ Hypernatremia, monitor electrolytes and renal function as tolerated.     # Sepsis second to SARS-COV-2 vs Enterobacter and MRSA PNA vs HSV Type 1   - Completed empiric ABX for PNA with Zosyn (1/16-1/20), however cultures negative   - ECOLI UTI (1/25) s/p CTX (1/25-1/27).   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     # DM2 A1C 6.6 (1/2021)   - Continue on ISS and monitor FS Q6H    # Wounds   - WOC recommendations appreciated.      # DVT PPX with Lovenox BID   # CODE STATUS - Full Code   # DISPO - PT recommends Rehab.    47 YO F with Morbid Obesity and DM2 A1C 6.6 admitted for ARDS second to SARS-COV-2, intubated 1/5-1/10 then transferred to Medicine, however failed BIPAP and reintubated 1/16. Course complicated ECOLI UTI, Mouth Sores and Enterobacter and MRSA VAP. s/p Tracheostomy 1/28 and PEG 1/26. Now transferred to [facility].     # ICU Delirium/ Agitation   - Now weaned off sedation  - c/w Seroquel 12.5mg AM and 50mg QHS and Klonopin 1mg BID   - Monitor mentation.  Still gets very anxious at times    # Depression  - Mood much improved  - c/w Remeron.   - Supportive care.     # ARDS second to SARS-COV-2 vs Superimposed Enterobacter and MRSA PNA   - s/p Remdesivir, Decadron and ABX as belowed.   - s/p Prolonged ICU stay and Tracheostomy on 1/28  - Tolerating TC on 28% well. PMV when secretions improves.   - Unable to change Trach from 6 cuffed to cuffless due to desatting and anxiety 2/18  - Discussed attempting to cap trach again possibly monday, pt agreeable  - Proventil and Chest PT Q6H. Suction PRN. Trach care QD.     # Septic vs Vasoplegic Shock (resolved)  - Off all Pressors. Monitor HR/ BP     # Dysphagia with PEG   - s/p PEG 1/26 and continue on TF as tolerated.   - c/w current bowel regimen (Senna and Miralax and Dulcolax PRN)   - Passed CINE on 2/16, started on Mechanical soft Honey thick liquid    #   - Failed TOV 2/3 and 2/11. Keep Haque for now and TOV again at Rehab.   - Hyponatremia/ Hypernatremia, monitor electrolytes and renal function as tolerated.     # Sepsis second to SARS-COV-2 vs Enterobacter and MRSA PNA vs HSV Type 1   - Completed empiric ABX for PNA with Zosyn (1/16-1/20), however cultures negative   - ECOLI UTI (1/25) s/p CTX (1/25-1/27).   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     # DM2 A1C 6.6 (1/2021)   - Continue on ISS and monitor FS Q6H    # Wounds   - WOC recommendations appreciated.      # DVT PPX with Lovenox BID   # CODE STATUS - Full Code   # DISPO - PT recommends Rehab.    45 YO F with Morbid Obesity and DM2 A1C 6.6 admitted for ARDS second to SARS-COV-2, intubated 1/5-1/10 then transferred to Medicine, however failed BIPAP and reintubated 1/16. Course complicated ECOLI UTI, Mouth Sores and Enterobacter and MRSA VAP. s/p Tracheostomy 1/28 and PEG 1/26. Now transferred to [facility].     # ICU Delirium/ Agitation   - Now weaned off sedation  - c/w Seroquel 12.5mg AM and 50mg QHS and Klonopin 1mg BID   - Monitor mentation.  Still gets very anxious at times    # Depression  - Mood much improved  - c/w Remeron.   - Supportive care.     # ARDS second to SARS-COV-2 vs Superimposed Enterobacter and MRSA PNA   - s/p Remdesivir, Decadron and ABX as belowed.   - s/p Prolonged ICU stay and Tracheostomy on 1/28  - Tolerating TC on 28% well. PMV when secretions improves.   - Unable to change Trach from 6 cuffed to cuffless due to desatting and anxiety 2/18  - Discussed re-attempting to cap trach again possibly monday, pt agreeable  - Proventil and Chest PT Q6H. Suction PRN. Trach care QD.     # Septic vs Vasoplegic Shock (resolved)  - Off all Pressors. Monitor HR/ BP     # Dysphagia with PEG   - s/p PEG 1/26 and continue on TF as tolerated.   - c/w current bowel regimen (Senna and Miralax and Dulcolax PRN)   - Passed CINE on 2/16, started on Mechanical soft Honey thick liquid    #   - Failed TOV 2/3 and 2/11. Keep Haque for now and TOV again at Rehab.   - Hyponatremia/ Hypernatremia, monitor electrolytes and renal function as tolerated.     # Sepsis second to SARS-COV-2 vs Enterobacter and MRSA PNA vs HSV Type 1   - Completed empiric ABX for PNA with Zosyn (1/16-1/20), however cultures negative   - ECOLI UTI (1/25) s/p CTX (1/25-1/27).   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     # DM2 A1C 6.6 (1/2021)   - Continue on ISS and monitor FS Q6H    # Wounds   - WOC recommendations appreciated.      # DVT PPX with Lovenox BID   # CODE STATUS - Full Code   # DISPO - PT recommends Rehab.

## 2021-02-21 LAB
ANION GAP SERPL CALC-SCNC: 12 MMOL/L — SIGNIFICANT CHANGE UP (ref 7–14)
BUN SERPL-MCNC: 9 MG/DL — SIGNIFICANT CHANGE UP (ref 7–23)
CALCIUM SERPL-MCNC: 9.1 MG/DL — SIGNIFICANT CHANGE UP (ref 8.4–10.5)
CHLORIDE SERPL-SCNC: 97 MMOL/L — LOW (ref 98–107)
CO2 SERPL-SCNC: 28 MMOL/L — SIGNIFICANT CHANGE UP (ref 22–31)
CREAT SERPL-MCNC: 0.58 MG/DL — SIGNIFICANT CHANGE UP (ref 0.5–1.3)
GLUCOSE SERPL-MCNC: 105 MG/DL — HIGH (ref 70–99)
HCT VFR BLD CALC: 33.6 % — LOW (ref 34.5–45)
HGB BLD-MCNC: 10.3 G/DL — LOW (ref 11.5–15.5)
MCHC RBC-ENTMCNC: 27.6 PG — SIGNIFICANT CHANGE UP (ref 27–34)
MCHC RBC-ENTMCNC: 30.7 GM/DL — LOW (ref 32–36)
MCV RBC AUTO: 90.1 FL — SIGNIFICANT CHANGE UP (ref 80–100)
NRBC # BLD: 0 /100 WBCS — SIGNIFICANT CHANGE UP
NRBC # FLD: 0 K/UL — SIGNIFICANT CHANGE UP
PLATELET # BLD AUTO: 203 K/UL — SIGNIFICANT CHANGE UP (ref 150–400)
POTASSIUM SERPL-MCNC: 3.7 MMOL/L — SIGNIFICANT CHANGE UP (ref 3.5–5.3)
POTASSIUM SERPL-SCNC: 3.7 MMOL/L — SIGNIFICANT CHANGE UP (ref 3.5–5.3)
RBC # BLD: 3.73 M/UL — LOW (ref 3.8–5.2)
RBC # FLD: 16.7 % — HIGH (ref 10.3–14.5)
SODIUM SERPL-SCNC: 137 MMOL/L — SIGNIFICANT CHANGE UP (ref 135–145)
WBC # BLD: 6.59 K/UL — SIGNIFICANT CHANGE UP (ref 3.8–10.5)
WBC # FLD AUTO: 6.59 K/UL — SIGNIFICANT CHANGE UP (ref 3.8–10.5)

## 2021-02-21 PROCEDURE — 99233 SBSQ HOSP IP/OBS HIGH 50: CPT | Mod: GC

## 2021-02-21 RX ADMIN — Medication 1 APPLICATION(S): at 17:18

## 2021-02-21 RX ADMIN — CHLORHEXIDINE GLUCONATE 1 APPLICATION(S): 213 SOLUTION TOPICAL at 06:57

## 2021-02-21 RX ADMIN — Medication 650 MILLIGRAM(S): at 06:56

## 2021-02-21 RX ADMIN — OXYCODONE HYDROCHLORIDE 5 MILLIGRAM(S): 5 TABLET ORAL at 13:35

## 2021-02-21 RX ADMIN — OXYCODONE HYDROCHLORIDE 5 MILLIGRAM(S): 5 TABLET ORAL at 06:56

## 2021-02-21 RX ADMIN — Medication 650 MILLIGRAM(S): at 12:13

## 2021-02-21 RX ADMIN — Medication 650 MILLIGRAM(S): at 22:54

## 2021-02-21 RX ADMIN — MIRTAZAPINE 15 MILLIGRAM(S): 45 TABLET, ORALLY DISINTEGRATING ORAL at 22:54

## 2021-02-21 RX ADMIN — Medication 1 MILLIGRAM(S): at 06:56

## 2021-02-21 RX ADMIN — QUETIAPINE FUMARATE 50 MILLIGRAM(S): 200 TABLET, FILM COATED ORAL at 22:54

## 2021-02-21 RX ADMIN — QUETIAPINE FUMARATE 12.5 MILLIGRAM(S): 200 TABLET, FILM COATED ORAL at 12:13

## 2021-02-21 RX ADMIN — Medication 1 APPLICATION(S): at 06:56

## 2021-02-21 RX ADMIN — Medication 1 MILLIGRAM(S): at 17:17

## 2021-02-21 RX ADMIN — Medication 1 TABLET(S): at 12:13

## 2021-02-21 RX ADMIN — ENOXAPARIN SODIUM 40 MILLIGRAM(S): 100 INJECTION SUBCUTANEOUS at 06:56

## 2021-02-21 RX ADMIN — OXYCODONE HYDROCHLORIDE 5 MILLIGRAM(S): 5 TABLET ORAL at 22:54

## 2021-02-21 RX ADMIN — Medication 650 MILLIGRAM(S): at 17:17

## 2021-02-21 RX ADMIN — ENOXAPARIN SODIUM 40 MILLIGRAM(S): 100 INJECTION SUBCUTANEOUS at 17:18

## 2021-02-21 NOTE — PROGRESS NOTE ADULT - SUBJECTIVE AND OBJECTIVE BOX
CHIEF COMPLAINT:    Interval Events:    REVIEW OF SYSTEMS:  Constitutional:   Eyes:  ENT:  CV:  Resp:  GI:  :  MSK:  Integumentary:  Neurological:  Psychiatric:  Endocrine:  Hematologic/Lymphatic:  Allergic/Immunologic:  [ ] All other systems negative  [ ] Unable to assess ROS because ________    OBJECTIVE:  ICU Vital Signs Last 24 Hrs  T(C): 36.2 (21 Feb 2021 06:43), Max: 36.8 (20 Feb 2021 13:14)  T(F): 97.2 (21 Feb 2021 06:43), Max: 98.3 (20 Feb 2021 13:14)  HR: 92 (21 Feb 2021 06:43) (92 - 107)  BP: 136/87 (21 Feb 2021 06:43) (121/84 - 136/87)  BP(mean): --  ABP: --  ABP(mean): --  RR: 20 (21 Feb 2021 06:43) (18 - 24)  SpO2: 99% (21 Feb 2021 06:43) (98% - 99%)        02-20 @ 07:01  -  02-21 @ 07:00  --------------------------------------------------------  IN: 800 mL / OUT: 1800 mL / NET: -1000 mL      CAPILLARY BLOOD GLUCOSE      POCT Blood Glucose.: 105 mg/dL (21 Feb 2021 11:42)      PHYSICAL EXAM:  General:   HEENT:   Lymph Nodes:  Neck:   Respiratory:   Cardiovascular:   Abdomen:   Extremities:   Skin:   Neurological:  Psychiatry:    HOSPITAL MEDICATIONS:  MEDICATIONS  (STANDING):  acetaminophen    Suspension .. 650 milliGRAM(s) Oral every 6 hours  BACItracin   Ointment 1 Application(s) Topical two times a day  chlorhexidine 4% Liquid 1 Application(s) Topical daily  clonazePAM  Tablet 1 milliGRAM(s) Oral <User Schedule>  dextrose 5%. 1000 milliLiter(s) (50 mL/Hr) IV Continuous <Continuous>  dextrose 5%. 1000 milliLiter(s) (100 mL/Hr) IV Continuous <Continuous>  enoxaparin Injectable 40 milliGRAM(s) SubCutaneous every 12 hours  glucagon  Injectable 1 milliGRAM(s) IntraMuscular once  insulin lispro (ADMELOG) corrective regimen sliding scale   SubCutaneous three times a day before meals  insulin lispro (ADMELOG) corrective regimen sliding scale   SubCutaneous at bedtime  mirtazapine 15 milliGRAM(s) Oral at bedtime  multivitamin 1 Tablet(s) Oral daily  QUEtiapine 12.5 milliGRAM(s) Oral daily  QUEtiapine 50 milliGRAM(s) Oral at bedtime    MEDICATIONS  (PRN):  ALBUTerol    90 MICROgram(s) HFA Inhaler 2 Puff(s) Inhalation every 6 hours PRN Wheezing  aluminum hydroxide/magnesium hydroxide/simethicone Suspension 30 milliLiter(s) Oral every 6 hours PRN Dyspepsia  bisacodyl Suppository 10 milliGRAM(s) Rectal at bedtime PRN Constipation  oxyCODONE    Solution 5 milliGRAM(s) Oral every 6 hours PRN Severe Pain (7 - 10)  polyethylene glycol 3350 17 Gram(s) Oral two times a day PRN Constipation      LABS:                        10.3   6.59  )-----------( 203      ( 21 Feb 2021 04:18 )             33.6     02-21    137  |  97<L>  |  9   ----------------------------<  105<H>  3.7   |  28  |  0.58    Ca    9.1      21 Feb 2021 04:18                MICROBIOLOGY:     RADIOLOGY:  [ ] Reviewed and interpreted by me    PULMONARY FUNCTION TESTS:    EKG: CHIEF COMPLAINT: COVID     Interval Events: no interval events overnight     REVIEW OF SYSTEMS:  Constitutional: denies fever, denies fatigue   Eyes:  ENT:  CV: denies chest pain, denies palpitations   Resp: denies shortness of breath  GI: denies abdominal pain, nausea, vomiting   :  MSK:  Integumentary:  Neurological:  Psychiatric: reports feeling anxious regarding progression of trach status   Endocrine:  Hematologic/Lymphatic:  Allergic/Immunologic:  [x] All other systems negative  [ ] Unable to assess ROS because ________    OBJECTIVE:  ICU Vital Signs Last 24 Hrs  T(C): 36.2 (21 Feb 2021 06:43), Max: 36.8 (20 Feb 2021 13:14)  T(F): 97.2 (21 Feb 2021 06:43), Max: 98.3 (20 Feb 2021 13:14)  HR: 92 (21 Feb 2021 06:43) (92 - 107)  BP: 136/87 (21 Feb 2021 06:43) (121/84 - 136/87)  BP(mean): --  ABP: --  ABP(mean): --  RR: 20 (21 Feb 2021 06:43) (18 - 24)  SpO2: 99% (21 Feb 2021 06:43) (98% - 99%)        02-20 @ 07:01  -  02-21 @ 07:00  --------------------------------------------------------  IN: 800 mL / OUT: 1800 mL / NET: -1000 mL      CAPILLARY BLOOD GLUCOSE      POCT Blood Glucose.: 105 mg/dL (21 Feb 2021 11:42)      PHYSICAL EXAM:  General: alert, well-appearing, sitting in the chair   HEENT:   Lymph Nodes:  Neck: trach present, patient tolerating trach collar at 6L - 28%   Respiratory: bilateral lung sounds clear, occasional non-productive cough present  Cardiovascular: regular rate and rhythm, S1 and S2 present   Abdomen: soft non-tender, PEG tube in place   Extremities:   Skin:   Neurological: calm, not anxious in appearance, appropriate affect   Psychiatry:    HOSPITAL MEDICATIONS:  MEDICATIONS  (STANDING):  acetaminophen    Suspension .. 650 milliGRAM(s) Oral every 6 hours  BACItracin   Ointment 1 Application(s) Topical two times a day  chlorhexidine 4% Liquid 1 Application(s) Topical daily  clonazePAM  Tablet 1 milliGRAM(s) Oral <User Schedule>  dextrose 5%. 1000 milliLiter(s) (50 mL/Hr) IV Continuous <Continuous>  dextrose 5%. 1000 milliLiter(s) (100 mL/Hr) IV Continuous <Continuous>  enoxaparin Injectable 40 milliGRAM(s) SubCutaneous every 12 hours  glucagon  Injectable 1 milliGRAM(s) IntraMuscular once  insulin lispro (ADMELOG) corrective regimen sliding scale   SubCutaneous three times a day before meals  insulin lispro (ADMELOG) corrective regimen sliding scale   SubCutaneous at bedtime  mirtazapine 15 milliGRAM(s) Oral at bedtime  multivitamin 1 Tablet(s) Oral daily  QUEtiapine 12.5 milliGRAM(s) Oral daily  QUEtiapine 50 milliGRAM(s) Oral at bedtime    MEDICATIONS  (PRN):  ALBUTerol    90 MICROgram(s) HFA Inhaler 2 Puff(s) Inhalation every 6 hours PRN Wheezing  aluminum hydroxide/magnesium hydroxide/simethicone Suspension 30 milliLiter(s) Oral every 6 hours PRN Dyspepsia  bisacodyl Suppository 10 milliGRAM(s) Rectal at bedtime PRN Constipation  oxyCODONE    Solution 5 milliGRAM(s) Oral every 6 hours PRN Severe Pain (7 - 10)  polyethylene glycol 3350 17 Gram(s) Oral two times a day PRN Constipation      LABS:                        10.3   6.59  )-----------( 203      ( 21 Feb 2021 04:18 )             33.6     02-21    137  |  97<L>  |  9   ----------------------------<  105<H>  3.7   |  28  |  0.58    Ca    9.1      21 Feb 2021 04:18                MICROBIOLOGY:     RADIOLOGY:  [ ] Reviewed and interpreted by me    PULMONARY FUNCTION TESTS:    EKG:

## 2021-02-21 NOTE — PROGRESS NOTE ADULT - ATTENDING COMMENTS
45 YO F with Morbid Obesity and DM2 p/w  ARDS second to SARS-COV-2, Course complicated w/ Ecoli UTI,  Enterobacter and MRSA VAP and s/p Tracheostomy 1/28. Pt today awake and denies any SOB, cough.  Moving all ext equally and no focal deficits. Passed  cine swallow but needs honey thick liquids. Speaking well via PMV. Pt unable to be changed to uncuffed trach yesterday as had brief episodes of mild hypoxia  assoc w/ significant coughing. Pt today back to tolerating TC on minimal fio2 and denies respiratory sx. --

## 2021-02-21 NOTE — PROGRESS NOTE ADULT - ASSESSMENT
45 YO F with Morbid Obesity and DM2 A1C 6.6 admitted for ARDS second to SARS-COV-2, intubated 1/5-1/10 then transferred to Medicine, however failed BIPAP and reintubated 1/16. Course complicated ECOLI UTI, Mouth Sores and Enterobacter and MRSA VAP. s/p Tracheostomy 1/28 and PEG 1/26. Now transferred to RCU.     # ICU Delirium/ Agitation   - Now weaned off sedation  - c/w Seroquel 12.5mg AM and 50mg QHS and Klonopin 1mg BID   - Monitor mentation.  Still gets very anxious at times    # Depression  - Mood much improved  - c/w Remeron.   - Supportive care.     # ARDS second to SARS-COV-2 vs Superimposed Enterobacter and MRSA PNA   - s/p Remdesivir, Decadron and ABX as belowed.   - s/p Prolonged ICU stay and Tracheostomy on 1/28  - Tolerating TC on 28% well. PMV when secretions improves.   - Unable to change Trach from 6 cuffed to cuffless due to desatting and anxiety 2/18  - Discussed re-attempting to cap trach again possibly monday, pt agreeable  - Proventil and Chest PT Q6H. Suction PRN. Trach care QD.     # Septic vs Vasoplegic Shock (resolved)  - Off all Pressors. Monitor HR/ BP     # Dysphagia with PEG   - s/p PEG 1/26 and continue on TF as tolerated.   - c/w current bowel regimen (Senna and Miralax and Dulcolax PRN)   - Passed CINE on 2/16, started on Mechanical soft Honey thick liquid    #   - Failed TOV 2/3 and 2/11. Keep Haque for now and TOV again at Rehab.   - Hyponatremia/ Hypernatremia, monitor electrolytes and renal function as tolerated.     # Sepsis second to SARS-COV-2 vs Enterobacter and MRSA PNA vs HSV Type 1   - Completed empiric ABX for PNA with Zosyn (1/16-1/20), however cultures negative   - ECOLI UTI (1/25) s/p CTX (1/25-1/27).   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     # DM2 A1C 6.6 (1/2021)   - Continue on ISS and monitor FS Q6H    # Wounds   - WOC recommendations appreciated.      # DVT PPX with Lovenox BID   # CODE STATUS - Full Code   # DISPO - PT recommends Rehab.    47 YO F with Morbid Obesity and DM2 A1C 6.6 admitted for ARDS second to SARS-COV-2, intubated 1/5-1/10 then transferred to Medicine, however failed BIPAP and reintubated 1/16. Course complicated ECOLI UTI, Mouth Sores and Enterobacter and MRSA VAP. s/p Tracheostomy 1/28 and PEG 1/26. Now transferred to RCU.     # ICU Delirium/ Agitation   - Now weaned off sedation  - c/w Seroquel 12.5mg AM and 50mg QHS and Klonopin 1mg BID   - Monitor mentation.  Still gets very anxious at times    # Depression  - Mood much improved  - c/w Remeron.   - Supportive care.     # ARDS second to SARS-COV-2 vs Superimposed Enterobacter and MRSA PNA   - s/p Remdesivir, Decadron and ABX as belowed.   - s/p Prolonged ICU stay and Tracheostomy on 1/28  - Tolerating TC on 28% well. PMV when secretions improves.   - Unable to change Trach from 6 cuffed to cuffless due to desatting and anxiety 2/18  - Discussed re-attempting to deflate cuff again possibly monday, pt agreeable  - Proventil and Chest PT Q6H. Suction PRN. Trach care QD.     # Septic vs Vasoplegic Shock (resolved)  - Off all Pressors. Monitor HR/ BP     # Dysphagia with PEG   - s/p PEG 1/26 and continue on TF as tolerated.   - c/w current bowel regimen (Senna and Miralax and Dulcolax PRN)   - Passed CINE on 2/16, started on Mechanical soft Honey thick liquid    #   - Failed TOV 2/3 and 2/11. Keep Haque for now and TOV again at Rehab.   - Hyponatremia/ Hypernatremia, monitor electrolytes and renal function as tolerated.     # Sepsis second to SARS-COV-2 vs Enterobacter and MRSA PNA vs HSV Type 1   - Completed empiric ABX for PNA with Zosyn (1/16-1/20), however cultures negative   - ECOLI UTI (1/25) s/p CTX (1/25-1/27).   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     # DM2 A1C 6.6 (1/2021)   - Continue on ISS and monitor FS Q6H    # Wounds   - WOC recommendations appreciated.      # DVT PPX with Lovenox BID   # CODE STATUS - Full Code   # DISPO - PT recommends Rehab.

## 2021-02-22 LAB
ANION GAP SERPL CALC-SCNC: 11 MMOL/L — SIGNIFICANT CHANGE UP (ref 7–14)
BUN SERPL-MCNC: 6 MG/DL — LOW (ref 7–23)
CALCIUM SERPL-MCNC: 9.2 MG/DL — SIGNIFICANT CHANGE UP (ref 8.4–10.5)
CHLORIDE SERPL-SCNC: 98 MMOL/L — SIGNIFICANT CHANGE UP (ref 98–107)
CO2 SERPL-SCNC: 27 MMOL/L — SIGNIFICANT CHANGE UP (ref 22–31)
CREAT SERPL-MCNC: 0.54 MG/DL — SIGNIFICANT CHANGE UP (ref 0.5–1.3)
GLUCOSE SERPL-MCNC: 100 MG/DL — HIGH (ref 70–99)
HCT VFR BLD CALC: 31.1 % — LOW (ref 34.5–45)
HGB BLD-MCNC: 9.7 G/DL — LOW (ref 11.5–15.5)
MAGNESIUM SERPL-MCNC: 2 MG/DL — SIGNIFICANT CHANGE UP (ref 1.6–2.6)
MCHC RBC-ENTMCNC: 27.6 PG — SIGNIFICANT CHANGE UP (ref 27–34)
MCHC RBC-ENTMCNC: 31.2 GM/DL — LOW (ref 32–36)
MCV RBC AUTO: 88.6 FL — SIGNIFICANT CHANGE UP (ref 80–100)
NRBC # BLD: 0 /100 WBCS — SIGNIFICANT CHANGE UP
NRBC # FLD: 0 K/UL — SIGNIFICANT CHANGE UP
PHOSPHATE SERPL-MCNC: 4.7 MG/DL — HIGH (ref 2.5–4.5)
PLATELET # BLD AUTO: 212 K/UL — SIGNIFICANT CHANGE UP (ref 150–400)
POTASSIUM SERPL-MCNC: 3.4 MMOL/L — LOW (ref 3.5–5.3)
POTASSIUM SERPL-SCNC: 3.4 MMOL/L — LOW (ref 3.5–5.3)
RBC # BLD: 3.51 M/UL — LOW (ref 3.8–5.2)
RBC # FLD: 16.7 % — HIGH (ref 10.3–14.5)
SODIUM SERPL-SCNC: 136 MMOL/L — SIGNIFICANT CHANGE UP (ref 135–145)
WBC # BLD: 7.26 K/UL — SIGNIFICANT CHANGE UP (ref 3.8–10.5)
WBC # FLD AUTO: 7.26 K/UL — SIGNIFICANT CHANGE UP (ref 3.8–10.5)

## 2021-02-22 PROCEDURE — 99232 SBSQ HOSP IP/OBS MODERATE 35: CPT

## 2021-02-22 PROCEDURE — 99233 SBSQ HOSP IP/OBS HIGH 50: CPT

## 2021-02-22 RX ORDER — POTASSIUM CHLORIDE 20 MEQ
40 PACKET (EA) ORAL ONCE
Refills: 0 | Status: COMPLETED | OUTPATIENT
Start: 2021-02-22 | End: 2021-02-22

## 2021-02-22 RX ADMIN — Medication 650 MILLIGRAM(S): at 12:07

## 2021-02-22 RX ADMIN — CHLORHEXIDINE GLUCONATE 1 APPLICATION(S): 213 SOLUTION TOPICAL at 06:47

## 2021-02-22 RX ADMIN — Medication 1 MILLIGRAM(S): at 16:57

## 2021-02-22 RX ADMIN — ENOXAPARIN SODIUM 40 MILLIGRAM(S): 100 INJECTION SUBCUTANEOUS at 16:54

## 2021-02-22 RX ADMIN — OXYCODONE HYDROCHLORIDE 5 MILLIGRAM(S): 5 TABLET ORAL at 06:46

## 2021-02-22 RX ADMIN — Medication 40 MILLIEQUIVALENT(S): at 09:30

## 2021-02-22 RX ADMIN — Medication 1 APPLICATION(S): at 16:54

## 2021-02-22 RX ADMIN — Medication 650 MILLIGRAM(S): at 06:46

## 2021-02-22 RX ADMIN — Medication 1 APPLICATION(S): at 06:47

## 2021-02-22 RX ADMIN — QUETIAPINE FUMARATE 50 MILLIGRAM(S): 200 TABLET, FILM COATED ORAL at 21:21

## 2021-02-22 RX ADMIN — Medication 650 MILLIGRAM(S): at 16:53

## 2021-02-22 RX ADMIN — Medication 1 TABLET(S): at 12:07

## 2021-02-22 RX ADMIN — QUETIAPINE FUMARATE 12.5 MILLIGRAM(S): 200 TABLET, FILM COATED ORAL at 12:08

## 2021-02-22 RX ADMIN — MIRTAZAPINE 15 MILLIGRAM(S): 45 TABLET, ORALLY DISINTEGRATING ORAL at 21:21

## 2021-02-22 RX ADMIN — ENOXAPARIN SODIUM 40 MILLIGRAM(S): 100 INJECTION SUBCUTANEOUS at 06:46

## 2021-02-22 RX ADMIN — Medication 1 MILLIGRAM(S): at 06:46

## 2021-02-22 NOTE — PROGRESS NOTE ADULT - SUBJECTIVE AND OBJECTIVE BOX
CHIEF COMPLAINT:    SUBJECTIVE:     [ ] All other systems negative  [ ] Unable to assess ROS because ________    OBJECTIVE:  ICU Vital Signs Last 24 Hrs  T(C): 36.6 (22 Feb 2021 06:44), Max: 37.2 (21 Feb 2021 22:51)  T(F): 97.9 (22 Feb 2021 06:44), Max: 99 (21 Feb 2021 22:51)  HR: 95 (22 Feb 2021 06:44) (93 - 107)  BP: 129/84 (22 Feb 2021 06:44) (123/66 - 130/83)  BP(mean): --  ABP: --  ABP(mean): --  RR: 22 (22 Feb 2021 06:44) (18 - 24)  SpO2: 99% (22 Feb 2021 06:44) (95% - 99%)        02-21 @ 07:01  -  02-22 @ 07:00  --------------------------------------------------------  IN: 0 mL / OUT: 2300 mL / NET: -2300 mL      CAPILLARY BLOOD GLUCOSE      POCT Blood Glucose.: 108 mg/dL (22 Feb 2021 11:24)      PHYSICAL EXAM:  General:   HEENT:   Lymph Nodes:  Neck:   Respiratory:   Cardiovascular:   Abdomen:   Extremities:   Skin:   Neurological:  Psychiatry:    HOSPITAL MEDICATIONS:  MEDICATIONS  (STANDING):  acetaminophen    Suspension .. 650 milliGRAM(s) Oral every 6 hours  BACItracin   Ointment 1 Application(s) Topical two times a day  chlorhexidine 4% Liquid 1 Application(s) Topical daily  clonazePAM  Tablet 1 milliGRAM(s) Oral <User Schedule>  dextrose 5%. 1000 milliLiter(s) (50 mL/Hr) IV Continuous <Continuous>  dextrose 5%. 1000 milliLiter(s) (100 mL/Hr) IV Continuous <Continuous>  enoxaparin Injectable 40 milliGRAM(s) SubCutaneous every 12 hours  glucagon  Injectable 1 milliGRAM(s) IntraMuscular once  insulin lispro (ADMELOG) corrective regimen sliding scale   SubCutaneous three times a day before meals  insulin lispro (ADMELOG) corrective regimen sliding scale   SubCutaneous at bedtime  mirtazapine 15 milliGRAM(s) Oral at bedtime  multivitamin 1 Tablet(s) Oral daily  QUEtiapine 12.5 milliGRAM(s) Oral daily  QUEtiapine 50 milliGRAM(s) Oral at bedtime    MEDICATIONS  (PRN):  ALBUTerol    90 MICROgram(s) HFA Inhaler 2 Puff(s) Inhalation every 6 hours PRN Wheezing  aluminum hydroxide/magnesium hydroxide/simethicone Suspension 30 milliLiter(s) Oral every 6 hours PRN Dyspepsia  bisacodyl Suppository 10 milliGRAM(s) Rectal at bedtime PRN Constipation  oxyCODONE    Solution 5 milliGRAM(s) Oral every 6 hours PRN Severe Pain (7 - 10)  polyethylene glycol 3350 17 Gram(s) Oral two times a day PRN Constipation      LABS:                        9.7    7.26  )-----------( 212      ( 22 Feb 2021 04:09 )             31.1     02-22    136  |  98  |  6<L>  ----------------------------<  100<H>  3.4<L>   |  27  |  0.54    Ca    9.2      22 Feb 2021 04:09  Phos  4.7     02-22  Mg     2.0     02-22             CHIEF COMPLAINT:Patient is a 46y old  Female who presents with a chief complaint of COVID (22 Feb 2021 11:43)      SUBJECTIVE: No interval overnight events    [X ] All other systems negative  [ ] Unable to assess ROS because ________    OBJECTIVE:  ICU Vital Signs Last 24 Hrs  T(C): 36.6 (22 Feb 2021 06:44), Max: 37.2 (21 Feb 2021 22:51)  T(F): 97.9 (22 Feb 2021 06:44), Max: 99 (21 Feb 2021 22:51)  HR: 95 (22 Feb 2021 06:44) (93 - 107)  BP: 129/84 (22 Feb 2021 06:44) (123/66 - 130/83)  BP(mean): --  ABP: --  ABP(mean): --  RR: 22 (22 Feb 2021 06:44) (18 - 24)  SpO2: 99% (22 Feb 2021 06:44) (95% - 99%)        02-21 @ 07:01  -  02-22 @ 07:00  --------------------------------------------------------  IN: 0 mL / OUT: 2300 mL / NET: -2300 mL      CAPILLARY BLOOD GLUCOSE      POCT Blood Glucose.: 108 mg/dL (22 Feb 2021 11:24)      PHYSICAL EXAM:  General:   HEENT:   Lymph Nodes:  Neck:   Respiratory:   Cardiovascular:   Abdomen:   Extremities:   Skin:   Neurological:  Psychiatry:    HOSPITAL MEDICATIONS:  MEDICATIONS  (STANDING):  acetaminophen    Suspension .. 650 milliGRAM(s) Oral every 6 hours  BACItracin   Ointment 1 Application(s) Topical two times a day  chlorhexidine 4% Liquid 1 Application(s) Topical daily  clonazePAM  Tablet 1 milliGRAM(s) Oral <User Schedule>  dextrose 5%. 1000 milliLiter(s) (50 mL/Hr) IV Continuous <Continuous>  dextrose 5%. 1000 milliLiter(s) (100 mL/Hr) IV Continuous <Continuous>  enoxaparin Injectable 40 milliGRAM(s) SubCutaneous every 12 hours  glucagon  Injectable 1 milliGRAM(s) IntraMuscular once  insulin lispro (ADMELOG) corrective regimen sliding scale   SubCutaneous three times a day before meals  insulin lispro (ADMELOG) corrective regimen sliding scale   SubCutaneous at bedtime  mirtazapine 15 milliGRAM(s) Oral at bedtime  multivitamin 1 Tablet(s) Oral daily  QUEtiapine 12.5 milliGRAM(s) Oral daily  QUEtiapine 50 milliGRAM(s) Oral at bedtime    MEDICATIONS  (PRN):  ALBUTerol    90 MICROgram(s) HFA Inhaler 2 Puff(s) Inhalation every 6 hours PRN Wheezing  aluminum hydroxide/magnesium hydroxide/simethicone Suspension 30 milliLiter(s) Oral every 6 hours PRN Dyspepsia  bisacodyl Suppository 10 milliGRAM(s) Rectal at bedtime PRN Constipation  oxyCODONE    Solution 5 milliGRAM(s) Oral every 6 hours PRN Severe Pain (7 - 10)  polyethylene glycol 3350 17 Gram(s) Oral two times a day PRN Constipation      LABS:                        9.7    7.26  )-----------( 212      ( 22 Feb 2021 04:09 )             31.1     02-22    136  |  98  |  6<L>  ----------------------------<  100<H>  3.4<L>   |  27  |  0.54    Ca    9.2      22 Feb 2021 04:09  Phos  4.7     02-22  Mg     2.0     02-22

## 2021-02-22 NOTE — PROGRESS NOTE ADULT - ASSESSMENT
# ICU Delirium/ Agitation   - Now weaned off sedation  - c/w Seroquel 12.5mg AM and 50mg QHS and Klonopin 1mg BID   - Monitor mentation.  Still gets very anxious at times    # Depression  - Mood much improved  - c/w Remeron.   - Supportive care.     # ARDS second to SARS-COV-2 vs Superimposed Enterobacter and MRSA PNA   - s/p Remdesivir, Decadron and ABX as belowed.   - s/p Prolonged ICU stay and Tracheostomy on 1/28  - Tolerating TC on 28% well. PMV when secretions improves.   - Unable to change Trach from 6 cuffed to cuffless due to desatting and anxiety 2/18  - Discussed re-attempting to deflate cuff again possibly monday, pt agreeable  - Proventil and Chest PT Q6H. Suction PRN. Trach care QD.     # Septic vs Vasoplegic Shock (resolved)  - Off all Pressors. Monitor HR/ BP     # Dysphagia with PEG   - s/p PEG 1/26 and continue on TF as tolerated.   - c/w current bowel regimen (Senna and Miralax and Dulcolax PRN)   - Passed CINE on 2/16, started on Mechanical soft Honey thick liquid    #   - Failed TOV 2/3 and 2/11. Keep Haque for now and TOV again at Rehab.   - Hyponatremia/ Hypernatremia, monitor electrolytes and renal function as tolerated.   -Pending TOV 2/22 evening per policy    # Sepsis second to SARS-COV-2 vs Enterobacter and MRSA PNA vs HSV Type 1   - Completed empiric ABX for PNA with Zosyn (1/16-1/20), however cultures negative   - ECOLI UTI (1/25) s/p CTX (1/25-1/27).   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     # DM2 A1C 6.6 (1/2021)   - Continue on ISS and monitor FS Q6H    # Wounds   - WOC recommendations appreciated.      # DVT PPX with Lovenox BID   # CODE STATUS - Full Code   # DISPO - PT recommends Rehab.

## 2021-02-22 NOTE — PROGRESS NOTE ADULT - SUBJECTIVE AND OBJECTIVE BOX
DATE OF SERVICE: 02-22-21     Subjective: Patient seen and examined. No new events except as noted.       REVIEW OF SYSTEMS:    CONSTITUTIONAL: No weakness, fevers or chills  EYES/ENT: No visual changes;  No vertigo or throat pain   NECK: No pain or stiffness  RESPIRATORY: No cough, wheezing, hemoptysis; No shortness of breath  CARDIOVASCULAR: No chest pain or palpitations  GASTROINTESTINAL: No abdominal or epigastric pain. No nausea, vomiting, or hematemesis; No diarrhea or constipation. No melena or hematochezia.  GENITOURINARY: No dysuria, frequency or hematuria  NEUROLOGICAL: No numbness or weakness  SKIN: No itching, burning, rashes, or lesions   All other review of systems is negative unless indicated above.    MEDICATIONS:  MEDICATIONS  (STANDING):  acetaminophen    Suspension .. 650 milliGRAM(s) Oral every 6 hours  BACItracin   Ointment 1 Application(s) Topical two times a day  chlorhexidine 4% Liquid 1 Application(s) Topical daily  clonazePAM  Tablet 1 milliGRAM(s) Oral <User Schedule>  dextrose 5%. 1000 milliLiter(s) (50 mL/Hr) IV Continuous <Continuous>  dextrose 5%. 1000 milliLiter(s) (100 mL/Hr) IV Continuous <Continuous>  enoxaparin Injectable 40 milliGRAM(s) SubCutaneous every 12 hours  glucagon  Injectable 1 milliGRAM(s) IntraMuscular once  insulin lispro (ADMELOG) corrective regimen sliding scale   SubCutaneous three times a day before meals  insulin lispro (ADMELOG) corrective regimen sliding scale   SubCutaneous at bedtime  mirtazapine 15 milliGRAM(s) Oral at bedtime  multivitamin 1 Tablet(s) Oral daily  QUEtiapine 12.5 milliGRAM(s) Oral daily  QUEtiapine 50 milliGRAM(s) Oral at bedtime      PHYSICAL EXAM:  T(C): 36.9 (02-22-21 @ 13:16), Max: 37.2 (02-21-21 @ 22:51)  HR: 102 (02-22-21 @ 13:16) (95 - 107)  BP: 113/68 (02-22-21 @ 13:16) (113/68 - 129/84)  RR: 18 (02-22-21 @ 13:16) (18 - 24)  SpO2: 96% (02-22-21 @ 13:16) (96% - 99%)  Wt(kg): --  I&O's Summary    21 Feb 2021 07:01  -  22 Feb 2021 07:00  --------------------------------------------------------  IN: 0 mL / OUT: 2300 mL / NET: -2300 mL          Appearance: Normal	  HEENT:  PERRLA , trache  Lymphatic: No lymphadenopathy   Cardiovascular: Normal S1 S2, no JVD  Respiratory: normal effort , clear  Gastrointestinal:  Soft, Non-tender  Skin: No rashes,  warm to touch  Psychiatry:  Mood & affect appropriate  Musculuskeletal: No edema      All labs, Imaging and EKGs personally reviewed                             9.7    7.26  )-----------( 212      ( 22 Feb 2021 04:09 )             31.1               02-22    136  |  98  |  6<L>  ----------------------------<  100<H>  3.4<L>   |  27  |  0.54    Ca    9.2      22 Feb 2021 04:09  Phos  4.7     02-22  Mg     2.0     02-22

## 2021-02-22 NOTE — PROGRESS NOTE ADULT - SUBJECTIVE AND OBJECTIVE BOX
Patient is a 46y old  Female who presents with a chief complaint of COVID (22 Feb 2021 11:43)      HPI:  Patient is a 46 year old woman with no past medical history coming in with shortness of breath, cough productive of white sputum, pleuritic rib pain (moderate in severity) for two days and diarrhea for one day. Patient  tested positive for COVID 12/29. Patient denies loss of taste or smell. Patient denies sore throat however states mouth is dry. Patient denies any history of lung disease or smoking. Patient states she was having subjective fevers at home however never took her temperature. Patient was taking an unknown antibiotic prescribed by a doctor. Unable to give name of doctor or name of abx. Patient dyspneic during with talking during interview.   ED Course: T 98.6, HR 96, /90, S 80% on RA. White count 10k. Hemoglobin 11.0. Plt 236. D-dimer 243. CRP 81.3. . CXR peripheral opacities consistent w/ COVID. Patient given 6 of dexamethasone. Patient admitted for acute hypoxic respiratory failure in the setting of likely COVID infection.  (03 Jan 2021 18:35)    Patient denies new complaint, states she is improving.  States headache controlled with medications  trache downsized.    REVIEW OF SYSTEMS  Constitutional - No fever, No weight loss, No fatigue  HEENT - No eye pain, No visual disturbances, No difficulty hearing, No tinnitus, No vertigo, No neck pain  Respiratory - No cough, No wheezing, No shortness of breath  Cardiovascular - No chest pain, No palpitations  Gastrointestinal - No abdominal pain, No nausea, No vomiting, No diarrhea, No constipation  Genitourinary - No dysuria, No frequency, No hematuria, No incontinence  Neurological - No headaches, No memory loss, + loss of strength, No numbness, No tremors  Skin - No itching, No rashes, No lesions   Endocrine - No temperature intolerance  Musculoskeletal - No joint pain, No joint swelling, No muscle pain  Psychiatric - No depression, No anxiety    PAST MEDICAL & SURGICAL HISTORY  No pertinent past medical history  No significant past surgical history      CURRENT FUNCTIONAL STATUS  2/22  Patient completed bed moblility with stand by assist (+) trach O2 6L satting 95%. Patient completed transfers with minimal assist and ambulated with contact guard assist for 15 feet,  Desat to 86%, with standing rest pt able to recover. Patient exxperiencing nausea requiring a sitting rest    FAMILY HISTORY   FH: type 2 diabetes      RECENT LABS/IMAGING  CBC Full  -  ( 22 Feb 2021 04:09 )  WBC Count : 7.26 K/uL  RBC Count : 3.51 M/uL  Hemoglobin : 9.7 g/dL  Hematocrit : 31.1 %  Platelet Count - Automated : 212 K/uL  Mean Cell Volume : 88.6 fL  Mean Cell Hemoglobin : 27.6 pg  Mean Cell Hemoglobin Concentration : 31.2 gm/dL  Auto Neutrophil # : x  Auto Lymphocyte # : x  Auto Monocyte # : x  Auto Eosinophil # : x  Auto Basophil # : x  Auto Neutrophil % : x  Auto Lymphocyte % : x  Auto Monocyte % : x  Auto Eosinophil % : x  Auto Basophil % : x    02-22    136  |  98  |  6<L>  ----------------------------<  100<H>  3.4<L>   |  27  |  0.54    Ca    9.2      22 Feb 2021 04:09  Phos  4.7     02-22  Mg     2.0     02-22          VITALS  T(C): 36.9 (02-22-21 @ 13:16), Max: 37.2 (02-21-21 @ 22:51)  HR: 102 (02-22-21 @ 13:16) (95 - 107)  BP: 113/68 (02-22-21 @ 13:16) (113/68 - 129/84)  RR: 18 (02-22-21 @ 13:16) (18 - 24)  SpO2: 96% (02-22-21 @ 13:16) (96% - 99%)  Wt(kg): --    ALLERGIES  No Known Allergies      MEDICATIONS   acetaminophen    Suspension .. 650 milliGRAM(s) Oral every 6 hours  ALBUTerol    90 MICROgram(s) HFA Inhaler 2 Puff(s) Inhalation every 6 hours PRN  aluminum hydroxide/magnesium hydroxide/simethicone Suspension 30 milliLiter(s) Oral every 6 hours PRN  BACItracin   Ointment 1 Application(s) Topical two times a day  bisacodyl Suppository 10 milliGRAM(s) Rectal at bedtime PRN  chlorhexidine 4% Liquid 1 Application(s) Topical daily  clonazePAM  Tablet 1 milliGRAM(s) Oral <User Schedule>  dextrose 5%. 1000 milliLiter(s) IV Continuous <Continuous>  dextrose 5%. 1000 milliLiter(s) IV Continuous <Continuous>  enoxaparin Injectable 40 milliGRAM(s) SubCutaneous every 12 hours  glucagon  Injectable 1 milliGRAM(s) IntraMuscular once  insulin lispro (ADMELOG) corrective regimen sliding scale   SubCutaneous three times a day before meals  insulin lispro (ADMELOG) corrective regimen sliding scale   SubCutaneous at bedtime  mirtazapine 15 milliGRAM(s) Oral at bedtime  multivitamin 1 Tablet(s) Oral daily  oxyCODONE    Solution 5 milliGRAM(s) Oral every 6 hours PRN  polyethylene glycol 3350 17 Gram(s) Oral two times a day PRN  QUEtiapine 12.5 milliGRAM(s) Oral daily  QUEtiapine 50 milliGRAM(s) Oral at bedtime      ----------------------------------------------------------------------------------------   PHYSICAL EXAM  Constitutional - NAD, Comfortable  HEENT - healing abrasion R cheek,  + trache   Chest - no respiratory distress  Cardiovascular - no edema  Abdomen - Soft, NTND, + PEG, burdick  Extremities - no calf tenderness   Neurologic Exam -                    Cognitive - Awake, Alert, AAO to self, place, date, year, situation     Communication - Fluent, No dysarthria     Cranial Nerves - CN 2-12 intact     Motor -                      LEFT    UE -4/5                    RIGHT UE - 4/5                    LEFT    LE - 4/5                                    RIGHT LE -4/5       Sensory - Intact to LT        Balance - WNL Static  Psychiatric - stable  ----------------------------------------------------------------------------------------  ASSESSMENT/PLAN   45 yo f admitted for ARDS second to SARS-COV-2, intubated 1/5-1/10 then transferred to Medicine, however failed BIPAP and reintubated 1/16. Course complicated UTI, Mouth Sores and Enterobacter and MRSA VAP. s/p Tracheostomy 1/28 and PEG 1/26.  on enhanced supervision to prevent pulling trach  contact precautions for MRSA  depression: on remeron  delirium- on seroquel  pain: tylenol prn, oxy ir 5mg daily with bowel regimen  urinary retention/ UTI: burdick- TOV tonight  also treated with zosyn for pneumonia  MRSA/HSV: on bactroban, s/p acyclovir    Diet: dysphagia 2 mech soft honey thick  DVT PPX - lovenox  continue bedside PT and OT  OOB to chair daily  Rehab - when medically cleared and able to tolerate mobilization without oxygen desaturation below 90%,   Recommend ACUTE inpatient rehabilitation for the functional deficits consisting of 3 hours of therapy/day & 24 hour RN/daily PMR physician for comorbid medical management. Will continue to follow for ongoing rehab needs and recommendations.

## 2021-02-22 NOTE — PROGRESS NOTE ADULT - ATTENDING COMMENTS
Agree with plan as outlined above. Patient seen and examined at bedside. Patient history, laboratory da ta, and imaging personally reviewed.    Pt is a 46F with PMHx DMII and obesity admitted to [facility] for hypoxemic respirtory failure with ARDS 2/2 COVID19 PNA with failure to wean from ventilator s/p tracheostomy placement and RCU transfer for further management.    Pt with resolving encephalopathy, consciousness significantly improved. Weaning sedation, on cloanzepam+Seroquel+mirtazapine. Pt very anxious intermittently. PT/OT following.     Pt initally p/w severe hypoxemic respiratory failure with failure to wean from ventilator s/p tracheostomy placement (1/28) now tolerating weaning. On TC ATC with intermittent PMV. Unable to change to cuffless trach on 2/18 2/2 anxiety during the procedure, pt amenable to re-attempting. Will c/w airway clearance therapy, respiratory secretions improved. Trach care as per RCU team.     Pt remains hemodynamically stable off all pressors. Also with oropharyngeal dysphagia s/p PEG (1/26) now tolerating dysphagia diet since 2/16. Bowel regimen in place. GI ppx. HISS with BGFS monitoring for DMII. DVT ppx with Lovenox.     Pt's hospital course further c/b EColi UTI, as well as VAP 2/2 MRSA and Enterobacter. Now clinically improving, without any s/s acute infectious process. Monitoring off abx.     Dispo likely to acute rehab pending medical optimization. Agree with plan as outlined above. Patient seen and examined at bedside. Patient history, laboratory da ta, and imaging personally reviewed.    Pt is a 46F with PMHx DMII and obesity admitted to [facility] for hypoxemic respiratory failure with ARDS 2/2 COVID19 PNA with failure to wean from ventilator s/p tracheostomy placement and RCU transfer for further management.    Pt with resolving encephalopathy, consciousness significantly improved. Weaning sedation, on cloanzepam+Seroquel+mirtazapine. Pt very anxious intermittently. PT/OT following.     Pt initially p/w severe hypoxemic respiratory failure with failure to wean from ventilator s/p tracheostomy placement (1/28) now tolerating weaning. On TC ATC with intermittent PMV. Unable to change to cuffless trach on 2/18 2/2 anxiety during the procedure, pt amenable to re-attempting. Will c/w airway clearance therapy, respiratory secretions improved. Trach care as per RCU team.     Pt remains hemodynamically stable off all pressors. Also with oropharyngeal dysphagia s/p PEG (1/26) now tolerating dysphagia diet since 2/16. Bowel regimen in place. GI ppx. HISS with BGFS monitoring for DMII. DVT ppx with Lovenox.     Pt's hospital course further c/b EColi UTI, as well as VAP 2/2 MRSA and Enterobacter. Now clinically improving, without any s/s acute infectious process. Monitoring off abx.     Dispo likely to acute rehab pending medical optimization.

## 2021-02-22 NOTE — PROGRESS NOTE ADULT - PSYCHIATRIC DETAILS
Less tearful
Less tearful /conversing
Remains tearful at times
+ tearful
normal affect/normal behavior/anxious

## 2021-02-23 LAB
ANION GAP SERPL CALC-SCNC: 14 MMOL/L — SIGNIFICANT CHANGE UP (ref 7–14)
APPEARANCE UR: ABNORMAL
APPEARANCE UR: ABNORMAL
BACTERIA # UR AUTO: ABNORMAL
BACTERIA # UR AUTO: NEGATIVE — SIGNIFICANT CHANGE UP
BILIRUB UR-MCNC: NEGATIVE — SIGNIFICANT CHANGE UP
BILIRUB UR-MCNC: NEGATIVE — SIGNIFICANT CHANGE UP
BUN SERPL-MCNC: 5 MG/DL — LOW (ref 7–23)
CALCIUM SERPL-MCNC: 9.2 MG/DL — SIGNIFICANT CHANGE UP (ref 8.4–10.5)
CHLORIDE SERPL-SCNC: 99 MMOL/L — SIGNIFICANT CHANGE UP (ref 98–107)
CO2 SERPL-SCNC: 26 MMOL/L — SIGNIFICANT CHANGE UP (ref 22–31)
COLOR SPEC: SIGNIFICANT CHANGE UP
COLOR SPEC: SIGNIFICANT CHANGE UP
CREAT SERPL-MCNC: 0.54 MG/DL — SIGNIFICANT CHANGE UP (ref 0.5–1.3)
DIFF PNL FLD: ABNORMAL
DIFF PNL FLD: ABNORMAL
EPI CELLS # UR: 0 /HPF — SIGNIFICANT CHANGE UP (ref 0–5)
EPI CELLS # UR: 1 /HPF — SIGNIFICANT CHANGE UP (ref 0–5)
GLUCOSE SERPL-MCNC: 130 MG/DL — HIGH (ref 70–99)
GLUCOSE UR QL: NEGATIVE — SIGNIFICANT CHANGE UP
GLUCOSE UR QL: NEGATIVE — SIGNIFICANT CHANGE UP
HCT VFR BLD CALC: 29.6 % — LOW (ref 34.5–45)
HGB BLD-MCNC: 9.3 G/DL — LOW (ref 11.5–15.5)
HYALINE CASTS # UR AUTO: 1 /LPF — SIGNIFICANT CHANGE UP (ref 0–7)
HYALINE CASTS # UR AUTO: 4 /LPF — SIGNIFICANT CHANGE UP (ref 0–7)
KETONES UR-MCNC: NEGATIVE — SIGNIFICANT CHANGE UP
KETONES UR-MCNC: NEGATIVE — SIGNIFICANT CHANGE UP
LEUKOCYTE ESTERASE UR-ACNC: ABNORMAL
LEUKOCYTE ESTERASE UR-ACNC: ABNORMAL
MAGNESIUM SERPL-MCNC: 2 MG/DL — SIGNIFICANT CHANGE UP (ref 1.6–2.6)
MCHC RBC-ENTMCNC: 27.8 PG — SIGNIFICANT CHANGE UP (ref 27–34)
MCHC RBC-ENTMCNC: 31.4 GM/DL — LOW (ref 32–36)
MCV RBC AUTO: 88.6 FL — SIGNIFICANT CHANGE UP (ref 80–100)
NITRITE UR-MCNC: NEGATIVE — SIGNIFICANT CHANGE UP
NITRITE UR-MCNC: NEGATIVE — SIGNIFICANT CHANGE UP
NRBC # BLD: 0 /100 WBCS — SIGNIFICANT CHANGE UP
NRBC # FLD: 0 K/UL — SIGNIFICANT CHANGE UP
PH UR: 7 — SIGNIFICANT CHANGE UP (ref 5–8)
PH UR: 7 — SIGNIFICANT CHANGE UP (ref 5–8)
PHOSPHATE SERPL-MCNC: 4.9 MG/DL — HIGH (ref 2.5–4.5)
PLATELET # BLD AUTO: 214 K/UL — SIGNIFICANT CHANGE UP (ref 150–400)
POTASSIUM SERPL-MCNC: 3.7 MMOL/L — SIGNIFICANT CHANGE UP (ref 3.5–5.3)
POTASSIUM SERPL-SCNC: 3.7 MMOL/L — SIGNIFICANT CHANGE UP (ref 3.5–5.3)
PROT UR-MCNC: ABNORMAL
PROT UR-MCNC: NEGATIVE — SIGNIFICANT CHANGE UP
RBC # BLD: 3.34 M/UL — LOW (ref 3.8–5.2)
RBC # FLD: 16.8 % — HIGH (ref 10.3–14.5)
RBC CASTS # UR COMP ASSIST: 1 /HPF — SIGNIFICANT CHANGE UP (ref 0–4)
RBC CASTS # UR COMP ASSIST: 2 /HPF — SIGNIFICANT CHANGE UP (ref 0–4)
SODIUM SERPL-SCNC: 139 MMOL/L — SIGNIFICANT CHANGE UP (ref 135–145)
SP GR SPEC: 1 — LOW (ref 1.01–1.02)
SP GR SPEC: 1.01 — LOW (ref 1.01–1.02)
UROBILINOGEN FLD QL: SIGNIFICANT CHANGE UP
UROBILINOGEN FLD QL: SIGNIFICANT CHANGE UP
WBC # BLD: 7.99 K/UL — SIGNIFICANT CHANGE UP (ref 3.8–10.5)
WBC # FLD AUTO: 7.99 K/UL — SIGNIFICANT CHANGE UP (ref 3.8–10.5)
WBC UR QL: 190 /HPF — HIGH (ref 0–5)
WBC UR QL: 260 /HPF — HIGH (ref 0–5)

## 2021-02-23 PROCEDURE — 31502 CHANGE OF WINDPIPE AIRWAY: CPT | Mod: GC

## 2021-02-23 PROCEDURE — 99233 SBSQ HOSP IP/OBS HIGH 50: CPT | Mod: 25

## 2021-02-23 RX ORDER — CIPROFLOXACIN LACTATE 400MG/40ML
500 VIAL (ML) INTRAVENOUS EVERY 24 HOURS
Refills: 0 | Status: DISCONTINUED | OUTPATIENT
Start: 2021-02-23 | End: 2021-02-25

## 2021-02-23 RX ORDER — ACETAMINOPHEN 500 MG
650 TABLET ORAL ONCE
Refills: 0 | Status: DISCONTINUED | OUTPATIENT
Start: 2021-02-23 | End: 2021-02-23

## 2021-02-23 RX ORDER — LIDOCAINE HCL 20 MG/ML
10 VIAL (ML) INJECTION ONCE
Refills: 0 | Status: COMPLETED | OUTPATIENT
Start: 2021-02-23 | End: 2021-02-23

## 2021-02-23 RX ADMIN — ENOXAPARIN SODIUM 40 MILLIGRAM(S): 100 INJECTION SUBCUTANEOUS at 18:10

## 2021-02-23 RX ADMIN — Medication 500 MILLIGRAM(S): at 18:10

## 2021-02-23 RX ADMIN — Medication 1 MILLIGRAM(S): at 04:50

## 2021-02-23 RX ADMIN — ALBUTEROL 2 PUFF(S): 90 AEROSOL, METERED ORAL at 10:49

## 2021-02-23 RX ADMIN — Medication 650 MILLIGRAM(S): at 04:50

## 2021-02-23 RX ADMIN — Medication 1 APPLICATION(S): at 04:51

## 2021-02-23 RX ADMIN — OXYCODONE HYDROCHLORIDE 5 MILLIGRAM(S): 5 TABLET ORAL at 23:35

## 2021-02-23 RX ADMIN — Medication 0.5 MILLIGRAM(S): at 14:05

## 2021-02-23 RX ADMIN — Medication 1 MILLIGRAM(S): at 18:10

## 2021-02-23 RX ADMIN — Medication 650 MILLIGRAM(S): at 10:21

## 2021-02-23 RX ADMIN — Medication 650 MILLIGRAM(S): at 22:22

## 2021-02-23 RX ADMIN — Medication 1 TABLET(S): at 10:21

## 2021-02-23 RX ADMIN — Medication 1 APPLICATION(S): at 18:11

## 2021-02-23 RX ADMIN — CHLORHEXIDINE GLUCONATE 1 APPLICATION(S): 213 SOLUTION TOPICAL at 04:50

## 2021-02-23 RX ADMIN — QUETIAPINE FUMARATE 12.5 MILLIGRAM(S): 200 TABLET, FILM COATED ORAL at 10:21

## 2021-02-23 RX ADMIN — ENOXAPARIN SODIUM 40 MILLIGRAM(S): 100 INJECTION SUBCUTANEOUS at 04:51

## 2021-02-23 RX ADMIN — MIRTAZAPINE 15 MILLIGRAM(S): 45 TABLET, ORALLY DISINTEGRATING ORAL at 22:22

## 2021-02-23 RX ADMIN — Medication 10 MILLILITER(S): at 14:21

## 2021-02-23 RX ADMIN — QUETIAPINE FUMARATE 50 MILLIGRAM(S): 200 TABLET, FILM COATED ORAL at 22:22

## 2021-02-23 RX ADMIN — Medication 650 MILLIGRAM(S): at 18:10

## 2021-02-23 NOTE — PROCEDURE NOTE - NSINFORMCONSENT_GEN_A_CORE
Benefits, risks, and possible complications of procedure explained to patient/caregiver who verbalized understanding and gave verbal consent.
This was an emergent procedure.
Benefits, risks, and possible complications of procedure explained to patient/caregiver who verbalized understanding and gave verbal consent.

## 2021-02-23 NOTE — PROGRESS NOTE ADULT - ASSESSMENT
# ICU Delirium/ Agitation   - Now weaned off sedation  - c/w Seroquel 12.5mg AM and 50mg QHS and Klonopin 1mg BID   - Monitor mentation.  Still gets very anxious at times    # Depression  - Mood much improved  - c/w Remeron.   - Supportive care.     # ARDS second to SARS-COV-2 vs Superimposed Enterobacter and MRSA PNA   - s/p Remdesivir, Decadron and ABX as belowed.   - s/p Prolonged ICU stay and Tracheostomy on 1/28  - Tolerating TC on 28% well. PMV when secretions improves.   - Unable to change Trach from 6 cuffed to cuffless due to desatting and anxiety 2/18  - Discussed re-attempting to deflate cuff again possibly monday, pt agreeable  - Proventil and Chest PT Q6H. Suction PRN. Trach care QD.     # Septic vs Vasoplegic Shock (resolved)  - Off all Pressors. Monitor HR/ BP     # Dysphagia with PEG   - s/p PEG 1/26 and continue on TF as tolerated.   - c/w current bowel regimen (Senna and Miralax and Dulcolax PRN)   - Passed CINE on 2/16, started on Mechanical soft Honey thick liquid    #   - Failed TOV 2/3 and 2/11. Keep Haque for now and TOV again at Rehab.   - Hyponatremia/ Hypernatremia, monitor electrolytes and renal function as tolerated.   -Pending TOV 2/22 evening per policy    # Sepsis second to SARS-COV-2 vs Enterobacter and MRSA PNA vs HSV Type 1   - Completed empiric ABX for PNA with Zosyn (1/16-1/20), however cultures negative   - ECOLI UTI (1/25) s/p CTX (1/25-1/27).   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     # DM2 A1C 6.6 (1/2021)   - Continue on ISS and monitor FS Q6H    # Wounds   - WOC recommendations appreciated.      # DVT PPX with Lovenox BID   # CODE STATUS - Full Code   # DISPO - PT recommends Rehab.  # ICU Delirium/ Agitation   - Now weaned off sedation  - c/w Seroquel 12.5mg AM and 50mg QHS and Klonopin 1mg BID   - Monitor mentation.  Still gets very anxious at times    # Depression  - Mood much improved  - c/w Remeron.   - Supportive care.     # ARDS second to SARS-COV-2 vs Superimposed Enterobacter and MRSA PNA   - s/p Remdesivir, Decadron and ABX as belowed.   - s/p Prolonged ICU stay and Tracheostomy on 1/28  - Tolerating TC on 28% well. PMV when secretions improves.   - Successfully exchanged Trach from 6 cuffed to cuffless on 2/23 - no complications  - Proventil and Chest PT Q6H. Suction PRN. Trach care QD.     # Septic vs Vasoplegic Shock (resolved)  - Off all Pressors. Monitor HR/ BP     # Dysphagia with PEG   - s/p PEG 1/26 and continue on TF as tolerated.   - c/w current bowel regimen (Senna and Miralax and Dulcolax PRN)   - Passed CINE on 2/16, c/w Mechanical soft Honey thick liquid    # /UTI  - Failed TOV 2/3 and 2/11. Keep Haque for now and TOV again at Rehab.   - Hyponatremia/ Hypernatremia, monitor electrolytes and renal function as tolerated.   - Pt passed TOV on 2/23 - Haque d'sergey  - (+) Dysuria on 2/23, UA pos for Bacteria and LE, Urine cx pending  - Start on Cipro 500 mg q 24hr, (Previous E.coli UTI sens to Cipro in the past)    # Sepsis second to SARS-COV-2 vs Enterobacter and MRSA PNA vs HSV Type 1   - Completed empiric ABX for PNA with Zosyn (1/16-1/20), however cultures negative   - ECOLI UTI (1/25) s/p CTX (1/25-1/27).   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     # DM2 A1C 6.6 (1/2021)   - Continue on ISS and monitor FS Q6H    # Wounds   - WOC recommendations appreciated.      # DVT PPX with Lovenox BID   # CODE STATUS - Full Code   # DISPO - PT recommends Rehab.

## 2021-02-23 NOTE — PROGRESS NOTE ADULT - SUBJECTIVE AND OBJECTIVE BOX
DATE OF SERVICE: 21     Subjective: Patient seen and examined. No new events except as noted.         MEDICATIONS:  MEDICATIONS  (STANDING):  acetaminophen    Suspension .. 650 milliGRAM(s) Oral every 6 hours  BACItracin   Ointment 1 Application(s) Topical two times a day  chlorhexidine 4% Liquid 1 Application(s) Topical daily  ciprofloxacin     Tablet 500 milliGRAM(s) Oral every 24 hours  clonazePAM  Tablet 1 milliGRAM(s) Oral <User Schedule>  dextrose 5%. 1000 milliLiter(s) (50 mL/Hr) IV Continuous <Continuous>  dextrose 5%. 1000 milliLiter(s) (100 mL/Hr) IV Continuous <Continuous>  enoxaparin Injectable 40 milliGRAM(s) SubCutaneous every 12 hours  glucagon  Injectable 1 milliGRAM(s) IntraMuscular once  insulin lispro (ADMELOG) corrective regimen sliding scale   SubCutaneous three times a day before meals  insulin lispro (ADMELOG) corrective regimen sliding scale   SubCutaneous at bedtime  mirtazapine 15 milliGRAM(s) Oral at bedtime  multivitamin 1 Tablet(s) Oral daily  QUEtiapine 12.5 milliGRAM(s) Oral daily  QUEtiapine 50 milliGRAM(s) Oral at bedtime      PHYSICAL EXAM:  T(C): 36.8 (21 @ 14:17), Max: 37.1 (21 @ 04:48)  HR: 109 (21 @ 22:21) (92 - 109)  BP: 101/80 (21 @ 22:21) (101/80 - 130/87)  RR: 20 (21 @ 22:21) (18 - 20)  SpO2: 100% (21 @ 22:21) (97% - 100%)  Wt(kg): --  I&O's Summary    2021 07:01  -  2021 07:00  --------------------------------------------------------  IN: 480 mL / OUT: 0 mL / NET: 480 mL          Appearance: coma  HEENT:  trache  Lymphatic: No lymphadenopathy   Cardiovascular: Normal S1 S2  Respiratory: normal effort , clear  Gastrointestinal:  Soft,   Skin: No rashes,  warm to touch  Musculuskeletal: No edema      All labs, Imaging and EKGs personally reviewed                           9.3    7.99  )-----------( 214      ( 2021 07:02 )             29.6               -    139  |  99  |  5<L>  ----------------------------<  130<H>  3.7   |  26  |  0.54    Ca    9.2      2021 07:02  Phos  4.9       Mg     2.0                              Urinalysis Basic - ( 2021 14:14 )    Color: Light Yellow / Appearance: Slightly Turbid / S.008 / pH: x  Gluc: x / Ketone: Negative  / Bili: Negative / Urobili: <2 mg/dL   Blood: x / Protein: Trace / Nitrite: Negative   Leuk Esterase: Large / RBC: 2 /HPF /  /HPF   Sq Epi: x / Non Sq Epi: 0 /HPF / Bacteria: Negative

## 2021-02-23 NOTE — PROCEDURE NOTE - NSPOSTCAREGUIDE_GEN_A_CORE
Care for catheter as per unit/ICU protocols
Verbal/written post procedure instructions were given to patient/caregiver
Care for catheter as per unit/ICU protocols
Verbal/written post procedure instructions were given to patient/caregiver
Verbal/written post procedure instructions were given to patient/caregiver

## 2021-02-23 NOTE — PROGRESS NOTE ADULT - SUBJECTIVE AND OBJECTIVE BOX
CHIEF COMPLAINT: Patient is a 46y old  Female who presents with a chief complaint of COVID (22 Feb 2021 16:33)    Interval Events:    REVIEW OF SYSTEMS:  Constitutional:   Eyes:  ENT:  CV:  Resp:  GI:  :  MSK:  Integumentary:  Neurological:  Psychiatric:  Endocrine:  Hematologic/Lymphatic:  Allergic/Immunologic:  [ ] All other systems negative  [ ] Unable to assess ROS because ________    OBJECTIVE:  ICU Vital Signs Last 24 Hrs  T(C): 37.1 (23 Feb 2021 04:48), Max: 37.1 (23 Feb 2021 04:48)  T(F): 98.7 (23 Feb 2021 04:48), Max: 98.7 (23 Feb 2021 04:48)  HR: 92 (23 Feb 2021 07:20) (92 - 105)  BP: 130/87 (23 Feb 2021 04:48) (113/68 - 130/87)  BP(mean): --  ABP: --  ABP(mean): --  RR: 19 (23 Feb 2021 07:20) (18 - 21)  SpO2: 100% (23 Feb 2021 07:20) (94% - 100%)        02-22 @ 07:01  -  02-23 @ 07:00  --------------------------------------------------------  IN: 480 mL / OUT: 0 mL / NET: 480 mL      CAPILLARY BLOOD GLUCOSE      POCT Blood Glucose.: 118 mg/dL (23 Feb 2021 07:43)      PHYSICAL EXAM:  General:   HEENT:   Lymph Nodes:  Neck:   Respiratory:   Cardiovascular:   Abdomen:   Extremities:   Skin:   Neurological:  Psychiatry:    HOSPITAL MEDICATIONS:  MEDICATIONS  (STANDING):  acetaminophen    Suspension .. 650 milliGRAM(s) Oral every 6 hours  BACItracin   Ointment 1 Application(s) Topical two times a day  chlorhexidine 4% Liquid 1 Application(s) Topical daily  clonazePAM  Tablet 1 milliGRAM(s) Oral <User Schedule>  dextrose 5%. 1000 milliLiter(s) (100 mL/Hr) IV Continuous <Continuous>  dextrose 5%. 1000 milliLiter(s) (50 mL/Hr) IV Continuous <Continuous>  enoxaparin Injectable 40 milliGRAM(s) SubCutaneous every 12 hours  glucagon  Injectable 1 milliGRAM(s) IntraMuscular once  insulin lispro (ADMELOG) corrective regimen sliding scale   SubCutaneous three times a day before meals  insulin lispro (ADMELOG) corrective regimen sliding scale   SubCutaneous at bedtime  mirtazapine 15 milliGRAM(s) Oral at bedtime  multivitamin 1 Tablet(s) Oral daily  QUEtiapine 12.5 milliGRAM(s) Oral daily  QUEtiapine 50 milliGRAM(s) Oral at bedtime    MEDICATIONS  (PRN):  ALBUTerol    90 MICROgram(s) HFA Inhaler 2 Puff(s) Inhalation every 6 hours PRN Wheezing  aluminum hydroxide/magnesium hydroxide/simethicone Suspension 30 milliLiter(s) Oral every 6 hours PRN Dyspepsia  bisacodyl Suppository 10 milliGRAM(s) Rectal at bedtime PRN Constipation  oxyCODONE    Solution 5 milliGRAM(s) Oral every 6 hours PRN Severe Pain (7 - 10)  polyethylene glycol 3350 17 Gram(s) Oral two times a day PRN Constipation      LABS:                        9.3    7.99  )-----------( 214      ( 23 Feb 2021 07:02 )             29.6     02-23    139  |  99  |  5<L>  ----------------------------<  130<H>  3.7   |  26  |  0.54    Ca    9.2      23 Feb 2021 07:02  Phos  4.9     02-23  Mg     2.0     02-23                MICROBIOLOGY:     RADIOLOGY:  [ ] Reviewed and interpreted by me    PULMONARY FUNCTION TESTS:    EKG: CHIEF COMPLAINT: Patient is a 46y old  Female who presents with a chief complaint of COVID (22 Feb 2021 16:33)    Interval Events: None reported overnight. Pt passed TOV today, and 6Shiley cuffed trach exchanged successfully at bedside to 6Cuffless Shiley. No complications. Clinically unchanged. VSS, and medications/labs reviewed. d/c planning to Acute inpatient rehab    REVIEW OF SYSTEMS:    [x] All other systems negative      OBJECTIVE:  ICU Vital Signs Last 24 Hrs  T(C): 37.1 (23 Feb 2021 04:48), Max: 37.1 (23 Feb 2021 04:48)  T(F): 98.7 (23 Feb 2021 04:48), Max: 98.7 (23 Feb 2021 04:48)  HR: 92 (23 Feb 2021 07:20) (92 - 105)  BP: 130/87 (23 Feb 2021 04:48) (113/68 - 130/87)  BP(mean): --  ABP: --  ABP(mean): --  RR: 19 (23 Feb 2021 07:20) (18 - 21)  SpO2: 100% (23 Feb 2021 07:20) (94% - 100%)        02-22 @ 07:01  -  02-23 @ 07:00  --------------------------------------------------------  IN: 480 mL / OUT: 0 mL / NET: 480 mL      CAPILLARY BLOOD GLUCOSE      POCT Blood Glucose.: 118 mg/dL (23 Feb 2021 07:43)    PHYSICAL EXAM  General: well appearing, NAD - saturating well on TC 28%  Neck: +trach collar, area c/d/i  Respiratory: clear b/l  Cardiovascular: +s1/s2  Abdomen: +BS, soft, nt/nd, no peritoneal signs noted, +PEG  Extremities: No pedal edema  Skin: as per RN AAI sheet  Neurological: no new focal deficits      HOSPITAL MEDICATIONS:  MEDICATIONS  (STANDING):  acetaminophen    Suspension .. 650 milliGRAM(s) Oral every 6 hours  BACItracin   Ointment 1 Application(s) Topical two times a day  chlorhexidine 4% Liquid 1 Application(s) Topical daily  clonazePAM  Tablet 1 milliGRAM(s) Oral <User Schedule>  dextrose 5%. 1000 milliLiter(s) (100 mL/Hr) IV Continuous <Continuous>  dextrose 5%. 1000 milliLiter(s) (50 mL/Hr) IV Continuous <Continuous>  enoxaparin Injectable 40 milliGRAM(s) SubCutaneous every 12 hours  glucagon  Injectable 1 milliGRAM(s) IntraMuscular once  insulin lispro (ADMELOG) corrective regimen sliding scale   SubCutaneous three times a day before meals  insulin lispro (ADMELOG) corrective regimen sliding scale   SubCutaneous at bedtime  mirtazapine 15 milliGRAM(s) Oral at bedtime  multivitamin 1 Tablet(s) Oral daily  QUEtiapine 12.5 milliGRAM(s) Oral daily  QUEtiapine 50 milliGRAM(s) Oral at bedtime    MEDICATIONS  (PRN):  ALBUTerol    90 MICROgram(s) HFA Inhaler 2 Puff(s) Inhalation every 6 hours PRN Wheezing  aluminum hydroxide/magnesium hydroxide/simethicone Suspension 30 milliLiter(s) Oral every 6 hours PRN Dyspepsia  bisacodyl Suppository 10 milliGRAM(s) Rectal at bedtime PRN Constipation  oxyCODONE    Solution 5 milliGRAM(s) Oral every 6 hours PRN Severe Pain (7 - 10)  polyethylene glycol 3350 17 Gram(s) Oral two times a day PRN Constipation      LABS:                        9.3    7.99  )-----------( 214      ( 23 Feb 2021 07:02 )             29.6     02-23    139  |  99  |  5<L>  ----------------------------<  130<H>  3.7   |  26  |  0.54    Ca    9.2      23 Feb 2021 07:02  Phos  4.9     02-23  Mg     2.0     02-23                MICROBIOLOGY:     RADIOLOGY:  [ ] Reviewed and interpreted by me    PULMONARY FUNCTION TESTS:    EKG:

## 2021-02-23 NOTE — PROCEDURE NOTE - NSPROCNAME_GEN_A_CORE
Tracheal Intubation
Central Line Insertion
Gastric Intubation/Gastric Lavage
Arterial Puncture/Cannulation
General
Arterial Puncture/Cannulation
Gastric Intubation/Gastric Lavage
Central Line Insertion

## 2021-02-23 NOTE — PROGRESS NOTE ADULT - ATTENDING COMMENTS
alex with plan as outlined above. Patient seen and examined at bedside. Patient history, laboratory da ta, and imaging personally reviewed.    Pt is a 46F with PMHx DMII and obesity admitted to [facility] for hypoxemic respiratory failure with ARDS 2/2 COVID19 PNA with failure to wean from ventilator s/p tracheostomy placement and RCU transfer for further management.    Pt with resolving encephalopathy, consciousness significantly improved. Weaning sedation, on clonazepam+ Seroquel+ mirtazapine. Pt very anxious intermittently. PT/OT following.     Pt initially p/w severe hypoxemic respiratory failure with failure to wean from ventilator s/p tracheostomy placement (1/28) now tolerating weaning. On TC ATC with intermittent PMV. Unable to change to cuffless trach on 2/18 2/2 anxiety and brief episodes of hypoxemia during the procedure, re-attempted again today but with similar issues. Will c/w airway clearance therapy, respiratory secretions improved. Trach care as per RCU team.     Pt remains hemodynamically stable off all pressors. Also with oropharyngeal dysphagia s/p PEG (1/26) now tolerating dysphagia diet since 2/16. Bowel regimen in place. GI ppx. HISS with BGFS monitoring for DMII. DVT ppx with Lovenox.     Pt's hospital course further c/b EColi UTI, as well as VAP 2/2 MRSA and Enterobacter. Now clinically improving, without any s/s acute infectious process. Monitoring off abx.     Dispo likely to acute rehab pending medical optimization. alex with plan as outlined above. Patient seen and examined at bedside. Patient history, laboratory da ta, and imaging personally reviewed.    Pt is a 46F with PMHx DMII and obesity admitted to [facility] for hypoxemic respiratory failure with ARDS 2/2 COVID19 PNA with failure to wean from ventilator s/p tracheostomy placement and RCU transfer for further management.    Pt with resolving encephalopathy, consciousness significantly improved. Weaning sedation, on clonazepam+ Seroquel+ mirtazapine. Pt very anxious intermittently. PT/OT following.     Pt initially p/w severe hypoxemic respiratory failure with failure to wean from ventilator s/p tracheostomy placement (1/28) now tolerating weaning. On TC ATC with intermittent PMV. Unable to change to cuffless trach on 2/18 2/2 anxiety and brief episodes of hypoxemia during the procedure, re-attempted with IP at bedside and were able to successfully change to cuffless 6 Shiley. Will c/w airway clearance therapy, respiratory secretions improved. Trach care as per RCU team. Will perform  trial this afternoon.     Pt remains hemodynamically stable off all pressors. Also with oropharyngeal dysphagia s/p PEG (1/26) now tolerating dysphagia diet since 2/16. Bowel regimen in place. GI ppx. HISS with BGFS monitoring for DMII. DVT ppx with Lovenox.     Pt's hospital course further c/b EColi UTI, as well as VAP 2/2 MRSA and Enterobacter. Now clinically improving, without any s/s acute infectious process. Monitoring off abx.     Dispo likely to acute rehab pending medical optimization.

## 2021-02-23 NOTE — PROCEDURE NOTE - PROCEDURE DATE TIME, MLM
05-Jan-2021 15:09
16-Jan-2021 06:25
05-Jan-2021 14:00
23-Feb-2021 14:00
05-Jan-2021 15:26
16-Jan-2021 06:00
05-Jan-2021 15:24
16-Jan-2021 05:50

## 2021-02-24 LAB
ANION GAP SERPL CALC-SCNC: 13 MMOL/L — SIGNIFICANT CHANGE UP (ref 7–14)
BUN SERPL-MCNC: 4 MG/DL — LOW (ref 7–23)
CALCIUM SERPL-MCNC: 9.2 MG/DL — SIGNIFICANT CHANGE UP (ref 8.4–10.5)
CHLORIDE SERPL-SCNC: 102 MMOL/L — SIGNIFICANT CHANGE UP (ref 98–107)
CO2 SERPL-SCNC: 25 MMOL/L — SIGNIFICANT CHANGE UP (ref 22–31)
CREAT SERPL-MCNC: 0.54 MG/DL — SIGNIFICANT CHANGE UP (ref 0.5–1.3)
GLUCOSE SERPL-MCNC: 98 MG/DL — SIGNIFICANT CHANGE UP (ref 70–99)
HCT VFR BLD CALC: 28.9 % — LOW (ref 34.5–45)
HGB BLD-MCNC: 9.1 G/DL — LOW (ref 11.5–15.5)
MAGNESIUM SERPL-MCNC: 2 MG/DL — SIGNIFICANT CHANGE UP (ref 1.6–2.6)
MCHC RBC-ENTMCNC: 27.8 PG — SIGNIFICANT CHANGE UP (ref 27–34)
MCHC RBC-ENTMCNC: 31.5 GM/DL — LOW (ref 32–36)
MCV RBC AUTO: 88.4 FL — SIGNIFICANT CHANGE UP (ref 80–100)
NRBC # BLD: 0 /100 WBCS — SIGNIFICANT CHANGE UP
NRBC # FLD: 0 K/UL — SIGNIFICANT CHANGE UP
PHOSPHATE SERPL-MCNC: 4.5 MG/DL — SIGNIFICANT CHANGE UP (ref 2.5–4.5)
PLATELET # BLD AUTO: 215 K/UL — SIGNIFICANT CHANGE UP (ref 150–400)
POTASSIUM SERPL-MCNC: 3.6 MMOL/L — SIGNIFICANT CHANGE UP (ref 3.5–5.3)
POTASSIUM SERPL-SCNC: 3.6 MMOL/L — SIGNIFICANT CHANGE UP (ref 3.5–5.3)
RBC # BLD: 3.27 M/UL — LOW (ref 3.8–5.2)
RBC # FLD: 16.7 % — HIGH (ref 10.3–14.5)
SODIUM SERPL-SCNC: 140 MMOL/L — SIGNIFICANT CHANGE UP (ref 135–145)
WBC # BLD: 6.82 K/UL — SIGNIFICANT CHANGE UP (ref 3.8–10.5)
WBC # FLD AUTO: 6.82 K/UL — SIGNIFICANT CHANGE UP (ref 3.8–10.5)

## 2021-02-24 PROCEDURE — 99232 SBSQ HOSP IP/OBS MODERATE 35: CPT

## 2021-02-24 PROCEDURE — 99233 SBSQ HOSP IP/OBS HIGH 50: CPT | Mod: 25

## 2021-02-24 RX ORDER — QUETIAPINE FUMARATE 200 MG/1
25 TABLET, FILM COATED ORAL AT BEDTIME
Refills: 0 | Status: DISCONTINUED | OUTPATIENT
Start: 2021-02-24 | End: 2021-02-26

## 2021-02-24 RX ORDER — OXYCODONE AND ACETAMINOPHEN 5; 325 MG/1; MG/1
1 TABLET ORAL EVERY 6 HOURS
Refills: 0 | Status: DISCONTINUED | OUTPATIENT
Start: 2021-02-24 | End: 2021-02-25

## 2021-02-24 RX ORDER — ACETAMINOPHEN 500 MG
650 TABLET ORAL EVERY 6 HOURS
Refills: 0 | Status: DISCONTINUED | OUTPATIENT
Start: 2021-02-24 | End: 2021-03-03

## 2021-02-24 RX ADMIN — Medication 1 MILLIGRAM(S): at 16:53

## 2021-02-24 RX ADMIN — Medication 650 MILLIGRAM(S): at 11:06

## 2021-02-24 RX ADMIN — QUETIAPINE FUMARATE 12.5 MILLIGRAM(S): 200 TABLET, FILM COATED ORAL at 11:06

## 2021-02-24 RX ADMIN — Medication 650 MILLIGRAM(S): at 22:28

## 2021-02-24 RX ADMIN — ENOXAPARIN SODIUM 40 MILLIGRAM(S): 100 INJECTION SUBCUTANEOUS at 04:58

## 2021-02-24 RX ADMIN — QUETIAPINE FUMARATE 25 MILLIGRAM(S): 200 TABLET, FILM COATED ORAL at 22:23

## 2021-02-24 RX ADMIN — ENOXAPARIN SODIUM 40 MILLIGRAM(S): 100 INJECTION SUBCUTANEOUS at 16:55

## 2021-02-24 RX ADMIN — MIRTAZAPINE 15 MILLIGRAM(S): 45 TABLET, ORALLY DISINTEGRATING ORAL at 22:23

## 2021-02-24 RX ADMIN — CHLORHEXIDINE GLUCONATE 1 APPLICATION(S): 213 SOLUTION TOPICAL at 04:57

## 2021-02-24 RX ADMIN — Medication 1 APPLICATION(S): at 04:57

## 2021-02-24 RX ADMIN — Medication 650 MILLIGRAM(S): at 04:57

## 2021-02-24 RX ADMIN — Medication 1 MILLIGRAM(S): at 04:57

## 2021-02-24 RX ADMIN — OXYCODONE HYDROCHLORIDE 5 MILLIGRAM(S): 5 TABLET ORAL at 15:18

## 2021-02-24 RX ADMIN — Medication 1 TABLET(S): at 11:06

## 2021-02-24 RX ADMIN — Medication 500 MILLIGRAM(S): at 15:51

## 2021-02-24 RX ADMIN — Medication 1 APPLICATION(S): at 17:27

## 2021-02-24 NOTE — PROGRESS NOTE ADULT - ASSESSMENT
47 YO F with Morbid Obesity and DM2 A1C 6.6 admitted for ARDS second to SARS-COV-2, intubated 1/5-1/10 then transferred to Medicine, however failed BIPAP and reintubated 1/16. Course complicated ECOLI UTI, Mouth Sores and Enterobacter and MRSA VAP. s/p Tracheostomy 1/28 and PEG 1/26. Now transferred to RCU.     # ICU Delirium/ Agitation /Depression   - Now weaned off sedation  - c/w Seroquel 12.5mg AM and 50mg QHS and Klonopin 1mg BID  - c/w Remeron with supportive care.    - Monitor mentation.     # ARDS second to SARS-COV-2 vs Superimposed Enterobacter and MRSA PNA   - s/p Remdesivir, Decadron and ABX as belowed.   - s/p Prolonged ICU stay and Tracheostomy on 1/28. Now TC and PMV.   - Exchanged 6DCT for 6CFS on 2/23.  trials as tolerated.   - Proventil and Chest PT Q6H. Suction PRN. Trach care QD.     # Septic vs Vasoplegic Shock (resolved)  - Off all Pressors. Monitor HR/ BP     # Dysphagia with PEG   - s/p PEG 1/26 and continue on TF as tolerated.   - Bowel regimen (Senna and Miralax and Dulcolax PRN)   - Passed CINE on 2/16, c/w Mechanical soft Honey thick liquid    # /UTI  - Passed TOV 2/23 and noted with dysuria. UA (+) and now treating empirically for UTI.   - Hx of ECOLI UTI sensitive to Cipro. c/w Cipro x 3 days (2/23 - )   - Hyponatremia/ Hypernatremia, monitor electrolytes and renal function as tolerated.     # Sepsis second to SARS-COV-2 vs Enterobacter and MRSA PNA vs HSV Type 1   - Completed empiric ABX for PNA with Zosyn (1/16-1/20), however cultures negative   - UCx ECOLI 1/25 s/p CTX (1/25-1/27).   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   - Now with dysuria and Cipro (2/23 - 2/25)     # DM2 A1C 6.6 (1/2021)   - Continue on ISS and monitor FS Q6H    # Wounds   - WOC recommendations appreciated.      # DVT PPX with Lovenox BID   # CODE STATUS - Full Code   # DISPO - PT recommends Rehab.  45 YO F with Morbid Obesity and DM2 A1C 6.6 admitted for ARDS second to SARS-COV-2, intubated 1/5-1/10 then transferred to Medicine, however failed BIPAP and reintubated 1/16. Course complicated ECOLI UTI, Mouth Sores and Enterobacter and MRSA VAP. s/p Tracheostomy 1/28 and PEG 1/26. Now transferred to RCU.     # ICU Delirium/ Agitation /Depression   - Now weaned off sedation  - c/w Seroquel 12.5mg AM and 50mg QHS and Klonopin 1mg BID  - c/w Remeron with supportive care.    - Monitor mentation.     # ARDS second to SARS-COV-2 vs Superimposed Enterobacter and MRSA PNA   - s/p Remdesivir, Decadron and ABX as belowed.   - s/p Prolonged ICU stay and Tracheostomy on 1/28. Now TC and PMV.   - Exchanged 6DCT for 6CFN on 2/23. Cap trials as tolerated.   - Proventil and Chest PT Q6H. Suction PRN. Trach care QD.     # Septic vs Vasoplegic Shock (resolved)  - Off all Pressors. Monitor HR/ BP     # Dysphagia with PEG   - s/p PEG 1/26 and continue on TF as tolerated.   - Bowel regimen (Senna and Miralax and Dulcolax PRN)   - Passed CINE on 2/16, c/w Mechanical soft Honey thick liquid    # /UTI  - Passed TOV 2/23 and noted with dysuria. UA (+) and now treating empirically for UTI.   - Hx of ECOLI UTI sensitive to Cipro. c/w Cipro x 3 days (2/23 - ). Pending UCx.   - Hyponatremia/ Hypernatremia, monitor electrolytes and renal function as tolerated.     # Sepsis second to SARS-COV-2 vs Enterobacter and MRSA PNA vs HSV Type 1   - Completed empiric ABX for PNA with Zosyn (1/16-1/20), however cultures negative   - UCx ECOLI 1/25 s/p CTX (1/25-1/27).   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   - Now with dysuria and Cipro (2/23 - 2/25). Pending UCx.     # DM2 A1C 6.6 (1/2021)   - Continue on ISS and monitor FS Q6H    # Wounds   - WOC recommendations appreciated.      # DVT PPX with Lovenox BID   # CODE STATUS - Full Code   # DISPO - PT recommends Rehab.

## 2021-02-24 NOTE — PROGRESS NOTE ADULT - SUBJECTIVE AND OBJECTIVE BOX
CHIEF COMPLAINT: Patient is a 46y old  Female who presents with a chief complaint of COVID (2021 14:58)    SUBJECTIVE: No interval events overnight.     [ ] All other systems negative  [ ] Unable to assess ROS because ________    OBJECTIVE:  ICU Vital Signs Last 24 Hrs  T(C): 36.8 (2021 04:55), Max: 36.8 (2021 14:17)  T(F): 98.3 (2021 04:55), Max: 98.3 (2021 04:55)  HR: 93 (2021 04:55) (93 - 109)  BP: 125/72 (2021 04:55) (101/80 - 125/72)  BP(mean): --  ABP: --  ABP(mean): --  RR: 20 (2021 04:55) (18 - 20)  SpO2: 100% (2021 04:55) (97% - 100%)    CAPILLARY BLOOD GLUCOSE  POCT Blood Glucose.: 94 mg/dL (2021 07:49)    PHYSICAL EXAM:  General:   HEENT:   Lymph Nodes:  Neck:   Respiratory:   Cardiovascular:   Abdomen:   Extremities:   Skin:   Neurological:  Psychiatry:    HOSPITAL MEDICATIONS:  MEDICATIONS  (STANDING):  acetaminophen    Suspension .. 650 milliGRAM(s) Oral every 6 hours  BACItracin   Ointment 1 Application(s) Topical two times a day  chlorhexidine 4% Liquid 1 Application(s) Topical daily  ciprofloxacin     Tablet 500 milliGRAM(s) Oral every 24 hours  clonazePAM  Tablet 1 milliGRAM(s) Oral <User Schedule>  dextrose 5%. 1000 milliLiter(s) (50 mL/Hr) IV Continuous <Continuous>  dextrose 5%. 1000 milliLiter(s) (100 mL/Hr) IV Continuous <Continuous>  enoxaparin Injectable 40 milliGRAM(s) SubCutaneous every 12 hours  glucagon  Injectable 1 milliGRAM(s) IntraMuscular once  insulin lispro (ADMELOG) corrective regimen sliding scale   SubCutaneous three times a day before meals  insulin lispro (ADMELOG) corrective regimen sliding scale   SubCutaneous at bedtime  mirtazapine 15 milliGRAM(s) Oral at bedtime  multivitamin 1 Tablet(s) Oral daily  QUEtiapine 12.5 milliGRAM(s) Oral daily  QUEtiapine 50 milliGRAM(s) Oral at bedtime    MEDICATIONS  (PRN):  ALBUTerol    90 MICROgram(s) HFA Inhaler 2 Puff(s) Inhalation every 6 hours PRN Wheezing  aluminum hydroxide/magnesium hydroxide/simethicone Suspension 30 milliLiter(s) Oral every 6 hours PRN Dyspepsia  bisacodyl Suppository 10 milliGRAM(s) Rectal at bedtime PRN Constipation  oxyCODONE    Solution 5 milliGRAM(s) Oral every 6 hours PRN Severe Pain (7 - 10)  polyethylene glycol 3350 17 Gram(s) Oral two times a day PRN Constipation    LABS:                        9.1    6.82  )-----------( 215      ( 2021 05:23 )             28.9     02-    140  |  102  |  4<L>  ----------------------------<  98  3.6   |  25  |  0.54    Ca    9.2      2021 05:23  Phos  4.5       Mg     2.0         Urinalysis Basic - ( 2021 14:14 )  Color: Light Yellow / Appearance: Slightly Turbid / S.008 / pH: x  Gluc: x / Ketone: Negative  / Bili: Negative / Urobili: <2 mg/dL   Blood: x / Protein: Trace / Nitrite: Negative   Leuk Esterase: Large / RBC: 2 /HPF /  /HPF   Sq Epi: x / Non Sq Epi: 0 /HPF / Bacteria: Negative CHIEF COMPLAINT: Patient is a 46y old  Female who presents with a chief complaint of COVID (2021 14:58)    SUBJECTIVE: No interval events overnight. Seen by bedside post trach exchanged yesterday and noted with continuos tracheal irritation/ coughing. Despite such, denies SOB or CP. Tolerating PMV well and red/ white capped by bedside and noted to tolerate well.     OBJECTIVE:  ICU Vital Signs Last 24 Hrs  T(C): 36.8 (2021 04:55), Max: 36.8 (2021 14:17)  T(F): 98.3 (2021 04:55), Max: 98.3 (2021 04:55)  HR: 93 (2021 04:55) (93 - 109)  BP: 125/72 (2021 04:55) (101/80 - 125/72)  BP(mean): --  ABP: --  ABP(mean): --  RR: 20 (2021 04:55) (18 - 20)  SpO2: 100% (2021 04:55) (97% - 100%)    CAPILLARY BLOOD GLUCOSE  POCT Blood Glucose.: 94 mg/dL (2021 07:49)    PHYSICAL EXAM:  General: NAD and well groomed   HEENT: NC/ AT, PERRLA  Neck: Tracheostomy C/D/I and tolerating capped.   Cardio: RRR, S1/S2, no murmurs or rubs.   Pulm: CTA, equal bilaterally. (+) Coughing from tracheal irritation.   GI: Soft, NDNT, BS (+). PEG (+)  MS: No pedal edema with AROMI   Neuro: AOx3. No focal neurological deficits noted.   Skin: Warm and dry. No jaundice or cyanosis     HOSPITAL MEDICATIONS:  MEDICATIONS  (STANDING):  acetaminophen    Suspension .. 650 milliGRAM(s) Oral every 6 hours  BACItracin   Ointment 1 Application(s) Topical two times a day  chlorhexidine 4% Liquid 1 Application(s) Topical daily  ciprofloxacin     Tablet 500 milliGRAM(s) Oral every 24 hours  clonazePAM  Tablet 1 milliGRAM(s) Oral <User Schedule>  dextrose 5%. 1000 milliLiter(s) (50 mL/Hr) IV Continuous <Continuous>  dextrose 5%. 1000 milliLiter(s) (100 mL/Hr) IV Continuous <Continuous>  enoxaparin Injectable 40 milliGRAM(s) SubCutaneous every 12 hours  glucagon  Injectable 1 milliGRAM(s) IntraMuscular once  insulin lispro (ADMELOG) corrective regimen sliding scale   SubCutaneous three times a day before meals  insulin lispro (ADMELOG) corrective regimen sliding scale   SubCutaneous at bedtime  mirtazapine 15 milliGRAM(s) Oral at bedtime  multivitamin 1 Tablet(s) Oral daily  QUEtiapine 12.5 milliGRAM(s) Oral daily  QUEtiapine 50 milliGRAM(s) Oral at bedtime    MEDICATIONS  (PRN):  ALBUTerol    90 MICROgram(s) HFA Inhaler 2 Puff(s) Inhalation every 6 hours PRN Wheezing  aluminum hydroxide/magnesium hydroxide/simethicone Suspension 30 milliLiter(s) Oral every 6 hours PRN Dyspepsia  bisacodyl Suppository 10 milliGRAM(s) Rectal at bedtime PRN Constipation  oxyCODONE    Solution 5 milliGRAM(s) Oral every 6 hours PRN Severe Pain (7 - 10)  polyethylene glycol 3350 17 Gram(s) Oral two times a day PRN Constipation    LABS:                        9.1    6.82  )-----------( 215      ( 2021 05:23 )             28.9     02-24    140  |  102  |  4<L>  ----------------------------<  98  3.6   |  25  |  0.54    Ca    9.2      2021 05:23  Phos  4.5       Mg     2.0     -    Urinalysis Basic - ( 2021 14:14 )  Color: Light Yellow / Appearance: Slightly Turbid / S.008 / pH: x  Gluc: x / Ketone: Negative  / Bili: Negative / Urobili: <2 mg/dL   Blood: x / Protein: Trace / Nitrite: Negative   Leuk Esterase: Large / RBC: 2 /HPF /  /HPF   Sq Epi: x / Non Sq Epi: 0 /HPF / Bacteria: Negative

## 2021-02-24 NOTE — PROGRESS NOTE ADULT - SUBJECTIVE AND OBJECTIVE BOX
Patient is a 46y old  Female who presents with a chief complaint of COVID (2021 07:51)      HPI:  Patient is a 46 year old woman with no past medical history coming in with shortness of breath, cough productive of white sputum, pleuritic rib pain (moderate in severity) for two days and diarrhea for one day. Patient  tested positive for COVID . Patient denies loss of taste or smell. Patient denies sore throat however states mouth is dry. Patient denies any history of lung disease or smoking. Patient states she was having subjective fevers at home however never took her temperature. Patient was taking an unknown antibiotic prescribed by a doctor. Unable to give name of doctor or name of abx. Patient dyspneic during with talking during interview.   ED Course: T 98.6, HR 96, /90, S 80% on RA. White count 10k. Hemoglobin 11.0. Plt 236. D-dimer 243. CRP 81.3. . CXR peripheral opacities consistent w/ COVID. Patient given 6 of dexamethasone. Patient admitted for acute hypoxic respiratory failure in the setting of likely COVID infection.  (2021 18:35)    trach downsized yesterday. Planned for  today.   reports mild headache today, 2/10    REVIEW OF SYSTEMS  Constitutional - No fever, No weight loss, No fatigue  HEENT - No eye pain, No visual disturbances, No difficulty hearing, No tinnitus, No vertigo, No neck pain  Respiratory - No cough, No wheezing, No shortness of breath  Cardiovascular - No chest pain, No palpitations  Gastrointestinal - No abdominal pain, No nausea, No vomiting, No diarrhea, No constipation  Genitourinary - No dysuria, No frequency, No hematuria, No incontinence  Neurological -+ headaches, No memory loss, +  loss of strength, No numbness, No tremors  Skin - healing R cheek abrasion   Endocrine - No temperature intolerance  Musculoskeletal - No joint pain, No joint swelling, No muscle pain  Psychiatric - No depression, No anxiety    PAST MEDICAL & SURGICAL HISTORY  No pertinent past medical history  No significant past surgical history    CURRENT FUNCTIONAL STATUS    Patient completed bed moblility with stand by assist (+) trach O2 6L satting 95%. Patient completed transfers with minimal assist and ambulated with contact guard assist for 15 feet,  Desat to 86%, with standing rest pt able to recover. Patient experiencing nausea requiring a sitting rest      FAMILY HISTORY   FH: type 2 diabetes        RECENT LABS/IMAGING  CBC Full  -  ( 2021 05:23 )  WBC Count : 6.82 K/uL  RBC Count : 3.27 M/uL  Hemoglobin : 9.1 g/dL  Hematocrit : 28.9 %  Platelet Count - Automated : 215 K/uL  Mean Cell Volume : 88.4 fL  Mean Cell Hemoglobin : 27.8 pg  Mean Cell Hemoglobin Concentration : 31.5 gm/dL  Auto Neutrophil # : x  Auto Lymphocyte # : x  Auto Monocyte # : x  Auto Eosinophil # : x  Auto Basophil # : x  Auto Neutrophil % : x  Auto Lymphocyte % : x  Auto Monocyte % : x  Auto Eosinophil % : x  Auto Basophil % : x        140  |  102  |  4<L>  ----------------------------<  98  3.6   |  25  |  0.54    Ca    9.2      2021 05:23  Phos  4.5       Mg     2.0           Urinalysis Basic - ( 2021 14:14 )    Color: Light Yellow / Appearance: Slightly Turbid / S.008 / pH: x  Gluc: x / Ketone: Negative  / Bili: Negative / Urobili: <2 mg/dL   Blood: x / Protein: Trace / Nitrite: Negative   Leuk Esterase: Large / RBC: 2 /HPF /  /HPF   Sq Epi: x / Non Sq Epi: 0 /HPF / Bacteria: Negative        VITALS  T(C): 36.8 (21 @ 04:55), Max: 36.8 (21 @ 14:17)  HR: 93 (21 @ 04:55) (93 - 109)  BP: 125/72 (21 @ 04:55) (101/80 - 125/72)  RR: 20 (21 @ 04:55) (18 - 20)  SpO2: 100% (21 @ 04:55) (97% - 100%)  Wt(kg): --    ALLERGIES  No Known Allergies      MEDICATIONS   acetaminophen    Suspension .. 650 milliGRAM(s) Oral every 6 hours  ALBUTerol    90 MICROgram(s) HFA Inhaler 2 Puff(s) Inhalation every 6 hours PRN  aluminum hydroxide/magnesium hydroxide/simethicone Suspension 30 milliLiter(s) Oral every 6 hours PRN  BACItracin   Ointment 1 Application(s) Topical two times a day  bisacodyl Suppository 10 milliGRAM(s) Rectal at bedtime PRN  chlorhexidine 4% Liquid 1 Application(s) Topical daily  ciprofloxacin     Tablet 500 milliGRAM(s) Oral every 24 hours  clonazePAM  Tablet 1 milliGRAM(s) Oral <User Schedule>  dextrose 5%. 1000 milliLiter(s) IV Continuous <Continuous>  dextrose 5%. 1000 milliLiter(s) IV Continuous <Continuous>  enoxaparin Injectable 40 milliGRAM(s) SubCutaneous every 12 hours  glucagon  Injectable 1 milliGRAM(s) IntraMuscular once  insulin lispro (ADMELOG) corrective regimen sliding scale   SubCutaneous three times a day before meals  insulin lispro (ADMELOG) corrective regimen sliding scale   SubCutaneous at bedtime  mirtazapine 15 milliGRAM(s) Oral at bedtime  multivitamin 1 Tablet(s) Oral daily  oxyCODONE    Solution 5 milliGRAM(s) Oral every 6 hours PRN  polyethylene glycol 3350 17 Gram(s) Oral two times a day PRN  QUEtiapine 12.5 milliGRAM(s) Oral daily  QUEtiapine 50 milliGRAM(s) Oral at bedtime      ----------------------------------------------------------------------------------------    PHYSICAL EXAM  Constitutional - NAD, Comfortable  HEENT - healing abrasion R cheek,  + trache with speaking valve  Chest - no respiratory distress  Cardiovascular - no edema  Abdomen - Soft, NTND, + PEG   Extremities - no calf tenderness   Neurologic Exam -                    Cognitive - Awake, Alert, AAO to self, place, date, year, situation     Communication - Fluent, No dysarthria     Cranial Nerves - CN 2-12 intact     Motor -                      LEFT    UE -4/5                    RIGHT UE - 4/5                    LEFT    LE - 4/5                                    RIGHT LE -4/5       Sensory - Intact to LT        Balance - WNL Static  Psychiatric - stable  ----------------------------------------------------------------------------------------  ASSESSMENT/PLAN   47 yo f admitted for ARDS second to SARS-COV-2, intubated -1/10 then transferred to Medicine, however failed BIPAP and reintubated . Course complicated UTI, Mouth Sores and Enterobacter and MRSA VAP. s/p Tracheostomy  and PEG .     monitor HR and oxygen with activity  burdick removed. on cipro for UTI  contact precautions for MRSA  depression: on remeron  delirium- on seroquel  pain: tylenol prn, oxy ir 5mg daily with bowel regimen  MRSA/HSV: on bactroban, s/p acyclovir   Diet: dysphagia 2 mech soft honey thick  DVT PPX - lovenox  continue bedside PT and OT  OOB to chair daily  Rehab - when medically cleared and able to tolerate mobilization without oxygen desaturation below 90% (with 4L nc or less),   Recommend ACUTE inpatient rehabilitation for the functional deficits consisting of 3 hours of therapy/day & 24 hour RN/daily PMR physician for comorbid medical management. Will continue to follow for ongoing rehab needs and recommendations.

## 2021-02-24 NOTE — PROGRESS NOTE ADULT - ATTENDING COMMENTS
Agree with plan as outlined above. Patient seen and examined at bedside. Patient history, laboratory da ta, and imaging personally reviewed.    Pt is a 46F with PMHx DMII and obesity admitted to [facility] for hypoxemic respiratory failure with ARDS 2/2 COVID19 PNA with failure to wean from ventilator s/p tracheostomy placement and RCU transfer for further management.    Pt with resolving encephalopathy, consciousness significantly improved. Weaning sedation, on clonazepam+ Seroquel+ mirtazapine. Anxiety improved. PT/OT following.     Pt initially p/w severe hypoxemic respiratory failure with failure to wean from ventilator s/p tracheostomy placement (1/28) now tolerating weaning. On TC ATC,  trial today. Will c/w airway clearance therapy, respiratory secretions improved. Trach care as per RCU team. Pt remains hemodynamically stable off all pressors. Also with oropharyngeal dysphagia s/p PEG (1/26) now tolerating dysphagia diet since 2/16. Bowel regimen in place. GI ppx. HISS with BGFS monitoring for DMII. DVT ppx with Lovenox.     Pt's hospital course further c/b EColi UTI, as well as VAP 2/2 MRSA and Enterobacter. Now clinically improving, without any s/s acute infectious process. Monitoring off abx.     Dispo likely to acute rehab pending medical optimization.

## 2021-02-25 LAB
GRAM STN FLD: SIGNIFICANT CHANGE UP
SPECIMEN SOURCE: SIGNIFICANT CHANGE UP

## 2021-02-25 PROCEDURE — 99233 SBSQ HOSP IP/OBS HIGH 50: CPT | Mod: 25

## 2021-02-25 RX ORDER — OXYCODONE HYDROCHLORIDE 5 MG/1
5 TABLET ORAL EVERY 6 HOURS
Refills: 0 | Status: DISCONTINUED | OUTPATIENT
Start: 2021-02-25 | End: 2021-03-03

## 2021-02-25 RX ORDER — CIPROFLOXACIN LACTATE 400MG/40ML
500 VIAL (ML) INTRAVENOUS EVERY 24 HOURS
Refills: 0 | Status: DISCONTINUED | OUTPATIENT
Start: 2021-02-25 | End: 2021-02-25

## 2021-02-25 RX ORDER — CIPROFLOXACIN LACTATE 400MG/40ML
500 VIAL (ML) INTRAVENOUS EVERY 12 HOURS
Refills: 0 | Status: COMPLETED | OUTPATIENT
Start: 2021-02-25 | End: 2021-03-03

## 2021-02-25 RX ORDER — OXYCODONE AND ACETAMINOPHEN 5; 325 MG/1; MG/1
1 TABLET ORAL EVERY 6 HOURS
Refills: 0 | Status: DISCONTINUED | OUTPATIENT
Start: 2021-02-25 | End: 2021-02-26

## 2021-02-25 RX ORDER — CIPROFLOXACIN LACTATE 400MG/40ML
500 VIAL (ML) INTRAVENOUS EVERY 12 HOURS
Refills: 0 | Status: DISCONTINUED | OUTPATIENT
Start: 2021-02-25 | End: 2021-02-25

## 2021-02-25 RX ORDER — CLONAZEPAM 1 MG
1 TABLET ORAL
Refills: 0 | Status: DISCONTINUED | OUTPATIENT
Start: 2021-02-25 | End: 2021-03-03

## 2021-02-25 RX ADMIN — Medication 500 MILLIGRAM(S): at 17:15

## 2021-02-25 RX ADMIN — Medication 1 MILLIGRAM(S): at 17:15

## 2021-02-25 RX ADMIN — Medication 1 APPLICATION(S): at 05:23

## 2021-02-25 RX ADMIN — Medication 250 MILLIGRAM(S): at 22:39

## 2021-02-25 RX ADMIN — ENOXAPARIN SODIUM 40 MILLIGRAM(S): 100 INJECTION SUBCUTANEOUS at 05:23

## 2021-02-25 RX ADMIN — MIRTAZAPINE 15 MILLIGRAM(S): 45 TABLET, ORALLY DISINTEGRATING ORAL at 21:26

## 2021-02-25 RX ADMIN — ENOXAPARIN SODIUM 40 MILLIGRAM(S): 100 INJECTION SUBCUTANEOUS at 17:16

## 2021-02-25 RX ADMIN — QUETIAPINE FUMARATE 12.5 MILLIGRAM(S): 200 TABLET, FILM COATED ORAL at 12:31

## 2021-02-25 RX ADMIN — Medication 250 MILLIGRAM(S): at 17:16

## 2021-02-25 RX ADMIN — Medication 650 MILLIGRAM(S): at 06:07

## 2021-02-25 RX ADMIN — OXYCODONE HYDROCHLORIDE 5 MILLIGRAM(S): 5 TABLET ORAL at 09:38

## 2021-02-25 RX ADMIN — Medication 1 MILLIGRAM(S): at 05:23

## 2021-02-25 RX ADMIN — Medication 1 TABLET(S): at 12:31

## 2021-02-25 RX ADMIN — Medication 1 APPLICATION(S): at 17:16

## 2021-02-25 RX ADMIN — CHLORHEXIDINE GLUCONATE 1 APPLICATION(S): 213 SOLUTION TOPICAL at 05:23

## 2021-02-25 RX ADMIN — QUETIAPINE FUMARATE 25 MILLIGRAM(S): 200 TABLET, FILM COATED ORAL at 21:26

## 2021-02-25 NOTE — CHART NOTE - NSCHARTNOTEFT_GEN_A_CORE
Source: Patient [X]    Family [ ]     other [X] Review of the patient's medical chart, RN    Current Diet : Diet, Dysphagia 2 Mechanical Soft-Honey Consistency Fluid (02-16-21 @ 11:24)    Reported:  [ ] nausea  [ ] vomiting [ ] diarrhea [ ] constipation  [ ]chewing problems [ ] swallowing issues  [ ] other:   PO intake:  < 50% [ ] 50-75% [ ]   % [ ]  other :    Current Weight and trends: Admission weight 109.8 kgs, 92.4kg 2/5, 97.5kg 2/5, 94.5kg 2/8, 96.1kg 2/10.  Pt states that he UBW is 242lb (110kg) in Dec 2020, which is consistent with weight obtained on admission.    Pt states that wt loss is possible, but she doubts it.  Noted, possible depletion to buccal fat region and temporal muscle wasting, but patient reports that she has not noted any changes in her appearance.  Attempted to obtain weight during encounter; however, bed scale not functioning.       Patient Hx:    45 YO F with Morbid Obesity and DM2 A1C 6.6 admitted for ARDS second to SARS-COV-2, intubated 1/5-1/10 then transferred to Medicine, however failed BIPAP and reintubated 1/16. Course complicated ECOLI UTI, Mouth Sores and Enterobacter and MRSA VAP. s/p Tracheostomy 1/28 and PEG 1/26. Now transferred to RCU.     Interval Nutrition Hx:    RDN met with patient at bedside. She reports consuming ~ 75% of meals.  Patient denies any nausea/vomiting/diarrhea/constipation, last BM 2/23.  No reported difficulties chewing and swallowing with current diet (Dysphagia 2, Mechanical Soft, Honey Consistency as per cinesophagram 2/16.       __________________ Pertinent Medications__________________   BACItracin   Ointment  chlorhexidine 4% Liquid  ciprofloxacin     Tablet  clonazePAM  Tablet  dextrose 5%.  dextrose 5%.  enoxaparin Injectable  glucagon  Injectable  insulin lispro (ADMELOG) corrective regimen sliding scale  insulin lispro (ADMELOG) corrective regimen sliding scale  mirtazapine  multivitamin  naproxen  QUEtiapine  QUEtiapine      __________________ Pertinent Labs__________________   02-24 Na140 mmol/L Glu 98 mg/dL K+ 3.6 mmol/L Cr  0.54 mg/dL BUN 4 mg/dL<L> 02-24 Phos 4.5 mg/dL        Skin:     Estimated Needs:   [ ] no change since previous assessment  [ ] recalculated:     Previous Nutrition Diagnosis:     [ ] Inadequate Energy Intake [ ]Inadequate Oral Intake [ ] Excessive Energy Intake   [ ] Underweight [ ] Increased Nutrient Needs [ ] Overweight/Obesity   [ ] Altered GI Function [ ] Unintended Weight Loss [ ] Food & Nutrition Related Knowledge Deficit [ ] Malnutrition     Nutrition Diagnosis is [ ] ongoing  [ ] resolved [ ] not applicable     New Nutrition Diagnosis: [ ] not applicable    [ ] Inadequate Protein Energy Intake   [ ]Inadequate Oral Intake   [ ] Excessive Energy Intake   [ ] Underweight   [ ] Increased Nutrient Needs   [ ] Overweight/Obesity   [ ] Altered GI Function   [ ] Unintended Weight Loss   [ ] Food & Nutrition Related Knowledge Deficit  [ ] Limited Adherence to nutrition related recommendations   [ ] Malnutrition    [ ] other:     Related to:   As evidenced by:     Nutrition Recommendations: Source: Patient [X]    Family [ ]     other [X] Review of the patient's medical chart, RN    Current Diet : Diet, Dysphagia 2 Mechanical Soft-Honey Consistency Fluid (02-16-21 @ 11:24)    Reported:  [ ] nausea  [ ] vomiting [ ] diarrhea [ ] constipation  [ ]chewing problems [ ] swallowing issues  [ ] other:   PO intake:  < 50% [ ] 50-75% [ ]   % [ ]  other :    Current Weight and trends: Admission weight 109.8 kgs, 92.4kg 2/5, 97.5kg 2/5, 94.5kg 2/8, 96.1kg 2/10.  Pt states that he UBW is 242lb (110kg) in Dec 2020, which is consistent with weight obtained on admission.    Pt states that wt loss is possible, but she doubts it.  Noted, possible depletion to buccal fat region and temporal muscle wasting, but patient reports that she has not noted any changes in her appearance.  Attempted to obtain weight during encounter; however, bed scale not functioning.       Patient Hx:    47 YO F with Morbid Obesity and DM2 A1C 6.6 admitted for ARDS second to SARS-COV-2, intubated 1/5-1/10 then transferred to Medicine, however failed BIPAP and reintubated 1/16. Course complicated ECOLI UTI, Mouth Sores and Enterobacter and MRSA VAP. s/p Tracheostomy 1/28 and PEG 1/26. Now transferred to RCU.     Interval Nutrition Hx:    RDN met with patient at bedside. She reports consuming ~ 75% of meals.  Patient denies any nausea/vomiting/diarrhea/constipation, last BM 2/23.  No reported difficulties chewing and swallowing with current diet (Dysphagia 2, Mechanical Soft, Honey Consistency as per cinesophagram 2/16. Pt also receiving Provide Magic Cup 2x daily (580kcal, 18gm pro).  PO intake encouraged.  RD remains available, re-consult as needed.       __________________ Pertinent Medications__________________   BACItracin   Ointment  chlorhexidine 4% Liquid  ciprofloxacin     Tablet  clonazePAM  Tablet  dextrose 5%.  dextrose 5%.  enoxaparin Injectable  glucagon  Injectable  insulin lispro (ADMELOG) corrective regimen sliding scale  insulin lispro (ADMELOG) corrective regimen sliding scale  mirtazapine  multivitamin  naproxen  QUEtiapine  QUEtiapine      __________________ Pertinent Labs__________________   02-24 Na140 mmol/L Glu 98 mg/dL K+ 3.6 mmol/L Cr  0.54 mg/dL BUN 4 mg/dL<L> 02-24 Phos 4.5 mg/dL        Skin: Wound to R. Cheek.     Estimated Needs:   [ X] no change since previous assessment  [ ] recalculated:           Nutrition Recommendations:  1- Continue current diet order, which remains appropriate at this time.   2- Monitor weights, labs, BM's, skin integrity, p.o. intake.   3- Please Encourage po intake, assist with meals and menu selections, provide alternatives PRN.     RD remains available, re-consult as needed. Caroline Ac, MS, RDN Pager #[phone]

## 2021-02-25 NOTE — PROGRESS NOTE ADULT - ASSESSMENT
46 F w respiratory failure due to COVID 19     Problem/Recommendation - 1:  Problem: Acute respiratory failure with hypoxia. Recommendation: s/p tracheostomy and PEG.   -can remove G tube, but concern pt may not be able to swallow pills yet? can remove at bedside once ready       Problem/Recommendation - 2:  ·  Problem: COVID-19.  Recommendation: status post dexamethasone.   on abx per ID for staph in sputum     Problem/Recommendation - 3:  ·  Problem: Normocytic anemia.  Recommendation: without overt GI bleeding. Likely due to critical illness. Trended down today  -monitor for GI bleed  -PPI QD.      Problem/Recommendation - 4:  ·  Problem: Abnormal LFTs.  Recommendation: multifactorial, likely NAFLD, COVID, critical illness. Can consider US to r/o gallstone disease, especially if increasing.      Problem/Recommendation - 5:  ·  Problem: Morbid obesity.         Attending Attestation:   Differential diagnosis and plan of care discussed with patient after the evaluation  35 Minutes spent on total encounter of which more than fifty percent of the encounter was spent counseling and/or coordinating care by the attending physician.    Mele Bolanos M.D.   Gastroenterology and Hepatology  Cell: [phone].

## 2021-02-25 NOTE — PROGRESS NOTE ADULT - SUBJECTIVE AND OBJECTIVE BOX
DATE OF SERVICE: 02-25-21     Subjective: Patient seen and examined. No new events except as noted.   headache     REVIEW OF SYSTEMS:    CONSTITUTIONAL: No weakness, fevers or chills  EYES/ENT: No visual changes;  No vertigo or throat pain   NECK: No pain or stiffness  RESPIRATORY: No cough, wheezing, hemoptysis; No shortness of breath  CARDIOVASCULAR: No chest pain or palpitations  GASTROINTESTINAL: No abdominal or epigastric pain.   GENITOURINARY: No dysuria, frequency or hematuria  NEUROLOGICAL: No numbness or weakness  SKIN: No itching, burning, rashes, or lesions   All other review of systems is negative unless indicated above.    MEDICATIONS:  MEDICATIONS  (STANDING):  BACItracin   Ointment 1 Application(s) Topical two times a day  chlorhexidine 4% Liquid 1 Application(s) Topical daily  ciprofloxacin     Tablet 500 milliGRAM(s) Oral every 12 hours  clonazePAM  Tablet 1 milliGRAM(s) Oral <User Schedule>  dextrose 5%. 1000 milliLiter(s) (50 mL/Hr) IV Continuous <Continuous>  dextrose 5%. 1000 milliLiter(s) (100 mL/Hr) IV Continuous <Continuous>  enoxaparin Injectable 40 milliGRAM(s) SubCutaneous every 12 hours  glucagon  Injectable 1 milliGRAM(s) IntraMuscular once  insulin lispro (ADMELOG) corrective regimen sliding scale   SubCutaneous three times a day before meals  insulin lispro (ADMELOG) corrective regimen sliding scale   SubCutaneous at bedtime  mirtazapine 15 milliGRAM(s) Oral at bedtime  multivitamin 1 Tablet(s) Oral daily  naproxen 250 milliGRAM(s) Oral two times a day  QUEtiapine 12.5 milliGRAM(s) Oral daily  QUEtiapine 25 milliGRAM(s) Oral at bedtime      PHYSICAL EXAM:  T(C): 36.6 (02-25-21 @ 21:17), Max: 36.7 (02-25-21 @ 05:20)  HR: 102 (02-25-21 @ 21:17) (95 - 102)  BP: 124/89 (02-25-21 @ 21:17) (117/76 - 130/75)  RR: 18 (02-25-21 @ 21:17) (18 - 20)  SpO2: 99% (02-25-21 @ 21:17) (97% - 100%)  Wt(kg): --  I&O's Summary    24 Feb 2021 07:01  -  25 Feb 2021 07:00  --------------------------------------------------------  IN: 0 mL / OUT: 1400 mL / NET: -1400 mL          Appearance: Normal	  HEENT:  PERRLA , trache color   Lymphatic: No lymphadenopathy   Cardiovascular: Normal S1 S2, no JVD  Respiratory: normal effort , clear  Gastrointestinal:  Soft, Non-tender  Skin: No rashes,  warm to touch  Psychiatry:  Mood & affect appropriate  Musculuskeletal: No edema      All labs, Imaging and EKGs personally reviewed             Culture - Sputum (collected 02-25-21 @ 21:11)  Source: .Sputum Sputum  Gram Stain (02-25-21 @ 23:29):    Numerous polymorphonuclear leukocytes per low power field    Rare Squamous epithelial cells per low power field    Few Gram Positive Cocci in Pairs and Chains per oil power field    Rare Gram Positive Rods per oil power field    Rare Gram Positive Cocci inClusters per oil power field                          9.1    6.82  )-----------( 215      ( 24 Feb 2021 05:23 )             28.9               02-24    140  |  102  |  4<L>  ----------------------------<  98  3.6   |  25  |  0.54    Ca    9.2      24 Feb 2021 05:23  Phos  4.5     02-24  Mg     2.0     02-24

## 2021-02-25 NOTE — PROGRESS NOTE ADULT - SUBJECTIVE AND OBJECTIVE BOX
CHIEF COMPLAINT:  COVID (2021 14:58)    SUBJECTIVE:     OBJECTIVE:  ICU Vital Signs Last 24 Hrs  T(C): 36.8 (2021 04:55), Max: 36.8 (2021 14:17)  T(F): 98.3 (2021 04:55), Max: 98.3 (2021 04:55)  HR: 93 (2021 04:55) (93 - 109)  BP: 125/72 (2021 04:55) (101/80 - 125/72)  BP(mean): --  ABP: --  ABP(mean): --  RR: 20 (2021 04:55) (18 - 20)  SpO2: 100% (2021 04:55) (97% - 100%)    CAPILLARY BLOOD GLUCOSE  POCT Blood Glucose.: 94 mg/dL (2021 07:49)    PHYSICAL EXAM:  General: NAD and well groomed   HEENT: NC/ AT, PERRLA  Neck: Tracheostomy C/D/I and tolerating capped.   Cardio: RRR, S1/S2, no murmurs or rubs.   Pulm: CTA, equal bilaterally. (+) Coughing from tracheal irritation.   GI: Soft, NDNT, BS (+). PEG (+)  MS: No pedal edema with AROMI   Neuro: AOx3. No focal neurological deficits noted.   Skin: Warm and dry. No jaundice or cyanosis     HOSPITAL MEDICATIONS:  MEDICATIONS  (STANDING):  acetaminophen    Suspension .. 650 milliGRAM(s) Oral every 6 hours  BACItracin   Ointment 1 Application(s) Topical two times a day  chlorhexidine 4% Liquid 1 Application(s) Topical daily  ciprofloxacin     Tablet 500 milliGRAM(s) Oral every 24 hours  clonazePAM  Tablet 1 milliGRAM(s) Oral <User Schedule>  dextrose 5%. 1000 milliLiter(s) (50 mL/Hr) IV Continuous <Continuous>  dextrose 5%. 1000 milliLiter(s) (100 mL/Hr) IV Continuous <Continuous>  enoxaparin Injectable 40 milliGRAM(s) SubCutaneous every 12 hours  glucagon  Injectable 1 milliGRAM(s) IntraMuscular once  insulin lispro (ADMELOG) corrective regimen sliding scale   SubCutaneous three times a day before meals  insulin lispro (ADMELOG) corrective regimen sliding scale   SubCutaneous at bedtime  mirtazapine 15 milliGRAM(s) Oral at bedtime  multivitamin 1 Tablet(s) Oral daily  QUEtiapine 12.5 milliGRAM(s) Oral daily  QUEtiapine 50 milliGRAM(s) Oral at bedtime    MEDICATIONS  (PRN):  ALBUTerol    90 MICROgram(s) HFA Inhaler 2 Puff(s) Inhalation every 6 hours PRN Wheezing  aluminum hydroxide/magnesium hydroxide/simethicone Suspension 30 milliLiter(s) Oral every 6 hours PRN Dyspepsia  bisacodyl Suppository 10 milliGRAM(s) Rectal at bedtime PRN Constipation  oxyCODONE    Solution 5 milliGRAM(s) Oral every 6 hours PRN Severe Pain (7 - 10)  polyethylene glycol 3350 17 Gram(s) Oral two times a day PRN Constipation    LABS:                        9.1    6.82  )-----------( 215      ( 2021 05:23 )             28.9     02-24    140  |  102  |  4<L>  ----------------------------<  98  3.6   |  25  |  0.54    Ca    9.2      2021 05:23  Phos  4.5     02-24  Mg     2.0     -    Urinalysis Basic - ( 2021 14:14 )  Color: Light Yellow / Appearance: Slightly Turbid / S.008 / pH: x  Gluc: x / Ketone: Negative  / Bili: Negative / Urobili: <2 mg/dL   Blood: x / Protein: Trace / Nitrite: Negative   Leuk Esterase: Large / RBC: 2 /HPF /  /HPF   Sq Epi: x / Non Sq Epi: 0 /HPF / Bacteria: Negative CHIEF COMPLAINT:  COVID (2021 14:58)    SUBJECTIVE: no overnight events     OBJECTIVE:  ICU Vital Signs Last 24 Hrs  T(C): 36.8 (2021 04:55), Max: 36.8 (2021 14:17)  T(F): 98.3 (2021 04:55), Max: 98.3 (2021 04:55)  HR: 93 (2021 04:55) (93 - 109)  BP: 125/72 (2021 04:55) (101/80 - 125/72)  BP(mean): --  ABP: --  ABP(mean): --  RR: 20 (2021 04:55) (18 - 20)  SpO2: 100% (2021 04:55) (97% - 100%)    CAPILLARY BLOOD GLUCOSE  POCT Blood Glucose.: 94 mg/dL (2021 07:49)    PHYSICAL EXAM:  General: NAD and well groomed   HEENT: NC/ AT, PERRLA  Neck: Tracheostomy C/D/I and tolerating capped.   Cardio: RRR, S1/S2, no murmurs or rubs.   Pulm: CTA, equal bilaterally. (+) Coughing from tracheal irritation.   GI: Soft, NDNT, BS (+). PEG (+)  MS: No pedal edema with AROMI   Neuro: AOx3. No focal neurological deficits noted.   Skin: Warm and dry. No jaundice or cyanosis     HOSPITAL MEDICATIONS:  MEDICATIONS  (STANDING):  acetaminophen    Suspension .. 650 milliGRAM(s) Oral every 6 hours  BACItracin   Ointment 1 Application(s) Topical two times a day  chlorhexidine 4% Liquid 1 Application(s) Topical daily  ciprofloxacin     Tablet 500 milliGRAM(s) Oral every 24 hours  clonazePAM  Tablet 1 milliGRAM(s) Oral <User Schedule>  dextrose 5%. 1000 milliLiter(s) (50 mL/Hr) IV Continuous <Continuous>  dextrose 5%. 1000 milliLiter(s) (100 mL/Hr) IV Continuous <Continuous>  enoxaparin Injectable 40 milliGRAM(s) SubCutaneous every 12 hours  glucagon  Injectable 1 milliGRAM(s) IntraMuscular once  insulin lispro (ADMELOG) corrective regimen sliding scale   SubCutaneous three times a day before meals  insulin lispro (ADMELOG) corrective regimen sliding scale   SubCutaneous at bedtime  mirtazapine 15 milliGRAM(s) Oral at bedtime  multivitamin 1 Tablet(s) Oral daily  QUEtiapine 12.5 milliGRAM(s) Oral daily  QUEtiapine 50 milliGRAM(s) Oral at bedtime    MEDICATIONS  (PRN):  ALBUTerol    90 MICROgram(s) HFA Inhaler 2 Puff(s) Inhalation every 6 hours PRN Wheezing  aluminum hydroxide/magnesium hydroxide/simethicone Suspension 30 milliLiter(s) Oral every 6 hours PRN Dyspepsia  bisacodyl Suppository 10 milliGRAM(s) Rectal at bedtime PRN Constipation  oxyCODONE    Solution 5 milliGRAM(s) Oral every 6 hours PRN Severe Pain (7 - 10)  polyethylene glycol 3350 17 Gram(s) Oral two times a day PRN Constipation    LABS:                        9.1    6.82  )-----------( 215      ( 2021 05:23 )             28.9     02-24    140  |  102  |  4<L>  ----------------------------<  98  3.6   |  25  |  0.54    Ca    9.2      2021 05:23  Phos  4.5     -  Mg     2.0     -    Urinalysis Basic - ( 2021 14:14 )  Color: Light Yellow / Appearance: Slightly Turbid / S.008 / pH: x  Gluc: x / Ketone: Negative  / Bili: Negative / Urobili: <2 mg/dL   Blood: x / Protein: Trace / Nitrite: Negative   Leuk Esterase: Large / RBC: 2 /HPF /  /HPF   Sq Epi: x / Non Sq Epi: 0 /HPF / Bacteria: Negative CHIEF COMPLAINT:  COVID (2021 14:58)    SUBJECTIVE: no overnight events, states dysuria is improving     OBJECTIVE:  ICU Vital Signs Last 24 Hrs  T(C): 36.8 (2021 04:55), Max: 36.8 (2021 14:17)  T(F): 98.3 (2021 04:55), Max: 98.3 (2021 04:55)  HR: 93 (2021 04:55) (93 - 109)  BP: 125/72 (2021 04:55) (101/80 - 125/72)  BP(mean): --  ABP: --  ABP(mean): --  RR: 20 (2021 04:55) (18 - 20)  SpO2: 100% (2021 04:55) (97% - 100%)    CAPILLARY BLOOD GLUCOSE  POCT Blood Glucose.: 94 mg/dL (2021 07:49)    PHYSICAL EXAM:  General: NAD and well groomed   HEENT: NC/ AT, PERRLA  Neck: Tracheostomy C/D/I and tolerating capped.   Cardio: RRR, S1/S2, no murmurs or rubs.   Pulm: CTA, equal bilaterally. (+) Coughing from tracheal irritation.   GI: Soft, NDNT, BS (+). PEG (+)  MS: No pedal edema with AROMI   Neuro: AOx3. No focal neurological deficits noted.   Skin: Warm and dry. No jaundice or cyanosis     HOSPITAL MEDICATIONS:  MEDICATIONS  (STANDING):  acetaminophen    Suspension .. 650 milliGRAM(s) Oral every 6 hours  BACItracin   Ointment 1 Application(s) Topical two times a day  chlorhexidine 4% Liquid 1 Application(s) Topical daily  ciprofloxacin     Tablet 500 milliGRAM(s) Oral every 24 hours  clonazePAM  Tablet 1 milliGRAM(s) Oral <User Schedule>  dextrose 5%. 1000 milliLiter(s) (50 mL/Hr) IV Continuous <Continuous>  dextrose 5%. 1000 milliLiter(s) (100 mL/Hr) IV Continuous <Continuous>  enoxaparin Injectable 40 milliGRAM(s) SubCutaneous every 12 hours  glucagon  Injectable 1 milliGRAM(s) IntraMuscular once  insulin lispro (ADMELOG) corrective regimen sliding scale   SubCutaneous three times a day before meals  insulin lispro (ADMELOG) corrective regimen sliding scale   SubCutaneous at bedtime  mirtazapine 15 milliGRAM(s) Oral at bedtime  multivitamin 1 Tablet(s) Oral daily  QUEtiapine 12.5 milliGRAM(s) Oral daily  QUEtiapine 50 milliGRAM(s) Oral at bedtime    MEDICATIONS  (PRN):  ALBUTerol    90 MICROgram(s) HFA Inhaler 2 Puff(s) Inhalation every 6 hours PRN Wheezing  aluminum hydroxide/magnesium hydroxide/simethicone Suspension 30 milliLiter(s) Oral every 6 hours PRN Dyspepsia  bisacodyl Suppository 10 milliGRAM(s) Rectal at bedtime PRN Constipation  oxyCODONE    Solution 5 milliGRAM(s) Oral every 6 hours PRN Severe Pain (7 - 10)  polyethylene glycol 3350 17 Gram(s) Oral two times a day PRN Constipation    LABS:                        9.1    6.82  )-----------( 215      ( 2021 05:23 )             28.9     02-24    140  |  102  |  4<L>  ----------------------------<  98  3.6   |  25  |  0.54    Ca    9.2      2021 05:23  Phos  4.5     -  Mg     2.0         Urinalysis Basic - ( 2021 14:14 )  Color: Light Yellow / Appearance: Slightly Turbid / S.008 / pH: x  Gluc: x / Ketone: Negative  / Bili: Negative / Urobili: <2 mg/dL   Blood: x / Protein: Trace / Nitrite: Negative   Leuk Esterase: Large / RBC: 2 /HPF /  /HPF   Sq Epi: x / Non Sq Epi: 0 /HPF / Bacteria: Negative

## 2021-02-25 NOTE — PROGRESS NOTE ADULT - ATTENDING COMMENTS
Agree with plan as outlined above. Patient seen and examined at bedside. Patient history, laboratory da ta, and imaging personally reviewed.    Pt is a 46F with PMHx DMII and obesity admitted to [facility] for hypoxemic respiratory failure with ARDS 2/2 COVID19 PNA with failure to wean from ventilator s/p tracheostomy placement and RCU transfer for further management.    Pt with resolving encephalopathy, consciousness significantly improved. Weaning sedation, on clonazepam+ Seroquel+ mirtazapine. Anxiety improved, but still intermittently c/o nervousness and nightmares. Has tension-type headache without associated sx or red flags and mild left neck paraspinal tenderness with left palm numbness, similar to pre-menstrual headaches but greater in severity and duration. Will try NSAID therapy x24hrs and re-evaluate. PT/OT following.     Pt initially p/w severe hypoxemic respiratory failure with failure to wean from ventilator s/p tracheostomy placement (1/28) now tolerating weaning. Was on TC ATC,  trial 2/24-2/26. Will c/w airway clearance therapy, respiratory secretions improved. Trach care as per RCU team. Pt remains hemodynamically stable off all pressors. Also with oropharyngeal dysphagia s/p PEG (1/26) now tolerating dysphagia diet since 2/16. Bowel regimen in place. GI ppx. HISS with BGFS monitoring for DMII. DVT ppx with Lovenox.     Pt's hospital course further c/b EColi UTI, as well as VAP 2/2 MRSA and Enterobacter. Now clinically improving, without any s/s acute infectious process. Monitoring off abx.     Dispo likely to acute rehab pending medical optimization.

## 2021-02-25 NOTE — PROGRESS NOTE ADULT - ASSESSMENT
45 YO F with Morbid Obesity and DM2 A1C 6.6 admitted for ARDS second to SARS-COV-2, intubated 1/5-1/10 then transferred to Medicine, however failed BIPAP and reintubated 1/16. Course complicated ECOLI UTI, Mouth Sores and Enterobacter and MRSA VAP. s/p Tracheostomy 1/28 and PEG 1/26. Now transferred to RCU.     # ICU Delirium/ Agitation /Depression   - Now weaned off sedation  - c/w Seroquel 12.5mg AM and 50mg QHS and Klonopin 1mg BID  - c/w Remeron with supportive care.    - Monitor mentation.     # ARDS second to SARS-COV-2 vs Superimposed Enterobacter and MRSA PNA   - s/p Remdesivir, Decadron and ABX as belowed.   - s/p Prolonged ICU stay and Tracheostomy on 1/28. Now TC and PMV.   - Exchanged 6DCT for 6CFN on 2/23. Cap trials as tolerated.   - Proventil and Chest PT Q6H. Suction PRN. Trach care QD.     # Septic vs Vasoplegic Shock (resolved)  - Off all Pressors. Monitor HR/ BP     # Dysphagia with PEG   - s/p PEG 1/26 and continue on TF as tolerated.   - Bowel regimen (Senna and Miralax and Dulcolax PRN)   - Passed CINE on 2/16, c/w Mechanical soft Honey thick liquid    # /UTI  - Passed TOV 2/23 and noted with dysuria. UA (+) and now treating empirically for UTI.   - Hx of ECOLI UTI sensitive to Cipro. c/w Cipro x 3 days (2/23 - ). Pending UCx.   - Hyponatremia/ Hypernatremia, monitor electrolytes and renal function as tolerated.     # Sepsis second to SARS-COV-2 vs Enterobacter and MRSA PNA vs HSV Type 1   - Completed empiric ABX for PNA with Zosyn (1/16-1/20), however cultures negative   - UCx ECOLI 1/25 s/p CTX (1/25-1/27).   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   - Now with dysuria and Cipro (2/23 - 2/25). Pending UCx.     # DM2 A1C 6.6 (1/2021)   - Continue on ISS and monitor FS Q6H    # Wounds   - WOC recommendations appreciated.      # DVT PPX with Lovenox BID   # CODE STATUS - Full Code   # DISPO - PT recommends Rehab.  45 YO F with Morbid Obesity and DM2 A1C 6.6 admitted for ARDS second to SARS-COV-2, intubated 1/5-1/10 then transferred to Medicine, however failed BIPAP and reintubated 1/16. Course complicated ECOLI UTI, Mouth Sores and Enterobacter and MRSA VAP. s/p Tracheostomy 1/28 and PEG 1/26. Now transferred to RCU.     # ICU Delirium/ Agitation /Depression   - Now weaned off sedation  - c/w Seroquel 12.5mg AM and 50mg QHS and Klonopin 1mg BID  - c/w Remeron with supportive care.    - Monitor mentation.     # ARDS second to SARS-COV-2 vs Superimposed Enterobacter and MRSA PNA   - s/p Remdesivir, Decadron and ABX as belowed.   - s/p Prolonged ICU stay and Tracheostomy on 1/28. Now TC and PMV.   - Exchanged 6DCT for 6CFN on 2/23. Cap trials as tolerated.   - Proventil and Chest PT Q6H. Suction PRN. Trach care QD.     # Septic vs Vasoplegic Shock (resolved)  - Off all Pressors. Monitor HR/ BP     # Dysphagia with PEG   - s/p PEG 1/26 and continue on TF as tolerated.   - Bowel regimen (Senna and Miralax and Dulcolax PRN)   - Passed CINE on 2/16, c/w Mechanical soft Honey thick liquid    # /UTI  - Passed TOV 2/23 and noted with dysuria. UA (+) and now treating empirically for UTI.   - Hx of ECOLI UTI sensitive to Cipro. c/w Cipro x 3 days (2/23 - ). UCx growing E. coli, awaiting sensitivities   - Hyponatremia/ Hypernatremia, monitor electrolytes and renal function as tolerated.     # Sepsis second to SARS-COV-2 vs Enterobacter and MRSA PNA vs HSV Type 1   - Completed empiric ABX for PNA with Zosyn (1/16-1/20), however cultures negative   - UCx ECOLI 1/25 s/p CTX (1/25-1/27).   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   - Now with dysuria and Cipro (2/23 - 2/25). Pending UCx.     # DM2 A1C 6.6 (1/2021)   - Continue on ISS and monitor FS Q6H    # Wounds   - WOC recommendations appreciated.      # DVT PPX with Lovenox BID   # CODE STATUS - Full Code   # DISPO - PT recommends Rehab.

## 2021-02-25 NOTE — PROGRESS NOTE ADULT - SUBJECTIVE AND OBJECTIVE BOX
Chief Complaint:  Patient is a 46y old  Female who presents with a chief complaint of COVID (2021 07:46)      Interval Events:   no acute events  GI reconsulted to remove G tube    Allergies:  No Known Allergies      Hospital Medications:  ALBUTerol    90 MICROgram(s) HFA Inhaler 2 Puff(s) Inhalation every 6 hours  ceFAZolin   IVPB 2000 milliGRAM(s) IV Intermittent once  chlorhexidine 0.12% Liquid 15 milliLiter(s) Oral Mucosa every 12 hours  chlorhexidine 2% Cloths 1 Application(s) Topical <User Schedule>  dexMEDEtomidine Infusion 1 MICROgram(s)/kG/Hr IV Continuous <Continuous>  dextrose 5%. 1000 milliLiter(s) IV Continuous <Continuous>  dextrose 5%. 1000 milliLiter(s) IV Continuous <Continuous>  diazepam  Injectable 5 milliGRAM(s) IV Push every 4 hours PRN  furosemide   Injectable 20 milliGRAM(s) IV Push two times a day  glucagon  Injectable 1 milliGRAM(s) IntraMuscular once  HYDROmorphone  Injectable 1 milliGRAM(s) IV Push every 3 hours PRN  insulin lispro (ADMELOG) corrective regimen sliding scale   SubCutaneous every 6 hours  methylnaltrexone Injectable 12 milliGRAM(s) SubCutaneous once  multivitamin/minerals/iron Oral Solution (CENTRUM) 15 milliLiter(s) Oral daily  norepinephrine Infusion 0.05 MICROgram(s)/kG/Min IV Continuous <Continuous>  pantoprazole  Injectable 40 milliGRAM(s) IV Push daily  petrolatum Ophthalmic Ointment 1 Application(s) Both EYES two times a day  polyethylene glycol 3350 17 Gram(s) Oral <User Schedule>  propofol Infusion 10 MICROgram(s)/kG/Min IV Continuous <Continuous>  QUEtiapine 100 milliGRAM(s) Oral at bedtime  senna Syrup 10 milliLiter(s) Oral daily      PMHX/PSHX:  No pertinent past medical history    No significant past surgical history        Family history:  FH: type 2 diabetes        ROS:     cannot provide    PHYSICAL EXAM:     GENERAL:  Appears stated age, well-groomed, well-nourished, no distress, intubated  HEENT:  NC/AT,  conjunctivae clear, sclera-anicteric  NECK: Trachea midline, supple  CHEST:  Full & symmetric excursion, no increased effort, breath sounds clear  HEART:  Regular rhythm, no minerva/heave  ABDOMEN:  Soft, non-tender, non-distended, normoactive bowel sounds,  no masses ,no hepato-splenomegaly, gastrostomy intact  EXTREMITIES:  no cyanosis,clubbing or edema  SKIN:  No rash/erythema/petechiae, no jaundice  NEURO:  nonverbal, sedated  RECTAL: Deferred    Vital Signs:  Vital Signs Last 24 Hrs  T(C): 37.9 (2021 12:00), Max: 38 (2021 19:43)  T(F): 100.2 (2021 12:00), Max: 100.4 (2021 19:43)  HR: 74 (2021 12:00) (72 - 106)  BP: 103/59 (2021 12:00) (103/59 - 121/-)  BP(mean): 76 (2021 12:00) (76 - 79)  RR: 26 (2021 04:00) (26 - 42)  SpO2: 98% (2021 12:00) (94% - 100%)  Daily     Daily     LABS:                        8.6    10.39 )-----------( 254      ( 2021 01:21 )             28.0         142  |  102  |  16  ----------------------------<  207<H>  3.3<L>   |  31  |  0.45<L>    Ca    8.9      2021 01:20  Phos  3.6       Mg     2.2         TPro  7.0  /  Alb  3.1<L>  /  TBili  0.3  /  DBili  x   /  AST  30  /  ALT  38<H>  /  AlkPhos  131<H>      LIVER FUNCTIONS - ( 2021 01:20 )  Alb: 3.1 g/dL / Pro: 7.0 g/dL / ALK PHOS: 131 U/L / ALT: 38 U/L / AST: 30 U/L / GGT: x           PT/INR - ( 2021 01:19 )   PT: 13.8 sec;   INR: 1.21 ratio         PTT - ( 2021 01:19 )  PTT:30.7 sec  Urinalysis Basic - ( 2021 00:50 )    Color: Yellow / Appearance: Slightly Turbid / S.041 / pH: x  Gluc: x / Ketone: Trace  / Bili: Negative / Urobili: 6 mg/dL   Blood: x / Protein: 100 mg/dL / Nitrite: Negative   Leuk Esterase: Negative / RBC: 3 /HPF / WBC 7 /HPF   Sq Epi: x / Non Sq Epi: 2 /HPF / Bacteria: Occasional          Imaging:

## 2021-02-26 PROCEDURE — 99232 SBSQ HOSP IP/OBS MODERATE 35: CPT

## 2021-02-26 PROCEDURE — 99233 SBSQ HOSP IP/OBS HIGH 50: CPT | Mod: 25

## 2021-02-26 RX ORDER — QUETIAPINE FUMARATE 200 MG/1
12.5 TABLET, FILM COATED ORAL AT BEDTIME
Refills: 0 | Status: DISCONTINUED | OUTPATIENT
Start: 2021-02-26 | End: 2021-03-03

## 2021-02-26 RX ADMIN — Medication 1 APPLICATION(S): at 17:21

## 2021-02-26 RX ADMIN — Medication 1 APPLICATION(S): at 05:32

## 2021-02-26 RX ADMIN — Medication 1 MILLIGRAM(S): at 17:21

## 2021-02-26 RX ADMIN — Medication 1 TABLET(S): at 13:31

## 2021-02-26 RX ADMIN — Medication 250 MILLIGRAM(S): at 17:23

## 2021-02-26 RX ADMIN — Medication 250 MILLIGRAM(S): at 05:31

## 2021-02-26 RX ADMIN — ENOXAPARIN SODIUM 40 MILLIGRAM(S): 100 INJECTION SUBCUTANEOUS at 05:31

## 2021-02-26 RX ADMIN — ENOXAPARIN SODIUM 40 MILLIGRAM(S): 100 INJECTION SUBCUTANEOUS at 17:21

## 2021-02-26 RX ADMIN — Medication 650 MILLIGRAM(S): at 13:37

## 2021-02-26 RX ADMIN — QUETIAPINE FUMARATE 12.5 MILLIGRAM(S): 200 TABLET, FILM COATED ORAL at 22:45

## 2021-02-26 RX ADMIN — Medication 500 MILLIGRAM(S): at 17:21

## 2021-02-26 RX ADMIN — Medication 500 MILLIGRAM(S): at 05:31

## 2021-02-26 RX ADMIN — Medication 1 MILLIGRAM(S): at 05:31

## 2021-02-26 RX ADMIN — CHLORHEXIDINE GLUCONATE 1 APPLICATION(S): 213 SOLUTION TOPICAL at 05:32

## 2021-02-26 RX ADMIN — MIRTAZAPINE 15 MILLIGRAM(S): 45 TABLET, ORALLY DISINTEGRATING ORAL at 22:45

## 2021-02-26 NOTE — PROGRESS NOTE ADULT - ADDITIONAL PE
A&Ox4
A&Ox4
A&Ox4  Able to stand and walk with walker unassisted
A&Ox4
A&Ox1-2; awake  Follows simple commands
Opens eyes on command but noted to be more lethargic than yesterday.

## 2021-02-26 NOTE — PROGRESS NOTE ADULT - RESPIRATORY
detailed exam
Breath Sounds equal & clear to percussion & auscultation, no accessory muscle use

## 2021-02-26 NOTE — PROGRESS NOTE ADULT - GUM GEN PE MLT EXAM PC
detailed exam
not examined
detailed exam
not examined
detailed exam
not examined
not examined
detailed exam
not examined
detailed exam
not examined

## 2021-02-26 NOTE — PROGRESS NOTE ADULT - CONSTITUTIONAL
detailed exam
Well-developed, well nourished
detailed exam

## 2021-02-26 NOTE — PROGRESS NOTE ADULT - ATTENDING COMMENTS
Agree with plan as outlined above. Patient seen and examined at bedside. Patient history, laboratory da ta, and imaging personally reviewed.    Pt is a 46F with PMHx DMII and obesity admitted to [facility] for hypoxemic respiratory failure with ARDS 2/2 COVID19 PNA with failure to wean from ventilator s/p tracheostomy placement and RCU transfer for further management.    Pt with resolved encephalopathy, at baseline mental status. Weaning sedation, on decreasing doses of clonazepam+ Seroquel+ mirtazapine. Anxiety improved, but still intermittently c/o nervousness and nightmares. Has tension-type headache without associated sx or red flags and mild left neck paraspinal tenderness with left palm numbness, similar to pre-menstrual headaches but greater in severity and duration. Improved with NSAID therapy x24hrs. PT/OT following. PM&R recs appreciated.     Pt initially p/w severe hypoxemic respiratory failure with failure to wean from ventilator s/p tracheostomy placement (1/28) now tolerating weaning. Was on TC ATC,  trial 2/24-2/26. Plan for decannulation today. Pt remains hemodynamically stable off all pressors. Also with oropharyngeal dysphagia s/p PEG (1/26) now tolerating dysphagia diet since 2/16. Will need PEG removed likely Monday, pt refused yesterday. Bowel regimen in place. GI ppx. HISS with BGFS monitoring for DMII. DVT ppx with Lovenox.     Pt's hospital course further c/b EColi UTI, as well as VAP 2/2 MRSA and Enterobacter. Now clinically improving, without any s/s acute infectious process. Monitoring off abx.     Dispo likely d/c home with home PT next week.

## 2021-02-26 NOTE — PROGRESS NOTE ADULT - SUBJECTIVE AND OBJECTIVE BOX
Patient is a 46y old  Female who presents with a chief complaint of COVID (26 Feb 2021 07:19)      HPI:  Patient is a 46 year old woman with no past medical history coming in with shortness of breath, cough productive of white sputum, pleuritic rib pain (moderate in severity) for two days and diarrhea for one day. Patient  tested positive for COVID 12/29. Patient denies loss of taste or smell. Patient denies sore throat however states mouth is dry. Patient denies any history of lung disease or smoking. Patient states she was having subjective fevers at home however never took her temperature. Patient was taking an unknown antibiotic prescribed by a doctor. Unable to give name of doctor or name of abx. Patient dyspneic during with talking during interview.   ED Course: T 98.6, HR 96, /90, S 80% on RA. White count 10k. Hemoglobin 11.0. Plt 236. D-dimer 243. CRP 81.3. . CXR peripheral opacities consistent w/ COVID. Patient given 6 of dexamethasone. Patient admitted for acute hypoxic respiratory failure in the setting of likely COVID infection.  (03 Jan 2021 18:35)    trach capped, per patient might be decannulated today.    Tolerating nasal cannula  reports headache, currently mild.    REVIEW OF SYSTEMS  Constitutional - No fever, No weight loss, No fatigue  HEENT - No eye pain, No visual disturbances, No difficulty hearing, No tinnitus, No vertigo, No neck pain  Respiratory - No cough, No wheezing, No shortness of breath  Cardiovascular - No chest pain, No palpitations  Gastrointestinal - No abdominal pain, No nausea, No vomiting, No diarrhea, No constipation  Genitourinary - No dysuria, No frequency, No hematuria, No incontinence  Neurological -+ headaches, No memory loss, +  loss of strength, No numbness, No tremors  Skin - healing R cheek abrasion   Endocrine - No temperature intolerance  Musculoskeletal - No joint pain, No joint swelling, No muscle pain  Psychiatric - No depression, No anxiety    PAST MEDICAL & SURGICAL HISTORY  No pertinent past medical history    No significant past surgical history     CURRENT FUNCTIONAL STATUS  ambulated with PT today, improving    FAMILY HISTORY   FH: type 2 diabetes      VITALS  T(C): 36.8 (02-26-21 @ 05:28), Max: 36.8 (02-26-21 @ 05:28)  HR: 88 (02-26-21 @ 09:21) (88 - 107)  BP: 123/85 (02-26-21 @ 05:28) (123/85 - 130/75)  RR: 18 (02-26-21 @ 09:21) (18 - 20)  SpO2: 100% (02-26-21 @ 09:21) (95% - 100%)  Wt(kg): --    ALLERGIES  No Known Allergies      MEDICATIONS   acetaminophen    Suspension .. 650 milliGRAM(s) Oral every 6 hours PRN  ALBUTerol    90 MICROgram(s) HFA Inhaler 2 Puff(s) Inhalation every 6 hours PRN  aluminum hydroxide/magnesium hydroxide/simethicone Suspension 30 milliLiter(s) Oral every 6 hours PRN  BACItracin   Ointment 1 Application(s) Topical two times a day  bisacodyl Suppository 10 milliGRAM(s) Rectal at bedtime PRN  chlorhexidine 4% Liquid 1 Application(s) Topical daily  ciprofloxacin     Tablet 500 milliGRAM(s) Oral every 12 hours  clonazePAM  Tablet 1 milliGRAM(s) Oral <User Schedule>  dextrose 5%. 1000 milliLiter(s) IV Continuous <Continuous>  dextrose 5%. 1000 milliLiter(s) IV Continuous <Continuous>  enoxaparin Injectable 40 milliGRAM(s) SubCutaneous every 12 hours  glucagon  Injectable 1 milliGRAM(s) IntraMuscular once  hydrocodone/homatropine Syrup 10 milliLiter(s) Oral every 8 hours PRN  insulin lispro (ADMELOG) corrective regimen sliding scale   SubCutaneous three times a day before meals  insulin lispro (ADMELOG) corrective regimen sliding scale   SubCutaneous at bedtime  mirtazapine 15 milliGRAM(s) Oral at bedtime  multivitamin 1 Tablet(s) Oral daily  naproxen 250 milliGRAM(s) Oral every 6 hours PRN  oxycodone    5 mG/acetaminophen 325 mG 1 Tablet(s) Oral every 6 hours PRN  oxyCODONE    Solution 5 milliGRAM(s) Oral every 6 hours PRN  polyethylene glycol 3350 17 Gram(s) Oral two times a day PRN  QUEtiapine 12.5 milliGRAM(s) Oral daily  QUEtiapine 25 milliGRAM(s) Oral at bedtime      ----------------------------------------------------------------------------------------   PHYSICAL EXAM  Constitutional - NAD, Comfortable  HEENT - healing abrasion R cheek,  + trache with speaking valve  Chest - no respiratory distress  Cardiovascular - no edema  Abdomen - Soft, NTND, + PEG   Extremities - no calf tenderness   Neurologic Exam -                    Cognitive - Awake, Alert, AAO to self, place, date, year, situation     Communication - Fluent, No dysarthria     Cranial Nerves - CN 2-12 intact     Motor -                      LEFT    UE -4/5                    RIGHT UE - 4/5                    LEFT    LE - 4/5                                    RIGHT LE -4/5       Sensory - Intact to LT        Balance - WNL Static  Psychiatric - stable  ----------------------------------------------------------------------------------------  ASSESSMENT/PLAN   47 yo f admitted for ARDS second to SARS-COV-2, intubated 1/5-1/10 then transferred to Medicine, however failed BIPAP and reintubated 1/16. Course complicated UTI, Mouth Sores and Enterobacter and MRSA VAP. s/p Tracheostomy 1/28 and PEG 1/26.     monitor HR and oxygen with activity   on cipro for UTI  contact precautions for MRSA  depression: on remeron  delirium- on seroquel  pain: tylenol prn, oxy ir 5mg daily with bowel regimen  Diet: dysphagia 2 mech soft honey thick  DVT PPX - lovenox  continue bedside PT and OT  OOB to chair daily  Rehab - patient improving with bedside therapy, ambulating with RW.  stands with supervision  recommend home with home care services when medically cleared.

## 2021-02-26 NOTE — PROGRESS NOTE ADULT - NECK DETAILS
+ trach with collar
Trach C/D/I
trach c/d/i
trach with PMV
trach to vent
Trach C/D/I
Trach C/D/I
Trach site C/D/I
Trach C/D/I
trach with PMV, + secretions (suctioned during exam)
trach to vent/supple
+ trach with collar
trach colalr

## 2021-02-26 NOTE — PROGRESS NOTE ADULT - EXTREMITIES
No cyanosis, clubbing or edema
detailed exam
detailed exam
No cyanosis, clubbing or edema
detailed exam
detailed exam
No cyanosis, clubbing or edema
No cyanosis, clubbing or edema

## 2021-02-26 NOTE — PROGRESS NOTE ADULT - ASSESSMENT
45 YO F with Morbid Obesity and DM2 A1C 6.6 admitted for ARDS second to SARS-COV-2, intubated 1/5-1/10 then transferred to Medicine, however failed BIPAP and reintubated 1/16. Course complicated ECOLI UTI, Mouth Sores and Enterobacter and MRSA VAP. s/p Tracheostomy 1/28 and PEG 1/26. Now transferred to RCU.     # ICU Delirium/ Agitation /Depression   - Now weaned off sedation  - c/w Seroquel 12.5mg AM and 50mg QHS and Klonopin 1mg BID  - c/w Remeron with supportive care.    - Monitor mentation.     # ARDS second to SARS-COV-2 vs Superimposed Enterobacter and MRSA PNA   - s/p Remdesivir, Decadron and ABX as belowed.   - s/p Prolonged ICU stay and Tracheostomy on 1/28. Now TC and PMV.   - Exchanged 6DCT for 6CFN on 2/23. Cap trials as tolerated.   - Proventil and Chest PT Q6H. Suction PRN. Trach care QD.     # Septic vs Vasoplegic Shock (resolved)  - Off all Pressors. Monitor HR/ BP     # Dysphagia with PEG   - s/p PEG 1/26 and continue on TF as tolerated.   - Bowel regimen (Senna and Miralax and Dulcolax PRN)   - Passed CINE on 2/16, c/w Mechanical soft Honey thick liquid    # /UTI  - Passed TOV 2/23 and noted with dysuria. UA (+) and now treating empirically for UTI.   - Hx of ECOLI UTI sensitive to Cipro. c/w Cipro x 3 days (2/23 - ). UCx growing E. coli, awaiting sensitivities   - Hyponatremia/ Hypernatremia, monitor electrolytes and renal function as tolerated.     # Sepsis second to SARS-COV-2 vs Enterobacter and MRSA PNA vs HSV Type 1   - Completed empiric ABX for PNA with Zosyn (1/16-1/20), however cultures negative   - UCx ECOLI 1/25 s/p CTX (1/25-1/27).   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   - Now with dysuria and Cipro (2/23 - 2/25). Pending UCx.     # DM2 A1C 6.6 (1/2021)   - Continue on ISS and monitor FS Q6H    # Wounds   - WOC recommendations appreciated.      # DVT PPX with Lovenox BID   # CODE STATUS - Full Code   # DISPO - PT recommends Rehab.  47 YO F with Morbid Obesity and DM2 A1C 6.6 admitted for ARDS second to SARS-COV-2, intubated 1/5-1/10 then transferred to Medicine, however failed BIPAP and reintubated 1/16. Course complicated ECOLI UTI, Mouth Sores and Enterobacter and MRSA VAP. s/p Tracheostomy 1/28 and PEG 1/26. Now transferred to RCU.     # ICU Delirium/ Agitation /Depression   - Now weaned off sedation  - c/w Seroquel 12.5mg AM and 50mg QHS and Klonopin 1mg BID  - c/w Remeron with supportive care.    - Monitor mentation.     # ARDS second to SARS-COV-2 vs Superimposed Enterobacter and MRSA PNA   - s/p Remdesivir, Decadron and ABX as belowed.   - s/p Prolonged ICU stay and Tracheostomy on 1/28. Now TC and PMV.   - Exchanged 6DCT for 6CFN on 2/23. Cap trials as tolerated.   - Proventil and Chest PT Q6H. Suction PRN. Trach care QD.   - Decannulated 2/26    # Septic vs Vasoplegic Shock (resolved)  - Off all Pressors. Monitor HR/ BP     # Dysphagia with PEG   - s/p PEG 1/26 and continue on TF as tolerated.   - Bowel regimen (Senna and Miralax and Dulcolax PRN)   - Passed CINE on 2/16, c/w Mechanical soft Honey thick liquid  - Plan to remove PEG 3/1    # /UTI  - Passed TOV 2/23 and noted with dysuria. UA (+) and now treating empirically for UTI.   - Hx of ECOLI UTI sensitive to Cipro. c/w Cipro x 3 days (2/23 - ). UCx growing E. coli, awaiting sensitivities   - Hyponatremia/ Hypernatremia, monitor electrolytes and renal function as tolerated.     # Sepsis second to SARS-COV-2 vs Enterobacter and MRSA PNA vs HSV Type 1   - Completed empiric ABX for PNA with Zosyn (1/16-1/20), however cultures negative   - UCx ECOLI 1/25 s/p CTX (1/25-1/27).   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   - Now with dysuria and Cipro (2/23 - 2/25). Pending UCx.     # DM2 A1C 6.6 (1/2021)   - Continue on ISS and monitor FS Q6H    # Wounds   - WOC recommendations appreciated.      # DVT PPX with Lovenox BID   # CODE STATUS - Full Code   # DISPO - PT recommends Home w/ home PT. Plan to remove PEG 3/1 and DC planning 3/2.

## 2021-02-26 NOTE — PROGRESS NOTE ADULT - SUBJECTIVE AND OBJECTIVE BOX
CHIEF COMPLAINT: Patient is a 46y old  Female who presents with a chief complaint of COVID.    Interval Events:    REVIEW OF SYSTEMS:  [ ] All other systems negative  [ ] Unable to assess ROS because ________    OBJECTIVE:  ICU Vital Signs Last 24 Hrs  T(C): 36.8 (26 Feb 2021 05:28), Max: 36.8 (26 Feb 2021 05:28)  T(F): 98.2 (26 Feb 2021 05:28), Max: 98.2 (26 Feb 2021 05:28)  HR: 107 (26 Feb 2021 05:28) (95 - 107)  BP: 123/85 (26 Feb 2021 05:28) (123/85 - 130/75)  RR: 20 (26 Feb 2021 05:28) (18 - 20)  SpO2: 95% (26 Feb 2021 05:28) (95% - 100%)      CAPILLARY BLOOD GLUCOSE      POCT Blood Glucose.: 131 mg/dL (25 Feb 2021 21:19)      HOSPITAL MEDICATIONS:  MEDICATIONS  (STANDING):  BACItracin   Ointment 1 Application(s) Topical two times a day  chlorhexidine 4% Liquid 1 Application(s) Topical daily  ciprofloxacin     Tablet 500 milliGRAM(s) Oral every 12 hours  clonazePAM  Tablet 1 milliGRAM(s) Oral <User Schedule>  dextrose 5%. 1000 milliLiter(s) (50 mL/Hr) IV Continuous <Continuous>  dextrose 5%. 1000 milliLiter(s) (100 mL/Hr) IV Continuous <Continuous>  enoxaparin Injectable 40 milliGRAM(s) SubCutaneous every 12 hours  glucagon  Injectable 1 milliGRAM(s) IntraMuscular once  insulin lispro (ADMELOG) corrective regimen sliding scale   SubCutaneous three times a day before meals  insulin lispro (ADMELOG) corrective regimen sliding scale   SubCutaneous at bedtime  mirtazapine 15 milliGRAM(s) Oral at bedtime  multivitamin 1 Tablet(s) Oral daily  QUEtiapine 12.5 milliGRAM(s) Oral daily  QUEtiapine 25 milliGRAM(s) Oral at bedtime    MEDICATIONS  (PRN):  acetaminophen    Suspension .. 650 milliGRAM(s) Oral every 6 hours PRN Mild Pain (1 - 3)  ALBUTerol    90 MICROgram(s) HFA Inhaler 2 Puff(s) Inhalation every 6 hours PRN Wheezing  aluminum hydroxide/magnesium hydroxide/simethicone Suspension 30 milliLiter(s) Oral every 6 hours PRN Dyspepsia  bisacodyl Suppository 10 milliGRAM(s) Rectal at bedtime PRN Constipation  hydrocodone/homatropine Syrup 10 milliLiter(s) Oral every 8 hours PRN SEVERE COUGHING SPELLS  naproxen 250 milliGRAM(s) Oral every 6 hours PRN Moderate Pain (4 - 6)  oxycodone    5 mG/acetaminophen 325 mG 1 Tablet(s) Oral every 6 hours PRN Moderate Pain (4 - 6)  oxyCODONE    Solution 5 milliGRAM(s) Oral every 6 hours PRN Severe Pain (7 - 10)  polyethylene glycol 3350 17 Gram(s) Oral two times a day PRN Constipation      LABS:          MICROBIOLOGY:     RADIOLOGY:  [ ] Reviewed and interpreted by me    PULMONARY FUNCTION TESTS:    EKG: CHIEF COMPLAINT: Patient is a 46y old  Female who presents with a chief complaint of COVID.    Interval Events: No interval events noted overnight.     REVIEW OF SYSTEMS:  Denies fever, chills, SOB, CP, abd pain, N/V  [x] All other systems negative    OBJECTIVE:  ICU Vital Signs Last 24 Hrs  T(C): 36.8 (26 Feb 2021 05:28), Max: 36.8 (26 Feb 2021 05:28)  T(F): 98.2 (26 Feb 2021 05:28), Max: 98.2 (26 Feb 2021 05:28)  HR: 107 (26 Feb 2021 05:28) (95 - 107)  BP: 123/85 (26 Feb 2021 05:28) (123/85 - 130/75)  RR: 20 (26 Feb 2021 05:28) (18 - 20)  SpO2: 95% (26 Feb 2021 05:28) (95% - 100%)      CAPILLARY BLOOD GLUCOSE      POCT Blood Glucose.: 131 mg/dL (25 Feb 2021 21:19)      HOSPITAL MEDICATIONS:  MEDICATIONS  (STANDING):  BACItracin   Ointment 1 Application(s) Topical two times a day  chlorhexidine 4% Liquid 1 Application(s) Topical daily  ciprofloxacin     Tablet 500 milliGRAM(s) Oral every 12 hours  clonazePAM  Tablet 1 milliGRAM(s) Oral <User Schedule>  dextrose 5%. 1000 milliLiter(s) (50 mL/Hr) IV Continuous <Continuous>  dextrose 5%. 1000 milliLiter(s) (100 mL/Hr) IV Continuous <Continuous>  enoxaparin Injectable 40 milliGRAM(s) SubCutaneous every 12 hours  glucagon  Injectable 1 milliGRAM(s) IntraMuscular once  insulin lispro (ADMELOG) corrective regimen sliding scale   SubCutaneous three times a day before meals  insulin lispro (ADMELOG) corrective regimen sliding scale   SubCutaneous at bedtime  mirtazapine 15 milliGRAM(s) Oral at bedtime  multivitamin 1 Tablet(s) Oral daily  QUEtiapine 12.5 milliGRAM(s) Oral daily  QUEtiapine 25 milliGRAM(s) Oral at bedtime    MEDICATIONS  (PRN):  acetaminophen    Suspension .. 650 milliGRAM(s) Oral every 6 hours PRN Mild Pain (1 - 3)  ALBUTerol    90 MICROgram(s) HFA Inhaler 2 Puff(s) Inhalation every 6 hours PRN Wheezing  aluminum hydroxide/magnesium hydroxide/simethicone Suspension 30 milliLiter(s) Oral every 6 hours PRN Dyspepsia  bisacodyl Suppository 10 milliGRAM(s) Rectal at bedtime PRN Constipation  hydrocodone/homatropine Syrup 10 milliLiter(s) Oral every 8 hours PRN SEVERE COUGHING SPELLS  naproxen 250 milliGRAM(s) Oral every 6 hours PRN Moderate Pain (4 - 6)  oxycodone    5 mG/acetaminophen 325 mG 1 Tablet(s) Oral every 6 hours PRN Moderate Pain (4 - 6)  oxyCODONE    Solution 5 milliGRAM(s) Oral every 6 hours PRN Severe Pain (7 - 10)  polyethylene glycol 3350 17 Gram(s) Oral two times a day PRN Constipation      LABS:        MICROBIOLOGY:     RADIOLOGY:  [ ] Reviewed and interpreted by me    PULMONARY FUNCTION TESTS:    EKG:

## 2021-02-26 NOTE — PROGRESS NOTE ADULT - CVS HE PE MLT D E PC
regular rate and rhythm
regular rate and rhythm/no rub/no murmur
regular rate and rhythm/no rub/no murmur
no rub
regular rate and rhythm
regular rate and rhythm
regular rate and rhythm/no rub/no murmur
regular rate and rhythm/no rub/no murmur
regular rate and rhythm
regular rate and rhythm
regular rate and rhythm/no rub/no murmur
regular rate and rhythm

## 2021-02-26 NOTE — PROGRESS NOTE ADULT - RS GEN PE MLT RESP DETAILS PC
airway patent/breath sounds equal
airway patent/breath sounds equal/good air movement
Bilat coarse bs/airway patent/breath sounds equal
airway patent/breath sounds equal/good air movement
bilat coarse bs/airway patent/breath sounds equal
airway patent/breath sounds equal/good air movement
Clear B/L after suctioning
COarse vs/airway patent/breath sounds equal
airway patent/breath sounds equal

## 2021-02-26 NOTE — PROGRESS NOTE ADULT - GASTROINTESTINAL DETAILS
+ PEG/soft/no distention
normal/soft/nontender
soft/nontender/no distention
+ PEG/soft/no distention
soft/nontender/no distention
PEG/soft/no distention
soft/nontender/no distention
soft/nontender/no distention
soft/no distention
soft/nontender/no distention
+ PEG/soft/no distention
+PEG/soft/nontender/bowel sounds normal

## 2021-02-26 NOTE — PROGRESS NOTE ADULT - CONSTITUTIONAL DETAILS
no distress
well-developed/well-groomed/well-nourished
no distress
well-developed/well-groomed/well-nourished
well-developed/well-groomed/well-nourished
+tearful/well-developed/well-groomed/no distress
no distress
well-developed/well-groomed/obese
well-developed/well-groomed/well-nourished
well-developed/well-groomed/well-nourished
no distress
no distress

## 2021-02-27 LAB
CULTURE RESULTS: SIGNIFICANT CHANGE UP
SPECIMEN SOURCE: SIGNIFICANT CHANGE UP

## 2021-02-27 PROCEDURE — 99233 SBSQ HOSP IP/OBS HIGH 50: CPT | Mod: GC

## 2021-02-27 RX ORDER — SENNA PLUS 8.6 MG/1
2 TABLET ORAL AT BEDTIME
Refills: 0 | Status: DISCONTINUED | OUTPATIENT
Start: 2021-02-27 | End: 2021-03-03

## 2021-02-27 RX ORDER — POLYETHYLENE GLYCOL 3350 17 G/17G
17 POWDER, FOR SOLUTION ORAL
Refills: 0 | Status: DISCONTINUED | OUTPATIENT
Start: 2021-02-27 | End: 2021-03-03

## 2021-02-27 RX ADMIN — MIRTAZAPINE 15 MILLIGRAM(S): 45 TABLET, ORALLY DISINTEGRATING ORAL at 21:40

## 2021-02-27 RX ADMIN — Medication 1 TABLET(S): at 12:03

## 2021-02-27 RX ADMIN — Medication 1 MILLIGRAM(S): at 06:41

## 2021-02-27 RX ADMIN — ENOXAPARIN SODIUM 40 MILLIGRAM(S): 100 INJECTION SUBCUTANEOUS at 17:30

## 2021-02-27 RX ADMIN — Medication 1 MILLIGRAM(S): at 17:37

## 2021-02-27 RX ADMIN — Medication 1 APPLICATION(S): at 06:41

## 2021-02-27 RX ADMIN — ENOXAPARIN SODIUM 40 MILLIGRAM(S): 100 INJECTION SUBCUTANEOUS at 06:41

## 2021-02-27 RX ADMIN — Medication 250 MILLIGRAM(S): at 13:06

## 2021-02-27 RX ADMIN — QUETIAPINE FUMARATE 12.5 MILLIGRAM(S): 200 TABLET, FILM COATED ORAL at 21:40

## 2021-02-27 RX ADMIN — Medication 500 MILLIGRAM(S): at 06:41

## 2021-02-27 RX ADMIN — Medication 500 MILLIGRAM(S): at 17:30

## 2021-02-27 RX ADMIN — CHLORHEXIDINE GLUCONATE 1 APPLICATION(S): 213 SOLUTION TOPICAL at 06:41

## 2021-02-27 RX ADMIN — Medication 1 APPLICATION(S): at 17:30

## 2021-02-27 NOTE — PROGRESS NOTE ADULT - SUBJECTIVE AND OBJECTIVE BOX
CHIEF COMPLAINT:    Interval Events:    REVIEW OF SYSTEMS:  Constitutional:   Eyes:  ENT:  CV:  Resp:  GI:  :  MSK:  Integumentary:  Neurological:  Psychiatric:  Endocrine:  Hematologic/Lymphatic:  Allergic/Immunologic:  [ ] All other systems negative  [ ] Unable to assess ROS because ________    OBJECTIVE:  ICU Vital Signs Last 24 Hrs  T(C): 36.5 (27 Feb 2021 06:16), Max: 37 (26 Feb 2021 22:37)  T(F): 97.7 (27 Feb 2021 06:16), Max: 98.6 (26 Feb 2021 22:37)  HR: 94 (27 Feb 2021 06:16) (88 - 100)  BP: 127/92 (27 Feb 2021 06:16) (119/84 - 132/80)  BP(mean): --  ABP: --  ABP(mean): --  RR: 18 (27 Feb 2021 06:16) (18 - 20)  SpO2: 98% (27 Feb 2021 06:16) (98% - 100%)        CAPILLARY BLOOD GLUCOSE      POCT Blood Glucose.: 116 mg/dL (26 Feb 2021 21:47)      PHYSICAL EXAM:  General:   HEENT:   Lymph Nodes:  Neck:   Respiratory:   Cardiovascular:   Abdomen:   Extremities:   Skin:   Neurological:  Psychiatry:    HOSPITAL MEDICATIONS:  MEDICATIONS  (STANDING):  BACItracin   Ointment 1 Application(s) Topical two times a day  chlorhexidine 4% Liquid 1 Application(s) Topical daily  ciprofloxacin     Tablet 500 milliGRAM(s) Oral every 12 hours  clonazePAM  Tablet 1 milliGRAM(s) Oral <User Schedule>  dextrose 5%. 1000 milliLiter(s) (100 mL/Hr) IV Continuous <Continuous>  dextrose 5%. 1000 milliLiter(s) (50 mL/Hr) IV Continuous <Continuous>  enoxaparin Injectable 40 milliGRAM(s) SubCutaneous every 12 hours  glucagon  Injectable 1 milliGRAM(s) IntraMuscular once  insulin lispro (ADMELOG) corrective regimen sliding scale   SubCutaneous three times a day before meals  insulin lispro (ADMELOG) corrective regimen sliding scale   SubCutaneous at bedtime  mirtazapine 15 milliGRAM(s) Oral at bedtime  multivitamin 1 Tablet(s) Oral daily  QUEtiapine 12.5 milliGRAM(s) Oral at bedtime    MEDICATIONS  (PRN):  acetaminophen    Suspension .. 650 milliGRAM(s) Oral every 6 hours PRN Mild Pain (1 - 3)  ALBUTerol    90 MICROgram(s) HFA Inhaler 2 Puff(s) Inhalation every 6 hours PRN Wheezing  aluminum hydroxide/magnesium hydroxide/simethicone Suspension 30 milliLiter(s) Oral every 6 hours PRN Dyspepsia  bisacodyl Suppository 10 milliGRAM(s) Rectal at bedtime PRN Constipation  hydrocodone/homatropine Syrup 10 milliLiter(s) Oral every 8 hours PRN SEVERE COUGHING SPELLS  naproxen 250 milliGRAM(s) Oral every 6 hours PRN Moderate Pain (4 - 6)  oxyCODONE    Solution 5 milliGRAM(s) Oral every 6 hours PRN Severe Pain (7 - 10)  polyethylene glycol 3350 17 Gram(s) Oral two times a day PRN Constipation      LABS:                    MICROBIOLOGY:     RADIOLOGY:  [ ] Reviewed and interpreted by me    PULMONARY FUNCTION TESTS:    EKG: CHIEF COMPLAINT:    Interval Events:    ROS:  CONSTITUTIONAL: denies fever, chills or night sweats. No apparent distress. + mildly anxious about d/c  EYES: denies eye pain or blurry vision.  ENT: denies ear pain, nose bleed or sore throat.  NECK: denies neck pain or stiffness.  RESPIRATORY: denies SOB or wheezing. + Cough & BRAVO on NC.  CV: denies chest pain or palpitations.  GI: denies abdominal pain, N/V, diarrhea, melena or hematochezia. + constipation.  : denies dysuria, hematuria or frequency.   NEUROLOGICAL: denies headache, weakness, numbness or tingling sensation.  SKIN: denies itching, burning or rashes.   MUSCULOSKELETAL: denies joint pain, swelling. + Left back/shoulder soreness   HEME: denies easy bruising, or bleeding gums         OBJECTIVE:  ICU Vital Signs Last 24 Hrs  T(C): 36.5 (27 Feb 2021 06:16), Max: 37 (26 Feb 2021 22:37)  T(F): 97.7 (27 Feb 2021 06:16), Max: 98.6 (26 Feb 2021 22:37)  HR: 94 (27 Feb 2021 06:16) (88 - 100)  BP: 127/92 (27 Feb 2021 06:16) (119/84 - 132/80)  RR: 18 (27 Feb 2021 06:16) (18 - 20)  SpO2: 98% (27 Feb 2021 06:16) (98% - 100%)        CAPILLARY BLOOD GLUCOSE      POCT Blood Glucose.: 116 mg/dL (26 Feb 2021 21:47)      PHYSICAL EXAM:  GENERAL: NAD, well-developed  HEAD:  Atraumatic, Normocephalic  EYES: EOMI, PERRLA, conjunctiva and sclera clear  NECK: Supple, No JVD, old trach site C/D/I.   CHEST/LUNG: Clear to auscultation bilaterally; No wheezing, old trach site C/D/I.   HEART: Regular rate and rhythm; No murmurs, rubs, or gallops  ABDOMEN: Soft, Nontender, Nondistended; Bowel sounds present, Peg site C/D/I  EXTREMITIES:  2+ Peripheral Pulses, No clubbing, cyanosis, or peripheral edema. Moves all extremities +5.   NEUROLOGY: A&0x3, non-focal  SKIN: No rashes or lesions    HOSPITAL MEDICATIONS:  MEDICATIONS  (STANDING):  BACItracin   Ointment 1 Application(s) Topical two times a day  chlorhexidine 4% Liquid 1 Application(s) Topical daily  ciprofloxacin     Tablet 500 milliGRAM(s) Oral every 12 hours  clonazePAM  Tablet 1 milliGRAM(s) Oral <User Schedule>  dextrose 5%. 1000 milliLiter(s) (100 mL/Hr) IV Continuous <Continuous>  dextrose 5%. 1000 milliLiter(s) (50 mL/Hr) IV Continuous <Continuous>  enoxaparin Injectable 40 milliGRAM(s) SubCutaneous every 12 hours  glucagon  Injectable 1 milliGRAM(s) IntraMuscular once  insulin lispro (ADMELOG) corrective regimen sliding scale   SubCutaneous three times a day before meals  insulin lispro (ADMELOG) corrective regimen sliding scale   SubCutaneous at bedtime  mirtazapine 15 milliGRAM(s) Oral at bedtime  multivitamin 1 Tablet(s) Oral daily  QUEtiapine 12.5 milliGRAM(s) Oral at bedtime    MEDICATIONS  (PRN):  acetaminophen    Suspension .. 650 milliGRAM(s) Oral every 6 hours PRN Mild Pain (1 - 3)  ALBUTerol    90 MICROgram(s) HFA Inhaler 2 Puff(s) Inhalation every 6 hours PRN Wheezing  aluminum hydroxide/magnesium hydroxide/simethicone Suspension 30 milliLiter(s) Oral every 6 hours PRN Dyspepsia  bisacodyl Suppository 10 milliGRAM(s) Rectal at bedtime PRN Constipation  hydrocodone/homatropine Syrup 10 milliLiter(s) Oral every 8 hours PRN SEVERE COUGHING SPELLS  naproxen 250 milliGRAM(s) Oral every 6 hours PRN Moderate Pain (4 - 6)  oxyCODONE    Solution 5 milliGRAM(s) Oral every 6 hours PRN Severe Pain (7 - 10)  polyethylene glycol 3350 17 Gram(s) Oral two times a day PRN Constipation      LABS:        MICROBIOLOGY:     RADIOLOGY:  [ ] Reviewed and interpreted by me      EKG: Interval Events: no events overnight     ROS:  CONSTITUTIONAL:  +anxious to go home. denies fever, chills or night sweats. No apparent distress.  EYES: denies eye pain or blurry vision.  ENT: denies ear pain, nose bleed or sore throat.  NECK: denies neck pain or stiffness.  RESPIRATORY: +cough, mild dyspnea on exertion  CV: denies chest pain or palpitations.  GI: +constipation; denies abdominal pain, N/V, melena or hematochezia  : denies dysuria, hematuria or frequency.   NEUROLOGICAL: denies headache, weakness, numbness or tingling sensation.  SKIN: denies itching, burning or rashes.   MUSCULOSKELETAL: denies joint pain, swelling. + Left back/shoulder soreness   HEME: denies easy bruising, or bleeding gums       OBJECTIVE:  ICU Vital Signs Last 24 Hrs  T(C): 36.5 (27 Feb 2021 06:16), Max: 37 (26 Feb 2021 22:37)  T(F): 97.7 (27 Feb 2021 06:16), Max: 98.6 (26 Feb 2021 22:37)  HR: 94 (27 Feb 2021 06:16) (88 - 100)  BP: 127/92 (27 Feb 2021 06:16) (119/84 - 132/80)  RR: 18 (27 Feb 2021 06:16) (18 - 20)  SpO2: 98% (27 Feb 2021 06:16) (98% - 100%)        CAPILLARY BLOOD GLUCOSE      POCT Blood Glucose.: 116 mg/dL (26 Feb 2021 21:47)      PHYSICAL EXAM:  GENERAL: NAD, well-developed  HEAD:  Atraumatic, Normocephalic  EYES: EOMI, PERRLA, conjunctiva and sclera clear  NECK: Supple, No JVD, old trach site C/D/I.   CHEST/LUNG: Clear to auscultation bilaterally; No wheezing, old trach site C/D/I.   HEART: Regular rate and rhythm; No murmurs, rubs, or gallops  ABDOMEN: Soft, Nontender, Nondistended; Bowel sounds present, Peg site C/D/I  EXTREMITIES:  2+ Peripheral Pulses, No clubbing, cyanosis, or peripheral edema. Moves all extremities +5.   NEUROLOGY: A&0x4, non-focal  SKIN: No rashes or lesions    HOSPITAL MEDICATIONS:  MEDICATIONS  (STANDING):  BACItracin   Ointment 1 Application(s) Topical two times a day  chlorhexidine 4% Liquid 1 Application(s) Topical daily  ciprofloxacin     Tablet 500 milliGRAM(s) Oral every 12 hours  clonazePAM  Tablet 1 milliGRAM(s) Oral <User Schedule>  dextrose 5%. 1000 milliLiter(s) (100 mL/Hr) IV Continuous <Continuous>  dextrose 5%. 1000 milliLiter(s) (50 mL/Hr) IV Continuous <Continuous>  enoxaparin Injectable 40 milliGRAM(s) SubCutaneous every 12 hours  glucagon  Injectable 1 milliGRAM(s) IntraMuscular once  insulin lispro (ADMELOG) corrective regimen sliding scale   SubCutaneous three times a day before meals  insulin lispro (ADMELOG) corrective regimen sliding scale   SubCutaneous at bedtime  mirtazapine 15 milliGRAM(s) Oral at bedtime  multivitamin 1 Tablet(s) Oral daily  QUEtiapine 12.5 milliGRAM(s) Oral at bedtime    MEDICATIONS  (PRN):  acetaminophen    Suspension .. 650 milliGRAM(s) Oral every 6 hours PRN Mild Pain (1 - 3)  ALBUTerol    90 MICROgram(s) HFA Inhaler 2 Puff(s) Inhalation every 6 hours PRN Wheezing  aluminum hydroxide/magnesium hydroxide/simethicone Suspension 30 milliLiter(s) Oral every 6 hours PRN Dyspepsia  bisacodyl Suppository 10 milliGRAM(s) Rectal at bedtime PRN Constipation  hydrocodone/homatropine Syrup 10 milliLiter(s) Oral every 8 hours PRN SEVERE COUGHING SPELLS  naproxen 250 milliGRAM(s) Oral every 6 hours PRN Moderate Pain (4 - 6)  oxyCODONE    Solution 5 milliGRAM(s) Oral every 6 hours PRN Severe Pain (7 - 10)  polyethylene glycol 3350 17 Gram(s) Oral two times a day PRN Constipation

## 2021-02-27 NOTE — PROGRESS NOTE ADULT - ATTENDING COMMENTS
Patient seen and examined. Agree with above  Decannulated on 2/26 - tolerated, on minimal supplemental O2.  Anxious - c/w current meds and emotional support  Dysphagia - dysphagia diet, PEG tube likely to be removed the beginning of the week prior to discharge.  PT/OT, oob to chair

## 2021-02-27 NOTE — PROGRESS NOTE ADULT - NSHPATTENDINGPLANDISCUSS_GEN_ALL_CORE
ACP
PMD
team
ICU TEAM
rcu
RCU Team
team
RESIDENT/FELLOW/RN
resident/fellow/nurse
ICU team
Plan discussed with resident/fellow/nurse.
RCU Team
Resident/Fellow and RN
rcu
team
ICU TEAM
resident/fellow/nurse
RCU Team
team
team
rcu
RCU team

## 2021-02-27 NOTE — PROGRESS NOTE ADULT - ASSESSMENT
47 YO F with Morbid Obesity and DM2 A1C 6.6 admitted for ARDS second to SARS-COV-2, intubated 1/5-1/10 then transferred to Medicine, however failed BIPAP and reintubated 1/16. Course complicated ECOLI UTI, Mouth Sores and Enterobacter and MRSA VAP. s/p Tracheostomy 1/28 and PEG 1/26. Now transferred to RCU.     # ICU Delirium/ Agitation /Depression   - Now weaned off sedation  - c/w Seroquel 12.5mg AM and 50mg QHS and Klonopin 1mg BID  - c/w Remeron with supportive care.    - Monitor mentation.     # ARDS second to SARS-COV-2 vs Superimposed Enterobacter and MRSA PNA   - s/p Remdesivir, Decadron and ABX as belowed.   - s/p Prolonged ICU stay and Tracheostomy on 1/28. Now TC and PMV.   - Exchanged 6DCT for 6CFN on 2/23. Cap trials as tolerated.   - Proventil and Chest PT Q6H. Suction PRN. Trach care QD.   - Decannulated 2/26    # Septic vs Vasoplegic Shock (resolved)  - Off all Pressors. Monitor HR/ BP     # Dysphagia with PEG   - s/p PEG 1/26 and continue on TF as tolerated.   - Bowel regimen (Senna and Miralax and Dulcolax PRN)   - Passed CINE on 2/16, c/w Mechanical soft Honey thick liquid  - Plan to remove PEG 3/1    # /UTI  - Passed TOV 2/23 and noted with dysuria. UA (+) and now treating empirically for UTI.   - Hx of ECOLI UTI sensitive to Cipro. c/w Cipro x 3 days (2/23 - ). UCx growing E. coli, awaiting sensitivities   - Hyponatremia/ Hypernatremia, monitor electrolytes and renal function as tolerated.     # Sepsis second to SARS-COV-2 vs Enterobacter and MRSA PNA vs HSV Type 1   - Completed empiric ABX for PNA with Zosyn (1/16-1/20), however cultures negative   - UCx ECOLI 1/25 s/p CTX (1/25-1/27).   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   - Now with dysuria and Cipro (2/23 - 2/25). Pending UCx.     # DM2 A1C 6.6 (1/2021)   - Continue on ISS and monitor FS Q6H    # Wounds   - WOC recommendations appreciated.      # DVT PPX with Lovenox BID   # CODE STATUS - Full Code   # DISPO - PT recommends Home w/ home PT. Plan to remove PEG 3/1 and DC planning 3/2. 47 YO F with Morbid Obesity and DM2 A1C 6.6 admitted for ARDS second to SARS-COV-2, intubated 1/5-1/10 then transferred to Medicine, however failed BIPAP and reintubated 1/16. Course complicated ECOLI UTI, Mouth Sores and Enterobacter and MRSA VAP. s/p Tracheostomy 1/28 and PEG 1/26. Now transferred to RCU.     # ICU Delirium/ Agitation /Depression   - resovled   - weaned off sedation and extubated   - c/w Seroquel 12.5mg AM and 50mg QHS and Klonopin 1mg BID  - c/w Remeron with supportive care.    - Monitor mentation.     # ARDS second to SARS-COV-2 vs Superimposed Enterobacter and MRSA PNA   - s/p Remdesivir, Decadron and ABX as below  - s/p Prolonged ICU stay and Tracheostomy on 1/28. Now TC and PMV.   - Exchanged 6DCT for 6CFN on 2/23. Cap trials as tolerated.   - Proventil and Chest PT Q6H. Suction PRN. Trach care QD.   - Decannulated 2/26    # Septic vs Vasoplegic Shock (resolved)  - Off all Pressors. Monitor HR/ BP     # Dysphagia with PEG   - s/p PEG 1/26 and continue on TF as tolerated.   - Bowel regimen (Senna and Miralax and Dulcolax PRN)   - Passed CINE on 2/16, c/w Mechanical soft Honey thick liquid  - Plan to remove PEG 3/1    # /UTI  - Passed TOV 2/23 and noted with dysuria. UA (+) and now treating empirically for UTI.   - Hx of ECOLI UTI sensitive to Cipro. c/w Cipro x 3 days (2/23 - ).  - Hyponatremia/ Hypernatremia, monitor electrolytes and renal function as tolerated.     # Sepsis second to SARS-COV-2 vs Enterobacter and MRSA PNA vs HSV Type 1   - Completed empiric ABX for PNA with Zosyn (1/16-1/20), however cultures negative   - UCx ECOLI 1/25 s/p CTX (1/25-1/27).   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   - Now with dysuria and Cipro (2/23 - 2/25). Pending UCx.     # DM2 A1C 6.6 (1/2021)   - Continue on ISS and monitor FS Q6H    # Wounds   - WOC recommendations appreciated.      # DVT PPX with Lovenox BID   # CODE STATUS - Full Code   # DISPO - PT recommends Home w/ home PT. Plan to remove PEG 3/1 and DC planning 3/2.

## 2021-02-28 LAB
ANION GAP SERPL CALC-SCNC: 9 MMOL/L — SIGNIFICANT CHANGE UP (ref 7–14)
APTT BLD: 32.1 SEC — SIGNIFICANT CHANGE UP (ref 27–36.3)
BUN SERPL-MCNC: 9 MG/DL — SIGNIFICANT CHANGE UP (ref 7–23)
CALCIUM SERPL-MCNC: 9.3 MG/DL — SIGNIFICANT CHANGE UP (ref 8.4–10.5)
CHLORIDE SERPL-SCNC: 101 MMOL/L — SIGNIFICANT CHANGE UP (ref 98–107)
CO2 SERPL-SCNC: 25 MMOL/L — SIGNIFICANT CHANGE UP (ref 22–31)
CREAT SERPL-MCNC: 0.65 MG/DL — SIGNIFICANT CHANGE UP (ref 0.5–1.3)
GLUCOSE SERPL-MCNC: 93 MG/DL — SIGNIFICANT CHANGE UP (ref 70–99)
HCT VFR BLD CALC: 31.2 % — LOW (ref 34.5–45)
HGB BLD-MCNC: 9.6 G/DL — LOW (ref 11.5–15.5)
INR BLD: 1.11 RATIO — SIGNIFICANT CHANGE UP (ref 0.88–1.16)
MCHC RBC-ENTMCNC: 27.7 PG — SIGNIFICANT CHANGE UP (ref 27–34)
MCHC RBC-ENTMCNC: 30.8 GM/DL — LOW (ref 32–36)
MCV RBC AUTO: 90.2 FL — SIGNIFICANT CHANGE UP (ref 80–100)
NRBC # BLD: 0 /100 WBCS — SIGNIFICANT CHANGE UP
NRBC # FLD: 0 K/UL — SIGNIFICANT CHANGE UP
PLATELET # BLD AUTO: 328 K/UL — SIGNIFICANT CHANGE UP (ref 150–400)
POTASSIUM SERPL-MCNC: 3.9 MMOL/L — SIGNIFICANT CHANGE UP (ref 3.5–5.3)
POTASSIUM SERPL-SCNC: 3.9 MMOL/L — SIGNIFICANT CHANGE UP (ref 3.5–5.3)
PROTHROM AB SERPL-ACNC: 12.7 SEC — SIGNIFICANT CHANGE UP (ref 10.6–13.6)
RBC # BLD: 3.46 M/UL — LOW (ref 3.8–5.2)
RBC # FLD: 16.7 % — HIGH (ref 10.3–14.5)
SODIUM SERPL-SCNC: 135 MMOL/L — SIGNIFICANT CHANGE UP (ref 135–145)
WBC # BLD: 7.47 K/UL — SIGNIFICANT CHANGE UP (ref 3.8–10.5)
WBC # FLD AUTO: 7.47 K/UL — SIGNIFICANT CHANGE UP (ref 3.8–10.5)

## 2021-02-28 PROCEDURE — 99233 SBSQ HOSP IP/OBS HIGH 50: CPT | Mod: GC

## 2021-02-28 RX ADMIN — Medication 500 MILLIGRAM(S): at 06:45

## 2021-02-28 RX ADMIN — Medication 500 MILLIGRAM(S): at 17:24

## 2021-02-28 RX ADMIN — Medication 1 APPLICATORFUL: at 22:16

## 2021-02-28 RX ADMIN — Medication 1 MILLIGRAM(S): at 17:24

## 2021-02-28 RX ADMIN — Medication 1 APPLICATION(S): at 17:25

## 2021-02-28 RX ADMIN — QUETIAPINE FUMARATE 12.5 MILLIGRAM(S): 200 TABLET, FILM COATED ORAL at 22:17

## 2021-02-28 RX ADMIN — ENOXAPARIN SODIUM 40 MILLIGRAM(S): 100 INJECTION SUBCUTANEOUS at 17:24

## 2021-02-28 RX ADMIN — Medication 1 MILLIGRAM(S): at 06:45

## 2021-02-28 RX ADMIN — Medication 1 TABLET(S): at 11:32

## 2021-02-28 RX ADMIN — SENNA PLUS 2 TABLET(S): 8.6 TABLET ORAL at 22:17

## 2021-02-28 RX ADMIN — MIRTAZAPINE 15 MILLIGRAM(S): 45 TABLET, ORALLY DISINTEGRATING ORAL at 22:16

## 2021-02-28 RX ADMIN — POLYETHYLENE GLYCOL 3350 17 GRAM(S): 17 POWDER, FOR SOLUTION ORAL at 17:24

## 2021-02-28 RX ADMIN — ENOXAPARIN SODIUM 40 MILLIGRAM(S): 100 INJECTION SUBCUTANEOUS at 06:45

## 2021-02-28 RX ADMIN — CHLORHEXIDINE GLUCONATE 1 APPLICATION(S): 213 SOLUTION TOPICAL at 06:46

## 2021-02-28 RX ADMIN — Medication 1 APPLICATION(S): at 06:46

## 2021-02-28 NOTE — PROGRESS NOTE ADULT - ATTENDING COMMENTS
Agree with plan as outlined above. Patient seen and examined at bedside.   46F with PMHx DMII and obesity admitted to [facility] for hypoxemic respiratory failure with ARDS 2/2 COVID19 PNA with failure to wean from ventilator s/p tracheostomy placement and RCU transfer for further management.    1. Neuro - resolved encephalopathy, at baseline mental status. Weaning sedation, on decreasing doses of clonazepam+ Seroquel+ mirtazapine. Anxiety improved, but still intermittently c/o nervousness and nightmares. Has tension-type headache without associated sx or red flags and mild left neck paraspinal tenderness with left palm numbness, similar to pre-menstrual headaches but greater in severity and duration. Improved with NSAID therapy x24hrs. PT/OT following. PM&R recs appreciated.     2. Pulm - initially p/w severe hypoxemic respiratory failure with failure to wean from ventilator s/p tracheostomy placement (1/28) now tolerating weaning. Was on TC ATC,  trial 2/24-2/26. Decannulated 2/26.     3. CVS - hemodynamically stable.     4. GI - oropharyngeal dysphagia s/p PEG (1/26) now tolerating dysphagia diet since 2/16. Will need PEG removed likely Monday, pt refused yesterday. Bowel regimen in place. GI ppx.     5. Endo- HISS with BGFS monitoring for DMII.     6. Heme - DVT ppx with Lovenox.     7. ID _ Pt's hospital course further c/b EColi UTI, as well as VAP 2/2 MRSA and Enterobacter. Now clinically improving, without any s/s acute infectious process. Monitoring off abx.     Dispo likely d/c home with home PT next week.     I was physically present for the key portions of the evaluation and management (E/M) service provided.  I agree with the above history, physical, and plan which I have reviewed and edited where appropriate.     35 minutes spent on total encounter; more than 50% of the visit was spent counseling and/or coordinating care by the attending physician.     Plan discussed with RCU Team.

## 2021-02-28 NOTE — PROGRESS NOTE ADULT - ASSESSMENT
45 YO F with Morbid Obesity and DM2 A1C 6.6 admitted for ARDS second to SARS-COV-2, intubated 1/5-1/10 then transferred to Medicine, however failed BIPAP and reintubated 1/16. Course complicated ECOLI UTI, Mouth Sores and Enterobacter and MRSA VAP. s/p Tracheostomy 1/28 and PEG 1/26. Now transferred to RCU.     # ICU Delirium/ Agitation /Depression   - resovled   - weaned off sedation and extubated   - c/w Seroquel 12.5mg AM and 50mg QHS and Klonopin 1mg BID  - c/w Remeron with supportive care.    - Monitor mentation.     # ARDS second to SARS-COV-2 vs Superimposed Enterobacter and MRSA PNA   - s/p Remdesivir, Decadron and ABX as below  - s/p Prolonged ICU stay and Tracheostomy on 1/28. Now TC and PMV.   - Exchanged 6DCT for 6CFN on 2/23. Cap trials as tolerated.   - Proventil and Chest PT Q6H. Suction PRN. Trach care QD.   - Decannulated 2/26    # Septic vs Vasoplegic Shock (resolved)  - Off all Pressors. Monitor HR/ BP     # Dysphagia with PEG   - s/p PEG 1/26 and continue on TF as tolerated.   - Bowel regimen (Senna and Miralax and Dulcolax PRN)   - Passed CINE on 2/16, c/w Mechanical soft Honey thick liquid  - Plan to remove PEG 3/1    # /UTI  - Passed TOV 2/23 and noted with dysuria. UA (+) and now treating empirically for UTI.   - Hx of ECOLI UTI sensitive to Cipro. c/w Cipro x 3 days (2/23 - ).  - Hyponatremia/ Hypernatremia, monitor electrolytes and renal function as tolerated.     # Sepsis second to SARS-COV-2 vs Enterobacter and MRSA PNA vs HSV Type 1   - Completed empiric ABX for PNA with Zosyn (1/16-1/20), however cultures negative   - UCx ECOLI 1/25 s/p CTX (1/25-1/27).   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   - Now with dysuria and Cipro (2/23 - 2/25). Pending UCx.     # DM2 A1C 6.6 (1/2021)   - Continue on ISS and monitor FS Q6H    # Wounds   - WOC recommendations appreciated.      # DVT PPX with Lovenox BID   # CODE STATUS - Full Code   # DISPO - PT recommends Home w/ home PT. Plan to remove PEG 3/1 and DC planning 3/2. 45 YO F with Morbid Obesity and DM2 A1C 6.6 admitted for ARDS second to SARS-COV-2, intubated 1/5-1/10 then transferred to Medicine, however failed BIPAP and reintubated 1/16. Course complicated ECOLI UTI, Mouth Sores and Enterobacter and MRSA VAP. s/p Tracheostomy 1/28 and PEG 1/26. Now transferred to RCU.     # ICU Delirium/ Agitation /Depression   - resovled   - weaned off sedation and extubated   - c/w Seroquel 12.5mg qhs and Klonopin 1mg BID  - c/w Remeron with supportive care.    - Monitor mentation. Much improved    # ARDS second to SARS-COV-2 vs Superimposed Enterobacter and MRSA PNA   - s/p Remdesivir, Decadron and ABX as below  - s/p Prolonged ICU stay and Tracheostomy on 1/28. s/p TC and PMV.   - Exchanged 6DCT for 6CFN on 2/23.   - Proventil and Chest PT q6hr PRN  - Decannulated successfully on 2/26. Saturating well on 2LNC    # Septic vs Vasoplegic Shock (resolved)  - Off all Pressors. Monitor HR/ BP     # Dysphagia with PEG   - s/p PEG 1/26   - Bowel regimen (Senna and Miralax and Dulcolax PRN)   - Passed CINE on 2/16, now Patient tolerating Regular diet well (started on 2/28)  - Plan to remove PEG tomorrow (3/1)    # /UTI/Vaginal Itching  - Passed TOV 2/23 and noted with dysuria. UA (+) and now treating empirically for UTI.   - Hx of E. coli UTI sensitive to Cipro. c/w Cipro x 3 days (2/23 - ).  - Hyponatremia/ Hypernatremia, monitor electrolytes and renal function as tolerated.   - Start on Clotrimazole 1% cream qhs x 7 days    # Sepsis second to SARS-COV-2 vs Enterobacter and MRSA PNA vs HSV Type 1   - s/p Empiric ABX for PNA with Zosyn (1/16-1/20), however cultures negative   - UCx ECOLI 1/25 s/p CTX (1/25-1/27).   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   - Now with dysuria and Cipro (2/23 - ). UCx showing E.coli sens to Cipro     # DM2 A1C 6.6 (1/2021)   - Continue on ISS and monitor FS Q6H    # Wounds   - WOC recommendations appreciated.      # DVT PPX with Lovenox BID   # CODE STATUS - Full Code   # DISPO - PT recommends Home w/ home PT. Plan to remove PEG 3/1 and DC planning 3/2.

## 2021-02-28 NOTE — PROGRESS NOTE ADULT - SUBJECTIVE AND OBJECTIVE BOX
CHIEF COMPLAINT: Patient is a 46y old  Female who presents with a chief complaint of COVID (27 Feb 2021 06:54)    Interval Events: None reported overnight.     REVIEW OF SYSTEMS:  Constitutional:   Eyes:  ENT:  CV:  Resp:  GI:  :  MSK:  Integumentary:  Neurological:  Psychiatric:  Endocrine:  Hematologic/Lymphatic:  Allergic/Immunologic:  [ ] All other systems negative  [ ] Unable to assess ROS because ________    OBJECTIVE:  ICU Vital Signs Last 24 Hrs  T(C): 37 (28 Feb 2021 06:42), Max: 37 (27 Feb 2021 22:00)  T(F): 98.6 (28 Feb 2021 06:42), Max: 98.6 (27 Feb 2021 22:00)  HR: 86 (28 Feb 2021 06:42) (86 - 98)  BP: 133/86 (28 Feb 2021 06:42) (124/83 - 133/86)  BP(mean): --  ABP: --  ABP(mean): --  RR: 18 (28 Feb 2021 06:42) (18 - 18)  SpO2: 98% (28 Feb 2021 06:42) (97% - 98%)        02-27 @ 07:01  -  02-28 @ 07:00  --------------------------------------------------------  IN: 0 mL / OUT: 2725 mL / NET: -2725 mL      CAPILLARY BLOOD GLUCOSE      POCT Blood Glucose.: 106 mg/dL (27 Feb 2021 21:29)      PHYSICAL EXAM:  General:   HEENT:   Lymph Nodes:  Neck:   Respiratory:   Cardiovascular:   Abdomen:   Extremities:   Skin:   Neurological:  Psychiatry:    HOSPITAL MEDICATIONS:  MEDICATIONS  (STANDING):  BACItracin   Ointment 1 Application(s) Topical two times a day  chlorhexidine 4% Liquid 1 Application(s) Topical daily  ciprofloxacin     Tablet 500 milliGRAM(s) Oral every 12 hours  clonazePAM  Tablet 1 milliGRAM(s) Oral <User Schedule>  dextrose 5%. 1000 milliLiter(s) (50 mL/Hr) IV Continuous <Continuous>  dextrose 5%. 1000 milliLiter(s) (100 mL/Hr) IV Continuous <Continuous>  enoxaparin Injectable 40 milliGRAM(s) SubCutaneous every 12 hours  glucagon  Injectable 1 milliGRAM(s) IntraMuscular once  insulin lispro (ADMELOG) corrective regimen sliding scale   SubCutaneous three times a day before meals  insulin lispro (ADMELOG) corrective regimen sliding scale   SubCutaneous at bedtime  mirtazapine 15 milliGRAM(s) Oral at bedtime  multivitamin 1 Tablet(s) Oral daily  polyethylene glycol 3350 17 Gram(s) Oral two times a day  QUEtiapine 12.5 milliGRAM(s) Oral at bedtime  senna 2 Tablet(s) Oral at bedtime    MEDICATIONS  (PRN):  acetaminophen    Suspension .. 650 milliGRAM(s) Oral every 6 hours PRN Mild Pain (1 - 3)  ALBUTerol    90 MICROgram(s) HFA Inhaler 2 Puff(s) Inhalation every 6 hours PRN Wheezing  aluminum hydroxide/magnesium hydroxide/simethicone Suspension 30 milliLiter(s) Oral every 6 hours PRN Dyspepsia  bisacodyl Suppository 10 milliGRAM(s) Rectal at bedtime PRN Constipation  hydrocodone/homatropine Syrup 10 milliLiter(s) Oral every 8 hours PRN SEVERE COUGHING SPELLS  naproxen 250 milliGRAM(s) Oral every 6 hours PRN Moderate Pain (4 - 6)  oxyCODONE    Solution 5 milliGRAM(s) Oral every 6 hours PRN Severe Pain (7 - 10)      LABS:          PT/INR - ( 28 Feb 2021 07:06 )   PT: 12.7 sec;   INR: 1.11 ratio         PTT - ( 28 Feb 2021 07:06 )  PTT:32.1 sec          MICROBIOLOGY:     RADIOLOGY:  [ ] Reviewed and interpreted by me    PULMONARY FUNCTION TESTS:    EKG: CHIEF COMPLAINT: Patient is a 46y old  Female who presents with a chief complaint of COVID (27 Feb 2021 06:54)    Interval Events: None reported overnight. She is saturating well on 2L NC, with good exercise tolerance. She is tolerating her regular diet well. Pt reports mild vaginal itching. VSS. Will c/w current treatment. Plan for PEG removal tomorrow.     REVIEW OF SYSTEMS:  see above  [x] All other systems negative      OBJECTIVE:  ICU Vital Signs Last 24 Hrs  T(C): 37 (28 Feb 2021 06:42), Max: 37 (27 Feb 2021 22:00)  T(F): 98.6 (28 Feb 2021 06:42), Max: 98.6 (27 Feb 2021 22:00)  HR: 86 (28 Feb 2021 06:42) (86 - 98)  BP: 133/86 (28 Feb 2021 06:42) (124/83 - 133/86)  BP(mean): --  ABP: --  ABP(mean): --  RR: 18 (28 Feb 2021 06:42) (18 - 18)  SpO2: 98% (28 Feb 2021 06:42) (97% - 98%)        02-27 @ 07:01  -  02-28 @ 07:00  --------------------------------------------------------  IN: 0 mL / OUT: 2725 mL / NET: -2725 mL      CAPILLARY BLOOD GLUCOSE      POCT Blood Glucose.: 106 mg/dL (27 Feb 2021 21:29)      PHYSICAL EXAM  General: well appearing, well groomed, NAD  Neck: Supple, no JVD, Tracheostomy stoma covered with dressing, area c/d/i   Respiratory: CTA b/l, normal respiratory effort  Cardiovascular: +s1, s2  Abdomen: +BS, soft, protuberant, +PEG tube, area c/d/i, nt/nd, no peritoneal signs noted  Extremities: Grossly normal   Skin: as per AAI sheet monitoring  Neurological: non focal   Psychiatry: Appropriate mood and affect    HOSPITAL MEDICATIONS:  MEDICATIONS  (STANDING):  BACItracin   Ointment 1 Application(s) Topical two times a day  chlorhexidine 4% Liquid 1 Application(s) Topical daily  ciprofloxacin     Tablet 500 milliGRAM(s) Oral every 12 hours  clonazePAM  Tablet 1 milliGRAM(s) Oral <User Schedule>  dextrose 5%. 1000 milliLiter(s) (50 mL/Hr) IV Continuous <Continuous>  dextrose 5%. 1000 milliLiter(s) (100 mL/Hr) IV Continuous <Continuous>  enoxaparin Injectable 40 milliGRAM(s) SubCutaneous every 12 hours  glucagon  Injectable 1 milliGRAM(s) IntraMuscular once  insulin lispro (ADMELOG) corrective regimen sliding scale   SubCutaneous three times a day before meals  insulin lispro (ADMELOG) corrective regimen sliding scale   SubCutaneous at bedtime  mirtazapine 15 milliGRAM(s) Oral at bedtime  multivitamin 1 Tablet(s) Oral daily  polyethylene glycol 3350 17 Gram(s) Oral two times a day  QUEtiapine 12.5 milliGRAM(s) Oral at bedtime  senna 2 Tablet(s) Oral at bedtime    MEDICATIONS  (PRN):  acetaminophen    Suspension .. 650 milliGRAM(s) Oral every 6 hours PRN Mild Pain (1 - 3)  ALBUTerol    90 MICROgram(s) HFA Inhaler 2 Puff(s) Inhalation every 6 hours PRN Wheezing  aluminum hydroxide/magnesium hydroxide/simethicone Suspension 30 milliLiter(s) Oral every 6 hours PRN Dyspepsia  bisacodyl Suppository 10 milliGRAM(s) Rectal at bedtime PRN Constipation  hydrocodone/homatropine Syrup 10 milliLiter(s) Oral every 8 hours PRN SEVERE COUGHING SPELLS  naproxen 250 milliGRAM(s) Oral every 6 hours PRN Moderate Pain (4 - 6)  oxyCODONE    Solution 5 milliGRAM(s) Oral every 6 hours PRN Severe Pain (7 - 10)      LABS:          PT/INR - ( 28 Feb 2021 07:06 )   PT: 12.7 sec;   INR: 1.11 ratio         PTT - ( 28 Feb 2021 07:06 )  PTT:32.1 sec          MICROBIOLOGY:     RADIOLOGY:  [ ] Reviewed and interpreted by me    PULMONARY FUNCTION TESTS:    EKG:

## 2021-03-01 PROCEDURE — 99233 SBSQ HOSP IP/OBS HIGH 50: CPT | Mod: GC

## 2021-03-01 RX ADMIN — Medication 1 MILLIGRAM(S): at 05:52

## 2021-03-01 RX ADMIN — POLYETHYLENE GLYCOL 3350 17 GRAM(S): 17 POWDER, FOR SOLUTION ORAL at 18:17

## 2021-03-01 RX ADMIN — Medication 500 MILLIGRAM(S): at 05:52

## 2021-03-01 RX ADMIN — CHLORHEXIDINE GLUCONATE 1 APPLICATION(S): 213 SOLUTION TOPICAL at 05:52

## 2021-03-01 RX ADMIN — ENOXAPARIN SODIUM 40 MILLIGRAM(S): 100 INJECTION SUBCUTANEOUS at 05:53

## 2021-03-01 RX ADMIN — Medication 1 APPLICATORFUL: at 22:14

## 2021-03-01 RX ADMIN — Medication 650 MILLIGRAM(S): at 18:15

## 2021-03-01 RX ADMIN — Medication 500 MILLIGRAM(S): at 18:17

## 2021-03-01 RX ADMIN — ENOXAPARIN SODIUM 40 MILLIGRAM(S): 100 INJECTION SUBCUTANEOUS at 18:16

## 2021-03-01 RX ADMIN — MIRTAZAPINE 15 MILLIGRAM(S): 45 TABLET, ORALLY DISINTEGRATING ORAL at 22:14

## 2021-03-01 RX ADMIN — POLYETHYLENE GLYCOL 3350 17 GRAM(S): 17 POWDER, FOR SOLUTION ORAL at 05:52

## 2021-03-01 RX ADMIN — Medication 1 APPLICATION(S): at 18:04

## 2021-03-01 RX ADMIN — SENNA PLUS 2 TABLET(S): 8.6 TABLET ORAL at 22:16

## 2021-03-01 RX ADMIN — Medication 1 TABLET(S): at 12:28

## 2021-03-01 RX ADMIN — QUETIAPINE FUMARATE 12.5 MILLIGRAM(S): 200 TABLET, FILM COATED ORAL at 22:14

## 2021-03-01 RX ADMIN — Medication 1 MILLIGRAM(S): at 18:16

## 2021-03-01 RX ADMIN — Medication 1 APPLICATION(S): at 05:52

## 2021-03-01 NOTE — PROVIDER CONTACT NOTE (OTHER) - ASSESSMENT
Pt. AxOx3, anxious, RR 48 O2Sat 91%
LL Abdominal Pain of 5
Patient A&Ox4. Patient denies chest pain, or palpitations. Patient has labored breathing and is sating 81-85% on High flow NC 50/100. Elevated RR is an ongoing issue for patient.
Pt complains of chest pain and back pain
patient is restless, anxious VSS
Patient A&Ox4. Patient denies chest pain, or palpitations. Patient has labored breathing and is sating 92% on bipap. Elevated RR is an ongoing issue for patient.
Rectal temp 100.2F
Tachycardic

## 2021-03-01 NOTE — PROVIDER CONTACT NOTE (OTHER) - NAME OF MD/NP/PA/DO NOTIFIED:
Milli Fisher
Nabila ENCINAS
Zachary Albarado
Samuel Patiño MD
Zak Turner PA ACP
Yue Crespo
[place] [id number]
CE [place]
Zak Turner PA ACP

## 2021-03-01 NOTE — PROVIDER CONTACT NOTE (OTHER) - RECOMMENDATIONS
Notify provider
Provider states to give pt the stat order of ativan. If pt vitals do not stabilize following administration, RN is to perform 12 lead ECG and notify provider of results.
anxiety medication
MD notified and made aware. Will continue to monitor.
Provider made aware, provider to come to bedside with respiratory to increase high flow settings to 60/100.
Provider Contact
please put patient on EC with supervision
Provider made aware, provider to come to bedside.

## 2021-03-01 NOTE — PROGRESS NOTE ADULT - ASSESSMENT
46 F w respiratory failure due to COVID 19     Problem/Recommendation - 1:  Problem: Acute respiratory failure with hypoxia. Recommendation: s/p tracheostomy and PEG.   -NPO at midnight, will remove gastrostomy tube tomorrow at bedside     Problem/Recommendation - 2:  ·  Problem: COVID-19.  Recommendation: status post dexamethasone.   on abx per ID for staph in sputum     Problem/Recommendation - 3:  ·  Problem: Normocytic anemia.  Recommendation: without overt GI bleeding. Likely due to critical illness. Trended down today  -monitor for GI bleed  -PPI QD.      Problem/Recommendation - 4:  ·  Problem: Abnormal LFTs.  Recommendation: multifactorial, likely NAFLD, COVID, critical illness. Can consider US to r/o gallstone disease, especially if increasing.      Problem/Recommendation - 5:  ·  Problem: Morbid obesity.         Attending Attestation:   Differential diagnosis and plan of care discussed with patient after the evaluation  35 Minutes spent on total encounter of which more than fifty percent of the encounter was spent counseling and/or coordinating care by the attending physician.    Mele Bolanos M.D.   Gastroenterology and Hepatology  Cell: [phone].

## 2021-03-01 NOTE — PROGRESS NOTE ADULT - ASSESSMENT
45 YO F with Morbid Obesity and DM2 A1C 6.6 admitted for ARDS second to SARS-COV-2, intubated 1/5-1/10 then transferred to Medicine, however failed BIPAP and reintubated 1/16. Course complicated ECOLI UTI, Mouth Sores and Enterobacter and MRSA VAP. s/p Tracheostomy 1/28 and PEG 1/26. Now transferred to RCU.     # ICU Delirium/ Agitation /Depression   - weaned off sedation and extubated   - c/w Seroquel 12.5mg qhs and Klonopin 1mg BID  - c/w Remeron with supportive care.    - Monitor mentation. Much improved    # ARDS second to SARS-COV-2 vs Superimposed Enterobacter and MRSA PNA   - s/p Remdesivir, Decadron and ABX as below  - s/p Prolonged ICU stay and Tracheostomy on 1/28. s/p TC and PMV.   - Exchanged 6DCT for 6CFN on 2/23.   - Proventil and Chest PT q6hr PRN  - Decannulated successfully on 2/26. Saturating well on 2LNC    # Septic vs Vasoplegic Shock (resolved)  - Off all Pressors. Monitor HR/ BP     # Dysphagia with PEG   - s/p PEG 1/26   - Bowel regimen (Senna and Miralax and Dulcolax PRN)   - Passed CINE on 2/16, now Patient tolerating Regular diet well (started on 2/28)  - Plan to remove PEG today (3/1)    # /UTI/Vaginal Itching  - Passed TOV 2/23 and noted with dysuria. UA (+) and now treating empirically for UTI.   - Hx of E. coli UTI sensitive to Cipro. c/w Cipro until 3/3 (2/23 - ).  - Hyponatremia/ Hypernatremia, monitor electrolytes and renal function as tolerated.   - Start on Clotrimazole 1% cream qhs x 7 days    # Sepsis second to SARS-COV-2 vs Enterobacter and MRSA PNA vs HSV Type 1   - s/p Empiric ABX for PNA with Zosyn (1/16-1/20), however cultures negative   - UCx ECOLI 1/25 s/p CTX (1/25-1/27).   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   - Now with dysuria and Cipro (2/23 - ). UCx showing E.coli sens to Cipro     # DM2 A1C 6.6 (1/2021)   - Continue on ISS and monitor FS Q6H    # Wounds   - WOC recommendations appreciated.      # DVT PPX with Lovenox BID   # CODE STATUS - Full Code   # DISPO - PT recommends Home w/ home PT. Plan to remove PEG 3/1 and DC planning 3/2. 47 YO F with Morbid Obesity and DM2 A1C 6.6 admitted for ARDS second to SARS-COV-2, intubated 1/5-1/10 then transferred to Medicine, however failed BIPAP and reintubated 1/16. Course complicated ECOLI UTI, Mouth Sores and Enterobacter and MRSA VAP. s/p Tracheostomy 1/28 and PEG 1/26. Now transferred to RCU.     # ICU Delirium/ Agitation /Depression   - weaned off sedation and extubated   - c/w Seroquel 12.5mg qhs and Klonopin 1mg BID  - c/w Remeron with supportive care.    - Monitor mentation. Much improved    # ARDS second to SARS-COV-2 vs Superimposed Enterobacter and MRSA PNA   - s/p Remdesivir, Decadron and ABX as below  - s/p Prolonged ICU stay and Tracheostomy on 1/28. s/p TC and PMV.   - Exchanged 6DCT for 6CFN on 2/23.   - Proventil and Chest PT q6hr PRN  - Decannulated successfully on 2/26. Saturating well on 2LNC    # Septic vs Vasoplegic Shock (resolved)  - Off all Pressors. Monitor HR/ BP     # Dysphagia with PEG   - s/p PEG 1/26   - Bowel regimen (Senna and Miralax and Dulcolax PRN)   - Passed CINE on 2/16, c/w Regular diet  - Plan to remove PEG tomorrow by GI (3/2)    # /UTI/Vaginal Itching  - Passed TOV 2/23 and noted with dysuria. UA (+) and now treating empirically for UTI.   - Hx of E. coli UTI sensitive to Cipro. c/w Cipro until 3/3 (2/23 - ).  - Hyponatremia/ Hypernatremia, monitor electrolytes and renal function as tolerated.   - c/w on Clotrimazole 1% cream qhs x 7 days    # Sepsis second to SARS-COV-2 vs Enterobacter and MRSA PNA vs HSV Type 1   - s/p Empiric ABX for PNA with Zosyn (1/16-1/20), however cultures negative   - UCx ECOLI 1/25 s/p CTX (1/25-1/27).   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   - c/w Cipro for Dysuria (2/23 - until 3/3). UCx showing E.coli sens to Cipro     # DM2 A1C 6.6 (1/2021)   - Continue on ISS and monitor FS Q6H    # Wounds   - WOC recommendations appreciated.      # DVT PPX with Lovenox BID   # CODE STATUS - Full Code   # DISPO - PT recommends Home w/ home PT, Home care Service, and Home Oxygen.                 - Plan to remove PEG 3/2 and DC planning 3/3.

## 2021-03-01 NOTE — PROVIDER CONTACT NOTE (OTHER) - REASON
Pt pulse is 143. and Respiratory rate of 34.
patient pulling vent, pulling burdick, attempted to get OOB multiple times
Patients respiratory rate elevated 38-41
Patients respiratory rate elevated 38 and desating to mid 80s% on high flow 50/100
Rectal temp 100.2F
LL Abdominal Pain
Pt complains of chest pain and back pain
RR in 50s and high 40s on Bipap despite Lorazepam 1 mg IVP O2Sat 91%
patient noted with increased anxiety

## 2021-03-01 NOTE — PROVIDER CONTACT NOTE (OTHER) - BACKGROUND
DM2; ARDS SARS COV-2
Patient s/p extubation 1/8
Pt is a 46Y female admitted for covid
SARS
s/p extubation on 1/8
SARS
admitted for covid19 with acute hypoxic respiratory failure. S/p extubation on 1/8/21.
Patient s/p extubation 1/8

## 2021-03-01 NOTE — PROVIDER CONTACT NOTE (OTHER) - DATE AND TIME:
06-Feb-2021 02:10
11-Jan-2021 02:31
12-Jan-2021 23:30
12-Jan-2021 05:50
13-Feb-2021 05:58
14-Jan-2021 01:00
06-Feb-2021 02:31
06-Feb-2021 21:04
01-Mar-2021 17:15

## 2021-03-01 NOTE — PROGRESS NOTE ADULT - SUBJECTIVE AND OBJECTIVE BOX
Chief Complaint:  Patient is a 46y old  Female who presents with a chief complaint of COVID (01 Mar 2021 07:41)      Interval Events:   tolerating PO    Allergies:  No Known Allergies      Hospital Medications:  acetaminophen    Suspension .. 650 milliGRAM(s) Oral every 6 hours PRN  ALBUTerol    90 MICROgram(s) HFA Inhaler 2 Puff(s) Inhalation every 6 hours PRN  aluminum hydroxide/magnesium hydroxide/simethicone Suspension 30 milliLiter(s) Oral every 6 hours PRN  BACItracin   Ointment 1 Application(s) Topical two times a day  bisacodyl Suppository 10 milliGRAM(s) Rectal at bedtime PRN  chlorhexidine 4% Liquid 1 Application(s) Topical daily  ciprofloxacin     Tablet 500 milliGRAM(s) Oral every 12 hours  clonazePAM  Tablet 1 milliGRAM(s) Oral <User Schedule>  clotrimazole 1% Vaginal Cream 1 Applicatorful Vaginal at bedtime  dextrose 5%. 1000 milliLiter(s) IV Continuous <Continuous>  dextrose 5%. 1000 milliLiter(s) IV Continuous <Continuous>  enoxaparin Injectable 40 milliGRAM(s) SubCutaneous every 12 hours  glucagon  Injectable 1 milliGRAM(s) IntraMuscular once  hydrocodone/homatropine Syrup 10 milliLiter(s) Oral every 8 hours PRN  insulin lispro (ADMELOG) corrective regimen sliding scale   SubCutaneous three times a day before meals  insulin lispro (ADMELOG) corrective regimen sliding scale   SubCutaneous at bedtime  mirtazapine 15 milliGRAM(s) Oral at bedtime  multivitamin 1 Tablet(s) Oral daily  naproxen 250 milliGRAM(s) Oral every 6 hours PRN  oxyCODONE    Solution 5 milliGRAM(s) Oral every 6 hours PRN  polyethylene glycol 3350 17 Gram(s) Oral two times a day  QUEtiapine 12.5 milliGRAM(s) Oral at bedtime  senna 2 Tablet(s) Oral at bedtime      PMHX/PSHX:  No pertinent past medical history    No significant past surgical history        Family history:  FH: type 2 diabetes        ROS:     General:  No wt loss, fevers, chills, night sweats, fatigue,   Eyes:  Good vision, no reported pain  ENT:  No sore throat, pain, runny nose, dysphagia  CV:  No pain, palpitations, hypo/hypertension  Resp:  No dyspnea, cough, tachypnea, wheezing  GI:  See HPI  :  No pain, bleeding, incontinence, nocturia  Muscle:  No pain, weakness  Neuro:  No weakness, tingling, memory problems  Psych:  No fatigue, insomnia, mood problems, depression  Endocrine:  No polyuria, polydipsia, cold/heat intolerance  Heme:  No petechiae, ecchymosis, easy bruisability  Skin:  No rash, edema      PHYSICAL EXAM:     GENERAL:  Appears stated age, well-groomed, well-nourished, no distress  HEENT:  NC/AT,  conjunctivae clear, sclera-anicteric  NECK: Trachea midline, supple  CHEST:  Full & symmetric excursion, no increased effort, breath sounds clear  HEART:  Regular rhythm, no minerva/heave  ABDOMEN:  Soft, non-tender, non-distended, normoactive bowel sounds,  no masses ,no hepato-splenomegaly, gastrostomy  EXTREMITIES:  no cyanosis,clubbing or edema  SKIN:  No rash/erythema/petechiae, no jaundice  NEURO:  Alert, oriented, no asterixis  RECTAL: Deferred    Vital Signs:  Vital Signs Last 24 Hrs  T(C): 37.1 (01 Mar 2021 12:27), Max: 37.1 (01 Mar 2021 12:27)  T(F): 98.7 (01 Mar 2021 12:27), Max: 98.7 (01 Mar 2021 12:27)  HR: 95 (01 Mar 2021 12:27) (95 - 112)  BP: 109/82 (01 Mar 2021 12:27) (109/82 - 129/92)  BP(mean): --  RR: 18 (01 Mar 2021 12:27) (18 - 20)  SpO2: 100% (01 Mar 2021 12:27) (96% - 100%)  Daily     Daily     LABS:                        9.6    7.47  )-----------( 328      ( 28 Feb 2021 07:06 )             31.2     02-28    135  |  101  |  9   ----------------------------<  93  3.9   |  25  |  0.65    Ca    9.3      28 Feb 2021 07:06        PT/INR - ( 28 Feb 2021 07:06 )   PT: 12.7 sec;   INR: 1.11 ratio         PTT - ( 28 Feb 2021 07:06 )  PTT:32.1 sec        Imaging:

## 2021-03-01 NOTE — PROGRESS NOTE ADULT - ATTENDING COMMENTS
agree with above  pt doing great  for PEG d/c  d/c planning home with 02 and homecare, home PT/OT and  will be home

## 2021-03-01 NOTE — PROGRESS NOTE ADULT - SUBJECTIVE AND OBJECTIVE BOX
CHIEF COMPLAINT: Patient is a 46y old  Female who presents with a chief complaint of COVID (28 Feb 2021 07:31)    Interval Events: None reported overnight. Clinically unchanged. No new complaints. Plan for PEG removal today. VSS.     REVIEW OF SYSTEMS:  see above  [x] All other systems negative      OBJECTIVE:  ICU Vital Signs Last 24 Hrs  T(C): 37 (01 Mar 2021 05:55), Max: 37 (01 Mar 2021 05:55)  T(F): 98.6 (01 Mar 2021 05:55), Max: 98.6 (01 Mar 2021 05:55)  HR: 104 (01 Mar 2021 05:55) (96 - 112)  BP: 125/78 (01 Mar 2021 05:55) (124/81 - 129/92)  BP(mean): --  ABP: --  ABP(mean): --  RR: 18 (01 Mar 2021 05:55) (18 - 20)  SpO2: 96% (01 Mar 2021 05:55) (96% - 99%)        02-28 @ 07:01  -  03-01 @ 07:00  --------------------------------------------------------  IN: 450 mL / OUT: 3300 mL / NET: -2850 mL      CAPILLARY BLOOD GLUCOSE      POCT Blood Glucose.: 111 mg/dL (28 Feb 2021 21:48)    PHYSICAL EXAM  General: well appearing, well groomed, NAD  Neck: Supple, no JVD, Tracheostomy stoma covered with dressing, area c/d/i   Respiratory: CTA b/l, normal respiratory effort  Cardiovascular: +s1, s2  Abdomen: +BS, soft, protuberant, +PEG tube, area c/d/i, nt/nd, no peritoneal signs noted  Extremities: Grossly normal   Skin: as per AAI sheet monitoring  Neurological: non focal   Psychiatry: Appropriate mood and affect    HOSPITAL MEDICATIONS:  MEDICATIONS  (STANDING):  BACItracin   Ointment 1 Application(s) Topical two times a day  chlorhexidine 4% Liquid 1 Application(s) Topical daily  ciprofloxacin     Tablet 500 milliGRAM(s) Oral every 12 hours  clonazePAM  Tablet 1 milliGRAM(s) Oral <User Schedule>  clotrimazole 1% Vaginal Cream 1 Applicatorful Vaginal at bedtime  dextrose 5%. 1000 milliLiter(s) (50 mL/Hr) IV Continuous <Continuous>  dextrose 5%. 1000 milliLiter(s) (100 mL/Hr) IV Continuous <Continuous>  enoxaparin Injectable 40 milliGRAM(s) SubCutaneous every 12 hours  glucagon  Injectable 1 milliGRAM(s) IntraMuscular once  insulin lispro (ADMELOG) corrective regimen sliding scale   SubCutaneous three times a day before meals  insulin lispro (ADMELOG) corrective regimen sliding scale   SubCutaneous at bedtime  mirtazapine 15 milliGRAM(s) Oral at bedtime  multivitamin 1 Tablet(s) Oral daily  polyethylene glycol 3350 17 Gram(s) Oral two times a day  QUEtiapine 12.5 milliGRAM(s) Oral at bedtime  senna 2 Tablet(s) Oral at bedtime    MEDICATIONS  (PRN):  acetaminophen    Suspension .. 650 milliGRAM(s) Oral every 6 hours PRN Mild Pain (1 - 3)  ALBUTerol    90 MICROgram(s) HFA Inhaler 2 Puff(s) Inhalation every 6 hours PRN Wheezing  aluminum hydroxide/magnesium hydroxide/simethicone Suspension 30 milliLiter(s) Oral every 6 hours PRN Dyspepsia  bisacodyl Suppository 10 milliGRAM(s) Rectal at bedtime PRN Constipation  hydrocodone/homatropine Syrup 10 milliLiter(s) Oral every 8 hours PRN SEVERE COUGHING SPELLS  naproxen 250 milliGRAM(s) Oral every 6 hours PRN Moderate Pain (4 - 6)  oxyCODONE    Solution 5 milliGRAM(s) Oral every 6 hours PRN Severe Pain (7 - 10)      LABS:                        9.6    7.47  )-----------( 328      ( 28 Feb 2021 07:06 )             31.2     02-28    135  |  101  |  9   ----------------------------<  93  3.9   |  25  |  0.65    Ca    9.3      28 Feb 2021 07:06      PT/INR - ( 28 Feb 2021 07:06 )   PT: 12.7 sec;   INR: 1.11 ratio         PTT - ( 28 Feb 2021 07:06 )  PTT:32.1 sec          MICROBIOLOGY:     RADIOLOGY:  [ ] Reviewed and interpreted by me    PULMONARY FUNCTION TESTS:    EKG: CHIEF COMPLAINT: Patient is a 46y old  Female who presents with a chief complaint of COVID (28 Feb 2021 07:31)    Interval Events: None reported overnight. Clinically unchanged. No new complaints. Plan for PEG removal by GI on 3/2. Will need to be NPO at midnight. VSS. d/c planning for 3/3.     REVIEW OF SYSTEMS:  see above  [x] All other systems negative      OBJECTIVE:  ICU Vital Signs Last 24 Hrs  T(C): 37 (01 Mar 2021 05:55), Max: 37 (01 Mar 2021 05:55)  T(F): 98.6 (01 Mar 2021 05:55), Max: 98.6 (01 Mar 2021 05:55)  HR: 104 (01 Mar 2021 05:55) (96 - 112)  BP: 125/78 (01 Mar 2021 05:55) (124/81 - 129/92)  BP(mean): --  ABP: --  ABP(mean): --  RR: 18 (01 Mar 2021 05:55) (18 - 20)  SpO2: 96% (01 Mar 2021 05:55) (96% - 99%)        02-28 @ 07:01  -  03-01 @ 07:00  --------------------------------------------------------  IN: 450 mL / OUT: 3300 mL / NET: -2850 mL      CAPILLARY BLOOD GLUCOSE      POCT Blood Glucose.: 111 mg/dL (28 Feb 2021 21:48)    PHYSICAL EXAM  General: well appearing, well groomed, NAD  Neck: Supple, no JVD, Tracheostomy stoma covered with dressing, area c/d/i   Respiratory: CTA b/l, normal respiratory effort  Cardiovascular: +s1, s2  Abdomen: +BS, soft, protuberant, +PEG tube, area c/d/i, nt/nd, no peritoneal signs noted  Extremities: Grossly normal   Skin: as per AAI sheet monitoring  Neurological: non focal   Psychiatry: Appropriate mood and affect    HOSPITAL MEDICATIONS:  MEDICATIONS  (STANDING):  BACItracin   Ointment 1 Application(s) Topical two times a day  chlorhexidine 4% Liquid 1 Application(s) Topical daily  ciprofloxacin     Tablet 500 milliGRAM(s) Oral every 12 hours  clonazePAM  Tablet 1 milliGRAM(s) Oral <User Schedule>  clotrimazole 1% Vaginal Cream 1 Applicatorful Vaginal at bedtime  dextrose 5%. 1000 milliLiter(s) (50 mL/Hr) IV Continuous <Continuous>  dextrose 5%. 1000 milliLiter(s) (100 mL/Hr) IV Continuous <Continuous>  enoxaparin Injectable 40 milliGRAM(s) SubCutaneous every 12 hours  glucagon  Injectable 1 milliGRAM(s) IntraMuscular once  insulin lispro (ADMELOG) corrective regimen sliding scale   SubCutaneous three times a day before meals  insulin lispro (ADMELOG) corrective regimen sliding scale   SubCutaneous at bedtime  mirtazapine 15 milliGRAM(s) Oral at bedtime  multivitamin 1 Tablet(s) Oral daily  polyethylene glycol 3350 17 Gram(s) Oral two times a day  QUEtiapine 12.5 milliGRAM(s) Oral at bedtime  senna 2 Tablet(s) Oral at bedtime    MEDICATIONS  (PRN):  acetaminophen    Suspension .. 650 milliGRAM(s) Oral every 6 hours PRN Mild Pain (1 - 3)  ALBUTerol    90 MICROgram(s) HFA Inhaler 2 Puff(s) Inhalation every 6 hours PRN Wheezing  aluminum hydroxide/magnesium hydroxide/simethicone Suspension 30 milliLiter(s) Oral every 6 hours PRN Dyspepsia  bisacodyl Suppository 10 milliGRAM(s) Rectal at bedtime PRN Constipation  hydrocodone/homatropine Syrup 10 milliLiter(s) Oral every 8 hours PRN SEVERE COUGHING SPELLS  naproxen 250 milliGRAM(s) Oral every 6 hours PRN Moderate Pain (4 - 6)  oxyCODONE    Solution 5 milliGRAM(s) Oral every 6 hours PRN Severe Pain (7 - 10)      LABS:                        9.6    7.47  )-----------( 328      ( 28 Feb 2021 07:06 )             31.2     02-28    135  |  101  |  9   ----------------------------<  93  3.9   |  25  |  0.65    Ca    9.3      28 Feb 2021 07:06      PT/INR - ( 28 Feb 2021 07:06 )   PT: 12.7 sec;   INR: 1.11 ratio         PTT - ( 28 Feb 2021 07:06 )  PTT:32.1 sec          MICROBIOLOGY:     RADIOLOGY:  [ ] Reviewed and interpreted by me    PULMONARY FUNCTION TESTS:    EKG:

## 2021-03-02 ENCOUNTER — TRANSCRIPTION ENCOUNTER (OUTPATIENT)
Age: 46
End: 2021-03-02

## 2021-03-02 LAB
CK MB BLD-MCNC: SIGNIFICANT CHANGE UP % (ref 0–2.5)
CK MB CFR SERPL CALC: <1 NG/ML — SIGNIFICANT CHANGE UP
CK SERPL-CCNC: 23 U/L — LOW (ref 25–170)
D DIMER BLD IA.RAPID-MCNC: 184 NG/ML DDU — SIGNIFICANT CHANGE UP
TROPONIN T, HIGH SENSITIVITY RESULT: 7 NG/L — SIGNIFICANT CHANGE UP

## 2021-03-02 PROCEDURE — 99233 SBSQ HOSP IP/OBS HIGH 50: CPT | Mod: GC

## 2021-03-02 RX ADMIN — Medication 1 MILLIGRAM(S): at 05:20

## 2021-03-02 RX ADMIN — Medication 250 MILLIGRAM(S): at 08:15

## 2021-03-02 RX ADMIN — QUETIAPINE FUMARATE 12.5 MILLIGRAM(S): 200 TABLET, FILM COATED ORAL at 22:36

## 2021-03-02 RX ADMIN — Medication 250 MILLIGRAM(S): at 22:37

## 2021-03-02 RX ADMIN — MIRTAZAPINE 15 MILLIGRAM(S): 45 TABLET, ORALLY DISINTEGRATING ORAL at 22:37

## 2021-03-02 RX ADMIN — Medication 1 APPLICATORFUL: at 22:35

## 2021-03-02 RX ADMIN — Medication 500 MILLIGRAM(S): at 05:20

## 2021-03-02 RX ADMIN — POLYETHYLENE GLYCOL 3350 17 GRAM(S): 17 POWDER, FOR SOLUTION ORAL at 05:20

## 2021-03-02 RX ADMIN — Medication 1 APPLICATION(S): at 17:17

## 2021-03-02 RX ADMIN — ENOXAPARIN SODIUM 40 MILLIGRAM(S): 100 INJECTION SUBCUTANEOUS at 17:18

## 2021-03-02 RX ADMIN — CHLORHEXIDINE GLUCONATE 1 APPLICATION(S): 213 SOLUTION TOPICAL at 05:20

## 2021-03-02 RX ADMIN — Medication 1 TABLET(S): at 17:17

## 2021-03-02 RX ADMIN — ENOXAPARIN SODIUM 40 MILLIGRAM(S): 100 INJECTION SUBCUTANEOUS at 05:20

## 2021-03-02 RX ADMIN — POLYETHYLENE GLYCOL 3350 17 GRAM(S): 17 POWDER, FOR SOLUTION ORAL at 17:18

## 2021-03-02 RX ADMIN — Medication 1 MILLIGRAM(S): at 17:17

## 2021-03-02 RX ADMIN — Medication 1 APPLICATION(S): at 05:25

## 2021-03-02 RX ADMIN — Medication 500 MILLIGRAM(S): at 17:18

## 2021-03-02 NOTE — PROGRESS NOTE ADULT - ASSESSMENT
45 YO F with Morbid Obesity and DM2 A1C 6.6 admitted for ARDS second to SARS-COV-2, intubated 1/5-1/10 then transferred to Medicine, however failed BIPAP and reintubated 1/16. Course complicated ECOLI UTI, Mouth Sores and Enterobacter and MRSA VAP. s/p Tracheostomy 1/28 and PEG 1/26. Now transferred to RCU.     # ICU Delirium/ Agitation /Depression   - Weaned off sedation and extubated   - c/w Seroquel 12.5mg qhs and Klonopin 1mg BID  - c/w Remeron with supportive care.    - Monitor mentation. Much improved    # ARDS second to SARS-COV-2 vs Superimposed Enterobacter and MRSA PNA   - s/p Remdesivir, Decadron and ABX as below  - s/p Prolonged ICU stay and Tracheostomy on 1/28. s/p TC and PMV.   - s/p Exchanged 6DCT for 6CFN on 2/23.   - Proventil and Chest PT q6hr PRN  - Decannulated successfully on 2/26. Saturating well on 2LNC    # Septic vs Vasoplegic Shock (resolved)  - Off all Pressors. Monitor HR/ BP     # Dysphagia with PEG   - s/p PEG 1/26   - Bowel regimen (Senna and Miralax and Dulcolax PRN)   - Passed CINE on 2/16, c/w Regular diet  - Plan to remove PEG today by GI (3/2)    # /UTI/Vaginal Itching  - Passed TOV 2/23 and noted with dysuria. UA (+) and now treating empirically for UTI.   - Hx of E. coli UTI sensitive to Cipro. c/w Cipro (2/23 - until 3/3).  - Hyponatremia/ Hypernatremia, monitor electrolytes and renal function as tolerated.   - c/w on Clotrimazole 1% cream qhs x 7 days    # Sepsis second to SARS-COV-2 vs Enterobacter and MRSA PNA vs HSV Type 1   - s/p Empiric ABX for PNA with Zosyn (1/16-1/20), however cultures negative   - UCx ECOLI 1/25 s/p CTX (1/25-1/27).   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   - c/w Cipro for Dysuria (2/23 - until 3/3). UCx showing E.coli sens to Cipro     # DM2 A1C 6.6 (1/2021)   - Continue on ISS and monitor FS Q6H    # Wounds   - WOC recommendations appreciated.      # DVT PPX with Lovenox BID   # CODE STATUS - Full Code   # DISPO - PT recommends Home w/ home PT, Home care Service, and Home Oxygen.                 - Plan to remove PEG today 3/2 and DC planning on 3/3. 45 YO F with Morbid Obesity and DM2 A1C 6.6 admitted for ARDS second to SARS-COV-2, intubated 1/5-1/10 then transferred to Medicine, however failed BIPAP and reintubated 1/16. Course complicated ECOLI UTI, Mouth Sores and Enterobacter and MRSA VAP. s/p Tracheostomy 1/28 and PEG 1/26. Now transferred to RCU.     # ICU Delirium/ Agitation /Depression   - Weaned off sedation and extubated   - c/w Seroquel 12.5mg qhs and Klonopin 1mg BID  - c/w Remeron with supportive care.    - Monitor mentation. Much improved    # ARDS second to SARS-COV-2 vs Superimposed Enterobacter and MRSA PNA   - s/p Remdesivir, Decadron and ABX as below  - s/p Prolonged ICU stay and Tracheostomy on 1/28. s/p TC and PMV.   - s/p Exchanged 6DCT for 6CFN on 2/23.   - Proventil and Chest PT q6hr PRN  - Decannulated successfully on 2/26. Saturating well on 2LNC  - Ambulation test done, will require Home Oxygen on discharge    # Septic vs Vasoplegic Shock (resolved)  - Off all Pressors. Monitor HR/ BP     # Dysphagia with PEG   - s/p PEG 1/26, and removed by GI on 3/2  - Bowel regimen (Senna and Miralax and Dulcolax PRN)   - Passed CINE on 2/16, c/w Regular diet    # /UTI/Vaginal Itching  - Passed TOV 2/23 and noted with dysuria. UA (+) and now treating empirically for UTI.   - Hx of E. coli UTI sensitive to Cipro. c/w Cipro (2/23 - until 3/3).  - Hyponatremia/ Hypernatremia, monitor electrolytes and renal function as tolerated.   - c/w on Clotrimazole 1% cream qhs x 7 days    # Sepsis second to SARS-COV-2 vs Enterobacter and MRSA PNA vs HSV Type 1   - s/p Empiric ABX for PNA with Zosyn (1/16-1/20), however cultures negative   - UCx ECOLI 1/25 s/p CTX (1/25-1/27).   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   - c/w Cipro for Dysuria (2/23 - until 3/3). UCx showing E.coli sens to Cipro     # DM2 A1C 6.6 (1/2021)   - Continue on ISS and monitor FS Q6H    # Wounds   - WOC recommendations appreciated.      # DVT PPX with Lovenox BID   # CODE STATUS - Full Code   # DISPO - PT recommends Home w/ home PT, Home care Service, and Home Oxygen.                 - DC planning on 3/3.

## 2021-03-02 NOTE — DISCHARGE NOTE NURSING/CASE MANAGEMENT/SOCIAL WORK - NSDCDMETYPESERV_GEN_ALL_CORE_FT
A Home Oxygen Concentrator will be delivered to your home. A Wheelchair will also be delivered to your home. A  Portable Oxygen Concentrator was delivered to your Hospital Room for Transport Home. A Rollator Walker was also delivered to your hospital room.     A Home Oxygen Concentrator will be delivered to your home. A Wheelchair will also be delivered to your home pending Authorization from your Insurance. A  Portable Oxygen Concentrator was delivered to your Hospital Room for Transport Home. A Rollator Walker was also delivered to your hospital room.

## 2021-03-02 NOTE — CHART NOTE - NSCHARTNOTESELECT_GEN_ALL_CORE
Bronchoscopy
Event Note
Hypoxia/Event Note
Intervention Pulmonology Note
POCUS/Event Note
ACP Note
Critical Care/Transfer Note
Event Note
Family Update/Event Note
Follow Up/Nutrition Services
Follow up/Nutrition Services
Follow-Up/Nutrition Services
MICU Accept Note/Event Note
MICU Accept/Event Note
Nutrition Follow/up Note/Nutrition Services
POCUS Note
POCUS Note
Tracheal Sutures/Event Note
Ultrasound Note
family call/Event Note
ultrasound/Event Note

## 2021-03-02 NOTE — CHART NOTE - NSCHARTNOTEFT_GEN_A_CORE
Patient requires Home oxygen due to Respiratory Failure with Hypoxia secondary to COVID-19 Pneumonia.  Room air Oxygen at rest is 96%  Room air Oxygen saturation on exertion is 86%  Oxygen saturation with Nasal Canula 2L on exertion is 94%    Home Oxygen concentrator and Portable Oxygen concentrator will be ordered. Patient requires Home oxygen due to Respiratory Failure with Hypoxia secondary to COVID-19 Pneumonia.  Room air Oxygen at rest is 96%  Room air Oxygen saturation on exertion is 86%  Oxygen saturation with Nasal Canula 2L on exertion is 94%    Home Oxygen concentrator and Portable Oxygen concentrator will be ordered.    For Wheelchair needs  Due to Respiratory Failure, Patient has a severe mobility limitation and requires a light weight wheelchair to assist in daily ADLs. Patient cannot ambulate safely with a cane or a walker. Patient does not have sufficient Upper Body strength to self propel a standard wheelchair but can and will be able to self propel in a light weight Wheelchair. Patient has not expressed an unwillingness to use the wheelchair. Patient has ample room in the home and a caregiver to assist with the Wheelchair. Use of a light weight Wheelchair will significantly improve the patient's ability to participate in Daily ADL's and the patient will use it on a regular basis in the Home.

## 2021-03-02 NOTE — PROGRESS NOTE ADULT - ASSESSMENT
46 F w respiratory failure due to COVID 19     Problem/Recommendation - 1:  Problem: Acute respiratory failure with hypoxia. Recommendation: s/p tracheostomy and PEG.   -G tube removed today at bedside     Problem/Recommendation - 2:  ·  Problem: COVID-19.  Recommendation: status post dexamethasone.   on abx per ID for staph in sputum     Problem/Recommendation - 3:  ·  Problem: Normocytic anemia.  Recommendation: without overt GI bleeding. Likely due to critical illness. Trended down today  -monitor for GI bleed  -PPI QD.      Problem/Recommendation - 4:  ·  Problem: Abnormal LFTs.  Recommendation: multifactorial, likely NAFLD, COVID, critical illness. Can consider US to r/o gallstone disease, especially if increasing.      Problem/Recommendation - 5:  ·  Problem: Morbid obesity.         Attending Attestation:   Differential diagnosis and plan of care discussed with patient after the evaluation  35 Minutes spent on total encounter of which more than fifty percent of the encounter was spent counseling and/or coordinating care by the attending physician.    Mele Bolanos M.D.   Gastroenterology and Hepatology  Cell: [phone].

## 2021-03-02 NOTE — PROGRESS NOTE ADULT - SUBJECTIVE AND OBJECTIVE BOX
CHIEF COMPLAINT: Patient is a 46y old  Female who presents with a chief complaint of COVID (01 Mar 2021 17:16)    Interval Events: No complaints overnight.     REVIEW OF SYSTEMS:  Constitutional:   Eyes:  ENT:  CV:  Resp:  GI:  :  MSK:  Integumentary:  Neurological:  Psychiatric:  Endocrine:  Hematologic/Lymphatic:  Allergic/Immunologic:  [ ] All other systems negative  [ ] Unable to assess ROS because ________    OBJECTIVE:  ICU Vital Signs Last 24 Hrs  T(C): 36.9 (02 Mar 2021 05:25), Max: 37.1 (01 Mar 2021 12:27)  T(F): 98.5 (02 Mar 2021 05:25), Max: 98.7 (01 Mar 2021 12:27)  HR: 99 (02 Mar 2021 05:25) (93 - 108)  BP: 129/85 (02 Mar 2021 05:25) (109/82 - 129/85)  BP(mean): --  ABP: --  ABP(mean): --  RR: 18 (02 Mar 2021 05:25) (18 - 19)  SpO2: 98% (02 Mar 2021 05:25) (98% - 100%)        CAPILLARY BLOOD GLUCOSE      POCT Blood Glucose.: 141 mg/dL (01 Mar 2021 22:33)      PHYSICAL EXAM:  General:   HEENT:   Lymph Nodes:  Neck:   Respiratory:   Cardiovascular:   Abdomen:   Extremities:   Skin:   Neurological:  Psychiatry:    HOSPITAL MEDICATIONS:  MEDICATIONS  (STANDING):  BACItracin   Ointment 1 Application(s) Topical two times a day  chlorhexidine 4% Liquid 1 Application(s) Topical daily  ciprofloxacin     Tablet 500 milliGRAM(s) Oral every 12 hours  clonazePAM  Tablet 1 milliGRAM(s) Oral <User Schedule>  clotrimazole 1% Vaginal Cream 1 Applicatorful Vaginal at bedtime  dextrose 5%. 1000 milliLiter(s) (50 mL/Hr) IV Continuous <Continuous>  dextrose 5%. 1000 milliLiter(s) (100 mL/Hr) IV Continuous <Continuous>  enoxaparin Injectable 40 milliGRAM(s) SubCutaneous every 12 hours  glucagon  Injectable 1 milliGRAM(s) IntraMuscular once  insulin lispro (ADMELOG) corrective regimen sliding scale   SubCutaneous three times a day before meals  insulin lispro (ADMELOG) corrective regimen sliding scale   SubCutaneous at bedtime  mirtazapine 15 milliGRAM(s) Oral at bedtime  multivitamin 1 Tablet(s) Oral daily  polyethylene glycol 3350 17 Gram(s) Oral two times a day  QUEtiapine 12.5 milliGRAM(s) Oral at bedtime  senna 2 Tablet(s) Oral at bedtime    MEDICATIONS  (PRN):  acetaminophen    Suspension .. 650 milliGRAM(s) Oral every 6 hours PRN Mild Pain (1 - 3)  ALBUTerol    90 MICROgram(s) HFA Inhaler 2 Puff(s) Inhalation every 6 hours PRN Wheezing  aluminum hydroxide/magnesium hydroxide/simethicone Suspension 30 milliLiter(s) Oral every 6 hours PRN Dyspepsia  bisacodyl Suppository 10 milliGRAM(s) Rectal at bedtime PRN Constipation  hydrocodone/homatropine Syrup 10 milliLiter(s) Oral every 8 hours PRN SEVERE COUGHING SPELLS  naproxen 250 milliGRAM(s) Oral every 6 hours PRN Moderate Pain (4 - 6)  oxyCODONE    Solution 5 milliGRAM(s) Oral every 6 hours PRN Severe Pain (7 - 10)      LABS:                    MICROBIOLOGY:     RADIOLOGY:  [ ] Reviewed and interpreted by me    PULMONARY FUNCTION TESTS:    EKG: CHIEF COMPLAINT: Patient is a 46y old  Female who presents with a chief complaint of COVID (01 Mar 2021 17:16)    Interval Events: No complaints overnight. Pt scheduled for bedside PEG removal today. d/c planning for tomorrow with Home care services. VSS. Medications reviewed.     REVIEW OF SYSTEMS:  see above  [x] All other systems negative    OBJECTIVE:  ICU Vital Signs Last 24 Hrs  T(C): 36.9 (02 Mar 2021 05:25), Max: 37.1 (01 Mar 2021 12:27)  T(F): 98.5 (02 Mar 2021 05:25), Max: 98.7 (01 Mar 2021 12:27)  HR: 99 (02 Mar 2021 05:25) (93 - 108)  BP: 129/85 (02 Mar 2021 05:25) (109/82 - 129/85)  BP(mean): --  ABP: --  ABP(mean): --  RR: 18 (02 Mar 2021 05:25) (18 - 19)  SpO2: 98% (02 Mar 2021 05:25) (98% - 100%)    CAPILLARY BLOOD GLUCOSE    POCT Blood Glucose.: 141 mg/dL (01 Mar 2021 22:33)      PHYSICAL EXAM  General: well appearing, well groomed, NAD  Neck: Supple, no JVD, Tracheostomy stoma covered with dressing, area c/d/i   Respiratory: CTA b/l, normal respiratory effort  Cardiovascular: +s1, s2  Abdomen: +BS, soft, protuberant, +PEG tube, area c/d/i, nt/nd, no peritoneal signs noted  Extremities: Grossly normal   Skin: as per AAI sheet monitoring  Neurological: non focal   Psychiatry: Appropriate mood and affect    HOSPITAL MEDICATIONS:  MEDICATIONS  (STANDING):  BACItracin   Ointment 1 Application(s) Topical two times a day  chlorhexidine 4% Liquid 1 Application(s) Topical daily  ciprofloxacin     Tablet 500 milliGRAM(s) Oral every 12 hours  clonazePAM  Tablet 1 milliGRAM(s) Oral <User Schedule>  clotrimazole 1% Vaginal Cream 1 Applicatorful Vaginal at bedtime  dextrose 5%. 1000 milliLiter(s) (50 mL/Hr) IV Continuous <Continuous>  dextrose 5%. 1000 milliLiter(s) (100 mL/Hr) IV Continuous <Continuous>  enoxaparin Injectable 40 milliGRAM(s) SubCutaneous every 12 hours  glucagon  Injectable 1 milliGRAM(s) IntraMuscular once  insulin lispro (ADMELOG) corrective regimen sliding scale   SubCutaneous three times a day before meals  insulin lispro (ADMELOG) corrective regimen sliding scale   SubCutaneous at bedtime  mirtazapine 15 milliGRAM(s) Oral at bedtime  multivitamin 1 Tablet(s) Oral daily  polyethylene glycol 3350 17 Gram(s) Oral two times a day  QUEtiapine 12.5 milliGRAM(s) Oral at bedtime  senna 2 Tablet(s) Oral at bedtime    MEDICATIONS  (PRN):  acetaminophen    Suspension .. 650 milliGRAM(s) Oral every 6 hours PRN Mild Pain (1 - 3)  ALBUTerol    90 MICROgram(s) HFA Inhaler 2 Puff(s) Inhalation every 6 hours PRN Wheezing  aluminum hydroxide/magnesium hydroxide/simethicone Suspension 30 milliLiter(s) Oral every 6 hours PRN Dyspepsia  bisacodyl Suppository 10 milliGRAM(s) Rectal at bedtime PRN Constipation  hydrocodone/homatropine Syrup 10 milliLiter(s) Oral every 8 hours PRN SEVERE COUGHING SPELLS  naproxen 250 milliGRAM(s) Oral every 6 hours PRN Moderate Pain (4 - 6)  oxyCODONE    Solution 5 milliGRAM(s) Oral every 6 hours PRN Severe Pain (7 - 10)      LABS:      MICROBIOLOGY:     RADIOLOGY:  [ ] Reviewed and interpreted by me    PULMONARY FUNCTION TESTS:    EKG:

## 2021-03-02 NOTE — DISCHARGE NOTE NURSING/CASE MANAGEMENT/SOCIAL WORK - NSSCTYPOFSERV_GEN_ALL_CORE
For Visiting Nurse Services & Home Physical Therapy. The 1st Visiting Nurse Visit is for Thursday 3/4/21. The Nurse will call to arrange the Visit time.

## 2021-03-03 VITALS
OXYGEN SATURATION: 99 % | RESPIRATION RATE: 17 BRPM | DIASTOLIC BLOOD PRESSURE: 76 MMHG | HEART RATE: 109 BPM | TEMPERATURE: 98 F | SYSTOLIC BLOOD PRESSURE: 116 MMHG

## 2021-03-03 PROBLEM — Z78.9 OTHER SPECIFIED HEALTH STATUS: Chronic | Status: ACTIVE | Noted: 2021-01-03

## 2021-03-03 LAB
ANION GAP SERPL CALC-SCNC: 11 MMOL/L — SIGNIFICANT CHANGE UP (ref 7–14)
BUN SERPL-MCNC: 15 MG/DL — SIGNIFICANT CHANGE UP (ref 7–23)
CALCIUM SERPL-MCNC: 9.4 MG/DL — SIGNIFICANT CHANGE UP (ref 8.4–10.5)
CHLORIDE SERPL-SCNC: 104 MMOL/L — SIGNIFICANT CHANGE UP (ref 98–107)
CO2 SERPL-SCNC: 21 MMOL/L — LOW (ref 22–31)
CREAT SERPL-MCNC: 0.58 MG/DL — SIGNIFICANT CHANGE UP (ref 0.5–1.3)
GLUCOSE SERPL-MCNC: 100 MG/DL — HIGH (ref 70–99)
HCT VFR BLD CALC: 32.2 % — LOW (ref 34.5–45)
HGB BLD-MCNC: 10.1 G/DL — LOW (ref 11.5–15.5)
MAGNESIUM SERPL-MCNC: 1.9 MG/DL — SIGNIFICANT CHANGE UP (ref 1.6–2.6)
MCHC RBC-ENTMCNC: 27.4 PG — SIGNIFICANT CHANGE UP (ref 27–34)
MCHC RBC-ENTMCNC: 31.4 GM/DL — LOW (ref 32–36)
MCV RBC AUTO: 87.5 FL — SIGNIFICANT CHANGE UP (ref 80–100)
NRBC # BLD: 0 /100 WBCS — SIGNIFICANT CHANGE UP
NRBC # FLD: 0 K/UL — SIGNIFICANT CHANGE UP
PHOSPHATE SERPL-MCNC: 5 MG/DL — HIGH (ref 2.5–4.5)
PLATELET # BLD AUTO: 353 K/UL — SIGNIFICANT CHANGE UP (ref 150–400)
POTASSIUM SERPL-MCNC: 3.8 MMOL/L — SIGNIFICANT CHANGE UP (ref 3.5–5.3)
POTASSIUM SERPL-SCNC: 3.8 MMOL/L — SIGNIFICANT CHANGE UP (ref 3.5–5.3)
RBC # BLD: 3.68 M/UL — LOW (ref 3.8–5.2)
RBC # FLD: 16.2 % — HIGH (ref 10.3–14.5)
SODIUM SERPL-SCNC: 136 MMOL/L — SIGNIFICANT CHANGE UP (ref 135–145)
WBC # BLD: 7.89 K/UL — SIGNIFICANT CHANGE UP (ref 3.8–10.5)
WBC # FLD AUTO: 7.89 K/UL — SIGNIFICANT CHANGE UP (ref 3.8–10.5)

## 2021-03-03 PROCEDURE — 99238 HOSP IP/OBS DSCHRG MGMT 30/<: CPT | Mod: GC

## 2021-03-03 PROCEDURE — 73030 X-RAY EXAM OF SHOULDER: CPT | Mod: 26,LT

## 2021-03-03 RX ORDER — SENNA PLUS 8.6 MG/1
2 TABLET ORAL
Qty: 0 | Refills: 0 | DISCHARGE
Start: 2021-03-03

## 2021-03-03 RX ORDER — ISOPROPYL ALCOHOL, BENZOCAINE .7; .06 ML/ML; ML/ML
1 SWAB TOPICAL
Qty: 100 | Refills: 1
Start: 2021-03-03 | End: 2021-04-21

## 2021-03-03 RX ORDER — POLYETHYLENE GLYCOL 3350 17 G/17G
17 POWDER, FOR SOLUTION ORAL
Qty: 0 | Refills: 0 | DISCHARGE
Start: 2021-03-03

## 2021-03-03 RX ORDER — CLONAZEPAM 1 MG
1 TABLET ORAL
Qty: 60 | Refills: 0
Start: 2021-03-03 | End: 2021-04-01

## 2021-03-03 RX ORDER — ALBUTEROL 90 UG/1
2 AEROSOL, METERED ORAL
Qty: 0 | Refills: 0 | DISCHARGE
Start: 2021-03-03

## 2021-03-03 RX ORDER — MIRTAZAPINE 45 MG/1
1 TABLET, ORALLY DISINTEGRATING ORAL
Qty: 30 | Refills: 0
Start: 2021-03-03 | End: 2021-04-01

## 2021-03-03 RX ADMIN — Medication 250 MILLIGRAM(S): at 07:09

## 2021-03-03 RX ADMIN — Medication 500 MILLIGRAM(S): at 05:48

## 2021-03-03 RX ADMIN — Medication 250 MILLIGRAM(S): at 07:50

## 2021-03-03 RX ADMIN — ENOXAPARIN SODIUM 40 MILLIGRAM(S): 100 INJECTION SUBCUTANEOUS at 05:48

## 2021-03-03 RX ADMIN — Medication 1 MILLIGRAM(S): at 05:47

## 2021-03-03 RX ADMIN — Medication 1 TABLET(S): at 16:35

## 2021-03-03 RX ADMIN — Medication 1 APPLICATION(S): at 05:47

## 2021-03-03 RX ADMIN — CHLORHEXIDINE GLUCONATE 1 APPLICATION(S): 213 SOLUTION TOPICAL at 05:48

## 2021-03-03 NOTE — PROGRESS NOTE ADULT - REASON FOR ADMISSION
COVID
AHRF 2/2 COVID19
COVID

## 2021-03-03 NOTE — PROGRESS NOTE ADULT - NUTRITIONAL ASSESSMENT
This patient has been assessed with a concern for Malnutrition and has been determined to have a diagnosis/diagnoses of Morbid obesity (BMI > 40).    This patient is being managed with:   Diet Dysphagia 2 Mechanical Soft-Honey Consistency Fluid-  Entered: Feb 16 2021 11:24AM    
This patient has been assessed with a concern for Malnutrition and has been determined to have a diagnosis/diagnoses of Morbid obesity (BMI > 40).    This patient is being managed with:   Diet Dysphagia 2 Mechanical Soft-Honey Consistency Fluid-  Entered: Feb 16 2021 11:24AM    
This patient has been assessed with a concern for Malnutrition and has been determined to have a diagnosis/diagnoses of Morbid obesity (BMI > 40).    This patient is being managed with:   Diet NPO with Tube Feed-  Tube Feeding Modality: Gastrostomy  Peptamen 1.5 (PEPTAMEN1.5RTH)  Total Volume for 24 Hours (mL): 1080  Continuous  Starting Tube Feed Rate {mL per Hour}: 45  Until Goal Tube Feed Rate (mL per Hour): 45  Tube Feed Duration (in Hours): 24  Tube Feed Start Time: 00:00  No Carb Prosource (1pkg = 15gms Protein)     Qty per Day:  4  Entered: Feb 2 2021 11:19AM    
This patient has been assessed with a concern for Malnutrition and has been determined to have a diagnosis/diagnoses of Morbid obesity (BMI > 40).    This patient is being managed with:   Diet NPO with Tube Feed-  Tube Feeding Modality: Nasogastric  Jevity 1.2 Willian (JEVITY1.2RTH)  Continuous  Starting Tube Feed Rate {mL per Hour}: 10  Increase Tube Feed Rate by (mL): 10     Every 4 hours  Until Goal Tube Feed Rate (mL per Hour): 50  Tube Feed Duration (in Hours): 24  Tube Feed Start Time: 19:00  Entered: Jan 17 2021 11:18AM    
This patient has been assessed with a concern for Malnutrition and has been determined to have a diagnosis/diagnoses of Morbid obesity (BMI > 40).    This patient is being managed with:   Diet NPO-  Entered: José Manuel 15 2021  9:37PM    
This patient has been assessed with a concern for Malnutrition and has been determined to have a diagnosis/diagnoses of Morbid obesity (BMI > 40).    This patient is being managed with:   Diet Dysphagia 2 Mechanical Soft-Honey Consistency Fluid-  Entered: Feb 16 2021 11:24AM    
This patient has been assessed with a concern for Malnutrition and has been determined to have a diagnosis/diagnoses of Morbid obesity (BMI > 40).    This patient is being managed with:   Diet Dysphagia 2 Mechanical Soft-Honey Consistency Fluid-  Entered: Feb 16 2021 11:24AM    
This patient has been assessed with a concern for Malnutrition and has been determined to have a diagnosis/diagnoses of Morbid obesity (BMI > 40).    This patient is being managed with:   Diet NPO after Midnight-     NPO Start Date: 01-Mar-2021   NPO Start Time: 23:59  Except Medications  Entered: Mar  2 2021  1:34AM    Diet Regular-  Entered: Feb 28 2021  8:17AM    
This patient has been assessed with a concern for Malnutrition and has been determined to have a diagnosis/diagnoses of Morbid obesity (BMI > 40).    This patient is being managed with:   Diet NPO with Tube Feed-  Tube Feeding Modality: Gastrostomy  Peptamen 1.5 (PEPTAMEN1.5RTH)  Total Volume for 24 Hours (mL): 1080  Continuous  Starting Tube Feed Rate {mL per Hour}: 45  Until Goal Tube Feed Rate (mL per Hour): 45  Tube Feed Duration (in Hours): 24  Tube Feed Start Time: 00:00  No Carb Prosource (1pkg = 15gms Protein)     Qty per Day:  4  Entered: Feb 2 2021 11:19AM    
This patient has been assessed with a concern for Malnutrition and has been determined to have a diagnosis/diagnoses of Morbid obesity (BMI > 40).    This patient is being managed with:   Diet NPO with Tube Feed-  Tube Feeding Modality: Gastrostomy  Peptamen 1.5 (PEPTAMEN1.5RTH)  Total Volume for 24 Hours (mL): 1200  Bolus  Total Volume of Bolus (mL):  200  Total # of Feeds: 6  Tube Feed Frequency: Every 4 hours   Tube Feed Start Time: 15:00  Bolus Feed Rate (mL per Hour): 50   Bolus Feed Duration (in Hours): 24  No Carb Prosource (1pkg = 15gms Protein)     Qty per Day:  4  Entered: Jan 29 2021  1:55PM    
This patient has been assessed with a concern for Malnutrition and has been determined to have a diagnosis/diagnoses of Morbid obesity (BMI > 40).    This patient is being managed with:   Diet NPO with Tube Feed-  Tube Feeding Modality: Gastrostomy  Peptamen 1.5 (PEPTAMEN1.5RTH)  Total Volume for 24 Hours (mL): 1200  Bolus  Total Volume of Bolus (mL):  200  Total # of Feeds: 6  Tube Feed Frequency: Every 4 hours   Tube Feed Start Time: 15:00  Bolus Feed Rate (mL per Hour): 50   Bolus Feed Duration (in Hours): 24  No Carb Prosource (1pkg = 15gms Protein)     Qty per Day:  4  Entered: Jan 29 2021  1:55PM    
This patient has been assessed with a concern for Malnutrition and has been determined to have a diagnosis/diagnoses of Morbid obesity (BMI > 40).    This patient is being managed with:   Diet NPO-  Entered: Jan 5 2021  1:30AM    
This patient has been assessed with a concern for Malnutrition and has been determined to have a diagnosis/diagnoses of Morbid obesity (BMI > 40).    This patient is being managed with:   Diet NPO-  Entered: Jan 5 2021  1:30AM    
This patient has been assessed with a concern for Malnutrition and has been determined to have a diagnosis/diagnoses of Morbid obesity (BMI > 40).    This patient is being managed with:   Diet NPO-  Entered: José Manuel 15 2021  9:37PM    
This patient has been assessed with a concern for Malnutrition and has been determined to have a diagnosis/diagnoses of Morbid obesity (BMI > 40).    This patient is being managed with:   Diet Regular-  Entered: Feb 28 2021  8:17AM    
This patient has been assessed with a concern for Malnutrition and has been determined to have a diagnosis/diagnoses of Morbid obesity (BMI > 40).    This patient is being managed with:   Diet Dysphagia 2 Mechanical Soft-Honey Consistency Fluid-  Entered: Feb 16 2021 11:24AM    
This patient has been assessed with a concern for Malnutrition and has been determined to have a diagnosis/diagnoses of Morbid obesity (BMI > 40).    This patient is being managed with:   Diet NPO with Tube Feed-  Tube Feeding Modality: Gastrostomy  Peptamen 1.5 (PEPTAMEN1.5RTH)  Total Volume for 24 Hours (mL): 1080  Continuous  Starting Tube Feed Rate {mL per Hour}: 45  Until Goal Tube Feed Rate (mL per Hour): 45  Tube Feed Duration (in Hours): 24  Tube Feed Start Time: 00:00  No Carb Prosource (1pkg = 15gms Protein)     Qty per Day:  4  Entered: Feb 2 2021 11:19AM    
This patient has been assessed with a concern for Malnutrition and has been determined to have a diagnosis/diagnoses of Morbid obesity (BMI > 40).    This patient is being managed with:   Diet NPO with Tube Feed-  Tube Feeding Modality: Gastrostomy  Peptamen 1.5 (PEPTAMEN1.5RTH)  Total Volume for 24 Hours (mL): 1200  Bolus  Total Volume of Bolus (mL):  200  Total # of Feeds: 6  Tube Feed Frequency: Every 4 hours   Tube Feed Start Time: 15:00  Bolus Feed Rate (mL per Hour): 50   Bolus Feed Duration (in Hours): 24  No Carb Prosource (1pkg = 15gms Protein)     Qty per Day:  4  Entered: Jan 29 2021  1:55PM    
This patient has been assessed with a concern for Malnutrition and has been determined to have a diagnosis/diagnoses of Morbid obesity (BMI > 40).    This patient is being managed with:   Diet NPO with Tube Feed-  Tube Feeding Modality: Nasogastric  Jevity 1.2 Willian (JEVITY1.2RTH)  Continuous  Starting Tube Feed Rate {mL per Hour}: 10  Increase Tube Feed Rate by (mL): 10     Every 4 hours  Until Goal Tube Feed Rate (mL per Hour): 50  Tube Feed Duration (in Hours): 24  Tube Feed Start Time: 19:00  Entered: Jan 17 2021 11:18AM    
This patient has been assessed with a concern for Malnutrition and has been determined to have a diagnosis/diagnoses of Morbid obesity (BMI > 40).    This patient is being managed with:   Diet NPO with Tube Feed-  Tube Feeding Modality: Nasogastric  Jevity 1.2 Willian (JEVITY1.2RTH)  Total Volume for 24 Hours (mL): 840  Continuous  Until Goal Tube Feed Rate (mL per Hour): 35  Tube Feed Duration (in Hours): 24  Tube Feed Start Time: 12:00  No Carb Prosource (1pkg = 15gms Protein)     Qty per Day:  5  Entered: Jan 21 2021  3:27PM    
This patient has been assessed with a concern for Malnutrition and has been determined to have a diagnosis/diagnoses of Morbid obesity (BMI > 40).    This patient is being managed with:   Diet NPO with Tube Feed-  Tube Feeding Modality: Orogastric  Peptamen 1.5 (PEPTAMEN1.5RTH)  Total Volume for 24 Hours (mL): 792  Continuous  Until Goal Tube Feed Rate (mL per Hour): 33  Tube Feed Duration (in Hours): 24  Tube Feed Start Time: 10:00  No Carb Prosource (1pkg = 15gms Protein)     Qty per Day:  4  Entered: Jan 26 2021  9:35AM    
This patient has been assessed with a concern for Malnutrition and has been determined to have a diagnosis/diagnoses of Morbid obesity (BMI > 40).    This patient is being managed with:   Diet NPO with Tube Feed-  Tube Feeding Modality: Orogastric  Peptamen 1.5 (PEPTAMEN1.5RTH)  Total Volume for 24 Hours (mL): 792  Continuous  Until Goal Tube Feed Rate (mL per Hour): 33  Tube Feed Duration (in Hours): 24  Tube Feed Start Time: 10:00  No Carb Prosource (1pkg = 15gms Protein)     Qty per Day:  4  Entered: Jan 26 2021  9:35AM    
This patient has been assessed with a concern for Malnutrition and has been determined to have a diagnosis/diagnoses of Morbid obesity (BMI > 40).    This patient is being managed with:   Diet NPO-  Entered: Jan 5 2021  1:30AM    
This patient has been assessed with a concern for Malnutrition and has been determined to have a diagnosis/diagnoses of Morbid obesity (BMI > 40).    This patient is being managed with:   Diet NPO-  Entered: Jan 5 2021  1:30AM    
This patient has been assessed with a concern for Malnutrition and has been determined to have a diagnosis/diagnoses of Morbid obesity (BMI > 40).    This patient is being managed with:   Diet Dysphagia 2 Mechanical Soft-Honey Consistency Fluid-  Entered: Feb 16 2021 11:24AM    
This patient has been assessed with a concern for Malnutrition and has been determined to have a diagnosis/diagnoses of Morbid obesity (BMI > 40).    This patient is being managed with:   Diet Dysphagia 3 Soft-Thin Liquids-  Entered: Jan 13 2021  5:09PM    
This patient has been assessed with a concern for Malnutrition and has been determined to have a diagnosis/diagnoses of Morbid obesity (BMI > 40).    This patient is being managed with:   Diet NPO with Tube Feed-  Tube Feeding Modality: Gastrostomy  Peptamen 1.5 (PEPTAMEN1.5RTH)  Total Volume for 24 Hours (mL): 1080  Continuous  Starting Tube Feed Rate {mL per Hour}: 45  Until Goal Tube Feed Rate (mL per Hour): 45  Tube Feed Duration (in Hours): 24  Tube Feed Start Time: 00:00  No Carb Prosource (1pkg = 15gms Protein)     Qty per Day:  4  Entered: Feb 2 2021 11:19AM    
This patient has been assessed with a concern for Malnutrition and has been determined to have a diagnosis/diagnoses of Morbid obesity (BMI > 40).    This patient is being managed with:   Diet NPO with Tube Feed-  Tube Feeding Modality: Nasogastric  Jevity 1.2 Willian (JEVITY1.2RTH)  Continuous  Starting Tube Feed Rate {mL per Hour}: 10  Increase Tube Feed Rate by (mL): 10     Every 4 hours  Until Goal Tube Feed Rate (mL per Hour): 50  Tube Feed Duration (in Hours): 24  Tube Feed Start Time: 19:00  Entered: Jan 17 2021 11:18AM    
This patient has been assessed with a concern for Malnutrition and has been determined to have a diagnosis/diagnoses of Morbid obesity (BMI > 40).    This patient is being managed with:   Diet NPO-  Entered: Jan 5 2021  1:30AM    
This patient has been assessed with a concern for Malnutrition and has been determined to have a diagnosis/diagnoses of Morbid obesity (BMI > 40).    This patient is being managed with:   Diet Regular-  Entered: Feb 28 2021  8:17AM    
This patient has been assessed with a concern for Malnutrition and has been determined to have a diagnosis/diagnoses of Morbid obesity (BMI > 40).    This patient is being managed with:   Diet Dysphagia 2 Mechanical Soft-Honey Consistency Fluid-  Entered: Feb 16 2021 11:24AM    
This patient has been assessed with a concern for Malnutrition and has been determined to have a diagnosis/diagnoses of Morbid obesity (BMI > 40).    This patient is being managed with:   Diet NPO with Tube Feed-  Tube Feeding Modality: Gastrostomy  Peptamen 1.5 (PEPTAMEN1.5RTH)  Total Volume for 24 Hours (mL): 1080  Continuous  Starting Tube Feed Rate {mL per Hour}: 45  Until Goal Tube Feed Rate (mL per Hour): 45  Tube Feed Duration (in Hours): 24  Tube Feed Start Time: 00:00  No Carb Prosource (1pkg = 15gms Protein)     Qty per Day:  4  Entered: Feb 2 2021 11:19AM    
This patient has been assessed with a concern for Malnutrition and has been determined to have a diagnosis/diagnoses of Morbid obesity (BMI > 40).    This patient is being managed with:   Diet NPO with Tube Feed-  Tube Feeding Modality: Nasogastric  Jevity 1.2 Willian (JEVITY1.2RTH)  Total Volume for 24 Hours (mL): 840  Continuous  Until Goal Tube Feed Rate (mL per Hour): 35  Tube Feed Duration (in Hours): 24  Tube Feed Start Time: 12:00  No Carb Prosource (1pkg = 15gms Protein)     Qty per Day:  5  Entered: Jan 21 2021  3:27PM    
This patient has been assessed with a concern for Malnutrition and has been determined to have a diagnosis/diagnoses of Morbid obesity (BMI > 40).    This patient is being managed with:   Diet NPO with Tube Feed-  Tube Feeding Modality: Nasogastric  Jevity 1.2 Willian (JEVITY1.2RTH)  Total Volume for 24 Hours (mL): 840  Continuous  Until Goal Tube Feed Rate (mL per Hour): 35  Tube Feed Duration (in Hours): 24  Tube Feed Start Time: 12:00  No Carb Prosource (1pkg = 15gms Protein)     Qty per Day:  5  Entered: Jan 21 2021  3:27PM    
This patient has been assessed with a concern for Malnutrition and has been determined to have a diagnosis/diagnoses of Morbid obesity (BMI > 40).    This patient is being managed with:   Diet NPO with Tube Feed-  Tube Feeding Modality: Orogastric  Peptamen 1.5 (PEPTAMEN1.5RTH)  Total Volume for 24 Hours (mL): 792  Continuous  Until Goal Tube Feed Rate (mL per Hour): 33  Tube Feed Duration (in Hours): 24  Tube Feed Start Time: 10:00  No Carb Prosource (1pkg = 15gms Protein)     Qty per Day:  4  Entered: Jan 26 2021  9:35AM    
This patient has been assessed with a concern for Malnutrition and has been determined to have a diagnosis/diagnoses of Morbid obesity (BMI > 40).    This patient is being managed with:   Diet NPO with Tube Feed-  Tube Feeding Modality: Orogastric  Peptamen 1.5 (PEPTAMEN1.5RTH)  Total Volume for 24 Hours (mL): 792  Continuous  Until Goal Tube Feed Rate (mL per Hour): 33  Tube Feed Duration (in Hours): 24  Tube Feed Start Time: 10:00  No Carb Prosource (1pkg = 15gms Protein)     Qty per Day:  4  Entered: Jan 26 2021  9:35AM    
This patient has been assessed with a concern for Malnutrition and has been determined to have a diagnosis/diagnoses of Morbid obesity (BMI > 40).    This patient is being managed with:   Diet NPO-  Entered: Jan 5 2021  1:30AM    
This patient has been assessed with a concern for Malnutrition and has been determined to have a diagnosis/diagnoses of Morbid obesity (BMI > 40).    This patient is being managed with:   Diet Clear Liquid-  Entered: Jan 11 2021 11:36AM    
This patient has been assessed with a concern for Malnutrition and has been determined to have a diagnosis/diagnoses of Morbid obesity (BMI > 40).    This patient is being managed with:   Diet NPO with Tube Feed-  Tube Feeding Modality: Gastrostomy  Peptamen 1.5 (PEPTAMEN1.5RTH)  Total Volume for 24 Hours (mL): 1080  Continuous  Starting Tube Feed Rate {mL per Hour}: 45  Until Goal Tube Feed Rate (mL per Hour): 45  Tube Feed Duration (in Hours): 24  Tube Feed Start Time: 00:00  No Carb Prosource (1pkg = 15gms Protein)     Qty per Day:  4  Entered: Feb 2 2021 11:19AM    
This patient has been assessed with a concern for Malnutrition and has been determined to have a diagnosis/diagnoses of Morbid obesity (BMI > 40).    This patient is being managed with:   Diet NPO with Tube Feed-  Tube Feeding Modality: Gastrostomy  Peptamen 1.5 (PEPTAMEN1.5RTH)  Total Volume for 24 Hours (mL): 1200  Bolus  Total Volume of Bolus (mL):  200  Total # of Feeds: 6  Tube Feed Frequency: Every 4 hours   Tube Feed Start Time: 15:00  Bolus Feed Rate (mL per Hour): 50   Bolus Feed Duration (in Hours): 24  No Carb Prosource (1pkg = 15gms Protein)     Qty per Day:  4  Entered: Jan 29 2021  1:55PM    
This patient has been assessed with a concern for Malnutrition and has been determined to have a diagnosis/diagnoses of Morbid obesity (BMI > 40).    This patient is being managed with:   Diet Dysphagia 3 Soft-Thin Liquids-  Entered: Jan 13 2021  5:09PM    
This patient has been assessed with a concern for Malnutrition and has been determined to have a diagnosis/diagnoses of Morbid obesity (BMI > 40).    This patient is being managed with:   Diet NPO with Tube Feed-  Tube Feeding Modality: Gastrostomy  Peptamen 1.5 (PEPTAMEN1.5RTH)  Total Volume for 24 Hours (mL): 1080  Continuous  Starting Tube Feed Rate {mL per Hour}: 45  Until Goal Tube Feed Rate (mL per Hour): 45  Tube Feed Duration (in Hours): 24  Tube Feed Start Time: 00:00  No Carb Prosource (1pkg = 15gms Protein)     Qty per Day:  4  Entered: Feb 2 2021 11:19AM    
This patient has been assessed with a concern for Malnutrition and has been determined to have a diagnosis/diagnoses of Morbid obesity (BMI > 40).    This patient is being managed with:   Diet NPO with Tube Feed-  Tube Feeding Modality: Nasogastric  Jevity 1.2 Willian (JEVITY1.2RTH)  Continuous  Starting Tube Feed Rate {mL per Hour}: 10  Increase Tube Feed Rate by (mL): 10     Every 4 hours  Until Goal Tube Feed Rate (mL per Hour): 50  Tube Feed Duration (in Hours): 24  Tube Feed Start Time: 19:00  Entered: Jan 17 2021 11:18AM    
This patient has been assessed with a concern for Malnutrition and has been determined to have a diagnosis/diagnoses of Morbid obesity (BMI > 40).    This patient is being managed with:   Diet NPO-  Except Medications  With Ice Chips/Sips of Water  Entered: Jan 13 2021  1:44AM    
This patient has been assessed with a concern for Malnutrition and has been determined to have a diagnosis/diagnoses of Morbid obesity (BMI > 40).    This patient is being managed with:   Diet NPO-  Entered: Jan 11 2021 10:07PM

## 2021-03-03 NOTE — PROGRESS NOTE ADULT - PROVIDER SPECIALTY LIST ADULT
Critical Care
Gastroenterology
Gastroenterology
Internal Medicine
MICU
Pulmonology
Rehab Medicine
Critical Care
Critical Care
Gastroenterology
Gastroenterology
Internal Medicine
MICU
MICU
Pulmonology
Rehab Medicine
Critical Care
Gastroenterology
Internal Medicine
Internal Medicine
MICU
MICU
Pulmonology
Rehab Medicine
Critical Care
Critical Care
Gastroenterology
Internal Medicine
MICU
MICU
Pulmonology
SICU
Anesthesia
Internal Medicine
MICU
MICU
Pulmonology
Internal Medicine

## 2021-03-03 NOTE — PROGRESS NOTE ADULT - SUBJECTIVE AND OBJECTIVE BOX
CHIEF COMPLAINT: Patient is a 46y old  Female who presents with a chief complaint of COVID (01 Mar 2021 17:16)    Interval Events: No complaints overnight. s/p bedside PEG removal 3/2 . d/c planning for today with Home care services. VSS. Medications reviewed.     REVIEW OF SYSTEMS:  see above  [x] All other systems negative    OBJECTIVE:  ICU Vital Signs Last 24 Hrs  T(C): 36.9 (02 Mar 2021 05:25), Max: 37.1 (01 Mar 2021 12:27)  T(F): 98.5 (02 Mar 2021 05:25), Max: 98.7 (01 Mar 2021 12:27)  HR: 99 (02 Mar 2021 05:25) (93 - 108)  BP: 129/85 (02 Mar 2021 05:25) (109/82 - 129/85)  BP(mean): --  ABP: --  ABP(mean): --  RR: 18 (02 Mar 2021 05:25) (18 - 19)  SpO2: 98% (02 Mar 2021 05:25) (98% - 100%)    CAPILLARY BLOOD GLUCOSE    POCT Blood Glucose.: 141 mg/dL (01 Mar 2021 22:33)      PHYSICAL EXAM  General: well appearing, well groomed, NAD  Neck: Supple, no JVD, Tracheostomy stoma covered with dressing, area c/d/i   Respiratory: CTA b/l, normal respiratory effort  Cardiovascular: +s1, s2  Abdomen: +BS, soft, protuberant,no peritoneal signs noted  Extremities: Grossly normal   Skin: as per AAI sheet monitoring  Neurological: non focal   Psychiatry: Appropriate mood and affect    HOSPITAL MEDICATIONS:  MEDICATIONS  (STANDING):  BACItracin   Ointment 1 Application(s) Topical two times a day  chlorhexidine 4% Liquid 1 Application(s) Topical daily  ciprofloxacin     Tablet 500 milliGRAM(s) Oral every 12 hours  clonazePAM  Tablet 1 milliGRAM(s) Oral <User Schedule>  clotrimazole 1% Vaginal Cream 1 Applicatorful Vaginal at bedtime  dextrose 5%. 1000 milliLiter(s) (50 mL/Hr) IV Continuous <Continuous>  dextrose 5%. 1000 milliLiter(s) (100 mL/Hr) IV Continuous <Continuous>  enoxaparin Injectable 40 milliGRAM(s) SubCutaneous every 12 hours  glucagon  Injectable 1 milliGRAM(s) IntraMuscular once  insulin lispro (ADMELOG) corrective regimen sliding scale   SubCutaneous three times a day before meals  insulin lispro (ADMELOG) corrective regimen sliding scale   SubCutaneous at bedtime  mirtazapine 15 milliGRAM(s) Oral at bedtime  multivitamin 1 Tablet(s) Oral daily  polyethylene glycol 3350 17 Gram(s) Oral two times a day  QUEtiapine 12.5 milliGRAM(s) Oral at bedtime  senna 2 Tablet(s) Oral at bedtime    MEDICATIONS  (PRN):  acetaminophen    Suspension .. 650 milliGRAM(s) Oral every 6 hours PRN Mild Pain (1 - 3)  ALBUTerol    90 MICROgram(s) HFA Inhaler 2 Puff(s) Inhalation every 6 hours PRN Wheezing  aluminum hydroxide/magnesium hydroxide/simethicone Suspension 30 milliLiter(s) Oral every 6 hours PRN Dyspepsia  bisacodyl Suppository 10 milliGRAM(s) Rectal at bedtime PRN Constipation  hydrocodone/homatropine Syrup 10 milliLiter(s) Oral every 8 hours PRN SEVERE COUGHING SPELLS  naproxen 250 milliGRAM(s) Oral every 6 hours PRN Moderate Pain (4 - 6)  oxyCODONE    Solution 5 milliGRAM(s) Oral every 6 hours PRN Severe Pain (7 - 10)      LABS:      MICROBIOLOGY:     RADIOLOGY:  [ ] Reviewed and interpreted by me    PULMONARY FUNCTION TESTS:    EKG:

## 2021-03-03 NOTE — PROGRESS NOTE ADULT - ASSESSMENT
45 YO F with Morbid Obesity and DM2 A1C 6.6 admitted for ARDS second to SARS-COV-2, intubated 1/5-1/10 then transferred to Medicine, however failed BIPAP and reintubated 1/16. Course complicated ECOLI UTI, Mouth Sores and Enterobacter and MRSA VAP. s/p Tracheostomy 1/28 and PEG 1/26. Now transferred to RCU.     # ICU Delirium/ Agitation /Depression   - Weaned off sedation and extubated   - c/w Seroquel 12.5mg qhs and Klonopin 1mg BID  - c/w Remeron with supportive care.    - Monitor mentation. Much improved    # ARDS second to SARS-COV-2 vs Superimposed Enterobacter and MRSA PNA   - s/p Remdesivir, Decadron and ABX as below  - s/p Prolonged ICU stay and Tracheostomy on 1/28. s/p TC and PMV.   - s/p Exchanged 6DCT for 6CFN on 2/23.   - Proventil and Chest PT q6hr PRN  - Decannulated successfully on 2/26. Saturating well on 2LNC  - Ambulation test done, will require Home Oxygen on discharge    # Septic vs Vasoplegic Shock (resolved)  - Off all Pressors. Monitor HR/ BP     # Dysphagia with PEG   - s/p PEG 1/26, and removed by GI on 3/2  - Bowel regimen (Senna and Miralax and Dulcolax PRN)   - Passed CINE on 2/16, c/w Regular diet    # /UTI/Vaginal Itching  - Passed TOV 2/23 and noted with dysuria. UA (+) and now treating empirically for UTI.   - Hx of E. coli UTI sensitive to Cipro. c/w Cipro (2/23 - until 3/3).  - Hyponatremia/ Hypernatremia, monitor electrolytes and renal function as tolerated.   - c/w on Clotrimazole 1% cream qhs x 7 days    # Sepsis second to SARS-COV-2 vs Enterobacter and MRSA PNA vs HSV Type 1   - s/p Empiric ABX for PNA with Zosyn (1/16-1/20), however cultures negative   - UCx ECOLI 1/25 s/p CTX (1/25-1/27).   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   - c/w Cipro for Dysuria (2/23 - until 3/3). UCx showing E.coli sens to Cipro     # DM2 A1C 6.6 (1/2021)   - Continue on ISS and monitor FS Q6H    # Wounds   - WOC recommendations appreciated.      # DVT PPX with Lovenox BID   # CODE STATUS - Full Code   # DISPO - PT recommends Home w/ home PT, Home care Service, and Home Oxygen.                 - DC planning on 3/3. 45 YO F with Morbid Obesity and DM2 A1C 6.6 admitted for ARDS second to SARS-COV-2, intubated 1/5-1/10 then transferred to Medicine, however failed BIPAP and reintubated 1/16. Course complicated ECOLI UTI, Mouth Sores and Enterobacter and MRSA VAP. s/p Tracheostomy 1/28 and PEG 1/26. Now transferred to RCU.     # ICU Delirium/ Agitation /Depression   - Weaned off sedation and extubated   - c/w Seroquel 12.5mg qhs and Klonopin 1mg BID  - c/w Remeron with supportive care.    - Monitor mentation. Much improved    # ARDS second to SARS-COV-2 vs Superimposed Enterobacter and MRSA PNA   - s/p Remdesivir, Decadron and ABX as below  - s/p Prolonged ICU stay and Tracheostomy on 1/28. s/p TC and PMV.   - s/p Exchanged 6DCT for 6CFN on 2/23.   - Proventil and Chest PT q6hr PRN  - Decannulated successfully on 2/26. Saturating well on 2LNC  - Ambulation test done, will require Home Oxygen on discharge    # Septic vs Vasoplegic Shock (resolved)  - Off all Pressors. Monitor HR/ BP     # Dysphagia with PEG   - s/p PEG 1/26, and removed by GI on 3/2  - Bowel regimen (Senna and Miralax and Dulcolax PRN)   - Passed CINE on 2/16, c/w Regular diet    # /UTI/Vaginal Itching  - Passed TOV 2/23 and noted with dysuria. UA (+) and now treating empirically for UTI.   - Hx of E. coli UTI sensitive to Cipro. c/w Cipro (2/23 - until 3/3).  - Hyponatremia/ Hypernatremia, monitor electrolytes and renal function as tolerated.   - c/w on Clotrimazole 1% cream qhs x 7 days    # Sepsis second to SARS-COV-2 vs Enterobacter and MRSA PNA vs HSV Type 1   - s/p Empiric ABX for PNA with Zosyn (1/16-1/20), however cultures negative   - UCx ECOLI 1/25 s/p CTX (1/25-1/27).   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   - c/w Cipro for Dysuria (2/23 - until 3/3). UCx showing E.coli sens to Cipro     # DM2 A1C 6.6 (1/2021)   - Continue on ISS and monitor FS Q6H  - Diabetes supplies sent home to monitor sugars     # Wounds   - WOC recommendations appreciated.      # DVT PPX with Lovenox BID   # CODE STATUS - Full Code   # DISPO - PT recommends Home w/ home PT, Home care Service, and Home Oxygen.                 - DC planning on 3/3. 47 YO F with Morbid Obesity and DM2 A1C 6.6 admitted for ARDS second to SARS-COV-2, intubated 1/5-1/10 then transferred to Medicine, however failed BIPAP and reintubated 1/16. Course complicated ECOLI UTI, Mouth Sores and Enterobacter and MRSA VAP. s/p Tracheostomy 1/28 and PEG 1/26. Now transferred to RCU.     # ICU Delirium/ Agitation /Depression   - Weaned off sedation and extubated   - c/w Seroquel 12.5mg qhs and Klonopin 1mg BID  - c/w Remeron with supportive care.    - Monitor mentation. Much improved  --Discharge on Klonopin 0.5mg BID, and Remeron. Discontinue Seroquel upon discharge     # ARDS second to SARS-COV-2 vs Superimposed Enterobacter and MRSA PNA   - s/p Remdesivir, Decadron and ABX as below  - s/p Prolonged ICU stay and Tracheostomy on 1/28. s/p TC and PMV.   - s/p Exchanged 6DCT for 6CFN on 2/23.   - Proventil and Chest PT q6hr PRN  - Decannulated successfully on 2/26. Saturating well on 2LNC  - Ambulation test done, will require Home Oxygen on discharge    # Septic vs Vasoplegic Shock (resolved)  - Off all Pressors. Monitor HR/ BP     # Dysphagia with PEG   - s/p PEG 1/26, and removed by GI on 3/2  - Bowel regimen (Senna and Miralax and Dulcolax PRN)   - Passed CINE on 2/16, c/w Regular diet    # /UTI/Vaginal Itching  - Passed TOV 2/23 and noted with dysuria. UA (+) and now treating empirically for UTI.   - Hx of E. coli UTI sensitive to Cipro. c/w Cipro (2/23 - until 3/3).  - Hyponatremia/ Hypernatremia, monitor electrolytes and renal function as tolerated.   - c/w on Clotrimazole 1% cream qhs x 7 days    # Sepsis second to SARS-COV-2 vs Enterobacter and MRSA PNA vs HSV Type 1   - s/p Empiric ABX for PNA with Zosyn (1/16-1/20), however cultures negative   - UCx ECOLI 1/25 s/p CTX (1/25-1/27).   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   - c/w Cipro for Dysuria (2/23 - until 3/3). UCx showing E.coli sens to Cipro     # DM2 A1C 6.6 (1/2021)   - Continue on ISS and monitor FS Q6H  - Diabetes supplies sent home to monitor sugars     # Wounds   - WOC recommendations appreciated.      # DVT PPX with Lovenox BID   # CODE STATUS - Full Code   # DISPO - PT recommends Home w/ home PT, Home care Service, and Home Oxygen.                 - DC planning on 3/3.

## 2021-03-04 ENCOUNTER — FORM ENCOUNTER (OUTPATIENT)
Age: 46
End: 2021-03-04

## 2021-03-04 ENCOUNTER — APPOINTMENT (OUTPATIENT)
Dept: PULMONOLOGY | Facility: CLINIC | Age: 46
End: 2021-03-04
Payer: MEDICAID

## 2021-03-04 PROCEDURE — 99215 OFFICE O/P EST HI 40 MIN: CPT | Mod: 95

## 2021-03-04 NOTE — HISTORY OF PRESENT ILLNESS
[Home] : at home, [unfilled] , at the time of the visit. [Medical Office: ([facility])___] : at the medical office located in  [Spouse] : spouse [Family Member] : family member [Verbal consent obtained from patient] : the patient, [unfilled] [FreeTextEntry1] : Initial Telehealth, [facility],\par d/c yesterday from [facility] after prolonged admission for COVID pneumonia/ARDS\par  and daughter present\par \par 45 y/o woman, admitted to [facility] on January 13th, COVID pneumonia. On presentation, BMI 40 and A1C 6.6.\par Initially tx on medical floors, failed NPPV.\par Intubated. Evenutally, trached, pegged, transferred to RCU -- \par Pt did well, was able to be decannulated, resumed po diet, peg removed\par D/C home yesterday, on home 02 3lpm.\par Lives with , adult daughter\par \par Discharged on weaning off doses on benzo -\par Now klonopin 0.5 bid, HS remeron.\par She is feeling well, calm . slept\par Ambulating at home with a walker\par Using 02 at 2lpm at rest - mid 90s. \par Had a HA this morning, improved with ibuprofen\par \par On exam, looks well, no distress. Happy to be home\par \par Homecare RN, OT and PT all set up\par Activity as tolerated . Can increase to 3lpm if needed \par Labs ordered for 3/8\par f/u TEB 3/9\par Slowly titrate off klonopin - starting next week if able\par Referred to [facility] - requesting new PMD with Rae

## 2021-03-05 ENCOUNTER — LABORATORY RESULT (OUTPATIENT)
Age: 46
End: 2021-03-05

## 2021-03-05 PROBLEM — Z00.00 ENCOUNTER FOR PREVENTIVE HEALTH EXAMINATION: Noted: 2021-03-05

## 2021-03-08 ENCOUNTER — APPOINTMENT (OUTPATIENT)
Dept: INTERNAL MEDICINE | Facility: CLINIC | Age: 46
End: 2021-03-08

## 2021-03-09 ENCOUNTER — APPOINTMENT (OUTPATIENT)
Dept: PULMONOLOGY | Facility: CLINIC | Age: 46
End: 2021-03-09
Payer: MEDICAID

## 2021-03-09 DIAGNOSIS — U07.1 COVID-19: ICD-10-CM

## 2021-03-09 PROCEDURE — 99214 OFFICE O/P EST MOD 30 MIN: CPT | Mod: 95

## 2021-03-09 NOTE — HISTORY OF PRESENT ILLNESS
[Home] : at home, [unfilled] , at the time of the visit. [Medical Office: ([facility])___] : at the medical office located in  [Verbal consent obtained from patient] : the patient, [unfilled] [FreeTextEntry1] : I have seen and spoken to pt for f/u with [place] program. \par Pt looks good with NC in place on video. Speaks clearly.\par \par Pt reports\par - headaches in the back of head - takes Tylenol with relief. BP  is checked prior every PT. Yesterday BP was 140/80.\par - nasal dryness from O2. \par O2 sat 99 % \par - pt stopped taking Klonopin and Remeron \par - eating ,drinking,walking\par - wound care \par

## 2021-03-12 ENCOUNTER — APPOINTMENT (OUTPATIENT)
Dept: INTERNAL MEDICINE | Facility: CLINIC | Age: 46
End: 2021-03-12
Payer: MEDICAID

## 2021-03-12 DIAGNOSIS — N89.8 OTHER SPECIFIED NONINFLAMMATORY DISORDERS OF VAGINA: ICD-10-CM

## 2021-03-12 DIAGNOSIS — K59.09 OTHER CONSTIPATION: ICD-10-CM

## 2021-03-12 DIAGNOSIS — R13.10 DYSPHAGIA, UNSPECIFIED: ICD-10-CM

## 2021-03-12 PROCEDURE — 99203 OFFICE O/P NEW LOW 30 MIN: CPT | Mod: 95

## 2021-03-12 NOTE — PHYSICAL EXAM
[No Respiratory Distress] : no respiratory distress  [Speech Grossly Normal] : speech grossly normal [Memory Grossly Normal] : memory grossly normal [Normal Affect] : the affect was normal [Alert and Oriented x3] : oriented to person, place, and time [Normal Mood] : the mood was normal [Normal Insight/Judgement] : insight and judgment were intact [de-identified] : on O2 NC [de-identified] : abd wound and neck wound healing well'

## 2021-03-12 NOTE — ASSESSMENT
[FreeTextEntry1] : I spend a total of 45 minutes on the date of the encounter evaluating and treating patient\par

## 2021-03-12 NOTE — REVIEW OF SYSTEMS
[Fatigue] : fatigue [Dyspnea on Exertion] : dyspnea on exertion [Joint Pain] : joint pain [Muscle Pain] : muscle pain [Headache] : headache [Unsteady Walking] : ataxia [Anxiety] : anxiety [Depression] : depression [Fever] : no fever [Chills] : no chills [Hot Flashes] : no hot flashes [Night Sweats] : no night sweats [Recent Change In Weight] : ~T no recent weight change [Discharge] : no discharge [Pain] : no pain [Redness] : no redness [Dryness] : no dryness  [Vision Problems] : no vision problems [Itching] : no itching [Earache] : no earache [Hearing Loss] : no hearing loss [Nosebleed] : no nosebleeds [Nasal Discharge] : no nasal discharge [Sore Throat] : no sore throat [Postnasal Drip] : no postnasal drip [Chest Pain] : no chest pain [Palpitations] : no palpitations [Leg Claudication] : no leg claudication [Lower Ext Edema] : no lower extremity edema [Orthopnea] : no orthopnea [Paroxysmal Nocturnal Dyspnea] : no paroxysmal nocturnal dyspnea [Shortness Of Breath] : no shortness of breath [Wheezing] : no wheezing [Cough] : no cough [Abdominal Pain] : no abdominal pain [Nausea] : no nausea [Constipation] : no constipation [Diarrhea] : diarrhea [Vomiting] : no vomiting [Heartburn] : no heartburn [Dysuria] : no dysuria [Incontinence] : no incontinence [Nocturia] : no nocturia [Hematuria] : no hematuria [Frequency] : no frequency [Joint Stiffness] : no joint stiffness [Joint Swelling] : no joint swelling [Muscle Weakness] : no muscle weakness [Back Pain] : no back pain [Dizziness] : no dizziness [Fainting] : no fainting [Confusion] : no confusion [Memory Loss] : no memory loss [Suicidal] : not suicidal [Insomnia] : no insomnia [FreeTextEntry6] : as per HPI;

## 2021-03-12 NOTE — HISTORY OF PRESENT ILLNESS
[FreeTextEntry8] : This visit was provided via telehealth using real-time 2-way audio visual technology. The patient, CHERELLE MEJIA , was located at home,[address] [place] [address]\[place] [address] , at the time of the visit. \par The provider,STEPHON ARMIJO , was located at his medical office located in  at the time of the visit. The patient, and Provider participated in the telehealth encounter. \par Verbal consent given on Mar 12 2021  3:30PM by the patient.\par \par \par Hospital Course: \par Discharge Date	03-Mar-2021 \par Admission Date	03-Jan-2021 17:49 \par Reason for Admission	COVID \par Hospital Course	 \par 45 YO F with Morbid Obesity and DM2 A1C 6.6 admitted for ARDS second to \par SARS-COV-2, intubated 1/5-1/10 then transferred to Medicine, however failed \par BIPAP and reintubated 1/16. Course complicated ECOLI UTI, Mouth Sores and \par Enterobacter and MRSA VAP. s/p Tracheostomy 1/28 and PEG 1/26. Now transferred \par to RCU. \par \par \par # ICU Delirium/ Agitation /Depression \par - Weaned off sedation and extubated \par - c/w Seroquel 12.5mg qhs and Klonopin 1mg BID \par - c/w Remeron with supportive care. \par - Monitor mentation. Much improved \par -Discharge on Klonopin 0.5mg BID, and Remeron. Discontinue Seroquel upon \par discharge \par \par # ARDS second to SARS-COV-2 vs Superimposed Enterobacter and MRSA PNA \par - s/p Remdesivir, Decadron and ABX as below \par - s/p Prolonged ICU stay and Tracheostomy on 1/28. s/p TC and PMV. \par - s/p Exchanged 6DCT for 6CFN on 2/23. \par - Proventil and Chest PT q6hr PRN \par - Decannulated successfully on 2/26. Saturating well on 2LNC \par - Ambulation test done, will require Home Oxygen on discharge \par \par # Septic vs Vasoplegic Shock (resolved) \par - Off all Pressors. Monitor HR/ BP \par \par # Dysphagia with PEG \par - s/p PEG 1/26, and removed by GI on 3/2 \par - Bowel regimen (Senna and Miralax and Dulcolax PRN) \par - Passed CINE on 2/16, c/w Regular diet \par \par # /UTI/Vaginal Itching \par - Passed TOV 2/23 and noted with dysuria. UA (+) and now treating empirically \par for UTI. \par - Hx of E. coli UTI sensitive to Cipro. c/w Cipro (2/23 - until 3/3). \par - Hyponatremia/ Hypernatremia, monitor electrolytes and renal function as \par tolerated. \par - c/w on Clotrimazole 1% cream qhs x 7 days \par \par # Sepsis second to SARS-COV-2 vs Enterobacter and MRSA PNA vs HSV Type 1 \par - s/p Empiric ABX for PNA with Zosyn (1/16-1/20), however cultures negative \par - UCx ECOLI 1/25 s/p CTX (1/25-1/27). 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 \par - c/w Cipro for Dysuria (2/23 - until 3/3). UCx showing E.coli sens to Cipro \par \par # DM2 A1C 6.6 (1/2021) \par - Continue on ISS and monitor FS Q6H \par \par # DISPO - PT recommends Home w/ home PT, Home care Service, and Home Oxygen. \par     - DC planning on 3/3. \par \par As per Dr. Lisker patient is medically cleared for discharge 3/3- patient to go \par home with home o2 and follow up with [facility] 3/4. \par \par Med Reconciliation: \par Override IMPROVE-DD recommendations due to:	IMPROVE-DD Application Not \par Available \par Recommended Post-Discharge VTE Prophylaxis	IMPROVE-DD Application Not Available \par Medication Reconciliation Status	Admission Reconciliation is Completed \par Discharge Reconciliation is Completed \par Discharge Medications	albuterol 90 mcg/inh inhalation aerosol: 2 puff(s) \par inhaled every 6 hours, As needed, Wheezing \par alcohol swabs : Apply topically to affected area 4 times a day \par clonazePAM 0.5 mg oral tablet: 1 tab(s) orally every 12 hours MDD:2 \par clotrimazole 1% vaginal cream with applicator: 1 applicatorful vaginal once a \par day (at bedtime) as needed for vaginal itch \par glucometer (per patient's insurance): Test blood sugars four times a day. \par Dispense #1 glucometer. \par lancets: 1 application subcutaneously 4 times a day \par mirtazapine 15 mg oral tablet: 1 tab(s) orally once a day (at bedtime) \par Multiple Vitamins oral tablet: 1 tab(s) orally once a day \par naproxen 250 mg oral tablet: 1 tab(s) orally every 6 hours, As needed, Moderate \par Pain (4 - 6) \par polyethylene glycol 3350 oral powder for reconstitution: 17 gram(s) orally 2 \par times a day for constipation \par senna oral tablet: 2 tab(s) orally once a day (at bedtime) for constipation \par test strips (per patient's insurance): 1 application subcutaneously 4 \par times a day. ** Compatible with patient's glucometer ** \par . \par , \par , \par \par Care Plan/Procedures: \par Goal(s)	To get better and follow your care plan as instructed. \par Discharge Diagnoses, Assessment and Plan of Treatment	PRINCIPAL DISCHARGE \par DIAGNOSIS \par Diagnosis: COVID-19 \par Assessment and Plan of Treatment: You have been diagnosed with the COVID-19 \par virus during your hospital stay. You have been intubated 1/16, extubated and \par trach placed 1/28 and decanulated 2/26. Peg tube was removed 3/2. Continue home \par  oxygen as needed. Monitor for fevers, shortness of breath and cough primarily. \par  Monitor your temperature daily to not any changes and increases.  It has been \par determined that you no longer need hospitalization. \par Follow up with [facility] with Dr. Lisker 3/4/2021 \par telehealth for further pulmonary recommendations. \par \par \par \par SECONDARY DISCHARGE DIAGNOSES \par Diagnosis: Sepsis \par Assessment and Plan of Treatment: Sputum culture with enterobacter aerogenes \par was treated with antibiotics. Mouth sores positive for MRSA treated as well. \par Continue close follow up with your PCP within 1 week to continue care \par outpatient and monitor for further signs of infection. \par \par Diagnosis: Dysuria \par Assessment and Plan of Treatment: Urine culture with Ecoli treated with \par antibiotics.  Monitor for signs/symptoms of infection, such as, fever/chills, \par burning/pain with urination, urinary frequency/hesitancy, cloudy urine, or \par blood in urine. \par \par \par Diagnosis: Dysphagia \par Assessment and Plan of Treatment: You had a peg tube placed this admission but \par now are able to tolerate foods by mouth. Peg tube was removed 3/2/21. Continue \par regular diet as tolerated. \par \par Diagnosis: Depression \par Assessment and Plan of Treatment: You were started on Klonopin and remeron this \par admission to help with anxiety and depression. Please follow up with your \par primary care doctors for further recommendations as to when to possibly stop \par medications in the future. \par \par \par Diagnosis: Diabetes \par Assessment and Plan of Treatment: Your hemoglobin a1c is 6.6 Continue \par consistent carbohydrate diet.  Monitor blood glucose levels throughout the day \par before meals and at bedtime.  Record blood sugars and bring to outpatient \par providers appointment in order to be reviewed by your doctor for management \par modifications.  Be aware of diabetes management symptoms including feeling cool \par and clammy may be related to low glucose levels.  Feeling hot and dry may \par indicate high glucose levels.  If  you feel these symptoms, check your blood \par sugar.  Make regular podiatry appointments in order to have feet checked for \par wounds and toe nails cut by a doctor to prevent infections. \par \par \par Diagnosis: Vaginal itching \par Assessment and Plan of Treatment: Continue clotrimazole 1% cream as prescribed \par for a total of 7 days. Follow up with your pcp if you have futher symptoms. \par \par Diagnosis: Abnormal LFTs \par Assessment and Plan of Treatment: Monitor your liver function outpatient with \par your primary care doctor within 1-2 weeks of discharge. \par \par Diagnosis: Normocytic anemia \par Assessment and Plan of Treatment: Follow-up with your outpatient provider if \par further treatment is warranted. Monitor for signs/symptoms indicating worsening \par of disease, such as, easy bleeding/bruising, pale skin, fatigue, dizziness, \par increased heart rate, or chest pain. \par \par 45 YO F with Morbid Obesity and DM2 A1C 6.6 admitted for ARDS second to \par SARS-COV-2, intubated 1/5-1/10 then transferred to Medicine, however failed \par BIPAP and reintubated 1/16. Course complicated ECOLI UTI, Mouth Sores and \par Enterobacter and MRSA VAP. s/p Tracheostomy 1/28 and PEG 1/26. Now transferred \par to RCU today for \par \par \par DMII: 6.6 ha1c;; HOME fasting FS 100s;stated that never had been diagnosed with DM before this hospitalization; \par \par use O2 when ambulating; no need o2 while resting now \par no fever or chills\par no dysuria; no flank pain; \par saw pulm Dr.Lisker, Gita N ( [phone])  in this week; advised to continue with \par \par \par pain of arm and shoulder had b/l shoulder x ray wnl  during hospitalization\par \par on PT now need wheel chair for now \par home care RN VISIT\par ON HOME O2\par \par need  help for ADLs( shower /dressing) and IADLs for now;\par \par

## 2021-03-15 ENCOUNTER — APPOINTMENT (OUTPATIENT)
Dept: PULMONOLOGY | Facility: CLINIC | Age: 46
End: 2021-03-15
Payer: MEDICAID

## 2021-03-15 PROCEDURE — 99213 OFFICE O/P EST LOW 20 MIN: CPT | Mod: 95

## 2021-03-22 ENCOUNTER — APPOINTMENT (OUTPATIENT)
Dept: INTERNAL MEDICINE | Facility: CLINIC | Age: 46
End: 2021-03-22
Payer: MEDICAID

## 2021-03-22 PROCEDURE — 99204 OFFICE O/P NEW MOD 45 MIN: CPT | Mod: 95

## 2021-03-24 NOTE — ASSESSMENT
[FreeTextEntry1] : will see pt in clinic in 4-6 weeks\par pt will follow up with pulm in office in 04/2021 too \par \par \par I spend a total of 45 minutes on the date of the encounter evaluating and treating patient\par

## 2021-03-24 NOTE — PHYSICAL EXAM
[No Respiratory Distress] : no respiratory distress  [Speech Grossly Normal] : speech grossly normal [Memory Grossly Normal] : memory grossly normal [Normal Affect] : the affect was normal [Alert and Oriented x3] : oriented to person, place, and time [Normal Mood] : the mood was normal [Normal Insight/Judgement] : insight and judgment were intact [de-identified] : on O2 NC [de-identified] : abd wound and neck wound healing well'

## 2021-03-24 NOTE — HISTORY OF PRESENT ILLNESS
[FreeTextEntry8] : This visit was provided via telehealth using real-time 2-way audio visual technology. The patient, CHERELLE MEJIA , was located at home,[address] [place] [address]\[place] [address] , at the time of the visit. \par The provider,STEPHON ARMIJO , was located at his medical office located in  at the time of the visit. The patient, and Provider participated in the telehealth encounter. \par Verbal consent given on Mar 22 2021  3:30PM by the patient.\par \par \par \par Hospital Course: \par Discharge Date	03-Mar-2021 \par Admission Date	03-Jan-2021 17:49 \par Reason for Admission	COVID \par Hospital Course	 \par 47 YO F with Morbid Obesity and DM2 A1C 6.6 admitted for ARDS second to \par SARS-COV-2, intubated 1/5-1/10 then transferred to Medicine, however failed \par BIPAP and reintubated 1/16. Course complicated ECOLI UTI, Mouth Sores and \par Enterobacter and MRSA VAP. s/p Tracheostomy 1/28 and PEG 1/26. Now transferred \par to RCU. \par \par \par # ICU Delirium/ Agitation /Depression \par - Weaned off sedation and extubated \par - c/w Seroquel 12.5mg qhs and Klonopin 1mg BID \par - c/w Remeron with supportive care. \par - Monitor mentation. Much improved \par -Discharge on Klonopin 0.5mg BID, and Remeron. Discontinue Seroquel upon \par discharge \par \par # ARDS second to SARS-COV-2 vs Superimposed Enterobacter and MRSA PNA \par - s/p Remdesivir, Decadron and ABX as below \par - s/p Prolonged ICU stay and Tracheostomy on 1/28. s/p TC and PMV. \par - s/p Exchanged 6DCT for 6CFN on 2/23. \par - Proventil and Chest PT q6hr PRN \par - Decannulated successfully on 2/26. Saturating well on 2LNC \par - Ambulation test done, will require Home Oxygen on discharge \par \par # Septic vs Vasoplegic Shock (resolved) \par - Off all Pressors. Monitor HR/ BP \par \par # Dysphagia with PEG \par - s/p PEG 1/26, and removed by GI on 3/2 \par - Bowel regimen (Senna and Miralax and Dulcolax PRN) \par - Passed CINE on 2/16, c/w Regular diet \par \par # /UTI/Vaginal Itching \par - Passed TOV 2/23 and noted with dysuria. UA (+) and now treating empirically \par for UTI. \par - Hx of E. coli UTI sensitive to Cipro. c/w Cipro (2/23 - until 3/3). \par - Hyponatremia/ Hypernatremia, monitor electrolytes and renal function as \par tolerated. \par - c/w on Clotrimazole 1% cream qhs x 7 days \par \par # Sepsis second to SARS-COV-2 vs Enterobacter and MRSA PNA vs HSV Type 1 \par - s/p Empiric ABX for PNA with Zosyn (1/16-1/20), however cultures negative \par - UCx ECOLI 1/25 s/p CTX (1/25-1/27). 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 \par - c/w Cipro for Dysuria (2/23 - until 3/3). UCx showing E.coli sens to Cipro \par \par # DM2 A1C 6.6 (1/2021) \par - Continue on ISS and monitor FS Q6H \par \par # DISPO - PT recommends Home w/ home PT, Home care Service, and Home Oxygen. \par     - DC planning on 3/3. \par \par As per Dr. Lisker patient is medically cleared for discharge 3/3- patient to go \par home with home o2 and follow up with [facility] 3/4. \par \par Med Reconciliation: \par Override IMPROVE-DD recommendations due to:	IMPROVE-DD Application Not \par Available \par Recommended Post-Discharge VTE Prophylaxis	IMPROVE-DD Application Not Available \par Medication Reconciliation Status	Admission Reconciliation is Completed \par Discharge Reconciliation is Completed \par Discharge Medications	albuterol 90 mcg/inh inhalation aerosol: 2 puff(s) \par inhaled every 6 hours, As needed, Wheezing \par alcohol swabs : Apply topically to affected area 4 times a day \par clonazePAM 0.5 mg oral tablet: 1 tab(s) orally every 12 hours MDD:2 \par clotrimazole 1% vaginal cream with applicator: 1 applicatorful vaginal once a \par day (at bedtime) as needed for vaginal itch \par glucometer (per patient's insurance): Test blood sugars four times a day. \par Dispense #1 glucometer. \par lancets: 1 application subcutaneously 4 times a day \par mirtazapine 15 mg oral tablet: 1 tab(s) orally once a day (at bedtime) \par Multiple Vitamins oral tablet: 1 tab(s) orally once a day \par naproxen 250 mg oral tablet: 1 tab(s) orally every 6 hours, As needed, Moderate \par Pain (4 - 6) \par polyethylene glycol 3350 oral powder for reconstitution: 17 gram(s) orally 2 \par times a day for constipation \par senna oral tablet: 2 tab(s) orally once a day (at bedtime) for constipation \par test strips (per patient's insurance): 1 application subcutaneously 4 \par times a day. ** Compatible with patient's glucometer ** \par . \par , \par , \par \par Care Plan/Procedures: \par Goal(s)	To get better and follow your care plan as instructed. \par Discharge Diagnoses, Assessment and Plan of Treatment	PRINCIPAL DISCHARGE \par DIAGNOSIS \par Diagnosis: COVID-19 \par Assessment and Plan of Treatment: You have been diagnosed with the COVID-19 \par virus during your hospital stay. You have been intubated 1/16, extubated and \par trach placed 1/28 and decanulated 2/26. Peg tube was removed 3/2. Continue home \par  oxygen as needed. Monitor for fevers, shortness of breath and cough primarily. \par  Monitor your temperature daily to not any changes and increases.  It has been \par determined that you no longer need hospitalization. \par Follow up with the [facility] with Dr. Lisker 3/4/2021 \par telehealth for further pulmonary recommendations. \par \par \par \par SECONDARY DISCHARGE DIAGNOSES \par Diagnosis: Sepsis \par Assessment and Plan of Treatment: Sputum culture with enterobacter aerogenes \par was treated with antibiotics. Mouth sores positive for MRSA treated as well. \par Continue close follow up with your PCP within 1 week to continue care \par outpatient and monitor for further signs of infection. \par \par Diagnosis: Dysuria \par Assessment and Plan of Treatment: Urine culture with Ecoli treated with \par antibiotics.  Monitor for signs/symptoms of infection, such as, fever/chills, \par burning/pain with urination, urinary frequency/hesitancy, cloudy urine, or \par blood in urine. \par \par \par Diagnosis: Dysphagia \par Assessment and Plan of Treatment: You had a peg tube placed this admission but \par now are able to tolerate foods by mouth. Peg tube was removed 3/2/21. Continue \par regular diet as tolerated. \par \par Diagnosis: Depression \par Assessment and Plan of Treatment: You were started on Klonopin and remeron this \par admission to help with anxiety and depression. Please follow up with your \par primary care doctors for further recommendations as to when to possibly stop \par medications in the future. \par \par \par Diagnosis: Diabetes \par Assessment and Plan of Treatment: Your hemoglobin a1c is 6.6 Continue \par consistent carbohydrate diet.  Monitor blood glucose levels throughout the day \par before meals and at bedtime.  Record blood sugars and bring to outpatient \par providers appointment in order to be reviewed by your doctor for management \par modifications.  Be aware of diabetes management symptoms including feeling cool \par and clammy may be related to low glucose levels.  Feeling hot and dry may \par indicate high glucose levels.  If  you feel these symptoms, check your blood \par sugar.  Make regular podiatry appointments in order to have feet checked for \par wounds and toe nails cut by a doctor to prevent infections. \par \par \par Diagnosis: Vaginal itching \par Assessment and Plan of Treatment: Continue clotrimazole 1% cream as prescribed \par for a total of 7 days. Follow up with your pcp if you have futher symptoms. \par \par Diagnosis: Abnormal LFTs \par Assessment and Plan of Treatment: Monitor your liver function outpatient with \par your primary care doctor within 1-2 weeks of discharge. \par \par Diagnosis: Normocytic anemia \par Assessment and Plan of Treatment: Follow-up with your outpatient provider if \par further treatment is warranted. Monitor for signs/symptoms indicating worsening \par of disease, such as, easy bleeding/bruising, pale skin, fatigue, dizziness, \par increased heart rate, or chest pain. \par \par 47 YO F with Morbid Obesity and DM2 A1C 6.6 admitted for ARDS second to \par SARS-COV-2, intubated 1/5-1/10 then transferred to Medicine, however failed \par BIPAP and reintubated 1/16. Course complicated ECOLI UTI, Mouth Sores and \par Enterobacter and MRSA VAP. s/p Tracheostomy 1/28 and PEG 1/26.\par \par \par DMII: 6.6 ha1c;; HOME fasting FS 100s;stated that never had been diagnosed with DM before this hospitalization; \par \par use O2 when ambulating with 2L no need o2 while resting now \par on PT AND WILL START OT \par on PT now need wheel chair for now \par home care RN VISIT\par no fever or chills\par reported some urinary discomfortable; no flank pain; \par saw pulm Dr.Lisker, Gita N ( [phone])  in this week; advised to continue with O2; \par \par \par pain of arm and shoulder had b/l shoulder x ray wnl  during hospitalization\par \par need  help for ADLs( shower /dressing) and IADLs for now;

## 2021-03-31 ENCOUNTER — NON-APPOINTMENT (OUTPATIENT)
Age: 46
End: 2021-03-31

## 2021-04-05 ENCOUNTER — APPOINTMENT (OUTPATIENT)
Dept: NEUROLOGY | Facility: CLINIC | Age: 46
End: 2021-04-05

## 2021-04-14 ENCOUNTER — APPOINTMENT (OUTPATIENT)
Dept: PULMONOLOGY | Facility: CLINIC | Age: 46
End: 2021-04-14
Payer: MEDICAID

## 2021-04-14 VITALS
SYSTOLIC BLOOD PRESSURE: 124 MMHG | HEIGHT: 64 IN | WEIGHT: 220 LBS | DIASTOLIC BLOOD PRESSURE: 84 MMHG | BODY MASS INDEX: 37.56 KG/M2 | RESPIRATION RATE: 16 BRPM | HEART RATE: 88 BPM | TEMPERATURE: 98.5 F

## 2021-04-14 PROCEDURE — 99213 OFFICE O/P EST LOW 20 MIN: CPT

## 2021-04-14 PROCEDURE — 99072 ADDL SUPL MATRL&STAF TM PHE: CPT

## 2021-04-14 NOTE — DISCUSSION/SUMMARY
[FreeTextEntry1] : 46-year-old woman, prolonged ICU and hospital stay due to ARDS secondary to COVID-19, undergone trach and PEG, both removed prior to discharge.\par \par She is doing amazingly well.  Not requiring oxygen anymore.  Ambulating, increasing her activity levels.\par Still carries the oxygen at her chest medicine.  She has a portable concentrator.  Also\par \par Still has the weakness of her left hand.  He is going to reschedule neurology appointment.  We will try to see what we can do for occupational therapy\par \par CT chest for follow-up.\par \par Advised that she should get the Covid vaccine.\par \par Follow-up 3 months.  We will do a PFT at that time, was not scheduled for today

## 2021-04-14 NOTE — HISTORY OF PRESENT ILLNESS
[TextBox_4] : Here for in person follow-up\par \par She is doing amazing.  She still uses her oxygen to make herself feel less nervous when she goes out, but her oxygen saturation has remained above 95% consistently.  She is walking more and more, started out by going around the block now goes a little bit further.  She graduated from physical therapy.\par She still wants to get occupational therapy for the weakness of her hand.  However, she was denied insurance authorization.  I will try to help her look into this further.\par \par Her other concerns today are cosmetic, her hair has thinned, and the scar on her face related to the intubation/ARDS.  These are all good signs.

## 2021-04-19 ENCOUNTER — RX RENEWAL (OUTPATIENT)
Age: 46
End: 2021-04-19

## 2021-05-03 ENCOUNTER — APPOINTMENT (OUTPATIENT)
Dept: INTERNAL MEDICINE | Facility: CLINIC | Age: 46
End: 2021-05-03
Payer: MEDICAID

## 2021-05-03 VITALS
SYSTOLIC BLOOD PRESSURE: 121 MMHG | OXYGEN SATURATION: 98 % | WEIGHT: 221 LBS | DIASTOLIC BLOOD PRESSURE: 83 MMHG | RESPIRATION RATE: 16 BRPM | HEIGHT: 63 IN | TEMPERATURE: 98.6 F | BODY MASS INDEX: 39.16 KG/M2 | HEART RATE: 73 BPM

## 2021-05-03 DIAGNOSIS — D64.9 ANEMIA, UNSPECIFIED: ICD-10-CM

## 2021-05-03 DIAGNOSIS — Z99.81 DEPENDENCE ON SUPPLEMENTAL OXYGEN: ICD-10-CM

## 2021-05-03 DIAGNOSIS — L65.9 NONSCARRING HAIR LOSS, UNSPECIFIED: ICD-10-CM

## 2021-05-03 DIAGNOSIS — R39.9 UNSPECIFIED SYMPTOMS AND SIGNS INVOLVING THE GENITOURINARY SYSTEM: ICD-10-CM

## 2021-05-03 PROCEDURE — 99214 OFFICE O/P EST MOD 30 MIN: CPT

## 2021-05-03 PROCEDURE — 99072 ADDL SUPL MATRL&STAF TM PHE: CPT

## 2021-05-03 RX ORDER — NAPROXEN 500 MG/1
500 TABLET ORAL
Qty: 60 | Refills: 0 | Status: DISCONTINUED | COMMUNITY
Start: 2021-03-12 | End: 2021-05-03

## 2021-05-07 PROBLEM — Z99.81 ON HOME O2: Status: ACTIVE | Noted: 2021-03-12

## 2021-05-07 PROBLEM — R39.9 UTI SYMPTOMS: Status: ACTIVE | Noted: 2021-03-24

## 2021-05-07 PROBLEM — D64.9 NORMOCYTIC ANEMIA: Status: ACTIVE | Noted: 2021-03-12

## 2021-05-07 PROBLEM — L65.9 HAIR LOSS: Status: ACTIVE | Noted: 2021-05-03

## 2021-05-07 RX ORDER — CIPROFLOXACIN HYDROCHLORIDE 250 MG/1
250 TABLET, FILM COATED ORAL
Qty: 6 | Refills: 0 | Status: DISCONTINUED | COMMUNITY
Start: 2021-03-24 | End: 2021-05-07

## 2021-05-07 NOTE — ASSESSMENT
[FreeTextEntry1] : will see pt in clinic in 4-6 weeks\par pt will follow up with pulm in office in 04/2021 too \par \par DMV for Disable form filled out; \par

## 2021-05-07 NOTE — PHYSICAL EXAM
[No Respiratory Distress] : no respiratory distress  [Memory Grossly Normal] : memory grossly normal [Speech Grossly Normal] : speech grossly normal [Normal Affect] : the affect was normal [Alert and Oriented x3] : oriented to person, place, and time [Normal Mood] : the mood was normal [Normal Insight/Judgement] : insight and judgment were intact [No Acute Distress] : no acute distress [Well Nourished] : well nourished [Well Developed] : well developed [Well-Appearing] : well-appearing [Normal Sclera/Conjunctiva] : normal sclera/conjunctiva [PERRL] : pupils equal round and reactive to light [EOMI] : extraocular movements intact [Normal Outer Ear/Nose] : the outer ears and nose were normal in appearance [Normal Oropharynx] : the oropharynx was normal [No JVD] : no jugular venous distention [No Lymphadenopathy] : no lymphadenopathy [Supple] : supple [Thyroid Normal, No Nodules] : the thyroid was normal and there were no nodules present [Normal Rate] : normal rate  [Regular Rhythm] : with a regular rhythm [Normal S1, S2] : normal S1 and S2 [No Murmur] : no murmur heard [No Edema] : there was no peripheral edema [Soft] : abdomen soft [Non Tender] : non-tender [Non-distended] : non-distended [No Masses] : no abdominal mass palpated [Normal Bowel Sounds] : normal bowel sounds [Normal Posterior Cervical Nodes] : no posterior cervical lymphadenopathy [Normal Anterior Cervical Nodes] : no anterior cervical lymphadenopathy [No CVA Tenderness] : no CVA  tenderness [No Spinal Tenderness] : no spinal tenderness [No Joint Swelling] : no joint swelling [Grossly Normal Strength/Tone] : grossly normal strength/tone [No Rash] : no rash [Coordination Grossly Intact] : coordination grossly intact [No Focal Deficits] : no focal deficits [Normal Gait] : normal gait [Deep Tendon Reflexes (DTR)] : deep tendon reflexes were 2+ and symmetric [de-identified] : on O2 NC [de-identified] : abd wound and neck wound healing well'

## 2021-05-07 NOTE — HISTORY OF PRESENT ILLNESS
[FreeTextEntry8] : 47 YO F with Morbid Obesity and DM2 A1C 6.6 admitted for ARDS second to \par SARS-COV-2, intubated 1/5-1/10 then transferred to Medicine, however failed \par BIPAP and reintubated 1/16. Course complicated ECOLI UTI, Mouth Sores and \par Enterobacter and MRSA VAP. s/p Tracheostomy 1/28 and PEG 1/26 today to follow up\par \par \par DMII: 6.6 ha1c;; HOME fasting FS 100s;stated that never had been diagnosed with DM before this hospitalization; \par \par use O2 when ambulating with 2L no need o2 while resting now \par no fever or chills\par \par saw pulm Dr.Lisker, Gita N ( [phone]) IN 04/14/2021;  advised to continue with but tapering down O2; WILL SEE pulm AGAIN IN 06/2021; HAS ct LUNG PLANNING PER PULM\par \par able to walk 4 blocks with O2 2L; o2 below 80s% when she was walking outside; \par able to rest and ambulate inside the house; \par \par still report hair loss \par \par also reported scar of her Rt face s/p long time of intubation; \par \par pain of arm and shoulder had b/l shoulder x ray wnl  during hospitalization\par \par pt has scare of her FACE after the intubation \par \par need  help for ADLs( shower /dressing) and IADLs for now;\par \par \par \par Hospital Course: \par Discharge Date	03-Mar-2021 \par Admission Date	03-Jan-2021 17:49 \par Reason for Admission	COVID \par Hospital Course	 \par 47 YO F with Morbid Obesity and DM2 A1C 6.6 admitted for ARDS second to \par SARS-COV-2, intubated 1/5-1/10 then transferred to Medicine, however failed \par BIPAP and reintubated 1/16. Course complicated ECOLI UTI, Mouth Sores and \par Enterobacter and MRSA VAP. s/p Tracheostomy 1/28 and PEG 1/26. Now transferred \par to RCU. \par \par \par # ICU Delirium/ Agitation /Depression \par - Weaned off sedation and extubated \par - c/w Seroquel 12.5mg qhs and Klonopin 1mg BID \par - c/w Remeron with supportive care. \par - Monitor mentation. Much improved \par -Discharge on Klonopin 0.5mg BID, and Remeron. Discontinue Seroquel upon \par discharge \par \par # ARDS second to SARS-COV-2 vs Superimposed Enterobacter and MRSA PNA \par - s/p Remdesivir, Decadron and ABX as below \par - s/p Prolonged ICU stay and Tracheostomy on 1/28. s/p TC and PMV. \par - s/p Exchanged 6DCT for 6CFN on 2/23. \par - Proventil and Chest PT q6hr PRN \par - Decannulated successfully on 2/26. Saturating well on 2LNC \par - Ambulation test done, will require Home Oxygen on discharge \par \par # Septic vs Vasoplegic Shock (resolved) \par - Off all Pressors. Monitor HR/ BP \par \par # Dysphagia with PEG \par - s/p PEG 1/26, and removed by GI on 3/2 \par - Bowel regimen (Senna and Miralax and Dulcolax PRN) \par - Passed CINE on 2/16, c/w Regular diet \par \par # /UTI/Vaginal Itching \par - Passed TOV 2/23 and noted with dysuria. UA (+) and now treating empirically \par for UTI. \par - Hx of E. coli UTI sensitive to Cipro. c/w Cipro (2/23 - until 3/3). \par - Hyponatremia/ Hypernatremia, monitor electrolytes and renal function as \par tolerated. \par - c/w on Clotrimazole 1% cream qhs x 7 days \par \par # Sepsis second to SARS-COV-2 vs Enterobacter and MRSA PNA vs HSV Type 1 \par - s/p Empiric ABX for PNA with Zosyn (1/16-1/20), however cultures negative \par - UCx ECOLI 1/25 s/p CTX (1/25-1/27). 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 \par - c/w Cipro for Dysuria (2/23 - until 3/3). UCx showing E.coli sens to Cipro \par \par # DM2 A1C 6.6 (1/2021) \par - Continue on ISS and monitor FS Q6H \par \par # DISPO - PT recommends Home w/ home PT, Home care Service, and Home Oxygen. \par     - DC planning on 3/3. \par \par As per Dr. Lisker patient is medically cleared for discharge 3/3- patient to go \par home with home o2 and follow up with [facility] 3/4. \par \par Med Reconciliation: \par Override IMPROVE-DD recommendations due to:	IMPROVE-DD Application Not \par Available \par Recommended Post-Discharge VTE Prophylaxis	IMPROVE-DD Application Not Available \par Medication Reconciliation Status	Admission Reconciliation is Completed \par Discharge Reconciliation is Completed \par Discharge Medications	albuterol 90 mcg/inh inhalation aerosol: 2 puff(s) \par inhaled every 6 hours, As needed, Wheezing \par alcohol swabs : Apply topically to affected area 4 times a day \par clonazePAM 0.5 mg oral tablet: 1 tab(s) orally every 12 hours MDD:2 \par clotrimazole 1% vaginal cream with applicator: 1 applicatorful vaginal once a \par day (at bedtime) as needed for vaginal itch \par glucometer (per patient's insurance): Test blood sugars four times a day. \par Dispense #1 glucometer. \par lancets: 1 application subcutaneously 4 times a day \par mirtazapine 15 mg oral tablet: 1 tab(s) orally once a day (at bedtime) \par Multiple Vitamins oral tablet: 1 tab(s) orally once a day \par naproxen 250 mg oral tablet: 1 tab(s) orally every 6 hours, As needed, Moderate \par Pain (4 - 6) \par polyethylene glycol 3350 oral powder for reconstitution: 17 gram(s) orally 2 \par times a day for constipation \par senna oral tablet: 2 tab(s) orally once a day (at bedtime) for constipation \par test strips (per patient's insurance): 1 application subcutaneously 4 \par times a day. ** Compatible with patient's glucometer ** \par . \par , \par , \par \par Care Plan/Procedures: \par Goal(s)	To get better and follow your care plan as instructed. \par Discharge Diagnoses, Assessment and Plan of Treatment	PRINCIPAL DISCHARGE \par DIAGNOSIS \par Diagnosis: COVID-19 \par Assessment and Plan of Treatment: You have been diagnosed with the COVID-19 \par virus during your hospital stay. You have been intubated 1/16, extubated and \par trach placed 1/28 and decanulated 2/26. Peg tube was removed 3/2. Continue home \par  oxygen as needed. Monitor for fevers, shortness of breath and cough primarily. \par  Monitor your temperature daily to not any changes and increases.  It has been \par determined that you no longer need hospitalization. \par Follow up with the [facility] with Dr. Lisker 3/4/2021 \par telehealth for further pulmonary recommendations. \par \par \par \par SECONDARY DISCHARGE DIAGNOSES \par Diagnosis: Sepsis \par Assessment and Plan of Treatment: Sputum culture with enterobacter aerogenes \par was treated with antibiotics. Mouth sores positive for MRSA treated as well. \par Continue close follow up with your PCP within 1 week to continue care \par outpatient and monitor for further signs of infection. \par \par Diagnosis: Dysuria \par Assessment and Plan of Treatment: Urine culture with Ecoli treated with \par antibiotics.  Monitor for signs/symptoms of infection, such as, fever/chills, \par burning/pain with urination, urinary frequency/hesitancy, cloudy urine, or \par blood in urine. \par \par \par Diagnosis: Dysphagia \par Assessment and Plan of Treatment: You had a peg tube placed this admission but \par now are able to tolerate foods by mouth. Peg tube was removed 3/2/21. Continue \par regular diet as tolerated. \par \par Diagnosis: Depression \par Assessment and Plan of Treatment: You were started on Klonopin and remeron this \par admission to help with anxiety and depression. Please follow up with your \par primary care doctors for further recommendations as to when to possibly stop \par medications in the future. \par \par \par Diagnosis: Diabetes \par Assessment and Plan of Treatment: Your hemoglobin a1c is 6.6 Continue \par consistent carbohydrate diet.  Monitor blood glucose levels throughout the day \par before meals and at bedtime.  Record blood sugars and bring to outpatient \par providers appointment in order to be reviewed by your doctor for management \par modifications.  Be aware of diabetes management symptoms including feeling cool \par and clammy may be related to low glucose levels.  Feeling hot and dry may \par indicate high glucose levels.  If  you feel these symptoms, check your blood \par sugar.  Make regular podiatry appointments in order to have feet checked for \par wounds and toe nails cut by a doctor to prevent infections. \par \par \par Diagnosis: Vaginal itching \par Assessment and Plan of Treatment: Continue clotrimazole 1% cream as prescribed \par for a total of 7 days. Follow up with your pcp if you have futher symptoms. \par \par Diagnosis: Abnormal LFTs \par Assessment and Plan of Treatment: Monitor your liver function outpatient with \par your primary care doctor within 1-2 weeks of discharge. \par \par Diagnosis: Normocytic anemia \par Assessment and Plan of Treatment: Follow-up with your outpatient provider if \par further treatment is warranted. Monitor for signs/symptoms indicating worsening \par of disease, such as, easy bleeding/bruising, pale skin, fatigue, dizziness, \par increased heart rate, or chest pain. \par \par

## 2021-05-19 ENCOUNTER — APPOINTMENT (OUTPATIENT)
Dept: NEUROLOGY | Facility: CLINIC | Age: 46
End: 2021-05-19
Payer: MEDICAID

## 2021-05-19 VITALS
DIASTOLIC BLOOD PRESSURE: 78 MMHG | HEIGHT: 63 IN | HEART RATE: 78 BPM | WEIGHT: 224 LBS | SYSTOLIC BLOOD PRESSURE: 122 MMHG | BODY MASS INDEX: 39.69 KG/M2

## 2021-05-19 PROCEDURE — 99204 OFFICE O/P NEW MOD 45 MIN: CPT

## 2021-05-19 NOTE — HISTORY OF PRESENT ILLNESS
[FreeTextEntry1] : 46-year-old woman who was recently hospitalized for Covid ARDS.  Patient was initially intubated from January 5-10 and then was transferred to medicine however she failed BiPAP and was reintubated January 16.  Patient's course was then complicated with E. coli UTI mouth sores and MRSA infection.  Patient was status post trach on January 28 and PEG on January 26.  Patient states that after regaining consciousness patient woke up with bilateral hand numbness that is localized to the palm of the hands.  Patient was getting physical therapy as she had subjective weakness as well.  Patient describes shoulder pain after this prolonged hospitalization.  She finds it difficult to perform any of the proximal arm movements

## 2021-05-19 NOTE — PHYSICAL EXAM
[General Appearance - Alert] : alert [Oriented To Time, Place, And Person] : oriented to person, place, and time [FreeTextEntry1] : Tearful at times [Person] : oriented to person [Place] : oriented to place [Time] : oriented to time [Short Term Intact] : short term memory intact [Fluency] : fluency intact [Current Events] : adequate knowledge of current events [Cranial Nerves Optic (II)] : visual acuity intact bilaterally,  visual fields full to confrontation, pupils equal round and reactive to light [Cranial Nerves Oculomotor (III)] : extraocular motion intact [Cranial Nerves Vestibulocochlear (VIII)] : hearing was intact bilaterally [Cranial Nerves Accessory (XI - Cranial And Spinal)] : head turning and shoulder shrug symmetric [Motor Tone] : muscle tone was normal in all four extremities [Motor Strength] : muscle strength was normal in all four extremities [Abnormal Walk] : normal gait [Coordination - Dysmetria Impaired Finger-to-Nose Bilateral] : not present [1+] : Patella left 1+ [FreeTextEntry7] : Decreased to light touch in the palms of the hands

## 2021-05-19 NOTE — DISCUSSION/SUMMARY
[FreeTextEntry1] : 46-year-old woman who is here for initial consultation of bilateral hand numbness and shoulder pain with decreased range of motion after her prolonged hospital stay for COVID-19 pneumonia with complications.  Patient was advised that the etiology of her condition may be due to COVID-19 infection and position during her intubation and hospital course.  Will recommend continued physical therapy as patient reports subjective weakness.  She will also seek psychiatric therapy as she suffers from PTSD from the hospitalization.  She will follow up with me in 3 months and reach out to me if any issues arise.\par \par I spent the time noted on the day of this patient encounter preparing for, providing and documenting the above E/M service and counseling and educate patient on differential, workup, disease course, and treatment/management. Education was provided to the patient during this encounter. All questions and concerns were answered and addressed in detail. The patient verbalized understanding and agreed to plan. Patient was advised to continue to monitor for neurologic symptoms and to notify my office or go to the nearest emergency room if there are any changes. Any orders/referrals and communications were provided as well. \par Side effects of the above medications were discussed in detail including but not limited to applicable black box warning and teratogenicity as appropriate. \par Patient was advised to bring previous records to my office. \par \par \par

## 2021-05-26 DIAGNOSIS — R60.0 LOCALIZED EDEMA: ICD-10-CM

## 2021-06-03 ENCOUNTER — OUTPATIENT (OUTPATIENT)
Dept: OUTPATIENT SERVICES | Facility: HOSPITAL | Age: 46
LOS: 1 days | End: 2021-06-03
Payer: MEDICAID

## 2021-06-03 ENCOUNTER — APPOINTMENT (OUTPATIENT)
Dept: CT IMAGING | Facility: CLINIC | Age: 46
End: 2021-06-03
Payer: MEDICAID

## 2021-06-03 ENCOUNTER — APPOINTMENT (OUTPATIENT)
Dept: ULTRASOUND IMAGING | Facility: CLINIC | Age: 46
End: 2021-06-03
Payer: MEDICAID

## 2021-06-03 DIAGNOSIS — R60.0 LOCALIZED EDEMA: ICD-10-CM

## 2021-06-03 LAB
ALBUMIN SERPL ELPH-MCNC: 4 G/DL
ALP BLD-CCNC: 97 U/L
ALT SERPL-CCNC: 16 U/L
ANION GAP SERPL CALC-SCNC: 10 MMOL/L
APPEARANCE: CLEAR
AST SERPL-CCNC: 17 U/L
BASOPHILS # BLD AUTO: 0.06 K/UL
BASOPHILS NFR BLD AUTO: 0.7 %
BILIRUB SERPL-MCNC: 0.2 MG/DL
BILIRUBIN URINE: NEGATIVE
BLOOD URINE: NEGATIVE
BUN SERPL-MCNC: 11 MG/DL
CALCIUM SERPL-MCNC: 9 MG/DL
CHLORIDE SERPL-SCNC: 107 MMOL/L
CHOLEST SERPL-MCNC: 178 MG/DL
CO2 SERPL-SCNC: 23 MMOL/L
COLOR: YELLOW
COVID-19 NUCLEOCAPSID  GAM ANTIBODY INTERPRETATION: POSITIVE
CREAT SERPL-MCNC: 0.72 MG/DL
CREAT SPEC-SCNC: 144 MG/DL
CRP SERPL-MCNC: 7 MG/L
DEPRECATED D DIMER PPP IA-ACNC: <150 NG/ML DDU
EOSINOPHIL # BLD AUTO: 0.14 K/UL
EOSINOPHIL NFR BLD AUTO: 1.7 %
ESTIMATED AVERAGE GLUCOSE: 120 MG/DL
FERRITIN SERPL-MCNC: 10 NG/ML
FOLATE RBC-MCNC: 1150 NG/ML
GLUCOSE QUALITATIVE U: NEGATIVE
GLUCOSE SERPL-MCNC: 98 MG/DL
HBA1C MFR BLD HPLC: 5.8 %
HCT VFR BLD CALC: 34 %
HCT VFR BLD CALC: 34.6 %
HDLC SERPL-MCNC: 45 MG/DL
HGB BLD-MCNC: 10.8 G/DL
IMM GRANULOCYTES NFR BLD AUTO: 0.2 %
IRON SATN MFR SERPL: 9 %
IRON SERPL-MCNC: 31 UG/DL
KETONES URINE: NEGATIVE
LDLC SERPL CALC-MCNC: 112 MG/DL
LEUKOCYTE ESTERASE URINE: NEGATIVE
LYMPHOCYTES # BLD AUTO: 2.7 K/UL
LYMPHOCYTES NFR BLD AUTO: 33.1 %
MAN DIFF?: NORMAL
MCHC RBC-ENTMCNC: 26.4 PG
MCHC RBC-ENTMCNC: 31.2 GM/DL
MCV RBC AUTO: 84.6 FL
MICROALBUMIN 24H UR DL<=1MG/L-MCNC: <1.2 MG/DL
MICROALBUMIN/CREAT 24H UR-RTO: NORMAL MG/G
MONOCYTES # BLD AUTO: 0.71 K/UL
MONOCYTES NFR BLD AUTO: 8.7 %
NEUTROPHILS # BLD AUTO: 4.52 K/UL
NEUTROPHILS NFR BLD AUTO: 55.6 %
NITRITE URINE: NEGATIVE
NONHDLC SERPL-MCNC: 133 MG/DL
PH URINE: 6
PLATELET # BLD AUTO: 321 K/UL
POTASSIUM SERPL-SCNC: 4 MMOL/L
PROT SERPL-MCNC: 6.9 G/DL
PROTEIN URINE: NEGATIVE
RBC # BLD: 4.09 M/UL
RBC # FLD: 13.7 %
SARS-COV-2 AB SERPL QL IA: 144 INDEX
SODIUM SERPL-SCNC: 141 MMOL/L
SPECIFIC GRAVITY URINE: 1.02
TIBC SERPL-MCNC: 349 UG/DL
TRANSFERRIN SERPL-MCNC: 293 MG/DL
TRIGL SERPL-MCNC: 105 MG/DL
TSH SERPL-ACNC: 3.89 UIU/ML
UIBC SERPL-MCNC: 318 UG/DL
UROBILINOGEN URINE: NORMAL
VIT B12 SERPL-MCNC: 724 PG/ML
WBC # FLD AUTO: 8.15 K/UL

## 2021-06-03 PROCEDURE — 93970 EXTREMITY STUDY: CPT

## 2021-06-03 PROCEDURE — 71250 CT THORAX DX C-: CPT

## 2021-06-03 PROCEDURE — 71250 CT THORAX DX C-: CPT | Mod: 26

## 2021-06-03 PROCEDURE — 93970 EXTREMITY STUDY: CPT | Mod: 26

## 2021-06-07 ENCOUNTER — APPOINTMENT (OUTPATIENT)
Dept: INTERNAL MEDICINE | Facility: CLINIC | Age: 46
End: 2021-06-07
Payer: MEDICAID

## 2021-06-07 ENCOUNTER — NON-APPOINTMENT (OUTPATIENT)
Age: 46
End: 2021-06-07

## 2021-06-07 VITALS
WEIGHT: 226 LBS | OXYGEN SATURATION: 97 % | SYSTOLIC BLOOD PRESSURE: 114 MMHG | HEIGHT: 63 IN | BODY MASS INDEX: 40.04 KG/M2 | HEART RATE: 82 BPM | RESPIRATION RATE: 16 BRPM | DIASTOLIC BLOOD PRESSURE: 76 MMHG | TEMPERATURE: 98 F

## 2021-06-07 DIAGNOSIS — M79.89 OTHER SPECIFIED SOFT TISSUE DISORDERS: ICD-10-CM

## 2021-06-07 PROCEDURE — 99214 OFFICE O/P EST MOD 30 MIN: CPT

## 2021-06-09 NOTE — HISTORY OF PRESENT ILLNESS
[FreeTextEntry8] : 47 YO F with Morbid Obesity and DM2 A1C 6.6 admitted for ARDS second to \par SARS-COV-2, intubated 1/5-1/10 then transferred to Medicine, however failed \par BIPAP and reintubated 1/16. Course complicated ECOLI UTI, Mouth Sores and \par Enterobacter and MRSA VAP. s/p Tracheostomy 1/28 and PEG 1/26 today to follow up\par \par \par DMII: 6.6 ha1c;; HOME fasting FS 100s;stated that never had been diagnosed with DM before this hospitalization; \par \par use O2 when ambulating with 2L no need o2 while resting now \par no fever or chills\par \par saw pulm Dr.Lisker, Gita N ( [phone]) IN 04/14/2021;  advised to continue with but tapering down O2; WILL SEE pulm AGAIN IN 06/2021; HAS ct LUNG PLANNING PER PULM at the end of 06/2021 palnning for CT lung;\par \par able to walk 4 blocks with O2 1L; able to do bysicl every day 3 0minutis without O2 \par able to rest and ambulate inside the house; \par \par still report hair loss \par \par also reported scar of her Rt face s/p long time of intubation; \par \par pain of arm and shoulder had b/l shoulder x ray wnl  during hospitalization; WAS REFER to Physical Therapy; AND ALSO SAW NEURO BUT HAS NOT FOLLOW UP WITH PHYSICAL THERAPY stated that she has an appointment; \par \par need  help for ADLs( shower /dressing) and IADLs for now;\par \par \par reported b/l leg swelling had negative US LOWER EXTREMITIES BY PULM;\par \par \par \par \par Hospital Course: \par Discharge Date	03-Mar-2021 \par Admission Date	03-Jan-2021 17:49 \par Reason for Admission	COVID \par Hospital Course	 \par 47 YO F with Morbid Obesity and DM2 A1C 6.6 admitted for ARDS second to \par SARS-COV-2, intubated 1/5-1/10 then transferred to Medicine, however failed \par BIPAP and reintubated 1/16. Course complicated ECOLI UTI, Mouth Sores and \par Enterobacter and MRSA VAP. s/p Tracheostomy 1/28 and PEG 1/26. Now transferred \par to RCU. \par \par \par # ICU Delirium/ Agitation /Depression \par - Weaned off sedation and extubated \par - c/w Seroquel 12.5mg qhs and Klonopin 1mg BID \par - c/w Remeron with supportive care. \par - Monitor mentation. Much improved \par -Discharge on Klonopin 0.5mg BID, and Remeron. Discontinue Seroquel upon \par discharge \par \par # ARDS second to SARS-COV-2 vs Superimposed Enterobacter and MRSA PNA \par - s/p Remdesivir, Decadron and ABX as below \par - s/p Prolonged ICU stay and Tracheostomy on 1/28. s/p TC and PMV. \par - s/p Exchanged 6DCT for 6CFN on 2/23. \par - Proventil and Chest PT q6hr PRN \par - Decannulated successfully on 2/26. Saturating well on 2LNC \par - Ambulation test done, will require Home Oxygen on discharge \par \par # Septic vs Vasoplegic Shock (resolved) \par - Off all Pressors. Monitor HR/ BP \par \par # Dysphagia with PEG \par - s/p PEG 1/26, and removed by GI on 3/2 \par - Bowel regimen (Senna and Miralax and Dulcolax PRN) \par - Passed CINE on 2/16, c/w Regular diet \par \par # /UTI/Vaginal Itching \par - Passed TOV 2/23 and noted with dysuria. UA (+) and now treating empirically \par for UTI. \par - Hx of E. coli UTI sensitive to Cipro. c/w Cipro (2/23 - until 3/3). \par - Hyponatremia/ Hypernatremia, monitor electrolytes and renal function as \par tolerated. \par - c/w on Clotrimazole 1% cream qhs x 7 days \par \par # Sepsis second to SARS-COV-2 vs Enterobacter and MRSA PNA vs HSV Type 1 \par - s/p Empiric ABX for PNA with Zosyn (1/16-1/20), however cultures negative \par - UCx ECOLI 1/25 s/p CTX (1/25-1/27). 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 \par - c/w Cipro for Dysuria (2/23 - until 3/3). UCx showing E.coli sens to Cipro \par \par # DM2 A1C 6.6 (1/2021) \par - Continue on ISS and monitor FS Q6H \par \par # DISPO - PT recommends Home w/ home PT, Home care Service, and Home Oxygen. \par     - DC planning on 3/3. \par \par As per Dr. Lisker patient is medically cleared for discharge 3/3- patient to go \par home with home o2 and follow up with [facility] 3/4. \par \par Med Reconciliation: \par Override IMPROVE-DD recommendations due to:	IMPROVE-DD Application Not \par Available \par Recommended Post-Discharge VTE Prophylaxis	IMPROVE-DD Application Not Available \par Medication Reconciliation Status	Admission Reconciliation is Completed \par Discharge Reconciliation is Completed \par Discharge Medications	albuterol 90 mcg/inh inhalation aerosol: 2 puff(s) \par inhaled every 6 hours, As needed, Wheezing \par alcohol swabs : Apply topically to affected area 4 times a day \par clonazePAM 0.5 mg oral tablet: 1 tab(s) orally every 12 hours MDD:2 \par clotrimazole 1% vaginal cream with applicator: 1 applicatorful vaginal once a \par day (at bedtime) as needed for vaginal itch \par glucometer (per patient's insurance): Test blood sugars four times a day. \par Dispense #1 glucometer. \par lancets: 1 application subcutaneously 4 times a day \par mirtazapine 15 mg oral tablet: 1 tab(s) orally once a day (at bedtime) \par Multiple Vitamins oral tablet: 1 tab(s) orally once a day \par naproxen 250 mg oral tablet: 1 tab(s) orally every 6 hours, As needed, Moderate \par Pain (4 - 6) \par polyethylene glycol 3350 oral powder for reconstitution: 17 gram(s) orally 2 \par times a day for constipation \par senna oral tablet: 2 tab(s) orally once a day (at bedtime) for constipation \par test strips (per patient's insurance): 1 application subcutaneously 4 \par times a day. ** Compatible with patient's glucometer ** \par . \par , \par , \par \par Care Plan/Procedures: \par Goal(s)	To get better and follow your care plan as instructed. \par Discharge Diagnoses, Assessment and Plan of Treatment	PRINCIPAL DISCHARGE \par DIAGNOSIS \par Diagnosis: COVID-19 \par Assessment and Plan of Treatment: You have been diagnosed with the COVID-19 \par virus during your hospital stay. You have been intubated 1/16, extubated and \par trach placed 1/28 and decanulated 2/26. Peg tube was removed 3/2. Continue home \par  oxygen as needed. Monitor for fevers, shortness of breath and cough primarily. \par  Monitor your temperature daily to not any changes and increases.  It has been \par determined that you no longer need hospitalization. \par Follow up with [facility] with Dr. Lisker 3/4/2021 \par telehealth for further pulmonary recommendations. \par \par \par \par SECONDARY DISCHARGE DIAGNOSES \par Diagnosis: Sepsis \par Assessment and Plan of Treatment: Sputum culture with enterobacter aerogenes \par was treated with antibiotics. Mouth sores positive for MRSA treated as well. \par Continue close follow up with your PCP within 1 week to continue care \par outpatient and monitor for further signs of infection. \par \par Diagnosis: Dysuria \par Assessment and Plan of Treatment: Urine culture with Ecoli treated with \par antibiotics.  Monitor for signs/symptoms of infection, such as, fever/chills, \par burning/pain with urination, urinary frequency/hesitancy, cloudy urine, or \par blood in urine. \par \par \par Diagnosis: Dysphagia \par Assessment and Plan of Treatment: You had a peg tube placed this admission but \par now are able to tolerate foods by mouth. Peg tube was removed 3/2/21. Continue \par regular diet as tolerated. \par \par Diagnosis: Depression \par Assessment and Plan of Treatment: You were started on Klonopin and remeron this \par admission to help with anxiety and depression. Please follow up with your \par primary care doctors for further recommendations as to when to possibly stop \par medications in the future. \par \par \par Diagnosis: Diabetes \par Assessment and Plan of Treatment: Your hemoglobin a1c is 6.6 Continue \par consistent carbohydrate diet.  Monitor blood glucose levels throughout the day \par before meals and at bedtime.  Record blood sugars and bring to outpatient \par providers appointment in order to be reviewed by your doctor for management \par modifications.  Be aware of diabetes management symptoms including feeling cool \par and clammy may be related to low glucose levels.  Feeling hot and dry may \par indicate high glucose levels.  If  you feel these symptoms, check your blood \par sugar.  Make regular podiatry appointments in order to have feet checked for \par wounds and toe nails cut by a doctor to prevent infections. \par \par \par Diagnosis: Vaginal itching \par Assessment and Plan of Treatment: Continue clotrimazole 1% cream as prescribed \par for a total of 7 days. Follow up with your pcp if you have futher symptoms. \par \par Diagnosis: Abnormal LFTs \par Assessment and Plan of Treatment: Monitor your liver function outpatient with \par your primary care doctor within 1-2 weeks of discharge. \par \par Diagnosis: Normocytic anemia \par Assessment and Plan of Treatment: Follow-up with your outpatient provider if \par further treatment is warranted. Monitor for signs/symptoms indicating worsening \par of disease, such as, easy bleeding/bruising, pale skin, fatigue, dizziness, \par increased heart rate, or chest pain. \par \par

## 2021-06-09 NOTE — ASSESSMENT
[FreeTextEntry1] : pt is currently with no acute distress; lung clear with no w/r/r\par \par DMV for Disable form filled out; \par \par leg swelling:today with no swelling;  follow up with TTE\par \par pt's breathing likely due to PTSD; advised to follow up with psych \par advised to tap the o2; \par and follow up with pulm routinely \par \par advised to see physical therapy; \par \par see derm for face scar; has not see plastic surgery; \par \par 2 months follow up

## 2021-06-09 NOTE — PHYSICAL EXAM
[No Acute Distress] : no acute distress [Well Nourished] : well nourished [Well Developed] : well developed [Well-Appearing] : well-appearing [Normal Sclera/Conjunctiva] : normal sclera/conjunctiva [PERRL] : pupils equal round and reactive to light [EOMI] : extraocular movements intact [Normal Outer Ear/Nose] : the outer ears and nose were normal in appearance [Normal Oropharynx] : the oropharynx was normal [No JVD] : no jugular venous distention [No Lymphadenopathy] : no lymphadenopathy [Supple] : supple [Thyroid Normal, No Nodules] : the thyroid was normal and there were no nodules present [No Respiratory Distress] : no respiratory distress  [Normal Rate] : normal rate  [Regular Rhythm] : with a regular rhythm [Normal S1, S2] : normal S1 and S2 [No Murmur] : no murmur heard [No Edema] : there was no peripheral edema [Soft] : abdomen soft [Non Tender] : non-tender [Non-distended] : non-distended [No Masses] : no abdominal mass palpated [Normal Bowel Sounds] : normal bowel sounds [Normal Posterior Cervical Nodes] : no posterior cervical lymphadenopathy [Normal Anterior Cervical Nodes] : no anterior cervical lymphadenopathy [No CVA Tenderness] : no CVA  tenderness [No Spinal Tenderness] : no spinal tenderness [No Joint Swelling] : no joint swelling [Grossly Normal Strength/Tone] : grossly normal strength/tone [No Rash] : no rash [Coordination Grossly Intact] : coordination grossly intact [No Focal Deficits] : no focal deficits [Normal Gait] : normal gait [Deep Tendon Reflexes (DTR)] : deep tendon reflexes were 2+ and symmetric [Speech Grossly Normal] : speech grossly normal [Memory Grossly Normal] : memory grossly normal [Normal Affect] : the affect was normal [Alert and Oriented x3] : oriented to person, place, and time [Normal Mood] : the mood was normal [Normal Insight/Judgement] : insight and judgment were intact [de-identified] : on O2 NC [de-identified] : abd wound and neck wound healing well'

## 2021-06-17 ENCOUNTER — APPOINTMENT (OUTPATIENT)
Dept: GASTROENTEROLOGY | Facility: CLINIC | Age: 46
End: 2021-06-17
Payer: MEDICAID

## 2021-06-17 VITALS
TEMPERATURE: 97.9 F | BODY MASS INDEX: 38.07 KG/M2 | HEIGHT: 64 IN | OXYGEN SATURATION: 98 % | WEIGHT: 223 LBS | DIASTOLIC BLOOD PRESSURE: 90 MMHG | HEART RATE: 78 BPM | SYSTOLIC BLOOD PRESSURE: 127 MMHG

## 2021-06-17 DIAGNOSIS — D50.9 IRON DEFICIENCY ANEMIA, UNSPECIFIED: ICD-10-CM

## 2021-06-17 PROCEDURE — 99203 OFFICE O/P NEW LOW 30 MIN: CPT

## 2021-06-18 NOTE — HISTORY OF PRESENT ILLNESS
[de-identified] : 47 yo intubated a total of 21 days in [facility]. Doing well. Sat about 95%. Uses Oxygen at times. Found to be iron deficient..LMP June 16th. No prior colon or EGD. From [place]. No rectal bleeeding. Had prior PEG tube. Had burn on face 2/2 intubation

## 2021-06-18 NOTE — PHYSICAL EXAM
[General Appearance - Alert] : alert [General Appearance - In No Acute Distress] : in no acute distress [Sclera] : the sclera and conjunctiva were normal [Extraocular Movements] : extraocular movements were intact [PERRL With Normal Accommodation] : pupils were equal in size, round, and reactive to light [Outer Ear] : the ears and nose were normal in appearance [Oropharynx] : the oropharynx was normal [Heart Rate And Rhythm] : heart rate was normal and rhythm regular [Auscultation Breath Sounds / Voice Sounds] : lungs were clear to auscultation bilaterally [Heart Sounds] : normal S1 and S2 [Heart Sounds Gallop] : no gallops [Heart Sounds Pericardial Friction Rub] : no pericardial rub [Murmurs] : no murmurs [Bowel Sounds] : normal bowel sounds [Abdomen Soft] : soft [Abdomen Tenderness] : non-tender [Abdomen Mass (___ Cm)] : no abdominal mass palpated [Cervical Lymph Nodes Enlarged Posterior Bilaterally] : posterior cervical [Cervical Lymph Nodes Enlarged Anterior Bilaterally] : anterior cervical [Supraclavicular Lymph Nodes Enlarged Bilaterally] : supraclavicular [Axillary Lymph Nodes Enlarged Bilaterally] : axillary [Femoral Lymph Nodes Enlarged Bilaterally] : femoral [Inguinal Lymph Nodes Enlarged Bilaterally] : inguinal [No CVA Tenderness] : no ~M costovertebral angle tenderness [No Spinal Tenderness] : no spinal tenderness [Skin Color & Pigmentation] : normal skin color and pigmentation [Skin Turgor] : normal skin turgor [] : no rash

## 2021-06-22 ENCOUNTER — APPOINTMENT (OUTPATIENT)
Dept: PULMONOLOGY | Facility: CLINIC | Age: 46
End: 2021-06-22
Payer: MEDICAID

## 2021-06-22 VITALS
SYSTOLIC BLOOD PRESSURE: 127 MMHG | TEMPERATURE: 98.6 F | DIASTOLIC BLOOD PRESSURE: 88 MMHG | BODY MASS INDEX: 38.41 KG/M2 | HEART RATE: 73 BPM | OXYGEN SATURATION: 99 % | HEIGHT: 64 IN | WEIGHT: 225 LBS

## 2021-06-22 DIAGNOSIS — Z23 ENCOUNTER FOR IMMUNIZATION: ICD-10-CM

## 2021-06-22 PROCEDURE — ZZZZZ: CPT

## 2021-06-22 PROCEDURE — 94729 DIFFUSING CAPACITY: CPT

## 2021-06-22 PROCEDURE — 94726 PLETHYSMOGRAPHY LUNG VOLUMES: CPT

## 2021-06-22 PROCEDURE — 94010 BREATHING CAPACITY TEST: CPT

## 2021-06-22 PROCEDURE — 99213 OFFICE O/P EST LOW 20 MIN: CPT | Mod: 25

## 2021-06-22 NOTE — DISCUSSION/SUMMARY
[FreeTextEntry1] : PFT and CT recovered well post severe covid ards\par And clinically, doing well. No resp limitations.\par Plan for f/u CT and PFT in a year\par \par SHe is scheduled for TTE tomorrow\par \par f/u GI\par \par Received 2 dose pfizer\par \par f/u 6 mo

## 2021-06-22 NOTE — HISTORY OF PRESENT ILLNESS
[TextBox_4] : f/u visit\par s/p COVID , ARDS, resp failure and trach then decannulation winter 2021, with excellent recovery\par \par Doing well.\par No respiratory complaints.\par Active\par No sob.\par \par Recent neg LE dopplers and CT with min residual post covid\par \par Anemia. Started on feso4 last week. scheduled for egd/colon\par c/o some nausea last few days

## 2021-06-23 ENCOUNTER — OUTPATIENT (OUTPATIENT)
Dept: OUTPATIENT SERVICES | Facility: HOSPITAL | Age: 46
LOS: 1 days | End: 2021-06-23
Payer: MEDICAID

## 2021-06-23 DIAGNOSIS — M79.89 OTHER SPECIFIED SOFT TISSUE DISORDERS: ICD-10-CM

## 2021-06-23 PROCEDURE — 93306 TTE W/DOPPLER COMPLETE: CPT

## 2021-06-23 PROCEDURE — 93306 TTE W/DOPPLER COMPLETE: CPT | Mod: 26

## 2021-08-08 ENCOUNTER — APPOINTMENT (OUTPATIENT)
Dept: DISASTER EMERGENCY | Facility: CLINIC | Age: 46
End: 2021-08-08

## 2021-08-09 ENCOUNTER — APPOINTMENT (OUTPATIENT)
Dept: DISASTER EMERGENCY | Facility: CLINIC | Age: 46
End: 2021-08-09

## 2021-08-09 DIAGNOSIS — Z01.818 ENCOUNTER FOR OTHER PREPROCEDURAL EXAMINATION: ICD-10-CM

## 2021-08-10 LAB — SARS-COV-2 N GENE NPH QL NAA+PROBE: NOT DETECTED

## 2021-08-11 ENCOUNTER — OUTPATIENT (OUTPATIENT)
Dept: OUTPATIENT SERVICES | Facility: HOSPITAL | Age: 46
LOS: 1 days | End: 2021-08-11
Payer: MEDICAID

## 2021-08-11 ENCOUNTER — APPOINTMENT (OUTPATIENT)
Dept: GASTROENTEROLOGY | Facility: HOSPITAL | Age: 46
End: 2021-08-11

## 2021-08-11 ENCOUNTER — RESULT REVIEW (OUTPATIENT)
Age: 46
End: 2021-08-11

## 2021-08-11 DIAGNOSIS — D50.9 IRON DEFICIENCY ANEMIA, UNSPECIFIED: ICD-10-CM

## 2021-08-11 LAB — HCG UR QL: NEGATIVE — SIGNIFICANT CHANGE UP

## 2021-08-11 PROCEDURE — 43239 EGD BIOPSY SINGLE/MULTIPLE: CPT

## 2021-08-11 PROCEDURE — 88305 TISSUE EXAM BY PATHOLOGIST: CPT | Mod: 26

## 2021-08-11 PROCEDURE — 88312 SPECIAL STAINS GROUP 1: CPT | Mod: 26

## 2021-08-11 PROCEDURE — 45378 DIAGNOSTIC COLONOSCOPY: CPT

## 2021-08-11 NOTE — PROGRESS NOTE ADULT - SUBJECTIVE AND OBJECTIVE BOX
Esophagogastroduodenoscopy Report  Indication:   Iron def anemia  Instrument:  #[id number]  Anesthesia: MAC  Consent:  Informed consent was obtained from the patient after providing any opportunity for questions  Procedure: The gastroscope was gently passed through the incisoral orifice into the oral cavity and under direct visualization the esophagus was intubated. The endoscope was passed down the esophagus, through the stomach and into proximal jejunum. Color, texture, mucosa and anatomy of the esophagus, stomach, and duodenum were carefully examined with the scope. The patient tolerated the procedure well. After completion of the examination, the patient was transferred to the recovery room.   Preparation: NPO   Findings:   Oropharynx	Normal  Esophagus	Normal  EG-junction	Normal  Cardia	Normal.  Body	Normal   Antrum	Normal, bx taken  Pylorus	Normal.  Duodenal Bulb	Normal   2nd portion	Normal bx obtained  3rd portion	Normal.  Date and time:    EBL:0  Impression: Normal EGD    Plan: f/u bx          Return to clinic 3 weeks                               Procedure Start Time: 8:36  Procedure End Time:  8:39                                   Attending:   Matheus Dunbar MD     
   Colonoscopy Report  Indication:   Referring:  Instrument:  [id number]  Anesthesia: MAC  Consent:  Informed consent was obtained from the patient after providing any opportunity for questions  Procedure: After placing the patient in the left lateral decubitus position, the colonoscope was gently inserted into the rectum and advanced to the cecum. Color, texture, mucosa, and anatomy of the colon were carefully examined with the scope. The patient tolerated the procedure well. After completion of the exam, the patient was transferred to the recovery room.     Preparation:  Findings:   Anal Canal	Normal  Rectum	Normal  Sigmoid Colon 	Normal  Descending Colon	Normal  Splenic Flexure	Normal  Transverse Colon	Normal  Hepatic Flexure	Normal  Ascending Colon	 Normal  Cecum	Normal  Ileo-cecal Valve	Normal  Ileum 	Normal  Date and time:   EBL:0    Impression: Normal colon    PLAN  return to clinic 3 weeks                       Procedure Start Time:  8:42  Cecum Reached Time: 8:50  Procedure End Time:   8:56  Total Withdrawal Time:                                 Attending: Matheus Dunbar MD

## 2021-08-13 LAB — SURGICAL PATHOLOGY STUDY: SIGNIFICANT CHANGE UP

## 2021-08-20 ENCOUNTER — APPOINTMENT (OUTPATIENT)
Dept: NEUROLOGY | Facility: CLINIC | Age: 46
End: 2021-08-20

## 2021-08-20 PROCEDURE — 88305 TISSUE EXAM BY PATHOLOGIST: CPT

## 2021-08-20 PROCEDURE — 43239 EGD BIOPSY SINGLE/MULTIPLE: CPT

## 2021-08-20 PROCEDURE — 88312 SPECIAL STAINS GROUP 1: CPT

## 2021-08-20 PROCEDURE — 45378 DIAGNOSTIC COLONOSCOPY: CPT

## 2021-08-20 PROCEDURE — 81025 URINE PREGNANCY TEST: CPT

## 2021-09-10 ENCOUNTER — APPOINTMENT (OUTPATIENT)
Dept: INTERNAL MEDICINE | Facility: CLINIC | Age: 46
End: 2021-09-10
Payer: MEDICAID

## 2021-09-10 VITALS
TEMPERATURE: 98.3 F | RESPIRATION RATE: 16 BRPM | WEIGHT: 231 LBS | OXYGEN SATURATION: 98 % | SYSTOLIC BLOOD PRESSURE: 133 MMHG | HEIGHT: 63 IN | HEART RATE: 79 BPM | BODY MASS INDEX: 40.93 KG/M2 | DIASTOLIC BLOOD PRESSURE: 84 MMHG

## 2021-09-10 DIAGNOSIS — M25.511 PAIN IN RIGHT SHOULDER: ICD-10-CM

## 2021-09-10 DIAGNOSIS — R26.0 ATAXIC GAIT: ICD-10-CM

## 2021-09-10 DIAGNOSIS — M25.512 PAIN IN RIGHT SHOULDER: ICD-10-CM

## 2021-09-10 DIAGNOSIS — F32.9 MAJOR DEPRESSIVE DISORDER, SINGLE EPISODE, UNSPECIFIED: ICD-10-CM

## 2021-09-10 DIAGNOSIS — L90.5 SCAR CONDITIONS AND FIBROSIS OF SKIN: ICD-10-CM

## 2021-09-10 DIAGNOSIS — D50.9 IRON DEFICIENCY ANEMIA, UNSPECIFIED: ICD-10-CM

## 2021-09-10 DIAGNOSIS — R63.5 ABNORMAL WEIGHT GAIN: ICD-10-CM

## 2021-09-10 DIAGNOSIS — F43.10 POST-TRAUMATIC STRESS DISORDER, UNSPECIFIED: ICD-10-CM

## 2021-09-10 DIAGNOSIS — M54.9 DORSALGIA, UNSPECIFIED: ICD-10-CM

## 2021-09-10 DIAGNOSIS — R20.0 ANESTHESIA OF SKIN: ICD-10-CM

## 2021-09-10 DIAGNOSIS — Z23 ENCOUNTER FOR IMMUNIZATION: ICD-10-CM

## 2021-09-10 DIAGNOSIS — F41.9 ANXIETY DISORDER, UNSPECIFIED: ICD-10-CM

## 2021-09-10 PROCEDURE — 99214 OFFICE O/P EST MOD 30 MIN: CPT | Mod: 25

## 2021-09-10 PROCEDURE — G0008: CPT

## 2021-09-10 PROCEDURE — 90686 IIV4 VACC NO PRSV 0.5 ML IM: CPT

## 2021-09-10 NOTE — HISTORY OF PRESENT ILLNESS
[FreeTextEntry8] : 47 YO F with Morbid Obesity and DM2 A1C 6.6 admitted for ARDS second to \par SARS-COV-2, intubated 1/5-1/10 then transferred to Medicine, however failed \par BIPAP and reintubated 1/16. Course complicated ECOLI UTI, Mouth Sores and \par Enterobacter and MRSA VAP. s/p Tracheostomy 1/28 and PEG 1/26 today to follow up\par \par \par DMII: 6.6 ha1c;; HOME fasting FS 100s;stated that never had been diagnosed with DM before this hospitalization; \par \par use O2 when ambulating with 2L no need o2 while resting now \par no fever or chills\par \par saw pulm Dr.Lisker, Gita N ( [phone]) [place] 04/14/2021;  advised to continue with but tapering down O2; saw pulm in 06/2021; had PFT and ct lung with result grossly nwl per pt; ; WILL SEE pulm AGAIN IN 12/2021;  \par \par able to walk 10 mints without O2 1L; \par able to rest and ambulate inside the house; \par \par still report hair loss \par \par also reported scar of her Rt face s/p long time of intubation; saw derm and has injection now; saw plastic and offer treatment option which she is still consider; \par \par pain of arm and shoulder had b/l shoulder x ray wnl  during hospitalization; WAS REFER to Physical Therapy; AND ALSO SAW NEURO ON PHYSICAL THERAPY \par \par need  help for ADLs( shower /dressing) due to shoulder pain and numbness  and IADLs for now;\par \par \par reported b/l leg swelling had negative US LOWER EXTREMITIES BY PULM;\par \par \par mood stable ; she dose have night mare; and flash back and some anxiety;\par \par \par lowe back pain; non radiating; no inury\par \par wiegh gain and prior hx of prathyroidectomy \par

## 2021-09-10 NOTE — ASSESSMENT
[FreeTextEntry1] : covid19 shot x2; last shot in 05/2021 \par \par -not allergy to egg\par -no fever or chills today\par -had flu shot before and tolerate with prior flu shot \par -tolerate with the procedural\par \par \par pt is currently with no acute distress; lung clear with no w/r/r\par \par DMV for Disable form filled out; \par \par TTE done wnl in 06/2021\par \par PTSD; advised to follow up with psych \par off O2 now \par and follow up with pulm routinely \par \par advised to see physical therapy; \par \par see derm for face scar; saw plastic surgery; \par \par had EGD and colonoscopy done\par \par pap was done in 01/2020 per pt. wnl per pt\par \par refer to mammo\par \par parathyroid disease: \par hx of parathyroid ca and s/p parathyroidectomy;\par will check labs and follow up with US thyroid and refer to endo \par \par back pain: \par exam showed b/l praraspinal pain of L3-L5; NO MIDSPINAL TENDERENSS; STRIGHT LEG RAISE WNL; STRENGTH, SENSATION, PULSES B/L LOWER EXTREMITIES WNL; \par CT done in hospital showed some degenerative bone disease\par refer to physical therapy\par advised with topical NSAIDs;\par \par \par -discussed in details regarding to the health risk of obesity;\par -recommended the strategies of weight loss including food diary, healthy food choice and portion control,  frequency and time of self weight monitor and exercise 120 min per week ; \par -recommend dietitian consult\par \par \par 2 weeks follow up on telephone\par 6 months follow up

## 2021-09-10 NOTE — PHYSICAL EXAM
[No Acute Distress] : no acute distress [Well Nourished] : well nourished [Well Developed] : well developed [Well-Appearing] : well-appearing [Normal Sclera/Conjunctiva] : normal sclera/conjunctiva [PERRL] : pupils equal round and reactive to light [EOMI] : extraocular movements intact [Normal Outer Ear/Nose] : the outer ears and nose were normal in appearance [Normal Oropharynx] : the oropharynx was normal [No JVD] : no jugular venous distention [No Lymphadenopathy] : no lymphadenopathy [Supple] : supple [Thyroid Normal, No Nodules] : the thyroid was normal and there were no nodules present [No Respiratory Distress] : no respiratory distress  [Normal Rate] : normal rate  [Regular Rhythm] : with a regular rhythm [Normal S1, S2] : normal S1 and S2 [No Murmur] : no murmur heard [No Edema] : there was no peripheral edema [Soft] : abdomen soft [Non Tender] : non-tender [Non-distended] : non-distended [No Masses] : no abdominal mass palpated [Normal Bowel Sounds] : normal bowel sounds [Normal Posterior Cervical Nodes] : no posterior cervical lymphadenopathy [Normal Anterior Cervical Nodes] : no anterior cervical lymphadenopathy [No CVA Tenderness] : no CVA  tenderness [No Spinal Tenderness] : no spinal tenderness [No Joint Swelling] : no joint swelling [Grossly Normal Strength/Tone] : grossly normal strength/tone [No Rash] : no rash [Coordination Grossly Intact] : coordination grossly intact [No Focal Deficits] : no focal deficits [Normal Gait] : normal gait [Deep Tendon Reflexes (DTR)] : deep tendon reflexes were 2+ and symmetric [Speech Grossly Normal] : speech grossly normal [Memory Grossly Normal] : memory grossly normal [Normal Affect] : the affect was normal [Alert and Oriented x3] : oriented to person, place, and time [Normal Mood] : the mood was normal [Normal Insight/Judgement] : insight and judgment were intact [de-identified] : on O2 NC [de-identified] : abd wound and neck wound healing well'

## 2021-09-22 ENCOUNTER — APPOINTMENT (OUTPATIENT)
Dept: INTERNAL MEDICINE | Facility: CLINIC | Age: 46
End: 2021-09-22
Payer: MEDICAID

## 2021-09-22 DIAGNOSIS — E11.9 TYPE 2 DIABETES MELLITUS W/OUT COMPLICATIONS: ICD-10-CM

## 2021-09-22 DIAGNOSIS — E55.9 VITAMIN D DEFICIENCY, UNSPECIFIED: ICD-10-CM

## 2021-09-22 LAB
25(OH)D3 SERPL-MCNC: 19.6 NG/ML
ALBUMIN SERPL ELPH-MCNC: 4.3 G/DL
ALP BLD-CCNC: 99 U/L
ALT SERPL-CCNC: 18 U/L
ANION GAP SERPL CALC-SCNC: 11 MMOL/L
APPEARANCE: CLEAR
AST SERPL-CCNC: 20 U/L
BASOPHILS # BLD AUTO: 0.06 K/UL
BASOPHILS NFR BLD AUTO: 0.8 %
BILIRUB SERPL-MCNC: 0.3 MG/DL
BILIRUBIN URINE: NEGATIVE
BLOOD URINE: NEGATIVE
BUN SERPL-MCNC: 12 MG/DL
CALCIT SERPL-MCNC: <1 PG/ML
CALCIUM SERPL-MCNC: 8.9 MG/DL
CALCIUM SERPL-MCNC: 9 MG/DL
CHLORIDE SERPL-SCNC: 105 MMOL/L
CHOLEST SERPL-MCNC: 182 MG/DL
CO2 SERPL-SCNC: 23 MMOL/L
COLOR: NORMAL
CREAT SERPL-MCNC: 0.76 MG/DL
EOSINOPHIL # BLD AUTO: 0.11 K/UL
EOSINOPHIL NFR BLD AUTO: 1.4 %
ESTIMATED AVERAGE GLUCOSE: 128 MG/DL
FERRITIN SERPL-MCNC: 12 NG/ML
GLUCOSE QUALITATIVE U: NEGATIVE
GLUCOSE SERPL-MCNC: 98 MG/DL
HBA1C MFR BLD HPLC: 6.1 %
HCT VFR BLD CALC: 38.3 %
HDLC SERPL-MCNC: 45 MG/DL
HGB BLD-MCNC: 11.6 G/DL
IMM GRANULOCYTES NFR BLD AUTO: 0.5 %
IRON SATN MFR SERPL: 12 %
IRON SERPL-MCNC: 45 UG/DL
KETONES URINE: NEGATIVE
LDLC SERPL CALC-MCNC: 114 MG/DL
LEUKOCYTE ESTERASE URINE: NEGATIVE
LYMPHOCYTES # BLD AUTO: 2.37 K/UL
LYMPHOCYTES NFR BLD AUTO: 30.5 %
MAGNESIUM SERPL-MCNC: 2.1 MG/DL
MAN DIFF?: NORMAL
MCHC RBC-ENTMCNC: 26.7 PG
MCHC RBC-ENTMCNC: 30.3 GM/DL
MCV RBC AUTO: 88 FL
MONOCYTES # BLD AUTO: 0.58 K/UL
MONOCYTES NFR BLD AUTO: 7.5 %
NEUTROPHILS # BLD AUTO: 4.61 K/UL
NEUTROPHILS NFR BLD AUTO: 59.3 %
NITRITE URINE: NEGATIVE
NONHDLC SERPL-MCNC: 137 MG/DL
PARATHYROID HORMONE INTACT: 48 PG/ML
PH URINE: 6
PHOSPHATE SERPL-MCNC: 3.4 MG/DL
PLATELET # BLD AUTO: 316 K/UL
POTASSIUM SERPL-SCNC: 4.1 MMOL/L
PROT SERPL-MCNC: 7 G/DL
PROTEIN URINE: NEGATIVE
RBC # BLD: 4.35 M/UL
RBC # FLD: 16.9 %
SODIUM SERPL-SCNC: 139 MMOL/L
SPECIFIC GRAVITY URINE: 1.02
T3 SERPL-MCNC: 95 NG/DL
T4 FREE SERPL-MCNC: 1 NG/DL
TIBC SERPL-MCNC: 385 UG/DL
TRANSFERRIN SERPL-MCNC: 330 MG/DL
TRIGL SERPL-MCNC: 116 MG/DL
TSH SERPL-ACNC: 2.15 UIU/ML
UIBC SERPL-MCNC: 340 UG/DL
UROBILINOGEN URINE: NORMAL
WBC # FLD AUTO: 7.77 K/UL

## 2021-09-22 PROCEDURE — 99442: CPT

## 2021-09-22 RX ORDER — CHLORHEXIDINE GLUCONATE 4 %
325 (65 FE) LIQUID (ML) TOPICAL
Qty: 60 | Refills: 3 | Status: DISCONTINUED | COMMUNITY
Start: 2021-06-03 | End: 2021-09-22

## 2021-12-08 ENCOUNTER — APPOINTMENT (OUTPATIENT)
Dept: INTERNAL MEDICINE | Facility: CLINIC | Age: 46
End: 2021-12-08

## 2022-01-01 NOTE — PATIENT PROFILE ADULT - NSASFALLNEEDSASSISTWITH_GEN_A_NUR
standing/walking/toileting
1. I was told the name of the doctor(s) who took care of my child while in the hospital.    2. I have been told about any important findings on my child's plan of care.    3. The doctor clearly explained my child's diagnosis and other possible diagnoses that were considered.    4. My child's doctor explained all the tests that were done and their results (if available). I understand that some of the test results may not be ready before we go home and I was told how I can get these results. I understand that a summary of my child's hospitalization and important test results will be shared with my child's outpatient doctor.    5. My child's doctor talked to me about what I need to do when we go home.    6. I understand what signs and symptoms to watch for. I understand what symptoms I would need to call my doctor for and/or return to the hospital.    7. I have the phone number to call the hospital for results and/or questions after I leave the hospital.

## 2022-03-04 ENCOUNTER — APPOINTMENT (OUTPATIENT)
Dept: INTERNAL MEDICINE | Facility: CLINIC | Age: 47
End: 2022-03-04
Payer: MEDICAID

## 2022-03-04 ENCOUNTER — TRANSCRIPTION ENCOUNTER (OUTPATIENT)
Age: 47
End: 2022-03-04

## 2022-03-04 VITALS
BODY MASS INDEX: 43.94 KG/M2 | SYSTOLIC BLOOD PRESSURE: 122 MMHG | HEART RATE: 85 BPM | WEIGHT: 248 LBS | RESPIRATION RATE: 16 BRPM | HEIGHT: 63 IN | TEMPERATURE: 98.3 F | DIASTOLIC BLOOD PRESSURE: 77 MMHG | OXYGEN SATURATION: 95 %

## 2022-03-04 DIAGNOSIS — Z12.39 ENCOUNTER FOR OTHER SCREENING FOR MALIGNANT NEOPLASM OF BREAST: ICD-10-CM

## 2022-03-04 DIAGNOSIS — E78.5 HYPERLIPIDEMIA, UNSPECIFIED: ICD-10-CM

## 2022-03-04 DIAGNOSIS — R73.03 PREDIABETES.: ICD-10-CM

## 2022-03-04 DIAGNOSIS — E21.5 DISORDER OF PARATHYROID GLAND, UNSPECIFIED: ICD-10-CM

## 2022-03-04 DIAGNOSIS — M25.519 PAIN IN UNSPECIFIED SHOULDER: ICD-10-CM

## 2022-03-04 DIAGNOSIS — M25.539 PAIN IN UNSPECIFIED WRIST: ICD-10-CM

## 2022-03-04 DIAGNOSIS — K76.0 FATTY (CHANGE OF) LIVER, NOT ELSEWHERE CLASSIFIED: ICD-10-CM

## 2022-03-04 DIAGNOSIS — J12.82 COVID-19: ICD-10-CM

## 2022-03-04 DIAGNOSIS — U07.1 COVID-19: ICD-10-CM

## 2022-03-04 PROCEDURE — 99214 OFFICE O/P EST MOD 30 MIN: CPT

## 2022-03-04 NOTE — HISTORY OF PRESENT ILLNESS
[FreeTextEntry8] : \par \par 46 YO F with Morbid Obesity and DM2 A1C 6.6 admitted for ARDS second to \par SARS-COV-2, intubated 1/5-1/10 then transferred to Medicine, however failed \par BIPAP and reintubated 1/16. Course complicated ECOLI UTI, Mouth Sores and \par Enterobacter and MRSA VAP. s/p Tracheostomy 1/28 and PEG 1/26 today to follow up\par \par \par DMII: 6.6 ha1c;; HOME fasting FS 100s;stated that never had been diagnosed with DM before this hospitalization; \par \par use O2 when ambulating with 2L no need o2 while resting now \par no fever or chills\par \par saw pulm Dr.Lisker, Gita N ( [phone]) [place] 04/14/2021;  advised to continue with but tapering down O2; saw pulm in 06/2021; had PFT and ct lung with result grossly nwl per pt; ; WILL SEE pulm AGAIN IN 12/2021;  \par \par able to walk 10 mints without O2 1L; \par able to rest and ambulate inside the house; \par \par still report hair loss \par \par also reported scar of her Rt face s/p long time of intubation; saw derm and has injection now; saw plastic and offer treatment option which she is still consider; \par \par pain of arm and shoulder had b/l shoulder x ray wnl  during hospitalization; WAS REFER to Physical Therapy; AND ALSO SAW NEURO ON PHYSICAL THERAPY \par \par need  help for ADLs( shower /dressing) due to shoulder pain and numbness  and IADLs for now;\par \par \par reported b/l leg swelling had negative US LOWER EXTREMITIES BY PULM;\par \par \par mood stable ; she dose have night mare; and flash back and some anxiety;\par \par \par lowe back pain; non radiating; no inury\par \par wiegh gain and prior hx of prathyroidectomy \par \par \par interval hx 09/22/2021\par \par has not do us thyroid /mammo /see endo\par \par \par interval hx 03/04/2022\par \par \par reported Lt lower leg vein swollen for 2 weeks since she got boost \par pt went tO ED in [facility] in 02/24/2022; and had negative cxr and labs with cbc and cmp and negative US lower extremist; \par also saw hematologist and had labs done pending\par also see vascular and will see again for varicose vein; \par \par not exercise now; \par \par \par chronic urinary incontinent; saw uro before in 2020 and had ct renal and cysto wnl per pt \par has not do us thyroid /mammo /see endo

## 2022-03-04 NOTE — ASSESSMENT
[FreeTextEntry1] : covid19 shot x2; last shot in 05/2021 \par \par \par pt is currently with no acute distress; lung clear with no w/r/r\par \par DMV for Disable form filled out; \par \par TTE done wnl in 06/2021\par \par PTSD; advised to follow up with psych \par off O2 now \par and follow up with pulm routinely \par \par advised to see physical therapy; \par \par see derm for face scar; saw plastic surgery; \par \par had EGD and colonoscopy done\par \par pap was done in 01/2020 per pt. wnl per pt\par \par refer to mammo advised to do\par \par parathyroid disease: \par hx of parathyroid ca and s/p parathyroidectomy;\par will check labs and follow up with US thyroid and refer to endo \par \par back pain: \par exam showed b/l praraspinal pain of L3-L5; NO MIDSPINAL TENDERENSS; STRIGHT LEG RAISE WNL; STRENGTH, SENSATION, PULSES B/L LOWER EXTREMITIES WNL; \par CT done in hospital showed some degenerative bone disease\par refer to physical therapy\par advised with topical NSAIDs;\par \par \par -discussed in details regarding to the health risk of obesity;\par -recommended the strategies of weight loss including food diary, healthy food choice and portion control,  frequency and time of self weight monitor and exercise 120 min per week ; \par -recommend dietitian consult\par \par \par advised to do mammo/us thyroid/see endo and pulm \par \par \par iron deficiency anemia resolved;\par but still mild iron deficiency;\par advised to take iorn supplement\par \par HLD and preDM: healthy diet and exercise;\par 6 months follow up\par \par \par -Vit D insufficiency: daily OTC Vit D supplement. follow up in 6 months \par \par refer to thyroid us and mammo and endo and psych again\par \par labs ORDERED'\par \par TODAY'S EXAM OF HER LEG SHOWED NORMAL VEIN WITH MILD PETECHIAE; \par reviewed the labs and US extremities and cxr done in ED\par likely due to covid19 vaccination reaction \par advised she continue to see her hematologist \par advised her to see vascular again\par \par \par 3 months follow up \par \par \par \par

## 2022-03-08 ENCOUNTER — APPOINTMENT (OUTPATIENT)
Dept: UROGYNECOLOGY | Facility: CLINIC | Age: 47
End: 2022-03-08
Payer: MEDICAID

## 2022-03-08 VITALS
HEIGHT: 63 IN | BODY MASS INDEX: 43.94 KG/M2 | DIASTOLIC BLOOD PRESSURE: 79 MMHG | WEIGHT: 248 LBS | TEMPERATURE: 97.8 F | SYSTOLIC BLOOD PRESSURE: 121 MMHG | HEART RATE: 77 BPM

## 2022-03-08 DIAGNOSIS — R35.0 FREQUENCY OF MICTURITION: ICD-10-CM

## 2022-03-08 LAB
BILIRUB UR QL STRIP: NORMAL
CLARITY UR: CLEAR
COLLECTION METHOD: NORMAL
GLUCOSE UR-MCNC: NORMAL
HCG UR QL: 0.2 EU/DL
HGB UR QL STRIP.AUTO: NORMAL
KETONES UR-MCNC: NORMAL
LEUKOCYTE ESTERASE UR QL STRIP: NORMAL
NITRITE UR QL STRIP: NORMAL
PH UR STRIP: 5.5
PROT UR STRIP-MCNC: NORMAL
SP GR UR STRIP: 1.02

## 2022-03-08 PROCEDURE — 51725 SIMPLE CYSTOMETROGRAM: CPT

## 2022-03-08 PROCEDURE — 99204 OFFICE O/P NEW MOD 45 MIN: CPT | Mod: 25

## 2022-03-08 PROCEDURE — 51736 URINE FLOW MEASUREMENT: CPT

## 2022-03-08 NOTE — HISTORY OF PRESENT ILLNESS
[FreeTextEntry1] : \lenny Is a 47-year-old woman with complaints of mixed urinary incontinence.  These problems are chronic for 2 to 3 years.  She describes urine loss with cough sneezing frequency and urgency and urge related incontinence.\par \par She had care by a urologist and by her report had CAT scans cystoscopy and medication with no improvement.\par \par \par She has severe high body mass index and had a very difficult admission with Covid in January 2022 requiring 2 intubations.  From a cardiorespiratory standpoint she seems to have that resolved at this point. \par \par \par TRANS-URETHRAL BLADDER CATHERIZATION: Due to symptoms of hesitancy, to rule out UTI, and to rule our retention, sterile prep and bladder catherization was performed.\par Post Void Residual volume was  20   cc.\par Urine analysis demonstrated  *******\par \par DIAGNOSTIC PROCEDURE: SIMPLE CYSTOMETRY :\par  -diagnostic test indicated as general examination, history, urine analysis and  microscopy failed to explain patient complaints\par \par In the supine position, the urethra was prepped with Betadine. A 16 Greek catheter was gently passed into the bladder with sterile technique and secured in place.\par A urine sample was obtained via catheterization. Sterile water was infused by gravity via an irrigation syringe.\par Patient sensation, change in flow rate, and capacity were observed: \par \par First Sensation      250    (cc)      First Urgency    400    Increase Urge  500 - mild        Capacity       500+ \par Pain with Fill:  NEGATIVE   \par Sensory Urgency:       negative      * reduced sensation\par Post Fill Void     ____          (cc):                             \par \par Involuntary detrusor contractions:  none,   \par \par Cough Stress Test :    negative   at capacity\par \par Diagnoses: \par \par Clinical mixed urinary incontinence.  I suspect overhydration as she had little sensation to 250 cc and did not feel full at 500 cc.  She does not have retention.  Either stress incontinence or overactive bladder were detected today\par \par \par \par \par \par TREATMENT PLAN\par \par  OVERACTIVE BLADDER SYMPTOMS\par Intake / void diary :  likely overhydration \par Vitamin C 1000 mg daily\par Cranberry tablets 400mg twice a day. \par Decrease coffee, alcohol, diet sweetener, citrus, spicy foods - these all stimulate voiding\par Decrease over-hydration:  If urine is clear (no yellow color) you are overhydrated\par Timed regular voids - no holding\par \par \par NEW Prescription:   Sanctura (Trospium) one tablet daily - start taking with a stool softener to avoid constipation .  If effective and stool soft can try without stool softener after 1-2 weeks. \par \par If no help with above plan would obtain formal UDS\par \par \par

## 2022-03-10 ENCOUNTER — RX CHANGE (OUTPATIENT)
Age: 47
End: 2022-03-10

## 2022-03-10 ENCOUNTER — TRANSCRIPTION ENCOUNTER (OUTPATIENT)
Age: 47
End: 2022-03-10

## 2022-04-26 ENCOUNTER — APPOINTMENT (OUTPATIENT)
Dept: UROGYNECOLOGY | Facility: CLINIC | Age: 47
End: 2022-04-26
Payer: MEDICAID

## 2022-04-26 VITALS — TEMPERATURE: 97.8 F | SYSTOLIC BLOOD PRESSURE: 121 MMHG | HEART RATE: 93 BPM | DIASTOLIC BLOOD PRESSURE: 82 MMHG

## 2022-04-26 DIAGNOSIS — R39.11 HESITANCY OF MICTURITION: ICD-10-CM

## 2022-04-26 DIAGNOSIS — N39.46 MIXED INCONTINENCE: ICD-10-CM

## 2022-04-26 LAB
BILIRUB UR QL STRIP: NORMAL
CLARITY UR: CLEAR
COLLECTION METHOD: NORMAL
GLUCOSE UR-MCNC: NORMAL
HCG UR QL: 0.2 EU/DL
HGB UR QL STRIP.AUTO: NORMAL
KETONES UR-MCNC: NORMAL
LEUKOCYTE ESTERASE UR QL STRIP: NORMAL
NITRITE UR QL STRIP: NORMAL
PH UR STRIP: 6
PROT UR STRIP-MCNC: NORMAL
SP GR UR STRIP: 1

## 2022-04-26 PROCEDURE — 99213 OFFICE O/P EST LOW 20 MIN: CPT | Mod: 25

## 2022-04-26 PROCEDURE — 51701 INSERT BLADDER CATHETER: CPT

## 2022-04-26 NOTE — HISTORY OF PRESENT ILLNESS
[FreeTextEntry1] : This is a 47-year-old woman with complaints of mixed urinary incontinence.She has high body mass index and high fluid intake including caffeine.\par \par On simple cystometry she had decreased sensation during bladder filling associated with her history of overhydration I was not able to elicit stress incontinence or detrusor overactivity.\par \par \par We initiated timed voiding, fluid restriction, and trospium and she has mild improvement but not satisfactory.\par She does not decrease caffeine which is a daily habit for her.\par \par Today she felt that she had urinary tract infection like symptoms and hesitancy and a catheterized urine was performed which was completely negative and had a residual volume of 50 cc.\par \par \par I have recommended continuing the regimen as she is only recently started on the medication and to experiment with decreasing caffeine or at least exchanging caffeine tablets instead of large fluid sources of caffeine.\par \par I informed her that my practice is moving locations that would be geographically impractical and then referred her to Dr. Lyon.  Is my simple cystometry did not match her history if she has not responded to the above regimen then urodynamics would be indicated.\par \par Review of previous notes, labs, current prescriptions was performed.\par History , Physical counseling was performed. \par All questions answered in lay language\par Total time for encounter was   39    min\par A chaperone was present for the entirety of the encounter\par \par

## 2022-05-04 ENCOUNTER — APPOINTMENT (OUTPATIENT)
Dept: PULMONOLOGY | Facility: CLINIC | Age: 47
End: 2022-05-04
Payer: MEDICAID

## 2022-05-04 VITALS
BODY MASS INDEX: 44.05 KG/M2 | WEIGHT: 258 LBS | HEART RATE: 93 BPM | TEMPERATURE: 98.1 F | HEIGHT: 64 IN | OXYGEN SATURATION: 96 % | DIASTOLIC BLOOD PRESSURE: 70 MMHG | SYSTOLIC BLOOD PRESSURE: 118 MMHG

## 2022-05-04 DIAGNOSIS — R06.00 DYSPNEA, UNSPECIFIED: ICD-10-CM

## 2022-05-04 DIAGNOSIS — R06.02 SHORTNESS OF BREATH: ICD-10-CM

## 2022-05-04 PROCEDURE — 94726 PLETHYSMOGRAPHY LUNG VOLUMES: CPT

## 2022-05-04 PROCEDURE — 94729 DIFFUSING CAPACITY: CPT

## 2022-05-04 PROCEDURE — 99213 OFFICE O/P EST LOW 20 MIN: CPT | Mod: 25

## 2022-05-04 PROCEDURE — 94010 BREATHING CAPACITY TEST: CPT

## 2022-05-04 PROCEDURE — ZZZZZ: CPT

## 2022-05-04 NOTE — REVIEW OF SYSTEMS
[Recent Wt Gain (___ Lbs)] : ~T recent [unfilled] lb weight gain [SOB on Exertion] : sob on exertion [Edema] : edema [Frequency] : frequency [Negative] : Endocrine

## 2022-05-04 NOTE — HISTORY OF PRESENT ILLNESS
[TextBox_4] : Pt is a 48yo F, hospitalized and intubated in Jan 2021 for COVID pneumonia, here for a follow up visit. Pt states she feels no SOB at [place] but becomes winded when climbing up the stairs to the train station and requires several minutes to recover. Pt denies any cough or wheezing. Pt would like to lose weight but her intolerance to physical activity is making it difficult. Pt states she has experienced a 60 lb weight gain the past several months and endorses feeling colder than usual on occasion. \par \par Pt also reports swelling in both legs, L > R, each time she got her COVID vaccine doses. Pt got her Pfizer booster in Feb 2022, after which she experienced swelling in both legs and a burst vein in her L leg resulting in a ~6 cm area of ecchymosis. Pt states she felt the vein was hard and twiglike but denies any tenderness. Pt states the swelling has gone down slightly but is still present.

## 2022-05-04 NOTE — DISCUSSION/SUMMARY
[FreeTextEntry1] : 47-year-old woman with, follow-up visit.  COVID ARDS January 2021, intubated, trach, ultimately decannulated discharged home in March 2021 on oxygen.  Since then, her lung function has returned to normal, her CAT scans are near normal.   completely off oxygen.\par \par Several complaints.  She has had intermittent lower extremity edema.  She showed me pictures from [place] when she had what appeared to have been a superficial rupture, and cause a large hematoma.  She also had lower extremity edema on the pictures after travel.  Today she does not have this.\par \par She complains of dyspnea with exertion, particularly when walking up the subway stairs.  Fatigue.\par \par she has also gained 50 pounds in the last year.\par \par Her lung function today is nearly perfect, except for very minimal decrease in diffusion capacity.  She should not be limited by shortness of breath secondary to her lung function.  I think it is more likely due to weight gain and deconditioning.\par \par Repeat CT chest ordered for complete resolution compared with June 2021\par \par Labs today, as well as echocardiogram ordered.

## 2022-05-06 ENCOUNTER — NON-APPOINTMENT (OUTPATIENT)
Age: 47
End: 2022-05-06

## 2022-05-06 LAB
ALBUMIN SERPL ELPH-MCNC: 4.5 G/DL
ALP BLD-CCNC: 107 U/L
ALT SERPL-CCNC: 17 U/L
ANION GAP SERPL CALC-SCNC: 14 MMOL/L
AST SERPL-CCNC: 20 U/L
BASOPHILS # BLD AUTO: 0.1 K/UL
BASOPHILS NFR BLD AUTO: 0.9 %
BILIRUB SERPL-MCNC: 0.2 MG/DL
BUN SERPL-MCNC: 14 MG/DL
CALCIUM SERPL-MCNC: 9.2 MG/DL
CHLORIDE SERPL-SCNC: 104 MMOL/L
CO2 SERPL-SCNC: 24 MMOL/L
CREAT SERPL-MCNC: 0.79 MG/DL
DEPRECATED D DIMER PPP IA-ACNC: 159 NG/ML DDU
EGFR: 92 ML/MIN/1.73M2
EOSINOPHIL # BLD AUTO: 0.21 K/UL
EOSINOPHIL NFR BLD AUTO: 2 %
ESTIMATED AVERAGE GLUCOSE: 140 MG/DL
GLUCOSE SERPL-MCNC: 102 MG/DL
HBA1C MFR BLD HPLC: 6.5 %
HCT VFR BLD CALC: 38.3 %
HGB BLD-MCNC: 12.2 G/DL
IMM GRANULOCYTES NFR BLD AUTO: 0.5 %
LYMPHOCYTES # BLD AUTO: 2.62 K/UL
LYMPHOCYTES NFR BLD AUTO: 24.4 %
MAN DIFF?: NORMAL
MCHC RBC-ENTMCNC: 27 PG
MCHC RBC-ENTMCNC: 31.9 GM/DL
MCV RBC AUTO: 84.7 FL
MONOCYTES # BLD AUTO: 0.8 K/UL
MONOCYTES NFR BLD AUTO: 7.4 %
NEUTROPHILS # BLD AUTO: 6.97 K/UL
NEUTROPHILS NFR BLD AUTO: 64.8 %
NT-PROBNP SERPL-MCNC: 95 PG/ML
PLATELET # BLD AUTO: 319 K/UL
POTASSIUM SERPL-SCNC: 4.1 MMOL/L
PROT SERPL-MCNC: 7.5 G/DL
RBC # BLD: 4.52 M/UL
RBC # FLD: 14.6 %
SODIUM SERPL-SCNC: 142 MMOL/L
TSH SERPL-ACNC: 3.33 UIU/ML
WBC # FLD AUTO: 10.75 K/UL

## 2022-05-12 PROBLEM — Z00.00 ENCOUNTER FOR PREVENTIVE HEALTH EXAMINATION: Status: ACTIVE | Noted: 2022-05-12

## 2022-05-13 ENCOUNTER — APPOINTMENT (OUTPATIENT)
Dept: INTERNAL MEDICINE | Facility: CLINIC | Age: 47
End: 2022-05-13

## 2022-06-08 ENCOUNTER — APPOINTMENT (OUTPATIENT)
Dept: HEPATOLOGY | Facility: CLINIC | Age: 47
End: 2022-06-08

## 2022-06-21 ENCOUNTER — RESULT REVIEW (OUTPATIENT)
Age: 47
End: 2022-06-21

## 2022-06-21 ENCOUNTER — OUTPATIENT (OUTPATIENT)
Dept: OUTPATIENT SERVICES | Facility: HOSPITAL | Age: 47
LOS: 1 days | End: 2022-06-21
Payer: MEDICAID

## 2022-06-21 ENCOUNTER — APPOINTMENT (OUTPATIENT)
Dept: CT IMAGING | Facility: IMAGING CENTER | Age: 47
End: 2022-06-21
Payer: MEDICAID

## 2022-06-21 ENCOUNTER — APPOINTMENT (OUTPATIENT)
Dept: MAMMOGRAPHY | Facility: IMAGING CENTER | Age: 47
End: 2022-06-21
Payer: MEDICAID

## 2022-06-21 DIAGNOSIS — Z00.8 ENCOUNTER FOR OTHER GENERAL EXAMINATION: ICD-10-CM

## 2022-06-21 DIAGNOSIS — Z12.39 ENCOUNTER FOR OTHER SCREENING FOR MALIGNANT NEOPLASM OF BREAST: ICD-10-CM

## 2022-06-21 DIAGNOSIS — U07.1 COVID-19: ICD-10-CM

## 2022-06-21 PROCEDURE — 71250 CT THORAX DX C-: CPT

## 2022-06-21 PROCEDURE — 71250 CT THORAX DX C-: CPT | Mod: 26

## 2022-06-21 PROCEDURE — 77067 SCR MAMMO BI INCL CAD: CPT | Mod: 26

## 2022-06-21 PROCEDURE — 77063 BREAST TOMOSYNTHESIS BI: CPT | Mod: 26

## 2022-06-21 PROCEDURE — 77067 SCR MAMMO BI INCL CAD: CPT

## 2022-06-21 PROCEDURE — 77063 BREAST TOMOSYNTHESIS BI: CPT

## 2022-06-29 ENCOUNTER — NON-APPOINTMENT (OUTPATIENT)
Age: 47
End: 2022-06-29

## 2022-07-12 ENCOUNTER — APPOINTMENT (OUTPATIENT)
Dept: ULTRASOUND IMAGING | Facility: IMAGING CENTER | Age: 47
End: 2022-07-12

## 2022-07-14 LAB
ALBUMIN SERPL ELPH-MCNC: 4.4 G/DL
ALP BLD-CCNC: 104 U/L
ALT SERPL-CCNC: 13 U/L
ANION GAP SERPL CALC-SCNC: 11 MMOL/L
AST SERPL-CCNC: 17 U/L
BASOPHILS # BLD AUTO: 0.06 K/UL
BASOPHILS NFR BLD AUTO: 0.7 %
BILIRUB SERPL-MCNC: 0.3 MG/DL
BUN SERPL-MCNC: 15 MG/DL
CALCIUM SERPL-MCNC: 9.3 MG/DL
CHLORIDE SERPL-SCNC: 105 MMOL/L
CHOLEST SERPL-MCNC: 172 MG/DL
CO2 SERPL-SCNC: 23 MMOL/L
CREAT SERPL-MCNC: 0.81 MG/DL
EGFR: 89 ML/MIN/1.73M2
EOSINOPHIL # BLD AUTO: 0.15 K/UL
EOSINOPHIL NFR BLD AUTO: 1.7 %
ESTIMATED AVERAGE GLUCOSE: 131 MG/DL
GLUCOSE SERPL-MCNC: 108 MG/DL
HBA1C MFR BLD HPLC: 6.2 %
HCT VFR BLD CALC: 36.7 %
HDLC SERPL-MCNC: 42 MG/DL
HGB BLD-MCNC: 11.1 G/DL
IMM GRANULOCYTES NFR BLD AUTO: 0.5 %
LDLC SERPL CALC-MCNC: 114 MG/DL
LYMPHOCYTES # BLD AUTO: 2.41 K/UL
LYMPHOCYTES NFR BLD AUTO: 27.4 %
MAN DIFF?: NORMAL
MCHC RBC-ENTMCNC: 26.7 PG
MCHC RBC-ENTMCNC: 30.2 GM/DL
MCV RBC AUTO: 88.4 FL
MONOCYTES # BLD AUTO: 0.64 K/UL
MONOCYTES NFR BLD AUTO: 7.3 %
NEUTROPHILS # BLD AUTO: 5.49 K/UL
NEUTROPHILS NFR BLD AUTO: 62.4 %
NONHDLC SERPL-MCNC: 130 MG/DL
PLATELET # BLD AUTO: 282 K/UL
POTASSIUM SERPL-SCNC: 4.4 MMOL/L
PROT SERPL-MCNC: 7 G/DL
RBC # BLD: 4.15 M/UL
RBC # FLD: 14.9 %
SODIUM SERPL-SCNC: 139 MMOL/L
TRIGL SERPL-MCNC: 79 MG/DL
WBC # FLD AUTO: 8.79 K/UL

## 2022-08-11 ENCOUNTER — OUTPATIENT (OUTPATIENT)
Dept: OUTPATIENT SERVICES | Facility: HOSPITAL | Age: 47
LOS: 1 days | End: 2022-08-11
Payer: MEDICAID

## 2022-08-11 ENCOUNTER — RESULT REVIEW (OUTPATIENT)
Age: 47
End: 2022-08-11

## 2022-08-11 ENCOUNTER — APPOINTMENT (OUTPATIENT)
Dept: ULTRASOUND IMAGING | Facility: IMAGING CENTER | Age: 47
End: 2022-08-11

## 2022-08-11 DIAGNOSIS — Z12.39 ENCOUNTER FOR OTHER SCREENING FOR MALIGNANT NEOPLASM OF BREAST: ICD-10-CM

## 2022-08-11 PROCEDURE — 76536 US EXAM OF HEAD AND NECK: CPT

## 2022-08-11 PROCEDURE — 76536 US EXAM OF HEAD AND NECK: CPT | Mod: 26

## 2022-10-28 ENCOUNTER — NON-APPOINTMENT (OUTPATIENT)
Age: 47
End: 2022-10-28

## 2023-01-08 ENCOUNTER — NON-APPOINTMENT (OUTPATIENT)
Age: 48
End: 2023-01-08

## 2023-01-17 ENCOUNTER — APPOINTMENT (OUTPATIENT)
Dept: INTERNAL MEDICINE | Facility: CLINIC | Age: 48
End: 2023-01-17
Payer: MEDICAID

## 2023-01-17 ENCOUNTER — RESULT REVIEW (OUTPATIENT)
Age: 48
End: 2023-01-17

## 2023-01-17 VITALS
OXYGEN SATURATION: 97 % | SYSTOLIC BLOOD PRESSURE: 113 MMHG | HEIGHT: 64 IN | RESPIRATION RATE: 16 BRPM | DIASTOLIC BLOOD PRESSURE: 80 MMHG | HEART RATE: 92 BPM | WEIGHT: 258 LBS | BODY MASS INDEX: 44.05 KG/M2 | TEMPERATURE: 97.2 F

## 2023-01-17 DIAGNOSIS — R10.9 UNSPECIFIED ABDOMINAL PAIN: ICD-10-CM

## 2023-01-17 DIAGNOSIS — R51.9 HEADACHE, UNSPECIFIED: ICD-10-CM

## 2023-01-17 DIAGNOSIS — F48.9 NONPSYCHOTIC MENTAL DISORDER, UNSPECIFIED: ICD-10-CM

## 2023-01-17 DIAGNOSIS — N20.0 CALCULUS OF KIDNEY: ICD-10-CM

## 2023-01-17 DIAGNOSIS — R07.81 PLEURODYNIA: ICD-10-CM

## 2023-01-17 DIAGNOSIS — Z00.00 ENCOUNTER FOR GENERAL ADULT MEDICAL EXAMINATION W/OUT ABNORMAL FINDINGS: ICD-10-CM

## 2023-01-17 PROCEDURE — 99214 OFFICE O/P EST MOD 30 MIN: CPT

## 2023-01-17 NOTE — HEALTH RISK ASSESSMENT
[Never] : Never [No] : In the past 12 months have you used drugs other than those required for medical reasons? No [No falls in past year] : Patient reported no falls in the past year [0] : 2) Feeling down, depressed, or hopeless: Not at all (0) [[id number]] : 0

## 2023-01-17 NOTE — PHYSICAL EXAM
[No Acute Distress] : no acute distress [Well Developed] : well developed [Well-Appearing] : well-appearing [Normal Sclera/Conjunctiva] : normal sclera/conjunctiva [PERRL] : pupils equal round and reactive to light [EOMI] : extraocular movements intact [Normal Outer Ear/Nose] : the outer ears and nose were normal in appearance [Normal Oropharynx] : the oropharynx was normal [No JVD] : no jugular venous distention [No Lymphadenopathy] : no lymphadenopathy [Supple] : supple [Thyroid Normal, No Nodules] : the thyroid was normal and there were no nodules present [No Respiratory Distress] : no respiratory distress  [No Accessory Muscle Use] : no accessory muscle use [Clear to Auscultation] : lungs were clear to auscultation bilaterally [Normal Rate] : normal rate  [Regular Rhythm] : with a regular rhythm [Normal S1, S2] : normal S1 and S2 [No Murmur] : no murmur heard [No Carotid Bruits] : no carotid bruits [No Abdominal Bruit] : a ~M bruit was not heard ~T in the abdomen [No Varicosities] : no varicosities [Pedal Pulses Present] : the pedal pulses are present [No Edema] : there was no peripheral edema [No Palpable Aorta] : no palpable aorta [No Extremity Clubbing/Cyanosis] : no extremity clubbing/cyanosis [Soft] : abdomen soft [Non Tender] : non-tender [Non-distended] : non-distended [No Masses] : no abdominal mass palpated [No HSM] : no HSM [Normal Bowel Sounds] : normal bowel sounds [Normal Posterior Cervical Nodes] : no posterior cervical lymphadenopathy [Normal Anterior Cervical Nodes] : no anterior cervical lymphadenopathy [No CVA Tenderness] : no CVA  tenderness [No Spinal Tenderness] : no spinal tenderness [No Joint Swelling] : no joint swelling [Grossly Normal Strength/Tone] : grossly normal strength/tone [No Rash] : no rash [Coordination Grossly Intact] : coordination grossly intact [No Focal Deficits] : no focal deficits [Normal Gait] : normal gait [Deep Tendon Reflexes (DTR)] : deep tendon reflexes were 2+ and symmetric [Normal Affect] : the affect was normal [Normal Insight/Judgement] : insight and judgment were intact [de-identified] : generalized morbid obesity

## 2023-01-17 NOTE — DISCHARGE NOTE PROVIDER - NSDCQMERRANDS_GEN_ALL_CORE
Onset: 3 days ago  Location/description: constipation no BM in 3 days, spitting up more, sightly more irritable  Associated Symptoms: more gassy, straining when trying to have BM yesterday but no crying with this  What improves/worsens symptoms: tummy rub, bicycle legs  Symptom specific medications: gripe water  Input and Output: breast fed, occasional supplementation with formula-good intake, no changes in diet in the last 2 weeks  Activity level: normal, maybe a little more irritable   Temperature (route and time): denies  Plan: Discussed that breast fed babies do typically go a little longer without having BM. Mom more concerned about the new spit up and straining the other day, appointment made due to the straining and mom comfortable with this to be sure. Home care advice also discussed and mom verbalizes thanks.     Reason for Disposition  • [1] Acute RECTAL pain (includes straining > 10 mins) with constipation AND [2] has not tried care advice    Protocols used: CONSTIPATION-P-AH    Encounter closed.     Yes

## 2023-01-17 NOTE — COUNSELING
[Potential consequences of obesity discussed] : Potential consequences of obesity discussed [Benefits of weight loss discussed] : Benefits of weight loss discussed [Structured Weight Management Program suggested:] : Structured weight management program suggested [Encouraged to increase physical activity] : Encouraged to increase physical activity [Decrease Portions] : decrease portions [None] : None [Good understanding] : Patient has a good understanding of lifestyle changes and steps needed to achieve self management goal [de-identified] : healthy eating,exercise

## 2023-01-17 NOTE — ASSESSMENT
[FreeTextEntry1] : 47 yo with diffuse c/o requesting multiple referrals:brandy for HA's,GI for abd.pain,x-ray ribs,labs,psychology etc.\par wt mng discussed with pt:surgica,medical approach.\par will f/u.

## 2023-01-17 NOTE — HISTORY OF PRESENT ILLNESS
[FreeTextEntry1] : HA's\par abd.pain\par rib pain\par back pain [de-identified] : 47 yo premenopausal F visited  1 week ago with multiple c/o HA's,tiredness,low back pain,nausea,sleepiness,her ua was normal but she was given Amoxicillin.COVID19 was neg.\par she still c/o fatigue,HA's,low back pain,abd.pain/epigastric,rib pain,L>R,mood fluctuations.\par also pt is concerned with wt gain>50 lbs for the past 2 y.\par she is also concerned with  red spots on her legs if she presses the skin\par PMH:had prolonged hospitalization for COVID 01/21,requiring intubation,later trach.recent pulm.eval.stated normalization of PFT's.\par pt is st.p.parathyroid adenoma removal for hypercalcemia and renal calculi(st.p.?lithotripsy).\par recent thyroid us wnl.\par

## 2023-01-17 NOTE — REVIEW OF SYSTEMS
[Fatigue] : fatigue [Abdominal Pain] : abdominal pain [Nausea] : nausea [Dysuria] : dysuria [Negative] : Respiratory

## 2023-01-20 ENCOUNTER — LABORATORY RESULT (OUTPATIENT)
Age: 48
End: 2023-01-20

## 2023-01-21 ENCOUNTER — APPOINTMENT (OUTPATIENT)
Dept: RADIOLOGY | Facility: IMAGING CENTER | Age: 48
End: 2023-01-21
Payer: MEDICAID

## 2023-01-21 ENCOUNTER — OUTPATIENT (OUTPATIENT)
Dept: OUTPATIENT SERVICES | Facility: HOSPITAL | Age: 48
LOS: 1 days | End: 2023-01-21
Payer: MEDICAID

## 2023-01-21 DIAGNOSIS — R07.81 PLEURODYNIA: ICD-10-CM

## 2023-01-21 DIAGNOSIS — Z00.8 ENCOUNTER FOR OTHER GENERAL EXAMINATION: ICD-10-CM

## 2023-01-21 PROCEDURE — 71100 X-RAY EXAM RIBS UNI 2 VIEWS: CPT

## 2023-01-21 PROCEDURE — 71100 X-RAY EXAM RIBS UNI 2 VIEWS: CPT | Mod: 26

## 2023-01-25 ENCOUNTER — NON-APPOINTMENT (OUTPATIENT)
Age: 48
End: 2023-01-25

## 2023-01-25 DIAGNOSIS — E66.01 MORBID (SEVERE) OBESITY DUE TO EXCESS CALORIES: ICD-10-CM

## 2023-01-25 LAB
25(OH)D3 SERPL-MCNC: 22.2 NG/ML
ALBUMIN SERPL ELPH-MCNC: 4.3 G/DL
ALP BLD-CCNC: 104 U/L
ALT SERPL-CCNC: 21 U/L
ANION GAP SERPL CALC-SCNC: 13 MMOL/L
APPEARANCE: CLEAR
AST SERPL-CCNC: 19 U/L
BACTERIA UR CULT: NORMAL
BASOPHILS # BLD AUTO: 0.07 K/UL
BASOPHILS NFR BLD AUTO: 0.7 %
BILIRUB SERPL-MCNC: 0.3 MG/DL
BILIRUBIN URINE: NEGATIVE
BLOOD URINE: NEGATIVE
BUN SERPL-MCNC: 12 MG/DL
CALCIUM SERPL-MCNC: 9.6 MG/DL
CHLORIDE SERPL-SCNC: 104 MMOL/L
CHOLEST SERPL-MCNC: 166 MG/DL
CO2 SERPL-SCNC: 25 MMOL/L
COLOR: NORMAL
CREAT SERPL-MCNC: 0.83 MG/DL
EGFR: 86 ML/MIN/1.73M2
EOSINOPHIL # BLD AUTO: 0.12 K/UL
EOSINOPHIL NFR BLD AUTO: 1.2 %
ESTIMATED AVERAGE GLUCOSE: 143 MG/DL
GLUCOSE QUALITATIVE U: NEGATIVE
GLUCOSE SERPL-MCNC: 93 MG/DL
HBA1C MFR BLD HPLC: 6.6 %
HCT VFR BLD CALC: 40.1 %
HDLC SERPL-MCNC: 37 MG/DL
HGB BLD-MCNC: 13 G/DL
IMM GRANULOCYTES NFR BLD AUTO: 0.6 %
KETONES URINE: NEGATIVE
LDLC SERPL CALC-MCNC: 108 MG/DL
LEUKOCYTE ESTERASE URINE: NEGATIVE
LYMPHOCYTES # BLD AUTO: 3.15 K/UL
LYMPHOCYTES NFR BLD AUTO: 31.9 %
MAN DIFF?: NORMAL
MCHC RBC-ENTMCNC: 29.1 PG
MCHC RBC-ENTMCNC: 32.4 GM/DL
MCV RBC AUTO: 89.7 FL
MONOCYTES # BLD AUTO: 0.7 K/UL
MONOCYTES NFR BLD AUTO: 7.1 %
NEUTROPHILS # BLD AUTO: 5.78 K/UL
NEUTROPHILS NFR BLD AUTO: 58.5 %
NITRITE URINE: NEGATIVE
NONHDLC SERPL-MCNC: 129 MG/DL
PH URINE: 6
PLATELET # BLD AUTO: 321 K/UL
POTASSIUM SERPL-SCNC: 4 MMOL/L
PROT SERPL-MCNC: 7.2 G/DL
PROTEIN URINE: ABNORMAL
RBC # BLD: 4.47 M/UL
RBC # FLD: 13.9 %
SODIUM SERPL-SCNC: 141 MMOL/L
SPECIFIC GRAVITY URINE: >=1.03
TRIGL SERPL-MCNC: 103 MG/DL
TSH SERPL-ACNC: 1.52 UIU/ML
UROBILINOGEN URINE: NORMAL
WBC # FLD AUTO: 9.88 K/UL

## 2023-02-01 ENCOUNTER — APPOINTMENT (OUTPATIENT)
Dept: UROLOGY | Facility: CLINIC | Age: 48
End: 2023-02-01
Payer: MEDICAID

## 2023-02-01 VITALS
OXYGEN SATURATION: 99 % | HEART RATE: 78 BPM | SYSTOLIC BLOOD PRESSURE: 123 MMHG | BODY MASS INDEX: 44.39 KG/M2 | HEIGHT: 64 IN | DIASTOLIC BLOOD PRESSURE: 87 MMHG | WEIGHT: 260 LBS

## 2023-02-01 DIAGNOSIS — R10.9 UNSPECIFIED ABDOMINAL PAIN: ICD-10-CM

## 2023-02-01 DIAGNOSIS — N20.0 CALCULUS OF KIDNEY: ICD-10-CM

## 2023-02-01 PROCEDURE — 99214 OFFICE O/P EST MOD 30 MIN: CPT

## 2023-02-01 NOTE — HISTORY OF PRESENT ILLNESS
[FreeTextEntry1] : Very pleasant 48-year-old woman who presents for evaluation of urinary urgency, stress urinary incontinence, new complaints of left flank pain, history of kidney stones.  She reports a history of kidney stones in the past for which she underwent ESWL.  She reports the onset of fevers, chills, nausea, vomiting and left flank pain recently for which she was prescribed antibiotics.  She reports that urine testing at an urgent care center was negative.  She also reports bothersome stress incontinence.  She reports resolution of fevers and chills at this time.\par \par

## 2023-02-01 NOTE — ASSESSMENT
[FreeTextEntry1] : Very pleasant 48-year-old woman who presents for follow-up with new complaints of urinary urgency, stress incontinence, left flank pain, fevers, chills, nausea, vomiting, history of kidney stones\par -We discussed that her symptoms of fevers, chills, nausea, and vomiting are worrisome.  We discussed that if the symptoms occur again she should go to the emergency department immediately\par -Urinalysis\par -Urine culture\par -CT scan given left flank pain and history of kidney stones\par -Follow-up in 2 to 3 weeks

## 2023-02-02 ENCOUNTER — RESULT REVIEW (OUTPATIENT)
Age: 48
End: 2023-02-02

## 2023-02-02 LAB
APPEARANCE: CLEAR
BACTERIA: NEGATIVE
BILIRUBIN URINE: NEGATIVE
BLOOD URINE: NEGATIVE
COLOR: YELLOW
GLUCOSE QUALITATIVE U: NEGATIVE
HYALINE CASTS: 0 /LPF
KETONES URINE: NEGATIVE
LEUKOCYTE ESTERASE URINE: NEGATIVE
MICROSCOPIC-UA: NORMAL
NITRITE URINE: NEGATIVE
PH URINE: 6
PROTEIN URINE: NORMAL
RED BLOOD CELLS URINE: 5 /HPF
SPECIFIC GRAVITY URINE: 1.02
SQUAMOUS EPITHELIAL CELLS: 1 /HPF
UROBILINOGEN URINE: NORMAL
WHITE BLOOD CELLS URINE: 0 /HPF

## 2023-02-03 LAB — BACTERIA UR CULT: NORMAL

## 2023-02-07 NOTE — PHYSICAL EXAM
Medical Necessity Clause: This procedure was medically necessary because the lesion that was treated was: Detail Level: Detailed Size Of Lesion (*Required): 0.4 Size Of Margin In Cm: 0 [No Acute Distress] : no acute distress Punch Size In Mm: 5 [Normal Oropharynx] : normal oropharynx Repair Type: None [Normal Appearance] : normal appearance Complex Requirements: Extensive Undermining Performed?: No [No Neck Mass] : no neck mass Undermining Type: Entire Wound [Normal Rate/Rhythm] : normal rate/rhythm Debridement Text: The wound edges were debrided prior to proceeding with the closure to facilitate wound healing. [Normal S1, S2] : normal s1, s2 Helical Rim Text: The closure involved the helical rim. [No Murmurs] : no murmurs Vermilion Border Text: The closure involved the vermilion border. [No Resp Distress] : no resp distress Nostril Rim Text: The closure involved the nostril rim. [Clear to Auscultation Bilaterally] : clear to auscultation bilaterally Retention Suture Text: Retention sutures were placed to support the closure and prevent dehiscence. [No Abnormalities] : no abnormalities Include Undermining Statement In The Repair Note?: Yes [Benign] : benign Anesthesia Type: 1% lidocaine with epinephrine [Normal Gait] : normal gait Anesthesia Volume In Cc: 2 [No Clubbing] : no clubbing Hemostasis: Electrocautery [No Cyanosis] : no cyanosis Deep Sutures: 5-0 Vicryl [FROM] : FROM Epidermal Sutures: 4-0 Ethilon [Non-Pitting] : non-pitting Epidermal Closure: simple interrupted [Normal Color/ Pigmentation] : normal color/ pigmentation Wound Care: Petrolatum [No Focal Deficits] : no focal deficits Wound Dressings: a bandage [Oriented x3] : oriented x3 Suture Removal: 14 days [Normal Affect] : normal affect Lab: 6 Lab Facility: 3 Path Notes (To The Dermatopathologist): Please check margins. 1.5 Mm Punch Excision Text: A 1.5 mm punch biopsy was used to excise the lesion to the level of the subcutaneous fat.  Blunt dissection was used to free the lesion from the surrounding tissues and the lesion was removed. 2 Mm Punch Excision Text: A 2 mm punch biopsy was used to excise the lesion to the level of the subcutaneous fat.  Blunt dissection was used to free the lesion from the surrounding tissues and the lesion was removed. 2.5 Mm Punch Excision Text: A 2.5 mm punch biopsy was used to excise the lesion to the level of the subcutaneous fat.  Blunt dissection was used to free the lesion from the surrounding tissues and the lesion was removed. 3 Mm Punch Excision Text: A 3 mm punch biopsy was used to excise the lesion to the level of the subcutaneous fat.  Blunt dissection was used to free the lesion from the surrounding tissues and the lesion was removed. 3.5 Mm Punch Excision Text: A 3.5 mm punch biopsy was used to excise the lesion to the level of the subcutaneous fat.  Blunt dissection was used to free the lesion from the surrounding tissues and the lesion was removed. 4 Mm Punch Excision Text: A 4 mm punch biopsy was used to excise the lesion to the level of the subcutaneous fat.  Blunt dissection was used to free the lesion from the surrounding tissues and the lesion was removed. 4.5 Mm Punch Excision Text: A 4.5 mm punch biopsy was used to excise the lesion to the level of the subcutaneous fat.  Blunt dissection was used to free the lesion from the surrounding tissues and the lesion was removed. 5 Mm Punch Excision Text: A 5 mm punch biopsy was used to excise the lesion to the level of the subcutaneous fat.  Blunt dissection was used to free the lesion from the surrounding tissues and the lesion was removed. 6 Mm Punch Excision Text: A 6 mm punch biopsy was used to excise the lesion to the level of the subcutaneous fat.  Blunt dissection was used to free the lesion from the surrounding tissues and the lesion was removed. 7 Mm Punch Excision Text: A 7 mm punch biopsy was used to excise the lesion to the level of the subcutaneous fat.  Blunt dissection was used to free the lesion from the surrounding tissues and the lesion was removed. 8 Mm Punch Excision Text: A 8 mm punch biopsy was used to excise the lesion to the level of the subcutaneous fat.  Blunt dissection was used to free the lesion from the surrounding tissues and the lesion was removed. 10 Mm Punch Excision Text: A 10 mm punch biopsy was used to excise the lesion to the level of the subcutaneous fat.  Blunt dissection was used to free the lesion from the surrounding tissues and the lesion was removed. 12 Mm Punch Excision Text: A 12 mm punch biopsy was used to excise the lesion to the level of the subcutaneous fat.  Blunt dissection was used to free the lesion from the surrounding tissues and the lesion was removed. Consent was obtained from the patient. The risks and benefits to therapy were discussed in detail. Specifically, the risks of infection, scarring, bleeding, prolonged wound healing, incomplete removal, allergy to anesthesia, nerve injury and recurrence were addressed. Prior to the procedure, the treatment site was clearly identified and confirmed by the patient. All components of Universal Protocol/PAUSE Rule completed. Post-Care Instructions: I reviewed with the patient in detail post-care instructions. Patient is to keep the biopsy site dry overnight, and then apply bacitracin twice daily until healed. Patient may apply hydrogen peroxide soaks to remove any crusting. Notification Instructions: Patient will be notified of biopsy results. However, patient instructed to call the office if not contacted within 2 weeks. Billing Type: Third-Party Bill

## 2023-02-15 ENCOUNTER — TRANSCRIPTION ENCOUNTER (OUTPATIENT)
Age: 48
End: 2023-02-15

## 2023-02-17 PROBLEM — E66.01 MORBID OBESITY: Status: ACTIVE | Noted: 2023-02-17

## 2023-03-04 ENCOUNTER — TRANSCRIPTION ENCOUNTER (OUTPATIENT)
Age: 48
End: 2023-03-04

## 2023-03-25 ENCOUNTER — OUTPATIENT (OUTPATIENT)
Dept: OUTPATIENT SERVICES | Facility: HOSPITAL | Age: 48
LOS: 1 days | End: 2023-03-25
Payer: MEDICAID

## 2023-03-25 ENCOUNTER — APPOINTMENT (OUTPATIENT)
Dept: CT IMAGING | Facility: IMAGING CENTER | Age: 48
End: 2023-03-25
Payer: MEDICAID

## 2023-03-25 DIAGNOSIS — R10.9 UNSPECIFIED ABDOMINAL PAIN: ICD-10-CM

## 2023-03-25 PROCEDURE — 74176 CT ABD & PELVIS W/O CONTRAST: CPT | Mod: 26

## 2023-03-25 PROCEDURE — 74176 CT ABD & PELVIS W/O CONTRAST: CPT

## 2023-04-04 ENCOUNTER — TRANSCRIPTION ENCOUNTER (OUTPATIENT)
Age: 48
End: 2023-04-04

## 2023-04-07 ENCOUNTER — APPOINTMENT (OUTPATIENT)
Dept: UROLOGY | Facility: CLINIC | Age: 48
End: 2023-04-07
Payer: MEDICAID

## 2023-04-07 DIAGNOSIS — Z87.442 PERSONAL HISTORY OF URINARY CALCULI: ICD-10-CM

## 2023-04-07 PROCEDURE — 99213 OFFICE O/P EST LOW 20 MIN: CPT

## 2023-04-07 NOTE — HISTORY OF PRESENT ILLNESS
[FreeTextEntry1] : Very pleasant 48-year-old woman who presents for follow-up of urinary urgency, stress urinary incontinence, left flank pain, history of kidney stones.  She reports a history of kidney stones in the past for which she underwent ESWL.  \par \par She underwent a CT scan 3/2023 which demonstrates no kidney stones, hydronephrosis, renal masses.  She reports resolution of fevers and chills at this time.

## 2023-04-07 NOTE — ASSESSMENT
[FreeTextEntry1] : Very pleasant 48-year-old woman who presents for follow-up of kidney stones, urinary urgency, stress incontinence\par -CT images reviewed demonstrating no kidney stones, hydronephrosis, renal masses\par -Urine culture negative\par -Urinalysis demonstrates 5 red blood cells per high-powered field\par -Cystoscopy; patient is considered to be low risk for urologic malignancy and wishes to avoid a cystoscopy at this time as she had one in July 2020\par -Follow-up in 6 months with repeat renal ultrasound given history of stones\par -I recommended that she follow-up with one of my partners who specializes in voiding dysfunction I have provided her with a reference to do so

## 2023-05-04 ENCOUNTER — APPOINTMENT (OUTPATIENT)
Dept: UROLOGY | Facility: CLINIC | Age: 48
End: 2023-05-04
Payer: MEDICAID

## 2023-05-04 DIAGNOSIS — R39.15 URGENCY OF URINATION: ICD-10-CM

## 2023-05-04 DIAGNOSIS — N39.3 STRESS INCONTINENCE (FEMALE) (MALE): ICD-10-CM

## 2023-05-04 PROCEDURE — 99214 OFFICE O/P EST MOD 30 MIN: CPT | Mod: 25

## 2023-05-04 PROCEDURE — 51798 US URINE CAPACITY MEASURE: CPT

## 2023-05-04 RX ORDER — CLONAZEPAM 0.5 MG/1
0.5 TABLET ORAL
Qty: 14 | Refills: 0 | Status: DISCONTINUED | COMMUNITY
Start: 2021-03-12 | End: 2023-05-04

## 2023-05-04 RX ORDER — SENNOSIDES 8.6 MG TABLETS 8.6 MG/1
8.6 TABLET ORAL
Qty: 30 | Refills: 1 | Status: DISCONTINUED | COMMUNITY
Start: 2021-03-12 | End: 2023-05-04

## 2023-05-04 RX ORDER — TROSPIUM CHLORIDE 60 MG/1
60 CAPSULE, EXTENDED RELEASE ORAL
Qty: 90 | Refills: 3 | Status: DISCONTINUED | COMMUNITY
Start: 2022-03-08 | End: 2023-05-04

## 2023-05-04 RX ORDER — DICLOFENAC SODIUM 1% 10 MG/G
1 GEL TOPICAL
Qty: 2 | Refills: 1 | Status: DISCONTINUED | COMMUNITY
Start: 2021-05-03 | End: 2023-05-04

## 2023-05-04 RX ORDER — CLOTRIMAZOLE 10 MG/G
1 CREAM VAGINAL
Qty: 1 | Refills: 0 | Status: DISCONTINUED | COMMUNITY
Start: 2021-03-12 | End: 2023-05-04

## 2023-05-04 RX ORDER — TROSPIUM CHLORIDE 20 MG/1
20 TABLET, FILM COATED ORAL
Qty: 180 | Refills: 2 | Status: DISCONTINUED | COMMUNITY
Start: 2022-03-10 | End: 2023-05-04

## 2023-05-04 RX ORDER — NITROFURANTOIN (MONOHYDRATE/MACROCRYSTALS) 25; 75 MG/1; MG/1
100 CAPSULE ORAL
Qty: 90 | Refills: 3 | Status: DISCONTINUED | COMMUNITY
Start: 2020-06-18 | End: 2023-05-04

## 2023-05-04 RX ORDER — MIRTAZAPINE 15 MG/1
15 TABLET, FILM COATED ORAL
Qty: 30 | Refills: 2 | Status: DISCONTINUED | COMMUNITY
Start: 2021-03-12 | End: 2023-05-04

## 2023-05-04 RX ORDER — TAMSULOSIN HYDROCHLORIDE 0.4 MG/1
0.4 CAPSULE ORAL
Qty: 60 | Refills: 2 | Status: DISCONTINUED | COMMUNITY
Start: 2020-06-18 | End: 2023-05-04

## 2023-05-04 RX ORDER — FERROUS GLUCONATE 324(38)MG
324 (38 FE) TABLET ORAL
Qty: 90 | Refills: 1 | Status: DISCONTINUED | COMMUNITY
Start: 2021-09-22 | End: 2023-05-04

## 2023-05-04 RX ORDER — POLYETHYLENE GLYCOL 3350 17 G/17G
17 POWDER, FOR SOLUTION ORAL
Qty: 1 | Refills: 0 | Status: DISCONTINUED | COMMUNITY
Start: 2021-03-12 | End: 2023-05-04

## 2023-05-04 RX ORDER — GABAPENTIN 300 MG/1
300 CAPSULE ORAL
Qty: 30 | Refills: 0 | Status: DISCONTINUED | COMMUNITY
Start: 2021-06-07 | End: 2023-05-04

## 2023-05-04 RX ORDER — ERGOCALCIFEROL 1.25 MG/1
1.25 MG CAPSULE, LIQUID FILLED ORAL
Qty: 12 | Refills: 1 | Status: DISCONTINUED | COMMUNITY
Start: 2021-09-22 | End: 2023-05-04

## 2023-05-04 RX ORDER — AMOXICILLIN 500 MG/1
500 TABLET, FILM COATED ORAL
Qty: 30 | Refills: 0 | Status: DISCONTINUED | COMMUNITY
Start: 2020-06-04 | End: 2023-05-04

## 2023-05-04 RX ORDER — OXYBUTYNIN CHLORIDE 10 MG/1
10 TABLET, EXTENDED RELEASE ORAL
Qty: 30 | Refills: 11 | Status: DISCONTINUED | COMMUNITY
Start: 2020-07-08 | End: 2023-05-04

## 2023-05-04 NOTE — HISTORY OF PRESENT ILLNESS
[Urinary Incontinence] : urinary incontinence [Urinary Urgency] : urinary urgency [Urinary Frequency] : urinary frequency [FreeTextEntry1] : 48F presents for initial evaluation of urinary urgency and FABRIZIO \par Referred by Dr. Lyon  \par \par PMH significant for: HLD, hyperparathyroid, constipation, anemia, nephrolithiasis, PTSD \par PSH significant for: parathyroid resection, renal lithotripsy, tracheotomy x2 \par Significant meds: nothing\par \par Patient reports years-long history of mixed urinary incontinence (FABRIZIO>UUI) \par Reports mild level of distress \par Associated with urinary frequency, bladder pain \par Previously treated with trospium and oxybutynin\par \par Daytime Frequency: 5-6\par Nighttime Frequency: 0\par Pads per day: doesn’t wear pads, will change underwear when it occurs\par Straining to void: no\par Subjective Incomplete Emptying: yes \par \par Daily Fluid Total: 6 cups\par Daily Caffeine Total: 8 oz \par  \par Fecal Incontinence: no\par Neurologic Hx, Vision or Balance changes: no\par

## 2023-05-04 NOTE — PHYSICAL EXAM
[General Appearance - Well Developed] : well developed [General Appearance - Well Nourished] : well nourished [Normal Appearance] : normal appearance [Abdomen Soft] : soft [External Female Genitalia] : normal external genitalia [] : no respiratory distress [Affect] : the affect was normal [Mood] : the mood was normal [Normal Station and Gait] : the gait and station were normal for the patient's age

## 2023-05-04 NOTE — ASSESSMENT
[FreeTextEntry1] : Ms. MEJIA has seen me today for an evaluation of stress predominant mixed urinary incontinence.  Her symptoms have been associated with lower abdominal pressure and have been refractory to 2 anticholinergic medications.\par Previous notes from Antonio Moran and Sonali reviewed \par UA: WNL, PVR: 25 indicates she is emptying well\par I was unable to elicit stress urinary incontinence on exam.  This corroborates previous notes stating the same with simple cystometry.\par \par Recommendations\par -UDS\par -We will formulate a plan based on these results.

## 2023-05-09 ENCOUNTER — NON-APPOINTMENT (OUTPATIENT)
Age: 48
End: 2023-05-09

## 2023-05-09 LAB — BACTERIA UR CULT: NORMAL

## 2023-05-25 ENCOUNTER — APPOINTMENT (OUTPATIENT)
Dept: UROLOGY | Facility: CLINIC | Age: 48
End: 2023-05-25

## 2023-05-30 ENCOUNTER — OUTPATIENT (OUTPATIENT)
Dept: OUTPATIENT SERVICES | Facility: HOSPITAL | Age: 48
LOS: 1 days | End: 2023-05-30
Payer: MEDICAID

## 2023-05-30 ENCOUNTER — APPOINTMENT (OUTPATIENT)
Dept: CT IMAGING | Facility: IMAGING CENTER | Age: 48
End: 2023-05-30
Payer: MEDICAID

## 2023-05-30 DIAGNOSIS — R06.09 OTHER FORMS OF DYSPNEA: ICD-10-CM

## 2023-05-30 DIAGNOSIS — U07.1 COVID-19: ICD-10-CM

## 2023-05-30 PROCEDURE — 71250 CT THORAX DX C-: CPT

## 2023-05-30 PROCEDURE — 71250 CT THORAX DX C-: CPT | Mod: 26

## 2023-06-01 ENCOUNTER — APPOINTMENT (OUTPATIENT)
Dept: UROLOGY | Facility: CLINIC | Age: 48
End: 2023-06-01

## 2023-06-02 ENCOUNTER — NON-APPOINTMENT (OUTPATIENT)
Age: 48
End: 2023-06-02

## 2023-06-21 ENCOUNTER — TRANSCRIPTION ENCOUNTER (OUTPATIENT)
Age: 48
End: 2023-06-21

## 2023-06-21 ENCOUNTER — APPOINTMENT (OUTPATIENT)
Dept: PULMONOLOGY | Facility: CLINIC | Age: 48
End: 2023-06-21
Payer: MEDICAID

## 2023-06-21 PROCEDURE — 94060 EVALUATION OF WHEEZING: CPT

## 2023-06-21 PROCEDURE — 94726 PLETHYSMOGRAPHY LUNG VOLUMES: CPT

## 2023-06-21 PROCEDURE — 94729 DIFFUSING CAPACITY: CPT

## 2023-06-30 NOTE — H&P ADULT - PROBLEM/PLAN-1
“Patient's name, , procedure and correct site were confirmed during the [place] Timeout.”
DISPLAY PLAN FREE TEXT

## 2023-07-12 ENCOUNTER — NON-APPOINTMENT (OUTPATIENT)
Age: 48
End: 2023-07-12

## 2023-07-25 ENCOUNTER — APPOINTMENT (OUTPATIENT)
Dept: PULMONOLOGY | Facility: CLINIC | Age: 48
End: 2023-07-25

## 2023-07-26 NOTE — HISTORY OF PRESENT ILLNESS
Rx refilled through date of next appt.    [Home] : at home, [unfilled] , at the time of the visit. [Medical Office: ([facility])___] : at the medical office located in  [Verbal consent obtained from patient] : the patient, [unfilled] [FreeTextEntry1] : f/u telehealth , post covid\par \par pt cont to improve\par \par she is off 02 at rest, 02 sats low 90s\par still using 02 for exertion and sleep\par doing well with PT\par walking in the house without a walker anymore\par still hasn’t been outside\par \par still has c/o intermittent HA\par and still some left hand numbness and dec fxn.\par \par On exam, looks great\par \par -cont PT\par -OT ordered as well\par -Neuro evalautaion requested\par -slowly taper off 02 as able\par f/u in person!

## 2023-09-05 NOTE — CONSULT NOTE ADULT - ASSESSMENT
Spoke to patient regarding high BP.     Patient informed her BP was normal at PCP office this AM. It was 124/64. At the dentist office it was 170/101. BP was repeated twice and was informed by them, they will not proceed with procedure. Referred her to oral surgeon.     Patient's BP at home was 144/102. No chest pain, no dizziness, no lightheadedness. Patient wasn't sure if it was anxiety.     Patient advised to monitor and take down BP reading for the next few days.  Directed to go to Urgent care or ER if experiencing chest pain, SOB, lightheaded, dizziness for further evaluation.     Patient verbalized understanding.    47 yo woman with BMI 40, admitted 1/3/2020 for acute hypoxic respiratory failure 2/2 COVID-19, transferred to MICU on 1/5/20 (intubated 1/5 - 1/10), transferred to medicine floor on BiPAP, trialed on HF however switched back to BiPAP at RRT 1/11 for desaturation. MICU reconsulted for dyspnea, tachypnea (30s-40s), increased WOB.     #COVID-PNA  - Currently with adequate SpO2 >92% on BiLevel  - Tachypnea improving, however suspect exacerbated by air-leak on device  - Recommend up-sizing BiLevel to reduce airleak  - Pt does not require MICU care at this time     Mark Hellerman PGY3  Internal Medicine MICU resident   Pager [phone] 45 yo woman with BMI 40, admitted 1/3/2020 for acute hypoxic respiratory failure 2/2 COVID-19, transferred to MICU on 1/5/20 (intubated 1/5 - 1/10), transferred to medicine floor on BiPAP, trialed on HF however switched back to BiPAP at RRT 1/11 for desaturation. MICU reconsulted for dyspnea, tachypnea (30s-40s), increased WOB.     #Acute hypoxic respiratory failure   - Given tachypnea and increased WOB would likely benefit from intubation   - Pt currently refusing intubation at this time  - Recommend trial of HiFlow however if respiratory status continues to deteriorate low threshold for re-intubation       Mark Hellerman PGY3  Internal Medicine MICU resident   Pager [phone]

## 2023-10-13 ENCOUNTER — APPOINTMENT (OUTPATIENT)
Dept: UROLOGY | Facility: CLINIC | Age: 48
End: 2023-10-13

## 2024-08-02 ENCOUNTER — NON-APPOINTMENT (OUTPATIENT)
Age: 49
End: 2024-08-02

## 2024-11-29 ENCOUNTER — NON-APPOINTMENT (OUTPATIENT)
Age: 49
End: 2024-11-29

## 2024-12-18 ENCOUNTER — NON-APPOINTMENT (OUTPATIENT)
Age: 49
End: 2024-12-18

## 2025-03-17 ENCOUNTER — NON-APPOINTMENT (OUTPATIENT)
Age: 50
End: 2025-03-17

## 2025-03-24 ENCOUNTER — NON-APPOINTMENT (OUTPATIENT)
Age: 50
End: 2025-03-24

## 2025-07-29 NOTE — PHYSICAL EXAM
[General Appearance - Well Developed] : well developed [General Appearance - Well Nourished] : well nourished [Well Groomed] : well groomed [Normal Appearance] : normal appearance [General Appearance - In No Acute Distress] : no acute distress [Edema] : no peripheral edema [Heart Rate And Rhythm] : Heart rate and rhythm were normal [Exaggerated Use Of Accessory Muscles For Inspiration] : no accessory muscle use [Respiration, Rhythm And Depth] : normal respiratory rhythm and effort [Abdomen Soft] : soft [Abdomen Tenderness] : non-tender [Costovertebral Angle Tenderness] : no ~M costovertebral angle tenderness [Urinary Bladder Findings] : the bladder was normal on palpation [Normal Station and Gait] : the gait and station were normal for the patient's age [] : no rash [No Focal Deficits] : no focal deficits [Oriented To Time, Place, And Person] : oriented to person, place, and time [Mood] : the mood was normal [Affect] : the affect was normal [Not Anxious] : not anxious [No Palpable Adenopathy] : no palpable adenopathy [FreeTextEntry1] : soft and non tender.  Mild left CVA tenderness English

## 2025-09-08 ENCOUNTER — APPOINTMENT (OUTPATIENT)
Dept: NEUROLOGY | Facility: CLINIC | Age: 50
End: 2025-09-08
Payer: MEDICAID

## 2025-09-08 VITALS
HEART RATE: 82 BPM | HEIGHT: 64 IN | OXYGEN SATURATION: 96 % | DIASTOLIC BLOOD PRESSURE: 78 MMHG | WEIGHT: 260 LBS | BODY MASS INDEX: 44.39 KG/M2 | SYSTOLIC BLOOD PRESSURE: 120 MMHG | RESPIRATION RATE: 20 BRPM

## 2025-09-08 DIAGNOSIS — R20.0 ANESTHESIA OF SKIN: ICD-10-CM

## 2025-09-08 PROCEDURE — 99205 OFFICE O/P NEW HI 60 MIN: CPT

## 2025-09-08 PROCEDURE — G2211 COMPLEX E/M VISIT ADD ON: CPT | Mod: NC
